# Patient Record
Sex: MALE | Race: WHITE | NOT HISPANIC OR LATINO | Employment: UNEMPLOYED | URBAN - METROPOLITAN AREA
[De-identification: names, ages, dates, MRNs, and addresses within clinical notes are randomized per-mention and may not be internally consistent; named-entity substitution may affect disease eponyms.]

---

## 2017-01-30 ENCOUNTER — TELEPHONE (OUTPATIENT)
Dept: CARDIOTHORACIC SURGERY | Facility: CLINIC | Age: 62
End: 2017-01-30

## 2017-01-30 NOTE — TELEPHONE ENCOUNTER
Returned call, will mail appointment slip closer to the scheduling period during April.  Verbalized understanding and is agreeable.

## 2017-01-30 NOTE — TELEPHONE ENCOUNTER
----- Message from Brian Leal sent at 1/30/2017  1:03 PM CST -----  Pt needs to schedule yearly f/u. Please call regarding this at [phone]

## 2017-05-17 ENCOUNTER — TELEPHONE (OUTPATIENT)
Dept: CARDIOTHORACIC SURGERY | Facility: CLINIC | Age: 62
End: 2017-05-17

## 2017-05-17 ENCOUNTER — HOSPITAL ENCOUNTER (OUTPATIENT)
Dept: RADIOLOGY | Facility: HOSPITAL | Age: 62
Discharge: HOME | End: 2017-05-17
Attending: THORACIC SURGERY (CARDIOTHORACIC VASCULAR SURGERY)
Payer: COMMERCIAL

## 2017-05-17 ENCOUNTER — OFFICE VISIT (OUTPATIENT)
Dept: CARDIOTHORACIC SURGERY | Facility: CLINIC | Age: 62
End: 2017-05-17
Payer: COMMERCIAL

## 2017-05-17 ENCOUNTER — DOCUMENTATION ONLY (OUTPATIENT)
Dept: CARDIOTHORACIC SURGERY | Facility: CLINIC | Age: 62
End: 2017-05-17

## 2017-05-17 VITALS
HEART RATE: 68 BPM | BODY MASS INDEX: 40.92 KG/M2 | WEIGHT: 292.31 LBS | HEIGHT: 71 IN | SYSTOLIC BLOOD PRESSURE: 127 MMHG | DIASTOLIC BLOOD PRESSURE: 83 MMHG | OXYGEN SATURATION: 94 %

## 2017-05-17 DIAGNOSIS — D3A.090 CARCINOID TUMOR OF LEFT LUNG: ICD-10-CM

## 2017-05-17 DIAGNOSIS — D3A.090 CARCINOID TUMOR OF LUNG: Primary | ICD-10-CM

## 2017-05-17 DIAGNOSIS — K76.9 LIVER LESION: ICD-10-CM

## 2017-05-17 DIAGNOSIS — D3A.00 CARCINOID TUMOR: Primary | ICD-10-CM

## 2017-05-17 PROCEDURE — 71250 CT THORAX DX C-: CPT | Mod: 26,,, | Performed by: RADIOLOGY

## 2017-05-17 PROCEDURE — 99999 PR PBB SHADOW E&M-EST. PATIENT-LVL III: CPT | Mod: PBBFAC,,, | Performed by: THORACIC SURGERY (CARDIOTHORACIC VASCULAR SURGERY)

## 2017-05-17 PROCEDURE — 99213 OFFICE O/P EST LOW 20 MIN: CPT | Mod: S$GLB,,, | Performed by: THORACIC SURGERY (CARDIOTHORACIC VASCULAR SURGERY)

## 2017-05-17 PROCEDURE — 99213 OFFICE O/P EST LOW 20 MIN: CPT | Mod: PBBFAC,25 | Performed by: THORACIC SURGERY (CARDIOTHORACIC VASCULAR SURGERY)

## 2017-05-17 NOTE — PATIENT CARE CONFERENCE
"[facility]      THORACIC MULTIDISCIPLINARY TUMOR BOARD  PATIENT REVIEW FORM  ________________________________________________________________________    CLINIC #: [id number]  DATE: 05/17/2017    TUMOR SITE:   Carcinoid    PRESENTER:   Dr. Bell    PATIENT SUMMARY:   62 y/o with typical carcinoid of the left lung. S/p thoracotomy with lung resection for ALIRIO carcinoid on 1/28/16. Pt presents today with repeat surveillance CT chest.   CT chest revealed "1.  Status post resection of left upper lobe and lingula for lingular carcinoid.  Stable postoperative findings.  2.  Several low attenuation lesions are apparent in the liver, new since the earlier studies.  One of the largest lesions is located in the anterior aspect of the RIGHT lobe of the liver, 4 x 2.8 cm (axial series 2 image 57).  Findings are concerning for hepatic metastases of unknown primary.  Sclerotic lesions in multiple vertebral bodies and in the manubrium are also concerning for hematogenous metastases.  3.  New subcentimeter soft tissue nodule in the extrapleural fat below the right middle lobe may represent an enlarge lymph node.  4.  Two subsolid opacities were identified in the left lower lobe on the most recent chest CT, 8/17/2016, the larger measuring 2.4 x 2.0 cm.  Those areas of subtle attenuation have resolved.  No new or persistent lung disease or pleural disease identified on today's study."    BOARD RECOMMENDATIONS:   Obtain triple phase CT abdomen and IR liver biopsy. Recommend Gallium 68 PET based on liver biopsy results.     CONSULT NEEDED:     [] Surgery    [] Hem/Onc    [] Rad/Onc    [] Dietary                 [] Social Service    [] Psychology       [] Pulmonology    Clinical Stage: Tumor  Node(s)  Metastasis   Pathologic Stage: Tumor  Node(s)  Metastasis   CD31: 40 vessels per 1 HPF  Factor VIII: 27 vessels per 1 HPF  Ki67: 1 % tumor cells staining    GROUP STAGE:     [] O    [] 1A    [] IB    [] IIA    [] IIB     [] IIIA  "    [] IIIB     [] IIIC    [] IV                               [] Local recurrence     [] Regional recurrence     [] Distant recurrence                   [] NSCLC     [] SCLC     Tumor type: Typical carcinoid     Unstageable:      [] Yes     [] No  Metastatic site(s):          [x] Pauly'l Treatment Guidelines reviewed and care planned is consistent with guidelines.         (i.e., NCCN, NCI, PD, ACO, AUA, etc.)    PRESENTATION AT CANCER CONFERENCE:         [] Prospective    [] Retrospective     [] Follow-Up          [] Eligible for clinical trial

## 2017-05-17 NOTE — TELEPHONE ENCOUNTER
Called to confirm scheduling of CT scan of abdomen.  Agreeable with scheduling for Friday 5/19 at [facility].  Instructions given NPO 4 hrs before procedure and to arrive  1 hr before scheduled testing.

## 2017-05-17 NOTE — MR AVS SNAPSHOT
"    [facility]  [address]  [facility] [address]  Phone: [phone]  Fax: [phone]                  Elroy Avilaups   2017 10:15 AM   Office Visit    Description:  Male : 1955   Provider:  Pan Bell MD   Department:  [facility]           Reason for Visit     Follow-up                To Do List           Goals (5 Years of Data)     None      [facility]     [facility] On Call Nurse Care Line -  Assistance  Unless otherwise directed by your provider, please contact [facility], our nurse care line that is available for  assistance.     Registered nurses in the [facility] provide: appointment scheduling, clinical advisement, health education, and other advisory services.  Call: [phone] (toll free)               Medications           STOP taking these medications     CARTIA  mg 24 hr capsule     flecainide (TAMBOCOR) 100 MG Tab            Verify that the below list of medications is an accurate representation of the medications you are currently taking.  If none reported, the list may be blank. If incorrect, please contact your healthcare provider. Carry this list with you in case of emergency.           Current Medications            Clinical Reference Information           Your Vitals Were     BP Pulse Height Weight SpO2 BMI    127/83 68 5' 11" (1.803 m) 132.6 kg (292 lb 5.3 oz) 94% 40.77 kg/m2      Blood Pressure          Most Recent Value    BP  127/83      Allergies as of 2017     No Known Allergies      Immunizations Administered on Date of Encounter - 2017     None      [facility] Sign-Up     Activating your [facility] account is as easy as 1-2-3!     1) Visit [email or web address], select Sign Up Now, enter this activation code and your date of birth, then select Next.  Activation code not generated  Current Patient Portal Status: Account disabled      2) Create a username and password to use when you " visit [email or web address] in the future and select a security question in case you lose your password and select Next.    3) Enter your e-mail address and click Sign Up!    Additional Information  If you have questions, please e-mail [email or web address] or call [phone] to talk to our [facility] staff. Remember, [facility] is NOT to be used for urgent needs. For medical emergencies, dial 911.         Language Assistance Services     ATTENTION: Language assistance services are available, free of charge. Please call [phone].      ATENCIÓN: Si habla español, tiene a grover disposición servicios gratuitos de asistencia lingüística. Llame al [phone].     CHÚ Ý: N?u b?n nói Ti?ng Vi?t, có các d?ch v? h? tr? ngôn ng? mi?n phí dành cho b?n. G?i s? [phone].         [facility] complies with applicable Federal civil rights laws and does not discriminate on the basis of race, color, national origin, age, disability, or sex.

## 2017-05-17 NOTE — PROGRESS NOTES
GENERAL SURGERY  Progress Note      SUBJECTIVE:     Elroy Rahman is a 61 y.o. male s/p thoracotomy with lung resection for ALIRIO carcinoid who presents for his 9 month follow up with repeat CT. His post-operative course was complicated by atrial fibrillation while inpatient which was successfully cardioverted and started on flecainide. However, he states he has been taken off of his medication since his last visit. He only takes a daily multivitamin. He denies SOB, chest pain, or chest palpitations.     Follow up CT shows no new concerning lesions or pulmonary pathology.     OBJECTIVE:     Most Recent Vitals:  Pulse: 68 (05/17/17 0954)  BP: 127/83 (05/17/17 0954)  SpO2: (!) 94 % (05/17/17 0954)    Physical Exam  Gen: NAD  Lungs: CTAB  Heart: RRR  Extremities: WWP, left leg prosthetic    Diagnostic Studes:  CT: Reviewed  No new concerning lesions or pulmonary pathology    ASSESSMENT:     Elroy Rahman is a 61 y.o. male 9 months s/p thoracotomy with lung resection for ALIRIO carcinoid who has no clinical or radiographic evidence of new disease.    Plan:    - RTC in 9 months with Chest CT w/o contrast    ATTENDING ATTESTATION:    I evaluated the patient and I agree with the assessment and plan.  The LLL ground glass density appears to have resolved.  The soft tissue density adjacent to the bronchial stump remains stable.  I see no new lung nodules or adenopathy. There are multiple subtle liver densities present.  I discussed the patient's case at our multidisciplinary lung conference.  A triple-phase abdominal CT scan followed by IR biopsy was recommended.  It was strongly felt that the liver lesions are likely from an intraabdominal source if they prove to be malignant. We will make the necessary referrals based upon the path findings from the percutaneous liver biopsy. I will see the patient to discuss the liver biopsy findings.  From a carcinoid tumor standpoint, I will see the patient again in 9 months.  If there  is no evidence of recurrent lung disease at that time, I will extend the follow up interval to yearly follow up.

## 2017-05-18 DIAGNOSIS — R16.0 LIVER MASS: Primary | ICD-10-CM

## 2017-05-19 ENCOUNTER — HOSPITAL ENCOUNTER (OUTPATIENT)
Dept: RADIOLOGY | Facility: HOSPITAL | Age: 62
Discharge: HOME | End: 2017-05-19
Attending: THORACIC SURGERY (CARDIOTHORACIC VASCULAR SURGERY)
Payer: COMMERCIAL

## 2017-05-19 DIAGNOSIS — K76.9 LIVER LESION: ICD-10-CM

## 2017-05-19 PROCEDURE — 74160 CT ABDOMEN W/CONTRAST: CPT | Mod: 26,,, | Performed by: RADIOLOGY

## 2017-05-19 PROCEDURE — 25500020 PHARM REV CODE 255: Performed by: THORACIC SURGERY (CARDIOTHORACIC VASCULAR SURGERY)

## 2017-05-19 PROCEDURE — 74160 CT ABDOMEN W/CONTRAST: CPT | Mod: TC

## 2017-05-19 RX ADMIN — IOHEXOL 100 ML: 350 INJECTION, SOLUTION INTRAVENOUS at 11:05

## 2017-05-31 ENCOUNTER — TELEPHONE (OUTPATIENT)
Dept: CARDIOTHORACIC SURGERY | Facility: CLINIC | Age: 62
End: 2017-05-31

## 2017-05-31 NOTE — TELEPHONE ENCOUNTER
Dr. Bell spoke with the pt's wife, discussed the need for IR liver biopsy after pt had the CT abdomen. Pt is agreeable to proceed. Notified Nena in IR the pt is ready to schedule. She will reach out to the pt to set up the bx.    ----- Message from Billy Beebe sent at 5/31/2017 12:41 PM CDT -----  [phone]//pt states that he needs to speak with nurse in ref to his ct results and what is being scheduled and why//please call/thank you

## 2017-06-02 DIAGNOSIS — K76.9 LIVER LESION: Primary | ICD-10-CM

## 2017-06-08 ENCOUNTER — SURGERY (OUTPATIENT)
Age: 62
End: 2017-06-08

## 2017-06-08 ENCOUNTER — HOSPITAL ENCOUNTER (OUTPATIENT)
Facility: HOSPITAL | Age: 62
Discharge: HOME | End: 2017-06-08
Attending: RADIOLOGY | Admitting: RADIOLOGY
Payer: COMMERCIAL

## 2017-06-08 VITALS
DIASTOLIC BLOOD PRESSURE: 70 MMHG | TEMPERATURE: 98 F | SYSTOLIC BLOOD PRESSURE: 119 MMHG | HEART RATE: 64 BPM | OXYGEN SATURATION: 98 % | RESPIRATION RATE: 18 BRPM | BODY MASS INDEX: 36.4 KG/M2 | WEIGHT: 260 LBS | HEIGHT: 71 IN

## 2017-06-08 DIAGNOSIS — K76.9 LIVER LESION: ICD-10-CM

## 2017-06-08 LAB
BASOPHILS # BLD AUTO: 0.02 K/UL
BASOPHILS NFR BLD: 0.4 %
DIFFERENTIAL METHOD: NORMAL
EOSINOPHIL # BLD AUTO: 0.1 K/UL
EOSINOPHIL NFR BLD: 1.8 %
ERYTHROCYTE [DISTWIDTH] IN BLOOD BY AUTOMATED COUNT: 13.5 %
HCT VFR BLD AUTO: 43.9 %
HGB BLD-MCNC: 15 G/DL
INR PPP: 1
LYMPHOCYTES # BLD AUTO: 1.5 K/UL
LYMPHOCYTES NFR BLD: 25.7 %
MCH RBC QN AUTO: 29.2 PG
MCHC RBC AUTO-ENTMCNC: 34.2 %
MCV RBC AUTO: 85 FL
MONOCYTES # BLD AUTO: 0.6 K/UL
MONOCYTES NFR BLD: 9.7 %
NEUTROPHILS # BLD AUTO: 3.5 K/UL
NEUTROPHILS NFR BLD: 62.2 %
PLATELET # BLD AUTO: 236 K/UL
PMV BLD AUTO: 9.9 FL
PROTHROMBIN TIME: 10.6 SEC
RBC # BLD AUTO: 5.14 M/UL
WBC # BLD AUTO: 5.67 K/UL

## 2017-06-08 PROCEDURE — 88307 TISSUE EXAM BY PATHOLOGIST: CPT | Performed by: PATHOLOGY

## 2017-06-08 PROCEDURE — 88342 IMHCHEM/IMCYTCHM 1ST ANTB: CPT | Mod: 26,59,, | Performed by: PATHOLOGY

## 2017-06-08 PROCEDURE — 88333 PATH CONSLTJ SURG CYTO XM 1: CPT | Mod: 26,,, | Performed by: PATHOLOGY

## 2017-06-08 PROCEDURE — 88360 TUMOR IMMUNOHISTOCHEM/MANUAL: CPT | Mod: 26,,, | Performed by: PATHOLOGY

## 2017-06-08 PROCEDURE — 85610 PROTHROMBIN TIME: CPT

## 2017-06-08 PROCEDURE — 63600175 PHARM REV CODE 636 W HCPCS: Performed by: RADIOLOGY

## 2017-06-08 PROCEDURE — 25000003 PHARM REV CODE 250: Performed by: NURSE PRACTITIONER

## 2017-06-08 PROCEDURE — 85025 COMPLETE CBC W/AUTO DIFF WBC: CPT

## 2017-06-08 PROCEDURE — 88341 IMHCHEM/IMCYTCHM EA ADD ANTB: CPT | Mod: 26,,, | Performed by: PATHOLOGY

## 2017-06-08 PROCEDURE — 25000003 PHARM REV CODE 250: Performed by: RADIOLOGY

## 2017-06-08 RX ORDER — SODIUM CHLORIDE 9 MG/ML
500 INJECTION, SOLUTION INTRAVENOUS CONTINUOUS
Status: DISCONTINUED | OUTPATIENT
Start: 2017-06-08 | End: 2017-06-08 | Stop reason: HOSPADM

## 2017-06-08 RX ORDER — MIDAZOLAM HYDROCHLORIDE 1 MG/ML
INJECTION, SOLUTION INTRAMUSCULAR; INTRAVENOUS CODE/TRAUMA/SEDATION MEDICATION
Status: COMPLETED | OUTPATIENT
Start: 2017-06-08 | End: 2017-06-08

## 2017-06-08 RX ORDER — LIDOCAINE HYDROCHLORIDE 10 MG/ML
1 INJECTION, SOLUTION EPIDURAL; INFILTRATION; INTRACAUDAL; PERINEURAL ONCE
Status: COMPLETED | OUTPATIENT
Start: 2017-06-08 | End: 2017-06-08

## 2017-06-08 RX ORDER — FENTANYL CITRATE 50 UG/ML
INJECTION, SOLUTION INTRAMUSCULAR; INTRAVENOUS CODE/TRAUMA/SEDATION MEDICATION
Status: COMPLETED | OUTPATIENT
Start: 2017-06-08 | End: 2017-06-08

## 2017-06-08 RX ORDER — HYDROCODONE BITARTRATE AND ACETAMINOPHEN 5; 325 MG/1; MG/1
1 TABLET ORAL EVERY 4 HOURS PRN
Status: DISCONTINUED | OUTPATIENT
Start: 2017-06-08 | End: 2017-06-08 | Stop reason: HOSPADM

## 2017-06-08 RX ADMIN — FENTANYL CITRATE 50 MCG: 50 INJECTION, SOLUTION INTRAMUSCULAR; INTRAVENOUS at 08:06

## 2017-06-08 RX ADMIN — SODIUM CHLORIDE 500 ML: 0.9 INJECTION, SOLUTION INTRAVENOUS at 07:06

## 2017-06-08 RX ADMIN — LIDOCAINE HYDROCHLORIDE 0.2 MG: 10 INJECTION, SOLUTION EPIDURAL; INFILTRATION; INTRACAUDAL; PERINEURAL at 07:06

## 2017-06-08 RX ADMIN — MIDAZOLAM HYDROCHLORIDE 1 MG: 1 INJECTION, SOLUTION INTRAMUSCULAR; INTRAVENOUS at 08:06

## 2017-06-08 NOTE — PLAN OF CARE
Pt arrived to ROCU bed 3 for 3 hour post CT guided liver biopsy recovery. Report received from SONIA Pemberton. Pt oriented to unit and staff.  Stretcher locked and placed in lowest position. Calming environment promoted. Comfort measures provided. Pt resting comfortably. Dressing CDI. VSS. No acute events. Wife to bedside. See flow sheets for post procedure monitoring. Pt denies pain/discomfort.  Will continue to monitor.

## 2017-06-08 NOTE — PROCEDURES
"Radiology Post-Procedure Note    Pre Op Diagnosis: right liver mass    Post Op Diagnosis: right liver mass    Procedure: right liver biopsy    Procedure performed by: Salvador Rudd MD and Soren Abarca MD    Written Informed Consent Obtained: Yes    Specimen Removed: YES  4 passes    Estimated Blood Loss: Minimal    Findings:   Using CT guidance a 19g sheath needle was placed into a right liver mass. 20g monopty biopsy gun used to take 4 core biopsy samples. Specimen sent to pathology.     Patient tolerated procedure well.    Soren Abarca MD (Buck)  Radiology PGY-3  [phone]      "

## 2017-06-08 NOTE — PROGRESS NOTES
Pt given discharge instructions/handouts. Pt and daughter both verbalize understanding. Questions answered. PIV dc'd. No acute events. Pt resting comfortably denies pain/discomfort. Pt to garage with all personal belongings. Pt refused transport, ambulating to garage. Daughter to provide transport home.

## 2017-06-08 NOTE — H&P
Radiology History & Physical      SUBJECTIVE:     Chief Complaint: multiple liver lesions    History of Present Illness:  Elroy Rahman is a 61 y.o. male who presents for Liver mass biopsy.    Past Medical History:   Diagnosis Date    Carcinoid bronchial adenoma of left lung     Cough with hemoptysis     mild/intermittent    Mild heartburn     Sleep apnea     Snores      Past Surgical History:   Procedure Laterality Date    Bka Left        Home Meds:   Prior to Admission medications    Not on File     Anticoagulants/Antiplatelets: no anticoagulation    Allergies: Review of patient's allergies indicates:  No Known Allergies  Sedation History:  no adverse reactions    Review of Systems:   Hematological: no known coagulopathies  Respiratory: no shortness of breath  Cardiovascular: no chest pain  Gastrointestinal: no abdominal pain  Genito-Urinary: no dysuria  Musculoskeletal: negative  Neurological: no TIA or stroke symptoms         OBJECTIVE:     Vital Signs (Most Recent)  Temp: 97.8 °F (36.6 °C) (06/08/17 0732)  Pulse: 62 (06/08/17 0732)  Resp: 20 (06/08/17 0732)  BP: 132/77 (06/08/17 0732)  SpO2: 96 % (06/08/17 0732)    Physical Exam:  ASA: 2  Mallampati: 2    General: no acute distress  Mental Status: alert and oriented to person, place and time  HEENT: normocephalic, atraumatic  Chest: unlabored breathing  Heart: regular heart rate  Abdomen: nondistended  Extremity: moves all extremities    Laboratory  Lab Results   Component Value Date    INR 1.0 06/08/2017       Lab Results   Component Value Date    WBC 5.67 06/08/2017    HGB 15.0 06/08/2017    HCT 43.9 06/08/2017    MCV 85 06/08/2017     06/08/2017      Lab Results   Component Value Date     05/19/2017     05/19/2017    K 4.1 05/19/2017     05/19/2017    CO2 28 05/19/2017    BUN 13 05/19/2017    CREATININE 1.0 05/19/2017    CALCIUM 9.6 05/19/2017    MG 2.0 02/04/2016       ASSESSMENT/PLAN:     Sedation Plan: moderate  Patient  "will undergo Liver mass biopsy.    Soren Abarca MD ([facility])  Radiology PGY-3  [phone]      "

## 2017-06-08 NOTE — PROGRESS NOTES
Liver biopsy complete. Pt tolerated well. VSS. No signs or symptoms of distress noted.  Pressure held per MD.  Bandaid to ruq CDI.  Pt to remain right side down for one hour, up at 1015.  Pt will be transferred to ROCU bed and report to be given at bedside.

## 2017-06-08 NOTE — DISCHARGE SUMMARY
"Radiology Discharge Summary      Hospital Course: No complications    Admit Date: 6/8/2017  Discharge Date: 06/08/2017     Instructions Given to Patient: Yes  Diet: Resume prior diet  Activity: activity as tolerated    Description of Condition on Discharge: Stable  Vital Signs (Most Recent): Temp: 98.4 °F (36.9 °C) (06/08/17 0915)  Pulse: 64 (06/08/17 1215)  Resp: 18 (06/08/17 1215)  BP: 119/70 (06/08/17 1215)  SpO2: 98 % (06/08/17 1215)    Discharge Disposition: Home    Discharge Diagnosis: liver mass    Follow-up: with Dr. Bell's clinic    Soren Abarca MD ([facility])  Radiology PGY-3  [phone]      "

## 2017-06-08 NOTE — PROGRESS NOTES
Pt arrived to  for Liver mass biopsy.  Name verified using two identifiers.  Allergies verified.  Will continue to monitor.

## 2017-06-14 DIAGNOSIS — C7B.00 METASTATIC CARCINOID TUMOR: Primary | ICD-10-CM

## 2017-06-15 DIAGNOSIS — C7B.8 SECONDARY NEUROENDOCRINE TUMOR OF LIVER: ICD-10-CM

## 2017-06-15 DIAGNOSIS — C7B.8 SECONDARY NEUROENDOCRINE TUMOR OF BONE(209.73): ICD-10-CM

## 2017-06-15 DIAGNOSIS — D3A.090 BRONCHIAL CARCINOID TUMORS: ICD-10-CM

## 2017-06-16 ENCOUNTER — OFFICE VISIT (OUTPATIENT)
Dept: CARDIOTHORACIC SURGERY | Facility: CLINIC | Age: 62
End: 2017-06-16
Payer: COMMERCIAL

## 2017-06-16 VITALS
WEIGHT: 297.63 LBS | BODY MASS INDEX: 41.67 KG/M2 | SYSTOLIC BLOOD PRESSURE: 136 MMHG | HEIGHT: 71 IN | OXYGEN SATURATION: 95 % | DIASTOLIC BLOOD PRESSURE: 74 MMHG | HEART RATE: 65 BPM

## 2017-06-16 DIAGNOSIS — C7B.02 METASTATIC MALIGNANT CARCINOID TUMOR TO LIVER: ICD-10-CM

## 2017-06-16 DIAGNOSIS — C7A.090 CARCINOID TUMOR, BRONCHUS, LUNG, MALIGNANT: Primary | ICD-10-CM

## 2017-06-16 PROCEDURE — 99213 OFFICE O/P EST LOW 20 MIN: CPT | Mod: S$GLB,,, | Performed by: THORACIC SURGERY (CARDIOTHORACIC VASCULAR SURGERY)

## 2017-06-16 PROCEDURE — 99999 PR PBB SHADOW E&M-EST. PATIENT-LVL III: CPT | Mod: PBBFAC,,, | Performed by: THORACIC SURGERY (CARDIOTHORACIC VASCULAR SURGERY)

## 2017-06-16 PROCEDURE — 99213 OFFICE O/P EST LOW 20 MIN: CPT | Mod: PBBFAC | Performed by: THORACIC SURGERY (CARDIOTHORACIC VASCULAR SURGERY)

## 2017-06-16 NOTE — PROGRESS NOTES
Subjective:       Patient ID: Elroy Rahman is a 61 y.o. male.    Chief Complaint: Follow-up    HPI   60 y/o with typical carcinoid of the left lung. S/p thoracotomy with lung resection for ALIRIO carcinoid on 1/28/16. On repeat surveillance CT chest on 5/17/17 noted to have several low attenuation lesions in the liver with there largest measuring 4 x 2.8 cm. Discussed at [facility] and recommendations for CT abdomen and liver biopsy. Subsequent IR liver biopsy on 6/8/17 revealing metastatic neuroendocrine tumor. Here today to discuss biopsy results.     Review of Systems      Objective:      Physical Exam      Pathology:   CT guided liver biopsy:  Positive for metastatic neuroendocrine tumor, see comment    CT abdomen 5/19/17:  There at least 9 discrete hepatic lesions as above, the majority of which are hypoenhancing. Findings consistent with metastasis.  Multiple sclerotic bony lesions, consistent with metastatic disease.  Stable right adrenal nodule, in keeping with an adenoma.    Assessment:       60 y/o with typical carcinoid of the left lung. S/p thoracotomy with lung resection for ALIRIO carcinoid on 1/28/16 with new liver lesions s/p IR biopsy revealing metastatic neuroendocrine tumor. Pathology discussed with patient.     Plan:       Plan for NM PET Gallium. Awaiting insurance approval. If not approved will plan for NM tumor localization with multi day Octreotide CT  Keep appointment with Dr. Florian     ATTENDING ATTESTATION:    I evaluated the patient and I agree with the assessment and plan.  The purpose of today's visit was to discuss the path report from the CT guided liver biopsy.  The path findings are consistent with metastatic neuroendocrine tumor to the liver.  The ki67 is 25% and there is no nuclear atypia or mitosis.  In comparison, the ki67 from the resected ALIRIO carcinoid tumor is <1%.  The patient has no symptoms and the liver lesions were identified during routine follow up.  Dr. London will see the  patient to discuss medical management.

## 2017-06-20 ENCOUNTER — TELEPHONE (OUTPATIENT)
Dept: CARDIOTHORACIC SURGERY | Facility: CLINIC | Age: 62
End: 2017-06-20

## 2017-06-20 NOTE — TELEPHONE ENCOUNTER
Called to remind pt of scheduled PET CT tomorrow 6/21.  Verbalized understanding of instructions NPO after midnight.

## 2017-06-21 ENCOUNTER — DOCUMENTATION ONLY (OUTPATIENT)
Dept: CARDIOTHORACIC SURGERY | Facility: CLINIC | Age: 62
End: 2017-06-21

## 2017-06-21 ENCOUNTER — HOSPITAL ENCOUNTER (OUTPATIENT)
Dept: RADIOLOGY | Facility: HOSPITAL | Age: 62
Discharge: HOME | End: 2017-06-21
Attending: INTERNAL MEDICINE
Payer: COMMERCIAL

## 2017-06-21 VITALS — WEIGHT: 297.38 LBS | BODY MASS INDEX: 41.48 KG/M2

## 2017-06-21 DIAGNOSIS — D3A.090 BRONCHIAL CARCINOID TUMORS: ICD-10-CM

## 2017-06-21 DIAGNOSIS — C7B.8 SECONDARY NEUROENDOCRINE TUMOR OF BONE(209.73): ICD-10-CM

## 2017-06-21 DIAGNOSIS — C7B.8 SECONDARY NEUROENDOCRINE TUMOR OF LIVER: ICD-10-CM

## 2017-06-21 PROCEDURE — A9587 GALLIUM GA-68: HCPCS

## 2017-06-21 PROCEDURE — 78815 PET IMAGE W/CT SKULL-THIGH: CPT | Mod: 26,PI,, | Performed by: RADIOLOGY

## 2017-06-21 NOTE — PATIENT CARE CONFERENCE
"[facility]      THORACIC MULTIDISCIPLINARY TUMOR BOARD  PATIENT REVIEW FORM  ________________________________________________________________________    CLINIC #: [id number]  DATE: 06/21/2017    TUMOR SITE:   Metastatic Neuroendocrine    PRESENTER:   Dr. Bell    PATIENT SUMMARY:   60 y/o with typical carcinoid of the left lung. S/p thoracotomy with lung resection for ALIRIO carcinoid on 1/28/16.   Restaging CT chest performed on 5/17/17 revealed   "1.Status post resection of left upper lobe and lingula for lingular carcinoid.  Stable postoperative findings.  2. Several low attenuation lesions are apparent in the liver, new since the earlier studies.  One of the largest lesions is located in the anterior aspect of the RIGHT lobe of the liver, 4 x 2.8 cm.  Findings are concerning for hepatic metastases of unknown primary.  Sclerotic lesions in multiple vertebral bodies and in the manubrium are also concerning for hematogenous metastases.  3. New subcentimeter soft tissue nodule in the extrapleural fat below the right middle lobe may represent an enlarge lymph node.  4. Two subsolid opacities were identified in the left lower lobe on the most recent chest CT, 8/17/2016, the larger measuring 2.4 x 2.0 cm.  Those areas of subtle attenuation have resolved.  No new or persistent lung disease or pleural disease identified on today's study."  Patients case was presented on 5/17/17 at thoracic tumor board conference, recommendation to obtain triple phase CT abdomen and IR liver biopsy.  Triple phase liver CT notes "There at least 9 discrete hepatic lesions as above, the majority of which are hypoenhancing. Findings consistent with metastasis.  Multiple sclerotic bony lesions, consistent with metastatic disease.  Stable right adrenal nodule, in keeping with an adenoma."   Subsequent IR liver biopsy on 6/8/17 revealing metastatic neuroendocrine tumor.    BOARD RECOMMENDATIONS:   Obtain Gallium PET and refer to " heme/onc to discuss treatment options.     CONSULT NEEDED:     [] Surgery    [x] Hem/Onc    [] Rad/Onc    [] Dietary                 [] Social Service    [] Psychology       [] Pulmonology    Clinical Stage: Tumor  Node(s)  Metastasis   Pathologic Stage: Tumor  Node(s)  Metastasis   Synaptophysin: Negative Chromogranin: Negative  Thyroid transcription factor (TTF): Negative CK7 CDX2: Negative KI67: 25%    GROUP STAGE:     [] O    [] 1A    [] IB    [] IIA    [] IIB     [] IIIA     [] IIIB     [] IIIC    [] IV                               [] Local recurrence     [] Regional recurrence     [] Distant recurrence                   [] NSCLC     [] SCLC     Tumor type: Neuroendocrine    Unstageable:      [] Yes     [] No  Metastatic site(s): Liver          [x] Pauly'l Treatment Guidelines reviewed and care planned is consistent with guidelines.         (i.e., NCCN, NCI, PD, ACO, AUA, etc.)    PRESENTATION AT CANCER CONFERENCE:         [] Prospective    [] Retrospective     [] Follow-Up          [] Eligible for clinical trial

## 2017-06-26 ENCOUNTER — INITIAL CONSULT (OUTPATIENT)
Dept: HEMATOLOGY/ONCOLOGY | Facility: CLINIC | Age: 62
End: 2017-06-26
Payer: COMMERCIAL

## 2017-06-26 VITALS
RESPIRATION RATE: 16 BRPM | HEIGHT: 71 IN | OXYGEN SATURATION: 96 % | SYSTOLIC BLOOD PRESSURE: 132 MMHG | DIASTOLIC BLOOD PRESSURE: 75 MMHG | BODY MASS INDEX: 42.19 KG/M2 | HEART RATE: 58 BPM | TEMPERATURE: 98 F | WEIGHT: 301.38 LBS

## 2017-06-26 DIAGNOSIS — D3A.090 BRONCHIAL CARCINOID TUMORS: Primary | ICD-10-CM

## 2017-06-26 DIAGNOSIS — C7B.8 SECONDARY NEUROENDOCRINE TUMOR OF LIVER: ICD-10-CM

## 2017-06-26 DIAGNOSIS — C7B.8 SECONDARY NEUROENDOCRINE TUMOR OF BONE(209.73): ICD-10-CM

## 2017-06-26 PROCEDURE — 99205 OFFICE O/P NEW HI 60 MIN: CPT | Mod: S$GLB,,, | Performed by: INTERNAL MEDICINE

## 2017-06-26 PROCEDURE — 99999 PR PBB SHADOW E&M-EST. PATIENT-LVL III: CPT | Mod: PBBFAC,,, | Performed by: INTERNAL MEDICINE

## 2017-06-26 NOTE — LETTER
June 26, 2017      Pan Bell MD  [address]  [facility] [address]           [facility]  [address]  [facility] [address]  Phone: [phone]          Patient: Elroy Rahman   MR Number: [id number]   YOB: 1955   Date of Visit: 6/26/2017       Dear Dr. Pan Bell:    Thank you for referring Elroy Rahman to me for evaluation. Attached you will find relevant portions of my assessment and plan of care.    If you have questions, please do not hesitate to call me. I look forward to following Elroy Rahman along with you.    Sincerely,    Jesus London DO, FACP    Enclosure  CC:  No Recipients    If you would like to receive this communication electronically, please contact [email or web address] or [phone] to request more information on [employer] Link access.    For providers and/or their staff who would like to refer a patient to [facility], please contact us through our one-stop-shop provider referral line, [facility], at [phone].    If you feel you have received this communication in error or would no longer like to receive these types of communications, please e-mail [email or web address]

## 2017-06-26 NOTE — PROGRESS NOTES
PATIENT: Elroy Rahman  MRN: [id number]  DATE: 6/26/2017      Diagnosis:   1. Bronchial carcinoid tumors    2. Secondary neuroendocrine tumor of bone    3. Secondary neuroendocrine tumor of liver        Chief Complaint: Carcinoid Tumor      Oncologic History:      Oncologic History Bronchial carcinoid, left, diagnosed 1/2016     Oncologic Treatment  left upper lobectomy 1/2016    Pathology 1/2016: Typical carcinoid tumor, well-differentiated T2a, N0, M0 Ki-67 <1%  6/2017: Liver biopsy, well-differentiated, Ki-67 25%           Subjective:    Interval History: Mr. Rahman is a 61 y.o. male who is seen as an initial visit for a bronchial carcinoid tumor.  His history dates to 1/2016 when he underwent a left upper lobectomy with Dr. Bell for a typical carcinoid tumor.  His pathological staging was T2a, N0, M0 with Ki-67 less than 1% and mitotic count 0 per 10 high-powered fields.  He done well postoperatively, however, in 5/2017 he was undergoing routine surveillance and a chest CT in picked up evidence of progression of disease in the liver.  Dedicated abdominal CT was performed showing multiple liver lesions.  In 6/2017 he underwent a CT-guided liver biopsy which was positive for metastatic neuroendocrine tumor which was well-differentiated with no overt nuclear atypia, however, the Ki-67 was 25%.  He underwent gallium-68 PET/CT and was found to have widespread disease involving the bone, liver and also mesentery.    He states that he feels well.  He denies any shortness of breath.  Denies any diarrhea, flushing or wheezing.  He has not lost weight.  He is fully active.  He is without complaints.    Past Medical History:   Past Medical History:   Diagnosis Date    Carcinoid bronchial adenoma of left lung     Cough with hemoptysis     mild/intermittent    Mild heartburn     Secondary neuroendocrine tumor of bone 6/15/2017    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    Sleep apnea     Snores   "      Past Surgical HIstory:   Past Surgical History:   Procedure Laterality Date    Bka Left        Family History:   Family History   Problem Relation Age of Onset    Cancer Mother      lung    Cancer Sister     Anesthesia problems Neg Hx        Social History:  reports that he has never smoked. He has never used smokeless tobacco. He reports that he drinks about 0.6 oz of alcohol per week . He reports that he does not use drugs.    Allergies:  Review of patient's allergies indicates:  No Known Allergies    Medications:  No current outpatient prescriptions on file.     No current facility-administered medications for this visit.        Review of Systems   Constitutional: Negative for appetite change, chills, fatigue, fever and unexpected weight change.   HENT: Negative for dental problem, sinus pressure and sneezing.    Eyes: Negative for visual disturbance.   Respiratory: Negative for cough, choking and chest tightness.    Cardiovascular: Negative for chest pain and leg swelling.   Gastrointestinal: Negative for abdominal pain, blood in stool, constipation, diarrhea and nausea.   Genitourinary: Negative for difficulty urinating and dysuria.   Musculoskeletal: Negative for arthralgias and back pain.   Skin: Negative for rash and wound.   Neurological: Negative for dizziness, light-headedness and headaches.   Hematological: Negative for adenopathy. Does not bruise/bleed easily.   Psychiatric/Behavioral: Negative for sleep disturbance. The patient is not nervous/anxious.        ECOG Performance Status:   ECOG SCORE           Objective:      Vitals:   Vitals:    06/26/17 1307   BP: 132/75   BP Location: Left arm   Patient Position: Sitting   BP Method: Automatic   Pulse: (!) 58   Resp: 16   Temp: 98 °F (36.7 °C)   TempSrc: Oral   SpO2: 96%   Weight: (!) 136.7 kg (301 lb 5.9 oz)   Height: 5' 11" (1.803 m)     BMI: Body mass index is 42.03 kg/m².    Physical Exam   Constitutional: He is oriented to person, place, " and time. He appears well-developed and well-nourished.   HENT:   Head: Normocephalic and atraumatic.   Eyes: Pupils are equal, round, and reactive to light.   Neck: Normal range of motion. Neck supple.   Cardiovascular: Normal rate and regular rhythm.    Pulmonary/Chest: Effort normal and breath sounds normal. No respiratory distress.   Abdominal: Soft. He exhibits no distension. There is no tenderness.   Musculoskeletal: He exhibits no edema or tenderness.   Left lower extremity prosthesis   Lymphadenopathy:     He has no cervical adenopathy.   Neurological: He is alert and oriented to person, place, and time. No cranial nerve deficit.   Skin: Skin is warm and dry.   Psychiatric: He has a normal mood and affect. His behavior is normal.       Laboratory Data:  No visits with results within 1 Week(s) from this visit.   Latest known visit with results is:   Admission on 06/08/2017, Discharged on 06/08/2017   Component Date Value Ref Range Status    WBC 06/08/2017 5.67  3.90 - 12.70 K/uL Final    RBC 06/08/2017 5.14  4.60 - 6.20 M/uL Final    Hemoglobin 06/08/2017 15.0  14.0 - 18.0 g/dL Final    Hematocrit 06/08/2017 43.9  40.0 - 54.0 % Final    MCV 06/08/2017 85  82 - 98 fL Final    MCH 06/08/2017 29.2  27.0 - 31.0 pg Final    MCHC 06/08/2017 34.2  32.0 - 36.0 % Final    RDW 06/08/2017 13.5  11.5 - 14.5 % Final    Platelets 06/08/2017 236  150 - 350 K/uL Final    MPV 06/08/2017 9.9  9.2 - 12.9 fL Final    Gran # 06/08/2017 3.5  1.8 - 7.7 K/uL Final    Lymph # 06/08/2017 1.5  1.0 - 4.8 K/uL Final    Mono # 06/08/2017 0.6  0.3 - 1.0 K/uL Final    Eos # 06/08/2017 0.1  0.0 - 0.5 K/uL Final    Baso # 06/08/2017 0.02  0.00 - 0.20 K/uL Final    Gran% 06/08/2017 62.2  38.0 - 73.0 % Final    Lymph% 06/08/2017 25.7  18.0 - 48.0 % Final    Mono% 06/08/2017 9.7  4.0 - 15.0 % Final    Eosinophil% 06/08/2017 1.8  0.0 - 8.0 % Final    Basophil% 06/08/2017 0.4  0.0 - 1.9 % Final    Differential Method  06/08/2017 Automated   Final    Prothrombin Time 06/08/2017 10.6  9.0 - 12.5 sec Final    INR 06/08/2017 1.0  0.8 - 1.2 Final    Comment: Coumadin Therapy:  2.0 - 3.0 for INR for all indicators except mechanical heart valves  and antiphospholipid syndromes which should use 2.5 - 3.5.       Supplemental Diagnosis  Chromogranin Staining:  Intensity: Moderate to strong. Positive cells: 100 (%)  Synaptophysin Staining:  Intensity: Strong. Positive cells: 100 (%)  CD31: 40 vessels per 1 HPF  Factor VIII: 27 vessels per 1 HPF  Ki67: 1 % tumor cells staining  Immunostains performed with appropriate positive and negative controls.  FINAL PATHOLOGIC DIAGNOSIS  1. Lymph node, level XI, excisional biopsy:  Fragments of benign lymph node with anthracosis and sinus histiocytosis (0/1).  2. Lung, lingular staple line, biopsy:  Lung, negative for neoplasm.  3. Lymph node, level X, excisional biopsy:  1 benign lymph node with anthracotic pigment and sinus histiocytosis (0/1).  4. Lymph node, level XII, excisional biopsy:  2 benign lymph nodes with sinus histiocytosis and anthracotic pigment (0/2).  5. Lung, left upper lobe, lobectomy:  Typical carcinoid tumor (well differentiated neuroendocrine tumor), 4 cm in greatest dimension.  Bronchial and vascular margins are negative for neoplasm.  Uninvolved lung adjacent to the tumor shows emphysematous changes and fibrosis, however remaining lung  appears unremarkable.  Additional immunohistochemical stains for ancillary studies (including synaptophysin, chromogranin and Ki-67)  will be completed and results will be reported in a supplemental report.  See synoptic report in comment section for further details.  6. Lymph node, level VII, excisional biopsy:  2 benign lymph nodes with anthracotic pigment and sinus histiocytosis (0/2).  Comment: Synoptic Report  Specimen: Left upper lobe of lung  Procedure: Lobectomy  Specimen laterality: Left  Tumor size:  Greatest dimension: 4 cm  Tumor  focality: Single focus  Histologic type: Typical carcinoid tumor  Visceral pleural invasion: Not identified  Tumor extension: Not identified  Margins: Bronchial and vascular margins are uninvolved by tumor.  Distance of tumor from closest margin: 1 cm from the bronchial surgical margin.  Lymphovascular invasion: Not identified  Ancillary studies:  Immunohistochemical stains for neuroendocrine markers and Ki-67 will be completed and results will follow in a  supplemental report.  Note: The tumor consists of a proliferation of cells in nests with lewis formation. The tumor cells have salt and  pepper chromatin pattern with abundant cytoplasm. There is no significant nuclear atypia. No evidence of  necrosis. Mitotic count is 0 mitoses in 10 high power fields.    Imaging:   Gallium-68 PET/CT 6/15/17  Gallium 68 PET-CT IMAGING :     History: History of metastatic carcinoid    Comparison: CT chest and abdomen 5/17/2017.    Technique:   The fasting blood glucose level was 96 mg/dl. 4.66 mCi of gallium 68 was administered intravenously in the left arm.  PET/CT images were acquired from the top of the skull through the thighs.  Noncontrast CT was performed for attenuation correction purposes.    Findings:    Quality of the study: Adequate.     There is widespread multifocal mostly blastic osseous metastatic disease to the axial and proximal appendicular skeleton including the right petroclival ridge, right humerus, cervical, thoracic, lumbar spine, sacrum, and pelvis, bilateral femurs, bilateral scapula, and bilateral rib cage.  Additional sternal lesions are also noted.  There is also diffuse hepatic metastatic disease as well as soft tissue metastatic deposits involving the scalp, right orbit an area of soft tissue thickening abutting the globe which may be involving the insertional fibers of the medial rectus with intraconal invasion not excluded.  There is also metastatic disease within the left upper quadrant  mesentery.    Index lesions are given as follows:    -Left orbital lesion demonstrates a max SUV of 8.0  -The left coracoid process of the scapula demonstrates a max SUV of 36.33  -Left hepatic lobe segment II liver lesion demonstrates a max SUV of 50.73 and is difficult to measure on this noncontrast CT.  -Left sacral ala demonstrates a max SUV of 51.1.    There is a right adrenal adenoma measuring 3.9 cm.  There is nonobstructing left nephrolithiasis.  There are postoperative changes of left upper lobe pulmonary resection.  There is mild coronary and aortic calcific atherosclerosis.   Impression         Widespread osseous metastatic disease to the axial and appendicular skeleton as above.    Diffuse hepatic metastatic disease.    Metastatic disease to the left upper quadrant mesentery.    Left orbital lesion abutting the medial globe with invasion of the intraconal structures not excluded.    Multiple scalp lesions.    Index lesions are given above.  ______________________________________             Assessment:       1. Bronchial carcinoid tumors    2. Secondary neuroendocrine tumor of bone    3. Secondary neuroendocrine tumor of liver           Plan:     I have had a long discussion with Mr. Rahman today concerning his disease.  I reviewed his images with him and we have discussed his case in thoracic tumor Board.  He has a widespread recurrent neuroendocrine tumor with metastatic disease to the liver, bone and also mesentery.  While his Ki-67 initially was less than 1% his repeat biopsy shows a Ki-67 of 25% indicating a more aggressive tumor.  He understands that his disease is incurable and that any treatment would be directed towards palliation and potential prolongation of life.  Fortunately, he is asymptomatic from his disease.  I discussed options for treatment with him and I would recommend the following.  1, start on Lanreotide 120 mg every 28 days.  He does have uptake on his gallium-68 PET/CT in do  think the use of a somatostatin analogue would be indicated.  The next recommendation would be for zoledronic acid given his bone disease.  He is undergoing dental procedures currently and I would hold off on initiating treatment until these have been completed.  Thirdly, I would recommend initiation of systemic chemotherapy with capecitabine and temozolomide.  I discussed the rationale, alternatives and potential side effects of all of these medications and he wishes to think about this more before committing to these.  He was given written information on all of these.  I will also send him for an MRI of the liver as he may be a candidate for liver directed therapy.  I will discuss his case at our multidisciplinary neuroendocrine tumor board.  Ideally, he would be an excellent candidate for peptide receptor radionuclide therapy, however, this is not readily available in the [place] but is on clinical trials.  The clinical trial in [place] does have a substantial cost which is not financially feasible for him.  Plan to see him back in another month for further discussion on treatment and he is to let us know if he wants to get started on treatment sooner.  Multiple questions were answered and he is agreeable with this plan.      Jesus London DO, FACP  Hematology & Oncology  [facility] [place] [address]  ph. [phone]  Fax. [phone]    60 minutes were spent in coordination of patient's care, record review and counseling.  More than 50% of the time was face-to-face.

## 2017-06-26 NOTE — Clinical Note
Schedule MRI He wishes to think about therapy and will let us know Neuroendocrine tumor board for treatment recommendations question liver directed therapy

## 2017-06-28 ENCOUNTER — TELEPHONE (OUTPATIENT)
Dept: ADMINISTRATIVE | Facility: OTHER | Age: 62
End: 2017-06-28

## 2017-06-28 ENCOUNTER — TELEPHONE (OUTPATIENT)
Dept: HEMATOLOGY/ONCOLOGY | Facility: CLINIC | Age: 62
End: 2017-06-28

## 2017-06-28 NOTE — TELEPHONE ENCOUNTER
----- Message from Yusra Salas RN sent at 6/28/2017  9:07 AM CDT -----  Contact: Spouse      ----- Message -----  From: Dav Mosley  Sent: 6/28/2017   8:54 AM  To: Surya Lee [facility]    Schedule appt,       Call: [phone]

## 2017-06-28 NOTE — TELEPHONE ENCOUNTER
----- Message from Lou Christina sent at 6/28/2017  9:21 AM CDT -----  Contact: Self   Pt would like a call back in regards to his recent visit. Pt stated he has questions he would like to speak with the doctor about.       Pt can be contacted [phone]

## 2017-07-03 ENCOUNTER — OFFICE VISIT (OUTPATIENT)
Dept: HEMATOLOGY/ONCOLOGY | Facility: CLINIC | Age: 62
End: 2017-07-03
Payer: COMMERCIAL

## 2017-07-03 ENCOUNTER — LAB VISIT (OUTPATIENT)
Dept: LAB | Facility: HOSPITAL | Age: 62
End: 2017-07-03
Attending: INTERNAL MEDICINE
Payer: COMMERCIAL

## 2017-07-03 VITALS
BODY MASS INDEX: 42.19 KG/M2 | HEART RATE: 65 BPM | OXYGEN SATURATION: 96 % | RESPIRATION RATE: 19 BRPM | HEIGHT: 71 IN | SYSTOLIC BLOOD PRESSURE: 114 MMHG | DIASTOLIC BLOOD PRESSURE: 64 MMHG | WEIGHT: 301.38 LBS | TEMPERATURE: 98 F

## 2017-07-03 DIAGNOSIS — D3A.00 CARCINOID TUMOR: Primary | ICD-10-CM

## 2017-07-03 DIAGNOSIS — D3A.00 CARCINOID TUMOR: ICD-10-CM

## 2017-07-03 PROCEDURE — 86316 IMMUNOASSAY TUMOR OTHER: CPT

## 2017-07-03 PROCEDURE — 99999 PR PBB SHADOW E&M-EST. PATIENT-LVL III: CPT | Mod: PBBFAC,,, | Performed by: INTERNAL MEDICINE

## 2017-07-03 PROCEDURE — 99213 OFFICE O/P EST LOW 20 MIN: CPT | Mod: S$GLB,,, | Performed by: INTERNAL MEDICINE

## 2017-07-03 PROCEDURE — 36415 COLL VENOUS BLD VENIPUNCTURE: CPT

## 2017-07-03 RX ORDER — TEMOZOLOMIDE 250 MG/1
200 CAPSULE ORAL DAILY
Qty: 10 CAPSULE | Refills: 3 | Status: SHIPPED | OUTPATIENT
Start: 2017-07-03 | End: 2017-07-08

## 2017-07-03 RX ORDER — CAPECITABINE 500 MG/1
750 TABLET, FILM COATED ORAL 2 TIMES DAILY
Qty: 112 TABLET | Refills: 3 | Status: SHIPPED | OUTPATIENT
Start: 2017-07-03 | End: 2017-11-10 | Stop reason: SDUPTHER

## 2017-07-03 NOTE — PROGRESS NOTES
Chief Complaint: Carcinoid Tumor        Oncologic History:       Oncologic History Bronchial carcinoid, left, diagnosed 1/2016     Oncologic Treatment  left upper lobectomy 1/2016    Pathology 1/2016: Typical carcinoid tumor, well-differentiated T2a, N0, M0 Ki-67 <1%  6/2017: Liver biopsy, well-differentiated, Ki-67 25%       HPI: Mr. Rahman is a 61 y.o. male with bronchial carcinoid tumor.  His history dates to 1/2016 when he underwent a left upper lobectomy with Dr. Bell for a typical carcinoid tumor.  His pathological staging was T2a, N0, M0 with Ki-67 less than 1% and mitotic count 0 per 10 high-powered fields.  He done well postoperatively, however, in 5/2017 he was undergoing routine surveillance and a chest CT in picked up evidence of progression of disease in the liver.  Dedicated abdominal CT was performed showing multiple liver lesions.  In 6/2017 he underwent a CT-guided liver biopsy which was positive for metastatic neuroendocrine tumor which was well-differentiated with no overt nuclear atypia, however, the Ki-67 was 25%.  He underwent gallium-68 PET/CT and was found to have widespread disease involving the bone, liver and also mesentery.    Review of Systems   Constitutional: Positive for malaise/fatigue. Negative for chills, fever and weight loss.   HENT: Negative for congestion, hearing loss and tinnitus.    Eyes: Negative for blurred vision and double vision.   Respiratory: Negative for cough, hemoptysis and shortness of breath.    Cardiovascular: Negative for chest pain, palpitations, orthopnea and leg swelling.   Gastrointestinal: Negative for abdominal pain, heartburn, nausea and vomiting.   Genitourinary: Negative for dysuria, frequency and hematuria.   Musculoskeletal: Negative for myalgias.   Neurological: Negative for dizziness, tingling, tremors, sensory change and headaches.   Endo/Heme/Allergies: Does not bruise/bleed easily.   Psychiatric/Behavioral: Negative for depression.    All other systems reviewed and are negative.        No current outpatient prescriptions on file.     No current facility-administered medications for this visit.        Vitals:    07/03/17 1618   BP: 114/64   Pulse: 65   Resp: 19   Temp: 98 °F (36.7 °C)       Physical Exam   Constitutional: He is oriented to person, place, and time. He appears well-developed and well-nourished.   HENT:   Head: Normocephalic and atraumatic.   Eyes: Pupils are equal, round, and reactive to light.   Neck: Normal range of motion.   Cardiovascular: Normal rate, regular rhythm and normal heart sounds.    Pulmonary/Chest: Effort normal and breath sounds normal. No respiratory distress.   Abdominal: Soft. He exhibits no distension.   Musculoskeletal: Normal range of motion.   Neurological: He is alert and oriented to person, place, and time. No cranial nerve deficit. Coordination normal.   Skin: Skin is warm.   Psychiatric: He has a normal mood and affect.       Component      Latest Ref Rng & Units 6/8/2017   WBC      3.90 - 12.70 K/uL 5.67   RBC      4.60 - 6.20 M/uL 5.14   Hemoglobin      14.0 - 18.0 g/dL 15.0   Hematocrit      40.0 - 54.0 % 43.9   MCV      82 - 98 fL 85   MCH      27.0 - 31.0 pg 29.2   MCHC      32.0 - 36.0 % 34.2   RDW      11.5 - 14.5 % 13.5   Platelets      150 - 350 K/uL 236   MPV      9.2 - 12.9 fL 9.9   Gran #      1.8 - 7.7 K/uL 3.5   Lymph #      1.0 - 4.8 K/uL 1.5   Mono #      0.3 - 1.0 K/uL 0.6   Eos #      0.0 - 0.5 K/uL 0.1   Baso #      0.00 - 0.20 K/uL 0.02   Gran%      38.0 - 73.0 % 62.2   Lymph%      18.0 - 48.0 % 25.7   Mono%      4.0 - 15.0 % 9.7   Eosinophil%      0.0 - 8.0 % 1.8   Basophil%      0.0 - 1.9 % 0.4   Differential Method       Automated   Protime      9.0 - 12.5 sec 10.6   Coumadin Monitoring INR      0.8 - 1.2 1.0          Supplemental Diagnosis  Chromogranin Staining:  Intensity: Moderate to strong. Positive cells: 100 (%)  Synaptophysin Staining:  Intensity: Strong. Positive cells:  100 (%)  CD31: 40 vessels per 1 HPF  Factor VIII: 27 vessels per 1 HPF  Ki67: 1 % tumor cells staining  Immunostains performed with appropriate positive and negative controls.  FINAL PATHOLOGIC DIAGNOSIS  1. Lymph node, level XI, excisional biopsy:  Fragments of benign lymph node with anthracosis and sinus histiocytosis (0/1).  2. Lung, lingular staple line, biopsy:  Lung, negative for neoplasm.  3. Lymph node, level X, excisional biopsy:  1 benign lymph node with anthracotic pigment and sinus histiocytosis (0/1).  4. Lymph node, level XII, excisional biopsy:  2 benign lymph nodes with sinus histiocytosis and anthracotic pigment (0/2).  5. Lung, left upper lobe, lobectomy:  Typical carcinoid tumor (well differentiated neuroendocrine tumor), 4 cm in greatest dimension.  Bronchial and vascular margins are negative for neoplasm.  Uninvolved lung adjacent to the tumor shows emphysematous changes and fibrosis, however remaining lung  appears unremarkable.  Additional immunohistochemical stains for ancillary studies (including synaptophysin, chromogranin and Ki-67)  will be completed and results will be reported in a supplemental report.  See synoptic report in comment section for further details.  6. Lymph node, level VII, excisional biopsy:  2 benign lymph nodes with anthracotic pigment and sinus histiocytosis (0/2).  Comment: Synoptic Report  Specimen: Left upper lobe of lung  Procedure: Lobectomy  Specimen laterality: Left  Tumor size:  Greatest dimension: 4 cm  Tumor focality: Single focus  Histologic type: Typical carcinoid tumor  Visceral pleural invasion: Not identified  Tumor extension: Not identified  Margins: Bronchial and vascular margins are uninvolved by tumor.  Distance of tumor from closest margin: 1 cm from the bronchial surgical margin.  Lymphovascular invasion: Not identified  Ancillary studies:  Immunohistochemical stains for neuroendocrine markers and Ki-67 will be completed and results will follow in  a  supplemental report.  Note: The tumor consists of a proliferation of cells in nests with lewis formation. The tumor cells have salt and  pepper chromatin pattern with abundant cytoplasm. There is no significant nuclear atypia. No evidence of  necrosis. Mitotic count is 0 mitoses in 10 high power fields.     Imaging:   Gallium-68 PET/CT 6/15/17  Gallium 68 PET-CT IMAGING :     History: History of metastatic carcinoid    Comparison: CT chest and abdomen 5/17/2017.    Technique:   The fasting blood glucose level was 96 mg/dl. 4.66 mCi of gallium 68 was administered intravenously in the left arm.  PET/CT images were acquired from the top of the skull through the thighs.  Noncontrast CT was performed for attenuation correction purposes.    Findings:    Quality of the study: Adequate.     There is widespread multifocal mostly blastic osseous metastatic disease to the axial and proximal appendicular skeleton including the right petroclival ridge, right humerus, cervical, thoracic, lumbar spine, sacrum, and pelvis, bilateral femurs, bilateral scapula, and bilateral rib cage.  Additional sternal lesions are also noted.  There is also diffuse hepatic metastatic disease as well as soft tissue metastatic deposits involving the scalp, right orbit an area of soft tissue thickening abutting the globe which may be involving the insertional fibers of the medial rectus with intraconal invasion not excluded.  There is also metastatic disease within the left upper quadrant mesentery.    Index lesions are given as follows:    -Left orbital lesion demonstrates a max SUV of 8.0  -The left coracoid process of the scapula demonstrates a max SUV of 36.33  -Left hepatic lobe segment II liver lesion demonstrates a max SUV of 50.73 and is difficult to measure on this noncontrast CT.  -Left sacral ala demonstrates a max SUV of 51.1.    There is a right adrenal adenoma measuring 3.9 cm.  There is nonobstructing left nephrolithiasis.  There  are postoperative changes of left upper lobe pulmonary resection.  There is mild coronary and aortic calcific atherosclerosis.     Impression          Widespread osseous metastatic disease to the axial and appendicular skeleton as above.    Diffuse hepatic metastatic disease.    Metastatic disease to the left upper quadrant mesentery.    Left orbital lesion abutting the medial globe with invasion of the intraconal structures not excluded.    Multiple scalp lesions.    Index lesions are given above.           Assessment:      1. Bronchial carcinoid tumors    2. Secondary neuroendocrine tumor of bone    3. Secondary neuroendocrine tumor of liver      Plan:     has widespread, recurrent neuroendocrine tumor with metastatic disease to the liver, bone and also mesentery.  While his Ki-67 initially was less than 1% his repeat biopsy showed a Ki-67 of 25% indicating a more aggressive tumor.  He understands that his disease is incurable and that any treatment would be directed towards palliation and potential prolongation of life.  Fortunately, he is asymptomatic from his disease. It was recommended that he start on Lanreotide 120 mg every 28 days.  He does have uptake on his gallium-68 PET/CT and thus, the use of a somatostatin analogue would be indicated.    It was also recommended that he start zoledronic acid given his bone disease.  He is undergoing dental procedures currently and Zolendronic acid would not be started at least 6-8 weeks after completion of the dental procedures.   It was also recommended that he start systemic chemotherapy with capecitabine and temozolomide.   He is scheduled to have MRI of the liver as it was felt that he may be a candidate for liver directed therapy. This will be discussed at the multidisciplinary neuroendocrine tumor board.

## 2017-07-03 NOTE — PATIENT INSTRUCTIONS
--Lanreotide sub q (care plan entered)  --labs today  --capecitabine and temodar (orders sent to [facility])  --weekly CBC, CMP x 8; monthly Chromogranin A  --Monthly Zometa ( start date after 7/18/17, after he visits his dentist)   --Calcium and vitamin D daily  --Follow up in 2 weeks

## 2017-07-05 ENCOUNTER — HOSPITAL ENCOUNTER (OUTPATIENT)
Dept: RADIOLOGY | Facility: HOSPITAL | Age: 62
Discharge: HOME | End: 2017-07-05
Attending: INTERNAL MEDICINE
Payer: COMMERCIAL

## 2017-07-05 ENCOUNTER — TELEPHONE (OUTPATIENT)
Dept: PHARMACY | Facility: CLINIC | Age: 62
End: 2017-07-05

## 2017-07-05 DIAGNOSIS — C7B.8 SECONDARY NEUROENDOCRINE TUMOR OF BONE(209.73): ICD-10-CM

## 2017-07-05 DIAGNOSIS — D3A.090 BRONCHIAL CARCINOID TUMORS: ICD-10-CM

## 2017-07-05 DIAGNOSIS — C7B.8 SECONDARY NEUROENDOCRINE TUMOR OF LIVER: ICD-10-CM

## 2017-07-05 PROCEDURE — A9585 GADOBUTROL INJECTION: HCPCS | Performed by: INTERNAL MEDICINE

## 2017-07-05 PROCEDURE — 25500020 PHARM REV CODE 255: Performed by: INTERNAL MEDICINE

## 2017-07-05 PROCEDURE — 74183 MRI ABD W/O CNTR FLWD CNTR: CPT | Mod: 26,,, | Performed by: RADIOLOGY

## 2017-07-05 PROCEDURE — 74183 MRI ABD W/O CNTR FLWD CNTR: CPT | Mod: TC

## 2017-07-05 RX ORDER — GADOBUTROL 604.72 MG/ML
10 INJECTION INTRAVENOUS
Status: COMPLETED | OUTPATIENT
Start: 2017-07-05 | End: 2017-07-05

## 2017-07-05 RX ADMIN — GADOBUTROL 10 ML: 604.72 INJECTION INTRAVENOUS at 09:07

## 2017-07-05 NOTE — TELEPHONE ENCOUNTER
[facility] received prescriptions for CAPTEM therapy (capecitabine 2000mg twice daily on days 1 to 14 plus temozolomide 500mg daily on days 10 to 14 of 28 day cycle.    Patient's Patient's Estimated Creatinine Clearance: 150.381 mL/min (based on Cr of 1.0) from 5/19/2017    Capecitabine dosing appropriate based on therapy for neuroendocrine (pancreatic/islet cell) tumors, metastatic or unresectable (off label use): Oral: 750 mg/m2 twice daily (in combination with temozolomide) on days 1 to 14 of a 4-week cycle (Alexis 2011)    Temozolomide dosing appropriate based on therapy for neuroendocrine tumors, advanced (off-label use): Oral: 200 mg/m2 once daily (at bedtime) days 10 to 14 of a 28-day treatment cycle (in combination with capecitabine) (Alexis 2011)    No drug-drug interactions identified.

## 2017-07-08 LAB — CGA SERPL-MCNC: 159 NG/ML

## 2017-07-10 ENCOUNTER — TELEPHONE (OUTPATIENT)
Dept: HEMATOLOGY/ONCOLOGY | Facility: CLINIC | Age: 62
End: 2017-07-10

## 2017-07-10 ENCOUNTER — TELEPHONE (OUTPATIENT)
Dept: ADMINISTRATIVE | Facility: OTHER | Age: 62
End: 2017-07-10

## 2017-07-10 ENCOUNTER — TELEPHONE (OUTPATIENT)
Dept: PHARMACY | Facility: CLINIC | Age: 62
End: 2017-07-10

## 2017-07-10 NOTE — TELEPHONE ENCOUNTER
----- Message from Alyssia Canada sent at 7/10/2017  8:22 AM CDT -----  Contact: wife  Patient's wife needs to speak with the nurse to find out why her  has to get labs today.  Wife's # is [phone]

## 2017-07-10 NOTE — TELEPHONE ENCOUNTER
Spoke with patient's wife regarding appointment that were scheduled. Patient's wife stated that the appointments were suppose to wait until after dentist appointment and per Dr. Stauffer note the appointments were suppose to wait until after dentist appointment. I advised the patient's wife that I will forward the concerns to the  to call her to reschedule all the appointments. Patient's wife verbalized understanding. Forward message to .    ---Cindy Husain

## 2017-07-10 NOTE — TELEPHONE ENCOUNTER
----- Message from Cindy Husain MA sent at 7/10/2017  8:56 AM CDT -----  Contact: wife  All appointments should wait until after dentist appointments  ----- Message -----  From: Jas Nath  Sent: 7/10/2017   8:53 AM  To: Cindy Husain MA    Hello, are you talking about the weekly labs?   ----- Message -----  From: Cindy Husain MA  Sent: 7/10/2017   8:30 AM  To: Jas Nath    Spoke with patient's wife and stated that the appointments were suppose to wait until after dentist appointment. And per Dr. Stauffer note to start after dentist appointment. I advised the patient's wife that you were going to call to reschedule appointments    Thanks  Cindy  ----- Message -----  From: Alyssia Canada  Sent: 7/10/2017   8:22 AM  To: [facility] Staff    Patient's wife needs to speak with the nurse to find out why her  has to get labs today.  Wife's # is [phone]

## 2017-07-10 NOTE — TELEPHONE ENCOUNTER
Initial CAPTEM (capecitabine and temozolomide) consult completed on 7/10/17 on phone with patients wife Hope.  Medications to be shipped to patient's home 7/10 for arrival on . $0 copay. Patient will start capecitabine and temozolomide cycle based on baseline labs and ok from MD office. Patient is awaiting completion of Dental procedures- last dental appointment on 17. Confirmed 2 patient identifiers - name and . Therapy Appropriate.     The patient was counseled on the administration directions for each 28-day cycle:   Capecitabine 500mg  - Take 4 tablets (2000mg total) twice daily, within 30 minutes of a meal on days 1 to 14 of 28 day cycle.    Temozolomide 250mg - Take 2 capsules (500mg total) once daily on days 10 to 14 of 28 day cycle. - take on an empty stomach with a full glass of water at bedtime in order to prevent nausea.    Patient would like to have Zofran on hand for preventative N/V for Temodar- please send an RX to their preferred pharmacy if possible- [facility]: [id number] , [place] [id number]      Do not chew, crush, or break the tablets. If possible, patient was instructed tip the tablets from the RX bottle to the cap, and take directly from the cap to the mouth. Patient may handle the medication with their hands if they wear with a latex or nitrile glove and wash their hands before and after handling the tablets.      Patient was counseled on the following possible side effects of both Xeloda and Temodar, which include, but are not limited to: anorexia, fatigue, headache, hair loss, nausea, vomiting, diarrhea, constipation, skin irritation, and hand-foot syndrome. Patient was given Eucerin cream for Hand-Foot Syndrome, and hydrocortisone cream for dermatitis.       DDIs: medication list reviewed, none.  Patients wife states he also drinks Papaya tea and takes Ca+ vitamin D.      Patient was given 2 patient education handouts on how to handle oral chemotherapy and specific  recommendations- do's and don'ts. Instructed the patient that if they have any remaining oral chemotherapy, not to flush down the toilet or throw away in the trash; The patient or caregiver should return the unused oral chemotherapy to either the clinic or to myself in the Pharmacy where the oral chemotherapy can be disposed of properly.       Discussed the importance of staying well hydrated while on therapy. Compliance stressed - patient to take missed doses as soon as remembered, but NOT to take 2 doses in one day. Patient will report questions or concerns to [facility] or practitioner. Patient verbalizes understanding. Consultation included: indication; goals of treatment; administration; storage and handling; side effects; how to handle side effects; the importance of compliance; how to handle missed doses; the importance of laboratory monitoring; the importance of keeping all follow up appointments. Patient understands to report any medication changes to [facility] and provider. All questions answered and addressed to patients satisfaction.  I will f/u with patient in 1 weeks from start, and [facility] will contact patient in 3 weeks to coordinate next refill.

## 2017-07-24 ENCOUNTER — INFUSION (OUTPATIENT)
Dept: INFUSION THERAPY | Facility: HOSPITAL | Age: 62
End: 2017-07-24
Attending: INTERNAL MEDICINE
Payer: COMMERCIAL

## 2017-07-24 ENCOUNTER — OFFICE VISIT (OUTPATIENT)
Dept: HEMATOLOGY/ONCOLOGY | Facility: CLINIC | Age: 62
End: 2017-07-24
Payer: COMMERCIAL

## 2017-07-24 ENCOUNTER — TELEPHONE (OUTPATIENT)
Dept: HEMATOLOGY/ONCOLOGY | Facility: CLINIC | Age: 62
End: 2017-07-24

## 2017-07-24 ENCOUNTER — LAB VISIT (OUTPATIENT)
Dept: LAB | Facility: HOSPITAL | Age: 62
End: 2017-07-24
Attending: INTERNAL MEDICINE
Payer: COMMERCIAL

## 2017-07-24 VITALS
TEMPERATURE: 99 F | SYSTOLIC BLOOD PRESSURE: 123 MMHG | HEART RATE: 61 BPM | DIASTOLIC BLOOD PRESSURE: 77 MMHG | RESPIRATION RATE: 18 BRPM

## 2017-07-24 VITALS
HEART RATE: 71 BPM | WEIGHT: 301.56 LBS | SYSTOLIC BLOOD PRESSURE: 139 MMHG | HEIGHT: 71 IN | DIASTOLIC BLOOD PRESSURE: 74 MMHG | RESPIRATION RATE: 18 BRPM | OXYGEN SATURATION: 96 % | BODY MASS INDEX: 42.22 KG/M2

## 2017-07-24 DIAGNOSIS — D3A.090 BRONCHIAL CARCINOID TUMORS: Primary | ICD-10-CM

## 2017-07-24 DIAGNOSIS — C7B.8 SECONDARY NEUROENDOCRINE TUMOR OF LIVER: ICD-10-CM

## 2017-07-24 DIAGNOSIS — D3A.00 CARCINOID TUMOR: ICD-10-CM

## 2017-07-24 LAB
ALBUMIN SERPL BCP-MCNC: 3.6 G/DL
ALP SERPL-CCNC: 89 U/L
ALT SERPL W/O P-5'-P-CCNC: 24 U/L
ANION GAP SERPL CALC-SCNC: 12 MMOL/L
AST SERPL-CCNC: 21 U/L
BASOPHILS # BLD AUTO: 0.02 K/UL
BASOPHILS NFR BLD: 0.3 %
BILIRUB SERPL-MCNC: 0.8 MG/DL
BUN SERPL-MCNC: 11 MG/DL
CALCIUM SERPL-MCNC: 9.3 MG/DL
CHLORIDE SERPL-SCNC: 104 MMOL/L
CO2 SERPL-SCNC: 22 MMOL/L
CREAT SERPL-MCNC: 1 MG/DL
DIFFERENTIAL METHOD: NORMAL
EOSINOPHIL # BLD AUTO: 0.1 K/UL
EOSINOPHIL NFR BLD: 1 %
ERYTHROCYTE [DISTWIDTH] IN BLOOD BY AUTOMATED COUNT: 13.3 %
EST. GFR  (AFRICAN AMERICAN): >60 ML/MIN/1.73 M^2
EST. GFR  (NON AFRICAN AMERICAN): >60 ML/MIN/1.73 M^2
GLUCOSE SERPL-MCNC: 133 MG/DL
HCT VFR BLD AUTO: 42.2 %
HGB BLD-MCNC: 14.6 G/DL
LYMPHOCYTES # BLD AUTO: 1.2 K/UL
LYMPHOCYTES NFR BLD: 20.2 %
MCH RBC QN AUTO: 29.4 PG
MCHC RBC AUTO-ENTMCNC: 34.6 G/DL
MCV RBC AUTO: 85 FL
MONOCYTES # BLD AUTO: 0.4 K/UL
MONOCYTES NFR BLD: 6.4 %
NEUTROPHILS # BLD AUTO: 4.4 K/UL
NEUTROPHILS NFR BLD: 71.9 %
PLATELET # BLD AUTO: 236 K/UL
PMV BLD AUTO: 10.2 FL
POTASSIUM SERPL-SCNC: 4 MMOL/L
PROT SERPL-MCNC: 7.3 G/DL
RBC # BLD AUTO: 4.97 M/UL
SODIUM SERPL-SCNC: 138 MMOL/L
WBC # BLD AUTO: 6.14 K/UL

## 2017-07-24 PROCEDURE — 25000003 PHARM REV CODE 250: Performed by: INTERNAL MEDICINE

## 2017-07-24 PROCEDURE — 96365 THER/PROPH/DIAG IV INF INIT: CPT

## 2017-07-24 PROCEDURE — 36415 COLL VENOUS BLD VENIPUNCTURE: CPT

## 2017-07-24 PROCEDURE — 80053 COMPREHEN METABOLIC PANEL: CPT

## 2017-07-24 PROCEDURE — 99999 PR PBB SHADOW E&M-EST. PATIENT-LVL III: CPT | Mod: PBBFAC,,, | Performed by: INTERNAL MEDICINE

## 2017-07-24 PROCEDURE — 99214 OFFICE O/P EST MOD 30 MIN: CPT | Mod: S$GLB,,, | Performed by: INTERNAL MEDICINE

## 2017-07-24 PROCEDURE — A4216 STERILE WATER/SALINE, 10 ML: HCPCS | Performed by: INTERNAL MEDICINE

## 2017-07-24 PROCEDURE — 63600175 PHARM REV CODE 636 W HCPCS: Performed by: INTERNAL MEDICINE

## 2017-07-24 PROCEDURE — 96372 THER/PROPH/DIAG INJ SC/IM: CPT

## 2017-07-24 PROCEDURE — 85025 COMPLETE CBC W/AUTO DIFF WBC: CPT

## 2017-07-24 RX ORDER — LANREOTIDE ACETATE 120 MG/.5ML
120 INJECTION SUBCUTANEOUS
Status: COMPLETED | OUTPATIENT
Start: 2017-07-24 | End: 2017-07-24

## 2017-07-24 RX ORDER — SODIUM CHLORIDE 0.9 % (FLUSH) 0.9 %
10 SYRINGE (ML) INJECTION
Status: DISCONTINUED | OUTPATIENT
Start: 2017-07-24 | End: 2017-07-24 | Stop reason: HOSPADM

## 2017-07-24 RX ADMIN — SODIUM CHLORIDE, PRESERVATIVE FREE 10 ML: 5 INJECTION INTRAVENOUS at 12:07

## 2017-07-24 RX ADMIN — LANREOTIDE ACETATE 120 MG: 120 INJECTION SUBCUTANEOUS at 12:07

## 2017-07-24 RX ADMIN — SODIUM CHLORIDE 4 MG: 9 INJECTION, SOLUTION INTRAVENOUS at 12:07

## 2017-07-24 RX ADMIN — SODIUM CHLORIDE: 9 INJECTION, SOLUTION INTRAVENOUS at 12:07

## 2017-07-24 NOTE — PROGRESS NOTES
PATIENT: Elroy Rahman  MRN: [id number]  DATE: 7/24/2017      Diagnosis:   1. Bronchial carcinoid tumors    2. Secondary neuroendocrine tumor of liver        Chief Complaint: Bronchial carcinoid tumors and Carcinoid Tumor      Oncologic History:      Oncologic History Bronchial carcinoid, left, diagnosed 1/2016     Oncologic Treatment  left upper lobectomy 1/2016  CAPTEM 7/2017   Lanreotide 7/2017   Zoledronic acid 7/2017     Pathology 1/2016: Typical carcinoid tumor, well-differentiated T2a, N0, M0 Ki-67 <1%  6/2017: Liver biopsy, well-differentiated, Ki-67 25%           Subjective:    Interval History: Mr. Rahman is a 61 y.o. male who is seen in follow-up for a bronchial carcinoid tumor.  He states he feels well.  He did fall in the waiting room after slipping and complains of some mild back pain.  He is without other new complaints.    His history dates to 1/2016 when he underwent a left upper lobectomy with Dr. Bell for a typical carcinoid tumor.  His pathological staging was T2a, N0, M0 with Ki-67 less than 1% and mitotic count 0 per 10 high-powered fields.  He done well postoperatively, however, in 5/2017 he was undergoing routine surveillance and a chest CT in picked up evidence of progression of disease in the liver.  Dedicated abdominal CT was performed showing multiple liver lesions.  In 6/2017 he underwent a CT-guided liver biopsy which was positive for metastatic neuroendocrine tumor which was well-differentiated with no overt nuclear atypia, however, the Ki-67 was 25%.  He underwent gallium-68 PET/CT and was found to have widespread disease involving the bone, liver and also mesentery.    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 He has never used smokeless tobacco. He reports that he drinks about 0.6 oz of alcohol per week . He reports that he does not use drugs.    Allergies:  Review of patient's allergies indicates:  No Known Allergies    Medications:  Current Outpatient Prescriptions   Medication Sig Dispense Refill    capecitabine (XELODA) 500 MG Tab Take 4 tablets (2,000 mg total) by mouth 2 (two) times daily. For 14 days , every 28 days ( 2 weeks on , 2 weeks off, every 4 weeks) 112 tablet 3     No current facility-administered medications for this visit.        Review of Systems   Constitutional: Negative for appetite change, chills, fatigue, fever and unexpected weight change.   HENT: Negative for dental problem, sinus pressure and sneezing.    Eyes: Negative for visual disturbance.   Respiratory: Negative for cough, choking and chest tightness.    Cardiovascular: Negative for chest pain and leg swelling.   Gastrointestinal: Negative for abdominal pain, blood in stool, constipation, diarrhea and nausea.   Genitourinary: Negative for difficulty urinating and dysuria.   Musculoskeletal: Positive for back pain. Negative for arthralgias.   Skin: Negative for rash and wound.   Neurological: Negative for dizziness, light-headedness and headaches.   Hematological: Negative for adenopathy. Does not bruise/bleed easily.   Psychiatric/Behavioral: Negative for sleep disturbance. The patient is not nervous/anxious.        ECOG Performance Status:   ECOG SCORE           Objective:      Vitals:   Vitals:    07/24/17 1042   BP: 139/74   BP Location: Left arm   Patient Position: Sitting   BP Method: Automatic   Pulse: 71   Resp: 18   SpO2: 96%   Weight: (!)  "136.8 kg (301 lb 9.4 oz)   Height: 5' 11" (1.803 m)     BMI: Body mass index is 42.06 kg/m².    Physical Exam   Constitutional: He is oriented to person, place, and time. He appears well-developed and well-nourished.   HENT:   Head: Normocephalic and atraumatic.   Eyes: Pupils are equal, round, and reactive to light.   Neck: Normal range of motion. Neck supple.   Cardiovascular: Normal rate and regular rhythm.    Pulmonary/Chest: Effort normal and breath sounds normal. No respiratory distress.   Abdominal: Soft. He exhibits no distension. There is no tenderness.   Musculoskeletal: He exhibits no edema or tenderness.   Left lower extremity prosthesis   Lymphadenopathy:     He has no cervical adenopathy.   Neurological: He is alert and oriented to person, place, and time. No cranial nerve deficit.   Skin: Skin is warm and dry.   Psychiatric: He has a normal mood and affect. His behavior is normal.       Laboratory Data:  Lab Visit on 07/24/2017   Component Date Value Ref Range Status    WBC 07/24/2017 6.14  3.90 - 12.70 K/uL Final    RBC 07/24/2017 4.97  4.60 - 6.20 M/uL Final    Hemoglobin 07/24/2017 14.6  14.0 - 18.0 g/dL Final    Hematocrit 07/24/2017 42.2  40.0 - 54.0 % Final    MCV 07/24/2017 85  82 - 98 fL Final    MCH 07/24/2017 29.4  27.0 - 31.0 pg Final    MCHC 07/24/2017 34.6  32.0 - 36.0 g/dL Final    RDW 07/24/2017 13.3  11.5 - 14.5 % Final    Platelets 07/24/2017 236  150 - 350 K/uL Final    MPV 07/24/2017 10.2  9.2 - 12.9 fL Final    Gran # 07/24/2017 4.4  1.8 - 7.7 K/uL Final    Lymph # 07/24/2017 1.2  1.0 - 4.8 K/uL Final    Mono # 07/24/2017 0.4  0.3 - 1.0 K/uL Final    Eos # 07/24/2017 0.1  0.0 - 0.5 K/uL Final    Baso # 07/24/2017 0.02  0.00 - 0.20 K/uL Final    Gran% 07/24/2017 71.9  38.0 - 73.0 % Final    Lymph% 07/24/2017 20.2  18.0 - 48.0 % Final    Mono% 07/24/2017 6.4  4.0 - 15.0 % Final    Eosinophil% 07/24/2017 1.0  0.0 - 8.0 % Final    Basophil% 07/24/2017 0.3  0.0 - 1.9 " % Final    Differential Method 07/24/2017 Automated   Final     Supplemental Diagnosis  Chromogranin Staining:  Intensity: Moderate to strong. Positive cells: 100 (%)  Synaptophysin Staining:  Intensity: Strong. Positive cells: 100 (%)  CD31: 40 vessels per 1 HPF  Factor VIII: 27 vessels per 1 HPF  Ki67: 1 % tumor cells staining  Immunostains performed with appropriate positive and negative controls.  FINAL PATHOLOGIC DIAGNOSIS  1. Lymph node, level XI, excisional biopsy:  Fragments of benign lymph node with anthracosis and sinus histiocytosis (0/1).  2. Lung, lingular staple line, biopsy:  Lung, negative for neoplasm.  3. Lymph node, level X, excisional biopsy:  1 benign lymph node with anthracotic pigment and sinus histiocytosis (0/1).  4. Lymph node, level XII, excisional biopsy:  2 benign lymph nodes with sinus histiocytosis and anthracotic pigment (0/2).  5. Lung, left upper lobe, lobectomy:  Typical carcinoid tumor (well differentiated neuroendocrine tumor), 4 cm in greatest dimension.  Bronchial and vascular margins are negative for neoplasm.  Uninvolved lung adjacent to the tumor shows emphysematous changes and fibrosis, however remaining lung  appears unremarkable.  Additional immunohistochemical stains for ancillary studies (including synaptophysin, chromogranin and Ki-67)  will be completed and results will be reported in a supplemental report.  See synoptic report in comment section for further details.  6. Lymph node, level VII, excisional biopsy:  2 benign lymph nodes with anthracotic pigment and sinus histiocytosis (0/2).  Comment: Synoptic Report  Specimen: Left upper lobe of lung  Procedure: Lobectomy  Specimen laterality: Left  Tumor size:  Greatest dimension: 4 cm  Tumor focality: Single focus  Histologic type: Typical carcinoid tumor  Visceral pleural invasion: Not identified  Tumor extension: Not identified  Margins: Bronchial and vascular margins are uninvolved by tumor.  Distance of tumor  from closest margin: 1 cm from the bronchial surgical margin.  Lymphovascular invasion: Not identified  Ancillary studies:  Immunohistochemical stains for neuroendocrine markers and Ki-67 will be completed and results will follow in a  supplemental report.  Note: The tumor consists of a proliferation of cells in nests with lewis formation. The tumor cells have salt and  pepper chromatin pattern with abundant cytoplasm. There is no significant nuclear atypia. No evidence of  necrosis. Mitotic count is 0 mitoses in 10 high power fields.    Imaging:   Gallium-68 PET/CT 6/15/17  Gallium 68 PET-CT IMAGING :     History: History of metastatic carcinoid    Comparison: CT chest and abdomen 5/17/2017.    Technique:   The fasting blood glucose level was 96 mg/dl. 4.66 mCi of gallium 68 was administered intravenously in the left arm.  PET/CT images were acquired from the top of the skull through the thighs.  Noncontrast CT was performed for attenuation correction purposes.    Findings:    Quality of the study: Adequate.     There is widespread multifocal mostly blastic osseous metastatic disease to the axial and proximal appendicular skeleton including the right petroclival ridge, right humerus, cervical, thoracic, lumbar spine, sacrum, and pelvis, bilateral femurs, bilateral scapula, and bilateral rib cage.  Additional sternal lesions are also noted.  There is also diffuse hepatic metastatic disease as well as soft tissue metastatic deposits involving the scalp, right orbit an area of soft tissue thickening abutting the globe which may be involving the insertional fibers of the medial rectus with intraconal invasion not excluded.  There is also metastatic disease within the left upper quadrant mesentery.    Index lesions are given as follows:    -Left orbital lesion demonstrates a max SUV of 8.0  -The left coracoid process of the scapula demonstrates a max SUV of 36.33  -Left hepatic lobe segment II liver lesion  demonstrates a max SUV of 50.73 and is difficult to measure on this noncontrast CT.  -Left sacral ala demonstrates a max SUV of 51.1.    There is a right adrenal adenoma measuring 3.9 cm.  There is nonobstructing left nephrolithiasis.  There are postoperative changes of left upper lobe pulmonary resection.  There is mild coronary and aortic calcific atherosclerosis.   Impression         Widespread osseous metastatic disease to the axial and appendicular skeleton as above.    Diffuse hepatic metastatic disease.    Metastatic disease to the left upper quadrant mesentery.    Left orbital lesion abutting the medial globe with invasion of the intraconal structures not excluded.    Multiple scalp lesions.    Index lesions are given above.  ______________________________________             Assessment:       1. Bronchial carcinoid tumors    2. Secondary neuroendocrine tumor of liver           Plan:     Mr. Rahman will begin on CAPTEM tomorrow and we'll also be initiated on Lanreotide and zoledronic acid.  He understands that following 3 months of therapy he will repeat imaging and after that may be a candidate for liver directed therapy versus further chemotherapy.  All questions were answered and he will follow up in one month or sooner if needed.      Jesus London DO, FACP  Hematology & Oncology  [facility] [place] [address]  ph. [phone]  Fax. [phone]    25 minutes were spent in coordination of patient's care, record review and counseling.  More than 50% of the time was face-to-face.

## 2017-07-24 NOTE — PLAN OF CARE
Problem: Patient Care Overview (Adult)  Goal: Individualization & Mutuality  Outcome: Ongoing (interventions implemented as appropriate)  Labs , hx, and medications reviewed. Assessment completed. Discussed plan of care with patient. Patient in agreement. Chair reclined and warm blanket and snack offered.

## 2017-07-24 NOTE — TELEPHONE ENCOUNTER
Clarified with Luisa Osman,  and Dr. London that Mr. Rahman is to get Zometa , starting today, every 3 monhts.

## 2017-07-27 ENCOUNTER — TELEPHONE (OUTPATIENT)
Dept: PHARMACY | Facility: CLINIC | Age: 62
End: 2017-07-27

## 2017-07-27 DIAGNOSIS — R11.0 NAUSEA: Primary | ICD-10-CM

## 2017-07-27 RX ORDER — ONDANSETRON 8 MG/1
8 TABLET, ORALLY DISINTEGRATING ORAL EVERY 12 HOURS PRN
Qty: 30 TABLET | Refills: 1 | Status: SHIPPED | OUTPATIENT
Start: 2017-07-27 | End: 2018-07-27

## 2017-08-02 ENCOUNTER — TELEPHONE (OUTPATIENT)
Dept: PHARMACY | Facility: CLINIC | Age: 62
End: 2017-08-02

## 2017-08-11 NOTE — TELEPHONE ENCOUNTER
"Patient's wife reports that patient has been tolerating CAPTEM therapy well with not missed doses.  He is currently in his 2 weeks off and will start next cycle 8/21/2017.    Patient experienced some diarrhea and vomiting on day 2 of the first cycle, but this has been managed with OTC nausea medication and now ondansetron.  Patient reports a tingling sensation all over his body, "like his skin is crawling".  Denies concentration in hands and feet, redness, swelling, or sores.  Feeling has been manageable but can be disruptive to falling asleep in the evenings with up to an hour delay in falling asleep.  Advised wife to monitor patient and report and progression to pharmacy or provider.    CAPTEM refill not scheduled during call due to requirement of secondary insurance to call for a manual override each fill.  Override will be obtained and patient called back to schedule delivery for start of next cycle.  "

## 2017-08-15 ENCOUNTER — TELEPHONE (OUTPATIENT)
Dept: PHARMACY | Facility: CLINIC | Age: 62
End: 2017-08-15

## 2017-08-15 NOTE — TELEPHONE ENCOUNTER
Patients wife reported Mr Abbasi has a rash on his forearms-- started day after he finished last Xeloda dose (aug 7th). The rash started 5-7 days ago on his forearms after exposure to heat. (this has happened  before prior to taking xeloda). Currently wearing long sleeves and putting lotion/coconut oil    Advised to try putting hydrocortisone on the rash--- thin layer BID to see if that helps. No issues with hands/feet only forearms. Advised Mrs Hope to try to have him stay out of the sun/heat as much as possible and try the hydrocortisone. Advised to stay hydrated and to report if it gets any worse. She verbalized understanding.    Kelley Gill, Pharm.D  Specialty Pharmacy Clinical Pharmacist  [facility]  Phone: [phone]

## 2017-08-16 NOTE — TELEPHONE ENCOUNTER
Patient's wife reports Mr. Abbasi's rash on forearms resolved with hydrocortisone cream.  He now has rhinitis and is taking OTC antihistamine.  No DDI with CAPTEM regimen.  Will f/u tomorrow.

## 2017-08-22 ENCOUNTER — TELEPHONE (OUTPATIENT)
Dept: PHARMACY | Facility: CLINIC | Age: 62
End: 2017-08-22

## 2017-08-25 ENCOUNTER — OFFICE VISIT (OUTPATIENT)
Dept: HEMATOLOGY/ONCOLOGY | Facility: CLINIC | Age: 62
End: 2017-08-25
Payer: COMMERCIAL

## 2017-08-25 VITALS
HEART RATE: 54 BPM | WEIGHT: 297.63 LBS | DIASTOLIC BLOOD PRESSURE: 79 MMHG | SYSTOLIC BLOOD PRESSURE: 137 MMHG | OXYGEN SATURATION: 95 % | RESPIRATION RATE: 18 BRPM | TEMPERATURE: 98 F | BODY MASS INDEX: 41.51 KG/M2

## 2017-08-25 DIAGNOSIS — D3A.090 BRONCHIAL CARCINOID TUMORS: Primary | ICD-10-CM

## 2017-08-25 DIAGNOSIS — Z09 CHEMOTHERAPY FOLLOW-UP EXAMINATION: ICD-10-CM

## 2017-08-25 DIAGNOSIS — C7B.8 SECONDARY NEUROENDOCRINE TUMOR OF LIVER: ICD-10-CM

## 2017-08-25 DIAGNOSIS — C7B.8 SECONDARY NEUROENDOCRINE TUMOR OF BONE: ICD-10-CM

## 2017-08-25 PROCEDURE — 99999 PR PBB SHADOW E&M-EST. PATIENT-LVL III: CPT | Mod: PBBFAC,,, | Performed by: INTERNAL MEDICINE

## 2017-08-25 PROCEDURE — 3008F BODY MASS INDEX DOCD: CPT | Mod: S$GLB,,, | Performed by: INTERNAL MEDICINE

## 2017-08-25 PROCEDURE — 99214 OFFICE O/P EST MOD 30 MIN: CPT | Mod: S$GLB,,, | Performed by: INTERNAL MEDICINE

## 2017-08-25 NOTE — PROGRESS NOTES
PATIENT: Elroy Rahman  MRN: [id number]  DATE: 8/25/2017      Diagnosis:   1. Bronchial carcinoid tumors    2. Secondary neuroendocrine tumor of liver    3. Secondary neuroendocrine tumor of bone    4. Chemotherapy follow-up examination        Chief Complaint: Carcinoid Tumor      Oncologic History:      Oncologic History Bronchial carcinoid, left, diagnosed 1/2016     Oncologic Treatment  left upper lobectomy 1/2016  CAPTEM 7/2017   Lanreotide 7/2017   Zoledronic acid 7/2017     Pathology 1/2016: Typical carcinoid tumor, well-differentiated T2a, N0, M0 Ki-67 <1%  6/2017: Liver biopsy, well-differentiated, Ki-67 25%           Subjective:    Interval History: Mr. Rahman is a 61 y.o. male who is seen in follow-up for a bronchial carcinoid tumor.  He states he feels well.  He began on CAPTEM and following the first dose of temozolomide he did develop nausea, however, this resolved with Zofran and he now takes Zofran prior to taking temozolomide.  He is without other new complaints.    His history dates to 1/2016 when he underwent a left upper lobectomy with Dr. Bell for a typical carcinoid tumor.  His pathological staging was T2a, N0, M0 with Ki-67 less than 1% and mitotic count 0 per 10 high-powered fields.  He done well postoperatively, however, in 5/2017 he was undergoing routine surveillance and a chest CT in picked up evidence of progression of disease in the liver.  Dedicated abdominal CT was performed showing multiple liver lesions.  In 6/2017 he underwent a CT-guided liver biopsy which was positive for metastatic neuroendocrine tumor which was well-differentiated with no overt nuclear atypia, however, the Ki-67 was 25%.  He underwent gallium-68 PET/CT and was found to have widespread disease involving the bone, liver and also mesentery.  He was started on CAPTEM in 7/2017 and also Lanreotide and zoledronic acid.    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 He has never used smokeless tobacco. He reports that he drinks about 0.6 oz of alcohol per week . He reports that he does not use drugs.    Allergies:  Review of patient's allergies indicates:  No Known Allergies    Medications:  Current Outpatient Prescriptions   Medication Sig Dispense Refill    capecitabine (XELODA) 500 MG Tab Take 4 tablets (2,000 mg total) by mouth 2 (two) times daily. For 14 days , every 28 days ( 2 weeks on , 2 weeks off, every 4 weeks) 112 tablet 3    ondansetron (ZOFRAN-ODT) 8 MG TbDL Take 1 tablet (8 mg total) by mouth every 12 (twelve) hours as needed. 30 tablet 1     No current facility-administered medications for this visit.        Review of Systems   Constitutional: Negative for appetite change, chills, fatigue, fever and unexpected weight change.   HENT: Negative for dental problem, sinus pressure and sneezing.    Eyes: Negative for visual disturbance.   Respiratory: Negative for cough, choking and chest tightness.    Cardiovascular: Negative for chest pain and leg swelling.   Gastrointestinal: Negative for abdominal pain, blood in stool, constipation, diarrhea and nausea.   Genitourinary: Negative for difficulty urinating and dysuria.   Musculoskeletal: Positive for back pain. Negative for arthralgias.   Skin: Negative for rash and wound.   Neurological: Negative for dizziness, light-headedness and headaches.   Hematological:  Negative for adenopathy. Does not bruise/bleed easily.   Psychiatric/Behavioral: Negative for sleep disturbance. The patient is not nervous/anxious.        ECOG Performance Status:   ECOG SCORE           Objective:      Vitals:   Vitals:    08/25/17 1149   BP: 137/79   BP Location: Right arm   Patient Position: Sitting   BP Method: Medium (Automatic)   Pulse: (!) 54   Resp: 18   Temp: 97.6 °F (36.4 °C)   TempSrc: Oral   SpO2: 95%   Weight: 135 kg (297 lb 9.9 oz)     BMI: Body mass index is 41.51 kg/m².    Physical Exam   Constitutional: He is oriented to person, place, and time. He appears well-developed and well-nourished.   HENT:   Head: Normocephalic and atraumatic.   Eyes: Pupils are equal, round, and reactive to light.   Neck: Normal range of motion. Neck supple.   Cardiovascular: Normal rate and regular rhythm.    Pulmonary/Chest: Effort normal and breath sounds normal. No respiratory distress.   Abdominal: Soft. He exhibits no distension. There is no tenderness.   Musculoskeletal: He exhibits no edema or tenderness.   Left lower extremity prosthesis   Lymphadenopathy:     He has no cervical adenopathy.   Neurological: He is alert and oriented to person, place, and time. No cranial nerve deficit.   Skin: Skin is warm and dry.   Psychiatric: He has a normal mood and affect. His behavior is normal.       Laboratory Data:  Lab Visit on 08/21/2017   Component Date Value Ref Range Status    WBC 08/21/2017 4.34  3.90 - 12.70 K/uL Final    RBC 08/21/2017 4.96  4.60 - 6.20 M/uL Final    Hemoglobin 08/21/2017 14.6  14.0 - 18.0 g/dL Final    Hematocrit 08/21/2017 42.6  40.0 - 54.0 % Final    MCV 08/21/2017 86  82 - 98 fL Final    MCH 08/21/2017 29.4  27.0 - 31.0 pg Final    MCHC 08/21/2017 34.3  32.0 - 36.0 g/dL Final    RDW 08/21/2017 14.5  11.5 - 14.5 % Final    Platelets 08/21/2017 208  150 - 350 K/uL Final    MPV 08/21/2017 10.2  9.2 - 12.9 fL Final    Gran # 08/21/2017 2.3  1.8 - 7.7 K/uL Final    Lymph  # 08/21/2017 1.5  1.0 - 4.8 K/uL Final    Mono # 08/21/2017 0.5  0.3 - 1.0 K/uL Final    Eos # 08/21/2017 0.1  0.0 - 0.5 K/uL Final    Baso # 08/21/2017 0.02  0.00 - 0.20 K/uL Final    Gran% 08/21/2017 51.7  38.0 - 73.0 % Final    Lymph% 08/21/2017 35.3  18.0 - 48.0 % Final    Mono% 08/21/2017 10.4  4.0 - 15.0 % Final    Eosinophil% 08/21/2017 2.1  0.0 - 8.0 % Final    Basophil% 08/21/2017 0.5  0.0 - 1.9 % Final    Differential Method 08/21/2017 Automated   Final    Sodium 08/21/2017 138  136 - 145 mmol/L Final    Potassium 08/21/2017 4.2  3.5 - 5.1 mmol/L Final    Chloride 08/21/2017 105  95 - 110 mmol/L Final    CO2 08/21/2017 25  23 - 29 mmol/L Final    Glucose 08/21/2017 107  70 - 110 mg/dL Final    BUN, Bld 08/21/2017 14  6 - 20 mg/dL Final    Creatinine 08/21/2017 1.1  0.5 - 1.4 mg/dL Final    Calcium 08/21/2017 8.5* 8.7 - 10.5 mg/dL Final    Total Protein 08/21/2017 7.2  6.0 - 8.4 g/dL Final    Albumin 08/21/2017 3.5  3.5 - 5.2 g/dL Final    Total Bilirubin 08/21/2017 0.9  0.1 - 1.0 mg/dL Final    Comment: For infants and newborns, interpretation of results should be based  on gestational age, weight and in agreement with clinical  observations.  Premature Infant recommended reference ranges:  Up to 24 hours.............<8.0 mg/dL  Up to 48 hours............<12.0 mg/dL  3-5 days..................<15.0 mg/dL  6-29 days.................<15.0 mg/dL      Alkaline Phosphatase 08/21/2017 81  55 - 135 U/L Final    AST 08/21/2017 17  10 - 40 U/L Final    ALT 08/21/2017 29  10 - 44 U/L Final    Anion Gap 08/21/2017 8  8 - 16 mmol/L Final    eGFR if African American 08/21/2017 >60.0  >60 mL/min/1.73 m^2 Final    eGFR if non African American 08/21/2017 >60.0  >60 mL/min/1.73 m^2 Final    Comment: Calculation used to obtain the estimated glomerular filtration  rate (eGFR) is the CKD-EPI equation. Since race is unknown   in our information system, the eGFR values for   -American and  Non--American patients are given   for each creatinine result.       Supplemental Diagnosis  Chromogranin Staining:  Intensity: Moderate to strong. Positive cells: 100 (%)  Synaptophysin Staining:  Intensity: Strong. Positive cells: 100 (%)  CD31: 40 vessels per 1 HPF  Factor VIII: 27 vessels per 1 HPF  Ki67: 1 % tumor cells staining  Immunostains performed with appropriate positive and negative controls.  FINAL PATHOLOGIC DIAGNOSIS  1. Lymph node, level XI, excisional biopsy:  Fragments of benign lymph node with anthracosis and sinus histiocytosis (0/1).  2. Lung, lingular staple line, biopsy:  Lung, negative for neoplasm.  3. Lymph node, level X, excisional biopsy:  1 benign lymph node with anthracotic pigment and sinus histiocytosis (0/1).  4. Lymph node, level XII, excisional biopsy:  2 benign lymph nodes with sinus histiocytosis and anthracotic pigment (0/2).  5. Lung, left upper lobe, lobectomy:  Typical carcinoid tumor (well differentiated neuroendocrine tumor), 4 cm in greatest dimension.  Bronchial and vascular margins are negative for neoplasm.  Uninvolved lung adjacent to the tumor shows emphysematous changes and fibrosis, however remaining lung  appears unremarkable.  Additional immunohistochemical stains for ancillary studies (including synaptophysin, chromogranin and Ki-67)  will be completed and results will be reported in a supplemental report.  See synoptic report in comment section for further details.  6. Lymph node, level VII, excisional biopsy:  2 benign lymph nodes with anthracotic pigment and sinus histiocytosis (0/2).  Comment: Synoptic Report  Specimen: Left upper lobe of lung  Procedure: Lobectomy  Specimen laterality: Left  Tumor size:  Greatest dimension: 4 cm  Tumor focality: Single focus  Histologic type: Typical carcinoid tumor  Visceral pleural invasion: Not identified  Tumor extension: Not identified  Margins: Bronchial and vascular margins are uninvolved by tumor.  Distance  of tumor from closest margin: 1 cm from the bronchial surgical margin.  Lymphovascular invasion: Not identified  Ancillary studies:  Immunohistochemical stains for neuroendocrine markers and Ki-67 will be completed and results will follow in a  supplemental report.  Note: The tumor consists of a proliferation of cells in nests with lewis formation. The tumor cells have salt and  pepper chromatin pattern with abundant cytoplasm. There is no significant nuclear atypia. No evidence of  necrosis. Mitotic count is 0 mitoses in 10 high power fields.    Imaging:   Gallium-68 PET/CT 6/15/17  Gallium 68 PET-CT IMAGING :     History: History of metastatic carcinoid    Comparison: CT chest and abdomen 5/17/2017.    Technique:   The fasting blood glucose level was 96 mg/dl. 4.66 mCi of gallium 68 was administered intravenously in the left arm.  PET/CT images were acquired from the top of the skull through the thighs.  Noncontrast CT was performed for attenuation correction purposes.    Findings:    Quality of the study: Adequate.     There is widespread multifocal mostly blastic osseous metastatic disease to the axial and proximal appendicular skeleton including the right petroclival ridge, right humerus, cervical, thoracic, lumbar spine, sacrum, and pelvis, bilateral femurs, bilateral scapula, and bilateral rib cage.  Additional sternal lesions are also noted.  There is also diffuse hepatic metastatic disease as well as soft tissue metastatic deposits involving the scalp, right orbit an area of soft tissue thickening abutting the globe which may be involving the insertional fibers of the medial rectus with intraconal invasion not excluded.  There is also metastatic disease within the left upper quadrant mesentery.    Index lesions are given as follows:    -Left orbital lesion demonstrates a max SUV of 8.0  -The left coracoid process of the scapula demonstrates a max SUV of 36.33  -Left hepatic lobe segment II liver lesion  demonstrates a max SUV of 50.73 and is difficult to measure on this noncontrast CT.  -Left sacral ala demonstrates a max SUV of 51.1.    There is a right adrenal adenoma measuring 3.9 cm.  There is nonobstructing left nephrolithiasis.  There are postoperative changes of left upper lobe pulmonary resection.  There is mild coronary and aortic calcific atherosclerosis.   Impression         Widespread osseous metastatic disease to the axial and appendicular skeleton as above.    Diffuse hepatic metastatic disease.    Metastatic disease to the left upper quadrant mesentery.    Left orbital lesion abutting the medial globe with invasion of the intraconal structures not excluded.    Multiple scalp lesions.    Index lesions are given above.  ______________________________________             Assessment:       1. Bronchial carcinoid tumors    2. Secondary neuroendocrine tumor of liver    3. Secondary neuroendocrine tumor of bone    4. Chemotherapy follow-up examination           Plan:     Mr. Rahman is doing well and tolerating chemotherapy with CAPTEM.  Review of his lab work shows no detrimental changes.  He will continue on with Lanreotide monthly and also zoledronic acid every 3 months.  Follow back up with me in one month.  We'll plan to check scans following 3 months of therapy.    Jesus London DO, FACP  Hematology & Oncology  [facility] [place] [address]  ph. [phone]  Fax. [phone]    25 minutes were spent in coordination of patient's care, record review and counseling.  More than 50% of the time was face-to-face.

## 2017-08-25 NOTE — Clinical Note
Change zoledronic acid to every 3 month dosing He gets Lanreotide next week Follow-up with me in 4 weeks

## 2017-08-28 ENCOUNTER — INFUSION (OUTPATIENT)
Dept: INFUSION THERAPY | Facility: HOSPITAL | Age: 62
End: 2017-08-28
Attending: INTERNAL MEDICINE
Payer: COMMERCIAL

## 2017-08-28 DIAGNOSIS — C7B.8 SECONDARY NEUROENDOCRINE TUMOR OF LIVER: ICD-10-CM

## 2017-08-28 DIAGNOSIS — D3A.090 BRONCHIAL CARCINOID TUMORS: Primary | ICD-10-CM

## 2017-08-28 PROCEDURE — 96402 CHEMO HORMON ANTINEOPL SQ/IM: CPT

## 2017-08-28 PROCEDURE — 63600175 PHARM REV CODE 636 W HCPCS: Performed by: INTERNAL MEDICINE

## 2017-08-28 RX ORDER — LANREOTIDE ACETATE 120 MG/.5ML
120 INJECTION SUBCUTANEOUS
Status: COMPLETED | OUTPATIENT
Start: 2017-08-28 | End: 2017-08-28

## 2017-08-28 RX ADMIN — LANREOTIDE ACETATE 120 MG: 120 INJECTION SUBCUTANEOUS at 11:08

## 2017-08-28 NOTE — NURSING
1130:  Pt arrived for Lanreotide #2.  Labs reviewed.  Pt tolerated injection SQ to right buttocks.  Discharged to home with wife and appt calendar.

## 2017-09-08 ENCOUNTER — TELEPHONE (OUTPATIENT)
Dept: PHARMACY | Facility: CLINIC | Age: 62
End: 2017-09-08

## 2017-09-11 ENCOUNTER — LAB VISIT (OUTPATIENT)
Dept: LAB | Facility: HOSPITAL | Age: 62
End: 2017-09-11
Attending: INTERNAL MEDICINE
Payer: COMMERCIAL

## 2017-09-11 DIAGNOSIS — D3A.00 CARCINOID TUMOR: ICD-10-CM

## 2017-09-11 LAB
ALBUMIN SERPL BCP-MCNC: 3.5 G/DL
ALP SERPL-CCNC: 63 U/L
ALT SERPL W/O P-5'-P-CCNC: 47 U/L
ANION GAP SERPL CALC-SCNC: 7 MMOL/L
AST SERPL-CCNC: 23 U/L
BASOPHILS # BLD AUTO: 0.02 K/UL
BASOPHILS NFR BLD: 0.4 %
BILIRUB SERPL-MCNC: 1 MG/DL
BUN SERPL-MCNC: 15 MG/DL
CALCIUM SERPL-MCNC: 8.9 MG/DL
CHLORIDE SERPL-SCNC: 107 MMOL/L
CO2 SERPL-SCNC: 26 MMOL/L
CREAT SERPL-MCNC: 1 MG/DL
DIFFERENTIAL METHOD: ABNORMAL
EOSINOPHIL # BLD AUTO: 0.1 K/UL
EOSINOPHIL NFR BLD: 1.2 %
ERYTHROCYTE [DISTWIDTH] IN BLOOD BY AUTOMATED COUNT: 16.4 %
EST. GFR  (AFRICAN AMERICAN): >60 ML/MIN/1.73 M^2
EST. GFR  (NON AFRICAN AMERICAN): >60 ML/MIN/1.73 M^2
GLUCOSE SERPL-MCNC: 101 MG/DL
HCT VFR BLD AUTO: 40.5 %
HGB BLD-MCNC: 14.3 G/DL
LYMPHOCYTES # BLD AUTO: 1.6 K/UL
LYMPHOCYTES NFR BLD: 30.2 %
MCH RBC QN AUTO: 29.9 PG
MCHC RBC AUTO-ENTMCNC: 35.3 G/DL
MCV RBC AUTO: 85 FL
MONOCYTES # BLD AUTO: 0.5 K/UL
MONOCYTES NFR BLD: 9.4 %
NEUTROPHILS # BLD AUTO: 3.1 K/UL
NEUTROPHILS NFR BLD: 58.6 %
PLATELET # BLD AUTO: 206 K/UL
PMV BLD AUTO: 9.8 FL
POTASSIUM SERPL-SCNC: 4.1 MMOL/L
PROT SERPL-MCNC: 6.7 G/DL
RBC # BLD AUTO: 4.79 M/UL
SODIUM SERPL-SCNC: 140 MMOL/L
WBC # BLD AUTO: 5.2 K/UL

## 2017-09-11 PROCEDURE — 80053 COMPREHEN METABOLIC PANEL: CPT

## 2017-09-11 PROCEDURE — 85025 COMPLETE CBC W/AUTO DIFF WBC: CPT

## 2017-09-11 PROCEDURE — 36415 COLL VENOUS BLD VENIPUNCTURE: CPT

## 2017-09-13 ENCOUNTER — TELEPHONE (OUTPATIENT)
Dept: PHARMACY | Facility: CLINIC | Age: 62
End: 2017-09-13

## 2017-09-18 ENCOUNTER — OFFICE VISIT (OUTPATIENT)
Dept: HEMATOLOGY/ONCOLOGY | Facility: CLINIC | Age: 62
End: 2017-09-18
Payer: COMMERCIAL

## 2017-09-18 VITALS
TEMPERATURE: 98 F | WEIGHT: 297.63 LBS | HEART RATE: 51 BPM | RESPIRATION RATE: 18 BRPM | OXYGEN SATURATION: 95 % | DIASTOLIC BLOOD PRESSURE: 72 MMHG | BODY MASS INDEX: 41.51 KG/M2 | SYSTOLIC BLOOD PRESSURE: 130 MMHG

## 2017-09-18 DIAGNOSIS — Z09 CHEMOTHERAPY FOLLOW-UP EXAMINATION: ICD-10-CM

## 2017-09-18 DIAGNOSIS — C7B.8 SECONDARY NEUROENDOCRINE TUMOR OF BONE: ICD-10-CM

## 2017-09-18 DIAGNOSIS — C7B.8 SECONDARY NEUROENDOCRINE TUMOR OF LIVER: ICD-10-CM

## 2017-09-18 DIAGNOSIS — D3A.090 BRONCHIAL CARCINOID TUMORS: Primary | ICD-10-CM

## 2017-09-18 PROCEDURE — 3008F BODY MASS INDEX DOCD: CPT | Mod: S$GLB,,, | Performed by: INTERNAL MEDICINE

## 2017-09-18 PROCEDURE — 99999 PR PBB SHADOW E&M-EST. PATIENT-LVL III: CPT | Mod: PBBFAC,,, | Performed by: INTERNAL MEDICINE

## 2017-09-18 PROCEDURE — 99214 OFFICE O/P EST MOD 30 MIN: CPT | Mod: S$GLB,,, | Performed by: INTERNAL MEDICINE

## 2017-09-18 NOTE — PROGRESS NOTES
PATIENT: Elroy Rahman  MRN: [id number]  DATE: 9/18/2017      Diagnosis:   1. Bronchial carcinoid tumors    2. Secondary neuroendocrine tumor of bone    3. Secondary neuroendocrine tumor of liver    4. Chemotherapy follow-up examination        Chief Complaint: Carcinoid Tumor      Oncologic History:      Oncologic History Bronchial carcinoid, left, diagnosed 1/2016     Oncologic Treatment  left upper lobectomy 1/2016  CAPTEM 7/2017   Lanreotide 7/2017   Zoledronic acid 7/2017     Pathology 1/2016: Typical carcinoid tumor, well-differentiated T2a, N0, M0 Ki-67 <1%  6/2017: Liver biopsy, well-differentiated, Ki-67 25%           Subjective:    Interval History: Mr. Rahman is a 62 y.o. male who is seen in follow-up for a bronchial carcinoid tumor.  He states he feels well.  Denies any nausea or vomiting.  He is planning on starting cycle 3 of CAPTEM tomorrow.  He is otherwise fully active and without new complaints.    His history dates to 1/2016 when he underwent a left upper lobectomy with Dr. Bell for a typical carcinoid tumor.  His pathological staging was T2a, N0, M0 with Ki-67 less than 1% and mitotic count 0 per 10 high-powered fields.  He done well postoperatively, however, in 5/2017 he was undergoing routine surveillance and a chest CT in picked up evidence of progression of disease in the liver.  Dedicated abdominal CT was performed showing multiple liver lesions.  In 6/2017 he underwent a CT-guided liver biopsy which was positive for metastatic neuroendocrine tumor which was well-differentiated with no overt nuclear atypia, however, the Ki-67 was 25%.  He underwent gallium-68 PET/CT and was found to have widespread disease involving the bone, liver and also mesentery.  He was started on CAPTEM in 7/2017 and also Lanreotide and zoledronic acid.    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 He has never used smokeless tobacco. He reports that he drinks about 0.6 oz of alcohol per week . He reports that he does not use drugs.    Allergies:  Review of patient's allergies indicates:  No Known Allergies    Medications:  Current Outpatient Prescriptions   Medication Sig Dispense Refill    capecitabine (XELODA) 500 MG Tab Take 4 tablets (2,000 mg total) by mouth 2 (two) times daily. For 14 days , every 28 days ( 2 weeks on , 2 weeks off, every 4 weeks) 112 tablet 3    ondansetron (ZOFRAN-ODT) 8 MG TbDL Take 1 tablet (8 mg total) by mouth every 12 (twelve) hours as needed. 30 tablet 1     No current facility-administered medications for this visit.        Review of Systems   Constitutional: Negative for appetite change, chills, fatigue, fever and unexpected weight change.   HENT: Negative for dental problem, sinus pressure and sneezing.    Eyes: Negative for visual disturbance.   Respiratory: Negative for cough, choking and chest tightness.    Cardiovascular: Negative for chest pain and leg swelling.   Gastrointestinal: Negative for abdominal pain, blood in stool, constipation, diarrhea and nausea.   Genitourinary: Negative for difficulty urinating and dysuria.   Musculoskeletal: Positive for back pain. Negative for arthralgias.   Skin: Negative for rash and wound.   Neurological: Negative for dizziness, light-headedness and headaches.   Hematological: Negative for adenopathy. Does not bruise/bleed easily.    Psychiatric/Behavioral: Negative for sleep disturbance. The patient is not nervous/anxious.        ECOG Performance Status:   ECOG SCORE           Objective:      Vitals:   Vitals:    09/18/17 1038   BP: 130/72   BP Location: Right arm   Patient Position: Sitting   BP Method: Medium (Automatic)   Pulse: (!) 51   Resp: 18   Temp: 97.9 °F (36.6 °C)   TempSrc: Oral   SpO2: 95%   Weight: 135 kg (297 lb 9.9 oz)     BMI: Body mass index is 41.51 kg/m².    Physical Exam   Constitutional: He is oriented to person, place, and time. He appears well-developed and well-nourished.   HENT:   Head: Normocephalic and atraumatic.   Eyes: Pupils are equal, round, and reactive to light.   Neck: Normal range of motion. Neck supple.   Cardiovascular: Normal rate and regular rhythm.    Pulmonary/Chest: Effort normal and breath sounds normal. No respiratory distress.   Abdominal: Soft. He exhibits no distension. There is no tenderness.   Musculoskeletal: He exhibits no edema or tenderness.   Left lower extremity prosthesis   Lymphadenopathy:     He has no cervical adenopathy.   Neurological: He is alert and oriented to person, place, and time. No cranial nerve deficit.   Skin: Skin is warm and dry.   Psychiatric: He has a normal mood and affect. His behavior is normal.       Laboratory Data:  Lab Visit on 09/18/2017   Component Date Value Ref Range Status    WBC 09/18/2017 4.65  3.90 - 12.70 K/uL Final    RBC 09/18/2017 4.88  4.60 - 6.20 M/uL Final    Hemoglobin 09/18/2017 14.6  14.0 - 18.0 g/dL Final    Hematocrit 09/18/2017 41.5  40.0 - 54.0 % Final    MCV 09/18/2017 85  82 - 98 fL Final    MCH 09/18/2017 29.9  27.0 - 31.0 pg Final    MCHC 09/18/2017 35.2  32.0 - 36.0 g/dL Final    RDW 09/18/2017 16.4* 11.5 - 14.5 % Final    Platelets 09/18/2017 196  150 - 350 K/uL Final    MPV 09/18/2017 10.0  9.2 - 12.9 fL Final    Gran # 09/18/2017 2.6  1.8 - 7.7 K/uL Final    Lymph # 09/18/2017 1.5  1.0 - 4.8 K/uL Final    Mono #  09/18/2017 0.4  0.3 - 1.0 K/uL Final    Eos # 09/18/2017 0.1  0.0 - 0.5 K/uL Final    Baso # 09/18/2017 0.02  0.00 - 0.20 K/uL Final    Gran% 09/18/2017 55.9  38.0 - 73.0 % Final    Lymph% 09/18/2017 32.7  18.0 - 48.0 % Final    Mono% 09/18/2017 8.2  4.0 - 15.0 % Final    Eosinophil% 09/18/2017 2.6  0.0 - 8.0 % Final    Basophil% 09/18/2017 0.4  0.0 - 1.9 % Final    Differential Method 09/18/2017 Automated   Final    Sodium 09/18/2017 138  136 - 145 mmol/L Final    Potassium 09/18/2017 4.3  3.5 - 5.1 mmol/L Final    Chloride 09/18/2017 107  95 - 110 mmol/L Final    CO2 09/18/2017 24  23 - 29 mmol/L Final    Glucose 09/18/2017 124* 70 - 110 mg/dL Final    BUN, Bld 09/18/2017 14  8 - 23 mg/dL Final    Creatinine 09/18/2017 1.1  0.5 - 1.4 mg/dL Final    Calcium 09/18/2017 8.9  8.7 - 10.5 mg/dL Final    Total Protein 09/18/2017 7.0  6.0 - 8.4 g/dL Final    Albumin 09/18/2017 3.6  3.5 - 5.2 g/dL Final    Total Bilirubin 09/18/2017 1.0  0.1 - 1.0 mg/dL Final    Comment: For infants and newborns, interpretation of results should be based  on gestational age, weight and in agreement with clinical  observations.  Premature Infant recommended reference ranges:  Up to 24 hours.............<8.0 mg/dL  Up to 48 hours............<12.0 mg/dL  3-5 days..................<15.0 mg/dL  6-29 days.................<15.0 mg/dL      Alkaline Phosphatase 09/18/2017 66  55 - 135 U/L Final    AST 09/18/2017 19  10 - 40 U/L Final    ALT 09/18/2017 29  10 - 44 U/L Final    Anion Gap 09/18/2017 7* 8 - 16 mmol/L Final    eGFR if African American 09/18/2017 >60.0  >60 mL/min/1.73 m^2 Final    eGFR if non African American 09/18/2017 >60.0  >60 mL/min/1.73 m^2 Final    Comment: Calculation used to obtain the estimated glomerular filtration  rate (eGFR) is the CKD-EPI equation. Since race is unknown   in our information system, the eGFR values for   -American and Non--American patients are given   for each  creatinine result.       Supplemental Diagnosis  Chromogranin Staining:  Intensity: Moderate to strong. Positive cells: 100 (%)  Synaptophysin Staining:  Intensity: Strong. Positive cells: 100 (%)  CD31: 40 vessels per 1 HPF  Factor VIII: 27 vessels per 1 HPF  Ki67: 1 % tumor cells staining  Immunostains performed with appropriate positive and negative controls.  FINAL PATHOLOGIC DIAGNOSIS  1. Lymph node, level XI, excisional biopsy:  Fragments of benign lymph node with anthracosis and sinus histiocytosis (0/1).  2. Lung, lingular staple line, biopsy:  Lung, negative for neoplasm.  3. Lymph node, level X, excisional biopsy:  1 benign lymph node with anthracotic pigment and sinus histiocytosis (0/1).  4. Lymph node, level XII, excisional biopsy:  2 benign lymph nodes with sinus histiocytosis and anthracotic pigment (0/2).  5. Lung, left upper lobe, lobectomy:  Typical carcinoid tumor (well differentiated neuroendocrine tumor), 4 cm in greatest dimension.  Bronchial and vascular margins are negative for neoplasm.  Uninvolved lung adjacent to the tumor shows emphysematous changes and fibrosis, however remaining lung  appears unremarkable.  Additional immunohistochemical stains for ancillary studies (including synaptophysin, chromogranin and Ki-67)  will be completed and results will be reported in a supplemental report.  See synoptic report in comment section for further details.  6. Lymph node, level VII, excisional biopsy:  2 benign lymph nodes with anthracotic pigment and sinus histiocytosis (0/2).  Comment: Synoptic Report  Specimen: Left upper lobe of lung  Procedure: Lobectomy  Specimen laterality: Left  Tumor size:  Greatest dimension: 4 cm  Tumor focality: Single focus  Histologic type: Typical carcinoid tumor  Visceral pleural invasion: Not identified  Tumor extension: Not identified  Margins: Bronchial and vascular margins are uninvolved by tumor.  Distance of tumor from closest margin: 1 cm from the  bronchial surgical margin.  Lymphovascular invasion: Not identified  Ancillary studies:  Immunohistochemical stains for neuroendocrine markers and Ki-67 will be completed and results will follow in a  supplemental report.  Note: The tumor consists of a proliferation of cells in nests with lewis formation. The tumor cells have salt and  pepper chromatin pattern with abundant cytoplasm. There is no significant nuclear atypia. No evidence of  necrosis. Mitotic count is 0 mitoses in 10 high power fields.    Imaging:   Gallium-68 PET/CT 6/15/17  Gallium 68 PET-CT IMAGING :     History: History of metastatic carcinoid    Comparison: CT chest and abdomen 5/17/2017.    Technique:   The fasting blood glucose level was 96 mg/dl. 4.66 mCi of gallium 68 was administered intravenously in the left arm.  PET/CT images were acquired from the top of the skull through the thighs.  Noncontrast CT was performed for attenuation correction purposes.    Findings:    Quality of the study: Adequate.     There is widespread multifocal mostly blastic osseous metastatic disease to the axial and proximal appendicular skeleton including the right petroclival ridge, right humerus, cervical, thoracic, lumbar spine, sacrum, and pelvis, bilateral femurs, bilateral scapula, and bilateral rib cage.  Additional sternal lesions are also noted.  There is also diffuse hepatic metastatic disease as well as soft tissue metastatic deposits involving the scalp, right orbit an area of soft tissue thickening abutting the globe which may be involving the insertional fibers of the medial rectus with intraconal invasion not excluded.  There is also metastatic disease within the left upper quadrant mesentery.    Index lesions are given as follows:    -Left orbital lesion demonstrates a max SUV of 8.0  -The left coracoid process of the scapula demonstrates a max SUV of 36.33  -Left hepatic lobe segment II liver lesion demonstrates a max SUV of 50.73 and is  difficult to measure on this noncontrast CT.  -Left sacral ala demonstrates a max SUV of 51.1.    There is a right adrenal adenoma measuring 3.9 cm.  There is nonobstructing left nephrolithiasis.  There are postoperative changes of left upper lobe pulmonary resection.  There is mild coronary and aortic calcific atherosclerosis.   Impression         Widespread osseous metastatic disease to the axial and appendicular skeleton as above.    Diffuse hepatic metastatic disease.    Metastatic disease to the left upper quadrant mesentery.    Left orbital lesion abutting the medial globe with invasion of the intraconal structures not excluded.    Multiple scalp lesions.    Index lesions are given above.  ______________________________________             Assessment:       1. Bronchial carcinoid tumors    2. Secondary neuroendocrine tumor of bone    3. Secondary neuroendocrine tumor of liver    4. Chemotherapy follow-up examination           Plan:     Mr. Rahman continues to do well and tolerating treatment with CAPTEM.  Labs are appropriate to continue treatment.  He will start cycle 3 tomorrow.  Follow back up with me in 4 weeks with a CT of the chest, abdomen and pelvis.  Report any new symptoms in the interim.    Jesus London DO, FACP  Hematology & Oncology  [address] [place] [address]  ph. [phone]  Fax. [phone]    25 minutes were spent in coordination of patient's care, record review and counseling.  More than 50% of the time was face-to-face.

## 2017-10-02 ENCOUNTER — LAB VISIT (OUTPATIENT)
Dept: LAB | Facility: HOSPITAL | Age: 62
End: 2017-10-02
Attending: INTERNAL MEDICINE
Payer: COMMERCIAL

## 2017-10-02 ENCOUNTER — INFUSION (OUTPATIENT)
Dept: INFUSION THERAPY | Facility: HOSPITAL | Age: 62
End: 2017-10-02
Attending: INTERNAL MEDICINE
Payer: COMMERCIAL

## 2017-10-02 VITALS
TEMPERATURE: 98 F | HEART RATE: 50 BPM | RESPIRATION RATE: 18 BRPM | SYSTOLIC BLOOD PRESSURE: 121 MMHG | DIASTOLIC BLOOD PRESSURE: 75 MMHG

## 2017-10-02 DIAGNOSIS — D3A.090 BRONCHIAL CARCINOID TUMORS: Primary | ICD-10-CM

## 2017-10-02 DIAGNOSIS — C7B.8 SECONDARY NEUROENDOCRINE TUMOR OF LIVER: ICD-10-CM

## 2017-10-02 DIAGNOSIS — D3A.00 CARCINOID TUMOR: ICD-10-CM

## 2017-10-02 LAB
ALBUMIN SERPL BCP-MCNC: 3.6 G/DL
ALP SERPL-CCNC: 63 U/L
ALT SERPL W/O P-5'-P-CCNC: 29 U/L
ANION GAP SERPL CALC-SCNC: 7 MMOL/L
AST SERPL-CCNC: 24 U/L
BASOPHILS # BLD AUTO: 0.03 K/UL
BASOPHILS NFR BLD: 0.6 %
BILIRUB SERPL-MCNC: 1 MG/DL
BUN SERPL-MCNC: 15 MG/DL
CALCIUM SERPL-MCNC: 8.9 MG/DL
CHLORIDE SERPL-SCNC: 104 MMOL/L
CO2 SERPL-SCNC: 27 MMOL/L
CREAT SERPL-MCNC: 1.1 MG/DL
DIFFERENTIAL METHOD: ABNORMAL
EOSINOPHIL # BLD AUTO: 0.1 K/UL
EOSINOPHIL NFR BLD: 1.6 %
ERYTHROCYTE [DISTWIDTH] IN BLOOD BY AUTOMATED COUNT: 16 %
EST. GFR  (AFRICAN AMERICAN): >60 ML/MIN/1.73 M^2
EST. GFR  (NON AFRICAN AMERICAN): >60 ML/MIN/1.73 M^2
GLUCOSE SERPL-MCNC: 112 MG/DL
HCT VFR BLD AUTO: 42.5 %
HGB BLD-MCNC: 14.8 G/DL
LYMPHOCYTES # BLD AUTO: 1.6 K/UL
LYMPHOCYTES NFR BLD: 31.6 %
MCH RBC QN AUTO: 30.3 PG
MCHC RBC AUTO-ENTMCNC: 34.8 G/DL
MCV RBC AUTO: 87 FL
MONOCYTES # BLD AUTO: 0.5 K/UL
MONOCYTES NFR BLD: 9.2 %
NEUTROPHILS # BLD AUTO: 2.9 K/UL
NEUTROPHILS NFR BLD: 56.8 %
PLATELET # BLD AUTO: 177 K/UL
PMV BLD AUTO: 9.7 FL
POTASSIUM SERPL-SCNC: 4.5 MMOL/L
PROT SERPL-MCNC: 7.1 G/DL
RBC # BLD AUTO: 4.88 M/UL
SODIUM SERPL-SCNC: 138 MMOL/L
WBC # BLD AUTO: 5.12 K/UL

## 2017-10-02 PROCEDURE — 80053 COMPREHEN METABOLIC PANEL: CPT

## 2017-10-02 PROCEDURE — 96365 THER/PROPH/DIAG IV INF INIT: CPT

## 2017-10-02 PROCEDURE — 36415 COLL VENOUS BLD VENIPUNCTURE: CPT

## 2017-10-02 PROCEDURE — 85025 COMPLETE CBC W/AUTO DIFF WBC: CPT

## 2017-10-02 PROCEDURE — 63600175 PHARM REV CODE 636 W HCPCS: Performed by: INTERNAL MEDICINE

## 2017-10-02 PROCEDURE — 96402 CHEMO HORMON ANTINEOPL SQ/IM: CPT

## 2017-10-02 RX ORDER — TEMOZOLOMIDE 250 MG/1
CAPSULE ORAL DAILY
COMMUNITY
Start: 2017-09-14 | End: 2017-11-10 | Stop reason: SDUPTHER

## 2017-10-02 RX ORDER — LANREOTIDE ACETATE 120 MG/.5ML
120 INJECTION SUBCUTANEOUS
Status: COMPLETED | OUTPATIENT
Start: 2017-10-02 | End: 2017-10-02

## 2017-10-02 RX ADMIN — LANREOTIDE ACETATE 120 MG: 120 INJECTION SUBCUTANEOUS at 09:10

## 2017-10-02 NOTE — NURSING
Pt arrived for lanreotide #3.  Labs reviewed with pt.  Pt tolerated injection SQ to right buttock.  Discharged to home with appt calendar.

## 2017-10-05 ENCOUNTER — TELEPHONE (OUTPATIENT)
Dept: PHARMACY | Facility: CLINIC | Age: 62
End: 2017-10-05

## 2017-10-13 ENCOUNTER — HOSPITAL ENCOUNTER (OUTPATIENT)
Dept: RADIOLOGY | Facility: HOSPITAL | Age: 62
Discharge: HOME | End: 2017-10-13
Attending: INTERNAL MEDICINE
Payer: COMMERCIAL

## 2017-10-13 DIAGNOSIS — D3A.090 BRONCHIAL CARCINOID TUMORS: ICD-10-CM

## 2017-10-13 DIAGNOSIS — C7B.8 SECONDARY NEUROENDOCRINE TUMOR OF BONE: ICD-10-CM

## 2017-10-13 DIAGNOSIS — C7B.8 SECONDARY NEUROENDOCRINE TUMOR OF LIVER: ICD-10-CM

## 2017-10-13 DIAGNOSIS — Z09 CHEMOTHERAPY FOLLOW-UP EXAMINATION: ICD-10-CM

## 2017-10-13 PROCEDURE — 71260 CT THORAX DX C+: CPT | Mod: 26,,, | Performed by: RADIOLOGY

## 2017-10-13 PROCEDURE — 25500020 PHARM REV CODE 255: Performed by: INTERNAL MEDICINE

## 2017-10-13 PROCEDURE — 74177 CT ABD & PELVIS W/CONTRAST: CPT | Mod: TC

## 2017-10-13 PROCEDURE — 74177 CT ABD & PELVIS W/CONTRAST: CPT | Mod: 26,,, | Performed by: RADIOLOGY

## 2017-10-13 PROCEDURE — 71260 CT THORAX DX C+: CPT | Mod: TC

## 2017-10-13 RX ADMIN — IOHEXOL 100 ML: 350 INJECTION, SOLUTION INTRAVENOUS at 12:10

## 2017-10-16 ENCOUNTER — LAB VISIT (OUTPATIENT)
Dept: LAB | Facility: HOSPITAL | Age: 62
End: 2017-10-16
Attending: INTERNAL MEDICINE
Payer: COMMERCIAL

## 2017-10-16 ENCOUNTER — OFFICE VISIT (OUTPATIENT)
Dept: HEMATOLOGY/ONCOLOGY | Facility: CLINIC | Age: 62
End: 2017-10-16
Payer: COMMERCIAL

## 2017-10-16 VITALS
HEART RATE: 77 BPM | TEMPERATURE: 99 F | WEIGHT: 299.19 LBS | HEIGHT: 71 IN | BODY MASS INDEX: 41.89 KG/M2 | DIASTOLIC BLOOD PRESSURE: 68 MMHG | SYSTOLIC BLOOD PRESSURE: 127 MMHG

## 2017-10-16 DIAGNOSIS — C7B.8 SECONDARY NEUROENDOCRINE TUMOR OF LIVER: ICD-10-CM

## 2017-10-16 DIAGNOSIS — D3A.090 BRONCHIAL CARCINOID TUMORS: Primary | ICD-10-CM

## 2017-10-16 DIAGNOSIS — C7B.8 SECONDARY NEUROENDOCRINE TUMOR OF BONE: ICD-10-CM

## 2017-10-16 DIAGNOSIS — Z09 CHEMOTHERAPY FOLLOW-UP EXAMINATION: ICD-10-CM

## 2017-10-16 DIAGNOSIS — D3A.00 CARCINOID TUMOR: ICD-10-CM

## 2017-10-16 LAB
ALBUMIN SERPL BCP-MCNC: 3.6 G/DL
ALP SERPL-CCNC: 58 U/L
ALT SERPL W/O P-5'-P-CCNC: 20 U/L
ANION GAP SERPL CALC-SCNC: 8 MMOL/L
AST SERPL-CCNC: 20 U/L
BASOPHILS # BLD AUTO: 0.03 K/UL
BASOPHILS NFR BLD: 0.7 %
BILIRUB SERPL-MCNC: 1.1 MG/DL
BUN SERPL-MCNC: 14 MG/DL
CALCIUM SERPL-MCNC: 9 MG/DL
CHLORIDE SERPL-SCNC: 105 MMOL/L
CO2 SERPL-SCNC: 24 MMOL/L
CREAT SERPL-MCNC: 1.1 MG/DL
DIFFERENTIAL METHOD: ABNORMAL
EOSINOPHIL # BLD AUTO: 0.1 K/UL
EOSINOPHIL NFR BLD: 1.8 %
ERYTHROCYTE [DISTWIDTH] IN BLOOD BY AUTOMATED COUNT: 15.9 %
EST. GFR  (AFRICAN AMERICAN): >60 ML/MIN/1.73 M^2
EST. GFR  (NON AFRICAN AMERICAN): >60 ML/MIN/1.73 M^2
GLUCOSE SERPL-MCNC: 120 MG/DL
HCT VFR BLD AUTO: 39.5 %
HGB BLD-MCNC: 14 G/DL
IMM GRANULOCYTES # BLD AUTO: 0.01 K/UL
IMM GRANULOCYTES NFR BLD AUTO: 0.2 %
LYMPHOCYTES # BLD AUTO: 1.1 K/UL
LYMPHOCYTES NFR BLD: 25.9 %
MCH RBC QN AUTO: 31.2 PG
MCHC RBC AUTO-ENTMCNC: 35.4 G/DL
MCV RBC AUTO: 88 FL
MONOCYTES # BLD AUTO: 0.6 K/UL
MONOCYTES NFR BLD: 13.7 %
NEUTROPHILS # BLD AUTO: 2.5 K/UL
NEUTROPHILS NFR BLD: 57.7 %
NRBC BLD-RTO: 0 /100 WBC
PLATELET # BLD AUTO: 194 K/UL
PMV BLD AUTO: 10.2 FL
POTASSIUM SERPL-SCNC: 4 MMOL/L
PROT SERPL-MCNC: 6.9 G/DL
RBC # BLD AUTO: 4.49 M/UL
SODIUM SERPL-SCNC: 137 MMOL/L
WBC # BLD AUTO: 4.37 K/UL

## 2017-10-16 PROCEDURE — 99214 OFFICE O/P EST MOD 30 MIN: CPT | Mod: S$GLB,,, | Performed by: INTERNAL MEDICINE

## 2017-10-16 PROCEDURE — 36415 COLL VENOUS BLD VENIPUNCTURE: CPT

## 2017-10-16 PROCEDURE — 80053 COMPREHEN METABOLIC PANEL: CPT

## 2017-10-16 PROCEDURE — 85025 COMPLETE CBC W/AUTO DIFF WBC: CPT

## 2017-10-16 PROCEDURE — 99999 PR PBB SHADOW E&M-EST. PATIENT-LVL III: CPT | Mod: PBBFAC,,, | Performed by: INTERNAL MEDICINE

## 2017-10-16 NOTE — PROGRESS NOTES
PATIENT: Elroy Rahman  MRN: [id number]  DATE: 10/16/2017      Diagnosis:   1. Bronchial carcinoid tumors    2. Chemotherapy follow-up examination    3. Secondary neuroendocrine tumor of bone    4. Secondary neuroendocrine tumor of liver        Chief Complaint: Carcinoid Tumor      Oncologic History:      Oncologic History Bronchial carcinoid, left, diagnosed 1/2016   Metastatic disease to liver and bone 5/2017     Oncologic Treatment Left upper lobectomy 1/2016  CAPTEM 7/2017   Lanreotide 7/2017   Zoledronic acid 7/2017     Pathology 1/2016: Typical carcinoid tumor, well-differentiated T2a, N0, M0 Ki-67 <1%  6/2017: Liver biopsy, well-differentiated, Ki-67 25%           Subjective:    Interval History: Mr. Rahman is a 62 y.o. male who is seen in follow-up for a bronchial carcinoid tumor.  He states he feels well.  He has no new complaints.    His history dates to 1/2016 when he underwent a left upper lobectomy with Dr. Bell for a typical carcinoid tumor.  His pathological staging was T2a, N0, M0 with Ki-67 less than 1% and mitotic count 0 per 10 high-powered fields.  He done well postoperatively, however, in 5/2017 he was undergoing routine surveillance and a chest CT in picked up evidence of progression of disease in the liver.  Dedicated abdominal CT was performed showing multiple liver lesions.  In 6/2017 he underwent a CT-guided liver biopsy which was positive for metastatic neuroendocrine tumor which was well-differentiated with no overt nuclear atypia, however, the Ki-67 was 25%.  He underwent gallium-68 PET/CT and was found to have widespread disease involving the bone, liver and also mesentery.  He was started on CAPTEM in 7/2017 and also Lanreotide and zoledronic acid.    Past Medical History:   Past Medical History:   Diagnosis Date    Carcinoid bronchial adenoma of left lung     Chemotherapy follow-up examination 8/25/2017    Cough with hemoptysis     mild/intermittent    Mild heartburn      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    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    Sleep apnea     Snores        Past Surgical HIstory:   Past Surgical History:   Procedure Laterality Date    Bka Left        Family History:   Family History   Problem Relation Age of Onset    Cancer Mother      lung    Cancer Sister     Anesthesia problems Neg Hx        Social History:  reports that he has never smoked. He has never used smokeless tobacco. He reports that he drinks about 0.6 oz of alcohol per week . He reports that he does not use drugs.    Allergies:  Review of patient's allergies indicates:  No Known Allergies    Medications:  Current Outpatient Prescriptions   Medication Sig Dispense Refill    capecitabine (XELODA) 500 MG Tab Take 4 tablets (2,000 mg total) by mouth 2 (two) times daily. For 14 days , every 28 days ( 2 weeks on , 2 weeks off, every 4 weeks) 112 tablet 3    ondansetron (ZOFRAN-ODT) 8 MG TbDL Take 1 tablet (8 mg total) by mouth every 12 (twelve) hours as needed. 30 tablet 1    temozolomide (TEMODAR) 250 MG capsule Take by mouth once daily.        No current facility-administered medications for this visit.        Review of Systems   Constitutional: Negative for appetite change, chills, fatigue, fever and unexpected weight change.   HENT: Negative for dental problem, sinus pressure and sneezing.    Eyes: Negative for visual disturbance.   Respiratory: Negative for cough, choking and chest tightness.    Cardiovascular: Negative for chest pain and leg swelling.   Gastrointestinal: Negative for abdominal pain, blood in stool, constipation, diarrhea and nausea.   Genitourinary: Negative for difficulty urinating and dysuria.   Musculoskeletal: Positive for back pain. Negative for arthralgias.   Skin: Negative for rash and wound.   Neurological: Negative for dizziness, light-headedness and headaches.   Hematological: Negative for adenopathy. Does not bruise/bleed  "easily.   Psychiatric/Behavioral: Negative for sleep disturbance. The patient is not nervous/anxious.        ECOG Performance Status:   ECOG SCORE           Objective:      Vitals:   Vitals:    10/16/17 1441   BP: 127/68   Pulse: 77   Temp: 98.7 °F (37.1 °C)   Weight: 135.7 kg (299 lb 2.6 oz)   Height: 5' 11" (1.803 m)     BMI: Body mass index is 41.72 kg/m².    Physical Exam   Constitutional: He is oriented to person, place, and time. He appears well-developed and well-nourished.   HENT:   Head: Normocephalic and atraumatic.   Eyes: Pupils are equal, round, and reactive to light.   Neck: Normal range of motion. Neck supple.   Cardiovascular: Normal rate and regular rhythm.    Pulmonary/Chest: Effort normal and breath sounds normal. No respiratory distress.   Abdominal: Soft. He exhibits no distension. There is no tenderness.   Musculoskeletal: He exhibits no edema or tenderness.   Left lower extremity prosthesis   Lymphadenopathy:     He has no cervical adenopathy.   Neurological: He is alert and oriented to person, place, and time. No cranial nerve deficit.   Skin: Skin is warm and dry.   Psychiatric: He has a normal mood and affect. His behavior is normal.       Laboratory Data:  Lab Visit on 10/16/2017   Component Date Value Ref Range Status    WBC 10/16/2017 4.37  3.90 - 12.70 K/uL Final    RBC 10/16/2017 4.49* 4.60 - 6.20 M/uL Final    Hemoglobin 10/16/2017 14.0  14.0 - 18.0 g/dL Final    Hematocrit 10/16/2017 39.5* 40.0 - 54.0 % Final    MCV 10/16/2017 88  82 - 98 fL Final    MCH 10/16/2017 31.2* 27.0 - 31.0 pg Final    MCHC 10/16/2017 35.4  32.0 - 36.0 g/dL Final    RDW 10/16/2017 15.9* 11.5 - 14.5 % Final    Platelets 10/16/2017 194  150 - 350 K/uL Final    MPV 10/16/2017 10.2  9.2 - 12.9 fL Final    Immature Granulocytes 10/16/2017 0.2  0.0 - 0.5 % Final    Gran # 10/16/2017 2.5  1.8 - 7.7 K/uL Final    Immature Grans (Abs) 10/16/2017 0.01  0.00 - 0.04 K/uL Final    Lymph # 10/16/2017 1.1  " 1.0 - 4.8 K/uL Final    Mono # 10/16/2017 0.6  0.3 - 1.0 K/uL Final    Eos # 10/16/2017 0.1  0.0 - 0.5 K/uL Final    Baso # 10/16/2017 0.03  0.00 - 0.20 K/uL Final    nRBC 10/16/2017 0  0 /100 WBC Final    Gran% 10/16/2017 57.7  38.0 - 73.0 % Final    Lymph% 10/16/2017 25.9  18.0 - 48.0 % Final    Mono% 10/16/2017 13.7  4.0 - 15.0 % Final    Eosinophil% 10/16/2017 1.8  0.0 - 8.0 % Final    Basophil% 10/16/2017 0.7  0.0 - 1.9 % Final    Differential Method 10/16/2017 Automated   Final    Sodium 10/16/2017 137  136 - 145 mmol/L Final    Potassium 10/16/2017 4.0  3.5 - 5.1 mmol/L Final    Chloride 10/16/2017 105  95 - 110 mmol/L Final    CO2 10/16/2017 24  23 - 29 mmol/L Final    Glucose 10/16/2017 120* 70 - 110 mg/dL Final    BUN, Bld 10/16/2017 14  8 - 23 mg/dL Final    Creatinine 10/16/2017 1.1  0.5 - 1.4 mg/dL Final    Calcium 10/16/2017 9.0  8.7 - 10.5 mg/dL Final    Total Protein 10/16/2017 6.9  6.0 - 8.4 g/dL Final    Albumin 10/16/2017 3.6  3.5 - 5.2 g/dL Final    Total Bilirubin 10/16/2017 1.1* 0.1 - 1.0 mg/dL Final    Comment: For infants and newborns, interpretation of results should be based  on gestational age, weight and in agreement with clinical  observations.  Premature Infant recommended reference ranges:  Up to 24 hours.............<8.0 mg/dL  Up to 48 hours............<12.0 mg/dL  3-5 days..................<15.0 mg/dL  6-29 days.................<15.0 mg/dL      Alkaline Phosphatase 10/16/2017 58  55 - 135 U/L Final    AST 10/16/2017 20  10 - 40 U/L Final    ALT 10/16/2017 20  10 - 44 U/L Final    Anion Gap 10/16/2017 8  8 - 16 mmol/L Final    eGFR if African American 10/16/2017 >60.0  >60 mL/min/1.73 m^2 Final    eGFR if non African American 10/16/2017 >60.0  >60 mL/min/1.73 m^2 Final    Comment: Calculation used to obtain the estimated glomerular filtration  rate (eGFR) is the CKD-EPI equation. Since race is unknown   in our information system, the eGFR values for    -American and Non--American patients are given   for each creatinine result.       Supplemental Diagnosis  Chromogranin Staining:  Intensity: Moderate to strong. Positive cells: 100 (%)  Synaptophysin Staining:  Intensity: Strong. Positive cells: 100 (%)  CD31: 40 vessels per 1 HPF  Factor VIII: 27 vessels per 1 HPF  Ki67: 1 % tumor cells staining  Immunostains performed with appropriate positive and negative controls.  FINAL PATHOLOGIC DIAGNOSIS  1. Lymph node, level XI, excisional biopsy:  Fragments of benign lymph node with anthracosis and sinus histiocytosis (0/1).  2. Lung, lingular staple line, biopsy:  Lung, negative for neoplasm.  3. Lymph node, level X, excisional biopsy:  1 benign lymph node with anthracotic pigment and sinus histiocytosis (0/1).  4. Lymph node, level XII, excisional biopsy:  2 benign lymph nodes with sinus histiocytosis and anthracotic pigment (0/2).  5. Lung, left upper lobe, lobectomy:  Typical carcinoid tumor (well differentiated neuroendocrine tumor), 4 cm in greatest dimension.  Bronchial and vascular margins are negative for neoplasm.  Uninvolved lung adjacent to the tumor shows emphysematous changes and fibrosis, however remaining lung  appears unremarkable.  Additional immunohistochemical stains for ancillary studies (including synaptophysin, chromogranin and Ki-67)  will be completed and results will be reported in a supplemental report.  See synoptic report in comment section for further details.  6. Lymph node, level VII, excisional biopsy:  2 benign lymph nodes with anthracotic pigment and sinus histiocytosis (0/2).  Comment: Synoptic Report  Specimen: Left upper lobe of lung  Procedure: Lobectomy  Specimen laterality: Left  Tumor size:  Greatest dimension: 4 cm  Tumor focality: Single focus  Histologic type: Typical carcinoid tumor  Visceral pleural invasion: Not identified  Tumor extension: Not identified  Margins: Bronchial and vascular margins are  uninvolved by tumor.  Distance of tumor from closest margin: 1 cm from the bronchial surgical margin.  Lymphovascular invasion: Not identified  Ancillary studies:  Immunohistochemical stains for neuroendocrine markers and Ki-67 will be completed and results will follow in a  supplemental report.  Note: The tumor consists of a proliferation of cells in nests with lewis formation. The tumor cells have salt and  pepper chromatin pattern with abundant cytoplasm. There is no significant nuclear atypia. No evidence of  necrosis. Mitotic count is 0 mitoses in 10 high power fields.    Imaging:   CT 10/13/17  Indication: Carcinoid tumor of the lung.    Comparison: MRI 7/5/17, CT by/19/17.    Findings:    The structures at the base of the neck are unremarkable.  There is a left-sided aortic arch with 3 branch vessels.  The aorta maintains normal caliber and course.The heart is not enlarged.  There is mild calcific atherosclerosis of the coronary arteries.    The trachea and large proximal airways are patent without evidence of endobronchial lesion.  Postoperative changes of left upper lobe and lingula resection.  Stable postoperative changes without evidence of local recurrent disease within the left lung.  The right lung is well aerated and clear without significant pulmonary disease.    The esophagus maintains normal caliber and course, and there is a small volume of fluid within the esophageal lumen.    The liver demonstrates multiple enhancing lesions compatible with hepatic metastatic disease.  Comparison is made to MRI of 7/5/17.  Comparison is difficult due to differences in modality.  Index lesions within hepatic segment VIII measure 4.1 cm and lesion within hepatic segment IV measuring 3.5 cm (previously 3.9 cm and 3.4 cm respectively).  Additional nontarget lesions appear stable to slightly increased in size.  The gallbladder is unremarkable.  There is no intra-or extrahepatic biliary ductal dilatation.    The  stomach, spleen, pancreas, and left adrenal are unremarkable.  Stable probable right adrenal adenoma.    The kidneys are normal in size and location and concentrate and excrete contrast properly on delayed imaging.  7 mm calculus within the lower pole the left kidney and tiny hypodensities within both kidneys which are too small to definitively characterize and unchanged.  No hydronephrosis.  The ureters are normal in course and caliber.  The urinary bladder is unremarkable. The prostate is within normal limits.    The abdominal aorta is tortuous and demonstrates mild scattered calcific atherosclerosis.     The small bowel is normal in caliber without evidence of obstruction or inflammation.   The appendix appears normal. The colon and rectum are unremarkable.  There is no ascites, free fluid, or intraperitoneal free air. No lymph node enlargement within the abdomen, pelvis, or inguinal regions.    1.0 cm soft tissue nodule within the left upper quadrant mesentery, previously hypermetabolic on PET/CT    The osseous structures demonstrate innumerable sclerotic lesions throughout the spine, bilateral ribs, bilateral iliac bones, and bilateral proximal femurs compatible with osseous metastatic disease.  Lesions appear increased in size and number from prior CT on 5/17/17.  fat containing umbilical hernia.  The extrathoracic and extraperitoneal soft tissues are otherwise unremarkable.   Impression         1.  Diffuse hepatic metastatic disease, with lesions stable to slightly increased in size from prior MRI on 7/5/17.    2.  Diffuse osseous metastatic disease, with multiple new metastatic lesions from prior CTs from 5/2017.    3.  Postoperative changes of carcinoid resection within the left upper lobe and lingula.  No local recurrent disease.    4.  1.1 cm left upper quadrant mesenteric nodule, stable from prior PET/CT on 6/21/17.    5.  Additional findings including right adrenal adenoma, nonobstructing left renal  stone, and multiple subcentimeter hypodense foci within the kidneys, likely benign.    RECIST SUMMARY    Lesion 1: Hepatic segment VIII lesion 4.1 cm cm Series 3 Image 19 Prior measurement 3.9 cm  Lesion 2: Hepatic segment IV 3.5 cm cm Series 3 Image 19 Prior measurement 3.4 cm  Lesion 3: Left upper quadrant mesenteric nodule 1.0 cm Series 3 Image with 20 Prior measurement 1.1 cm    Note there are increase in the metastatic sclerotic bony foci as above.              Assessment:       1. Bronchial carcinoid tumors    2. Chemotherapy follow-up examination    3. Secondary neuroendocrine tumor of bone    4. Secondary neuroendocrine tumor of liver           Plan:     Mr. Rahman remains on CAPTEM and tolerating well.  Review of his CT shows no detrimental changes in the liver.  It does note some progressive disease in the bone, however, this was compared to a scan prior to initiation of chemotherapy.  We'll plan to continue him on with CAPTEM and repeat imaging in another 3 months.  He may be a candidate for PRRT down the road.  Follow back up in one month.  All questions were answered and he is agreeable with this plan.    Jesus London DO, FACP  Hematology & Oncology  [facility] [place] [address]  ph. [phone]  Fax. [phone]    25 minutes were spent in coordination of patient's care, record review and counseling.  More than 50% of the time was face-to-face.

## 2017-11-06 ENCOUNTER — LAB VISIT (OUTPATIENT)
Dept: LAB | Facility: HOSPITAL | Age: 62
End: 2017-11-06
Attending: INTERNAL MEDICINE
Payer: COMMERCIAL

## 2017-11-06 DIAGNOSIS — D3A.00 CARCINOID TUMOR: ICD-10-CM

## 2017-11-06 LAB
ALBUMIN SERPL BCP-MCNC: 3.9 G/DL
ALP SERPL-CCNC: 57 U/L
ALT SERPL W/O P-5'-P-CCNC: 27 U/L
ANION GAP SERPL CALC-SCNC: 5 MMOL/L
AST SERPL-CCNC: 20 U/L
BASOPHILS # BLD AUTO: 0.05 K/UL
BASOPHILS NFR BLD: 0.8 %
BILIRUB SERPL-MCNC: 1.1 MG/DL
BUN SERPL-MCNC: 15 MG/DL
CALCIUM SERPL-MCNC: 9.3 MG/DL
CHLORIDE SERPL-SCNC: 103 MMOL/L
CO2 SERPL-SCNC: 29 MMOL/L
CREAT SERPL-MCNC: 1.1 MG/DL
DIFFERENTIAL METHOD: ABNORMAL
EOSINOPHIL # BLD AUTO: 0.1 K/UL
EOSINOPHIL NFR BLD: 0.8 %
ERYTHROCYTE [DISTWIDTH] IN BLOOD BY AUTOMATED COUNT: 15.3 %
EST. GFR  (AFRICAN AMERICAN): >60 ML/MIN/1.73 M^2
EST. GFR  (NON AFRICAN AMERICAN): >60 ML/MIN/1.73 M^2
GLUCOSE SERPL-MCNC: 127 MG/DL
HCT VFR BLD AUTO: 41.6 %
HGB BLD-MCNC: 14.6 G/DL
IMM GRANULOCYTES # BLD AUTO: 0.02 K/UL
IMM GRANULOCYTES NFR BLD AUTO: 0.3 %
LYMPHOCYTES # BLD AUTO: 1.4 K/UL
LYMPHOCYTES NFR BLD: 22.5 %
MCH RBC QN AUTO: 31.1 PG
MCHC RBC AUTO-ENTMCNC: 35.1 G/DL
MCV RBC AUTO: 89 FL
MONOCYTES # BLD AUTO: 0.7 K/UL
MONOCYTES NFR BLD: 11.6 %
NEUTROPHILS # BLD AUTO: 3.9 K/UL
NEUTROPHILS NFR BLD: 64 %
NRBC BLD-RTO: 0 /100 WBC
PLATELET # BLD AUTO: 223 K/UL
PMV BLD AUTO: 9.7 FL
POTASSIUM SERPL-SCNC: 4.5 MMOL/L
PROT SERPL-MCNC: 7.5 G/DL
RBC # BLD AUTO: 4.69 M/UL
SODIUM SERPL-SCNC: 137 MMOL/L
WBC # BLD AUTO: 6.13 K/UL

## 2017-11-06 PROCEDURE — 36415 COLL VENOUS BLD VENIPUNCTURE: CPT

## 2017-11-06 PROCEDURE — 85025 COMPLETE CBC W/AUTO DIFF WBC: CPT

## 2017-11-06 PROCEDURE — 80053 COMPREHEN METABOLIC PANEL: CPT

## 2017-11-08 DIAGNOSIS — D3A.00 CARCINOID TUMOR: ICD-10-CM

## 2017-11-10 NOTE — TELEPHONE ENCOUNTER
MELLISA....Patient is due for refill soon.  [facility] sent request for Capecitabine and Temozolomide refills.  Thanks!

## 2017-11-13 ENCOUNTER — INFUSION (OUTPATIENT)
Dept: INFUSION THERAPY | Facility: HOSPITAL | Age: 62
End: 2017-11-13
Attending: INTERNAL MEDICINE
Payer: COMMERCIAL

## 2017-11-13 ENCOUNTER — TELEPHONE (OUTPATIENT)
Dept: HEMATOLOGY/ONCOLOGY | Facility: CLINIC | Age: 62
End: 2017-11-13

## 2017-11-13 ENCOUNTER — TELEPHONE (OUTPATIENT)
Dept: PHARMACY | Facility: CLINIC | Age: 62
End: 2017-11-13

## 2017-11-13 ENCOUNTER — OFFICE VISIT (OUTPATIENT)
Dept: HEMATOLOGY/ONCOLOGY | Facility: CLINIC | Age: 62
End: 2017-11-13
Payer: COMMERCIAL

## 2017-11-13 VITALS
DIASTOLIC BLOOD PRESSURE: 78 MMHG | RESPIRATION RATE: 20 BRPM | BODY MASS INDEX: 42.12 KG/M2 | WEIGHT: 302 LBS | SYSTOLIC BLOOD PRESSURE: 129 MMHG | OXYGEN SATURATION: 97 % | TEMPERATURE: 99 F | HEART RATE: 68 BPM

## 2017-11-13 VITALS
SYSTOLIC BLOOD PRESSURE: 123 MMHG | RESPIRATION RATE: 16 BRPM | TEMPERATURE: 98 F | WEIGHT: 301 LBS | BODY MASS INDEX: 42.14 KG/M2 | HEIGHT: 71 IN | HEART RATE: 56 BPM | DIASTOLIC BLOOD PRESSURE: 74 MMHG

## 2017-11-13 DIAGNOSIS — C7B.8 SECONDARY NEUROENDOCRINE TUMOR OF BONE: ICD-10-CM

## 2017-11-13 DIAGNOSIS — C7B.8 SECONDARY NEUROENDOCRINE TUMOR OF LIVER: ICD-10-CM

## 2017-11-13 DIAGNOSIS — D3A.090 BRONCHIAL CARCINOID TUMORS: Primary | ICD-10-CM

## 2017-11-13 DIAGNOSIS — Z09 CHEMOTHERAPY FOLLOW-UP EXAMINATION: ICD-10-CM

## 2017-11-13 PROCEDURE — 25000003 PHARM REV CODE 250: Performed by: INTERNAL MEDICINE

## 2017-11-13 PROCEDURE — 63600175 PHARM REV CODE 636 W HCPCS: Performed by: INTERNAL MEDICINE

## 2017-11-13 PROCEDURE — 99999 PR PBB SHADOW E&M-EST. PATIENT-LVL III: CPT | Mod: PBBFAC,,, | Performed by: INTERNAL MEDICINE

## 2017-11-13 PROCEDURE — 99214 OFFICE O/P EST MOD 30 MIN: CPT | Mod: S$GLB,,, | Performed by: INTERNAL MEDICINE

## 2017-11-13 PROCEDURE — 96365 THER/PROPH/DIAG IV INF INIT: CPT

## 2017-11-13 RX ORDER — TEMOZOLOMIDE 250 MG/1
500 CAPSULE ORAL DAILY
Qty: 10 CAPSULE | Refills: 6 | Status: SHIPPED | OUTPATIENT
Start: 2017-11-13 | End: 2018-05-24

## 2017-11-13 RX ORDER — CAPECITABINE 500 MG/1
750 TABLET, FILM COATED ORAL 2 TIMES DAILY
Qty: 112 TABLET | Refills: 3 | Status: SHIPPED | OUTPATIENT
Start: 2017-11-13 | End: 2018-03-02 | Stop reason: SDUPTHER

## 2017-11-13 RX ORDER — LANREOTIDE ACETATE 120 MG/.5ML
120 INJECTION SUBCUTANEOUS
Status: DISCONTINUED | OUTPATIENT
Start: 2017-11-13 | End: 2017-11-13 | Stop reason: HOSPADM

## 2017-11-13 RX ADMIN — SODIUM CHLORIDE 4 MG: 9 INJECTION, SOLUTION INTRAVENOUS at 12:11

## 2017-11-13 NOTE — TELEPHONE ENCOUNTER
----- Message from Jesus London DO, FACP sent at 11/13/2017  1:09 PM CST -----  Lanreotide and zoledronic acid today  See me in 4 weeks for Lanreotide alone

## 2017-11-13 NOTE — PLAN OF CARE
Problem: Patient Care Overview (Adult)  Goal: Plan of Care Review  Outcome: Ongoing (interventions implemented as appropriate)  Patient tolerated Zometa well.  Lanreotide not given due to pharmacy not having medication in stock.  Notified Dr. London's office who will reschedule patient for Lanreotide.  AVS given to patient.  Patient ambulated off unit accompanied by his wife.

## 2017-11-13 NOTE — TELEPHONE ENCOUNTER
Spoke to patient, [facility] was out of Lanreotide and [facility] only has enough doses for their already scheduled patients. Patient rescheduled for 11/20

## 2017-11-13 NOTE — PROGRESS NOTES
PATIENT: Elroy Rahman  MRN: [id number]  DATE: 11/13/2017      Diagnosis:   1. Bronchial carcinoid tumors    2. Chemotherapy follow-up examination    3. Secondary neuroendocrine tumor of bone    4. Secondary neuroendocrine tumor of liver        Chief Complaint: Follow-up      Oncologic History:      Oncologic History Bronchial carcinoid, left, diagnosed 1/2016   Metastatic disease to liver and bone 5/2017     Oncologic Treatment Left upper lobectomy 1/2016  CAPTEM 7/2017   Lanreotide 7/2017   Zoledronic acid 7/2017     Pathology 1/2016: Typical carcinoid tumor, well-differentiated T2a, N0, M0 Ki-67 <1%  6/2017: Liver biopsy, well-differentiated, Ki-67 25%           Subjective:    Interval History: Mr. Rahman is a 62 y.o. male who is seen in follow-up for a bronchial carcinoid tumor.  He states he feels well.  He has no new complaints.    His history dates to 1/2016 when he underwent a left upper lobectomy with Dr. Bell for a typical carcinoid tumor.  His pathological staging was T2a, N0, M0 with Ki-67 less than 1% and mitotic count 0 per 10 high-powered fields.  He done well postoperatively, however, in 5/2017 he was undergoing routine surveillance and a chest CT in picked up evidence of progression of disease in the liver.  Dedicated abdominal CT was performed showing multiple liver lesions.  In 6/2017 he underwent a CT-guided liver biopsy which was positive for metastatic neuroendocrine tumor which was well-differentiated with no overt nuclear atypia, however, the Ki-67 was 25%.  He underwent gallium-68 PET/CT and was found to have widespread disease involving the bone, liver and also mesentery.  He was started on CAPTEM in 7/2017 and also Lanreotide and zoledronic acid.  Scans in 10/2017 had shown stability of disease.    Past Medical History:   Past Medical History:   Diagnosis Date    Carcinoid bronchial adenoma of left lung     Chemotherapy follow-up examination 8/25/2017    Cough with hemoptysis      mild/intermittent    Mild heartburn     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    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    Sleep apnea     Snores        Past Surgical HIstory:   Past Surgical History:   Procedure Laterality Date    Bka Left        Family History:   Family History   Problem Relation Age of Onset    Cancer Mother      lung    Cancer Sister     Anesthesia problems Neg Hx        Social History:  reports that he has never smoked. He has never used smokeless tobacco. He reports that he drinks about 0.6 oz of alcohol per week . He reports that he does not use drugs.    Allergies:  Review of patient's allergies indicates:  No Known Allergies    Medications:  Current Outpatient Prescriptions   Medication Sig Dispense Refill    capecitabine (XELODA) 500 MG Tab Take 4 tablets (2,000 mg total) by mouth 2 (two) times daily. For 14 days , every 28 days ( 2 weeks on , 2 weeks off, every 4 weeks) 112 tablet 3    ondansetron (ZOFRAN-ODT) 8 MG TbDL Take 1 tablet (8 mg total) by mouth every 12 (twelve) hours as needed. 30 tablet 1    temozolomide (TEMODAR) 250 MG capsule Take 2 capsules (500 mg total) by mouth once daily. Take 2 capsules on days 10-14 of 28 day cycle 10 capsule 6     No current facility-administered medications for this visit.      Facility-Administered Medications Ordered in Other Visits   Medication Dose Route Frequency Provider Last Rate Last Dose    lanreotide injection 120 mg  120 mg Subcutaneous 1 time in Clinic/HOD Jesus London DO, FACP        zoledronic acid (ZOMETA) 4 mg in sodium chloride 0.9% 100 mL IVPB  4 mg Intravenous 1 time in Clinic/HOD Jesus London DO, FACP           Review of Systems   Constitutional: Negative for appetite change, chills, fatigue, fever and unexpected weight change.   HENT: Negative for dental problem, sinus pressure and sneezing.    Eyes: Negative for visual disturbance.   Respiratory: Negative for cough,  choking and chest tightness.    Cardiovascular: Negative for chest pain and leg swelling.   Gastrointestinal: Negative for abdominal pain, blood in stool, constipation, diarrhea and nausea.   Genitourinary: Negative for difficulty urinating and dysuria.   Musculoskeletal: Positive for back pain. Negative for arthralgias.   Skin: Negative for rash and wound.   Neurological: Negative for dizziness, light-headedness and headaches.   Hematological: Negative for adenopathy. Does not bruise/bleed easily.   Psychiatric/Behavioral: Negative for sleep disturbance. The patient is not nervous/anxious.        ECOG Performance Status:   ECOG SCORE    0 - Fully active-able to carry on all pre-disease performance without restriction         Objective:      Vitals:   Vitals:    11/13/17 1143   BP: 129/78   BP Location: Left arm   Patient Position: Sitting   Pulse: 68   Resp: 20   Temp: 98.7 °F (37.1 °C)   TempSrc: Oral   SpO2: 97%   Weight: (!) 137 kg (302 lb 0.5 oz)     BMI: Body mass index is 42.12 kg/m².    Physical Exam   Constitutional: He is oriented to person, place, and time. He appears well-developed and well-nourished.   HENT:   Head: Normocephalic and atraumatic.   Eyes: Pupils are equal, round, and reactive to light.   Neck: Normal range of motion. Neck supple.   Cardiovascular: Normal rate and regular rhythm.    Pulmonary/Chest: Effort normal and breath sounds normal. No respiratory distress.   Abdominal: Soft. He exhibits no distension. There is no tenderness.   Musculoskeletal: He exhibits no edema or tenderness.   Left lower extremity prosthesis   Lymphadenopathy:     He has no cervical adenopathy.   Neurological: He is alert and oriented to person, place, and time. No cranial nerve deficit.   Skin: Skin is warm and dry.   Psychiatric: He has a normal mood and affect. His behavior is normal.       Laboratory Data:  Lab Visit on 11/13/2017   Component Date Value Ref Range Status    WBC 11/13/2017 4.30  3.90 - 12.70  K/uL Final    RBC 11/13/2017 4.64  4.60 - 6.20 M/uL Final    Hemoglobin 11/13/2017 14.4  14.0 - 18.0 g/dL Final    Hematocrit 11/13/2017 41.6  40.0 - 54.0 % Final    MCV 11/13/2017 90  82 - 98 fL Final    MCH 11/13/2017 31.0  27.0 - 31.0 pg Final    MCHC 11/13/2017 34.6  32.0 - 36.0 g/dL Final    RDW 11/13/2017 15.5* 11.5 - 14.5 % Final    Platelets 11/13/2017 212  150 - 350 K/uL Final    MPV 11/13/2017 10.0  9.2 - 12.9 fL Final    Immature Granulocytes 11/13/2017 0.5  0.0 - 0.5 % Final    Gran # 11/13/2017 2.7  1.8 - 7.7 K/uL Final    Immature Grans (Abs) 11/13/2017 0.02  0.00 - 0.04 K/uL Final    Lymph # 11/13/2017 1.1  1.0 - 4.8 K/uL Final    Mono # 11/13/2017 0.4  0.3 - 1.0 K/uL Final    Eos # 11/13/2017 0.0  0.0 - 0.5 K/uL Final    Baso # 11/13/2017 0.04  0.00 - 0.20 K/uL Final    nRBC 11/13/2017 0  0 /100 WBC Final    Gran% 11/13/2017 63.7  38.0 - 73.0 % Final    Lymph% 11/13/2017 26.3  18.0 - 48.0 % Final    Mono% 11/13/2017 8.1  4.0 - 15.0 % Final    Eosinophil% 11/13/2017 0.5  0.0 - 8.0 % Final    Basophil% 11/13/2017 0.9  0.0 - 1.9 % Final    Differential Method 11/13/2017 Automated   Final    Sodium 11/13/2017 138  136 - 145 mmol/L Final    Potassium 11/13/2017 4.4  3.5 - 5.1 mmol/L Final    Chloride 11/13/2017 105  95 - 110 mmol/L Final    CO2 11/13/2017 24  23 - 29 mmol/L Final    Glucose 11/13/2017 138* 70 - 110 mg/dL Final    BUN, Bld 11/13/2017 13  8 - 23 mg/dL Final    Creatinine 11/13/2017 1.0  0.5 - 1.4 mg/dL Final    Calcium 11/13/2017 8.9  8.7 - 10.5 mg/dL Final    Total Protein 11/13/2017 6.9  6.0 - 8.4 g/dL Final    Albumin 11/13/2017 3.6  3.5 - 5.2 g/dL Final    Total Bilirubin 11/13/2017 0.8  0.1 - 1.0 mg/dL Final    Comment: For infants and newborns, interpretation of results should be based  on gestational age, weight and in agreement with clinical  observations.  Premature Infant recommended reference ranges:  Up to 24 hours.............<8.0 mg/dL  Up to  48 hours............<12.0 mg/dL  3-5 days..................<15.0 mg/dL  6-29 days.................<15.0 mg/dL      Alkaline Phosphatase 11/13/2017 59  55 - 135 U/L Final    AST 11/13/2017 14  10 - 40 U/L Final    ALT 11/13/2017 23  10 - 44 U/L Final    Anion Gap 11/13/2017 9  8 - 16 mmol/L Final    eGFR if African American 11/13/2017 >60.0  >60 mL/min/1.73 m^2 Final    eGFR if non African American 11/13/2017 >60.0  >60 mL/min/1.73 m^2 Final    Comment: Calculation used to obtain the estimated glomerular filtration  rate (eGFR) is the CKD-EPI equation.       Magnesium 11/13/2017 1.9  1.6 - 2.6 mg/dL Final    Phosphorus 11/13/2017 2.5* 2.7 - 4.5 mg/dL Final     Supplemental Diagnosis  Chromogranin Staining:  Intensity: Moderate to strong. Positive cells: 100 (%)  Synaptophysin Staining:  Intensity: Strong. Positive cells: 100 (%)  CD31: 40 vessels per 1 HPF  Factor VIII: 27 vessels per 1 HPF  Ki67: 1 % tumor cells staining  Immunostains performed with appropriate positive and negative controls.  FINAL PATHOLOGIC DIAGNOSIS  1. Lymph node, level XI, excisional biopsy:  Fragments of benign lymph node with anthracosis and sinus histiocytosis (0/1).  2. Lung, lingular staple line, biopsy:  Lung, negative for neoplasm.  3. Lymph node, level X, excisional biopsy:  1 benign lymph node with anthracotic pigment and sinus histiocytosis (0/1).  4. Lymph node, level XII, excisional biopsy:  2 benign lymph nodes with sinus histiocytosis and anthracotic pigment (0/2).  5. Lung, left upper lobe, lobectomy:  Typical carcinoid tumor (well differentiated neuroendocrine tumor), 4 cm in greatest dimension.  Bronchial and vascular margins are negative for neoplasm.  Uninvolved lung adjacent to the tumor shows emphysematous changes and fibrosis, however remaining lung  appears unremarkable.  Additional immunohistochemical stains for ancillary studies (including synaptophysin, chromogranin and Ki-67)  will be completed and results  will be reported in a supplemental report.  See synoptic report in comment section for further details.  6. Lymph node, level VII, excisional biopsy:  2 benign lymph nodes with anthracotic pigment and sinus histiocytosis (0/2).  Comment: Synoptic Report  Specimen: Left upper lobe of lung  Procedure: Lobectomy  Specimen laterality: Left  Tumor size:  Greatest dimension: 4 cm  Tumor focality: Single focus  Histologic type: Typical carcinoid tumor  Visceral pleural invasion: Not identified  Tumor extension: Not identified  Margins: Bronchial and vascular margins are uninvolved by tumor.  Distance of tumor from closest margin: 1 cm from the bronchial surgical margin.  Lymphovascular invasion: Not identified  Ancillary studies:  Immunohistochemical stains for neuroendocrine markers and Ki-67 will be completed and results will follow in a  supplemental report.  Note: The tumor consists of a proliferation of cells in nests with lewis formation. The tumor cells have salt and  pepper chromatin pattern with abundant cytoplasm. There is no significant nuclear atypia. No evidence of  necrosis. Mitotic count is 0 mitoses in 10 high power fields.    Imaging:   CT 10/13/17  Indication: Carcinoid tumor of the lung.    Comparison: MRI 7/5/17, CT by/19/17.    Findings:    The structures at the base of the neck are unremarkable.  There is a left-sided aortic arch with 3 branch vessels.  The aorta maintains normal caliber and course.The heart is not enlarged.  There is mild calcific atherosclerosis of the coronary arteries.    The trachea and large proximal airways are patent without evidence of endobronchial lesion.  Postoperative changes of left upper lobe and lingula resection.  Stable postoperative changes without evidence of local recurrent disease within the left lung.  The right lung is well aerated and clear without significant pulmonary disease.    The esophagus maintains normal caliber and course, and there is a small  volume of fluid within the esophageal lumen.    The liver demonstrates multiple enhancing lesions compatible with hepatic metastatic disease.  Comparison is made to MRI of 7/5/17.  Comparison is difficult due to differences in modality.  Index lesions within hepatic segment VIII measure 4.1 cm and lesion within hepatic segment IV measuring 3.5 cm (previously 3.9 cm and 3.4 cm respectively).  Additional nontarget lesions appear stable to slightly increased in size.  The gallbladder is unremarkable.  There is no intra-or extrahepatic biliary ductal dilatation.    The stomach, spleen, pancreas, and left adrenal are unremarkable.  Stable probable right adrenal adenoma.    The kidneys are normal in size and location and concentrate and excrete contrast properly on delayed imaging.  7 mm calculus within the lower pole the left kidney and tiny hypodensities within both kidneys which are too small to definitively characterize and unchanged.  No hydronephrosis.  The ureters are normal in course and caliber.  The urinary bladder is unremarkable. The prostate is within normal limits.    The abdominal aorta is tortuous and demonstrates mild scattered calcific atherosclerosis.     The small bowel is normal in caliber without evidence of obstruction or inflammation.   The appendix appears normal. The colon and rectum are unremarkable.  There is no ascites, free fluid, or intraperitoneal free air. No lymph node enlargement within the abdomen, pelvis, or inguinal regions.    1.0 cm soft tissue nodule within the left upper quadrant mesentery, previously hypermetabolic on PET/CT    The osseous structures demonstrate innumerable sclerotic lesions throughout the spine, bilateral ribs, bilateral iliac bones, and bilateral proximal femurs compatible with osseous metastatic disease.  Lesions appear increased in size and number from prior CT on 5/17/17.  fat containing umbilical hernia.  The extrathoracic and extraperitoneal soft tissues  are otherwise unremarkable.   Impression         1.  Diffuse hepatic metastatic disease, with lesions stable to slightly increased in size from prior MRI on 7/5/17.    2.  Diffuse osseous metastatic disease, with multiple new metastatic lesions from prior CTs from 5/2017.    3.  Postoperative changes of carcinoid resection within the left upper lobe and lingula.  No local recurrent disease.    4.  1.1 cm left upper quadrant mesenteric nodule, stable from prior PET/CT on 6/21/17.    5.  Additional findings including right adrenal adenoma, nonobstructing left renal stone, and multiple subcentimeter hypodense foci within the kidneys, likely benign.    RECIST SUMMARY    Lesion 1: Hepatic segment VIII lesion 4.1 cm cm Series 3 Image 19 Prior measurement 3.9 cm  Lesion 2: Hepatic segment IV 3.5 cm cm Series 3 Image 19 Prior measurement 3.4 cm  Lesion 3: Left upper quadrant mesenteric nodule 1.0 cm Series 3 Image with 20 Prior measurement 1.1 cm    Note there are increase in the metastatic sclerotic bony foci as above.              Assessment:       1. Bronchial carcinoid tumors    2. Chemotherapy follow-up examination    3. Secondary neuroendocrine tumor of bone    4. Secondary neuroendocrine tumor of liver           Plan:     Mr. Rahman  is doing well from an oncologic perspective and tolerating treatment with CAPTEM.  His labs are appropriate to proceed.  He will receive Lanreotide and zoledronic acid today.  Follow back up with me in another 4 weeks or sooner if needed.    Jesus London DO, FACP  Hematology & Oncology  [facility] [place] [address]  ph. [phone]  Fax. [phone]    25 minutes were spent in coordination of patient's care, record review and counseling.  More than 50% of the time was face-to-face.

## 2017-11-13 NOTE — PLAN OF CARE
Problem: Patient Care Overview (Adult)  Goal: Plan of Care Review  Outcome: Ongoing (interventions implemented as appropriate)  Patient here for Zometa and Lanreotide.  Assessment complete and labs reviewed.  VSS.  Patient states he has not been taking calcium and vitamin D daily.  Educated patient on mechanism of action of Zometa and importance of daily calcium and vitamin D.  Patient denies jaw pain and any recent dental surgery.  No needs expressed at this time.  Will continue to monitor.

## 2017-11-15 ENCOUNTER — TELEPHONE (OUTPATIENT)
Dept: PHARMACY | Facility: CLINIC | Age: 62
End: 2017-11-15

## 2017-11-20 ENCOUNTER — INFUSION (OUTPATIENT)
Dept: INFUSION THERAPY | Facility: HOSPITAL | Age: 62
End: 2017-11-20
Attending: INTERNAL MEDICINE
Payer: COMMERCIAL

## 2017-11-20 ENCOUNTER — TELEPHONE (OUTPATIENT)
Dept: HEMATOLOGY/ONCOLOGY | Facility: CLINIC | Age: 62
End: 2017-11-20

## 2017-11-20 VITALS
SYSTOLIC BLOOD PRESSURE: 125 MMHG | RESPIRATION RATE: 18 BRPM | DIASTOLIC BLOOD PRESSURE: 74 MMHG | TEMPERATURE: 98 F | HEART RATE: 52 BPM

## 2017-11-20 DIAGNOSIS — D3A.090 BRONCHIAL CARCINOID TUMORS: Primary | ICD-10-CM

## 2017-11-20 DIAGNOSIS — C7B.8 SECONDARY NEUROENDOCRINE TUMOR OF LIVER: Primary | ICD-10-CM

## 2017-11-20 DIAGNOSIS — C7B.8 SECONDARY NEUROENDOCRINE TUMOR OF LIVER: ICD-10-CM

## 2017-11-20 PROCEDURE — 96372 THER/PROPH/DIAG INJ SC/IM: CPT

## 2017-11-20 PROCEDURE — 63600175 PHARM REV CODE 636 W HCPCS: Performed by: INTERNAL MEDICINE

## 2017-11-20 RX ORDER — LANREOTIDE ACETATE 120 MG/.5ML
120 INJECTION SUBCUTANEOUS
Status: COMPLETED | OUTPATIENT
Start: 2017-11-20 | End: 2017-11-20

## 2017-11-20 RX ADMIN — LANREOTIDE ACETATE 120 MG: 120 INJECTION SUBCUTANEOUS at 12:11

## 2017-11-27 PROBLEM — Z09 CHEMOTHERAPY FOLLOW-UP EXAMINATION: Status: RESOLVED | Noted: 2017-08-25 | Resolved: 2017-11-27

## 2017-12-04 ENCOUNTER — LAB VISIT (OUTPATIENT)
Dept: LAB | Facility: HOSPITAL | Age: 62
End: 2017-12-04
Attending: INTERNAL MEDICINE
Payer: COMMERCIAL

## 2017-12-04 DIAGNOSIS — C7B.8 SECONDARY NEUROENDOCRINE TUMOR OF LIVER: ICD-10-CM

## 2017-12-04 LAB
ALBUMIN SERPL BCP-MCNC: 3.8 G/DL
ALP SERPL-CCNC: 56 U/L
ALT SERPL W/O P-5'-P-CCNC: 27 U/L
ANION GAP SERPL CALC-SCNC: 8 MMOL/L
AST SERPL-CCNC: 21 U/L
BILIRUB SERPL-MCNC: 0.9 MG/DL
BUN SERPL-MCNC: 14 MG/DL
CALCIUM SERPL-MCNC: 9.4 MG/DL
CHLORIDE SERPL-SCNC: 105 MMOL/L
CO2 SERPL-SCNC: 26 MMOL/L
CREAT SERPL-MCNC: 1.2 MG/DL
ERYTHROCYTE [DISTWIDTH] IN BLOOD BY AUTOMATED COUNT: 14.7 %
EST. GFR  (AFRICAN AMERICAN): >60 ML/MIN/1.73 M^2
EST. GFR  (NON AFRICAN AMERICAN): >60 ML/MIN/1.73 M^2
GLUCOSE SERPL-MCNC: 126 MG/DL
HCT VFR BLD AUTO: 42 %
HGB BLD-MCNC: 14.6 G/DL
IMM GRANULOCYTES # BLD AUTO: 0.02 K/UL
MCH RBC QN AUTO: 31.4 PG
MCHC RBC AUTO-ENTMCNC: 34.8 G/DL
MCV RBC AUTO: 90 FL
NEUTROPHILS # BLD AUTO: 3 K/UL
PLATELET # BLD AUTO: 182 K/UL
PMV BLD AUTO: 9.7 FL
POTASSIUM SERPL-SCNC: 4.5 MMOL/L
PROT SERPL-MCNC: 7.3 G/DL
RBC # BLD AUTO: 4.65 M/UL
SODIUM SERPL-SCNC: 139 MMOL/L
WBC # BLD AUTO: 4.73 K/UL

## 2017-12-04 PROCEDURE — 85027 COMPLETE CBC AUTOMATED: CPT

## 2017-12-04 PROCEDURE — 36415 COLL VENOUS BLD VENIPUNCTURE: CPT

## 2017-12-04 PROCEDURE — 80053 COMPREHEN METABOLIC PANEL: CPT

## 2017-12-05 ENCOUNTER — TELEPHONE (OUTPATIENT)
Dept: NEUROLOGY | Facility: HOSPITAL | Age: 62
End: 2017-12-05

## 2017-12-05 NOTE — TELEPHONE ENCOUNTER
----- Message from Irish Sharpe sent at 12/5/2017 10:59 AM CST -----  Contact: Pt Wife   Pt Wife would like the nurse to give her a call back in regards to rescheduling Pt appointments .. Contact number [phone]..

## 2017-12-06 ENCOUNTER — TELEPHONE (OUTPATIENT)
Dept: PHARMACY | Facility: CLINIC | Age: 62
End: 2017-12-06

## 2017-12-08 NOTE — TELEPHONE ENCOUNTER
Refill call for CAP and TEM. Patient confirmed that they are in need of the refill. Will prepare to ship out 17 to arrive 12/15/17 with patient consent to charge [facility] Copay $0 at 004. Address &  confirmed.Patient has no doses remaining and starts next cycle on 17. Patient has not started any new medications, no missed doses and no side effects. Patient taking the medication as directed by doctor. Patient does not have any questions or concerns at this time.

## 2017-12-18 ENCOUNTER — INFUSION (OUTPATIENT)
Dept: INFUSION THERAPY | Facility: HOSPITAL | Age: 62
End: 2017-12-18
Attending: INTERNAL MEDICINE
Payer: COMMERCIAL

## 2017-12-18 ENCOUNTER — OFFICE VISIT (OUTPATIENT)
Dept: HEMATOLOGY/ONCOLOGY | Facility: CLINIC | Age: 62
End: 2017-12-18
Payer: COMMERCIAL

## 2017-12-18 VITALS
HEART RATE: 59 BPM | WEIGHT: 299.63 LBS | DIASTOLIC BLOOD PRESSURE: 89 MMHG | BODY MASS INDEX: 41.79 KG/M2 | TEMPERATURE: 98 F | RESPIRATION RATE: 15 BRPM | SYSTOLIC BLOOD PRESSURE: 142 MMHG

## 2017-12-18 DIAGNOSIS — C7B.8 SECONDARY NEUROENDOCRINE TUMOR OF LIVER: ICD-10-CM

## 2017-12-18 DIAGNOSIS — D3A.090 BRONCHIAL CARCINOID TUMORS: Primary | ICD-10-CM

## 2017-12-18 DIAGNOSIS — C7B.8 SECONDARY NEUROENDOCRINE TUMOR OF BONE: ICD-10-CM

## 2017-12-18 PROCEDURE — 99214 OFFICE O/P EST MOD 30 MIN: CPT | Mod: S$GLB,,, | Performed by: INTERNAL MEDICINE

## 2017-12-18 PROCEDURE — 63600175 PHARM REV CODE 636 W HCPCS: Performed by: INTERNAL MEDICINE

## 2017-12-18 PROCEDURE — 99999 PR PBB SHADOW E&M-EST. PATIENT-LVL III: CPT | Mod: PBBFAC,,, | Performed by: INTERNAL MEDICINE

## 2017-12-18 PROCEDURE — 96372 THER/PROPH/DIAG INJ SC/IM: CPT

## 2017-12-18 RX ORDER — LANREOTIDE ACETATE 120 MG/.5ML
120 INJECTION SUBCUTANEOUS
Status: COMPLETED | OUTPATIENT
Start: 2017-12-18 | End: 2017-12-18

## 2017-12-18 RX ADMIN — LANREOTIDE ACETATE 120 MG: 120 INJECTION SUBCUTANEOUS at 03:12

## 2017-12-18 NOTE — NURSING
Pt arrived for lanreotide following MD appt.  Pt tolerated injection SQ to right buttock.  Pt discharged to home.

## 2017-12-18 NOTE — PROGRESS NOTES
PATIENT: Elroy Rahman  MRN: [id number]  DATE: 12/18/2017      Diagnosis:   1. Bronchial carcinoid tumors    2. Secondary neuroendocrine tumor of liver    3. Secondary neuroendocrine tumor of bone        Chief Complaint: Bronchial carcinoid tumors      Oncologic History:      Oncologic History Bronchial carcinoid, left, diagnosed 1/2016   Metastatic disease to liver and bone 5/2017     Oncologic Treatment Left upper lobectomy 1/2016  CAPTEM 7/2017   Lanreotide 7/2017   Zoledronic acid 7/2017     Pathology 1/2016: Typical carcinoid tumor, well-differentiated T2a, N0, M0 Ki-67 <1%  6/2017: Liver biopsy, well-differentiated, Ki-67 25%           Subjective:    Interval History: Mr. Rahman is a 62 y.o. male who is seen in follow-up for a bronchial carcinoid tumor.  He states he feels well.  He has noted a change in taste following chemotherapy.  He has no other new complaints.    His history dates to 1/2016 when he underwent a left upper lobectomy with Dr. Bell for a typical carcinoid tumor.  His pathological staging was T2a, N0, M0 with Ki-67 less than 1% and mitotic count 0 per 10 high-powered fields.  He done well postoperatively, however, in 5/2017 he was undergoing routine surveillance and a chest CT in picked up evidence of progression of disease in the liver.  Dedicated abdominal CT was performed showing multiple liver lesions.  In 6/2017 he underwent a CT-guided liver biopsy which was positive for metastatic neuroendocrine tumor which was well-differentiated with no overt nuclear atypia, however, the Ki-67 was 25%.  He underwent gallium-68 PET/CT and was found to have widespread disease involving the bone, liver and also mesentery.  He was started on CAPTEM in 7/2017 and also Lanreotide and zoledronic acid.  Scans in 10/2017 had shown stability of disease.    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 He has never used smokeless tobacco. He reports that he drinks about 0.6 oz of alcohol per week . He reports that he does not use drugs.    Allergies:  Review of patient's allergies indicates:  No Known Allergies    Medications:  Current Outpatient Prescriptions   Medication Sig Dispense Refill    capecitabine (XELODA) 500 MG Tab Take 4 tablets (2,000 mg total) by mouth 2 (two) times daily. For 14 days , every 28 days ( 2 weeks on , 2 weeks off, every 4 weeks) 112 tablet 3    ondansetron (ZOFRAN-ODT) 8 MG TbDL Take 1 tablet (8 mg total) by mouth every 12 (twelve) hours as needed. 30 tablet 1    temozolomide (TEMODAR) 250 MG capsule Take 2 capsules (500 mg total) by mouth once daily. Take 2 capsules on days 10-14 of 28 day cycle 10 capsule 6     No current facility-administered medications for this visit.        Review of Systems   Constitutional: Negative for appetite change, chills, fatigue, fever and unexpected weight change.   HENT: Negative for dental problem, sinus pressure and sneezing.    Eyes: Negative for visual disturbance.   Respiratory: Negative for cough, choking and chest tightness.    Cardiovascular: Negative for chest pain and leg swelling.   Gastrointestinal: Negative for abdominal pain, blood in stool, constipation, diarrhea and nausea.   Genitourinary: Negative for difficulty urinating and dysuria.   Musculoskeletal: Positive for back pain. Negative for arthralgias.   Skin:  Negative for rash and wound.   Neurological: Negative for dizziness, light-headedness and headaches.   Hematological: Negative for adenopathy. Does not bruise/bleed easily.   Psychiatric/Behavioral: Negative for sleep disturbance. The patient is not nervous/anxious.        ECOG Performance Status:   ECOG SCORE    0 - Fully active-able to carry on all pre-disease performance without restriction         Objective:      Vitals:   Vitals:    12/18/17 1501   BP: (!) 142/89   Pulse: (!) 59   Resp: 15   Temp: 98.4 °F (36.9 °C)   Weight: 135.9 kg (299 lb 9.7 oz)     BMI: Body mass index is 41.79 kg/m².    Physical Exam   Constitutional: He is oriented to person, place, and time. He appears well-developed and well-nourished.   HENT:   Head: Normocephalic and atraumatic.   Eyes: Pupils are equal, round, and reactive to light.   Neck: Normal range of motion. Neck supple.   Cardiovascular: Normal rate and regular rhythm.    Pulmonary/Chest: Effort normal and breath sounds normal. No respiratory distress.   Abdominal: Soft. He exhibits no distension. There is no tenderness.   Musculoskeletal: He exhibits no edema or tenderness.   Left lower extremity prosthesis   Lymphadenopathy:     He has no cervical adenopathy.   Neurological: He is alert and oriented to person, place, and time. No cranial nerve deficit.   Skin: Skin is warm and dry.   Psychiatric: He has a normal mood and affect. His behavior is normal.       Laboratory Data:  Lab Visit on 12/18/2017   Component Date Value Ref Range Status    WBC 12/18/2017 5.37  3.90 - 12.70 K/uL Final    RBC 12/18/2017 4.61  4.60 - 6.20 M/uL Final    Hemoglobin 12/18/2017 14.8  14.0 - 18.0 g/dL Final    Hematocrit 12/18/2017 42.8  40.0 - 54.0 % Final    MCV 12/18/2017 93  82 - 98 fL Final    MCH 12/18/2017 32.1* 27.0 - 31.0 pg Final    MCHC 12/18/2017 34.6  32.0 - 36.0 g/dL Final    RDW 12/18/2017 14.3  11.5 - 14.5 % Final    Platelets 12/18/2017 232  150 - 350 K/uL Final    MPV  12/18/2017 10.0  9.2 - 12.9 fL Final    Gran # 12/18/2017 3.1  1.8 - 7.7 K/uL Final    Comment: The ANC is based on a white cell differential from an   automated cell counter. It has not been microscopically   reviewed for the presence of abnormal cells. Clinical   correlation is required.      Immature Grans (Abs) 12/18/2017 0.01  0.00 - 0.04 K/uL Final     Supplemental Diagnosis  Chromogranin Staining:  Intensity: Moderate to strong. Positive cells: 100 (%)  Synaptophysin Staining:  Intensity: Strong. Positive cells: 100 (%)  CD31: 40 vessels per 1 HPF  Factor VIII: 27 vessels per 1 HPF  Ki67: 1 % tumor cells staining  Immunostains performed with appropriate positive and negative controls.  FINAL PATHOLOGIC DIAGNOSIS  1. Lymph node, level XI, excisional biopsy:  Fragments of benign lymph node with anthracosis and sinus histiocytosis (0/1).  2. Lung, lingular staple line, biopsy:  Lung, negative for neoplasm.  3. Lymph node, level X, excisional biopsy:  1 benign lymph node with anthracotic pigment and sinus histiocytosis (0/1).  4. Lymph node, level XII, excisional biopsy:  2 benign lymph nodes with sinus histiocytosis and anthracotic pigment (0/2).  5. Lung, left upper lobe, lobectomy:  Typical carcinoid tumor (well differentiated neuroendocrine tumor), 4 cm in greatest dimension.  Bronchial and vascular margins are negative for neoplasm.  Uninvolved lung adjacent to the tumor shows emphysematous changes and fibrosis, however remaining lung  appears unremarkable.  Additional immunohistochemical stains for ancillary studies (including synaptophysin, chromogranin and Ki-67)  will be completed and results will be reported in a supplemental report.  See synoptic report in comment section for further details.  6. Lymph node, level VII, excisional biopsy:  2 benign lymph nodes with anthracotic pigment and sinus histiocytosis (0/2).  Comment: Synoptic Report  Specimen: Left upper lobe of lung  Procedure:  Lobectomy  Specimen laterality: Left  Tumor size:  Greatest dimension: 4 cm  Tumor focality: Single focus  Histologic type: Typical carcinoid tumor  Visceral pleural invasion: Not identified  Tumor extension: Not identified  Margins: Bronchial and vascular margins are uninvolved by tumor.  Distance of tumor from closest margin: 1 cm from the bronchial surgical margin.  Lymphovascular invasion: Not identified  Ancillary studies:  Immunohistochemical stains for neuroendocrine markers and Ki-67 will be completed and results will follow in a  supplemental report.  Note: The tumor consists of a proliferation of cells in nests with lewis formation. The tumor cells have salt and  pepper chromatin pattern with abundant cytoplasm. There is no significant nuclear atypia. No evidence of  necrosis. Mitotic count is 0 mitoses in 10 high power fields.    Imaging:   CT 10/13/17  Indication: Carcinoid tumor of the lung.    Comparison: MRI 7/5/17, CT by/19/17.    Findings:    The structures at the base of the neck are unremarkable.  There is a left-sided aortic arch with 3 branch vessels.  The aorta maintains normal caliber and course.The heart is not enlarged.  There is mild calcific atherosclerosis of the coronary arteries.    The trachea and large proximal airways are patent without evidence of endobronchial lesion.  Postoperative changes of left upper lobe and lingula resection.  Stable postoperative changes without evidence of local recurrent disease within the left lung.  The right lung is well aerated and clear without significant pulmonary disease.    The esophagus maintains normal caliber and course, and there is a small volume of fluid within the esophageal lumen.    The liver demonstrates multiple enhancing lesions compatible with hepatic metastatic disease.  Comparison is made to MRI of 7/5/17.  Comparison is difficult due to differences in modality.  Index lesions within hepatic segment VIII measure 4.1 cm and lesion  within hepatic segment IV measuring 3.5 cm (previously 3.9 cm and 3.4 cm respectively).  Additional nontarget lesions appear stable to slightly increased in size.  The gallbladder is unremarkable.  There is no intra-or extrahepatic biliary ductal dilatation.    The stomach, spleen, pancreas, and left adrenal are unremarkable.  Stable probable right adrenal adenoma.    The kidneys are normal in size and location and concentrate and excrete contrast properly on delayed imaging.  7 mm calculus within the lower pole the left kidney and tiny hypodensities within both kidneys which are too small to definitively characterize and unchanged.  No hydronephrosis.  The ureters are normal in course and caliber.  The urinary bladder is unremarkable. The prostate is within normal limits.    The abdominal aorta is tortuous and demonstrates mild scattered calcific atherosclerosis.     The small bowel is normal in caliber without evidence of obstruction or inflammation.   The appendix appears normal. The colon and rectum are unremarkable.  There is no ascites, free fluid, or intraperitoneal free air. No lymph node enlargement within the abdomen, pelvis, or inguinal regions.    1.0 cm soft tissue nodule within the left upper quadrant mesentery, previously hypermetabolic on PET/CT    The osseous structures demonstrate innumerable sclerotic lesions throughout the spine, bilateral ribs, bilateral iliac bones, and bilateral proximal femurs compatible with osseous metastatic disease.  Lesions appear increased in size and number from prior CT on 5/17/17.  fat containing umbilical hernia.  The extrathoracic and extraperitoneal soft tissues are otherwise unremarkable.   Impression         1.  Diffuse hepatic metastatic disease, with lesions stable to slightly increased in size from prior MRI on 7/5/17.    2.  Diffuse osseous metastatic disease, with multiple new metastatic lesions from prior CTs from 5/2017.    3.  Postoperative changes of  carcinoid resection within the left upper lobe and lingula.  No local recurrent disease.    4.  1.1 cm left upper quadrant mesenteric nodule, stable from prior PET/CT on 6/21/17.    5.  Additional findings including right adrenal adenoma, nonobstructing left renal stone, and multiple subcentimeter hypodense foci within the kidneys, likely benign.    RECIST SUMMARY    Lesion 1: Hepatic segment VIII lesion 4.1 cm cm Series 3 Image 19 Prior measurement 3.9 cm  Lesion 2: Hepatic segment IV 3.5 cm cm Series 3 Image 19 Prior measurement 3.4 cm  Lesion 3: Left upper quadrant mesenteric nodule 1.0 cm Series 3 Image with 20 Prior measurement 1.1 cm    Note there are increase in the metastatic sclerotic bony foci as above.              Assessment:       1. Bronchial carcinoid tumors    2. Secondary neuroendocrine tumor of liver    3. Secondary neuroendocrine tumor of bone           Plan:     Mr. Rahman continues to do well from an oncologic perspective.  Review of his lab work is appropriate to continue on with chemotherapy.  He will proceed onto cycle #6 of CAPTEM and we'll plan to repeat imaging prior to next visit.  He will get Lanreotide today.  Follow-up with me in 4 weeks.    Jesus London DO, FACP  Hematology & Oncology  [facility] [place] [address]  ph. [phone]  Fax. [phone]    25 minutes were spent in coordination of patient's care, record review and counseling.  More than 50% of the time was face-to-face.

## 2018-01-04 ENCOUNTER — TELEPHONE (OUTPATIENT)
Dept: PHARMACY | Facility: CLINIC | Age: 63
End: 2018-01-04

## 2018-01-08 ENCOUNTER — LAB VISIT (OUTPATIENT)
Dept: LAB | Facility: HOSPITAL | Age: 63
End: 2018-01-08
Attending: INTERNAL MEDICINE
Payer: COMMERCIAL

## 2018-01-08 DIAGNOSIS — C7B.8 SECONDARY NEUROENDOCRINE TUMOR OF LIVER: ICD-10-CM

## 2018-01-08 LAB
ALBUMIN SERPL BCP-MCNC: 3.8 G/DL
ALP SERPL-CCNC: 55 U/L
ALT SERPL W/O P-5'-P-CCNC: 34 U/L
ANION GAP SERPL CALC-SCNC: 8 MMOL/L
AST SERPL-CCNC: 23 U/L
BILIRUB SERPL-MCNC: 0.9 MG/DL
BUN SERPL-MCNC: 12 MG/DL
CALCIUM SERPL-MCNC: 9.4 MG/DL
CHLORIDE SERPL-SCNC: 104 MMOL/L
CO2 SERPL-SCNC: 26 MMOL/L
CREAT SERPL-MCNC: 1.1 MG/DL
ERYTHROCYTE [DISTWIDTH] IN BLOOD BY AUTOMATED COUNT: 14.7 %
EST. GFR  (AFRICAN AMERICAN): >60 ML/MIN/1.73 M^2
EST. GFR  (NON AFRICAN AMERICAN): >60 ML/MIN/1.73 M^2
GLUCOSE SERPL-MCNC: 111 MG/DL
HCT VFR BLD AUTO: 41 %
HGB BLD-MCNC: 14.3 G/DL
IMM GRANULOCYTES # BLD AUTO: 0.03 K/UL
MCH RBC QN AUTO: 31.5 PG
MCHC RBC AUTO-ENTMCNC: 34.9 G/DL
MCV RBC AUTO: 90 FL
NEUTROPHILS # BLD AUTO: 2.9 K/UL
PLATELET # BLD AUTO: 231 K/UL
PMV BLD AUTO: 9.4 FL
POTASSIUM SERPL-SCNC: 4.6 MMOL/L
PROT SERPL-MCNC: 7.2 G/DL
RBC # BLD AUTO: 4.54 M/UL
SODIUM SERPL-SCNC: 138 MMOL/L
WBC # BLD AUTO: 4.55 K/UL

## 2018-01-08 PROCEDURE — 85027 COMPLETE CBC AUTOMATED: CPT

## 2018-01-08 PROCEDURE — 36415 COLL VENOUS BLD VENIPUNCTURE: CPT

## 2018-01-08 PROCEDURE — 80053 COMPREHEN METABOLIC PANEL: CPT

## 2018-01-17 NOTE — TELEPHONE ENCOUNTER
FOR DOCUMENTATION ONLY:  Financial Assistance for Temozolomide & Capecitabine are approved from 1-16-18 to 6-30-18  Source [employer]

## 2018-01-18 ENCOUNTER — TELEPHONE (OUTPATIENT)
Dept: HEMATOLOGY/ONCOLOGY | Facility: CLINIC | Age: 63
End: 2018-01-18

## 2018-01-18 NOTE — TELEPHONE ENCOUNTER
----- Message from Demetrius Banerjee sent at 1/18/2018 11:26 AM CST -----  Contact: Spouse   Will like a call from office regarding the MRI and CT chest appt on tomorrow     Spouse states that both test are expensive and will like to know can pt do one test or the other     Contact::[phone]

## 2018-01-18 NOTE — TELEPHONE ENCOUNTER
Patient states for the two tests he has a co-pay of $1,100 and he cannot afford that. Let him know I would send his concerns to the  and to Dr. London.

## 2018-01-19 ENCOUNTER — HOSPITAL ENCOUNTER (OUTPATIENT)
Dept: RADIOLOGY | Facility: HOSPITAL | Age: 63
Discharge: HOME | End: 2018-01-19
Attending: INTERNAL MEDICINE
Payer: COMMERCIAL

## 2018-01-19 DIAGNOSIS — C7B.8 SECONDARY NEUROENDOCRINE TUMOR OF BONE: ICD-10-CM

## 2018-01-19 DIAGNOSIS — D3A.090 BRONCHIAL CARCINOID TUMORS: ICD-10-CM

## 2018-01-19 DIAGNOSIS — C7B.8 SECONDARY NEUROENDOCRINE TUMOR OF LIVER: ICD-10-CM

## 2018-01-19 PROCEDURE — 71250 CT THORAX DX C-: CPT | Mod: TC

## 2018-01-19 PROCEDURE — A9585 GADOBUTROL INJECTION: HCPCS | Performed by: INTERNAL MEDICINE

## 2018-01-19 PROCEDURE — 74183 MRI ABD W/O CNTR FLWD CNTR: CPT | Mod: TC

## 2018-01-19 PROCEDURE — 71250 CT THORAX DX C-: CPT | Mod: 26,,, | Performed by: RADIOLOGY

## 2018-01-19 PROCEDURE — 74183 MRI ABD W/O CNTR FLWD CNTR: CPT | Mod: 26,,, | Performed by: RADIOLOGY

## 2018-01-19 PROCEDURE — 25500020 PHARM REV CODE 255: Performed by: INTERNAL MEDICINE

## 2018-01-19 RX ORDER — GADOBUTROL 604.72 MG/ML
10 INJECTION INTRAVENOUS
Status: COMPLETED | OUTPATIENT
Start: 2018-01-19 | End: 2018-01-19

## 2018-01-19 RX ADMIN — GADOBUTROL 10 ML: 604.72 INJECTION INTRAVENOUS at 09:01

## 2018-01-22 ENCOUNTER — OFFICE VISIT (OUTPATIENT)
Dept: HEMATOLOGY/ONCOLOGY | Facility: CLINIC | Age: 63
End: 2018-01-22
Payer: COMMERCIAL

## 2018-01-22 ENCOUNTER — INFUSION (OUTPATIENT)
Dept: INFUSION THERAPY | Facility: HOSPITAL | Age: 63
End: 2018-01-22
Attending: THORACIC SURGERY (CARDIOTHORACIC VASCULAR SURGERY)
Payer: COMMERCIAL

## 2018-01-22 VITALS
HEART RATE: 52 BPM | OXYGEN SATURATION: 99 % | SYSTOLIC BLOOD PRESSURE: 136 MMHG | RESPIRATION RATE: 18 BRPM | HEIGHT: 71 IN | DIASTOLIC BLOOD PRESSURE: 90 MMHG | BODY MASS INDEX: 42.28 KG/M2 | WEIGHT: 302 LBS | TEMPERATURE: 98 F

## 2018-01-22 DIAGNOSIS — C7B.8 SECONDARY NEUROENDOCRINE TUMOR OF BONE: ICD-10-CM

## 2018-01-22 DIAGNOSIS — D3A.090 BRONCHIAL CARCINOID TUMORS: Primary | ICD-10-CM

## 2018-01-22 DIAGNOSIS — C7B.8 SECONDARY NEUROENDOCRINE TUMOR OF LIVER: ICD-10-CM

## 2018-01-22 DIAGNOSIS — Z09 CHEMOTHERAPY FOLLOW-UP EXAMINATION: ICD-10-CM

## 2018-01-22 PROCEDURE — 96372 THER/PROPH/DIAG INJ SC/IM: CPT

## 2018-01-22 PROCEDURE — 99999 PR PBB SHADOW E&M-EST. PATIENT-LVL III: CPT | Mod: PBBFAC,,, | Performed by: INTERNAL MEDICINE

## 2018-01-22 PROCEDURE — 63600175 PHARM REV CODE 636 W HCPCS: Mod: JG | Performed by: INTERNAL MEDICINE

## 2018-01-22 PROCEDURE — 99214 OFFICE O/P EST MOD 30 MIN: CPT | Mod: S$GLB,,, | Performed by: INTERNAL MEDICINE

## 2018-01-22 RX ORDER — LANREOTIDE ACETATE 120 MG/.5ML
120 INJECTION SUBCUTANEOUS
Status: COMPLETED | OUTPATIENT
Start: 2018-01-22 | End: 2018-01-22

## 2018-01-22 RX ADMIN — LANREOTIDE ACETATE 120 MG: 120 INJECTION SUBCUTANEOUS at 01:01

## 2018-01-22 NOTE — NURSING
Pt arrived for Lanreotide following MD appt.  Pt tolerated injection SQ to right buttocks.  Discharged to home.

## 2018-01-22 NOTE — PROGRESS NOTES
PATIENT: Elroy Rahman  MRN: [id number]  DATE: 1/22/2018      Diagnosis:   1. Bronchial carcinoid tumors    2. Secondary neuroendocrine tumor of liver    3. Chemotherapy follow-up examination    4. Secondary neuroendocrine tumor of bone        Chief Complaint: Carcinoid Tumor      Oncologic History:      Oncologic History Bronchial carcinoid, left, diagnosed 1/2016   Metastatic disease to liver and bone 5/2017     Oncologic Treatment Left upper lobectomy 1/2016  CAPTEM 7/2017   Lanreotide 7/2017   Zoledronic acid 7/2017     Pathology 1/2016: Typical carcinoid tumor, well-differentiated T2a, N0, M0 Ki-67 <1%  6/2017: Liver biopsy, well-differentiated, Ki-67 25%           Subjective:    Interval History: Mr. Rahman is a 62 y.o. male who is seen in follow-up for a bronchial carcinoid tumor.  He states he feels well.  He has no new complaints.    His history dates to 1/2016 when he underwent a left upper lobectomy with Dr. Bell for a typical carcinoid tumor.  His pathological staging was T2a, N0, M0 with Ki-67 less than 1% and mitotic count 0 per 10 high-powered fields.  He done well postoperatively, however, in 5/2017 he was undergoing routine surveillance and a chest CT in picked up evidence of progression of disease in the liver.  Dedicated abdominal CT was performed showing multiple liver lesions.  In 6/2017 he underwent a CT-guided liver biopsy which was positive for metastatic neuroendocrine tumor which was well-differentiated with no overt nuclear atypia, however, the Ki-67 was 25%.  He underwent gallium-68 PET/CT and was found to have widespread disease involving the bone, liver and also mesentery.  He was started on CAPTEM in 7/2017 and also Lanreotide and zoledronic acid.  Scans in 10/2017 had shown stability of disease.  Imaging in 1/2018 had shown stability of disease.      Past Medical History:   Past Medical History:   Diagnosis Date    Carcinoid bronchial adenoma of left lung     Chemotherapy  follow-up examination 8/25/2017    Cough with hemoptysis     mild/intermittent    Mild heartburn     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    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    Sleep apnea     Snores        Past Surgical HIstory:   Past Surgical History:   Procedure Laterality Date    Bka Left        Family History:   Family History   Problem Relation Age of Onset    Cancer Mother      lung    Cancer Sister     Anesthesia problems Neg Hx        Social History:  reports that he has never smoked. He has never used smokeless tobacco. He reports that he drinks about 0.6 oz of alcohol per week . He reports that he does not use drugs.    Allergies:  Review of patient's allergies indicates:  No Known Allergies    Medications:  Current Outpatient Prescriptions   Medication Sig Dispense Refill    capecitabine (XELODA) 500 MG Tab Take 4 tablets (2,000 mg total) by mouth 2 (two) times daily. For 14 days , every 28 days ( 2 weeks on , 2 weeks off, every 4 weeks) 112 tablet 3    ondansetron (ZOFRAN-ODT) 8 MG TbDL Take 1 tablet (8 mg total) by mouth every 12 (twelve) hours as needed. 30 tablet 1    temozolomide (TEMODAR) 250 MG capsule Take 2 capsules (500 mg total) by mouth once daily. Take 2 capsules on days 10-14 of 28 day cycle 10 capsule 6     No current facility-administered medications for this visit.      Facility-Administered Medications Ordered in Other Visits   Medication Dose Route Frequency Provider Last Rate Last Dose    lanreotide injection 120 mg  120 mg Subcutaneous 1 time in Clinic/HOD Jesus London DO, FACP           Review of Systems   Constitutional: Negative for appetite change, chills, fatigue, fever and unexpected weight change.   HENT: Negative for dental problem, sinus pressure and sneezing.    Eyes: Negative for visual disturbance.   Respiratory: Negative for cough, choking and chest tightness.    Cardiovascular: Negative for chest pain and leg  "swelling.   Gastrointestinal: Negative for abdominal pain, blood in stool, constipation, diarrhea and nausea.   Genitourinary: Negative for difficulty urinating and dysuria.   Musculoskeletal: Positive for back pain. Negative for arthralgias.   Skin: Negative for rash and wound.   Neurological: Negative for dizziness, light-headedness and headaches.   Hematological: Negative for adenopathy. Does not bruise/bleed easily.   Psychiatric/Behavioral: Negative for sleep disturbance. The patient is not nervous/anxious.        ECOG Performance Status:   ECOG SCORE    0 - Fully active-able to carry on all pre-disease performance without restriction         Objective:      Vitals:   Vitals:    01/22/18 1155   BP: (!) 136/90   Pulse: (!) 52   Resp: 18   Temp: 98 °F (36.7 °C)   TempSrc: Oral   SpO2: 99%   Weight: (!) 137 kg (302 lb)   Height: 5' 11" (1.803 m)     BMI: Body mass index is 42.12 kg/m².    Physical Exam   Constitutional: He is oriented to person, place, and time. He appears well-developed and well-nourished.   HENT:   Head: Normocephalic and atraumatic.   Eyes: Pupils are equal, round, and reactive to light.   Neck: Normal range of motion. Neck supple.   Cardiovascular: Normal rate and regular rhythm.    Pulmonary/Chest: Effort normal and breath sounds normal. No respiratory distress.   Abdominal: Soft. He exhibits no distension. There is no tenderness.   Musculoskeletal: He exhibits no edema or tenderness.   Left lower extremity prosthesis   Lymphadenopathy:     He has no cervical adenopathy.   Neurological: He is alert and oriented to person, place, and time. No cranial nerve deficit.   Skin: Skin is warm and dry.   Psychiatric: He has a normal mood and affect. His behavior is normal.       Laboratory Data:  Lab Visit on 01/22/2018   Component Date Value Ref Range Status    WBC 01/22/2018 4.46  3.90 - 12.70 K/uL Final    RBC 01/22/2018 4.55* 4.60 - 6.20 M/uL Final    Hemoglobin 01/22/2018 14.7  14.0 - 18.0 " g/dL Final    Hematocrit 01/22/2018 41.2  40.0 - 54.0 % Final    MCV 01/22/2018 91  82 - 98 fL Final    MCH 01/22/2018 32.3* 27.0 - 31.0 pg Final    MCHC 01/22/2018 35.7  32.0 - 36.0 g/dL Final    RDW 01/22/2018 13.8  11.5 - 14.5 % Final    Platelets 01/22/2018 166  150 - 350 K/uL Final    MPV 01/22/2018 9.6  9.2 - 12.9 fL Final    Gran # 01/22/2018 2.7  1.8 - 7.7 K/uL Final    Comment: The ANC is based on a white cell differential from an   automated cell counter. It has not been microscopically   reviewed for the presence of abnormal cells. Clinical   correlation is required.      Immature Grans (Abs) 01/22/2018 0.02  0.00 - 0.04 K/uL Final    Sodium 01/22/2018 140  136 - 145 mmol/L Final    Potassium 01/22/2018 4.2  3.5 - 5.1 mmol/L Final    Chloride 01/22/2018 106  95 - 110 mmol/L Final    CO2 01/22/2018 25  23 - 29 mmol/L Final    Glucose 01/22/2018 95  70 - 110 mg/dL Final    BUN, Bld 01/22/2018 15  8 - 23 mg/dL Final    Creatinine 01/22/2018 1.0  0.5 - 1.4 mg/dL Final    Calcium 01/22/2018 9.3  8.7 - 10.5 mg/dL Final    Total Protein 01/22/2018 7.4  6.0 - 8.4 g/dL Final    Albumin 01/22/2018 3.8  3.5 - 5.2 g/dL Final    Total Bilirubin 01/22/2018 0.9  0.1 - 1.0 mg/dL Final    Comment: For infants and newborns, interpretation of results should be based  on gestational age, weight and in agreement with clinical  observations.  Premature Infant recommended reference ranges:  Up to 24 hours.............<8.0 mg/dL  Up to 48 hours............<12.0 mg/dL  3-5 days..................<15.0 mg/dL  6-29 days.................<15.0 mg/dL      Alkaline Phosphatase 01/22/2018 58  55 - 135 U/L Final    AST 01/22/2018 16  10 - 40 U/L Final    ALT 01/22/2018 24  10 - 44 U/L Final    Anion Gap 01/22/2018 9  8 - 16 mmol/L Final    eGFR if African American 01/22/2018 >60.0  >60 mL/min/1.73 m^2 Final    eGFR if non African American 01/22/2018 >60.0  >60 mL/min/1.73 m^2 Final    Comment: Calculation used to  obtain the estimated glomerular filtration  rate (eGFR) is the CKD-EPI equation.        Supplemental Diagnosis  Chromogranin Staining:  Intensity: Moderate to strong. Positive cells: 100 (%)  Synaptophysin Staining:  Intensity: Strong. Positive cells: 100 (%)  CD31: 40 vessels per 1 HPF  Factor VIII: 27 vessels per 1 HPF  Ki67: 1 % tumor cells staining  Immunostains performed with appropriate positive and negative controls.  FINAL PATHOLOGIC DIAGNOSIS  1. Lymph node, level XI, excisional biopsy:  Fragments of benign lymph node with anthracosis and sinus histiocytosis (0/1).  2. Lung, lingular staple line, biopsy:  Lung, negative for neoplasm.  3. Lymph node, level X, excisional biopsy:  1 benign lymph node with anthracotic pigment and sinus histiocytosis (0/1).  4. Lymph node, level XII, excisional biopsy:  2 benign lymph nodes with sinus histiocytosis and anthracotic pigment (0/2).  5. Lung, left upper lobe, lobectomy:  Typical carcinoid tumor (well differentiated neuroendocrine tumor), 4 cm in greatest dimension.  Bronchial and vascular margins are negative for neoplasm.  Uninvolved lung adjacent to the tumor shows emphysematous changes and fibrosis, however remaining lung  appears unremarkable.  Additional immunohistochemical stains for ancillary studies (including synaptophysin, chromogranin and Ki-67)  will be completed and results will be reported in a supplemental report.  See synoptic report in comment section for further details.  6. Lymph node, level VII, excisional biopsy:  2 benign lymph nodes with anthracotic pigment and sinus histiocytosis (0/2).  Comment: Synoptic Report  Specimen: Left upper lobe of lung  Procedure: Lobectomy  Specimen laterality: Left  Tumor size:  Greatest dimension: 4 cm  Tumor focality: Single focus  Histologic type: Typical carcinoid tumor  Visceral pleural invasion: Not identified  Tumor extension: Not identified  Margins: Bronchial and vascular margins are uninvolved by  tumor.  Distance of tumor from closest margin: 1 cm from the bronchial surgical margin.  Lymphovascular invasion: Not identified  Ancillary studies:  Immunohistochemical stains for neuroendocrine markers and Ki-67 will be completed and results will follow in a  supplemental report.  Note: The tumor consists of a proliferation of cells in nests with lewis formation. The tumor cells have salt and  pepper chromatin pattern with abundant cytoplasm. There is no significant nuclear atypia. No evidence of  necrosis. Mitotic count is 0 mitoses in 10 high power fields.    Imaging:   CT 1/19/18    Comparison: CT chest 10/13/2017, 5/17/2017, 8/17/2016, 1/21/2016.    Indication: Carcinoid.    Findings:    The structures at the base of the neck reveal no convincing evidence of disease.    There is a left-sided aortic arch with three branch vessels.  The thoracic aorta maintains normal caliber, contour, and course without atherosclerotic calcification within its course.      The heart is not enlarged, and no pericardial fluid is identified.  There is atherosclerotic calcification in the distribution of coronary arteries.    There is no axillary or mediastinal lymph node enlargement.  The hilar contours are unremarkable on this non-contrast examination.    Limited views of the abdomen demonstrate multiple hepatic hypodense lesions, in this patient with known metastatic disease, noting the lesions are not fully evaluated on this noncontrasted exam.  There is a stable 1.1 cm soft tissue nodule in left upper quadrant, could be splenule.  There is a stable right adrenal nodule.    The osseous structures demonstrate stable post thoracotomy changes of the left ribs, as well as numerous sclerotic lesions throughout spine and ribs, grossly unchanged since prior exam.    Stable postoperative changes from prior left upper lobe and lingula resection are noted.  The trachea and proximal airways are patent.  There is no evidence for disease  recurrence in the lungs.  There is no pleural fluid or pneumothorax.   Impression         Stable postoperative changes from prior left upper and lingular section.  No evidence of disease recurrence in the lungs.    Grossly stable appearing osseous and hepatic metastatic disease as above.  ______________________________________      MRI 1/19/18  Comparison: CT abdomen and pelvis 10/13/2017, MRI abdomen 7/5/2017.    Technique:   Multiplanar, multi-sequence MR imaging of the abdomen was performed before and after administration of 10 cc of IV Gadavist contrast per the liver protocol.    Findings:    The visualized lungs, heart, and pericardium appear normal.      The liver demonstrates grossly stable size and number of multiple enhancing lesions scattered throughout the hepatic parenchyma, some of which appear more conspicuous on today's examination, noting lesions may be less well-defined due to respiratory motion on prior examination of 7/2017. Index lesion in hepatic segment VIII measures 3.8 cm and lesion within hepatic segment IV measures 3.6 cm (previously 3.9 cm and 3.4 cm, respectively).  The hepatic vein, portal vein, splenic vein, and hepatic artery are patent.  No evidence of intra-or extrahepatic biliary ductal dilatation.    The gallbladder demonstrates no evidence of gallstones or surrounding inflammatory change.  The spleen is enlarged with likely left upper quadrant accessory splenule. Stable appearance of the 3.6 cm right adrenal adenoma. The left adrenal glands, stomach, pancreas, and visualized portions of the bowel demonstrate no significant abnormalities.      Subcentimeter left renal cysts. No evidence of hydronephrosis.      The osseous structures demonstrate multiple enhancing lesions, similar in appearance to prior.    The visualized aorta is normal in caliber.    Soft tissues of the lower thoracic and abdominal wall are unremarkable.   Impression       1.  Grossly stable appearance of multiple  enhancing hepatic lesions consistent with metastatic disease.    2. Stable appearance of multiple enhancing osseous lesions compatible with metastases.    3. Right adrenal adenoma.      RECIST SUMMARY:  Lesion 1: Right hepatic lobe lesion measures 3.8 cm (series 12, image 42), previously 3.9 cm.  Lesion 2: Left hepatic lobe lesion measures 3.6 cm (series 12, image 42), previously 3.4 cm.    ______________________________________                 Assessment:       1. Bronchial carcinoid tumors    2. Secondary neuroendocrine tumor of liver    3. Chemotherapy follow-up examination    4. Secondary neuroendocrine tumor of bone           Plan:     Mr. Rahman is doing well from an oncologic perspective.  Review of his imaging shows stability of disease.  Labs are appropriate to continue treatment.  We will try to get him Lanreotide today to get him back on schedule.  Follow back up with me in 4 weeks.  All questions were answered and he is agreeable with this plan.    Jesus London DO, FACP  Hematology & Oncology  [facility] [place] [address]  ph. [phone]  Fax. [phone]    25 minutes were spent in coordination of patient's care, record review and counseling.  More than 50% of the time was face-to-face.

## 2018-02-01 ENCOUNTER — TELEPHONE (OUTPATIENT)
Dept: PHARMACY | Facility: CLINIC | Age: 63
End: 2018-02-01

## 2018-02-05 ENCOUNTER — LAB VISIT (OUTPATIENT)
Dept: LAB | Facility: HOSPITAL | Age: 63
End: 2018-02-05
Attending: INTERNAL MEDICINE
Payer: COMMERCIAL

## 2018-02-05 DIAGNOSIS — C7B.8 SECONDARY NEUROENDOCRINE TUMOR OF LIVER: ICD-10-CM

## 2018-02-05 LAB
ALBUMIN SERPL BCP-MCNC: 3.9 G/DL
ALP SERPL-CCNC: 58 U/L
ALT SERPL W/O P-5'-P-CCNC: 26 U/L
ANION GAP SERPL CALC-SCNC: 8 MMOL/L
AST SERPL-CCNC: 17 U/L
BILIRUB SERPL-MCNC: 1 MG/DL
BUN SERPL-MCNC: 16 MG/DL
CALCIUM SERPL-MCNC: 9.4 MG/DL
CHLORIDE SERPL-SCNC: 103 MMOL/L
CO2 SERPL-SCNC: 26 MMOL/L
CREAT SERPL-MCNC: 1.1 MG/DL
ERYTHROCYTE [DISTWIDTH] IN BLOOD BY AUTOMATED COUNT: 14.5 %
EST. GFR  (AFRICAN AMERICAN): >60 ML/MIN/1.73 M^2
EST. GFR  (NON AFRICAN AMERICAN): >60 ML/MIN/1.73 M^2
GLUCOSE SERPL-MCNC: 128 MG/DL
HCT VFR BLD AUTO: 41.8 %
HGB BLD-MCNC: 15 G/DL
IMM GRANULOCYTES # BLD AUTO: 0.01 K/UL
MCH RBC QN AUTO: 32.6 PG
MCHC RBC AUTO-ENTMCNC: 35.9 G/DL
MCV RBC AUTO: 91 FL
NEUTROPHILS # BLD AUTO: 3 K/UL
PLATELET # BLD AUTO: 202 K/UL
PMV BLD AUTO: 9.6 FL
POTASSIUM SERPL-SCNC: 4.2 MMOL/L
PROT SERPL-MCNC: 7.4 G/DL
RBC # BLD AUTO: 4.6 M/UL
SODIUM SERPL-SCNC: 137 MMOL/L
WBC # BLD AUTO: 4.72 K/UL

## 2018-02-05 PROCEDURE — 85027 COMPLETE CBC AUTOMATED: CPT

## 2018-02-05 PROCEDURE — 36415 COLL VENOUS BLD VENIPUNCTURE: CPT

## 2018-02-05 PROCEDURE — 80053 COMPREHEN METABOLIC PANEL: CPT

## 2018-02-05 NOTE — TELEPHONE ENCOUNTER
Patient called to refill Capecitabine and Temozolomide.  Patient confirmed he has no doses left.  Ship 2/7/18 for 2/8/18 delivery.  Copay $100 each in 004.  Patient confirmed no new meds, allergies, or health conditions.  Verified address, informed of copay.  Patient has assistance through [employer] for $150.00.  Declined [facility] .

## 2018-02-15 ENCOUNTER — TELEPHONE (OUTPATIENT)
Dept: HEMATOLOGY/ONCOLOGY | Facility: CLINIC | Age: 63
End: 2018-02-15

## 2018-02-15 NOTE — TELEPHONE ENCOUNTER
----- Message from Lou Cooper RN sent at 2/15/2018 12:40 PM CST -----  Contact: Pt Wife   This is Dr London patient.  ----- Message -----  From: Irish Sharpe  Sent: 2/15/2018  12:36 PM  To: [facility] Peraza Staff    Pt Wife would like the nurse to give her a call back in regards to rescheduling Pt appointment .. Pt wife stated she has already left a message and needs a call back .. contact number [phone] or [phone]..

## 2018-02-15 NOTE — TELEPHONE ENCOUNTER
----- Message from Yusra Salas RN sent at 2/15/2018 11:42 AM CST -----  Contact: Pt's wife  As long as they want to reschedule his injection and it isn't before the 28 days, that is fine.     ----- Message -----  From: Karmen Goyal  Sent: 2/14/2018   3:53 PM  To: Yusra Salas RN        ----- Message -----  From: Rosalina Baker  Sent: 2/14/2018   3:30 PM  To: Surya Lee Staff-[facility]    Is it okay to change the patients injection?  ----- Message -----  From: Lou Cooper RN  Sent: 2/14/2018  10:22 AM  To: Jas Nath    Please call to reschedule. Dr London patient.  ----- Message -----  From: Leticia Calero  Sent: 2/14/2018   8:58 AM  To: Henri Peraza Staff    Pt's wife is calling to r/s appt on 2/19/18 and can be reached at [phone].    Thank you

## 2018-02-21 ENCOUNTER — OFFICE VISIT (OUTPATIENT)
Dept: HEMATOLOGY/ONCOLOGY | Facility: CLINIC | Age: 63
End: 2018-02-21
Payer: COMMERCIAL

## 2018-02-21 ENCOUNTER — INFUSION (OUTPATIENT)
Dept: INFUSION THERAPY | Facility: HOSPITAL | Age: 63
End: 2018-02-21
Attending: THORACIC SURGERY (CARDIOTHORACIC VASCULAR SURGERY)
Payer: COMMERCIAL

## 2018-02-21 VITALS
BODY MASS INDEX: 41.74 KG/M2 | WEIGHT: 308.19 LBS | HEIGHT: 72 IN | SYSTOLIC BLOOD PRESSURE: 119 MMHG | DIASTOLIC BLOOD PRESSURE: 74 MMHG | HEART RATE: 62 BPM | TEMPERATURE: 98 F

## 2018-02-21 DIAGNOSIS — C7B.8 SECONDARY NEUROENDOCRINE TUMOR OF LIVER: ICD-10-CM

## 2018-02-21 DIAGNOSIS — D3A.090 BRONCHIAL CARCINOID TUMORS: Primary | ICD-10-CM

## 2018-02-21 PROCEDURE — 99215 OFFICE O/P EST HI 40 MIN: CPT | Mod: S$GLB,,, | Performed by: INTERNAL MEDICINE

## 2018-02-21 PROCEDURE — 3008F BODY MASS INDEX DOCD: CPT | Mod: S$GLB,,, | Performed by: INTERNAL MEDICINE

## 2018-02-21 PROCEDURE — 99999 PR PBB SHADOW E&M-EST. PATIENT-LVL III: CPT | Mod: PBBFAC,,, | Performed by: INTERNAL MEDICINE

## 2018-02-21 PROCEDURE — 96372 THER/PROPH/DIAG INJ SC/IM: CPT

## 2018-02-21 PROCEDURE — 63600175 PHARM REV CODE 636 W HCPCS: Mod: JG | Performed by: INTERNAL MEDICINE

## 2018-02-21 RX ORDER — LANREOTIDE ACETATE 120 MG/.5ML
120 INJECTION SUBCUTANEOUS
Status: COMPLETED | OUTPATIENT
Start: 2018-02-21 | End: 2018-02-21

## 2018-02-21 RX ORDER — LANREOTIDE ACETATE 120 MG/.5ML
120 INJECTION SUBCUTANEOUS
Status: ON HOLD | COMMUNITY
End: 2019-09-18

## 2018-02-21 RX ADMIN — LANREOTIDE ACETATE 120 MG: 120 INJECTION SUBCUTANEOUS at 01:02

## 2018-02-21 NOTE — PROGRESS NOTES
PATIENT: Elroy Rahman  MRN: [id number]  DATE: 2/21/2018      Diagnosis: Bronchial Carcinoid     Oncologic History:      Oncologic History Bronchial carcinoid, left, diagnosed 1/2016   Metastatic disease to liver and bone 5/2017     Oncologic Treatment Left upper lobectomy 1/2016  CAPTEM 7/2017   Lanreotide 7/2017   Zoledronic acid 7/2017     Pathology 1/2016: Typical carcinoid tumor, well-differentiated T2a, N0, M0 Ki-67 <1%  6/2017: Liver biopsy, well-differentiated, Ki-67 25%           Subjective:    Interval History: Mr. Rahman is a 62 y.o. male who is seen in follow-up for a bronchial carcinoid tumor.  He states he feels well.  His only complaint is worsening of his umbilical hernia. He denies all carcinoid symptoms. Of note he has gained 6 pounds in the last 3 weeks due to excessive oreo and ice cream consumption.     His history dates to 1/2016 when he underwent a left upper lobectomy with Dr. Bell for a typical carcinoid tumor.  His pathological staging was T2a, N0, M0 with Ki-67 less than 1% and mitotic count 0 per 10 high-powered fields.  He done well postoperatively, however, in 5/2017 he was undergoing routine surveillance and a chest CT in picked up evidence of progression of disease in the liver.  Dedicated abdominal CT was performed showing multiple liver lesions.  In 6/2017 he underwent a CT-guided liver biopsy which was positive for metastatic neuroendocrine tumor which was well-differentiated with no overt nuclear atypia, however, the Ki-67 was 25%.  He underwent gallium-68 PET/CT and was found to have widespread disease involving the bone, liver and also mesentery.  He was started on CAPTEM in 7/2017 and also Lanreotide and zoledronic acid.  Scans in 10/2017 had shown stability of disease.  Imaging in 1/2018 had shown stability of disease.      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 He has never used smokeless tobacco. He reports that he drinks about 0.6 oz of alcohol per week . He reports that he does not use drugs.    Allergies:  Review of patient's allergies indicates:  No Known Allergies    Medications:  Current Outpatient Prescriptions   Medication Sig Dispense Refill    capecitabine (XELODA) 500 MG Tab Take 4 tablets (2,000 mg total) by mouth 2 (two) times daily. For 14 days , every 28 days ( 2 weeks on , 2 weeks off, every 4 weeks) 112 tablet 3    lanreotide (SOMATULINE DEPOT) 120 mg/0.5 mL Syrg Inject 120 mg into the skin every 28 days.      ondansetron (ZOFRAN-ODT) 8 MG TbDL Take 1 tablet (8 mg total) by mouth every 12 (twelve) hours as needed. 30 tablet 1    temozolomide (TEMODAR) 250 MG capsule Take 2 capsules (500 mg total) by mouth once daily. Take 2 capsules on days 10-14 of 28 day cycle 10 capsule 6     No current facility-administered medications for this visit.        Review of Systems   Constitutional: Negative for appetite change, chills, fatigue, fever and unexpected weight change.   HENT: Negative for dental problem, sinus pressure and sneezing.    Eyes: Negative for visual disturbance.   Respiratory: Negative for cough, choking and chest tightness.    Cardiovascular: Negative for chest pain and leg swelling.   Gastrointestinal: Negative for abdominal pain, blood in stool, constipation, diarrhea and nausea.   Genitourinary: Negative for difficulty  urinating and dysuria.   Musculoskeletal: Positive for back pain. Negative for arthralgias.   Skin: Negative for rash and wound.   Neurological: Negative for dizziness, light-headedness and headaches.   Hematological: Negative for adenopathy. Does not bruise/bleed easily.   Psychiatric/Behavioral: Negative for sleep disturbance. The patient is not nervous/anxious.        ECOG Performance Status:   ECOG SCORE           Objective:      Vitals:   Vitals:    02/21/18 1512   BP: 119/74   Pulse: 62   Temp: 98.2 °F (36.8 °C)     Physical Exam   Constitutional: He is oriented to person, place, and time. He appears well-developed and well-nourished.   HENT:   Head: Normocephalic and atraumatic.   Eyes: Pupils are equal, round, and reactive to light.   Neck: Normal range of motion. Neck supple.   Cardiovascular: Normal rate and regular rhythm.    Pulmonary/Chest: Effort normal and breath sounds normal. No respiratory distress.   Abdominal: Soft. He exhibits no distension. There is no tenderness.   Musculoskeletal: He exhibits no edema or tenderness.   Left lower extremity prosthesis   Lymphadenopathy:     He has no cervical adenopathy.   Neurological: He is alert and oriented to person, place, and time. No cranial nerve deficit.   Skin: Skin is warm and dry.   Psychiatric: He has a normal mood and affect. His behavior is normal.       Laboratory Data:  No visits with results within 1 Week(s) from this visit.   Latest known visit with results is:   Lab Visit on 02/05/2018   Component Date Value Ref Range Status    WBC 02/05/2018 4.72  3.90 - 12.70 K/uL Final    RBC 02/05/2018 4.60  4.60 - 6.20 M/uL Final    Hemoglobin 02/05/2018 15.0  14.0 - 18.0 g/dL Final    Hematocrit 02/05/2018 41.8  40.0 - 54.0 % Final    MCV 02/05/2018 91  82 - 98 fL Final    MCH 02/05/2018 32.6* 27.0 - 31.0 pg Final    MCHC 02/05/2018 35.9  32.0 - 36.0 g/dL Final    RDW 02/05/2018 14.5  11.5 - 14.5 % Final    Platelets 02/05/2018 202  150 - 350  K/uL Final    MPV 02/05/2018 9.6  9.2 - 12.9 fL Final    Gran # (ANC) 02/05/2018 3.0  1.8 - 7.7 K/uL Final    Comment: The ANC is based on a white cell differential from an   automated cell counter. It has not been microscopically   reviewed for the presence of abnormal cells. Clinical   correlation is required.      Immature Grans (Abs) 02/05/2018 0.01  0.00 - 0.04 K/uL Final    Comment: Mild elevation in immature granulocytes is non specific and   can be seen in a variety of conditions including stress response,   acute inflammation, trauma and pregnancy. Correlation with other   laboratory and clinical findings is essential.      Sodium 02/05/2018 137  136 - 145 mmol/L Final    Potassium 02/05/2018 4.2  3.5 - 5.1 mmol/L Final    Chloride 02/05/2018 103  95 - 110 mmol/L Final    CO2 02/05/2018 26  23 - 29 mmol/L Final    Glucose 02/05/2018 128* 70 - 110 mg/dL Final    BUN, Bld 02/05/2018 16  8 - 23 mg/dL Final    Creatinine 02/05/2018 1.1  0.5 - 1.4 mg/dL Final    Calcium 02/05/2018 9.4  8.7 - 10.5 mg/dL Final    Total Protein 02/05/2018 7.4  6.0 - 8.4 g/dL Final    Albumin 02/05/2018 3.9  3.5 - 5.2 g/dL Final    Total Bilirubin 02/05/2018 1.0  0.1 - 1.0 mg/dL Final    Comment: For infants and newborns, interpretation of results should be based  on gestational age, weight and in agreement with clinical  observations.  Premature Infant recommended reference ranges:  Up to 24 hours.............<8.0 mg/dL  Up to 48 hours............<12.0 mg/dL  3-5 days..................<15.0 mg/dL  6-29 days.................<15.0 mg/dL      Alkaline Phosphatase 02/05/2018 58  55 - 135 U/L Final    AST 02/05/2018 17  10 - 40 U/L Final    ALT 02/05/2018 26  10 - 44 U/L Final    Anion Gap 02/05/2018 8  8 - 16 mmol/L Final    eGFR if African American 02/05/2018 >60.0  >60 mL/min/1.73 m^2 Final    eGFR if non African American 02/05/2018 >60.0  >60 mL/min/1.73 m^2 Final    Comment: Calculation used to obtain the  estimated glomerular filtration  rate (eGFR) is the CKD-EPI equation.        Supplemental Diagnosis  Chromogranin Staining:  Intensity: Moderate to strong. Positive cells: 100 (%)  Synaptophysin Staining:  Intensity: Strong. Positive cells: 100 (%)  CD31: 40 vessels per 1 HPF  Factor VIII: 27 vessels per 1 HPF  Ki67: 1 % tumor cells staining  Immunostains performed with appropriate positive and negative controls.  FINAL PATHOLOGIC DIAGNOSIS  1. Lymph node, level XI, excisional biopsy:  Fragments of benign lymph node with anthracosis and sinus histiocytosis (0/1).  2. Lung, lingular staple line, biopsy:  Lung, negative for neoplasm.  3. Lymph node, level X, excisional biopsy:  1 benign lymph node with anthracotic pigment and sinus histiocytosis (0/1).  4. Lymph node, level XII, excisional biopsy:  2 benign lymph nodes with sinus histiocytosis and anthracotic pigment (0/2).  5. Lung, left upper lobe, lobectomy:  Typical carcinoid tumor (well differentiated neuroendocrine tumor), 4 cm in greatest dimension.  Bronchial and vascular margins are negative for neoplasm.  Uninvolved lung adjacent to the tumor shows emphysematous changes and fibrosis, however remaining lung  appears unremarkable.  Additional immunohistochemical stains for ancillary studies (including synaptophysin, chromogranin and Ki-67)  will be completed and results will be reported in a supplemental report.  See synoptic report in comment section for further details.  6. Lymph node, level VII, excisional biopsy:  2 benign lymph nodes with anthracotic pigment and sinus histiocytosis (0/2).  Comment: Synoptic Report  Specimen: Left upper lobe of lung  Procedure: Lobectomy  Specimen laterality: Left  Tumor size:  Greatest dimension: 4 cm  Tumor focality: Single focus  Histologic type: Typical carcinoid tumor  Visceral pleural invasion: Not identified  Tumor extension: Not identified  Margins: Bronchial and vascular margins are uninvolved by tumor.  Distance  of tumor from closest margin: 1 cm from the bronchial surgical margin.  Lymphovascular invasion: Not identified  Ancillary studies:  Immunohistochemical stains for neuroendocrine markers and Ki-67 will be completed and results will follow in a  supplemental report.  Note: The tumor consists of a proliferation of cells in nests with lewis formation. The tumor cells have salt and  pepper chromatin pattern with abundant cytoplasm. There is no significant nuclear atypia. No evidence of  necrosis. Mitotic count is 0 mitoses in 10 high power fields.    Imaging:   CT 1/19/18    Comparison: CT chest 10/13/2017, 5/17/2017, 8/17/2016, 1/21/2016.  Indication: Carcinoid.  Findings:  The structures at the base of the neck reveal no convincing evidence of disease.  There is a left-sided aortic arch with three branch vessels.  The thoracic aorta maintains normal caliber, contour, and course without atherosclerotic calcification within its course.      The heart is not enlarged, and no pericardial fluid is identified.  There is atherosclerotic calcification in the distribution of coronary arteries.    There is no axillary or mediastinal lymph node enlargement.  The hilar contours are unremarkable on this non-contrast examination.    Limited views of the abdomen demonstrate multiple hepatic hypodense lesions, in this patient with known metastatic disease, noting the lesions are not fully evaluated on this noncontrasted exam.  There is a stable 1.1 cm soft tissue nodule in left upper quadrant, could be splenule.  There is a stable right adrenal nodule.    The osseous structures demonstrate stable post thoracotomy changes of the left ribs, as well as numerous sclerotic lesions throughout spine and ribs, grossly unchanged since prior exam.    Stable postoperative changes from prior left upper lobe and lingula resection are noted.  The trachea and proximal airways are patent.  There is no evidence for disease recurrence in the lungs.   There is no pleural fluid or pneumothorax.   Impression         Stable postoperative changes from prior left upper and lingular section.  No evidence of disease recurrence in the lungs.    Grossly stable appearing osseous and hepatic metastatic disease as above.  ______________________________________      MRI 1/19/18  Comparison: CT abdomen and pelvis 10/13/2017, MRI abdomen 7/5/2017.    Technique:   Multiplanar, multi-sequence MR imaging of the abdomen was performed before and after administration of 10 cc of IV Gadavist contrast per the liver protocol.    Findings:    The visualized lungs, heart, and pericardium appear normal.      The liver demonstrates grossly stable size and number of multiple enhancing lesions scattered throughout the hepatic parenchyma, some of which appear more conspicuous on today's examination, noting lesions may be less well-defined due to respiratory motion on prior examination of 7/2017. Index lesion in hepatic segment VIII measures 3.8 cm and lesion within hepatic segment IV measures 3.6 cm (previously 3.9 cm and 3.4 cm, respectively).  The hepatic vein, portal vein, splenic vein, and hepatic artery are patent.  No evidence of intra-or extrahepatic biliary ductal dilatation.    The gallbladder demonstrates no evidence of gallstones or surrounding inflammatory change.  The spleen is enlarged with likely left upper quadrant accessory splenule. Stable appearance of the 3.6 cm right adrenal adenoma. The left adrenal glands, stomach, pancreas, and visualized portions of the bowel demonstrate no significant abnormalities.      Subcentimeter left renal cysts. No evidence of hydronephrosis.      The osseous structures demonstrate multiple enhancing lesions, similar in appearance to prior.    The visualized aorta is normal in caliber.    Soft tissues of the lower thoracic and abdominal wall are unremarkable.   Impression       1.  Grossly stable appearance of multiple enhancing hepatic  lesions consistent with metastatic disease.    2. Stable appearance of multiple enhancing osseous lesions compatible with metastases.    3. Right adrenal adenoma.    RECIST SUMMARY:  Lesion 1: Right hepatic lobe lesion measures 3.8 cm (series 12, image 42), previously 3.9 cm.  Lesion 2: Left hepatic lobe lesion measures 3.6 cm (series 12, image 42), previously 3.4 cm.______________________________        Assessment:       Mr. Rahman is a pleasant 62 year old male with a bronchial carcinoid on lanreotide therapy.     Plan:     Mr. Rahman is doing well from an oncologic perspective.  Review of his imaging shows stability of disease.  Labs are appropriate to continue treatment.  He received Lanreotide today.      30 minutes were spent face to face with the patient and his  family to discuss the disease, natural history, treatment options and survival statistics. I have provided the patient with an opportunity to ask questions and have all questions answered to his satisfaction.         he will return to clinic in 1 month with Dr. London, but knows to call in the interim if symptoms change or should a problem arise.         Karmen Álvarez MD  Hematology and Medical Oncology  Bone Marrow Transplant  [facility]

## 2018-03-02 DIAGNOSIS — D3A.00 CARCINOID TUMOR: ICD-10-CM

## 2018-03-02 RX ORDER — CAPECITABINE 500 MG/1
2000 TABLET, FILM COATED ORAL 2 TIMES DAILY
Qty: 112 TABLET | Refills: 3 | Status: SHIPPED | OUTPATIENT
Start: 2018-05-24 | End: 2018-06-21

## 2018-03-06 ENCOUNTER — TELEPHONE (OUTPATIENT)
Dept: PHARMACY | Facility: CLINIC | Age: 63
End: 2018-03-06

## 2018-03-21 ENCOUNTER — OFFICE VISIT (OUTPATIENT)
Dept: HEMATOLOGY/ONCOLOGY | Facility: CLINIC | Age: 63
End: 2018-03-21
Payer: COMMERCIAL

## 2018-03-21 ENCOUNTER — INFUSION (OUTPATIENT)
Dept: INFUSION THERAPY | Facility: HOSPITAL | Age: 63
End: 2018-03-21
Attending: INTERNAL MEDICINE
Payer: COMMERCIAL

## 2018-03-21 ENCOUNTER — LAB VISIT (OUTPATIENT)
Dept: LAB | Facility: HOSPITAL | Age: 63
End: 2018-03-21
Attending: INTERNAL MEDICINE
Payer: COMMERCIAL

## 2018-03-21 VITALS
RESPIRATION RATE: 16 BRPM | TEMPERATURE: 98 F | HEIGHT: 72 IN | WEIGHT: 305.75 LBS | DIASTOLIC BLOOD PRESSURE: 81 MMHG | OXYGEN SATURATION: 97 % | SYSTOLIC BLOOD PRESSURE: 147 MMHG | HEART RATE: 57 BPM | BODY MASS INDEX: 41.41 KG/M2

## 2018-03-21 DIAGNOSIS — D49.1 NEOPLASM OF LUNG: ICD-10-CM

## 2018-03-21 DIAGNOSIS — D49.0 LIVER NEOPLASM: ICD-10-CM

## 2018-03-21 DIAGNOSIS — D3A.090 BRONCHIAL CARCINOID TUMORS: Primary | ICD-10-CM

## 2018-03-21 DIAGNOSIS — C7B.8 SECONDARY NEUROENDOCRINE TUMOR OF LIVER: ICD-10-CM

## 2018-03-21 DIAGNOSIS — D3A.090 BRONCHIAL CARCINOID TUMORS: ICD-10-CM

## 2018-03-21 DIAGNOSIS — C7B.8 SECONDARY NEUROENDOCRINE TUMOR OF BONE: ICD-10-CM

## 2018-03-21 DIAGNOSIS — Z09 CHEMOTHERAPY FOLLOW-UP EXAMINATION: ICD-10-CM

## 2018-03-21 LAB
ALBUMIN SERPL BCP-MCNC: 3.9 G/DL
ALP SERPL-CCNC: 56 U/L
ALT SERPL W/O P-5'-P-CCNC: 27 U/L
ANION GAP SERPL CALC-SCNC: 7 MMOL/L
AST SERPL-CCNC: 18 U/L
BASOPHILS # BLD AUTO: 0.05 K/UL
BASOPHILS NFR BLD: 1.2 %
BILIRUB SERPL-MCNC: 1.2 MG/DL
BUN SERPL-MCNC: 14 MG/DL
CALCIUM SERPL-MCNC: 9 MG/DL
CHLORIDE SERPL-SCNC: 105 MMOL/L
CO2 SERPL-SCNC: 26 MMOL/L
CREAT SERPL-MCNC: 1 MG/DL
DIFFERENTIAL METHOD: ABNORMAL
EOSINOPHIL # BLD AUTO: 0 K/UL
EOSINOPHIL NFR BLD: 0.7 %
ERYTHROCYTE [DISTWIDTH] IN BLOOD BY AUTOMATED COUNT: 13.5 %
EST. GFR  (AFRICAN AMERICAN): >60 ML/MIN/1.73 M^2
EST. GFR  (NON AFRICAN AMERICAN): >60 ML/MIN/1.73 M^2
GLUCOSE SERPL-MCNC: 132 MG/DL
HCT VFR BLD AUTO: 42.6 %
HGB BLD-MCNC: 14.9 G/DL
IMM GRANULOCYTES # BLD AUTO: 0.02 K/UL
IMM GRANULOCYTES NFR BLD AUTO: 0.5 %
LYMPHOCYTES # BLD AUTO: 1.1 K/UL
LYMPHOCYTES NFR BLD: 24.4 %
MCH RBC QN AUTO: 32.8 PG
MCHC RBC AUTO-ENTMCNC: 35 G/DL
MCV RBC AUTO: 94 FL
MONOCYTES # BLD AUTO: 0.5 K/UL
MONOCYTES NFR BLD: 10.7 %
NEUTROPHILS # BLD AUTO: 2.7 K/UL
NEUTROPHILS NFR BLD: 62.5 %
NRBC BLD-RTO: 0 /100 WBC
PLATELET # BLD AUTO: 164 K/UL
PMV BLD AUTO: 9.5 FL
POTASSIUM SERPL-SCNC: 4 MMOL/L
PROT SERPL-MCNC: 7.1 G/DL
RBC # BLD AUTO: 4.54 M/UL
SODIUM SERPL-SCNC: 138 MMOL/L
WBC # BLD AUTO: 4.3 K/UL

## 2018-03-21 PROCEDURE — 85025 COMPLETE CBC W/AUTO DIFF WBC: CPT

## 2018-03-21 PROCEDURE — 99999 PR PBB SHADOW E&M-EST. PATIENT-LVL IV: CPT | Mod: PBBFAC,,, | Performed by: INTERNAL MEDICINE

## 2018-03-21 PROCEDURE — 36415 COLL VENOUS BLD VENIPUNCTURE: CPT

## 2018-03-21 PROCEDURE — 80053 COMPREHEN METABOLIC PANEL: CPT

## 2018-03-21 PROCEDURE — 99214 OFFICE O/P EST MOD 30 MIN: CPT | Mod: S$GLB,,, | Performed by: INTERNAL MEDICINE

## 2018-03-21 PROCEDURE — 63600175 PHARM REV CODE 636 W HCPCS: Mod: JG | Performed by: INTERNAL MEDICINE

## 2018-03-21 PROCEDURE — 96372 THER/PROPH/DIAG INJ SC/IM: CPT

## 2018-03-21 RX ORDER — LANREOTIDE ACETATE 120 MG/.5ML
120 INJECTION SUBCUTANEOUS
Status: COMPLETED | OUTPATIENT
Start: 2018-03-21 | End: 2018-03-21

## 2018-03-21 RX ADMIN — LANREOTIDE ACETATE 120 MG: 120 INJECTION SUBCUTANEOUS at 11:03

## 2018-03-21 NOTE — PROGRESS NOTES
PATIENT: Elroy Rahman  MRN: [id number]  DATE: 3/21/2018      Diagnosis:   1. Bronchial carcinoid tumors    2. Secondary neuroendocrine tumor of liver    3. Secondary neuroendocrine tumor of bone    4. Chemotherapy follow-up examination        Chief Complaint: Bronchial carcinoid tumors      Oncologic History:      Oncologic History Bronchial carcinoid, left, diagnosed 1/2016   Metastatic disease to liver and bone 5/2017     Oncologic Treatment Left upper lobectomy 1/2016  CAPTEM 7/2017   Lanreotide 7/2017   Zoledronic acid 7/2017     Pathology 1/2016: Typical carcinoid tumor, well-differentiated T2a, N0, M0 Ki-67 <1%  6/2017: Liver biopsy, well-differentiated, Ki-67 25%           Subjective:    Interval History: Mr. Rahman is a 62 y.o. male who is seen in follow-up for a bronchial carcinoid tumor.  He states he feels well.  He has no new complaints.    His history dates to 1/2016 when he underwent a left upper lobectomy with Dr. Bell for a typical carcinoid tumor.  His pathological staging was T2a, N0, M0 with Ki-67 less than 1% and mitotic count 0 per 10 high-powered fields.  He done well postoperatively, however, in 5/2017 he was undergoing routine surveillance and a chest CT in picked up evidence of progression of disease in the liver.  Dedicated abdominal CT was performed showing multiple liver lesions.  In 6/2017 he underwent a CT-guided liver biopsy which was positive for metastatic neuroendocrine tumor which was well-differentiated with no overt nuclear atypia, however, the Ki-67 was 25%.  He underwent gallium-68 PET/CT and was found to have widespread disease involving the bone, liver and also mesentery.  He was started on CAPTEM in 7/2017 and also Lanreotide and zoledronic acid.  Scans in 10/2017 had shown stability of disease.  Imaging in 1/2018 had shown stability of disease.      Past Medical History:   Past Medical History:   Diagnosis Date    Carcinoid bronchial adenoma of left lung      Chemotherapy follow-up examination 8/25/2017    Cough with hemoptysis     mild/intermittent    Mild heartburn     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    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    Sleep apnea     Snores        Past Surgical HIstory:   Past Surgical History:   Procedure Laterality Date    Bka Left        Family History:   Family History   Problem Relation Age of Onset    Cancer Mother      lung    Cancer Sister     Anesthesia problems Neg Hx        Social History:  reports that he has never smoked. He has never used smokeless tobacco. He reports that he drinks about 0.6 oz of alcohol per week . He reports that he does not use drugs.    Allergies:  Review of patient's allergies indicates:  No Known Allergies    Medications:  Current Outpatient Prescriptions   Medication Sig Dispense Refill    capecitabine (XELODA) 500 MG Tab TAKE 4 TABLETS (2,000MG TOTAL) BY MOUTH 2 TIMES DAILY FOR 14 DAYS EVERY 28 DAYS ( 2 WEEKS ON, 2 WEEKS OFF, EVERY 4 WEEKS) 112 tablet 3    lanreotide (SOMATULINE DEPOT) 120 mg/0.5 mL Syrg Inject 120 mg into the skin every 28 days.      ondansetron (ZOFRAN-ODT) 8 MG TbDL Take 1 tablet (8 mg total) by mouth every 12 (twelve) hours as needed. 30 tablet 1    temozolomide (TEMODAR) 250 MG capsule Take 2 capsules (500 mg total) by mouth once daily. Take 2 capsules on days 10-14 of 28 day cycle 10 capsule 6     No current facility-administered medications for this visit.        Review of Systems   Constitutional: Negative for appetite change, chills, fatigue, fever and unexpected weight change.   HENT: Negative for dental problem, sinus pressure and sneezing.    Eyes: Negative for visual disturbance.   Respiratory: Negative for cough, choking and chest tightness.    Cardiovascular: Negative for chest pain and leg swelling.   Gastrointestinal: Negative for abdominal pain, blood in stool, constipation, diarrhea and nausea.   Genitourinary:  Negative for difficulty urinating and dysuria.   Musculoskeletal: Positive for back pain. Negative for arthralgias.   Skin: Negative for rash and wound.   Neurological: Negative for dizziness, light-headedness and headaches.   Hematological: Negative for adenopathy. Does not bruise/bleed easily.   Psychiatric/Behavioral: Negative for sleep disturbance. The patient is not nervous/anxious.        ECOG Performance Status:   ECOG SCORE    0 - Fully active-able to carry on all pre-disease performance without restriction         Objective:      Vitals:   Vitals:    03/21/18 1045   BP: (!) 147/81   BP Location: Right arm   Patient Position: Sitting   BP Method: Large (Automatic)   Pulse: (!) 57   Resp: 16   Temp: 98.1 °F (36.7 °C)   TempSrc: Oral   SpO2: 97%   Weight: (!) 138.7 kg (305 lb 12.5 oz)   Height: 6' (1.829 m)     BMI: Body mass index is 41.47 kg/m².    Physical Exam   Constitutional: He is oriented to person, place, and time. He appears well-developed and well-nourished.   HENT:   Head: Normocephalic and atraumatic.   Eyes: Pupils are equal, round, and reactive to light.   Neck: Normal range of motion. Neck supple.   Cardiovascular: Normal rate and regular rhythm.    Pulmonary/Chest: Effort normal and breath sounds normal. No respiratory distress.   Abdominal: Soft. He exhibits no distension. There is no tenderness.   Musculoskeletal: He exhibits no edema or tenderness.   Left lower extremity prosthesis   Lymphadenopathy:     He has no cervical adenopathy.   Neurological: He is alert and oriented to person, place, and time. No cranial nerve deficit.   Skin: Skin is warm and dry.   Psychiatric: He has a normal mood and affect. His behavior is normal.       Laboratory Data:  Lab Visit on 03/21/2018   Component Date Value Ref Range Status    WBC 03/21/2018 4.30  3.90 - 12.70 K/uL Final    RBC 03/21/2018 4.54* 4.60 - 6.20 M/uL Final    Hemoglobin 03/21/2018 14.9  14.0 - 18.0 g/dL Final    Hematocrit 03/21/2018  42.6  40.0 - 54.0 % Final    MCV 03/21/2018 94  82 - 98 fL Final    MCH 03/21/2018 32.8* 27.0 - 31.0 pg Final    MCHC 03/21/2018 35.0  32.0 - 36.0 g/dL Final    RDW 03/21/2018 13.5  11.5 - 14.5 % Final    Platelets 03/21/2018 164  150 - 350 K/uL Final    MPV 03/21/2018 9.5  9.2 - 12.9 fL Final    Immature Granulocytes 03/21/2018 0.5  0.0 - 0.5 % Final    Gran # (ANC) 03/21/2018 2.7  1.8 - 7.7 K/uL Final    Immature Grans (Abs) 03/21/2018 0.02  0.00 - 0.04 K/uL Final    Comment: Mild elevation in immature granulocytes is non specific and   can be seen in a variety of conditions including stress response,   acute inflammation, trauma and pregnancy. Correlation with other   laboratory and clinical findings is essential.      Lymph # 03/21/2018 1.1  1.0 - 4.8 K/uL Final    Mono # 03/21/2018 0.5  0.3 - 1.0 K/uL Final    Eos # 03/21/2018 0.0  0.0 - 0.5 K/uL Final    Baso # 03/21/2018 0.05  0.00 - 0.20 K/uL Final    nRBC 03/21/2018 0  0 /100 WBC Final    Gran% 03/21/2018 62.5  38.0 - 73.0 % Final    Lymph% 03/21/2018 24.4  18.0 - 48.0 % Final    Mono% 03/21/2018 10.7  4.0 - 15.0 % Final    Eosinophil% 03/21/2018 0.7  0.0 - 8.0 % Final    Basophil% 03/21/2018 1.2  0.0 - 1.9 % Final    Differential Method 03/21/2018 Automated   Final    Sodium 03/21/2018 138  136 - 145 mmol/L Final    Potassium 03/21/2018 4.0  3.5 - 5.1 mmol/L Final    Chloride 03/21/2018 105  95 - 110 mmol/L Final    CO2 03/21/2018 26  23 - 29 mmol/L Final    Glucose 03/21/2018 132* 70 - 110 mg/dL Final    BUN, Bld 03/21/2018 14  8 - 23 mg/dL Final    Creatinine 03/21/2018 1.0  0.5 - 1.4 mg/dL Final    Calcium 03/21/2018 9.0  8.7 - 10.5 mg/dL Final    Total Protein 03/21/2018 7.1  6.0 - 8.4 g/dL Final    Albumin 03/21/2018 3.9  3.5 - 5.2 g/dL Final    Total Bilirubin 03/21/2018 1.2* 0.1 - 1.0 mg/dL Final    Comment: For infants and newborns, interpretation of results should be based  on gestational age, weight and in agreement  with clinical  observations.  Premature Infant recommended reference ranges:  Up to 24 hours.............<8.0 mg/dL  Up to 48 hours............<12.0 mg/dL  3-5 days..................<15.0 mg/dL  6-29 days.................<15.0 mg/dL      Alkaline Phosphatase 03/21/2018 56  55 - 135 U/L Final    AST 03/21/2018 18  10 - 40 U/L Final    ALT 03/21/2018 27  10 - 44 U/L Final    Anion Gap 03/21/2018 7* 8 - 16 mmol/L Final    eGFR if African American 03/21/2018 >60.0  >60 mL/min/1.73 m^2 Final    eGFR if non African American 03/21/2018 >60.0  >60 mL/min/1.73 m^2 Final    Comment: Calculation used to obtain the estimated glomerular filtration  rate (eGFR) is the CKD-EPI equation.        Supplemental Diagnosis  Chromogranin Staining:  Intensity: Moderate to strong. Positive cells: 100 (%)  Synaptophysin Staining:  Intensity: Strong. Positive cells: 100 (%)  CD31: 40 vessels per 1 HPF  Factor VIII: 27 vessels per 1 HPF  Ki67: 1 % tumor cells staining  Immunostains performed with appropriate positive and negative controls.  FINAL PATHOLOGIC DIAGNOSIS  1. Lymph node, level XI, excisional biopsy:  Fragments of benign lymph node with anthracosis and sinus histiocytosis (0/1).  2. Lung, lingular staple line, biopsy:  Lung, negative for neoplasm.  3. Lymph node, level X, excisional biopsy:  1 benign lymph node with anthracotic pigment and sinus histiocytosis (0/1).  4. Lymph node, level XII, excisional biopsy:  2 benign lymph nodes with sinus histiocytosis and anthracotic pigment (0/2).  5. Lung, left upper lobe, lobectomy:  Typical carcinoid tumor (well differentiated neuroendocrine tumor), 4 cm in greatest dimension.  Bronchial and vascular margins are negative for neoplasm.  Uninvolved lung adjacent to the tumor shows emphysematous changes and fibrosis, however remaining lung  appears unremarkable.  Additional immunohistochemical stains for ancillary studies (including synaptophysin, chromogranin and Ki-67)  will be  completed and results will be reported in a supplemental report.  See synoptic report in comment section for further details.  6. Lymph node, level VII, excisional biopsy:  2 benign lymph nodes with anthracotic pigment and sinus histiocytosis (0/2).  Comment: Synoptic Report  Specimen: Left upper lobe of lung  Procedure: Lobectomy  Specimen laterality: Left  Tumor size:  Greatest dimension: 4 cm  Tumor focality: Single focus  Histologic type: Typical carcinoid tumor  Visceral pleural invasion: Not identified  Tumor extension: Not identified  Margins: Bronchial and vascular margins are uninvolved by tumor.  Distance of tumor from closest margin: 1 cm from the bronchial surgical margin.  Lymphovascular invasion: Not identified  Ancillary studies:  Immunohistochemical stains for neuroendocrine markers and Ki-67 will be completed and results will follow in a  supplemental report.  Note: The tumor consists of a proliferation of cells in nests with lewis formation. The tumor cells have salt and  pepper chromatin pattern with abundant cytoplasm. There is no significant nuclear atypia. No evidence of  necrosis. Mitotic count is 0 mitoses in 10 high power fields.    Imaging:   CT 1/19/18    Comparison: CT chest 10/13/2017, 5/17/2017, 8/17/2016, 1/21/2016.    Indication: Carcinoid.    Findings:    The structures at the base of the neck reveal no convincing evidence of disease.    There is a left-sided aortic arch with three branch vessels.  The thoracic aorta maintains normal caliber, contour, and course without atherosclerotic calcification within its course.      The heart is not enlarged, and no pericardial fluid is identified.  There is atherosclerotic calcification in the distribution of coronary arteries.    There is no axillary or mediastinal lymph node enlargement.  The hilar contours are unremarkable on this non-contrast examination.    Limited views of the abdomen demonstrate multiple hepatic hypodense lesions, in  this patient with known metastatic disease, noting the lesions are not fully evaluated on this noncontrasted exam.  There is a stable 1.1 cm soft tissue nodule in left upper quadrant, could be splenule.  There is a stable right adrenal nodule.    The osseous structures demonstrate stable post thoracotomy changes of the left ribs, as well as numerous sclerotic lesions throughout spine and ribs, grossly unchanged since prior exam.    Stable postoperative changes from prior left upper lobe and lingula resection are noted.  The trachea and proximal airways are patent.  There is no evidence for disease recurrence in the lungs.  There is no pleural fluid or pneumothorax.   Impression         Stable postoperative changes from prior left upper and lingular section.  No evidence of disease recurrence in the lungs.    Grossly stable appearing osseous and hepatic metastatic disease as above.  ______________________________________      MRI 1/19/18  Comparison: CT abdomen and pelvis 10/13/2017, MRI abdomen 7/5/2017.    Technique:   Multiplanar, multi-sequence MR imaging of the abdomen was performed before and after administration of 10 cc of IV Gadavist contrast per the liver protocol.    Findings:    The visualized lungs, heart, and pericardium appear normal.      The liver demonstrates grossly stable size and number of multiple enhancing lesions scattered throughout the hepatic parenchyma, some of which appear more conspicuous on today's examination, noting lesions may be less well-defined due to respiratory motion on prior examination of 7/2017. Index lesion in hepatic segment VIII measures 3.8 cm and lesion within hepatic segment IV measures 3.6 cm (previously 3.9 cm and 3.4 cm, respectively).  The hepatic vein, portal vein, splenic vein, and hepatic artery are patent.  No evidence of intra-or extrahepatic biliary ductal dilatation.    The gallbladder demonstrates no evidence of gallstones or surrounding inflammatory  change.  The spleen is enlarged with likely left upper quadrant accessory splenule. Stable appearance of the 3.6 cm right adrenal adenoma. The left adrenal glands, stomach, pancreas, and visualized portions of the bowel demonstrate no significant abnormalities.      Subcentimeter left renal cysts. No evidence of hydronephrosis.      The osseous structures demonstrate multiple enhancing lesions, similar in appearance to prior.    The visualized aorta is normal in caliber.    Soft tissues of the lower thoracic and abdominal wall are unremarkable.   Impression       1.  Grossly stable appearance of multiple enhancing hepatic lesions consistent with metastatic disease.    2. Stable appearance of multiple enhancing osseous lesions compatible with metastases.    3. Right adrenal adenoma.      RECIST SUMMARY:  Lesion 1: Right hepatic lobe lesion measures 3.8 cm (series 12, image 42), previously 3.9 cm.  Lesion 2: Left hepatic lobe lesion measures 3.6 cm (series 12, image 42), previously 3.4 cm.    ______________________________________                 Assessment:       1. Bronchial carcinoid tumors    2. Secondary neuroendocrine tumor of liver    3. Secondary neuroendocrine tumor of bone    4. Chemotherapy follow-up examination           Plan:     Mr. Rahman continues to do well from an oncologic standpoint.  His last scans were in 1/2018 and will plan to repeat scans in another month.  His labs are appropriate to continue treatment.  He will continue on with CAPTEM and also Lanreotide.  Report any new symptoms immediately.    Jesus London DO, FACP  Hematology & Oncology  [facility] [place] [address]  ph. [phone]  Fax. [phone]    25 minutes were spent in coordination of patient's care, record review and counseling.  More than 50% of the time was face-to-face.

## 2018-03-29 ENCOUNTER — TELEPHONE (OUTPATIENT)
Dept: PHARMACY | Facility: CLINIC | Age: 63
End: 2018-03-29

## 2018-04-16 ENCOUNTER — HOSPITAL ENCOUNTER (OUTPATIENT)
Dept: RADIOLOGY | Facility: HOSPITAL | Age: 63
Discharge: HOME | End: 2018-04-16
Attending: INTERNAL MEDICINE
Payer: COMMERCIAL

## 2018-04-16 DIAGNOSIS — D49.1 NEOPLASM OF LUNG: ICD-10-CM

## 2018-04-16 PROCEDURE — 71250 CT THORAX DX C-: CPT | Mod: 26,,, | Performed by: RADIOLOGY

## 2018-04-16 PROCEDURE — 71250 CT THORAX DX C-: CPT | Mod: TC

## 2018-04-18 ENCOUNTER — HOSPITAL ENCOUNTER (OUTPATIENT)
Dept: RADIOLOGY | Facility: HOSPITAL | Age: 63
Discharge: HOME | End: 2018-04-18
Attending: INTERNAL MEDICINE
Payer: COMMERCIAL

## 2018-04-18 ENCOUNTER — OFFICE VISIT (OUTPATIENT)
Dept: HEMATOLOGY/ONCOLOGY | Facility: CLINIC | Age: 63
End: 2018-04-18
Payer: COMMERCIAL

## 2018-04-18 ENCOUNTER — INFUSION (OUTPATIENT)
Dept: INFUSION THERAPY | Facility: HOSPITAL | Age: 63
End: 2018-04-18
Attending: INTERNAL MEDICINE
Payer: COMMERCIAL

## 2018-04-18 VITALS
HEIGHT: 72 IN | OXYGEN SATURATION: 97 % | HEART RATE: 58 BPM | RESPIRATION RATE: 18 BRPM | DIASTOLIC BLOOD PRESSURE: 72 MMHG | TEMPERATURE: 98 F | WEIGHT: 309.5 LBS | SYSTOLIC BLOOD PRESSURE: 128 MMHG | BODY MASS INDEX: 41.92 KG/M2

## 2018-04-18 DIAGNOSIS — D3A.090 BRONCHIAL CARCINOID TUMORS: Primary | ICD-10-CM

## 2018-04-18 DIAGNOSIS — C7B.8 SECONDARY NEUROENDOCRINE TUMOR OF BONE: ICD-10-CM

## 2018-04-18 DIAGNOSIS — C7B.8 SECONDARY NEUROENDOCRINE TUMOR OF LIVER: ICD-10-CM

## 2018-04-18 DIAGNOSIS — D49.0 LIVER NEOPLASM: ICD-10-CM

## 2018-04-18 DIAGNOSIS — Z09 CHEMOTHERAPY FOLLOW-UP EXAMINATION: ICD-10-CM

## 2018-04-18 PROCEDURE — 74183 MRI ABD W/O CNTR FLWD CNTR: CPT | Mod: 26,,, | Performed by: RADIOLOGY

## 2018-04-18 PROCEDURE — 99999 PR PBB SHADOW E&M-EST. PATIENT-LVL III: CPT | Mod: PBBFAC,,, | Performed by: INTERNAL MEDICINE

## 2018-04-18 PROCEDURE — 96372 THER/PROPH/DIAG INJ SC/IM: CPT

## 2018-04-18 PROCEDURE — 25500020 PHARM REV CODE 255: Performed by: INTERNAL MEDICINE

## 2018-04-18 PROCEDURE — A9585 GADOBUTROL INJECTION: HCPCS | Performed by: INTERNAL MEDICINE

## 2018-04-18 PROCEDURE — 63600175 PHARM REV CODE 636 W HCPCS: Mod: JG | Performed by: INTERNAL MEDICINE

## 2018-04-18 PROCEDURE — 74183 MRI ABD W/O CNTR FLWD CNTR: CPT | Mod: TC

## 2018-04-18 PROCEDURE — 99214 OFFICE O/P EST MOD 30 MIN: CPT | Mod: S$GLB,,, | Performed by: INTERNAL MEDICINE

## 2018-04-18 RX ORDER — GADOBUTROL 604.72 MG/ML
10 INJECTION INTRAVENOUS
Status: COMPLETED | OUTPATIENT
Start: 2018-04-18 | End: 2018-04-18

## 2018-04-18 RX ORDER — LANREOTIDE ACETATE 120 MG/.5ML
120 INJECTION SUBCUTANEOUS
Status: COMPLETED | OUTPATIENT
Start: 2018-04-18 | End: 2018-04-18

## 2018-04-18 RX ADMIN — GADOBUTROL 10 ML: 604.72 INJECTION INTRAVENOUS at 09:04

## 2018-04-18 RX ADMIN — LANREOTIDE ACETATE 120 MG: 120 INJECTION SUBCUTANEOUS at 12:04

## 2018-04-18 NOTE — PROGRESS NOTES
PATIENT: Elroy Rahman  MRN: [id number]  DATE: 4/18/2018      Diagnosis:   1. Bronchial carcinoid tumors    2. Secondary neuroendocrine tumor of liver    3. Secondary neuroendocrine tumor of bone    4. Chemotherapy follow-up examination        Chief Complaint: No chief complaint on file.      Oncologic History:      Oncologic History Bronchial carcinoid, left, diagnosed 1/2016   Metastatic disease to liver and bone 5/2017     Oncologic Treatment Left upper lobectomy 1/2016  CAPTEM 7/2017   Lanreotide 7/2017   Zoledronic acid 7/2017     Pathology 1/2016: Typical carcinoid tumor, well-differentiated T2a, N0, M0 Ki-67 <1%  6/2017: Liver biopsy, well-differentiated, Ki-67 25%           Subjective:    Interval History: Mr. Rahman is a 62 y.o. male who is seen in follow-up for a bronchial carcinoid tumor.  He states he feels well.  He has no new complaints.    His history dates to 1/2016 when he underwent a left upper lobectomy with Dr. Bell for a typical carcinoid tumor.  His pathological staging was T2a, N0, M0 with Ki-67 less than 1% and mitotic count 0 per 10 high-powered fields.  He done well postoperatively, however, in 5/2017 he was undergoing routine surveillance and a chest CT in picked up evidence of progression of disease in the liver.  Dedicated abdominal CT was performed showing multiple liver lesions.  In 6/2017 he underwent a CT-guided liver biopsy which was positive for metastatic neuroendocrine tumor which was well-differentiated with no overt nuclear atypia, however, the Ki-67 was 25%.  He underwent gallium-68 PET/CT and was found to have widespread disease involving the bone, liver and also mesentery.  He was started on CAPTEM in 7/2017 and also Lanreotide and zoledronic acid.  Scans in 10/2017 had shown stability of disease.  Imaging in 1/2018 had shown stability of disease.  Scans in 4/2018 had shown stability of disease.      Past Medical History:   Past Medical History:   Diagnosis Date     Carcinoid bronchial adenoma of left lung     Chemotherapy follow-up examination 8/25/2017    Cough with hemoptysis     mild/intermittent    Mild heartburn     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    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    Sleep apnea     Snores        Past Surgical HIstory:   Past Surgical History:   Procedure Laterality Date    Bka Left        Family History:   Family History   Problem Relation Age of Onset    Cancer Mother      lung    Cancer Sister     Anesthesia problems Neg Hx        Social History:  reports that he has never smoked. He has never used smokeless tobacco. He reports that he drinks about 0.6 oz of alcohol per week . He reports that he does not use drugs.    Allergies:  Review of patient's allergies indicates:  No Known Allergies    Medications:  Current Outpatient Prescriptions   Medication Sig Dispense Refill    capecitabine (XELODA) 500 MG Tab TAKE 4 TABLETS (2,000MG TOTAL) BY MOUTH 2 TIMES DAILY FOR 14 DAYS EVERY 28 DAYS ( 2 WEEKS ON, 2 WEEKS OFF, EVERY 4 WEEKS) 112 tablet 3    lanreotide (SOMATULINE DEPOT) 120 mg/0.5 mL Syrg Inject 120 mg into the skin every 28 days.      ondansetron (ZOFRAN-ODT) 8 MG TbDL Take 1 tablet (8 mg total) by mouth every 12 (twelve) hours as needed. 30 tablet 1    temozolomide (TEMODAR) 250 MG capsule Take 2 capsules (500 mg total) by mouth once daily. Take 2 capsules on days 10-14 of 28 day cycle 10 capsule 6     No current facility-administered medications for this visit.        Review of Systems   Constitutional: Negative for appetite change, chills, fatigue, fever and unexpected weight change.   HENT: Negative for dental problem, sinus pressure and sneezing.    Eyes: Negative for visual disturbance.   Respiratory: Negative for cough, choking and chest tightness.    Cardiovascular: Negative for chest pain and leg swelling.   Gastrointestinal: Negative for abdominal pain, blood in stool, constipation,  diarrhea and nausea.   Genitourinary: Negative for difficulty urinating and dysuria.   Musculoskeletal: Positive for back pain. Negative for arthralgias.   Skin: Negative for rash and wound.   Neurological: Negative for dizziness, light-headedness and headaches.   Hematological: Negative for adenopathy. Does not bruise/bleed easily.   Psychiatric/Behavioral: Negative for sleep disturbance. The patient is not nervous/anxious.        ECOG Performance Status:   ECOG SCORE    1 - Restricted in strenuous activity-ambulatory and able to carry out work of a light nature         Objective:      Vitals:   Vitals:    04/18/18 1145   BP: 128/72   BP Location: Right arm   Patient Position: Sitting   BP Method: Large (Automatic)   Pulse: (!) 58   Resp: 18   Temp: 97.8 °F (36.6 °C)   TempSrc: Oral   SpO2: 97%   Weight: (!) 140.4 kg (309 lb 8.4 oz)   Height: 6' (1.829 m)     BMI: Body mass index is 41.98 kg/m².    Physical Exam   Constitutional: He is oriented to person, place, and time. He appears well-developed and well-nourished.   HENT:   Head: Normocephalic and atraumatic.   Eyes: Pupils are equal, round, and reactive to light.   Neck: Normal range of motion. Neck supple.   Cardiovascular: Normal rate and regular rhythm.    Pulmonary/Chest: Effort normal and breath sounds normal. No respiratory distress.   Abdominal: Soft. He exhibits no distension. There is no tenderness.   Musculoskeletal: He exhibits no edema or tenderness.   Left lower extremity prosthesis   Lymphadenopathy:     He has no cervical adenopathy.   Neurological: He is alert and oriented to person, place, and time. No cranial nerve deficit.   Skin: Skin is warm and dry.   Psychiatric: He has a normal mood and affect. His behavior is normal.       Laboratory Data:  No visits with results within 1 Week(s) from this visit.   Latest known visit with results is:   Lab Visit on 03/21/2018   Component Date Value Ref Range Status    WBC 03/21/2018 4.30  3.90 - 12.70  K/uL Final    RBC 03/21/2018 4.54* 4.60 - 6.20 M/uL Final    Hemoglobin 03/21/2018 14.9  14.0 - 18.0 g/dL Final    Hematocrit 03/21/2018 42.6  40.0 - 54.0 % Final    MCV 03/21/2018 94  82 - 98 fL Final    MCH 03/21/2018 32.8* 27.0 - 31.0 pg Final    MCHC 03/21/2018 35.0  32.0 - 36.0 g/dL Final    RDW 03/21/2018 13.5  11.5 - 14.5 % Final    Platelets 03/21/2018 164  150 - 350 K/uL Final    MPV 03/21/2018 9.5  9.2 - 12.9 fL Final    Immature Granulocytes 03/21/2018 0.5  0.0 - 0.5 % Final    Gran # (ANC) 03/21/2018 2.7  1.8 - 7.7 K/uL Final    Immature Grans (Abs) 03/21/2018 0.02  0.00 - 0.04 K/uL Final    Comment: Mild elevation in immature granulocytes is non specific and   can be seen in a variety of conditions including stress response,   acute inflammation, trauma and pregnancy. Correlation with other   laboratory and clinical findings is essential.      Lymph # 03/21/2018 1.1  1.0 - 4.8 K/uL Final    Mono # 03/21/2018 0.5  0.3 - 1.0 K/uL Final    Eos # 03/21/2018 0.0  0.0 - 0.5 K/uL Final    Baso # 03/21/2018 0.05  0.00 - 0.20 K/uL Final    nRBC 03/21/2018 0  0 /100 WBC Final    Gran% 03/21/2018 62.5  38.0 - 73.0 % Final    Lymph% 03/21/2018 24.4  18.0 - 48.0 % Final    Mono% 03/21/2018 10.7  4.0 - 15.0 % Final    Eosinophil% 03/21/2018 0.7  0.0 - 8.0 % Final    Basophil% 03/21/2018 1.2  0.0 - 1.9 % Final    Differential Method 03/21/2018 Automated   Final    Sodium 03/21/2018 138  136 - 145 mmol/L Final    Potassium 03/21/2018 4.0  3.5 - 5.1 mmol/L Final    Chloride 03/21/2018 105  95 - 110 mmol/L Final    CO2 03/21/2018 26  23 - 29 mmol/L Final    Glucose 03/21/2018 132* 70 - 110 mg/dL Final    BUN, Bld 03/21/2018 14  8 - 23 mg/dL Final    Creatinine 03/21/2018 1.0  0.5 - 1.4 mg/dL Final    Calcium 03/21/2018 9.0  8.7 - 10.5 mg/dL Final    Total Protein 03/21/2018 7.1  6.0 - 8.4 g/dL Final    Albumin 03/21/2018 3.9  3.5 - 5.2 g/dL Final    Total Bilirubin 03/21/2018 1.2* 0.1 -  1.0 mg/dL Final    Comment: For infants and newborns, interpretation of results should be based  on gestational age, weight and in agreement with clinical  observations.  Premature Infant recommended reference ranges:  Up to 24 hours.............<8.0 mg/dL  Up to 48 hours............<12.0 mg/dL  3-5 days..................<15.0 mg/dL  6-29 days.................<15.0 mg/dL      Alkaline Phosphatase 03/21/2018 56  55 - 135 U/L Final    AST 03/21/2018 18  10 - 40 U/L Final    ALT 03/21/2018 27  10 - 44 U/L Final    Anion Gap 03/21/2018 7* 8 - 16 mmol/L Final    eGFR if African American 03/21/2018 >60.0  >60 mL/min/1.73 m^2 Final    eGFR if non African American 03/21/2018 >60.0  >60 mL/min/1.73 m^2 Final    Comment: Calculation used to obtain the estimated glomerular filtration  rate (eGFR) is the CKD-EPI equation.        Supplemental Diagnosis  Chromogranin Staining:  Intensity: Moderate to strong. Positive cells: 100 (%)  Synaptophysin Staining:  Intensity: Strong. Positive cells: 100 (%)  CD31: 40 vessels per 1 HPF  Factor VIII: 27 vessels per 1 HPF  Ki67: 1 % tumor cells staining  Immunostains performed with appropriate positive and negative controls.  FINAL PATHOLOGIC DIAGNOSIS  1. Lymph node, level XI, excisional biopsy:  Fragments of benign lymph node with anthracosis and sinus histiocytosis (0/1).  2. Lung, lingular staple line, biopsy:  Lung, negative for neoplasm.  3. Lymph node, level X, excisional biopsy:  1 benign lymph node with anthracotic pigment and sinus histiocytosis (0/1).  4. Lymph node, level XII, excisional biopsy:  2 benign lymph nodes with sinus histiocytosis and anthracotic pigment (0/2).  5. Lung, left upper lobe, lobectomy:  Typical carcinoid tumor (well differentiated neuroendocrine tumor), 4 cm in greatest dimension.  Bronchial and vascular margins are negative for neoplasm.  Uninvolved lung adjacent to the tumor shows emphysematous changes and fibrosis, however remaining lung  appears  unremarkable.  Additional immunohistochemical stains for ancillary studies (including synaptophysin, chromogranin and Ki-67)  will be completed and results will be reported in a supplemental report.  See synoptic report in comment section for further details.  6. Lymph node, level VII, excisional biopsy:  2 benign lymph nodes with anthracotic pigment and sinus histiocytosis (0/2).  Comment: Synoptic Report  Specimen: Left upper lobe of lung  Procedure: Lobectomy  Specimen laterality: Left  Tumor size:  Greatest dimension: 4 cm  Tumor focality: Single focus  Histologic type: Typical carcinoid tumor  Visceral pleural invasion: Not identified  Tumor extension: Not identified  Margins: Bronchial and vascular margins are uninvolved by tumor.  Distance of tumor from closest margin: 1 cm from the bronchial surgical margin.  Lymphovascular invasion: Not identified  Ancillary studies:  Immunohistochemical stains for neuroendocrine markers and Ki-67 will be completed and results will follow in a  supplemental report.  Note: The tumor consists of a proliferation of cells in nests with lewis formation. The tumor cells have salt and  pepper chromatin pattern with abundant cytoplasm. There is no significant nuclear atypia. No evidence of  necrosis. Mitotic count is 0 mitoses in 10 high power fields.    Imaging:   MRI 4/18/18  FINDINGS:  The visualized lungs, heart, and pericardium appear normal.    The liver is normal in size, contour, and parenchymal signal.  No intra or extrahepatic biliary ductal dilatation.  There are multiple enhancing lesions seen scattered throughout the hepatic parenchyma that are grossly stable in size and number compared to prior examination on 01/19/2018.  Index lesions measure as follows:    -lesion in hepatic segment V/VIII measures 3.8 cm (series 12, image 57) and previously measured at 3.8 cm.    -lesion in hepatic segment III measures 3.8 cm (series 12, image 57) and previously measured at 3.6  cm.  Differences in measurement may be secondary to interobserver variability.    The hepatic vein, portal vein, splenic vein, and hepatic artery are patent.  No evidence of intra-or extrahepatic biliary ductal dilatation.    The gallbladder demonstrates no evidence of gallstones or surrounding inflammatory change.  The spleen is not enlarged and has no collateral vessel formation.  There are 2 nonspecific lesions within the spleen that demonstrate restricted diffusion, with no associated abnormal enhancement on postcontrast images.  This appears unchanged compared to prior study on 01/19/2018 and may represent splenic lesions.  Incidental note is made of a splenule.  Stable appearing right adrenal adenoma.  The left adrenal glands, stomach, pancreas, and visualized portions of the bowel demonstrate no significant abnormalities.  Subcentimeter left renal cysts.  No evidence of hydronephrosis.    The osseous structures demonstrate multiple enhancing lesions throughout the thoracolumbar spine, similar to prior examination.    The visualized aorta is normal in caliber.  Soft tissues of the lower thoracic and abdominal wall are unremarkable.   Impression       Multiple enhancing hepatic lesions that appear stable in size and number compared to prior exam on 01/19/2018 and suggestive of metastatic disease.    Stable osseous metastatic disease.    Right adrenal adenoma.    2 nonspecific lesions within the spleen that demonstrate restricted diffusion, with no corresponding enhancement on postcontrast images that may represent splenic lesions.    Left renal cysts.    RECIST SUMMARY:    Date of prior examination for comparison: 01/19/2018    Lesion 1: Right hepatic lobe (segment V/VIII), measures 3.8 cm (series 12, image 57) and previously measured at 3.8 cm.    Lesion 2: Left hepatic lobe (segment III),  measures 3.8 cm (series 12, image 57) and previously measured at 3.8 cm.         CT 4/16/18  FINDINGS:  Base of Neck:  There is a 1.3 cm hypodense right inferior lobe thyroid lobe nodule, which appears stable since 2012 and is likely benign.    Thoracic soft tissues: Normal.    Aorta: There is a left-sided aortic arch with 3 branch vessels.  Thoracic aorta maintains a normal caliber, contour, and course.  There is mild calcification of the coronary arteries and thoracic aorta.    Heart: Normal size. No effusion.    Pulmonary vasculature: Pulmonary arteries distribute normally.  There are four pulmonary veins.    Darya/Mediastinum: No pathologic trang enlargement.    Lungs/Pleura: Postoperative changes of left upper lobe resection and lingulectomy with volume loss and leftward mediastinal shift again identified.  No definite new pulmonary disease identified.  No evidence of focal consolidation, pulmonary mass, or pneumothorax.  No pleural effusions.    Airways: The left upper lobe and lingular bronchi have been ligated.  The remaining proximal airways and trachea are patent.    Esophagus: Normal.    Upper Abdomen: There are multiple hypoattenuating masses identified in the hepatic parenchyma compatible with the patient's known metastatic disease, which are not fully characterized on this noncontrast examination.  The largest lesion in the right hepatic lobe measures approximately 4.5 cm.  There is a stable 3.5 cm hypoattenuating right adrenal nodule, likely an adenoma.  There is a stable left upper quadrant soft tissue nodule, presumably a splenule.    Bones: Postoperative changes of left thoracotomy again noted.  Numerous sclerotic foci are again identified throughout the visualized spine and ribs, relatively similar in comparison to the prior study.  No definite new osseous lesions identified.   Impression       Postoperative changes of left upper lobectomy and lingulectomy in this patient the history of metastatic carcinoid tumor.  No definite new pulmonary disease identified.    Osseous and hepatic metastases, essentially  unchanged in comparison to the previous study dated 01/19/2018.              Assessment:       1. Bronchial carcinoid tumors    2. Secondary neuroendocrine tumor of liver    3. Secondary neuroendocrine tumor of bone    4. Chemotherapy follow-up examination           Plan:     Mr. Rahman is doing well from an oncologic standpoint and review of his scans from yesterday show overall stability of his disease with no new growth.  Plan to continue on with treatment with CAPTEM.  Check lab work today.  Follow back up in another 4 weeks or sooner if needed.  All questions were answered and he is agreeable with this plan.    Jesus London DO, FACP  Hematology & Oncology  [address] [place] [address]  ph. [phone]  Fax. [phone]    25 minutes were spent in coordination of patient's care, record review and counseling.  More than 50% of the time was face-to-face.

## 2018-04-18 NOTE — NURSING
Pt arrived for Lanreotide following MD appt.  Pt tolerated injection SQ to R buttocks.  Discharged to home with wife.

## 2018-04-23 PROBLEM — Z09 CHEMOTHERAPY FOLLOW-UP EXAMINATION: Status: RESOLVED | Noted: 2018-01-22 | Resolved: 2018-04-23

## 2018-04-27 ENCOUNTER — TELEPHONE (OUTPATIENT)
Dept: PHARMACY | Facility: CLINIC | Age: 63
End: 2018-04-27

## 2018-05-01 ENCOUNTER — TELEPHONE (OUTPATIENT)
Dept: PHARMACY | Facility: CLINIC | Age: 63
End: 2018-05-01

## 2018-05-16 ENCOUNTER — TELEPHONE (OUTPATIENT)
Dept: HEMATOLOGY/ONCOLOGY | Facility: CLINIC | Age: 63
End: 2018-05-16

## 2018-05-16 NOTE — TELEPHONE ENCOUNTER
----- Message from Demterius Banerjee sent at 5/15/2018  9:48 AM CDT -----  Contact: Pt   Will like to reschedule 5.16.18 lab, appt with carmen martínez, and infusion to next avail     Contact::[phone]

## 2018-05-16 NOTE — TELEPHONE ENCOUNTER
----- Message from Jas Nath sent at 5/16/2018 10:45 AM CDT -----  Contact: Dc fatima   ----- Message -----  From: Demetrius Banerjee  Sent: 5/15/2018   9:48 AM  To: Jas Nath    Will like to reschedule 5.16.18 lab, appt with carmen martínez, and infusion to next avail     Contact::[phone]

## 2018-05-21 ENCOUNTER — LAB VISIT (OUTPATIENT)
Dept: LAB | Facility: HOSPITAL | Age: 63
End: 2018-05-21
Attending: INTERNAL MEDICINE
Payer: COMMERCIAL

## 2018-05-21 ENCOUNTER — OFFICE VISIT (OUTPATIENT)
Dept: HEMATOLOGY/ONCOLOGY | Facility: CLINIC | Age: 63
End: 2018-05-21
Payer: COMMERCIAL

## 2018-05-21 ENCOUNTER — INFUSION (OUTPATIENT)
Dept: INFUSION THERAPY | Facility: HOSPITAL | Age: 63
End: 2018-05-21
Attending: INTERNAL MEDICINE
Payer: COMMERCIAL

## 2018-05-21 VITALS
SYSTOLIC BLOOD PRESSURE: 121 MMHG | HEART RATE: 50 BPM | HEIGHT: 72 IN | TEMPERATURE: 98 F | RESPIRATION RATE: 18 BRPM | WEIGHT: 296.94 LBS | BODY MASS INDEX: 40.22 KG/M2 | DIASTOLIC BLOOD PRESSURE: 79 MMHG

## 2018-05-21 VITALS
OXYGEN SATURATION: 94 % | SYSTOLIC BLOOD PRESSURE: 128 MMHG | RESPIRATION RATE: 16 BRPM | HEIGHT: 72 IN | BODY MASS INDEX: 40.22 KG/M2 | TEMPERATURE: 98 F | WEIGHT: 296.94 LBS | DIASTOLIC BLOOD PRESSURE: 79 MMHG | HEART RATE: 66 BPM

## 2018-05-21 DIAGNOSIS — C7B.8 SECONDARY NEUROENDOCRINE TUMOR OF LIVER: ICD-10-CM

## 2018-05-21 DIAGNOSIS — D3A.090 BRONCHIAL CARCINOID TUMORS: Primary | ICD-10-CM

## 2018-05-21 DIAGNOSIS — E87.5 HYPERKALEMIA: ICD-10-CM

## 2018-05-21 DIAGNOSIS — C7B.8 SECONDARY NEUROENDOCRINE TUMOR OF BONE: ICD-10-CM

## 2018-05-21 LAB
ALBUMIN SERPL BCP-MCNC: 4.4 G/DL
ALP SERPL-CCNC: 58 U/L
ALT SERPL W/O P-5'-P-CCNC: 31 U/L
ANION GAP SERPL CALC-SCNC: 7 MMOL/L
AST SERPL-CCNC: 21 U/L
BILIRUB SERPL-MCNC: 1 MG/DL
BUN SERPL-MCNC: 16 MG/DL
CALCIUM SERPL-MCNC: 10.5 MG/DL
CHLORIDE SERPL-SCNC: 102 MMOL/L
CO2 SERPL-SCNC: 29 MMOL/L
CREAT SERPL-MCNC: 1.3 MG/DL
ERYTHROCYTE [DISTWIDTH] IN BLOOD BY AUTOMATED COUNT: 13.8 %
EST. GFR  (AFRICAN AMERICAN): >60 ML/MIN/1.73 M^2
EST. GFR  (NON AFRICAN AMERICAN): 58.9 ML/MIN/1.73 M^2
GLUCOSE SERPL-MCNC: 96 MG/DL
HCT VFR BLD AUTO: 43.6 %
HGB BLD-MCNC: 15.4 G/DL
IMM GRANULOCYTES # BLD AUTO: 0.01 K/UL
MCH RBC QN AUTO: 33.6 PG
MCHC RBC AUTO-ENTMCNC: 35.3 G/DL
MCV RBC AUTO: 95 FL
NEUTROPHILS # BLD AUTO: 3.1 K/UL
PLATELET # BLD AUTO: 190 K/UL
PMV BLD AUTO: 10 FL
POTASSIUM SERPL-SCNC: 5.4 MMOL/L
PROT SERPL-MCNC: 7.7 G/DL
RBC # BLD AUTO: 4.58 M/UL
SODIUM SERPL-SCNC: 138 MMOL/L
WBC # BLD AUTO: 4.83 K/UL

## 2018-05-21 PROCEDURE — 96372 THER/PROPH/DIAG INJ SC/IM: CPT

## 2018-05-21 PROCEDURE — 96365 THER/PROPH/DIAG IV INF INIT: CPT

## 2018-05-21 PROCEDURE — 36415 COLL VENOUS BLD VENIPUNCTURE: CPT

## 2018-05-21 PROCEDURE — 85027 COMPLETE CBC AUTOMATED: CPT

## 2018-05-21 PROCEDURE — 99214 OFFICE O/P EST MOD 30 MIN: CPT | Mod: S$GLB,,, | Performed by: NURSE PRACTITIONER

## 2018-05-21 PROCEDURE — 25000003 PHARM REV CODE 250: Performed by: INTERNAL MEDICINE

## 2018-05-21 PROCEDURE — 3008F BODY MASS INDEX DOCD: CPT | Mod: CPTII,S$GLB,, | Performed by: NURSE PRACTITIONER

## 2018-05-21 PROCEDURE — 80053 COMPREHEN METABOLIC PANEL: CPT

## 2018-05-21 PROCEDURE — 63600175 PHARM REV CODE 636 W HCPCS: Mod: JG | Performed by: INTERNAL MEDICINE

## 2018-05-21 PROCEDURE — 99999 PR PBB SHADOW E&M-EST. PATIENT-LVL III: CPT | Mod: PBBFAC,,, | Performed by: NURSE PRACTITIONER

## 2018-05-21 RX ORDER — LANREOTIDE ACETATE 120 MG/.5ML
120 INJECTION SUBCUTANEOUS
Status: COMPLETED | OUTPATIENT
Start: 2018-05-21 | End: 2018-05-21

## 2018-05-21 RX ADMIN — SODIUM CHLORIDE 4 MG: 9 INJECTION, SOLUTION INTRAVENOUS at 03:05

## 2018-05-21 RX ADMIN — SODIUM CHLORIDE: 9 INJECTION, SOLUTION INTRAVENOUS at 03:05

## 2018-05-21 RX ADMIN — LANREOTIDE ACETATE 120 MG: 120 INJECTION SUBCUTANEOUS at 04:05

## 2018-05-21 NOTE — PROGRESS NOTES
PATIENT: Elroy Rahman  MRN: [id number]  DATE: 5/21/2018      Diagnosis:   1. Bronchial carcinoid tumors    2. Secondary neuroendocrine tumor of bone    3. Secondary neuroendocrine tumor of liver    4. Hyperkalemia        Chief Complaint: Bronchial carcinoid tumors      Oncologic History:      Oncologic History Bronchial carcinoid, left, diagnosed 1/2016   Metastatic disease to liver and bone 5/2017     Oncologic Treatment Left upper lobectomy 1/2016  CAPTEM 7/2017   Lanreotide 7/2017   Zoledronic acid 7/2017     Pathology 1/2016: Typical carcinoid tumor, well-differentiated T2a, N0, M0 Ki-67 <1%  6/2017: Liver biopsy, well-differentiated, Ki-67 25%           Subjective:    Interval History: Mr. Rahman is a 62 y.o. male who is seen in follow-up for a bronchial carcinoid tumor. He states he feels well.  He has no new complaints.     His history dates to 1/2016 when he underwent a left upper lobectomy with Dr. Bell for a typical carcinoid tumor.  His pathological staging was T2a, N0, M0 with Ki-67 less than 1% and mitotic count 0 per 10 high-powered fields.  He done well postoperatively, however, in 5/2017 he was undergoing routine surveillance and a chest CT in picked up evidence of progression of disease in the liver.  Dedicated abdominal CT was performed showing multiple liver lesions.  In 6/2017 he underwent a CT-guided liver biopsy which was positive for metastatic neuroendocrine tumor which was well-differentiated with no overt nuclear atypia, however, the Ki-67 was 25%.  He underwent gallium-68 PET/CT and was found to have widespread disease involving the bone, liver and also mesentery.  He was started on CAPTEM in 7/2017 and also Lanreotide and zoledronic acid.  Scans in 10/2017 had shown stability of disease.  Imaging in 1/2018 had shown stability of disease.  Scans in 4/2018 had shown stability of disease.       Past Medical History:   Past Medical History:   Diagnosis Date    Carcinoid bronchial  adenoma of left lung     Chemotherapy follow-up examination 8/25/2017    Cough with hemoptysis     mild/intermittent    Mild heartburn     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    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    Sleep apnea     Snores        Past Surgical HIstory:   Past Surgical History:   Procedure Laterality Date    Bka Left        Family History:   Family History   Problem Relation Age of Onset    Cancer Mother         lung    Cancer Sister     Anesthesia problems Neg Hx        Social History:  reports that he has never smoked. He has never used smokeless tobacco. He reports that he drinks about 0.6 oz of alcohol per week . He reports that he does not use drugs.    Allergies:  Review of patient's allergies indicates:  No Known Allergies    Medications:  Current Outpatient Prescriptions   Medication Sig Dispense Refill    capecitabine (XELODA) 500 MG Tab TAKE 4 TABLETS (2,000MG TOTAL) BY MOUTH 2 TIMES DAILY FOR 14 DAYS EVERY 28 DAYS ( 2 WEEKS ON, 2 WEEKS OFF, EVERY 4 WEEKS) 112 tablet 3    lanreotide (SOMATULINE DEPOT) 120 mg/0.5 mL Syrg Inject 120 mg into the skin every 28 days.      ondansetron (ZOFRAN-ODT) 8 MG TbDL Take 1 tablet (8 mg total) by mouth every 12 (twelve) hours as needed. 30 tablet 1    temozolomide (TEMODAR) 250 MG capsule Take 2 capsules (500 mg total) by mouth once daily. Take 2 capsules on days 10-14 of 28 day cycle 10 capsule 6     No current facility-administered medications for this visit.        Review of Systems   Constitutional: Negative for appetite change, chills, fatigue, fever and unexpected weight change.   HENT: Negative for dental problem, sinus pressure and sneezing.    Eyes: Negative for visual disturbance.   Respiratory: Negative for cough, choking and chest tightness.    Cardiovascular: Negative for chest pain and leg swelling.   Gastrointestinal: Negative for abdominal pain, blood in stool, constipation, diarrhea and  nausea.   Genitourinary: Negative for difficulty urinating and dysuria.   Musculoskeletal: Negative for arthralgias.   Skin: Negative for rash and wound.   Neurological: Negative for dizziness, light-headedness and headaches.   Hematological: Negative for adenopathy. Does not bruise/bleed easily.   Psychiatric/Behavioral: Negative for sleep disturbance. The patient is not nervous/anxious.        ECOG Performance Status:   ECOG SCORE    1 - Restricted in strenuous activity-ambulatory and able to carry out work of a light nature         Objective:      Vitals:   Vitals:    05/21/18 1352   BP: 128/79   BP Location: Right arm   Patient Position: Sitting   BP Method: Large (Automatic)   Pulse: 66   Resp: 16   Temp: 97.9 °F (36.6 °C)   TempSrc: Oral   SpO2: (!) 94%   Weight: 134.7 kg (296 lb 15.4 oz)   Height: 6' (1.829 m)     BMI: Body mass index is 40.27 kg/m².    Physical Exam   Constitutional: He is oriented to person, place, and time. He appears well-developed and well-nourished.   HENT:   Head: Normocephalic and atraumatic.   Eyes: Pupils are equal, round, and reactive to light.   Neck: Normal range of motion. Neck supple.   Cardiovascular: Normal rate and regular rhythm.    Pulmonary/Chest: Effort normal and breath sounds normal. No respiratory distress.   Abdominal: Soft. He exhibits no distension. There is no tenderness.   Musculoskeletal: He exhibits no edema or tenderness.   Left lower extremity prosthesis   Lymphadenopathy:     He has no cervical adenopathy.   Neurological: He is alert and oriented to person, place, and time. No cranial nerve deficit.   Skin: Skin is warm and dry.   Psychiatric: He has a normal mood and affect. His behavior is normal.       Laboratory Data:  Lab Visit on 05/21/2018   Component Date Value Ref Range Status    WBC 05/21/2018 4.83  3.90 - 12.70 K/uL Final    RBC 05/21/2018 4.58* 4.60 - 6.20 M/uL Final    Hemoglobin 05/21/2018 15.4  14.0 - 18.0 g/dL Final    Hematocrit  05/21/2018 43.6  40.0 - 54.0 % Final    MCV 05/21/2018 95  82 - 98 fL Final    MCH 05/21/2018 33.6* 27.0 - 31.0 pg Final    MCHC 05/21/2018 35.3  32.0 - 36.0 g/dL Final    RDW 05/21/2018 13.8  11.5 - 14.5 % Final    Platelets 05/21/2018 190  150 - 350 K/uL Final    MPV 05/21/2018 10.0  9.2 - 12.9 fL Final    Gran # (ANC) 05/21/2018 3.1  1.8 - 7.7 K/uL Final    Comment: The ANC is based on a white cell differential from an   automated cell counter. It has not been microscopically   reviewed for the presence of abnormal cells. Clinical   correlation is required.      Immature Grans (Abs) 05/21/2018 0.01  0.00 - 0.04 K/uL Final    Comment: Mild elevation in immature granulocytes is non specific and   can be seen in a variety of conditions including stress response,   acute inflammation, trauma and pregnancy. Correlation with other   laboratory and clinical findings is essential.      Sodium 05/21/2018 138  136 - 145 mmol/L Final    Potassium 05/21/2018 5.4* 3.5 - 5.1 mmol/L Final    *No Visible Hemolysis    Chloride 05/21/2018 102  95 - 110 mmol/L Final    CO2 05/21/2018 29  23 - 29 mmol/L Final    Glucose 05/21/2018 96  70 - 110 mg/dL Final    BUN, Bld 05/21/2018 16  8 - 23 mg/dL Final    Creatinine 05/21/2018 1.3  0.5 - 1.4 mg/dL Final    Calcium 05/21/2018 10.5  8.7 - 10.5 mg/dL Final    Total Protein 05/21/2018 7.7  6.0 - 8.4 g/dL Final    Albumin 05/21/2018 4.4  3.5 - 5.2 g/dL Final    Total Bilirubin 05/21/2018 1.0  0.1 - 1.0 mg/dL Final    Comment: For infants and newborns, interpretation of results should be based  on gestational age, weight and in agreement with clinical  observations.  Premature Infant recommended reference ranges:  Up to 24 hours.............<8.0 mg/dL  Up to 48 hours............<12.0 mg/dL  3-5 days..................<15.0 mg/dL  6-29 days.................<15.0 mg/dL      Alkaline Phosphatase 05/21/2018 58  55 - 135 U/L Final    AST 05/21/2018 21  10 - 40 U/L Final     ALT 05/21/2018 31  10 - 44 U/L Final    Anion Gap 05/21/2018 7* 8 - 16 mmol/L Final    eGFR if African American 05/21/2018 >60.0  >60 mL/min/1.73 m^2 Final    eGFR if non African American 05/21/2018 58.9* >60 mL/min/1.73 m^2 Final    Comment: Calculation used to obtain the estimated glomerular filtration  rate (eGFR) is the CKD-EPI equation.        Supplemental Diagnosis  Chromogranin Staining:  Intensity: Moderate to strong. Positive cells: 100 (%)  Synaptophysin Staining:  Intensity: Strong. Positive cells: 100 (%)  CD31: 40 vessels per 1 HPF  Factor VIII: 27 vessels per 1 HPF  Ki67: 1 % tumor cells staining  Immunostains performed with appropriate positive and negative controls.  FINAL PATHOLOGIC DIAGNOSIS  1. Lymph node, level XI, excisional biopsy:  Fragments of benign lymph node with anthracosis and sinus histiocytosis (0/1).  2. Lung, lingular staple line, biopsy:  Lung, negative for neoplasm.  3. Lymph node, level X, excisional biopsy:  1 benign lymph node with anthracotic pigment and sinus histiocytosis (0/1).  4. Lymph node, level XII, excisional biopsy:  2 benign lymph nodes with sinus histiocytosis and anthracotic pigment (0/2).  5. Lung, left upper lobe, lobectomy:  Typical carcinoid tumor (well differentiated neuroendocrine tumor), 4 cm in greatest dimension.  Bronchial and vascular margins are negative for neoplasm.  Uninvolved lung adjacent to the tumor shows emphysematous changes and fibrosis, however remaining lung  appears unremarkable.  Additional immunohistochemical stains for ancillary studies (including synaptophysin, chromogranin and Ki-67)  will be completed and results will be reported in a supplemental report.  See synoptic report in comment section for further details.  6. Lymph node, level VII, excisional biopsy:  2 benign lymph nodes with anthracotic pigment and sinus histiocytosis (0/2).  Comment: Synoptic Report  Specimen: Left upper lobe of lung  Procedure: Lobectomy  Specimen  laterality: Left  Tumor size:  Greatest dimension: 4 cm  Tumor focality: Single focus  Histologic type: Typical carcinoid tumor  Visceral pleural invasion: Not identified  Tumor extension: Not identified  Margins: Bronchial and vascular margins are uninvolved by tumor.  Distance of tumor from closest margin: 1 cm from the bronchial surgical margin.  Lymphovascular invasion: Not identified  Ancillary studies:  Immunohistochemical stains for neuroendocrine markers and Ki-67 will be completed and results will follow in a  supplemental report.  Note: The tumor consists of a proliferation of cells in nests with lewis formation. The tumor cells have salt and  pepper chromatin pattern with abundant cytoplasm. There is no significant nuclear atypia. No evidence of  necrosis. Mitotic count is 0 mitoses in 10 high power fields.    Imaging:   MRI 4/18/18  FINDINGS:  The visualized lungs, heart, and pericardium appear normal.    The liver is normal in size, contour, and parenchymal signal.  No intra or extrahepatic biliary ductal dilatation.  There are multiple enhancing lesions seen scattered throughout the hepatic parenchyma that are grossly stable in size and number compared to prior examination on 01/19/2018.  Index lesions measure as follows:    -lesion in hepatic segment V/VIII measures 3.8 cm (series 12, image 57) and previously measured at 3.8 cm.    -lesion in hepatic segment III measures 3.8 cm (series 12, image 57) and previously measured at 3.6 cm.  Differences in measurement may be secondary to interobserver variability.    The hepatic vein, portal vein, splenic vein, and hepatic artery are patent.  No evidence of intra-or extrahepatic biliary ductal dilatation.    The gallbladder demonstrates no evidence of gallstones or surrounding inflammatory change.  The spleen is not enlarged and has no collateral vessel formation.  There are 2 nonspecific lesions within the spleen that demonstrate restricted diffusion,  with no associated abnormal enhancement on postcontrast images.  This appears unchanged compared to prior study on 01/19/2018 and may represent splenic lesions.  Incidental note is made of a splenule.  Stable appearing right adrenal adenoma.  The left adrenal glands, stomach, pancreas, and visualized portions of the bowel demonstrate no significant abnormalities.  Subcentimeter left renal cysts.  No evidence of hydronephrosis.    The osseous structures demonstrate multiple enhancing lesions throughout the thoracolumbar spine, similar to prior examination.    The visualized aorta is normal in caliber.  Soft tissues of the lower thoracic and abdominal wall are unremarkable.   Impression       Multiple enhancing hepatic lesions that appear stable in size and number compared to prior exam on 01/19/2018 and suggestive of metastatic disease.    Stable osseous metastatic disease.    Right adrenal adenoma.    2 nonspecific lesions within the spleen that demonstrate restricted diffusion, with no corresponding enhancement on postcontrast images that may represent splenic lesions.    Left renal cysts.    RECIST SUMMARY:    Date of prior examination for comparison: 01/19/2018    Lesion 1: Right hepatic lobe (segment V/VIII), measures 3.8 cm (series 12, image 57) and previously measured at 3.8 cm.    Lesion 2: Left hepatic lobe (segment III),  measures 3.8 cm (series 12, image 57) and previously measured at 3.8 cm.         CT 4/16/18  FINDINGS:  Base of Neck: There is a 1.3 cm hypodense right inferior lobe thyroid lobe nodule, which appears stable since 2012 and is likely benign.    Thoracic soft tissues: Normal.    Aorta: There is a left-sided aortic arch with 3 branch vessels.  Thoracic aorta maintains a normal caliber, contour, and course.  There is mild calcification of the coronary arteries and thoracic aorta.    Heart: Normal size. No effusion.    Pulmonary vasculature: Pulmonary arteries distribute normally.  There are  four pulmonary veins.    Darya/Mediastinum: No pathologic trang enlargement.    Lungs/Pleura: Postoperative changes of left upper lobe resection and lingulectomy with volume loss and leftward mediastinal shift again identified.  No definite new pulmonary disease identified.  No evidence of focal consolidation, pulmonary mass, or pneumothorax.  No pleural effusions.    Airways: The left upper lobe and lingular bronchi have been ligated.  The remaining proximal airways and trachea are patent.    Esophagus: Normal.    Upper Abdomen: There are multiple hypoattenuating masses identified in the hepatic parenchyma compatible with the patient's known metastatic disease, which are not fully characterized on this noncontrast examination.  The largest lesion in the right hepatic lobe measures approximately 4.5 cm.  There is a stable 3.5 cm hypoattenuating right adrenal nodule, likely an adenoma.  There is a stable left upper quadrant soft tissue nodule, presumably a splenule.    Bones: Postoperative changes of left thoracotomy again noted.  Numerous sclerotic foci are again identified throughout the visualized spine and ribs, relatively similar in comparison to the prior study.  No definite new osseous lesions identified.   Impression       Postoperative changes of left upper lobectomy and lingulectomy in this patient the history of metastatic carcinoid tumor.  No definite new pulmonary disease identified.    Osseous and hepatic metastases, essentially unchanged in comparison to the previous study dated 01/19/2018.              Assessment:       1. Bronchial carcinoid tumors    2. Secondary neuroendocrine tumor of bone    3. Secondary neuroendocrine tumor of liver    4. Hyperkalemia           Plan:     Mr. Rahman is doing well from an oncologic standpoint.  Labs reviewed. Most recent scans reviewed. Plan to continue on with treatment with CAPTEM.  Lanreotide and Zoledronic acid today as scheduled. RTC in 4 weeks to see   Surya with a CBC,CMP and for Lanreotide. Next Zoledronic acid today and 3 months.   He eats a banana and drinks freshly squeezed OJ daily- Encouraged to decrease the amount of potassium rich foods in diet. Recheck potassium next week.     Patient is in agreement with the proposed treatment plan. All questions were answered to the patient's satisfaction. Pt knows to call clinic if anything is needed before the next clinic visit.      TAMELA Burnette-RICA  Hematology & Oncology  [facility] [place] [address]  ph. [phone]  Fax. [phone]     30 minutes were spent in coordination of patient's care, record review and counseling. More than 50% of the time was face-to-face.      Distress Screening Results: Psychosocial Distress screening score of Distress Score: 0 noted and reviewed. No intervention indicated.

## 2018-05-21 NOTE — PLAN OF CARE
Problem: Oncology Care (Adult)  Goal: Signs and Symptoms of Listed Potential Problems Will be Absent, Minimized or Managed (Oncology Care)  Signs and symptoms of listed potential problems will be absent, minimized or managed by discharge/transition of care (reference Oncology Care (Adult) CPG).   Outcome: Ongoing (interventions implemented as appropriate)  Pt here for Zometa infusion, Lareotide injection, accompanied by spouse, no complaints or concerns at present; discussed treatment plan for today, all questions answered and pt agrees to proceed

## 2018-05-21 NOTE — PLAN OF CARE
Problem: Patient Care Overview  Goal: Plan of Care Review  Outcome: Ongoing (interventions implemented as appropriate)  Infusion completed, pt tolerated well; deep SQ injection to right hip/buttock, pt tolerated well, no site complications; pt instructed to remain well hydrated, to increase hydrated over next 3 days r/t Zometa infusion; instructed to contact MD for any needs or concerns; spouse states next appt should be on Mon 6/11/18 (not Wed 6/13/18) as pt is off work on Mondays, spouse will call  to request change; printed AVS given to and reviewed with pt and spouse, both verbalized understanding of all discussed

## 2018-05-21 NOTE — PLAN OF CARE
Problem: Oncology Care (Adult)  Goal: Signs and Symptoms of Listed Potential Problems Will be Absent, Minimized or Managed (Oncology Care)  Signs and symptoms of listed potential problems will be absent, minimized or managed by discharge/transition of care (reference Oncology Care (Adult) CPG).  Outcome: Ongoing (interventions implemented as appropriate)  Pt here for Cyramza, Taxotere infusion, Neulasta OBI, pt has no new complaints or concerns at present, tolerating treatment well; discussed treatment plan for today, all questions answered and pt agrees to proceed

## 2018-05-24 ENCOUNTER — TELEPHONE (OUTPATIENT)
Dept: HEMATOLOGY/ONCOLOGY | Facility: CLINIC | Age: 63
End: 2018-05-24

## 2018-05-24 ENCOUNTER — TELEPHONE (OUTPATIENT)
Dept: PHARMACY | Facility: CLINIC | Age: 63
End: 2018-05-24

## 2018-05-24 DIAGNOSIS — D3A.00 CARCINOID TUMOR: ICD-10-CM

## 2018-05-24 RX ORDER — TEMOZOLOMIDE 250 MG/1
500 CAPSULE ORAL DAILY
Qty: 10 CAPSULE | Refills: 6 | Status: CANCELLED | OUTPATIENT
Start: 2018-05-24

## 2018-05-24 NOTE — TELEPHONE ENCOUNTER
----- Message from Marilee Nance NP sent at 5/21/2018  2:31 PM CDT -----  CMP next week.   RTC in 4 weeks to see Dr. London with a CBC,CMP and for Lanreotide.

## 2018-05-28 DIAGNOSIS — D3A.00 CARCINOID TUMOR: ICD-10-CM

## 2018-05-28 RX ORDER — TEMOZOLOMIDE 250 MG/1
500 CAPSULE ORAL DAILY
Qty: 10 CAPSULE | Refills: 6 | Status: SHIPPED | OUTPATIENT
Start: 2018-05-28 | End: 2019-02-19

## 2018-05-28 NOTE — TELEPHONE ENCOUNTER
----- Message from Marie Kuhn sent at 5/28/2018 11:10 AM CDT -----  Contact: [facility] drea Chong calling to check status of refill request that was faxed over for Tamika Chong call back number [phone] opt 1

## 2018-06-13 ENCOUNTER — TELEPHONE (OUTPATIENT)
Dept: HEMATOLOGY/ONCOLOGY | Facility: CLINIC | Age: 63
End: 2018-06-13

## 2018-06-13 DIAGNOSIS — C7B.8 SECONDARY NEUROENDOCRINE TUMOR OF LIVER: ICD-10-CM

## 2018-06-13 DIAGNOSIS — D3A.090 BRONCHIAL CARCINOID TUMORS: Primary | ICD-10-CM

## 2018-06-13 NOTE — TELEPHONE ENCOUNTER
----- Message from Demetrius Banerjee sent at 6/13/2018  1:46 PM CDT -----  Contact: Hope- Spouse   Will like to reschedule today's lab, infusion and appt with  to next Monday     States pt is only available on Monday's     [place]::[phone]

## 2018-06-13 NOTE — TELEPHONE ENCOUNTER
Spoke to patient's wife.  She states that the patient is only available on Mondays and he wasn't aware he had an appointment today as it hasn't been 4 week since his last appt and injection/infusion.  She also said that his appts are in the middle of a cycle (when he is taking chemo) and the infusion of zometa this last time made him feel so sick he doesn't want to continue.  I offered to have him come at the end of this cycle, which he will finish his pills on Tuesday, 6/19, on July 2 so we can have labs, injection and future zometa while is not on his chemo to see if the side effects are easier.     I will follow up with Dr. London on when he needs to have scans.

## 2018-06-21 ENCOUNTER — TELEPHONE (OUTPATIENT)
Dept: PHARMACY | Facility: CLINIC | Age: 63
End: 2018-06-21

## 2018-06-21 DIAGNOSIS — D3A.00 CARCINOID TUMOR: ICD-10-CM

## 2018-06-21 RX ORDER — CAPECITABINE 500 MG/1
2000 TABLET, FILM COATED ORAL 2 TIMES DAILY
Qty: 112 TABLET | Refills: 3 | Status: SHIPPED | OUTPATIENT
Start: 2018-06-21 | End: 2018-11-09

## 2018-07-02 ENCOUNTER — OFFICE VISIT (OUTPATIENT)
Dept: HEMATOLOGY/ONCOLOGY | Facility: CLINIC | Age: 63
End: 2018-07-02
Payer: COMMERCIAL

## 2018-07-02 ENCOUNTER — INFUSION (OUTPATIENT)
Dept: INFUSION THERAPY | Facility: HOSPITAL | Age: 63
End: 2018-07-02
Attending: INTERNAL MEDICINE
Payer: COMMERCIAL

## 2018-07-02 ENCOUNTER — LAB VISIT (OUTPATIENT)
Dept: LAB | Facility: HOSPITAL | Age: 63
End: 2018-07-02
Attending: INTERNAL MEDICINE
Payer: COMMERCIAL

## 2018-07-02 VITALS
BODY MASS INDEX: 39.96 KG/M2 | SYSTOLIC BLOOD PRESSURE: 126 MMHG | OXYGEN SATURATION: 95 % | WEIGHT: 295 LBS | HEIGHT: 72 IN | RESPIRATION RATE: 57 BRPM | TEMPERATURE: 97 F | DIASTOLIC BLOOD PRESSURE: 71 MMHG

## 2018-07-02 DIAGNOSIS — C7B.8 SECONDARY NEUROENDOCRINE TUMOR OF LIVER: ICD-10-CM

## 2018-07-02 DIAGNOSIS — R17 ELEVATED BILIRUBIN: ICD-10-CM

## 2018-07-02 DIAGNOSIS — D3A.090 BRONCHIAL CARCINOID TUMORS: Primary | ICD-10-CM

## 2018-07-02 LAB
ALBUMIN SERPL BCP-MCNC: 4 G/DL
ALP SERPL-CCNC: 59 U/L
ALT SERPL W/O P-5'-P-CCNC: 22 U/L
ANION GAP SERPL CALC-SCNC: 7 MMOL/L
AST SERPL-CCNC: 15 U/L
BILIRUB SERPL-MCNC: 1.4 MG/DL
BUN SERPL-MCNC: 16 MG/DL
CALCIUM SERPL-MCNC: 9.4 MG/DL
CHLORIDE SERPL-SCNC: 105 MMOL/L
CO2 SERPL-SCNC: 24 MMOL/L
CREAT SERPL-MCNC: 1.1 MG/DL
ERYTHROCYTE [DISTWIDTH] IN BLOOD BY AUTOMATED COUNT: 13.7 %
EST. GFR  (AFRICAN AMERICAN): >60 ML/MIN/1.73 M^2
EST. GFR  (NON AFRICAN AMERICAN): >60 ML/MIN/1.73 M^2
GLUCOSE SERPL-MCNC: 118 MG/DL
HCT VFR BLD AUTO: 41.1 %
HGB BLD-MCNC: 14.1 G/DL
IMM GRANULOCYTES # BLD AUTO: 0.01 K/UL
MCH RBC QN AUTO: 32.3 PG
MCHC RBC AUTO-ENTMCNC: 34.3 G/DL
MCV RBC AUTO: 94 FL
NEUTROPHILS # BLD AUTO: 2.3 K/UL
PLATELET # BLD AUTO: 170 K/UL
PMV BLD AUTO: 9.9 FL
POTASSIUM SERPL-SCNC: 4.4 MMOL/L
PROT SERPL-MCNC: 7.1 G/DL
RBC # BLD AUTO: 4.37 M/UL
SODIUM SERPL-SCNC: 136 MMOL/L
WBC # BLD AUTO: 4.04 K/UL

## 2018-07-02 PROCEDURE — 3008F BODY MASS INDEX DOCD: CPT | Mod: CPTII,S$GLB,, | Performed by: INTERNAL MEDICINE

## 2018-07-02 PROCEDURE — 63600175 PHARM REV CODE 636 W HCPCS: Mod: JG | Performed by: INTERNAL MEDICINE

## 2018-07-02 PROCEDURE — 96372 THER/PROPH/DIAG INJ SC/IM: CPT

## 2018-07-02 PROCEDURE — 36415 COLL VENOUS BLD VENIPUNCTURE: CPT

## 2018-07-02 PROCEDURE — 99214 OFFICE O/P EST MOD 30 MIN: CPT | Mod: S$GLB,,, | Performed by: INTERNAL MEDICINE

## 2018-07-02 PROCEDURE — 85027 COMPLETE CBC AUTOMATED: CPT

## 2018-07-02 PROCEDURE — 99999 PR PBB SHADOW E&M-EST. PATIENT-LVL III: CPT | Mod: PBBFAC,,, | Performed by: INTERNAL MEDICINE

## 2018-07-02 PROCEDURE — 80053 COMPREHEN METABOLIC PANEL: CPT

## 2018-07-02 RX ORDER — LANREOTIDE ACETATE 120 MG/.5ML
120 INJECTION SUBCUTANEOUS
Status: COMPLETED | OUTPATIENT
Start: 2018-07-02 | End: 2018-07-02

## 2018-07-02 RX ADMIN — LANREOTIDE ACETATE 120 MG: 120 INJECTION SUBCUTANEOUS at 11:07

## 2018-07-02 NOTE — PROGRESS NOTES
PATIENT: Elroy Rahman  MRN: [id number]  DATE: 7/2/2018      Diagnosis:   1. Bronchial carcinoid tumors    2. Secondary neuroendocrine tumor of liver    3. Elevated bilirubin        Chief Complaint: No chief complaint on file.      Oncologic History:      Oncologic History Bronchial carcinoid, left, diagnosed 1/2016   Metastatic disease to liver and bone 5/2017     Oncologic Treatment Left upper lobectomy 1/2016  CAPTEM 7/2017   Lanreotide 7/2017   Zoledronic acid 7/2017     Pathology 1/2016: Typical carcinoid tumor, well-differentiated T2a, N0, M0 Ki-67 <1%  6/2017: Liver biopsy, well-differentiated, Ki-67 25%           Subjective:    Interval History: Mr. Rahman is a 62 y.o. male who is seen in follow-up for a bronchial carcinoid tumor.  He states he feels well.  He was bitten by an alligator yesterday on his right hand.  He clean the wound following this.  States his finger appears improved.  He has no new complaints.    His history dates to 1/2016 when he underwent a left upper lobectomy with Dr. Bell for a typical carcinoid tumor.  His pathological staging was T2a, N0, M0 with Ki-67 less than 1% and mitotic count 0 per 10 high-powered fields.  He done well postoperatively, however, in 5/2017 he was undergoing routine surveillance and a chest CT in picked up evidence of progression of disease in the liver.  Dedicated abdominal CT was performed showing multiple liver lesions.  In 6/2017 he underwent a CT-guided liver biopsy which was positive for metastatic neuroendocrine tumor which was well-differentiated with no overt nuclear atypia, however, the Ki-67 was 25%.  He underwent gallium-68 PET/CT and was found to have widespread disease involving the bone, liver and also mesentery.  He was started on CAPTEM in 7/2017 and also Lanreotide and zoledronic acid.  Scans in 10/2017 had shown stability of disease.  Imaging in 1/2018 had shown stability of disease.  Scans in 4/2018 had shown stability of  disease.      Past Medical History:   Past Medical History:   Diagnosis Date    Carcinoid bronchial adenoma of left lung     Chemotherapy follow-up examination 8/25/2017    Cough with hemoptysis     mild/intermittent    Elevated bilirubin 7/2/2018    Mild heartburn     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    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    Sleep apnea     Snores        Past Surgical HIstory:   Past Surgical History:   Procedure Laterality Date    Bka Left        Family History:   Family History   Problem Relation Age of Onset    Cancer Mother         lung    Cancer Sister     Anesthesia problems Neg Hx        Social History:  reports that he has never smoked. He has never used smokeless tobacco. He reports that he drinks about 0.6 oz of alcohol per week . He reports that he does not use drugs.    Allergies:  Review of patient's allergies indicates:  No Known Allergies    Medications:  Current Outpatient Prescriptions   Medication Sig Dispense Refill    capecitabine (XELODA) 500 MG Tab TAKE 4 TABLETS (2,000MG TOTAL) BY MOUTH 2 TIMES DAILY FOR 14 DAYS EVERY 28 DAYS ( 2 WEEKS ON, 2 WEEKS OFF, EVERY 4 WEEKS). 112 tablet 3    lanreotide (SOMATULINE DEPOT) 120 mg/0.5 mL Syrg Inject 120 mg into the skin every 28 days.      ondansetron (ZOFRAN-ODT) 8 MG TbDL Take 1 tablet (8 mg total) by mouth every 12 (twelve) hours as needed. 30 tablet 1    temozolomide (TEMODAR) 250 MG capsule TAKE 2 CAPSULES (500MG TOTAL) BY MOUTH ONCE DAILY ON DAYS 10-14 OF 28 DAY CYCLE 10 capsule 6     No current facility-administered medications for this visit.        Review of Systems   Constitutional: Negative for appetite change, chills, fatigue, fever and unexpected weight change.   HENT: Negative for dental problem, sinus pressure and sneezing.    Eyes: Negative for visual disturbance.   Respiratory: Negative for cough, choking and chest tightness.    Cardiovascular: Negative for chest  pain and leg swelling.   Gastrointestinal: Negative for abdominal pain, blood in stool, constipation, diarrhea and nausea.   Genitourinary: Negative for difficulty urinating and dysuria.   Musculoskeletal: Negative for arthralgias and back pain.   Skin: Positive for wound. Negative for rash.   Neurological: Negative for dizziness, light-headedness and headaches.   Hematological: Negative for adenopathy. Does not bruise/bleed easily.   Psychiatric/Behavioral: Negative for sleep disturbance. The patient is not nervous/anxious.        ECOG Performance Status:   ECOG SCORE    0 - Fully active-able to carry on all pre-disease performance without restriction         Objective:      Vitals:   Vitals:    07/02/18 1025   BP: 126/71   Resp: (!) 57   Temp: 97.3 °F (36.3 °C)   SpO2: 95%   Weight: 133.8 kg (294 lb 15.6 oz)   Height: 6' (1.829 m)     BMI: Body mass index is 40.01 kg/m².    Physical Exam   Constitutional: He is oriented to person, place, and time. He appears well-developed and well-nourished.   HENT:   Head: Normocephalic and atraumatic.   Eyes: Pupils are equal, round, and reactive to light.   Neck: Normal range of motion. Neck supple.   Cardiovascular: Normal rate and regular rhythm.    Pulmonary/Chest: Effort normal and breath sounds normal. No respiratory distress.   Abdominal: Soft. He exhibits no distension. There is no tenderness.   Musculoskeletal: He exhibits no edema or tenderness.   Left lower extremity prosthesis   Lymphadenopathy:     He has no cervical adenopathy.   Neurological: He is alert and oriented to person, place, and time. No cranial nerve deficit.   Skin: Skin is warm and dry.   Right middle finger with 2 cm abrasion, dorsal skin of 3rd metacarpal.  No surrounding erythema, edema.   Psychiatric: He has a normal mood and affect. His behavior is normal.       Laboratory Data:  Lab Visit on 07/02/2018   Component Date Value Ref Range Status    WBC 07/02/2018 4.04  3.90 - 12.70 K/uL Final     RBC 07/02/2018 4.37* 4.60 - 6.20 M/uL Final    Hemoglobin 07/02/2018 14.1  14.0 - 18.0 g/dL Final    Hematocrit 07/02/2018 41.1  40.0 - 54.0 % Final    MCV 07/02/2018 94  82 - 98 fL Final    MCH 07/02/2018 32.3* 27.0 - 31.0 pg Final    MCHC 07/02/2018 34.3  32.0 - 36.0 g/dL Final    RDW 07/02/2018 13.7  11.5 - 14.5 % Final    Platelets 07/02/2018 170  150 - 350 K/uL Final    MPV 07/02/2018 9.9  9.2 - 12.9 fL Final    Gran # (ANC) 07/02/2018 2.3  1.8 - 7.7 K/uL Final    Comment: The ANC is based on a white cell differential from an   automated cell counter. It has not been microscopically   reviewed for the presence of abnormal cells. Clinical   correlation is required.      Immature Grans (Abs) 07/02/2018 0.01  0.00 - 0.04 K/uL Final    Comment: Mild elevation in immature granulocytes is non specific and   can be seen in a variety of conditions including stress response,   acute inflammation, trauma and pregnancy. Correlation with other   laboratory and clinical findings is essential.      Sodium 07/02/2018 136  136 - 145 mmol/L Final    Potassium 07/02/2018 4.4  3.5 - 5.1 mmol/L Final    Chloride 07/02/2018 105  95 - 110 mmol/L Final    CO2 07/02/2018 24  23 - 29 mmol/L Final    Glucose 07/02/2018 118* 70 - 110 mg/dL Final    BUN, Bld 07/02/2018 16  8 - 23 mg/dL Final    Creatinine 07/02/2018 1.1  0.5 - 1.4 mg/dL Final    Calcium 07/02/2018 9.4  8.7 - 10.5 mg/dL Final    Total Protein 07/02/2018 7.1  6.0 - 8.4 g/dL Final    Albumin 07/02/2018 4.0  3.5 - 5.2 g/dL Final    Total Bilirubin 07/02/2018 1.4* 0.1 - 1.0 mg/dL Final    Comment: For infants and newborns, interpretation of results should be based  on gestational age, weight and in agreement with clinical  observations.  Premature Infant recommended reference ranges:  Up to 24 hours.............<8.0 mg/dL  Up to 48 hours............<12.0 mg/dL  3-5 days..................<15.0 mg/dL  6-29 days.................<15.0 mg/dL      Alkaline  Phosphatase 07/02/2018 59  55 - 135 U/L Final    AST 07/02/2018 15  10 - 40 U/L Final    ALT 07/02/2018 22  10 - 44 U/L Final    Anion Gap 07/02/2018 7* 8 - 16 mmol/L Final    eGFR if African American 07/02/2018 >60.0  >60 mL/min/1.73 m^2 Final    eGFR if non African American 07/02/2018 >60.0  >60 mL/min/1.73 m^2 Final    Comment: Calculation used to obtain the estimated glomerular filtration  rate (eGFR) is the CKD-EPI equation.        Supplemental Diagnosis  Chromogranin Staining:  Intensity: Moderate to strong. Positive cells: 100 (%)  Synaptophysin Staining:  Intensity: Strong. Positive cells: 100 (%)  CD31: 40 vessels per 1 HPF  Factor VIII: 27 vessels per 1 HPF  Ki67: 1 % tumor cells staining  Immunostains performed with appropriate positive and negative controls.  FINAL PATHOLOGIC DIAGNOSIS  1. Lymph node, level XI, excisional biopsy:  Fragments of benign lymph node with anthracosis and sinus histiocytosis (0/1).  2. Lung, lingular staple line, biopsy:  Lung, negative for neoplasm.  3. Lymph node, level X, excisional biopsy:  1 benign lymph node with anthracotic pigment and sinus histiocytosis (0/1).  4. Lymph node, level XII, excisional biopsy:  2 benign lymph nodes with sinus histiocytosis and anthracotic pigment (0/2).  5. Lung, left upper lobe, lobectomy:  Typical carcinoid tumor (well differentiated neuroendocrine tumor), 4 cm in greatest dimension.  Bronchial and vascular margins are negative for neoplasm.  Uninvolved lung adjacent to the tumor shows emphysematous changes and fibrosis, however remaining lung  appears unremarkable.  Additional immunohistochemical stains for ancillary studies (including synaptophysin, chromogranin and Ki-67)  will be completed and results will be reported in a supplemental report.  See synoptic report in comment section for further details.  6. Lymph node, level VII, excisional biopsy:  2 benign lymph nodes with anthracotic pigment and sinus histiocytosis  (0/2).  Comment: Synoptic Report  Specimen: Left upper lobe of lung  Procedure: Lobectomy  Specimen laterality: Left  Tumor size:  Greatest dimension: 4 cm  Tumor focality: Single focus  Histologic type: Typical carcinoid tumor  Visceral pleural invasion: Not identified  Tumor extension: Not identified  Margins: Bronchial and vascular margins are uninvolved by tumor.  Distance of tumor from closest margin: 1 cm from the bronchial surgical margin.  Lymphovascular invasion: Not identified  Ancillary studies:  Immunohistochemical stains for neuroendocrine markers and Ki-67 will be completed and results will follow in a  supplemental report.  Note: The tumor consists of a proliferation of cells in nests with lewis formation. The tumor cells have salt and  pepper chromatin pattern with abundant cytoplasm. There is no significant nuclear atypia. No evidence of  necrosis. Mitotic count is 0 mitoses in 10 high power fields.    Imaging:   MRI 4/18/18  FINDINGS:  The visualized lungs, heart, and pericardium appear normal.    The liver is normal in size, contour, and parenchymal signal.  No intra or extrahepatic biliary ductal dilatation.  There are multiple enhancing lesions seen scattered throughout the hepatic parenchyma that are grossly stable in size and number compared to prior examination on 01/19/2018.  Index lesions measure as follows:    -lesion in hepatic segment V/VIII measures 3.8 cm (series 12, image 57) and previously measured at 3.8 cm.    -lesion in hepatic segment III measures 3.8 cm (series 12, image 57) and previously measured at 3.6 cm.  Differences in measurement may be secondary to interobserver variability.    The hepatic vein, portal vein, splenic vein, and hepatic artery are patent.  No evidence of intra-or extrahepatic biliary ductal dilatation.    The gallbladder demonstrates no evidence of gallstones or surrounding inflammatory change.  The spleen is not enlarged and has no collateral vessel  formation.  There are 2 nonspecific lesions within the spleen that demonstrate restricted diffusion, with no associated abnormal enhancement on postcontrast images.  This appears unchanged compared to prior study on 01/19/2018 and may represent splenic lesions.  Incidental note is made of a splenule.  Stable appearing right adrenal adenoma.  The left adrenal glands, stomach, pancreas, and visualized portions of the bowel demonstrate no significant abnormalities.  Subcentimeter left renal cysts.  No evidence of hydronephrosis.    The osseous structures demonstrate multiple enhancing lesions throughout the thoracolumbar spine, similar to prior examination.    The visualized aorta is normal in caliber.  Soft tissues of the lower thoracic and abdominal wall are unremarkable.   Impression       Multiple enhancing hepatic lesions that appear stable in size and number compared to prior exam on 01/19/2018 and suggestive of metastatic disease.    Stable osseous metastatic disease.    Right adrenal adenoma.    2 nonspecific lesions within the spleen that demonstrate restricted diffusion, with no corresponding enhancement on postcontrast images that may represent splenic lesions.    Left renal cysts.    RECIST SUMMARY:    Date of prior examination for comparison: 01/19/2018    Lesion 1: Right hepatic lobe (segment V/VIII), measures 3.8 cm (series 12, image 57) and previously measured at 3.8 cm.    Lesion 2: Left hepatic lobe (segment III),  measures 3.8 cm (series 12, image 57) and previously measured at 3.8 cm.         CT 4/16/18  FINDINGS:  Base of Neck: There is a 1.3 cm hypodense right inferior lobe thyroid lobe nodule, which appears stable since 2012 and is likely benign.    Thoracic soft tissues: Normal.    Aorta: There is a left-sided aortic arch with 3 branch vessels.  Thoracic aorta maintains a normal caliber, contour, and course.  There is mild calcification of the coronary arteries and thoracic aorta.    Heart:  Normal size. No effusion.    Pulmonary vasculature: Pulmonary arteries distribute normally.  There are four pulmonary veins.    Darya/Mediastinum: No pathologic trang enlargement.    Lungs/Pleura: Postoperative changes of left upper lobe resection and lingulectomy with volume loss and leftward mediastinal shift again identified.  No definite new pulmonary disease identified.  No evidence of focal consolidation, pulmonary mass, or pneumothorax.  No pleural effusions.    Airways: The left upper lobe and lingular bronchi have been ligated.  The remaining proximal airways and trachea are patent.    Esophagus: Normal.    Upper Abdomen: There are multiple hypoattenuating masses identified in the hepatic parenchyma compatible with the patient's known metastatic disease, which are not fully characterized on this noncontrast examination.  The largest lesion in the right hepatic lobe measures approximately 4.5 cm.  There is a stable 3.5 cm hypoattenuating right adrenal nodule, likely an adenoma.  There is a stable left upper quadrant soft tissue nodule, presumably a splenule.    Bones: Postoperative changes of left thoracotomy again noted.  Numerous sclerotic foci are again identified throughout the visualized spine and ribs, relatively similar in comparison to the prior study.  No definite new osseous lesions identified.   Impression       Postoperative changes of left upper lobectomy and lingulectomy in this patient the history of metastatic carcinoid tumor.  No definite new pulmonary disease identified.    Osseous and hepatic metastases, essentially unchanged in comparison to the previous study dated 01/19/2018.              Assessment:       1. Bronchial carcinoid tumors    2. Secondary neuroendocrine tumor of liver    3. Elevated bilirubin           Plan:     Mr. Rahman continues on with CAPTEM tolerating well.  It is noted that his bilirubin has slightly increased in will plan to repeat labs on him next week.  If this  continues to trend up we will check scans early including an MRI with MRCP, otherwise plan to repeat all imaging in 1 month and see back at that time.  He will continue to monitor his finger wound and if worsened will seek medical attention immediately.  All questions were answered and he is agreeable with this plan.    Jesus London DO, [facility]ology [facility]  [address] [place] [address]  ph. [phone]  Fax. [phone]    25 minutes were spent in coordination of patient's care, record review and counseling.  More than 50% of the time was face-to-face.

## 2018-07-09 ENCOUNTER — LAB VISIT (OUTPATIENT)
Dept: LAB | Facility: HOSPITAL | Age: 63
End: 2018-07-09
Attending: INTERNAL MEDICINE
Payer: COMMERCIAL

## 2018-07-09 DIAGNOSIS — C7B.8 SECONDARY NEUROENDOCRINE TUMOR OF LIVER: ICD-10-CM

## 2018-07-09 LAB
ALBUMIN SERPL BCP-MCNC: 3.7 G/DL
ALP SERPL-CCNC: 58 U/L
ALT SERPL W/O P-5'-P-CCNC: 20 U/L
ANION GAP SERPL CALC-SCNC: 7 MMOL/L
AST SERPL-CCNC: 14 U/L
BILIRUB SERPL-MCNC: 0.9 MG/DL
BUN SERPL-MCNC: 16 MG/DL
CALCIUM SERPL-MCNC: 9.1 MG/DL
CHLORIDE SERPL-SCNC: 105 MMOL/L
CO2 SERPL-SCNC: 25 MMOL/L
CREAT SERPL-MCNC: 1 MG/DL
EST. GFR  (AFRICAN AMERICAN): >60 ML/MIN/1.73 M^2
EST. GFR  (NON AFRICAN AMERICAN): >60 ML/MIN/1.73 M^2
GLUCOSE SERPL-MCNC: 127 MG/DL
POTASSIUM SERPL-SCNC: 4 MMOL/L
PROT SERPL-MCNC: 6.8 G/DL
SODIUM SERPL-SCNC: 137 MMOL/L

## 2018-07-09 PROCEDURE — 80053 COMPREHEN METABOLIC PANEL: CPT

## 2018-07-09 PROCEDURE — 36415 COLL VENOUS BLD VENIPUNCTURE: CPT

## 2018-07-18 ENCOUNTER — TELEPHONE (OUTPATIENT)
Dept: PHARMACY | Facility: CLINIC | Age: 63
End: 2018-07-18

## 2018-07-23 ENCOUNTER — HOSPITAL ENCOUNTER (OUTPATIENT)
Dept: RADIOLOGY | Facility: HOSPITAL | Age: 63
Discharge: HOME | End: 2018-07-23
Attending: INTERNAL MEDICINE
Payer: COMMERCIAL

## 2018-07-23 DIAGNOSIS — D3A.090 BRONCHIAL CARCINOID TUMORS: ICD-10-CM

## 2018-07-23 DIAGNOSIS — C7B.8 SECONDARY NEUROENDOCRINE TUMOR OF LIVER: ICD-10-CM

## 2018-07-23 PROCEDURE — 71250 CT THORAX DX C-: CPT | Mod: TC

## 2018-07-23 PROCEDURE — 74183 MRI ABD W/O CNTR FLWD CNTR: CPT | Mod: 26,,, | Performed by: RADIOLOGY

## 2018-07-23 PROCEDURE — A9585 GADOBUTROL INJECTION: HCPCS | Performed by: INTERNAL MEDICINE

## 2018-07-23 PROCEDURE — 71250 CT THORAX DX C-: CPT | Mod: 26,,, | Performed by: RADIOLOGY

## 2018-07-23 PROCEDURE — 25500020 PHARM REV CODE 255: Performed by: INTERNAL MEDICINE

## 2018-07-23 PROCEDURE — 74183 MRI ABD W/O CNTR FLWD CNTR: CPT | Mod: TC

## 2018-07-23 RX ORDER — GADOBUTROL 604.72 MG/ML
10 INJECTION INTRAVENOUS
Status: COMPLETED | OUTPATIENT
Start: 2018-07-23 | End: 2018-07-23

## 2018-07-23 RX ADMIN — GADOBUTROL 10 ML: 604.72 INJECTION INTRAVENOUS at 10:07

## 2018-07-30 ENCOUNTER — OFFICE VISIT (OUTPATIENT)
Dept: HEMATOLOGY/ONCOLOGY | Facility: CLINIC | Age: 63
End: 2018-07-30
Payer: COMMERCIAL

## 2018-07-30 ENCOUNTER — LAB VISIT (OUTPATIENT)
Dept: LAB | Facility: HOSPITAL | Age: 63
End: 2018-07-30
Attending: INTERNAL MEDICINE
Payer: COMMERCIAL

## 2018-07-30 VITALS
DIASTOLIC BLOOD PRESSURE: 73 MMHG | SYSTOLIC BLOOD PRESSURE: 125 MMHG | BODY MASS INDEX: 40.25 KG/M2 | RESPIRATION RATE: 16 BRPM | HEART RATE: 50 BPM | TEMPERATURE: 98 F | HEIGHT: 72 IN | OXYGEN SATURATION: 96 % | WEIGHT: 297.19 LBS

## 2018-07-30 DIAGNOSIS — C7B.8 SECONDARY NEUROENDOCRINE TUMOR OF BONE: ICD-10-CM

## 2018-07-30 DIAGNOSIS — C7B.8 SECONDARY NEUROENDOCRINE TUMOR OF LIVER: ICD-10-CM

## 2018-07-30 DIAGNOSIS — D3A.090 BRONCHIAL CARCINOID TUMORS: Primary | ICD-10-CM

## 2018-07-30 LAB
ALBUMIN SERPL BCP-MCNC: 3.8 G/DL
ALP SERPL-CCNC: 53 U/L
ALT SERPL W/O P-5'-P-CCNC: 22 U/L
ANION GAP SERPL CALC-SCNC: 6 MMOL/L
AST SERPL-CCNC: 17 U/L
BILIRUB SERPL-MCNC: 1.2 MG/DL
BUN SERPL-MCNC: 16 MG/DL
CALCIUM SERPL-MCNC: 9 MG/DL
CHLORIDE SERPL-SCNC: 108 MMOL/L
CO2 SERPL-SCNC: 25 MMOL/L
CREAT SERPL-MCNC: 0.9 MG/DL
ERYTHROCYTE [DISTWIDTH] IN BLOOD BY AUTOMATED COUNT: 13.8 %
EST. GFR  (AFRICAN AMERICAN): >60 ML/MIN/1.73 M^2
EST. GFR  (NON AFRICAN AMERICAN): >60 ML/MIN/1.73 M^2
GLUCOSE SERPL-MCNC: 119 MG/DL
HCT VFR BLD AUTO: 40.6 %
HGB BLD-MCNC: 14.2 G/DL
IMM GRANULOCYTES # BLD AUTO: 0.02 K/UL
MCH RBC QN AUTO: 32.6 PG
MCHC RBC AUTO-ENTMCNC: 35 G/DL
MCV RBC AUTO: 93 FL
NEUTROPHILS # BLD AUTO: 2.4 K/UL
PLATELET # BLD AUTO: 168 K/UL
PMV BLD AUTO: 9.7 FL
POTASSIUM SERPL-SCNC: 4.2 MMOL/L
PROT SERPL-MCNC: 6.9 G/DL
RBC # BLD AUTO: 4.36 M/UL
SODIUM SERPL-SCNC: 139 MMOL/L
WBC # BLD AUTO: 4.12 K/UL

## 2018-07-30 PROCEDURE — 3008F BODY MASS INDEX DOCD: CPT | Mod: CPTII,S$GLB,, | Performed by: INTERNAL MEDICINE

## 2018-07-30 PROCEDURE — 99214 OFFICE O/P EST MOD 30 MIN: CPT | Mod: S$GLB,,, | Performed by: INTERNAL MEDICINE

## 2018-07-30 PROCEDURE — 80053 COMPREHEN METABOLIC PANEL: CPT

## 2018-07-30 PROCEDURE — 36415 COLL VENOUS BLD VENIPUNCTURE: CPT

## 2018-07-30 PROCEDURE — 85027 COMPLETE CBC AUTOMATED: CPT

## 2018-07-30 PROCEDURE — 99999 PR PBB SHADOW E&M-EST. PATIENT-LVL III: CPT | Mod: PBBFAC,,, | Performed by: INTERNAL MEDICINE

## 2018-07-30 RX ORDER — LANREOTIDE ACETATE 120 MG/.5ML
120 INJECTION SUBCUTANEOUS
Status: CANCELLED | OUTPATIENT
Start: 2018-08-28

## 2018-07-30 RX ORDER — LANREOTIDE ACETATE 120 MG/.5ML
120 INJECTION SUBCUTANEOUS
Status: CANCELLED | OUTPATIENT
Start: 2018-11-14

## 2018-07-30 RX ORDER — LANREOTIDE ACETATE 120 MG/.5ML
120 INJECTION SUBCUTANEOUS
Status: CANCELLED | OUTPATIENT
Start: 2019-01-07

## 2018-07-30 RX ORDER — LANREOTIDE ACETATE 120 MG/.5ML
120 INJECTION SUBCUTANEOUS
Status: CANCELLED | OUTPATIENT
Start: 2018-10-02

## 2018-07-30 RX ORDER — LANREOTIDE ACETATE 120 MG/.5ML
120 INJECTION SUBCUTANEOUS
Status: CANCELLED | OUTPATIENT
Start: 2018-07-30

## 2018-07-30 NOTE — Clinical Note
Lanreotide today Follow-up 4 weeks for visit and lanreotide, CBC, CMP Please add for tumor board:  Addition liver directed therapy? review scans

## 2018-07-30 NOTE — PROGRESS NOTES
PATIENT: Elroy Rahman  MRN: [id number]  DATE: 7/30/2018      Diagnosis:   1. Bronchial carcinoid tumors    2. Secondary neuroendocrine tumor of liver    3. Secondary neuroendocrine tumor of bone        Chief Complaint: Bronchial carcinoid tumors      Oncologic History:      Oncologic History Bronchial carcinoid, left, diagnosed 1/2016   Metastatic disease to liver and bone 5/2017     Oncologic Treatment Left upper lobectomy 1/2016  CAPTEM 7/2017   Lanreotide 7/2017   Zoledronic acid 7/2017     Pathology 1/2016: Typical carcinoid tumor, well-differentiated T2a, N0, M0 Ki-67 <1%  6/2017: Liver biopsy, well-differentiated, Ki-67 25%           Subjective:    Interval History: Mr. Rahman is a 62 y.o. male who is seen in follow-up for a bronchial carcinoid tumor.  He states he feels well.  He remains on treatment CAPTEM.  States he does have some fatigue while on this but relates some of this to being out in the sun throughout the day.  His wife has noted right eye irritation which she is concerned about.  He has not noticed this and has no visual changes.  He has no other new complaints.    His history dates to 1/2016 when he underwent a left upper lobectomy with Dr. Bell for a typical carcinoid tumor.  His pathological staging was T2a, N0, M0 with Ki-67 less than 1% and mitotic count 0 per 10 high-powered fields.  He done well postoperatively, however, in 5/2017 he was undergoing routine surveillance and a chest CT in picked up evidence of progression of disease in the liver.  Dedicated abdominal CT was performed showing multiple liver lesions.  In 6/2017 he underwent a CT-guided liver biopsy which was positive for metastatic neuroendocrine tumor which was well-differentiated with no overt nuclear atypia, however, the Ki-67 was 25%.  He underwent gallium-68 PET/CT and was found to have widespread disease involving the bone, liver and also mesentery.  He was started on CAPTEM in 7/2017 and also Lanreotide and  zoledronic acid.  Scans in 10/2017 had shown stability of disease.  Imaging in 1/2018 had shown stability of disease.  Scans in 4/2018 had shown stability of disease.  Scans in 07/2018 had continued to show stability of disease.      Past Medical History:   Past Medical History:   Diagnosis Date    Carcinoid bronchial adenoma of left lung     Chemotherapy follow-up examination 8/25/2017    Cough with hemoptysis     mild/intermittent    Elevated bilirubin 7/2/2018    Mild heartburn     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    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    Sleep apnea     Snores        Past Surgical HIstory:   Past Surgical History:   Procedure Laterality Date    Bka Left        Family History:   Family History   Problem Relation Age of Onset    Cancer Mother         lung    Cancer Sister     Anesthesia problems Neg Hx        Social History:  reports that he has never smoked. He has never used smokeless tobacco. He reports that he drinks about 0.6 oz of alcohol per week . He reports that he does not use drugs.    Allergies:  Review of patient's allergies indicates:  No Known Allergies    Medications:  Current Outpatient Prescriptions   Medication Sig Dispense Refill    capecitabine (XELODA) 500 MG Tab TAKE 4 TABLETS (2,000MG TOTAL) BY MOUTH 2 TIMES DAILY FOR 14 DAYS EVERY 28 DAYS ( 2 WEEKS ON, 2 WEEKS OFF, EVERY 4 WEEKS). 112 tablet 3    lanreotide (SOMATULINE DEPOT) 120 mg/0.5 mL Syrg Inject 120 mg into the skin every 28 days.      temozolomide (TEMODAR) 250 MG capsule TAKE 2 CAPSULES (500MG TOTAL) BY MOUTH ONCE DAILY ON DAYS 10-14 OF 28 DAY CYCLE 10 capsule 6     No current facility-administered medications for this visit.        Review of Systems   Constitutional: Positive for fatigue. Negative for appetite change, chills, fever and unexpected weight change.   HENT: Negative for dental problem, sinus pressure and sneezing.    Eyes: Negative for visual  disturbance.   Respiratory: Negative for cough, choking and chest tightness.    Cardiovascular: Negative for chest pain and leg swelling.   Gastrointestinal: Negative for abdominal pain, blood in stool, constipation, diarrhea and nausea.   Genitourinary: Negative for difficulty urinating and dysuria.   Musculoskeletal: Negative for arthralgias and back pain.   Skin: Positive for wound. Negative for rash.   Neurological: Negative for dizziness, light-headedness and headaches.   Hematological: Negative for adenopathy. Does not bruise/bleed easily.   Psychiatric/Behavioral: Negative for sleep disturbance. The patient is not nervous/anxious.        ECOG Performance Status:   ECOG SCORE    0 - Fully active-able to carry on all pre-disease performance without restriction         Objective:      Vitals:   Vitals:    07/30/18 0901   BP: 125/73   BP Location: Right arm   Patient Position: Sitting   BP Method: Large (Automatic)   Pulse: (!) 50   Resp: 16   Temp: 98.1 °F (36.7 °C)   TempSrc: Oral   SpO2: 96%   Weight: 134.8 kg (297 lb 2.9 oz)   Height: 6' (1.829 m)     BMI: Body mass index is 40.3 kg/m².    Physical Exam   Constitutional: He is oriented to person, place, and time. He appears well-developed and well-nourished.   HENT:   Head: Normocephalic and atraumatic.   Eyes: Pupils are equal, round, and reactive to light.   Neck: Normal range of motion. Neck supple.   Cardiovascular: Normal rate and regular rhythm.    Pulmonary/Chest: Effort normal and breath sounds normal. No respiratory distress.   Abdominal: Soft. He exhibits no distension. There is no tenderness.   Musculoskeletal: He exhibits no edema or tenderness.   Left lower extremity prosthesis   Lymphadenopathy:     He has no cervical adenopathy.   Neurological: He is alert and oriented to person, place, and time. No cranial nerve deficit.   Skin: Skin is warm and dry.   Right middle finger with 2 cm abrasion, dorsal skin of 3rd metacarpal.  No surrounding  erythema, edema.   Psychiatric: He has a normal mood and affect. His behavior is normal.       Laboratory Data:  Lab Visit on 07/30/2018   Component Date Value Ref Range Status    WBC 07/30/2018 4.12  3.90 - 12.70 K/uL Final    RBC 07/30/2018 4.36* 4.60 - 6.20 M/uL Final    Hemoglobin 07/30/2018 14.2  14.0 - 18.0 g/dL Final    Hematocrit 07/30/2018 40.6  40.0 - 54.0 % Final    MCV 07/30/2018 93  82 - 98 fL Final    MCH 07/30/2018 32.6* 27.0 - 31.0 pg Final    MCHC 07/30/2018 35.0  32.0 - 36.0 g/dL Final    RDW 07/30/2018 13.8  11.5 - 14.5 % Final    Platelets 07/30/2018 168  150 - 350 K/uL Final    MPV 07/30/2018 9.7  9.2 - 12.9 fL Final    Gran # (ANC) 07/30/2018 2.4  1.8 - 7.7 K/uL Final    Comment: The ANC is based on a white cell differential from an   automated cell counter. It has not been microscopically   reviewed for the presence of abnormal cells. Clinical   correlation is required.      Immature Grans (Abs) 07/30/2018 0.02  0.00 - 0.04 K/uL Final    Comment: Mild elevation in immature granulocytes is non specific and   can be seen in a variety of conditions including stress response,   acute inflammation, trauma and pregnancy. Correlation with other   laboratory and clinical findings is essential.      Sodium 07/30/2018 139  136 - 145 mmol/L Final    Potassium 07/30/2018 4.2  3.5 - 5.1 mmol/L Final    Chloride 07/30/2018 108  95 - 110 mmol/L Final    CO2 07/30/2018 25  23 - 29 mmol/L Final    Glucose 07/30/2018 119* 70 - 110 mg/dL Final    BUN, Bld 07/30/2018 16  8 - 23 mg/dL Final    Creatinine 07/30/2018 0.9  0.5 - 1.4 mg/dL Final    Calcium 07/30/2018 9.0  8.7 - 10.5 mg/dL Final    Total Protein 07/30/2018 6.9  6.0 - 8.4 g/dL Final    Albumin 07/30/2018 3.8  3.5 - 5.2 g/dL Final    Total Bilirubin 07/30/2018 1.2* 0.1 - 1.0 mg/dL Final    Comment: For infants and newborns, interpretation of results should be based  on gestational age, weight and in agreement with  clinical  observations.  Premature Infant recommended reference ranges:  Up to 24 hours.............<8.0 mg/dL  Up to 48 hours............<12.0 mg/dL  3-5 days..................<15.0 mg/dL  6-29 days.................<15.0 mg/dL      Alkaline Phosphatase 07/30/2018 53* 55 - 135 U/L Final    AST 07/30/2018 17  10 - 40 U/L Final    ALT 07/30/2018 22  10 - 44 U/L Final    Anion Gap 07/30/2018 6* 8 - 16 mmol/L Final    eGFR if African American 07/30/2018 >60.0  >60 mL/min/1.73 m^2 Final    eGFR if non African American 07/30/2018 >60.0  >60 mL/min/1.73 m^2 Final    Comment: Calculation used to obtain the estimated glomerular filtration  rate (eGFR) is the CKD-EPI equation.        Supplemental Diagnosis  Chromogranin Staining:  Intensity: Moderate to strong. Positive cells: 100 (%)  Synaptophysin Staining:  Intensity: Strong. Positive cells: 100 (%)  CD31: 40 vessels per 1 HPF  Factor VIII: 27 vessels per 1 HPF  Ki67: 1 % tumor cells staining  Immunostains performed with appropriate positive and negative controls.  FINAL PATHOLOGIC DIAGNOSIS  1. Lymph node, level XI, excisional biopsy:  Fragments of benign lymph node with anthracosis and sinus histiocytosis (0/1).  2. Lung, lingular staple line, biopsy:  Lung, negative for neoplasm.  3. Lymph node, level X, excisional biopsy:  1 benign lymph node with anthracotic pigment and sinus histiocytosis (0/1).  4. Lymph node, level XII, excisional biopsy:  2 benign lymph nodes with sinus histiocytosis and anthracotic pigment (0/2).  5. Lung, left upper lobe, lobectomy:  Typical carcinoid tumor (well differentiated neuroendocrine tumor), 4 cm in greatest dimension.  Bronchial and vascular margins are negative for neoplasm.  Uninvolved lung adjacent to the tumor shows emphysematous changes and fibrosis, however remaining lung  appears unremarkable.  Additional immunohistochemical stains for ancillary studies (including synaptophysin, chromogranin and Ki-67)  will be completed  and results will be reported in a supplemental report.  See synoptic report in comment section for further details.  6. Lymph node, level VII, excisional biopsy:  2 benign lymph nodes with anthracotic pigment and sinus histiocytosis (0/2).  Comment: Synoptic Report  Specimen: Left upper lobe of lung  Procedure: Lobectomy  Specimen laterality: Left  Tumor size:  Greatest dimension: 4 cm  Tumor focality: Single focus  Histologic type: Typical carcinoid tumor  Visceral pleural invasion: Not identified  Tumor extension: Not identified  Margins: Bronchial and vascular margins are uninvolved by tumor.  Distance of tumor from closest margin: 1 cm from the bronchial surgical margin.  Lymphovascular invasion: Not identified  Ancillary studies:  Immunohistochemical stains for neuroendocrine markers and Ki-67 will be completed and results will follow in a  supplemental report.  Note: The tumor consists of a proliferation of cells in nests with lewis formation. The tumor cells have salt and  pepper chromatin pattern with abundant cytoplasm. There is no significant nuclear atypia. No evidence of  necrosis. Mitotic count is 0 mitoses in 10 high power fields.    Imaging:     MRI 07/23/2018  EXAMINATION:  MRI ABDOMEN W WO CONTRAST    CLINICAL HISTORY:  Bronchial carcinoid tumors;  Other secondary neuroendocrine tumors    TECHNIQUE:  Multiplanar, multi-sequence MR imaging of the abdomen was performed before and after administration of 10 cc of Gadavist gadolinium-based intravenous contrast per the liver protocol.    COMPARISON:  MRI abdomen 01/19/2018, 04/18/2018    FINDINGS:  The visualized lungs, heart, and pericardium demonstrate nothing unusual within the confines of this exam.    The liver is normal in size and contour.  There is diffuse signal dropout of the liver on out of phase imaging compatible with hepatic steatosis.  Innumerable T2 hyperintense, enhancing lesions are seen throughout the liver compatible with known  metastatic disease.  Index lesion in hepatic segment III measures 4.1 cm (axial series 14, image 38), previously 3.8 cm.  Additional index lesion in hepatic segment V/VIII measures 4.0 cm (axial series 14, image 40), previously 3.8 cm.  While not an index lesion, there is a lesion in hepatic segment V which measures 2.3 cm (axial series 14, image 43), previously 2.2 cm when directly remeasured.  Overall lesions appear to have increased in size over multiple prior exams with no definite new lesions identified.  The portal vein is patent.    No evidence of intra-or extrahepatic biliary ductal dilatation. The gallbladder, stomach, pancreas, and visualized portions of the bowel demonstrate no significant abnormalities.  Two areas of restricted diffusion within the spleen appear unchanged.  Stable right adrenal adenoma.  The left adrenal gland is unremarkable.    The kidneys are normal in size and location. No evidence of hydronephrosis or solid renal masses.  Stable subcentimeter left renal cysts.    The visualized aorta is normal in caliber.    The osseous structures demonstrate stable enhancing lesions compatible with known metastatic disease.    Soft tissues of the lower thoracic and abdominal wall are unremarkable.      Impression       Innumerable enhancing hepatic lesions which appear stable in number and majority the stable in size, but at least 2 have slightly increased in size suggesting mild progression of metastatic disease.    Stable osseous metastatic disease.    Additional stable findings as above.    RECIST SUMMARY:    Date of prior examination for comparison: 04/18/2018    Lesion 1: Hepatic segment V/VIII.  4.0 cm. Series 14 image 40.  Prior measurement 3.8 cm.    Lesion 2: Hepatic segment III.  4.1 cm. Series 14 image 38.  Prior measurement 3.8 cm.       CT 07/23/2018  EXAMINATION:  CT CHEST WITHOUT CONTRAST    CLINICAL HISTORY:  Bronchial carcinoid tumors; Benign carcinoid tumor of the bronchus and  lung    TECHNIQUE:  Low dose axial images, sagittal and coronal reformations were obtained from the thoracic inlet to the lung bases. Contrast was not administered.    COMPARISON:  CT chest without contrast: 04/16/2018, 01/19/2018, 11/02/2017, 05/17/2017, 08/17/2016, 02/01/2016, 12/31/2015.    FINDINGS:  The structures at the base of the neck reveal no convincing evidence of disease.    There is a left-sided aortic arch with three branch vessels.  The thoracic aorta maintains normal caliber, contour, and course with atherosclerotic calcification within its course.    The heart is not enlarged and there is no evidence of pericardial effusion.  There is mild calcification of the coronary arteries.    The pulmonary arteries distribute normally.  There are four pulmonary veins.  Systemic and pulmonary venoatrial connections are concordant.    There is no axillary or mediastinal lymph node enlargement.    The esophagus maintains a normal course and caliber.    Postoperative changes of left thoracotomy are again noted.  There are numerous sclerotic foci again identified throughout the visualized spine and ribs, similar to prior examination dated 04/16/2018.    Limited views of the abdomen demonstrate multiple hypoattenuating masses in the liver consistent with patient's known metastatic disease, which are not fully characterized on this noncontrast examination.  There is again identified a right adrenal adenoma.  Please refer to MRI abdomen dated 07/23/2018 for further characterization..    Lungs: Postoperative changes of left upper lobe resection and lingulectomy with volume loss and leftward mediastinal shift again identified.  No convincing evidence of new pulmonary disease.  There is a nodule measuring 0.4 cm located in the posterior basal segment of the left lower lobe (axial series 4, image 324).  This has been stable since CT chest dated 05/17/2017.  Such stability over 12 months is likely of benign etiology.  No  further surveillance is warranted per the Fleischner Society Pulmonary nodule recommendations.  There is an incomplete right major fissure.  The lungs are symmetrically expanded and without evidence of consolidation, pneumothorax, mass, or significant pleural thickening.  There is no evidence of pleural fluid.      Impression       Postoperative changes of left upper lobe resection and lingulectomy with volume loss and leftward mediastinal shift again identified. No convincing evidence of new pulmonary disease.  There is a nodule measuring 0.4 cm located in the posterior basal segment of the left lower lobe (axial series 4, image 324).  This has been stable since CT chest dated 05/17/2017. Such stability over 12 months is likely of benign etiology. No further surveillance is warranted per the Fleischner Society Pulmonary nodule recommendations.    Hypoattenuating masses in the liver consistent with patient's known metastatic disease, which are not fully characterized on this noncontrast examination.  Right adrenal adenoma is again identified.  Please refer to MRI abdomen dated 07/23/2018 for further characterization.    Numerous sclerotic foci consistent with metastases again identified throughout the visualized spine and ribs, similar to prior examination dated 04/16/2018.    Additional findings as above                  Assessment:       1. Bronchial carcinoid tumors    2. Secondary neuroendocrine tumor of liver    3. Secondary neuroendocrine tumor of bone           Plan:     Mr. Rahman is doing well from an oncologic standpoint and review of his scans shows overall stability of his disease.  It does look like to lesions may be increasing in size by a few mm.  I will discuss his case in tumor Board and review his scans.  Options may be consideration for the addition of liver directed therapy.  All questions were answered and he is agreeable with this plan.    Jesus London, DO, FACP  Hematology & Oncology  [id number]  [place] [address]  ph. [phone]  Fax. [phone]    25 minutes were spent in coordination of patient's care, record review and counseling.  More than 50% of the time was face-to-face.

## 2018-08-06 ENCOUNTER — CONFERENCE (OUTPATIENT)
Dept: NEUROLOGY | Facility: HOSPITAL | Age: 63
End: 2018-08-06

## 2018-08-06 NOTE — TELEPHONE ENCOUNTER
[facility]      NEUROENDOCRINE TUMOR MULTIDISCIPLINARY TUMOR BOARD  _____________________________________________________________________    PRESENTER:   Jesus London DO    REASON FOR PRESENTATION:  Treatment Plan, Scan Review and Liver Directed Therapy    ATTENDEES:   Surgery:              MD TOMÁS Corbin MD               Interventional Radiology - [place] Peralta MD  Pathology -   Oncology - Jesus London  Gastroenterology - Not present   Nursing  Research    PATIENT STATUS:  Established Patient    TUMOR SITE (Primary & Mets):  See below    PATIENT SUMMARY:  Past Medical History:   Diagnosis Date    Carcinoid bronchial adenoma of left lung     Chemotherapy follow-up examination 8/25/2017    Cough with hemoptysis     mild/intermittent    Elevated bilirubin 7/2/2018    Mild heartburn     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    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    Sleep apnea     Snores        Past Surgical History:   Procedure Laterality Date    Bka Left      ________________________________________________________________    DISCUSSION:  Diagnosis:   1. Bronchial carcinoid tumors    2. Secondary neuroendocrine tumor of liver    3. Secondary neuroendocrine tumor of bone          Chief Complaint: Bronchial carcinoid tumors        Oncologic History:       Oncologic History Bronchial carcinoid, left, diagnosed 1/2016   Metastatic disease to liver and bone 5/2017     Oncologic Treatment Left upper lobectomy 1/2016  CAPTEM 7/2017   Lanreotide 7/2017   Zoledronic acid 7/2017     Pathology 1/2016: Typical carcinoid tumor, well-differentiated T2a, N0, M0 Ki-67 <1%  6/2017: Liver biopsy, well-differentiated, Ki-67           Pt has widespread osseous metastasis and bulky liver disease with ~30% involvement. All lesions (target and non target) are enlarging. I dont see prior LDT on our system. Has he had any done at OSH. If no  prior LDT I think he would be a good RETNET candidate. I rec TACE given increase in size of lesions.   MRI scan reviewed     BOARD RECOMMENDATIONS:     Consider TACE for multiple liver lesions which are enlarging, no new lesions noted  Not a candidate for RetNet due to chemotherapy CapTem  Appointment with Dr Peralta to discuss TACE

## 2018-08-20 ENCOUNTER — TELEPHONE (OUTPATIENT)
Dept: PHARMACY | Facility: CLINIC | Age: 63
End: 2018-08-20

## 2018-08-27 ENCOUNTER — LAB VISIT (OUTPATIENT)
Dept: LAB | Facility: HOSPITAL | Age: 63
End: 2018-08-27
Attending: INTERNAL MEDICINE
Payer: COMMERCIAL

## 2018-08-27 ENCOUNTER — OFFICE VISIT (OUTPATIENT)
Dept: HEMATOLOGY/ONCOLOGY | Facility: CLINIC | Age: 63
End: 2018-08-27
Payer: COMMERCIAL

## 2018-08-27 ENCOUNTER — INFUSION (OUTPATIENT)
Dept: INFUSION THERAPY | Facility: HOSPITAL | Age: 63
End: 2018-08-27
Attending: INTERNAL MEDICINE
Payer: COMMERCIAL

## 2018-08-27 VITALS
HEART RATE: 63 BPM | RESPIRATION RATE: 16 BRPM | OXYGEN SATURATION: 97 % | SYSTOLIC BLOOD PRESSURE: 127 MMHG | DIASTOLIC BLOOD PRESSURE: 71 MMHG | BODY MASS INDEX: 40.13 KG/M2 | TEMPERATURE: 98 F | WEIGHT: 295.88 LBS

## 2018-08-27 VITALS
SYSTOLIC BLOOD PRESSURE: 125 MMHG | DIASTOLIC BLOOD PRESSURE: 77 MMHG | RESPIRATION RATE: 18 BRPM | TEMPERATURE: 98 F | HEART RATE: 52 BPM

## 2018-08-27 DIAGNOSIS — C7B.8 SECONDARY NEUROENDOCRINE TUMOR OF LIVER: ICD-10-CM

## 2018-08-27 DIAGNOSIS — D3A.090 BRONCHIAL CARCINOID TUMORS: ICD-10-CM

## 2018-08-27 DIAGNOSIS — E87.5 HYPERKALEMIA: ICD-10-CM

## 2018-08-27 DIAGNOSIS — C7B.8 SECONDARY NEUROENDOCRINE TUMOR OF BONE: ICD-10-CM

## 2018-08-27 DIAGNOSIS — D3A.090 BRONCHIAL CARCINOID TUMORS: Primary | ICD-10-CM

## 2018-08-27 LAB
ALBUMIN SERPL BCP-MCNC: 3.9 G/DL
ALP SERPL-CCNC: 60 U/L
ALT SERPL W/O P-5'-P-CCNC: 23 U/L
ANION GAP SERPL CALC-SCNC: 8 MMOL/L
AST SERPL-CCNC: 17 U/L
BILIRUB SERPL-MCNC: 1.2 MG/DL
BUN SERPL-MCNC: 16 MG/DL
CALCIUM SERPL-MCNC: 9.1 MG/DL
CHLORIDE SERPL-SCNC: 106 MMOL/L
CO2 SERPL-SCNC: 24 MMOL/L
CREAT SERPL-MCNC: 1 MG/DL
ERYTHROCYTE [DISTWIDTH] IN BLOOD BY AUTOMATED COUNT: 14 %
EST. GFR  (AFRICAN AMERICAN): >60 ML/MIN/1.73 M^2
EST. GFR  (NON AFRICAN AMERICAN): >60 ML/MIN/1.73 M^2
GLUCOSE SERPL-MCNC: 119 MG/DL
HCT VFR BLD AUTO: 41.6 %
HGB BLD-MCNC: 14.3 G/DL
IMM GRANULOCYTES # BLD AUTO: 0.01 K/UL
MCH RBC QN AUTO: 32.6 PG
MCHC RBC AUTO-ENTMCNC: 34.4 G/DL
MCV RBC AUTO: 95 FL
NEUTROPHILS # BLD AUTO: 2.6 K/UL
PLATELET # BLD AUTO: 193 K/UL
PMV BLD AUTO: 9.5 FL
POTASSIUM SERPL-SCNC: 4.1 MMOL/L
PROT SERPL-MCNC: 7.1 G/DL
RBC # BLD AUTO: 4.39 M/UL
SODIUM SERPL-SCNC: 138 MMOL/L
WBC # BLD AUTO: 4.18 K/UL

## 2018-08-27 PROCEDURE — 36415 COLL VENOUS BLD VENIPUNCTURE: CPT

## 2018-08-27 PROCEDURE — 85027 COMPLETE CBC AUTOMATED: CPT

## 2018-08-27 PROCEDURE — 99999 PR PBB SHADOW E&M-EST. PATIENT-LVL III: CPT | Mod: PBBFAC,,, | Performed by: INTERNAL MEDICINE

## 2018-08-27 PROCEDURE — 63600175 PHARM REV CODE 636 W HCPCS: Mod: JG | Performed by: INTERNAL MEDICINE

## 2018-08-27 PROCEDURE — 96372 THER/PROPH/DIAG INJ SC/IM: CPT

## 2018-08-27 PROCEDURE — 3008F BODY MASS INDEX DOCD: CPT | Mod: CPTII,S$GLB,, | Performed by: INTERNAL MEDICINE

## 2018-08-27 PROCEDURE — 99214 OFFICE O/P EST MOD 30 MIN: CPT | Mod: S$GLB,,, | Performed by: INTERNAL MEDICINE

## 2018-08-27 PROCEDURE — 80053 COMPREHEN METABOLIC PANEL: CPT

## 2018-08-27 PROCEDURE — 96401 CHEMO ANTI-NEOPL SQ/IM: CPT

## 2018-08-27 RX ORDER — LANREOTIDE ACETATE 120 MG/.5ML
120 INJECTION SUBCUTANEOUS
Status: COMPLETED | OUTPATIENT
Start: 2018-08-27 | End: 2018-08-27

## 2018-08-27 RX ADMIN — LANREOTIDE ACETATE 120 MG: 120 INJECTION SUBCUTANEOUS at 11:08

## 2018-08-27 NOTE — PROGRESS NOTES
PATIENT: Elroy Rahman  MRN: [id number]  DATE: 8/27/2018      Diagnosis:   1. Bronchial carcinoid tumors    2. Secondary neuroendocrine tumor of liver        Chief Complaint: Carcinoid Syndrome (bronchial)      Oncologic History:      Oncologic History Bronchial carcinoid, left, diagnosed 1/2016   Metastatic disease to liver and bone 5/2017     Oncologic Treatment Left upper lobectomy 1/2016  CAPTEM 7/2017   Lanreotide 7/2017   Zoledronic acid 7/2017     Pathology 1/2016: Typical carcinoid tumor, well-differentiated T2a, N0, M0 Ki-67 <1%  6/2017: Liver biopsy, well-differentiated, Ki-67 25%           Subjective:    Interval History: Mr. Rahman is a 62 y.o. male who is seen in follow-up for a bronchial carcinoid tumor.  He states he feels well.  He remains on treatment CAPTEM.  He is planning on going [place] [place] next week when the season opens.  He has no new complaints.    His history dates to 1/2016 when he underwent a left upper lobectomy with Dr. Bell for a typical carcinoid tumor.  His pathological staging was T2a, N0, M0 with Ki-67 less than 1% and mitotic count 0 per 10 high-powered fields.  He done well postoperatively, however, in 5/2017 he was undergoing routine surveillance and a chest CT in picked up evidence of progression of disease in the liver.  Dedicated abdominal CT was performed showing multiple liver lesions.  In 6/2017 he underwent a CT-guided liver biopsy which was positive for metastatic neuroendocrine tumor which was well-differentiated with no overt nuclear atypia, however, the Ki-67 was 25%.  He underwent gallium-68 PET/CT and was found to have widespread disease involving the bone, liver and also mesentery.  He was started on CAPTEM in 7/2017 and also Lanreotide and zoledronic acid.  Scans in 10/2017 had shown stability of disease.  Imaging in 1/2018 had shown stability of disease.  Scans in 4/2018 had shown stability of disease.  Scans in 07/2018 had continued to showed mild  growth.      Past Medical History:   Past Medical History:   Diagnosis Date    Carcinoid bronchial adenoma of left lung     Chemotherapy follow-up examination 8/25/2017    Cough with hemoptysis     mild/intermittent    Elevated bilirubin 7/2/2018    Mild heartburn     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    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    Sleep apnea     Snores        Past Surgical HIstory:   Past Surgical History:   Procedure Laterality Date    Bka Left        Family History:   Family History   Problem Relation Age of Onset    Cancer Mother         lung    Cancer Sister     Anesthesia problems Neg Hx        Social History:  reports that  has never smoked. he has never used smokeless tobacco. He reports that he drinks about 0.6 oz of alcohol per week. He reports that he does not use drugs.    Allergies:  Review of patient's allergies indicates:  No Known Allergies    Medications:  Current Outpatient Medications   Medication Sig Dispense Refill    capecitabine (XELODA) 500 MG Tab TAKE 4 TABLETS (2,000MG TOTAL) BY MOUTH 2 TIMES DAILY FOR 14 DAYS EVERY 28 DAYS ( 2 WEEKS ON, 2 WEEKS OFF, EVERY 4 WEEKS). 112 tablet 3    lanreotide (SOMATULINE DEPOT) 120 mg/0.5 mL Syrg Inject 120 mg into the skin every 28 days.      temozolomide (TEMODAR) 250 MG capsule TAKE 2 CAPSULES (500MG TOTAL) BY MOUTH ONCE DAILY ON DAYS 10-14 OF 28 DAY CYCLE 10 capsule 6     No current facility-administered medications for this visit.      Facility-Administered Medications Ordered in Other Visits   Medication Dose Route Frequency Provider Last Rate Last Dose    lanreotide injection 120 mg  120 mg Subcutaneous 1 time in Clinic/HOD Jesus London DO, FACP           Review of Systems   Constitutional: Positive for fatigue. Negative for appetite change, chills, fever and unexpected weight change.   HENT: Negative for dental problem, sinus pressure and sneezing.    Eyes: Negative for visual  disturbance.   Respiratory: Negative for cough, choking and chest tightness.    Cardiovascular: Negative for chest pain and leg swelling.   Gastrointestinal: Negative for abdominal pain, blood in stool, constipation, diarrhea and nausea.   Genitourinary: Negative for difficulty urinating and dysuria.   Musculoskeletal: Negative for arthralgias and back pain.   Skin: Positive for wound. Negative for rash.   Neurological: Negative for dizziness, light-headedness and headaches.   Hematological: Negative for adenopathy. Does not bruise/bleed easily.   Psychiatric/Behavioral: Negative for sleep disturbance. The patient is not nervous/anxious.        ECOG Performance Status:   ECOG SCORE    0 - Fully active-able to carry on all pre-disease performance without restriction         Objective:      Vitals:   Vitals:    08/27/18 1054   BP: 127/71   Pulse: 63   Resp: 16   Temp: 97.8 °F (36.6 °C)   SpO2: 97%   Weight: 134.2 kg (295 lb 13.7 oz)     BMI: Body mass index is 40.13 kg/m².    Physical Exam   Constitutional: He is oriented to person, place, and time. He appears well-developed and well-nourished.   HENT:   Head: Normocephalic and atraumatic.   Eyes: Pupils are equal, round, and reactive to light.   Neck: Normal range of motion. Neck supple.   Cardiovascular: Normal rate and regular rhythm.   Pulmonary/Chest: Effort normal and breath sounds normal. No respiratory distress.   Abdominal: Soft. He exhibits no distension. There is no tenderness.   Musculoskeletal: He exhibits no edema or tenderness.   Left lower extremity prosthesis   Lymphadenopathy:     He has no cervical adenopathy.   Neurological: He is alert and oriented to person, place, and time. No cranial nerve deficit.   Skin: Skin is warm and dry.   Right middle finger with 2 cm abrasion, dorsal skin of 3rd metacarpal.  No surrounding erythema, edema.   Psychiatric: He has a normal mood and affect. His behavior is normal.       Laboratory Data:  Lab Visit on  08/27/2018   Component Date Value Ref Range Status    WBC 08/27/2018 4.18  3.90 - 12.70 K/uL Final    RBC 08/27/2018 4.39* 4.60 - 6.20 M/uL Final    Hemoglobin 08/27/2018 14.3  14.0 - 18.0 g/dL Final    Hematocrit 08/27/2018 41.6  40.0 - 54.0 % Final    MCV 08/27/2018 95  82 - 98 fL Final    MCH 08/27/2018 32.6* 27.0 - 31.0 pg Final    MCHC 08/27/2018 34.4  32.0 - 36.0 g/dL Final    RDW 08/27/2018 14.0  11.5 - 14.5 % Final    Platelets 08/27/2018 193  150 - 350 K/uL Final    MPV 08/27/2018 9.5  9.2 - 12.9 fL Final    Gran # (ANC) 08/27/2018 2.6  1.8 - 7.7 K/uL Final    Comment: The ANC is based on a white cell differential from an   automated cell counter. It has not been microscopically   reviewed for the presence of abnormal cells. Clinical   correlation is required.      Immature Grans (Abs) 08/27/2018 0.01  0.00 - 0.04 K/uL Final    Comment: Mild elevation in immature granulocytes is non specific and   can be seen in a variety of conditions including stress response,   acute inflammation, trauma and pregnancy. Correlation with other   laboratory and clinical findings is essential.      Sodium 08/27/2018 138  136 - 145 mmol/L Final    Potassium 08/27/2018 4.1  3.5 - 5.1 mmol/L Final    Chloride 08/27/2018 106  95 - 110 mmol/L Final    CO2 08/27/2018 24  23 - 29 mmol/L Final    Glucose 08/27/2018 119* 70 - 110 mg/dL Final    BUN, Bld 08/27/2018 16  8 - 23 mg/dL Final    Creatinine 08/27/2018 1.0  0.5 - 1.4 mg/dL Final    Calcium 08/27/2018 9.1  8.7 - 10.5 mg/dL Final    Total Protein 08/27/2018 7.1  6.0 - 8.4 g/dL Final    Albumin 08/27/2018 3.9  3.5 - 5.2 g/dL Final    Total Bilirubin 08/27/2018 1.2* 0.1 - 1.0 mg/dL Final    Comment: For infants and newborns, interpretation of results should be based  on gestational age, weight and in agreement with clinical  observations.  Premature Infant recommended reference ranges:  Up to 24 hours.............<8.0 mg/dL  Up to 48 hours............<12.0  mg/dL  3-5 days..................<15.0 mg/dL  6-29 days.................<15.0 mg/dL      Alkaline Phosphatase 08/27/2018 60  55 - 135 U/L Final    AST 08/27/2018 17  10 - 40 U/L Final    ALT 08/27/2018 23  10 - 44 U/L Final    Anion Gap 08/27/2018 8  8 - 16 mmol/L Final    eGFR if African American 08/27/2018 >60.0  >60 mL/min/1.73 m^2 Final    eGFR if non African American 08/27/2018 >60.0  >60 mL/min/1.73 m^2 Final    Comment: Calculation used to obtain the estimated glomerular filtration  rate (eGFR) is the CKD-EPI equation.        Supplemental Diagnosis  Chromogranin Staining:  Intensity: Moderate to strong. Positive cells: 100 (%)  Synaptophysin Staining:  Intensity: Strong. Positive cells: 100 (%)  CD31: 40 vessels per 1 HPF  Factor VIII: 27 vessels per 1 HPF  Ki67: 1 % tumor cells staining  Immunostains performed with appropriate positive and negative controls.  FINAL PATHOLOGIC DIAGNOSIS  1. Lymph node, level XI, excisional biopsy:  Fragments of benign lymph node with anthracosis and sinus histiocytosis (0/1).  2. Lung, lingular staple line, biopsy:  Lung, negative for neoplasm.  3. Lymph node, level X, excisional biopsy:  1 benign lymph node with anthracotic pigment and sinus histiocytosis (0/1).  4. Lymph node, level XII, excisional biopsy:  2 benign lymph nodes with sinus histiocytosis and anthracotic pigment (0/2).  5. Lung, left upper lobe, lobectomy:  Typical carcinoid tumor (well differentiated neuroendocrine tumor), 4 cm in greatest dimension.  Bronchial and vascular margins are negative for neoplasm.  Uninvolved lung adjacent to the tumor shows emphysematous changes and fibrosis, however remaining lung  appears unremarkable.  Additional immunohistochemical stains for ancillary studies (including synaptophysin, chromogranin and Ki-67)  will be completed and results will be reported in a supplemental report.  See synoptic report in comment section for further details.  6. Lymph node, level VII,  excisional biopsy:  2 benign lymph nodes with anthracotic pigment and sinus histiocytosis (0/2).  Comment: Synoptic Report  Specimen: Left upper lobe of lung  Procedure: Lobectomy  Specimen laterality: Left  Tumor size:  Greatest dimension: 4 cm  Tumor focality: Single focus  Histologic type: Typical carcinoid tumor  Visceral pleural invasion: Not identified  Tumor extension: Not identified  Margins: Bronchial and vascular margins are uninvolved by tumor.  Distance of tumor from closest margin: 1 cm from the bronchial surgical margin.  Lymphovascular invasion: Not identified  Ancillary studies:  Immunohistochemical stains for neuroendocrine markers and Ki-67 will be completed and results will follow in a  supplemental report.  Note: The tumor consists of a proliferation of cells in nests with lewis formation. The tumor cells have salt and  pepper chromatin pattern with abundant cytoplasm. There is no significant nuclear atypia. No evidence of  necrosis. Mitotic count is 0 mitoses in 10 high power fields.    Imaging:     MRI 07/23/2018  EXAMINATION:  MRI ABDOMEN W WO CONTRAST    CLINICAL HISTORY:  Bronchial carcinoid tumors;  Other secondary neuroendocrine tumors    TECHNIQUE:  Multiplanar, multi-sequence MR imaging of the abdomen was performed before and after administration of 10 cc of Gadavist gadolinium-based intravenous contrast per the liver protocol.    COMPARISON:  MRI abdomen 01/19/2018, 04/18/2018    FINDINGS:  The visualized lungs, heart, and pericardium demonstrate nothing unusual within the confines of this exam.    The liver is normal in size and contour.  There is diffuse signal dropout of the liver on out of phase imaging compatible with hepatic steatosis.  Innumerable T2 hyperintense, enhancing lesions are seen throughout the liver compatible with known metastatic disease.  Index lesion in hepatic segment III measures 4.1 cm (axial series 14, image 38), previously 3.8 cm.  Additional index lesion  in hepatic segment V/VIII measures 4.0 cm (axial series 14, image 40), previously 3.8 cm.  While not an index lesion, there is a lesion in hepatic segment V which measures 2.3 cm (axial series 14, image 43), previously 2.2 cm when directly remeasured.  Overall lesions appear to have increased in size over multiple prior exams with no definite new lesions identified.  The portal vein is patent.    No evidence of intra-or extrahepatic biliary ductal dilatation. The gallbladder, stomach, pancreas, and visualized portions of the bowel demonstrate no significant abnormalities.  Two areas of restricted diffusion within the spleen appear unchanged.  Stable right adrenal adenoma.  The left adrenal gland is unremarkable.    The kidneys are normal in size and location. No evidence of hydronephrosis or solid renal masses.  Stable subcentimeter left renal cysts.    The visualized aorta is normal in caliber.    The osseous structures demonstrate stable enhancing lesions compatible with known metastatic disease.    Soft tissues of the lower thoracic and abdominal wall are unremarkable.      Impression       Innumerable enhancing hepatic lesions which appear stable in number and majority the stable in size, but at least 2 have slightly increased in size suggesting mild progression of metastatic disease.    Stable osseous metastatic disease.    Additional stable findings as above.    RECIST SUMMARY:    Date of prior examination for comparison: 04/18/2018    Lesion 1: Hepatic segment V/VIII.  4.0 cm. Series 14 image 40.  Prior measurement 3.8 cm.    Lesion 2: Hepatic segment III.  4.1 cm. Series 14 image 38.  Prior measurement 3.8 cm.       CT 07/23/2018  EXAMINATION:  CT CHEST WITHOUT CONTRAST    CLINICAL HISTORY:  Bronchial carcinoid tumors; Benign carcinoid tumor of the bronchus and lung    TECHNIQUE:  Low dose axial images, sagittal and coronal reformations were obtained from the thoracic inlet to the lung bases. Contrast was  not administered.    COMPARISON:  CT chest without contrast: 04/16/2018, 01/19/2018, 11/02/2017, 05/17/2017, 08/17/2016, 02/01/2016, 12/31/2015.    FINDINGS:  The structures at the base of the neck reveal no convincing evidence of disease.    There is a left-sided aortic arch with three branch vessels.  The thoracic aorta maintains normal caliber, contour, and course with atherosclerotic calcification within its course.    The heart is not enlarged and there is no evidence of pericardial effusion.  There is mild calcification of the coronary arteries.    The pulmonary arteries distribute normally.  There are four pulmonary veins.  Systemic and pulmonary venoatrial connections are concordant.    There is no axillary or mediastinal lymph node enlargement.    The esophagus maintains a normal course and caliber.    Postoperative changes of left thoracotomy are again noted.  There are numerous sclerotic foci again identified throughout the visualized spine and ribs, similar to prior examination dated 04/16/2018.    Limited views of the abdomen demonstrate multiple hypoattenuating masses in the liver consistent with patient's known metastatic disease, which are not fully characterized on this noncontrast examination.  There is again identified a right adrenal adenoma.  Please refer to MRI abdomen dated 07/23/2018 for further characterization..    Lungs: Postoperative changes of left upper lobe resection and lingulectomy with volume loss and leftward mediastinal shift again identified.  No convincing evidence of new pulmonary disease.  There is a nodule measuring 0.4 cm located in the posterior basal segment of the left lower lobe (axial series 4, image 324).  This has been stable since CT chest dated 05/17/2017.  Such stability over 12 months is likely of benign etiology.  No further surveillance is warranted per the Fleischner Society Pulmonary nodule recommendations.  There is an incomplete right major fissure.  The  lungs are symmetrically expanded and without evidence of consolidation, pneumothorax, mass, or significant pleural thickening.  There is no evidence of pleural fluid.      Impression       Postoperative changes of left upper lobe resection and lingulectomy with volume loss and leftward mediastinal shift again identified. No convincing evidence of new pulmonary disease.  There is a nodule measuring 0.4 cm located in the posterior basal segment of the left lower lobe (axial series 4, image 324).  This has been stable since CT chest dated 05/17/2017. Such stability over 12 months is likely of benign etiology. No further surveillance is warranted per the Fleischner Society Pulmonary nodule recommendations.    Hypoattenuating masses in the liver consistent with patient's known metastatic disease, which are not fully characterized on this noncontrast examination.  Right adrenal adenoma is again identified.  Please refer to MRI abdomen dated 07/23/2018 for further characterization.    Numerous sclerotic foci consistent with metastases again identified throughout the visualized spine and ribs, similar to prior examination dated 04/16/2018.    Additional findings as above                  Assessment:       1. Bronchial carcinoid tumors    2. Secondary neuroendocrine tumor of liver           Plan:     Mr. Rahman continues to do well on CAPTEM in tolerating this fine.  I did review his scans in our multidisciplinary neuroendocrine tumor board which indicate mild increase in the size several liver lesions and liver directed therapy was recommended.  I have discussed this in detail with him today and he is agreeable to proceed but wishes to wait until after hunting season which should and next week.  He will start his next cycle of CAPTEM tomorrow and we will plan on getting him set up for liver directed therapy in another 3-4 weeks.  All questions were answered and he is agreeable with this plan.    Jesus London DO,  FACP  Hematology & Oncology  [address] [place] [address]  ph. [phone]  Fax. [phone]    25 minutes were spent in coordination of patient's care, record review and counseling.  More than 50% of the time was face-to-face.

## 2018-08-29 ENCOUNTER — TELEPHONE (OUTPATIENT)
Dept: HEMATOLOGY/ONCOLOGY | Facility: CLINIC | Age: 63
End: 2018-08-29

## 2018-08-29 DIAGNOSIS — C7B.8 SECONDARY NEUROENDOCRINE TUMOR OF LIVER: Primary | ICD-10-CM

## 2018-08-29 NOTE — TELEPHONE ENCOUNTER
Spoke to patient to make appt for TACE, he said he would call back to find out what dates we have.

## 2018-09-04 ENCOUNTER — TELEPHONE (OUTPATIENT)
Dept: NEUROLOGY | Facility: HOSPITAL | Age: 63
End: 2018-09-04

## 2018-09-04 DIAGNOSIS — C7B.8 SECONDARY NEUROENDOCRINE TUMOR OF BONE: ICD-10-CM

## 2018-09-04 DIAGNOSIS — C7B.8 SECONDARY NEUROENDOCRINE TUMOR OF LIVER: Primary | ICD-10-CM

## 2018-09-04 NOTE — TELEPHONE ENCOUNTER
----- Message from Demetrius Banerjee sent at 9/4/2018 10:46 AM CDT -----  Contact: Pt   Will like a call from office in regards to a certain procedure     Contact::[phone]

## 2018-09-12 ENCOUNTER — TELEPHONE (OUTPATIENT)
Dept: PHARMACY | Facility: CLINIC | Age: 63
End: 2018-09-12

## 2018-09-12 NOTE — TELEPHONE ENCOUNTER
Patient states that he is not in need of CAPTEM therapy. Reached out to doctor's office to confirm.

## 2018-09-21 ENCOUNTER — TELEPHONE (OUTPATIENT)
Dept: HEMATOLOGY/ONCOLOGY | Facility: CLINIC | Age: 63
End: 2018-09-21

## 2018-09-21 NOTE — TELEPHONE ENCOUNTER
Returned call to pt.  Pt unavailable.   Left message for pt to return call.   Callback number provided.         ----- Message from Marie Kuhn sent at 9/21/2018  1:37 PM CDT -----  Contact: Monica pt wife   Pt wife calling regarding upcoming appts and upcoming procedure        Hope call back number [phone]

## 2018-09-27 ENCOUNTER — TELEPHONE (OUTPATIENT)
Dept: HEMATOLOGY/ONCOLOGY | Facility: CLINIC | Age: 63
End: 2018-09-27

## 2018-09-27 NOTE — TELEPHONE ENCOUNTER
Spoke to patient and let him know to get his injection, it is only before a gallium scan he should hold off.  He requests an appointment on Monday, will alert the .

## 2018-09-27 NOTE — TELEPHONE ENCOUNTER
----- Message from Jillian De Jesus RN sent at 9/24/2018  3:53 PM CDT -----  Contact: patient  Mr Rahman missed his lanreotide injection.  Spoke with patient on phone and he is wondering if he should take his lanreotide injection before upcoming procedure.  Please call patient back to discuss.

## 2018-10-01 ENCOUNTER — INFUSION (OUTPATIENT)
Dept: INFUSION THERAPY | Facility: HOSPITAL | Age: 63
End: 2018-10-01
Attending: INTERNAL MEDICINE
Payer: COMMERCIAL

## 2018-10-01 VITALS — HEART RATE: 67 BPM | RESPIRATION RATE: 18 BRPM | DIASTOLIC BLOOD PRESSURE: 80 MMHG | SYSTOLIC BLOOD PRESSURE: 132 MMHG

## 2018-10-01 DIAGNOSIS — C7B.8 SECONDARY NEUROENDOCRINE TUMOR OF LIVER: ICD-10-CM

## 2018-10-01 DIAGNOSIS — D3A.090 BRONCHIAL CARCINOID TUMORS: Primary | ICD-10-CM

## 2018-10-01 PROCEDURE — 63600175 PHARM REV CODE 636 W HCPCS: Mod: JG | Performed by: INTERNAL MEDICINE

## 2018-10-01 PROCEDURE — 96372 THER/PROPH/DIAG INJ SC/IM: CPT

## 2018-10-01 RX ORDER — LANREOTIDE ACETATE 120 MG/.5ML
120 INJECTION SUBCUTANEOUS
Status: COMPLETED | OUTPATIENT
Start: 2018-10-01 | End: 2018-10-01

## 2018-10-01 RX ADMIN — LANREOTIDE ACETATE 120 MG: 120 INJECTION SUBCUTANEOUS at 11:10

## 2018-10-04 ENCOUNTER — OFFICE VISIT (OUTPATIENT)
Dept: NEUROLOGY | Facility: HOSPITAL | Age: 63
End: 2018-10-04
Attending: SURGERY
Payer: COMMERCIAL

## 2018-10-04 ENCOUNTER — LAB VISIT (OUTPATIENT)
Dept: LAB | Facility: HOSPITAL | Age: 63
End: 2018-10-04
Attending: SURGERY
Payer: COMMERCIAL

## 2018-10-04 VITALS
HEIGHT: 71 IN | WEIGHT: 297.81 LBS | OXYGEN SATURATION: 98 % | BODY MASS INDEX: 41.69 KG/M2 | TEMPERATURE: 97 F | HEART RATE: 51 BPM | DIASTOLIC BLOOD PRESSURE: 72 MMHG | SYSTOLIC BLOOD PRESSURE: 132 MMHG

## 2018-10-04 DIAGNOSIS — C78.7 SECONDARY MALIGNANT NEOPLASM OF LIVER: ICD-10-CM

## 2018-10-04 DIAGNOSIS — C78.7 SECONDARY MALIGNANT NEOPLASM OF LIVER: Primary | ICD-10-CM

## 2018-10-04 LAB
ALBUMIN SERPL BCP-MCNC: 4 G/DL
ALP SERPL-CCNC: 70 U/L
ALT SERPL W/O P-5'-P-CCNC: 19 U/L
ANION GAP SERPL CALC-SCNC: 10 MMOL/L
APTT BLDCRRT: 28.5 SEC
AST SERPL-CCNC: 16 U/L
BASOPHILS # BLD AUTO: 0.03 K/UL
BASOPHILS NFR BLD: 0.6 %
BILIRUB SERPL-MCNC: 0.9 MG/DL
BUN SERPL-MCNC: 13 MG/DL
CALCIUM SERPL-MCNC: 9.4 MG/DL
CHLORIDE SERPL-SCNC: 102 MMOL/L
CO2 SERPL-SCNC: 26 MMOL/L
CREAT SERPL-MCNC: 1.1 MG/DL
DIFFERENTIAL METHOD: ABNORMAL
EOSINOPHIL # BLD AUTO: 0.1 K/UL
EOSINOPHIL NFR BLD: 1 %
ERYTHROCYTE [DISTWIDTH] IN BLOOD BY AUTOMATED COUNT: 13.5 %
EST. GFR  (AFRICAN AMERICAN): >60 ML/MIN/1.73 M^2
EST. GFR  (NON AFRICAN AMERICAN): >60 ML/MIN/1.73 M^2
GLUCOSE SERPL-MCNC: 107 MG/DL
HCT VFR BLD AUTO: 42.8 %
HGB BLD-MCNC: 14.5 G/DL
INR PPP: 0.9
LYMPHOCYTES # BLD AUTO: 1.2 K/UL
LYMPHOCYTES NFR BLD: 22.7 %
MCH RBC QN AUTO: 31.8 PG
MCHC RBC AUTO-ENTMCNC: 33.9 G/DL
MCV RBC AUTO: 94 FL
MONOCYTES # BLD AUTO: 0.6 K/UL
MONOCYTES NFR BLD: 11.6 %
NEUTROPHILS # BLD AUTO: 3.2 K/UL
NEUTROPHILS NFR BLD: 63.7 %
PLATELET # BLD AUTO: 226 K/UL
PMV BLD AUTO: 9.8 FL
POTASSIUM SERPL-SCNC: 4.4 MMOL/L
PROT SERPL-MCNC: 7.7 G/DL
PROTHROMBIN TIME: 9.9 SEC
RBC # BLD AUTO: 4.56 M/UL
SODIUM SERPL-SCNC: 138 MMOL/L
WBC # BLD AUTO: 5.07 K/UL

## 2018-10-04 PROCEDURE — 85730 THROMBOPLASTIN TIME PARTIAL: CPT

## 2018-10-04 PROCEDURE — 36415 COLL VENOUS BLD VENIPUNCTURE: CPT

## 2018-10-04 PROCEDURE — 80053 COMPREHEN METABOLIC PANEL: CPT

## 2018-10-04 PROCEDURE — 99214 OFFICE O/P EST MOD 30 MIN: CPT | Performed by: SURGERY

## 2018-10-04 PROCEDURE — 85025 COMPLETE CBC W/AUTO DIFF WBC: CPT

## 2018-10-04 PROCEDURE — 85610 PROTHROMBIN TIME: CPT

## 2018-10-04 RX ORDER — DEXTROSE MONOHYDRATE, SODIUM CHLORIDE, AND POTASSIUM CHLORIDE 50; 1.49; 4.5 G/1000ML; G/1000ML; G/1000ML
INJECTION, SOLUTION INTRAVENOUS CONTINUOUS
Status: CANCELLED | OUTPATIENT
Start: 2018-10-04

## 2018-10-04 RX ORDER — CIPROFLOXACIN 500 MG/1
500 TABLET ORAL EVERY 12 HOURS
Qty: 14 TABLET | Refills: 0 | Status: SHIPPED | OUTPATIENT
Start: 2018-10-04 | End: 2018-11-12

## 2018-10-04 RX ORDER — ONDANSETRON 2 MG/ML
4 INJECTION INTRAMUSCULAR; INTRAVENOUS EVERY 8 HOURS PRN
Status: CANCELLED | OUTPATIENT
Start: 2018-10-04

## 2018-10-04 RX ORDER — MAGNESIUM SULFATE HEPTAHYDRATE 40 MG/ML
2 INJECTION, SOLUTION INTRAVENOUS ONCE
Status: CANCELLED | OUTPATIENT
Start: 2018-10-04 | End: 2018-10-04

## 2018-10-04 RX ORDER — OXYCODONE AND ACETAMINOPHEN 5; 325 MG/1; MG/1
1 TABLET ORAL EVERY 4 HOURS PRN
Status: CANCELLED | OUTPATIENT
Start: 2018-10-04

## 2018-10-04 RX ORDER — ONDANSETRON 4 MG/1
4 TABLET, FILM COATED ORAL 2 TIMES DAILY
Qty: 15 TABLET | Refills: 0 | Status: ON HOLD | OUTPATIENT
Start: 2018-10-04 | End: 2019-09-18

## 2018-10-04 RX ORDER — FUROSEMIDE 10 MG/ML
20 INJECTION INTRAMUSCULAR; INTRAVENOUS ONCE
Status: CANCELLED | OUTPATIENT
Start: 2018-10-04 | End: 2018-10-04

## 2018-10-04 RX ORDER — LIDOCAINE HYDROCHLORIDE 10 MG/ML
1 INJECTION, SOLUTION EPIDURAL; INFILTRATION; INTRACAUDAL; PERINEURAL ONCE
Status: CANCELLED | OUTPATIENT
Start: 2018-10-04 | End: 2018-10-04

## 2018-10-04 RX ORDER — DEXAMETHASONE SODIUM PHOSPHATE 4 MG/ML
8 INJECTION, SOLUTION INTRA-ARTICULAR; INTRALESIONAL; INTRAMUSCULAR; INTRAVENOUS; SOFT TISSUE ONCE
Status: CANCELLED | OUTPATIENT
Start: 2018-10-04 | End: 2018-10-04

## 2018-10-04 RX ORDER — MIDAZOLAM HYDROCHLORIDE 1 MG/ML
0.5 INJECTION INTRAMUSCULAR; INTRAVENOUS ONCE
Status: CANCELLED | OUTPATIENT
Start: 2018-10-04 | End: 2018-10-04

## 2018-10-04 RX ORDER — TRAMADOL HYDROCHLORIDE 50 MG/1
50 TABLET ORAL EVERY 6 HOURS PRN
Qty: 15 TABLET | Refills: 0 | Status: SHIPPED | OUTPATIENT
Start: 2018-10-04 | End: 2018-10-14

## 2018-10-04 RX ORDER — SODIUM CHLORIDE 9 MG/ML
500 INJECTION, SOLUTION INTRAVENOUS ONCE
Status: CANCELLED | OUTPATIENT
Start: 2018-10-04 | End: 2018-10-04

## 2018-10-04 RX ORDER — DIPHENHYDRAMINE HYDROCHLORIDE 50 MG/ML
50 INJECTION INTRAMUSCULAR; INTRAVENOUS ONCE
Status: CANCELLED | OUTPATIENT
Start: 2018-10-04 | End: 2018-10-04

## 2018-10-04 RX ORDER — FAMOTIDINE 20 MG/1
20 TABLET, FILM COATED ORAL EVERY 12 HOURS
Status: CANCELLED | OUTPATIENT
Start: 2018-10-04

## 2018-10-04 RX ORDER — FENTANYL CITRATE 50 UG/ML
50 INJECTION, SOLUTION INTRAMUSCULAR; INTRAVENOUS ONCE
Status: CANCELLED | OUTPATIENT
Start: 2018-10-04 | End: 2018-10-04

## 2018-10-04 RX ORDER — IBUPROFEN 400 MG/1
400 TABLET ORAL EVERY 4 HOURS PRN
Status: CANCELLED | OUTPATIENT
Start: 2018-10-04

## 2018-10-04 RX ORDER — ALLOPURINOL 300 MG/1
300 TABLET ORAL DAILY
Status: CANCELLED | OUTPATIENT
Start: 2018-10-04 | End: 2018-10-06

## 2018-10-04 RX ORDER — MANNITOL 250 MG/ML
12.5 INJECTION, SOLUTION INTRAVENOUS ONCE
Status: CANCELLED | OUTPATIENT
Start: 2018-10-04 | End: 2018-10-04

## 2018-10-04 NOTE — PATIENT INSTRUCTIONS
Pre TACE instructions:    Labs today, [facility]       Nothing to eat or drink after midnight tonight.    Please report to [address] [place] [address] hospital admissions desk at the time given to you by Interventional Radiology. Someone will contact you by the end of the day to give you an arrival time.     *Remember to have labs (CBC & CMP) 10 days after TACE procedure- orders placed in [facility]    A hospital follow up appointment with surgeon is typically not needed, unless complications arise.     You will need repeat MRI in 1-3 months after TACE, depending on Radiologist recommendation.   This timeframe will be indicated at the time of your discharge.   Orders for MRI will be placed electronically, and you can call [phone] to schedule your MRI appointment.     Please call our office after your MRI is completed.   We will review this MRI in our Multi Disciplinary Tumor Board to determine if next TACE is needed.   If it is decided that future TACE treatments are needed, our office will contact you to schedule additional TACE.  If additional TACE treatments are not needed, you will continue regular follow up with your managing Oncologist (Dr. Mejia or Dr. London).    Please contact our office with any questions

## 2018-10-04 NOTE — PROGRESS NOTES
"NOLANETS:  [facility]  A collaboration between [facility] and [facility]      PATIENT: Elroy Rahman  MRN: [id number]  DATE: 10/4/2018    Subjective:      Chief Complaint: Consult (Pre TACE #1)  has had surgery for lung carcinoid 2 years back    Doing well  Eating well   Having normal Gut functions    Vitals:   Vitals:    10/04/18 1205   BP: 132/72   Pulse: (!) 51   Temp: 97 °F (36.1 °C)   TempSrc: Oral   SpO2: 98%   Weight: 135.1 kg (297 lb 13.5 oz)   Height: 5' 11" (1.803 m)        Karnofsky Score:     Diagnosis:   1. 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    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    Sleep apnea     Snores        Past Surgical History:  Past Surgical History:   Procedure Laterality Date    [id number] N/A 6/8/2017    Performed by [facility] Provider at [facility] OR 2ND FLR    Bka Left     LEFT UPPER LOBECTOMY-LUNG Left 1/28/2016    Performed by Pan Bell MD at [facility] OR 2ND FLR    THORACOTOMY W/LUNG RESECTION Left 1/28/2016    Performed by Pan Bell MD at [facility] OR 2ND FLR       Family History:  Family History   Problem Relation Age of Onset    Cancer Mother         lung    Cancer Sister     Anesthesia problems Neg Hx        Allergies:  Review of patient's allergies indicates:  No Known Allergies    Medications:  Current Outpatient Medications   Medication Sig Dispense Refill    capecitabine (XELODA) 500 MG Tab TAKE 4 TABLETS (2,000MG TOTAL) BY MOUTH 2 TIMES DAILY FOR 14 DAYS EVERY 28 DAYS ( 2 WEEKS " ON, 2 WEEKS OFF, EVERY 4 WEEKS). 112 tablet 3    lanreotide (SOMATULINE DEPOT) 120 mg/0.5 mL Syrg Inject 120 mg into the skin every 28 days.      temozolomide (TEMODAR) 250 MG capsule TAKE 2 CAPSULES (500MG TOTAL) BY MOUTH ONCE DAILY ON DAYS 10-14 OF 28 DAY CYCLE 10 capsule 6     No current facility-administered medications for this visit.        Review of Systems   Constitutional: Positive for fatigue. Negative for activity change, appetite change, chills, diaphoresis, fever and unexpected weight change.   HENT: Negative for dental problem, drooling, ear discharge, ear pain, facial swelling, mouth sores, postnasal drip, rhinorrhea, sinus pressure, sinus pain, sneezing and sore throat.    Eyes: Negative for photophobia, pain, discharge, redness, itching and visual disturbance.   Respiratory: Negative for choking, wheezing and stridor.    Cardiovascular: Negative for chest pain, palpitations and leg swelling.   Gastrointestinal: Negative for abdominal pain, anal bleeding, constipation, diarrhea, nausea, rectal pain and vomiting.   Endocrine: Negative.    Genitourinary: Negative for decreased urine volume, difficulty urinating, enuresis, flank pain, frequency, penile pain, scrotal swelling, testicular pain and urgency.   Musculoskeletal: Negative for arthralgias, back pain, gait problem and joint swelling.   Skin: Negative for color change, pallor and rash.   Allergic/Immunologic: Negative.    Neurological: Positive for numbness. Negative for dizziness, tremors, syncope, facial asymmetry, light-headedness and headaches.   Psychiatric/Behavioral: Negative for agitation, behavioral problems, decreased concentration and hallucinations.      Objective:      Physical Exam   Constitutional: He is oriented to person, place, and time. He appears well-developed and well-nourished.   HENT:   Head: Normocephalic and atraumatic.   Right Ear: External ear normal.   Left Ear: External ear normal.   Eyes: Conjunctivae and EOM are  normal. Pupils are equal, round, and reactive to light.   Neck: Normal range of motion. Neck supple.   Cardiovascular: Normal rate and regular rhythm.   Pulmonary/Chest: Effort normal and breath sounds normal.   Abdominal: Soft. Bowel sounds are normal. A hernia is present.   Incarcerated umbilical hernia has some symptoms but not severe   Musculoskeletal:   Status post amputation one side   Neurological: He is alert and oriented to person, place, and time.   Skin: Skin is warm. He is not diaphoretic.   Psychiatric: He has a normal mood and affect. His behavior is normal.      Assessment:       1. Secondary malignant neoplasm of liver        Laboratory Data:   Results for TRISTEN GARCIA (MRN [id number]) as of 10/4/2018 12:48   Ref. Range 7/23/2018 10:38 7/30/2018 08:30 8/27/2018 10:17   WBC Latest Ref Range: 3.90 - 12.70 K/uL  4.12 4.18   RBC Latest Ref Range: 4.60 - 6.20 M/uL  4.36 (L) 4.39 (L)   Hemoglobin Latest Ref Range: 14.0 - 18.0 g/dL  14.2 14.3   Hematocrit Latest Ref Range: 40.0 - 54.0 %  40.6 41.6   MCV Latest Ref Range: 82 - 98 fL  93 95   MCH Latest Ref Range: 27.0 - 31.0 pg  32.6 (H) 32.6 (H)   MCHC Latest Ref Range: 32.0 - 36.0 g/dL  35.0 34.4   RDW Latest Ref Range: 11.5 - 14.5 %  13.8 14.0   Platelets Latest Ref Range: 150 - 350 K/uL  168 193   MPV Latest Ref Range: 9.2 - 12.9 fL  9.7 9.5   Gran # (ANC) Latest Ref Range: 1.8 - 7.7 K/uL  2.4 2.6   Immature Grans (Abs) Latest Ref Range: 0.00 - 0.04 K/uL  0.02 0.01   Sodium Latest Ref Range: 136 - 145 mmol/L  139 138   Potassium Latest Ref Range: 3.5 - 5.1 mmol/L  4.2 4.1   Chloride Latest Ref Range: 95 - 110 mmol/L  108 106   CO2 Latest Ref Range: 23 - 29 mmol/L  25 24   Anion Gap Latest Ref Range: 8 - 16 mmol/L  6 (L) 8   BUN, Bld Latest Ref Range: 8 - 23 mg/dL  16 16   Creatinine Latest Ref Range: 0.5 - 1.4 mg/dL  0.9 1.0   eGFR if non African American Latest Ref Range: >60 mL/min/1.73 m^2  >60.0 >60.0   eGFR if African American Latest Ref Range:  >60 mL/min/1.73 m^2  >60.0 >60.0   Glucose Latest Ref Range: 70 - 110 mg/dL  119 (H) 119 (H)   Calcium Latest Ref Range: 8.7 - 10.5 mg/dL  9.0 9.1   Alkaline Phosphatase Latest Ref Range: 55 - 135 U/L  53 (L) 60   Total Protein Latest Ref Range: 6.0 - 8.4 g/dL  6.9 7.1   Albumin Latest Ref Range: 3.5 - 5.2 g/dL  3.8 3.9   Total Bilirubin Latest Ref Range: 0.1 - 1.0 mg/dL  1.2 (H) 1.2 (H)   AST Latest Ref Range: 10 - 40 U/L  17 17   ALT Latest Ref Range: 10 - 44 U/L  22 23   MRI ABDOMEN W WO CONTRAST Unknown Rpt       Progress Notes     Expand All Collapse All    PATIENT: Elroy Rahman  MRN: [id number]  DATE: 8/27/2018        Diagnosis:   1. Bronchial carcinoid tumors    2. Secondary neuroendocrine tumor of liver          Chief Complaint: Carcinoid Syndrome (bronchial)        Oncologic History:       Oncologic History Bronchial carcinoid, left, diagnosed 1/2016   Metastatic disease to liver and bone 5/2017     Oncologic Treatment Left upper lobectomy 1/2016  CAPTEM 7/2017   Lanreotide 7/2017   Zoledronic acid 7/2017     Pathology 1/2016: Typical carcinoid tumor, well-differentiated T2a, N0, M0 Ki-67 <1%  6/2017: Liver biopsy, well-differentiated, Ki-67 25%             Subjective:    Interval History: Mr. Rahman is a 62 y.o. male who is seen in follow-up for a bronchial carcinoid tumor.  He states he feels well.  He remains on treatment CAPTEM.  He is planning on going [employer] hunting next week when the season opens.  He has no new complaints.     His history dates to 1/2016 when he underwent a left upper lobectomy with Dr. Bell for a typical carcinoid tumor.  His pathological staging was T2a, N0, M0 with Ki-67 less than 1% and mitotic count 0 per 10 high-powered fields.  He done well postoperatively, however, in 5/2017 he was undergoing routine surveillance and a chest CT in picked up evidence of progression of disease in the liver.  Dedicated abdominal CT was performed showing multiple liver lesions.  In  6/2017 he underwent a CT-guided liver biopsy which was positive for metastatic neuroendocrine tumor which was well-differentiated with no overt nuclear atypia, however, the Ki-67 was 25%.  He underwent gallium-68 PET/CT and was found to have widespread disease involving the bone, liver and also mesentery.  He was started on CAPTEM in 7/2017 and also Lanreotide and zoledronic acid.  Scans in 10/2017 had shown stability of disease.  Imaging in 1/2018 had shown stability of disease.  Scans in 4/2018 had shown stability of disease.  Scans in 07/2018 had continued to showed mild growth.        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 he has never used smokeless tobacco. He reports that he drinks about 0.6 oz of alcohol per week. He reports that he does not use drugs.     Allergies:  Review of patient's allergies indicates:  No Known Allergies     Medications:  Current Medications          Current Outpatient Medications   Medication Sig Dispense Refill    capecitabine (XELODA) 500 MG Tab TAKE 4 TABLETS (2,000MG TOTAL) BY MOUTH 2 TIMES DAILY FOR 14 DAYS EVERY 28 DAYS ( 2 WEEKS ON, 2 WEEKS OFF, EVERY 4 WEEKS). 112 tablet 3    lanreotide (SOMATULINE DEPOT) 120 mg/0.5 mL Syrg Inject 120 mg into the skin every 28 days.        temozolomide  (TEMODAR) 250 MG capsule TAKE 2 CAPSULES (500MG TOTAL) BY MOUTH ONCE DAILY ON DAYS 10-14 OF 28 DAY CYCLE 10 capsule 6      No current facility-administered medications for this visit.                 Facility-Administered Medications Ordered in Other Visits   Medication Dose Route Frequency Provider Last Rate Last Dose    lanreotide injection 120 mg  120 mg Subcutaneous 1 time in Clinic/HOD Jesus London, DO, FACP                Review of Systems   Constitutional: Positive for fatigue. Negative for appetite change, chills, fever and unexpected weight change.   HENT: Negative for dental problem, sinus pressure and sneezing.    Eyes: Negative for visual disturbance.   Respiratory: Negative for cough, choking and chest tightness.    Cardiovascular: Negative for chest pain and leg swelling.   Gastrointestinal: Negative for abdominal pain, blood in stool, constipation, diarrhea and nausea.   Genitourinary: Negative for difficulty urinating and dysuria.   Musculoskeletal: Negative for arthralgias and back pain.   Skin: Positive for wound. Negative for rash.   Neurological: Negative for dizziness, light-headedness and headaches.   Hematological: Negative for adenopathy. Does not bruise/bleed easily.   Psychiatric/Behavioral: Negative for sleep disturbance. The patient is not nervous/anxious.          ECOG Performance Status:   ECOG SCORE    0 - Fully active-able to carry on all pre-disease performance without restriction            Objective:   Vitals:   Vitals       Vitals:     08/27/18 1054   BP: 127/71   Pulse: 63   Resp: 16   Temp: 97.8 °F (36.6 °C)   SpO2: 97%   Weight: 134.2 kg (295 lb 13.7 oz)         BMI: Body mass index is 40.13 kg/m².     Physical Exam   Constitutional: He is oriented to person, place, and time. He appears well-developed and well-nourished.   HENT:   Head: Normocephalic and atraumatic.   Eyes: Pupils are equal, round, and reactive to light.   Neck: Normal range of motion. Neck supple.    Cardiovascular: Normal rate and regular rhythm.   Pulmonary/Chest: Effort normal and breath sounds normal. No respiratory distress.   Abdominal: Soft. He exhibits no distension. There is no tenderness.   Musculoskeletal: He exhibits no edema or tenderness.   Left lower extremity prosthesis   Lymphadenopathy:     He has no cervical adenopathy.   Neurological: He is alert and oriented to person, place, and time. No cranial nerve deficit.   Skin: Skin is warm and dry.   Right middle finger with 2 cm abrasion, dorsal skin of 3rd metacarpal.  No surrounding erythema, edema.   Psychiatric: He has a normal mood and affect. His behavior is normal.         Laboratory Data:          Lab Visit on 08/27/2018   Component Date Value Ref Range Status    WBC 08/27/2018 4.18  3.90 - 12.70 K/uL Final    RBC 08/27/2018 4.39* 4.60 - 6.20 M/uL Final    Hemoglobin 08/27/2018 14.3  14.0 - 18.0 g/dL Final    Hematocrit 08/27/2018 41.6  40.0 - 54.0 % Final    MCV 08/27/2018 95  82 - 98 fL Final    MCH 08/27/2018 32.6* 27.0 - 31.0 pg Final    MCHC 08/27/2018 34.4  32.0 - 36.0 g/dL Final    RDW 08/27/2018 14.0  11.5 - 14.5 % Final    Platelets 08/27/2018 193  150 - 350 K/uL Final    MPV 08/27/2018 9.5  9.2 - 12.9 fL Final    Gran # (ANC) 08/27/2018 2.6  1.8 - 7.7 K/uL Final     Comment: The ANC is based on a white cell differential from an   automated cell counter. It has not been microscopically   reviewed for the presence of abnormal cells. Clinical   correlation is required.       Immature Grans (Abs) 08/27/2018 0.01  0.00 - 0.04 K/uL Final     Comment: Mild elevation in immature granulocytes is non specific and   can be seen in a variety of conditions including stress response,   acute inflammation, trauma and pregnancy. Correlation with other   laboratory and clinical findings is essential.       Sodium 08/27/2018 138  136 - 145 mmol/L Final    Potassium 08/27/2018 4.1  3.5 - 5.1 mmol/L Final    Chloride 08/27/2018 106   95 - 110 mmol/L Final    CO2 08/27/2018 24  23 - 29 mmol/L Final    Glucose 08/27/2018 119* 70 - 110 mg/dL Final    BUN, Bld 08/27/2018 16  8 - 23 mg/dL Final    Creatinine 08/27/2018 1.0  0.5 - 1.4 mg/dL Final    Calcium 08/27/2018 9.1  8.7 - 10.5 mg/dL Final    Total Protein 08/27/2018 7.1  6.0 - 8.4 g/dL Final    Albumin 08/27/2018 3.9  3.5 - 5.2 g/dL Final    Total Bilirubin 08/27/2018 1.2* 0.1 - 1.0 mg/dL Final     Comment: For infants and newborns, interpretation of results should be based  on gestational age, weight and in agreement with clinical  observations.  Premature Infant recommended reference ranges:  Up to 24 hours.............<8.0 mg/dL  Up to 48 hours............<12.0 mg/dL  3-5 days..................<15.0 mg/dL  6-29 days.................<15.0 mg/dL       Alkaline Phosphatase 08/27/2018 60  55 - 135 U/L Final    AST 08/27/2018 17  10 - 40 U/L Final    ALT 08/27/2018 23  10 - 44 U/L Final    Anion Gap 08/27/2018 8  8 - 16 mmol/L Final    eGFR if African American 08/27/2018 >60.0  >60 mL/min/1.73 m^2 Final    eGFR if non African American 08/27/2018 >60.0  >60 mL/min/1.73 m^2 Final     Comment: Calculation used to obtain the estimated glomerular filtration  rate (eGFR) is the CKD-EPI equation.          Supplemental Diagnosis  Chromogranin Staining:  Intensity: Moderate to strong. Positive cells: 100 (%)  Synaptophysin Staining:  Intensity: Strong. Positive cells: 100 (%)  CD31: 40 vessels per 1 HPF  Factor VIII: 27 vessels per 1 HPF  Ki67: 1 % tumor cells staining  Immunostains performed with appropriate positive and negative controls.  FINAL PATHOLOGIC DIAGNOSIS  1. Lymph node, level XI, excisional biopsy:  Fragments of benign lymph node with anthracosis and sinus histiocytosis (0/1).  2. Lung, lingular staple line, biopsy:  Lung, negative for neoplasm.  3. Lymph node, level X, excisional biopsy:  1 benign lymph node with anthracotic pigment and sinus histiocytosis (0/1).  4. Lymph  node, level XII, excisional biopsy:  2 benign lymph nodes with sinus histiocytosis and anthracotic pigment (0/2).  5. Lung, left upper lobe, lobectomy:  Typical carcinoid tumor (well differentiated neuroendocrine tumor), 4 cm in greatest dimension.  Bronchial and vascular margins are negative for neoplasm.  Uninvolved lung adjacent to the tumor shows emphysematous changes and fibrosis, however remaining lung  appears unremarkable.  Additional immunohistochemical stains for ancillary studies (including synaptophysin, chromogranin and Ki-67)  will be completed and results will be reported in a supplemental report.  See synoptic report in comment section for further details.  6. Lymph node, level VII, excisional biopsy:  2 benign lymph nodes with anthracotic pigment and sinus histiocytosis (0/2).  Comment: Synoptic Report  Specimen: Left upper lobe of lung  Procedure: Lobectomy  Specimen laterality: Left  Tumor size:  Greatest dimension: 4 cm  Tumor focality: Single focus  Histologic type: Typical carcinoid tumor  Visceral pleural invasion: Not identified  Tumor extension: Not identified  Margins: Bronchial and vascular margins are uninvolved by tumor.  Distance of tumor from closest margin: 1 cm from the bronchial surgical margin.  Lymphovascular invasion: Not identified  Ancillary studies:  Immunohistochemical stains for neuroendocrine markers and Ki-67 will be completed and results will follow in a  supplemental report.  Note: The tumor consists of a proliferation of cells in nests with lewis formation. The tumor cells have salt and  pepper chromatin pattern with abundant cytoplasm. There is no significant nuclear atypia. No evidence of  necrosis. Mitotic count is 0 mitoses in 10 high power fields.     Imaging:      MRI 07/23/2018       EXAMINATION:  MRI ABDOMEN W WO CONTRAST    CLINICAL HISTORY:  Bronchial carcinoid tumors;  Other secondary neuroendocrine tumors    TECHNIQUE:  Multiplanar, multi-sequence MR  imaging of the abdomen was performed before and after administration of 10 cc of Gadavist gadolinium-based intravenous contrast per the liver protocol.    COMPARISON:  MRI abdomen 01/19/2018, 04/18/2018    FINDINGS:  The visualized lungs, heart, and pericardium demonstrate nothing unusual within the confines of this exam.    The liver is normal in size and contour.  There is diffuse signal dropout of the liver on out of phase imaging compatible with hepatic steatosis.  Innumerable T2 hyperintense, enhancing lesions are seen throughout the liver compatible with known metastatic disease.  Index lesion in hepatic segment III measures 4.1 cm (axial series 14, image 38), previously 3.8 cm.  Additional index lesion in hepatic segment V/VIII measures 4.0 cm (axial series 14, image 40), previously 3.8 cm.  While not an index lesion, there is a lesion in hepatic segment V which measures 2.3 cm (axial series 14, image 43), previously 2.2 cm when directly remeasured.  Overall lesions appear to have increased in size over multiple prior exams with no definite new lesions identified.  The portal vein is patent.    No evidence of intra-or extrahepatic biliary ductal dilatation. The gallbladder, stomach, pancreas, and visualized portions of the bowel demonstrate no significant abnormalities.  Two areas of restricted diffusion within the spleen appear unchanged.  Stable right adrenal adenoma.  The left adrenal gland is unremarkable.    The kidneys are normal in size and location. No evidence of hydronephrosis or solid renal masses.  Stable subcentimeter left renal cysts.    The visualized aorta is normal in caliber.    The osseous structures demonstrate stable enhancing lesions compatible with known metastatic disease.    Soft tissues of the lower thoracic and abdominal wall are unremarkable.       Impression         Innumerable enhancing hepatic lesions which appear stable in number and majority the stable in size, but at least 2  have slightly increased in size suggesting mild progression of metastatic disease.    Stable osseous metastatic disease.    Additional stable findings as above.    RECIST SUMMARY:    Date of prior examination for comparison: 04/18/2018    Lesion 1: Hepatic segment V/VIII.  4.0 cm. Series 14 image 40.  Prior measurement 3.8 cm.    Lesion 2: Hepatic segment III.  4.1 cm. Series 14 image 38.  Prior measurement 3.8 cm.         CT 07/23/2018       EXAMINATION:  CT CHEST WITHOUT CONTRAST    CLINICAL HISTORY:  Bronchial carcinoid tumors; Benign carcinoid tumor of the bronchus and lung    TECHNIQUE:  Low dose axial images, sagittal and coronal reformations were obtained from the thoracic inlet to the lung bases. Contrast was not administered.    COMPARISON:  CT chest without contrast: 04/16/2018, 01/19/2018, 11/02/2017, 05/17/2017, 08/17/2016, 02/01/2016, 12/31/2015.    FINDINGS:  The structures at the base of the neck reveal no convincing evidence of disease.    There is a left-sided aortic arch with three branch vessels.  The thoracic aorta maintains normal caliber, contour, and course with atherosclerotic calcification within its course.    The heart is not enlarged and there is no evidence of pericardial effusion.  There is mild calcification of the coronary arteries.    The pulmonary arteries distribute normally.  There are four pulmonary veins.  Systemic and pulmonary venoatrial connections are concordant.    There is no axillary or mediastinal lymph node enlargement.    The esophagus maintains a normal course and caliber.    Postoperative changes of left thoracotomy are again noted.  There are numerous sclerotic foci again identified throughout the visualized spine and ribs, similar to prior examination dated 04/16/2018.    Limited views of the abdomen demonstrate multiple hypoattenuating masses in the liver consistent with patient's known metastatic disease, which are not fully characterized on this noncontrast  examination.  There is again identified a right adrenal adenoma.  Please refer to MRI abdomen dated 07/23/2018 for further characterization..    Lungs: Postoperative changes of left upper lobe resection and lingulectomy with volume loss and leftward mediastinal shift again identified.  No convincing evidence of new pulmonary disease.  There is a nodule measuring 0.4 cm located in the posterior basal segment of the left lower lobe (axial series 4, image 324).  This has been stable since CT chest dated 05/17/2017.  Such stability over 12 months is likely of benign etiology.  No further surveillance is warranted per the Fleischner Society Pulmonary nodule recommendations.  There is an incomplete right major fissure.  The lungs are symmetrically expanded and without evidence of consolidation, pneumothorax, mass, or significant pleural thickening.  There is no evidence of pleural fluid.       Impression         Postoperative changes of left upper lobe resection and lingulectomy with volume loss and leftward mediastinal shift again identified. No convincing evidence of new pulmonary disease.  There is a nodule measuring 0.4 cm located in the posterior basal segment of the left lower lobe (axial series 4, image 324).  This has been stable since CT chest dated 05/17/2017. Such stability over 12 months is likely of benign etiology. No further surveillance is warranted per the Fleischner Society Pulmonary nodule recommendations.    Hypoattenuating masses in the liver consistent with patient's known metastatic disease, which are not fully characterized on this noncontrast examination.  Right adrenal adenoma is again identified.  Please refer to MRI abdomen dated 07/23/2018 for further characterization.    Numerous sclerotic foci consistent with metastases again identified throughout the visualized spine and ribs, similar to prior examination dated 04/16/2018.    Additional findings as above                     Assessment:        1. Bronchial carcinoid tumors    2. Secondary neuroendocrine tumor of liver             Plan:      Mr. Rahman continues to do well on CAPTEM in tolerating this fine.  I did review his scans in our multidisciplinary neuroendocrine tumor board which indicate mild increase in the size several liver lesions and liver directed therapy was recommended.  I have discussed this in detail with him today and he is agreeable to proceed but wishes to wait until after hunting season which should and next week.  He will start his next cycle of CAPTEM tomorrow and we will plan on getting him set up for liver directed therapy in another 3-4 weeks.  All questions were answered and he is agreeable with this plan.     Jesus London DO, FACP  Hematology & Oncology  [facility] [place] [address]  ph. [phone]  Fax. [phone]           Encounter        [facility]                                                NEUROENDOCRINE TUMOR MULTIDISCIPLINARY TUMOR BOARD  _____________________________________________________________________     PRESENTER:   Jesus London DO     REASON FOR PRESENTATION:  Treatment Plan, Scan Review and Liver Directed Therapy     ATTENDEES:   Surgery:              MD TOMÁS Corbin MD     Interventional Radiology - Richmond Peralta MD  Pathology -   Oncology - Jesus London  Gastroenterology - Not present   Nursing  Research     PATIENT STATUS:  Established Patient     TUMOR SITE (Primary & Mets):  See below     PATIENT SUMMARY:       Past Medical History:   Diagnosis Date    Carcinoid bronchial adenoma of left lung      Chemotherapy follow-up examination 8/25/2017    Cough with hemoptysis       mild/intermittent    Elevated bilirubin 7/2/2018    Mild heartburn      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 Bronchial carcinoid tumors    2. Secondary neuroendocrine tumor of liver    3. Secondary neuroendocrine tumor of bone          Chief Complaint: Bronchial carcinoid tumors        Oncologic History:       Oncologic History Bronchial carcinoid, left, diagnosed 1/2016   Metastatic disease to liver and bone 5/2017     Oncologic Treatment Left upper lobectomy 1/2016  CAPTEM 7/2017   Lanreotide 7/2017   Zoledronic acid 7/2017     Pathology 1/2016: Typical carcinoid tumor, well-differentiated T2a, N0, M0 Ki-67 <1%  6/2017: Liver biopsy, well-differentiated, Ki-67               Pt has widespread osseous metastasis and bulky liver disease with ~30% involvement. All lesions (target and non target) are enlarging. I dont see prior LDT on our system. Has he had any done at OSH. If no prior LDT I think he would be a good RETNET candidate. I rec TACE given increase in size of lesions.   MRI scan reviewed      BOARD RECOMMENDATIONS:      Consider TACE for multiple liver lesions which are enlarging, no new lesions noted  Not a candidate for RetNet due to chemotherapy CapTem  Appointment with Dr Peralta to discuss TACE           MRI ABDOMEN W WO CONTRAST    CLINICAL HISTORY:  Bronchial carcinoid tumors;  Other secondary neuroendocrine tumors    TECHNIQUE:  Multiplanar, multi-sequence MR imaging of the abdomen was performed before and after administration of 10 cc of Gadavist gadolinium-based intravenous contrast per the liver protocol.    COMPARISON:  MRI abdomen 01/19/2018, 04/18/2018    FINDINGS:  The visualized lungs, heart, and pericardium demonstrate nothing unusual within the confines of this exam.    The liver is normal in size and contour.  There is diffuse signal dropout of the liver on out of phase imaging compatible with hepatic steatosis.  Innumerable T2  hyperintense, enhancing lesions are seen throughout the liver compatible with known metastatic disease.  Index lesion in hepatic segment III measures 4.1 cm (axial series 14, image 38), previously 3.8 cm.  Additional index lesion in hepatic segment V/VIII measures 4.0 cm (axial series 14, image 40), previously 3.8 cm.  While not an index lesion, there is a lesion in hepatic segment V which measures 2.3 cm (axial series 14, image 43), previously 2.2 cm when directly remeasured.  Overall lesions appear to have increased in size over multiple prior exams with no definite new lesions identified.  The portal vein is patent.    No evidence of intra-or extrahepatic biliary ductal dilatation. The gallbladder, stomach, pancreas, and visualized portions of the bowel demonstrate no significant abnormalities.  Two areas of restricted diffusion within the spleen appear unchanged.  Stable right adrenal adenoma.  The left adrenal gland is unremarkable.    The kidneys are normal in size and location. No evidence of hydronephrosis or solid renal masses.  Stable subcentimeter left renal cysts.    The visualized aorta is normal in caliber.    The osseous structures demonstrate stable enhancing lesions compatible with known metastatic disease.    Soft tissues of the lower thoracic and abdominal wall are unremarkable                         Impression:  Lung carcinoid with metastatic disease to the liver with an incarcerated umbilical hernia  .  He is here for the chemo embolization for the 1st time I doctor him extensively about the risks and benefits of the chemo embolization I went over with him the procedure however it is done the hospitalization and the postop recovery time.  I doctor more the complications suggests nausea vomiting fever of fatigue for to 2-3 weeks time.    Laboratory explained about the embolization the need for repetition of the procedure in 3 months time.  I also Dr. IRAHETA postop care since his labs in 2 weeks time  and MRI in  3 months time    Since he has incarcerated umbilical hernia which is causing him some trouble I would pursue repair of umbilical hernia after he recovers from the chemo embolization.  He is not obstructed and not significantly symptomatic however side has some discomfort on palpation of the hernia. Will fix the hernia between the chemo embolization sections.    I answered all his questions extensively.  High a doctor over all the possible options.  Spent more than 30 min in conversation explanation and discussion of long-term plans  Plan:     NPO after midnight  Pre up orders and post embolization are displaced  For chemo embolization tomorrow  Umbilical hernia repair in about 4 weeks time                    GRACY Sepulveda MD, FACS   Associate Professor of Surgery, [facility]   Neuroendocrine Surgery, Hepatic/Pancreatic & General Surgery   [address], [address]   [place] [address]   ph. [phone]; [phone]   fax. [phone]

## 2018-10-05 ENCOUNTER — HOSPITAL ENCOUNTER (OUTPATIENT)
Facility: HOSPITAL | Age: 63
Discharge: HOME | End: 2018-10-06
Attending: INTERNAL MEDICINE | Admitting: SURGERY
Payer: COMMERCIAL

## 2018-10-05 DIAGNOSIS — C78.7 SECONDARY MALIGNANT NEOPLASM OF LIVER: ICD-10-CM

## 2018-10-05 DIAGNOSIS — C7B.8 SECONDARY NEUROENDOCRINE TUMOR OF LIVER: ICD-10-CM

## 2018-10-05 PROCEDURE — 25000003 PHARM REV CODE 250: Performed by: INTERNAL MEDICINE

## 2018-10-05 PROCEDURE — 63600175 PHARM REV CODE 636 W HCPCS: Performed by: RADIOLOGY

## 2018-10-05 PROCEDURE — 25000003 PHARM REV CODE 250: Performed by: RADIOLOGY

## 2018-10-05 PROCEDURE — 63600175 PHARM REV CODE 636 W HCPCS: Performed by: INTERNAL MEDICINE

## 2018-10-05 PROCEDURE — 25500020 PHARM REV CODE 255

## 2018-10-05 PROCEDURE — 25000003 PHARM REV CODE 250: Performed by: SURGERY

## 2018-10-05 PROCEDURE — 63600175 PHARM REV CODE 636 W HCPCS: Performed by: SURGERY

## 2018-10-05 PROCEDURE — 63600175 PHARM REV CODE 636 W HCPCS

## 2018-10-05 PROCEDURE — 25500020 PHARM REV CODE 255: Performed by: RADIOLOGY

## 2018-10-05 RX ORDER — FENTANYL CITRATE 50 UG/ML
INJECTION, SOLUTION INTRAMUSCULAR; INTRAVENOUS CODE/TRAUMA/SEDATION MEDICATION
Status: DISCONTINUED | OUTPATIENT
Start: 2018-10-05 | End: 2018-10-06 | Stop reason: HOSPADM

## 2018-10-05 RX ORDER — PROMETHAZINE HYDROCHLORIDE 25 MG/ML
INJECTION, SOLUTION INTRAMUSCULAR; INTRAVENOUS CODE/TRAUMA/SEDATION MEDICATION
Status: DISCONTINUED | OUTPATIENT
Start: 2018-10-05 | End: 2018-10-06 | Stop reason: HOSPADM

## 2018-10-05 RX ORDER — MIDAZOLAM HYDROCHLORIDE 1 MG/ML
INJECTION INTRAMUSCULAR; INTRAVENOUS CODE/TRAUMA/SEDATION MEDICATION
Status: DISCONTINUED | OUTPATIENT
Start: 2018-10-05 | End: 2018-10-06 | Stop reason: HOSPADM

## 2018-10-05 RX ORDER — IODIXANOL 320 MG/ML
105 INJECTION, SOLUTION INTRAVASCULAR
Status: COMPLETED | OUTPATIENT
Start: 2018-10-05 | End: 2018-10-05

## 2018-10-05 RX ORDER — MAGNESIUM SULFATE HEPTAHYDRATE 40 MG/ML
2 INJECTION, SOLUTION INTRAVENOUS ONCE
Status: COMPLETED | OUTPATIENT
Start: 2018-10-05 | End: 2018-10-05

## 2018-10-05 RX ORDER — MANNITOL 250 MG/ML
12.5 INJECTION, SOLUTION INTRAVENOUS ONCE
Status: DISCONTINUED | OUTPATIENT
Start: 2018-10-05 | End: 2018-10-05 | Stop reason: HOSPADM

## 2018-10-05 RX ORDER — LIDOCAINE HYDROCHLORIDE 10 MG/ML
INJECTION INFILTRATION; PERINEURAL CODE/TRAUMA/SEDATION MEDICATION
Status: DISCONTINUED | OUTPATIENT
Start: 2018-10-05 | End: 2018-10-06 | Stop reason: HOSPADM

## 2018-10-05 RX ORDER — DEXTROSE MONOHYDRATE, SODIUM CHLORIDE, AND POTASSIUM CHLORIDE 50; 1.49; 4.5 G/1000ML; G/1000ML; G/1000ML
INJECTION, SOLUTION INTRAVENOUS CONTINUOUS
Status: DISCONTINUED | OUTPATIENT
Start: 2018-10-05 | End: 2018-10-06 | Stop reason: HOSPADM

## 2018-10-05 RX ORDER — FUROSEMIDE 10 MG/ML
20 INJECTION INTRAMUSCULAR; INTRAVENOUS ONCE
Status: COMPLETED | OUTPATIENT
Start: 2018-10-05 | End: 2018-10-05

## 2018-10-05 RX ORDER — DEXAMETHASONE SODIUM PHOSPHATE 4 MG/ML
8 INJECTION, SOLUTION INTRA-ARTICULAR; INTRALESIONAL; INTRAMUSCULAR; INTRAVENOUS; SOFT TISSUE ONCE
Status: COMPLETED | OUTPATIENT
Start: 2018-10-05 | End: 2018-10-05

## 2018-10-05 RX ORDER — ONDANSETRON 2 MG/ML
4 INJECTION INTRAMUSCULAR; INTRAVENOUS EVERY 8 HOURS PRN
Status: DISCONTINUED | OUTPATIENT
Start: 2018-10-05 | End: 2018-10-06 | Stop reason: HOSPADM

## 2018-10-05 RX ORDER — DIPHENHYDRAMINE HYDROCHLORIDE 50 MG/ML
50 INJECTION INTRAMUSCULAR; INTRAVENOUS ONCE
Status: COMPLETED | OUTPATIENT
Start: 2018-10-05 | End: 2018-10-05

## 2018-10-05 RX ORDER — IBUPROFEN 400 MG/1
400 TABLET ORAL EVERY 4 HOURS PRN
Status: DISCONTINUED | OUTPATIENT
Start: 2018-10-05 | End: 2018-10-06 | Stop reason: HOSPADM

## 2018-10-05 RX ORDER — SODIUM CHLORIDE 9 MG/ML
500 INJECTION, SOLUTION INTRAVENOUS ONCE
Status: COMPLETED | OUTPATIENT
Start: 2018-10-05 | End: 2018-10-05

## 2018-10-05 RX ORDER — OXYCODONE AND ACETAMINOPHEN 5; 325 MG/1; MG/1
1 TABLET ORAL EVERY 4 HOURS PRN
Status: DISCONTINUED | OUTPATIENT
Start: 2018-10-05 | End: 2018-10-06 | Stop reason: HOSPADM

## 2018-10-05 RX ORDER — ALLOPURINOL 300 MG/1
300 TABLET ORAL DAILY
Status: COMPLETED | OUTPATIENT
Start: 2018-10-05 | End: 2018-10-06

## 2018-10-05 RX ORDER — FAMOTIDINE 20 MG/1
20 TABLET, FILM COATED ORAL EVERY 12 HOURS
Status: DISCONTINUED | OUTPATIENT
Start: 2018-10-05 | End: 2018-10-06 | Stop reason: HOSPADM

## 2018-10-05 RX ADMIN — LIDOCAINE HYDROCHLORIDE 10 ML: 10 INJECTION, SOLUTION INFILTRATION; PERINEURAL at 10:10

## 2018-10-05 RX ADMIN — FUROSEMIDE 20 MG: 10 INJECTION, SOLUTION INTRAMUSCULAR; INTRAVENOUS at 01:10

## 2018-10-05 RX ADMIN — AMPICILLIN AND SULBACTAM 3 G: 2; 1 INJECTION, POWDER, FOR SOLUTION INTRAVENOUS at 09:10

## 2018-10-05 RX ADMIN — DEXTROSE, SODIUM CHLORIDE, AND POTASSIUM CHLORIDE: 5; .45; .15 INJECTION INTRAVENOUS at 12:10

## 2018-10-05 RX ADMIN — FAMOTIDINE 20 MG: 20 TABLET, FILM COATED ORAL at 08:10

## 2018-10-05 RX ADMIN — DOXORUBICIN HYDROCHLORIDE 50 MG: 2 INJECTION, POWDER, LYOPHILIZED, FOR SOLUTION INTRAVENOUS at 10:10

## 2018-10-05 RX ADMIN — SODIUM CHLORIDE 500 ML: 0.9 INJECTION, SOLUTION INTRAVENOUS at 09:10

## 2018-10-05 RX ADMIN — DIPHENHYDRAMINE HYDROCHLORIDE 50 MG: 50 INJECTION, SOLUTION INTRAMUSCULAR; INTRAVENOUS at 09:10

## 2018-10-05 RX ADMIN — OCTREOTIDE ACETATE 500 MCG: 500 INJECTION, SOLUTION INTRAVENOUS; SUBCUTANEOUS at 09:10

## 2018-10-05 RX ADMIN — AMPICILLIN AND SULBACTAM 3 G: 2; 1 INJECTION, POWDER, FOR SOLUTION INTRAVENOUS at 08:10

## 2018-10-05 RX ADMIN — PROMETHAZINE HYDROCHLORIDE 12.5 MG: 25 INJECTION INTRAMUSCULAR; INTRAVENOUS at 10:10

## 2018-10-05 RX ADMIN — MIDAZOLAM HYDROCHLORIDE 0.5 MG: 1 INJECTION, SOLUTION INTRAMUSCULAR; INTRAVENOUS at 10:10

## 2018-10-05 RX ADMIN — FENTANYL CITRATE 25 MCG: 50 INJECTION, SOLUTION INTRAMUSCULAR; INTRAVENOUS at 10:10

## 2018-10-05 RX ADMIN — OCTREOTIDE ACETATE 500 MCG/HR: 1000 INJECTION, SOLUTION INTRAVENOUS; SUBCUTANEOUS at 10:10

## 2018-10-05 RX ADMIN — ONDANSETRON HYDROCHLORIDE 16 MG: 2 SOLUTION INTRAMUSCULAR; INTRAVENOUS at 09:10

## 2018-10-05 RX ADMIN — DEXTROSE, SODIUM CHLORIDE, AND POTASSIUM CHLORIDE: 5; .45; .15 INJECTION INTRAVENOUS at 09:10

## 2018-10-05 RX ADMIN — IODIXANOL 105 ML: 320 INJECTION, SOLUTION INTRAVASCULAR at 11:10

## 2018-10-05 RX ADMIN — MAGNESIUM SULFATE IN WATER 2 G: 40 INJECTION, SOLUTION INTRAVENOUS at 09:10

## 2018-10-05 RX ADMIN — ALLOPURINOL 300 MG: 300 TABLET ORAL at 01:10

## 2018-10-05 RX ADMIN — DEXAMETHASONE SODIUM PHOSPHATE 8 MG: 4 INJECTION, SOLUTION INTRAMUSCULAR; INTRAVENOUS at 01:10

## 2018-10-05 RX ADMIN — DEXTROSE, SODIUM CHLORIDE, AND POTASSIUM CHLORIDE: 5; .45; .15 INJECTION INTRAVENOUS at 07:10

## 2018-10-05 RX ADMIN — Medication 10 ML: at 10:10

## 2018-10-05 RX ADMIN — FAMOTIDINE 20 MG: 20 TABLET, FILM COATED ORAL at 01:10

## 2018-10-05 RX ADMIN — AMPICILLIN AND SULBACTAM 3 G: 2; 1 INJECTION, POWDER, FOR SOLUTION INTRAVENOUS at 03:10

## 2018-10-05 NOTE — H&P
Radiology History & Physical      SUBJECTIVE:     Chief Complaint: Neuroendocrine    History of Present Illness:  Elroy Rahman is a 63 y.o. 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    Sleep apnea     Snores      Past Surgical History:   Procedure Laterality Date    [id number] N/A 6/8/2017    Performed by [facility] Diagnostic Provider at [facility] OR 2ND FLR    Bka Left     LEFT UPPER LOBECTOMY-LUNG Left 1/28/2016    Performed by Pan Bell MD at [facility] [place]    THORACOTOMY W/LUNG RESECTION Left 1/28/2016    Performed by Pan Bell MD at [facility] OR [address] [place] [address]       Home Meds:   Prior to Admission medications    Medication Sig Start Date End Date Taking? Authorizing Provider   capecitabine (XELODA) 500 MG Tab TAKE 4 TABLETS (2,000MG TOTAL) BY MOUTH 2 TIMES DAILY FOR 14 DAYS EVERY 28 DAYS ( 2 WEEKS ON, 2 WEEKS OFF, EVERY 4 WEEKS). 6/21/18   Jesus London DO, FACJOSE   ciprofloxacin HCl (CIPRO) 500 MG tablet Take 1 tablet (500 mg total) by mouth every 12 (twelve) hours. 10/4/18   Coco Guidry MD   lanreotide (SOMATULINE DEPOT) 120 mg/0.5 mL Syrg Inject 120 mg into the skin every 28 days.    Historical Provider, MD   ondansetron (ZOFRAN) 4 MG tablet Take 1 tablet (4 mg total) by mouth 2 (two) times daily. 10/4/18   Coco Guidry MD   temozolomide (TEMODAR) 250 MG capsule TAKE 2 CAPSULES (500MG TOTAL) BY MOUTH ONCE DAILY ON DAYS 10-14 OF 28 DAY CYCLE 5/28/18   Kade Medina MD   traMADol (ULTRAM) 50 mg tablet Take 1 tablet (50 mg total) by mouth every 6 (six) hours as needed. 10/4/18 10/14/18  Coco Guidry MD      Anticoagulants/Antiplatelets: no anticoagulation    Allergies: Review of patient's allergies indicates:  No Known Allergies  Sedation History:  no adverse reactions           OBJECTIVE:     Vital Signs (Most Recent)       Physical Exam:  ASA: 2  Mallampati: 2    General: no acute distress  Mental Status: alert and oriented to person, place and time  HEENT: normocephalic, atraumatic  Chest: unlabored breathing  Heart: regular heart rate  Abdomen: nondistended  Extremity: moves all extremities    Laboratory  Lab Results   Component Value Date    INR 0.9 10/04/2018       Lab Results   Component Value Date    WBC 5.07 10/04/2018    HGB 14.5 10/04/2018    HCT 42.8 10/04/2018    MCV 94 10/04/2018     10/04/2018      Lab Results   Component Value Date     10/04/2018     10/04/2018    K 4.4 10/04/2018     10/04/2018    CO2 26 10/04/2018    BUN 13 10/04/2018    CREATININE 1.1 10/04/2018    CALCIUM 9.4 10/04/2018    MG 2.0 11/20/2017    ALT 19 10/04/2018    AST 16 10/04/2018    ALBUMIN 4.0 10/04/2018    BILITOT 0.9 10/04/2018       ASSESSMENT/PLAN:     Sedation Plan: Moderate  Patient will undergo TACE.    .INestor M.D. personally reviewed and agree with the above dictated note.

## 2018-10-05 NOTE — PLAN OF CARE
Problem: Patient Care Overview  Goal: Plan of Care Review  Outcome: Ongoing (interventions implemented as appropriate)  Pt AAOx4. Right groin dressing CDI. Pt advanced to a regular diet, tolerating well. No complaints of pain or discomfort. Wife at the bedside. Will continue to monitor.

## 2018-10-05 NOTE — DISCHARGE SUMMARY
Radiology Discharge Summary      Hospital Course: No complications    Admit Date: 10/5/2018  Discharge Date: 10/05/2018     Instructions Given to Patient: Yes  Diet: Resume prior diet  Activity: activity as tolerated    Description of Condition on Discharge: Stable  Vital Signs (Most Recent): Temp: 98.1 °F (36.7 °C) (10/05/18 0837)  Pulse: (!) 52 (10/05/18 1130)  Resp: 15 (10/05/18 1130)  BP: (!) 161/90 (10/05/18 1130)  SpO2: 97 % (10/05/18 1130)    Discharge Disposition: Floor    Discharge Diagnosis: Neuroendocrine     Follow-up: with surgical oncology    .INestor M.D. personally reviewed and agree with the above dictated note.

## 2018-10-05 NOTE — PROCEDURES
Radiology Post-Procedure Note    Pre Op Diagnosis: Metastatatic Neuroendocrine    Post Op Diagnosis: Same    Procedure: Chemoembolization    Procedure Performed by: Kelvin    Written Informed Consent Obtained: Yes    Specimen Removed: None    Estimated Blood Loss: Minimal    Findings:     After placement of a right femoral artery sheath, a 5-[place]ian catheter was inserted and angiography of the celiac artery and superior mesenteric artery for anatomic evaluation and localization of hepatic tumor.  A microcatheter was introduced into feeding arteries of the right hepatic lobe and lidiodol coated with doxorubicin emulsion was injected until slow flow was achieved. Bakced up with 100-300 microspheres.  Post procedural angiography revealed no complications.    Right femoral artery angiogram was performed and the sheath was removed.  Hemostasis was achieved using AngioSeal technique.  There was no hematoma at the time of hemostasis.    The patient tolerated procedure well.  Please see Imaging report for further details.    .Nestor LEONE M.D. personally reviewed and agree with the above dictated note.

## 2018-10-05 NOTE — NURSING TRANSFER
Nursing Transfer Note      10/5/2018     Transfer From: IR    Transfer via stretcher    Transfer with Personal belongings     Transported by [facility]    Medicines sent: Yes    Chart send with patient: Yes    Notified: spouse    Patient reassessed at: 1345  10/5/2018    Upon arrival to floor: patient oriented to room, call bell in reach and bed in lowest position

## 2018-10-06 VITALS
RESPIRATION RATE: 18 BRPM | DIASTOLIC BLOOD PRESSURE: 83 MMHG | HEIGHT: 71 IN | WEIGHT: 282.19 LBS | TEMPERATURE: 97 F | OXYGEN SATURATION: 98 % | SYSTOLIC BLOOD PRESSURE: 150 MMHG | BODY MASS INDEX: 39.51 KG/M2 | HEART RATE: 60 BPM

## 2018-10-06 PROCEDURE — 63600175 PHARM REV CODE 636 W HCPCS: Performed by: SURGERY

## 2018-10-06 PROCEDURE — 25000003 PHARM REV CODE 250: Performed by: SURGERY

## 2018-10-06 RX ADMIN — FAMOTIDINE 20 MG: 20 TABLET, FILM COATED ORAL at 10:10

## 2018-10-06 RX ADMIN — DEXTROSE, SODIUM CHLORIDE, AND POTASSIUM CHLORIDE: 5; .45; .15 INJECTION INTRAVENOUS at 02:10

## 2018-10-06 RX ADMIN — ALLOPURINOL 300 MG: 300 TABLET ORAL at 10:10

## 2018-10-06 RX ADMIN — AMPICILLIN AND SULBACTAM 3 G: 2; 1 INJECTION, POWDER, FOR SOLUTION INTRAVENOUS at 10:10

## 2018-10-06 RX ADMIN — AMPICILLIN AND SULBACTAM 3 G: 2; 1 INJECTION, POWDER, FOR SOLUTION INTRAVENOUS at 02:10

## 2018-10-06 NOTE — DISCHARGE SUMMARY
[facility] Surgery  Discharge Summary      Patient Name: Elroy Rahman  MRN: [id number]  Admission Date: 10/5/2018  Hospital Length of Stay: 0 days  Discharge Date and Time:  10/06/2018 11:21 AM  Attending Physician: Coco Guidry MD   Discharging Provider: Marcel Guardado MD  Primary Care Provider: Wayne Catalan PA-C     HPI: See H&P.  Planned observation following chemo-embolization    Procedure(s) (LRB):  EMBOLIZATION, BLOOD VESSEL (N/A)     Hospital Course: Admitted for obs following TACE.  Discharged to home in stable condition without any complaints.  Has medications and follow up plan.    Consults: none    Significant Diagnostic Studies: none    Pending Diagnostic Studies:     None        Final Active Diagnoses:    Diagnosis Date Noted POA    PRINCIPAL PROBLEM:  Secondary malignant neoplasm of liver [C78.7] 10/05/2018 Yes      Problems Resolved During this Admission:      Discharged Condition: stable    Disposition: Home or Self Care    Follow Up:  Follow-up Information     Jesus CHERELLE London DO, FACP In 3 months.    Specialty:  Hematology and Oncology  Contact information:  [address]  [address] [id number] [phone]                 Patient Instructions:      Diet Adult Regular     Activity as tolerated     Medications:  Reconciled Home Medications:      Medication List      CONTINUE taking these medications    capecitabine 500 MG Tab  Commonly known as:  XELODA  TAKE 4 TABLETS (2,000MG TOTAL) BY MOUTH 2 TIMES DAILY FOR 14 DAYS EVERY 28 DAYS ( 2 WEEKS ON, 2 WEEKS OFF, EVERY 4 WEEKS).     ciprofloxacin HCl 500 MG tablet  Commonly known as:  CIPRO  Take 1 tablet (500 mg total) by mouth every 12 (twelve) hours.     lanreotide 120 mg/0.5 mL Syrg  Commonly known as:  SOMATULINE DEPOT  Inject 120 mg into the skin every 28 days.     ondansetron 4 MG tablet  Commonly known as:  ZOFRAN  Take 1 tablet (4 mg total) by mouth 2 (two) times daily.     temozolomide 250  MG capsule  Commonly known as:  TEMODAR  TAKE 2 CAPSULES (500MG TOTAL) BY MOUTH ONCE DAILY ON DAYS 10-14 OF 28 DAY CYCLE     traMADol 50 mg tablet  Commonly known as:  ULTRAM  Take 1 tablet (50 mg total) by mouth every 6 (six) hours as needed.            Marcel Guardado MD  General Surgery  [facility]

## 2018-10-06 NOTE — PROGRESS NOTES
S/p first chemoembolization.    Doing well, no N/V or pain.  Would like to go home today    Has scripts    See final d/c summary.

## 2018-10-06 NOTE — PLAN OF CARE
Discharge orders noted, no HH or HME ordered.    Future Appointments   Date Time Provider Department Center   10/15/2018  9:00 AM LAB, HEMONC CANCER BLDG [facility] LAB HO [facility]   10/15/2018 10:00 AM Jesus London DO, FACP [facility] HEM ONC [facility]   11/5/2018  8:30 AM Coco Guidry MD [facility] TUMOR [facility]       Pt's nurse will go over medications/signs and symptoms prior to discharge       10/06/18 1127   Final Note   Assessment Type Final Discharge Note   Discharge Disposition Home   What phone number can be called within the next 1-3 days to see how you are doing after discharge? [phone]   Hospital Follow Up  Appt(s) scheduled? No  (Offices closed for weekend. Patient to schedule own follow up appointment.)   Right Care Referral Info   Post Acute Recommendation No Care     Anne Gutierres, RN Transitional Navigator  [phone]

## 2018-10-06 NOTE — NURSING
Patient discharged per MD orders. Patient verbalized understanding of discharge instructions. PIV dis continued per MD orders. Catheter tip intact and pressure dressing applied. Awaiting transport to [facility] patient to main lobby.

## 2018-10-15 ENCOUNTER — LAB VISIT (OUTPATIENT)
Dept: LAB | Facility: HOSPITAL | Age: 63
End: 2018-10-15
Attending: INTERNAL MEDICINE
Payer: COMMERCIAL

## 2018-10-15 ENCOUNTER — OFFICE VISIT (OUTPATIENT)
Dept: HEMATOLOGY/ONCOLOGY | Facility: CLINIC | Age: 63
End: 2018-10-15
Payer: COMMERCIAL

## 2018-10-15 ENCOUNTER — TELEPHONE (OUTPATIENT)
Dept: PHARMACY | Facility: CLINIC | Age: 63
End: 2018-10-15

## 2018-10-15 VITALS
SYSTOLIC BLOOD PRESSURE: 118 MMHG | RESPIRATION RATE: 16 BRPM | DIASTOLIC BLOOD PRESSURE: 74 MMHG | WEIGHT: 289.25 LBS | HEART RATE: 60 BPM | HEIGHT: 72 IN | OXYGEN SATURATION: 96 % | TEMPERATURE: 98 F | BODY MASS INDEX: 39.18 KG/M2

## 2018-10-15 DIAGNOSIS — D3A.090 BRONCHIAL CARCINOID TUMORS: Primary | ICD-10-CM

## 2018-10-15 DIAGNOSIS — C7B.8 SECONDARY NEUROENDOCRINE TUMOR OF LIVER: ICD-10-CM

## 2018-10-15 DIAGNOSIS — C7B.8 SECONDARY NEUROENDOCRINE TUMOR OF BONE: ICD-10-CM

## 2018-10-15 DIAGNOSIS — D49.1 NEOPLASM OF LUNG: ICD-10-CM

## 2018-10-15 DIAGNOSIS — D49.89 NEOPLASM OF ABDOMEN: ICD-10-CM

## 2018-10-15 LAB
ALBUMIN SERPL BCP-MCNC: 3.5 G/DL
ALP SERPL-CCNC: 106 U/L
ALT SERPL W/O P-5'-P-CCNC: 23 U/L
ANION GAP SERPL CALC-SCNC: 6 MMOL/L
AST SERPL-CCNC: 13 U/L
BILIRUB SERPL-MCNC: 0.6 MG/DL
BUN SERPL-MCNC: 10 MG/DL
CALCIUM SERPL-MCNC: 9.6 MG/DL
CHLORIDE SERPL-SCNC: 105 MMOL/L
CO2 SERPL-SCNC: 28 MMOL/L
CREAT SERPL-MCNC: 1.1 MG/DL
ERYTHROCYTE [DISTWIDTH] IN BLOOD BY AUTOMATED COUNT: 12.6 %
EST. GFR  (AFRICAN AMERICAN): >60 ML/MIN/1.73 M^2
EST. GFR  (NON AFRICAN AMERICAN): >60 ML/MIN/1.73 M^2
GLUCOSE SERPL-MCNC: 132 MG/DL
HCT VFR BLD AUTO: 41.1 %
HGB BLD-MCNC: 14 G/DL
IMM GRANULOCYTES # BLD AUTO: 0.03 K/UL
MCH RBC QN AUTO: 31.7 PG
MCHC RBC AUTO-ENTMCNC: 34.1 G/DL
MCV RBC AUTO: 93 FL
NEUTROPHILS # BLD AUTO: 4.6 K/UL
PLATELET # BLD AUTO: 303 K/UL
PMV BLD AUTO: 9.3 FL
POTASSIUM SERPL-SCNC: 4.7 MMOL/L
PROT SERPL-MCNC: 7.7 G/DL
RBC # BLD AUTO: 4.42 M/UL
SODIUM SERPL-SCNC: 139 MMOL/L
WBC # BLD AUTO: 6.2 K/UL

## 2018-10-15 PROCEDURE — 99999 PR PBB SHADOW E&M-EST. PATIENT-LVL IV: CPT | Mod: PBBFAC,,, | Performed by: INTERNAL MEDICINE

## 2018-10-15 PROCEDURE — 80053 COMPREHEN METABOLIC PANEL: CPT

## 2018-10-15 PROCEDURE — 3008F BODY MASS INDEX DOCD: CPT | Mod: CPTII,S$GLB,, | Performed by: INTERNAL MEDICINE

## 2018-10-15 PROCEDURE — 99214 OFFICE O/P EST MOD 30 MIN: CPT | Mod: S$GLB,,, | Performed by: INTERNAL MEDICINE

## 2018-10-15 PROCEDURE — 36415 COLL VENOUS BLD VENIPUNCTURE: CPT

## 2018-10-15 PROCEDURE — 85027 COMPLETE CBC AUTOMATED: CPT

## 2018-10-15 NOTE — TELEPHONE ENCOUNTER
Called patient in regards to CAPTEM refill and clinical follow up. Per Dr. London's visit today, patient should restart medications. Patient confirmed he will restart CAPTEM on 10/22. Patient stated he struggles with persistent fatigue but has not worsened much after his chemoembolization.      Will ship CAPTEM 10/18 for patient to receive 10/19. Verified address, $0.00 copay. Patient has 0 doses on hand. No new allergies or health conditions. Will follow up with patient one week after restarting medication.

## 2018-10-15 NOTE — PROGRESS NOTES
PATIENT: Elroy Rahman  MRN: [id number]  DATE: 10/15/2018      Diagnosis:   1. Bronchial carcinoid tumors    2. Secondary neuroendocrine tumor of liver    3. Secondary neuroendocrine tumor of bone        Chief Complaint: Bronchial carcinoid tumors      Oncologic History:      Oncologic History Bronchial carcinoid, left, diagnosed 1/2016   Metastatic disease to liver and bone 5/2017     Oncologic Treatment Left upper lobectomy 1/2016  CAPTEM 7/2017   Lanreotide 7/2017   Zoledronic acid 7/2017   TACE 10/2018    Pathology 1/2016: Typical carcinoid tumor, well-differentiated T2a, N0, M0 Ki-67 <1%  6/2017: Liver biopsy, well-differentiated, Ki-67 25%           Subjective:    Interval History: Mr. Rahman is a 63 y.o. male who is seen in follow-up for a bronchial carcinoid tumor.  He states he generally feels well. In 10/2018 he underwent chemo embolization.  He tolerated this procedure well but did have some fatigue following this.  He is without new complaints.    His history dates to 1/2016 when he underwent a left upper lobectomy with Dr. Bell for a typical carcinoid tumor.  His pathological staging was T2a, N0, M0 with Ki-67 less than 1% and mitotic count 0 per 10 high-powered fields.  He done well postoperatively, however, in 5/2017 he was undergoing routine surveillance and a chest CT in picked up evidence of progression of disease in the liver.  Dedicated abdominal CT was performed showing multiple liver lesions.  In 6/2017 he underwent a CT-guided liver biopsy which was positive for metastatic neuroendocrine tumor which was well-differentiated with no overt nuclear atypia, however, the Ki-67 was 25%.  He underwent gallium-68 PET/CT and was found to have widespread disease involving the bone, liver and also mesentery.  He was started on CAPTEM in 7/2017 and also Lanreotide and zoledronic acid.  Scans in 10/2017 had shown stability of disease.  Imaging in 1/2018 had shown stability of disease.  Scans in  4/2018 had shown stability of disease.  Scans in 07/2018 had continued to showed mild growth.  In 10/2018 he underwent TACE.      Past Medical History:   Past Medical History:   Diagnosis Date    Carcinoid bronchial adenoma of left lung     Chemotherapy follow-up examination 8/25/2017    Cough with hemoptysis     mild/intermittent    Elevated bilirubin 7/2/2018    Hx of BKA, left     Mild heartburn     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    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    Sleep apnea     Snores        Past Surgical HIstory:   Past Surgical History:   Procedure Laterality Date    [id number] N/A 6/8/2017    Performed by [facility] OR 2ND FLR    Bka Left     LEFT UPPER LOBECTOMY-LUNG Left 1/28/2016    Performed by Pan Bell MD at [facility] OR 2ND FLR    THORACOTOMY W/LUNG RESECTION Left 1/28/2016    Performed by Pan Bell MD at [facility] OR 2ND FLR       Family History:   Family History   Problem Relation Age of Onset    Cancer Mother         lung    Cancer Sister     Anesthesia problems Neg Hx        Social History:  reports that  has never smoked. he has never used smokeless tobacco. He reports that he drinks about 0.6 oz of alcohol per week. He reports that he does not use drugs.    Allergies:  Review of patient's allergies indicates:  No Known Allergies    Medications:  Current Outpatient Medications   Medication Sig Dispense Refill    lanreotide (SOMATULINE DEPOT) 120 mg/0.5 mL Syrg Inject 120 mg into the skin every 28 days.      capecitabine (XELODA) 500 MG Tab TAKE 4 TABLETS (2,000MG TOTAL) BY MOUTH 2 TIMES DAILY FOR 14 DAYS EVERY 28 DAYS ( 2 WEEKS ON, 2 WEEKS OFF, EVERY 4 WEEKS). 112 tablet 3    ciprofloxacin HCl (CIPRO) 500 MG tablet Take 1 tablet (500 mg total) by mouth every 12 (twelve) hours. 14 tablet 0    ondansetron (ZOFRAN) 4 MG tablet Take 1 tablet (4 mg total) by mouth 2 (two) times daily. 15 tablet 0     temozolomide (TEMODAR) 250 MG capsule TAKE 2 CAPSULES (500MG TOTAL) BY MOUTH ONCE DAILY ON DAYS 10-14 OF 28 DAY CYCLE 10 capsule 6     No current facility-administered medications for this visit.        Review of Systems   Constitutional: Positive for fatigue. Negative for appetite change, chills, fever and unexpected weight change.   HENT: Negative for dental problem, sinus pressure and sneezing.    Eyes: Negative for visual disturbance.   Respiratory: Negative for cough, choking and chest tightness.    Cardiovascular: Negative for chest pain and leg swelling.   Gastrointestinal: Negative for abdominal pain, blood in stool, constipation, diarrhea and nausea.   Genitourinary: Negative for difficulty urinating and dysuria.   Musculoskeletal: Negative for arthralgias and back pain.   Skin: Positive for wound. Negative for rash.   Neurological: Negative for dizziness, light-headedness and headaches.   Hematological: Negative for adenopathy. Does not bruise/bleed easily.   Psychiatric/Behavioral: Negative for sleep disturbance. The patient is not nervous/anxious.        ECOG Performance Status:   ECOG SCORE    0 - Fully active-able to carry on all pre-disease performance without restriction         Objective:      Vitals:   Vitals:    10/15/18 1014   BP: 118/74   BP Location: Right arm   Patient Position: Sitting   BP Method: Large (Automatic)   Pulse: 60   Resp: 16   Temp: 98.1 °F (36.7 °C)   TempSrc: Oral   SpO2: 96%   Weight: 131.2 kg (289 lb 3.9 oz)   Height: 6' (1.829 m)     BMI: Body mass index is 39.23 kg/m².    Physical Exam   Constitutional: He is oriented to person, place, and time. He appears well-developed and well-nourished.   HENT:   Head: Normocephalic and atraumatic.   Eyes: Pupils are equal, round, and reactive to light.   Neck: Normal range of motion. Neck supple.   Cardiovascular: Normal rate and regular rhythm.   Pulmonary/Chest: Effort normal and breath sounds normal. No respiratory distress.    Abdominal: Soft. He exhibits no distension. There is no tenderness.   Musculoskeletal: He exhibits no edema or tenderness.   Left lower extremity prosthesis   Lymphadenopathy:     He has no cervical adenopathy.   Neurological: He is alert and oriented to person, place, and time. No cranial nerve deficit.   Skin: Skin is warm and dry.   Right middle finger with 2 cm abrasion, dorsal skin of 3rd metacarpal.  No surrounding erythema, edema.   Psychiatric: He has a normal mood and affect. His behavior is normal.       Laboratory Data:  Lab Visit on 10/15/2018   Component Date Value Ref Range Status    WBC 10/15/2018 6.20  3.90 - 12.70 K/uL Final    RBC 10/15/2018 4.42* 4.60 - 6.20 M/uL Final    Hemoglobin 10/15/2018 14.0  14.0 - 18.0 g/dL Final    Hematocrit 10/15/2018 41.1  40.0 - 54.0 % Final    MCV 10/15/2018 93  82 - 98 fL Final    MCH 10/15/2018 31.7* 27.0 - 31.0 pg Final    MCHC 10/15/2018 34.1  32.0 - 36.0 g/dL Final    RDW 10/15/2018 12.6  11.5 - 14.5 % Final    Platelets 10/15/2018 303  150 - 350 K/uL Final    MPV 10/15/2018 9.3  9.2 - 12.9 fL Final    Gran # (ANC) 10/15/2018 4.6  1.8 - 7.7 K/uL Final    Comment: The ANC is based on a white cell differential from an   automated cell counter. It has not been microscopically   reviewed for the presence of abnormal cells. Clinical   correlation is required.      Immature Grans (Abs) 10/15/2018 0.03  0.00 - 0.04 K/uL Final    Comment: Mild elevation in immature granulocytes is non specific and   can be seen in a variety of conditions including stress response,   acute inflammation, trauma and pregnancy. Correlation with other   laboratory and clinical findings is essential.      Sodium 10/15/2018 139  136 - 145 mmol/L Final    Potassium 10/15/2018 4.7  3.5 - 5.1 mmol/L Final    Chloride 10/15/2018 105  95 - 110 mmol/L Final    CO2 10/15/2018 28  23 - 29 mmol/L Final    Glucose 10/15/2018 132* 70 - 110 mg/dL Final    BUN, Bld 10/15/2018 10  8 -  23 mg/dL Final    Creatinine 10/15/2018 1.1  0.5 - 1.4 mg/dL Final    Calcium 10/15/2018 9.6  8.7 - 10.5 mg/dL Final    Total Protein 10/15/2018 7.7  6.0 - 8.4 g/dL Final    Albumin 10/15/2018 3.5  3.5 - 5.2 g/dL Final    Total Bilirubin 10/15/2018 0.6  0.1 - 1.0 mg/dL Final    Comment: For infants and newborns, interpretation of results should be based  on gestational age, weight and in agreement with clinical  observations.  Premature Infant recommended reference ranges:  Up to 24 hours.............<8.0 mg/dL  Up to 48 hours............<12.0 mg/dL  3-5 days..................<15.0 mg/dL  6-29 days.................<15.0 mg/dL      Alkaline Phosphatase 10/15/2018 106  55 - 135 U/L Final    AST 10/15/2018 13  10 - 40 U/L Final    ALT 10/15/2018 23  10 - 44 U/L Final    Anion Gap 10/15/2018 6* 8 - 16 mmol/L Final    eGFR if African American 10/15/2018 >60.0  >60 mL/min/1.73 m^2 Final    eGFR if non African American 10/15/2018 >60.0  >60 mL/min/1.73 m^2 Final    Comment: Calculation used to obtain the estimated glomerular filtration  rate (eGFR) is the CKD-EPI equation.        Supplemental Diagnosis  Chromogranin Staining:  Intensity: Moderate to strong. Positive cells: 100 (%)  Synaptophysin Staining:  Intensity: Strong. Positive cells: 100 (%)  CD31: 40 vessels per 1 HPF  Factor VIII: 27 vessels per 1 HPF  Ki67: 1 % tumor cells staining  Immunostains performed with appropriate positive and negative controls.  FINAL PATHOLOGIC DIAGNOSIS  1. Lymph node, level XI, excisional biopsy:  Fragments of benign lymph node with anthracosis and sinus histiocytosis (0/1).  2. Lung, lingular staple line, biopsy:  Lung, negative for neoplasm.  3. Lymph node, level X, excisional biopsy:  1 benign lymph node with anthracotic pigment and sinus histiocytosis (0/1).  4. Lymph node, level XII, excisional biopsy:  2 benign lymph nodes with sinus histiocytosis and anthracotic pigment (0/2).  5. Lung, left upper lobe,  lobectomy:  Typical carcinoid tumor (well differentiated neuroendocrine tumor), 4 cm in greatest dimension.  Bronchial and vascular margins are negative for neoplasm.  Uninvolved lung adjacent to the tumor shows emphysematous changes and fibrosis, however remaining lung  appears unremarkable.  Additional immunohistochemical stains for ancillary studies (including synaptophysin, chromogranin and Ki-67)  will be completed and results will be reported in a supplemental report.  See synoptic report in comment section for further details.  6. Lymph node, level VII, excisional biopsy:  2 benign lymph nodes with anthracotic pigment and sinus histiocytosis (0/2).  Comment: Synoptic Report  Specimen: Left upper lobe of lung  Procedure: Lobectomy  Specimen laterality: Left  Tumor size:  Greatest dimension: 4 cm  Tumor focality: Single focus  Histologic type: Typical carcinoid tumor  Visceral pleural invasion: Not identified  Tumor extension: Not identified  Margins: Bronchial and vascular margins are uninvolved by tumor.  Distance of tumor from closest margin: 1 cm from the bronchial surgical margin.  Lymphovascular invasion: Not identified  Ancillary studies:  Immunohistochemical stains for neuroendocrine markers and Ki-67 will be completed and results will follow in a  supplemental report.  Note: The tumor consists of a proliferation of cells in nests with lewis formation. The tumor cells have salt and  pepper chromatin pattern with abundant cytoplasm. There is no significant nuclear atypia. No evidence of  necrosis. Mitotic count is 0 mitoses in 10 high power fields.    Imaging:     MRI 07/23/2018  EXAMINATION:  MRI ABDOMEN W WO CONTRAST    CLINICAL HISTORY:  Bronchial carcinoid tumors;  Other secondary neuroendocrine tumors    TECHNIQUE:  Multiplanar, multi-sequence MR imaging of the abdomen was performed before and after administration of 10 cc of Gadavist gadolinium-based intravenous contrast per the liver  protocol.    COMPARISON:  MRI abdomen 01/19/2018, 04/18/2018    FINDINGS:  The visualized lungs, heart, and pericardium demonstrate nothing unusual within the confines of this exam.    The liver is normal in size and contour.  There is diffuse signal dropout of the liver on out of phase imaging compatible with hepatic steatosis.  Innumerable T2 hyperintense, enhancing lesions are seen throughout the liver compatible with known metastatic disease.  Index lesion in hepatic segment III measures 4.1 cm (axial series 14, image 38), previously 3.8 cm.  Additional index lesion in hepatic segment V/VIII measures 4.0 cm (axial series 14, image 40), previously 3.8 cm.  While not an index lesion, there is a lesion in hepatic segment V which measures 2.3 cm (axial series 14, image 43), previously 2.2 cm when directly remeasured.  Overall lesions appear to have increased in size over multiple prior exams with no definite new lesions identified.  The portal vein is patent.    No evidence of intra-or extrahepatic biliary ductal dilatation. The gallbladder, stomach, pancreas, and visualized portions of the bowel demonstrate no significant abnormalities.  Two areas of restricted diffusion within the spleen appear unchanged.  Stable right adrenal adenoma.  The left adrenal gland is unremarkable.    The kidneys are normal in size and location. No evidence of hydronephrosis or solid renal masses.  Stable subcentimeter left renal cysts.    The visualized aorta is normal in caliber.    The osseous structures demonstrate stable enhancing lesions compatible with known metastatic disease.    Soft tissues of the lower thoracic and abdominal wall are unremarkable.      Impression       Innumerable enhancing hepatic lesions which appear stable in number and majority the stable in size, but at least 2 have slightly increased in size suggesting mild progression of metastatic disease.    Stable osseous metastatic disease.    Additional stable  findings as above.    RECIST SUMMARY:    Date of prior examination for comparison: 04/18/2018    Lesion 1: Hepatic segment V/VIII.  4.0 cm. Series 14 image 40.  Prior measurement 3.8 cm.    Lesion 2: Hepatic segment III.  4.1 cm. Series 14 image 38.  Prior measurement 3.8 cm.       CT 07/23/2018  EXAMINATION:  CT CHEST WITHOUT CONTRAST    CLINICAL HISTORY:  Bronchial carcinoid tumors; Benign carcinoid tumor of the bronchus and lung    TECHNIQUE:  Low dose axial images, sagittal and coronal reformations were obtained from the thoracic inlet to the lung bases. Contrast was not administered.    COMPARISON:  CT chest without contrast: 04/16/2018, 01/19/2018, 11/02/2017, 05/17/2017, 08/17/2016, 02/01/2016, 12/31/2015.    FINDINGS:  The structures at the base of the neck reveal no convincing evidence of disease.    There is a left-sided aortic arch with three branch vessels.  The thoracic aorta maintains normal caliber, contour, and course with atherosclerotic calcification within its course.    The heart is not enlarged and there is no evidence of pericardial effusion.  There is mild calcification of the coronary arteries.    The pulmonary arteries distribute normally.  There are four pulmonary veins.  Systemic and pulmonary venoatrial connections are concordant.    There is no axillary or mediastinal lymph node enlargement.    The esophagus maintains a normal course and caliber.    Postoperative changes of left thoracotomy are again noted.  There are numerous sclerotic foci again identified throughout the visualized spine and ribs, similar to prior examination dated 04/16/2018.    Limited views of the abdomen demonstrate multiple hypoattenuating masses in the liver consistent with patient's known metastatic disease, which are not fully characterized on this noncontrast examination.  There is again identified a right adrenal adenoma.  Please refer to MRI abdomen dated 07/23/2018 for further characterization..    Lungs:  Postoperative changes of left upper lobe resection and lingulectomy with volume loss and leftward mediastinal shift again identified.  No convincing evidence of new pulmonary disease.  There is a nodule measuring 0.4 cm located in the posterior basal segment of the left lower lobe (axial series 4, image 324).  This has been stable since CT chest dated 05/17/2017.  Such stability over 12 months is likely of benign etiology.  No further surveillance is warranted per the Fleischner Society Pulmonary nodule recommendations.  There is an incomplete right major fissure.  The lungs are symmetrically expanded and without evidence of consolidation, pneumothorax, mass, or significant pleural thickening.  There is no evidence of pleural fluid.      Impression       Postoperative changes of left upper lobe resection and lingulectomy with volume loss and leftward mediastinal shift again identified. No convincing evidence of new pulmonary disease.  There is a nodule measuring 0.4 cm located in the posterior basal segment of the left lower lobe (axial series 4, image 324).  This has been stable since CT chest dated 05/17/2017. Such stability over 12 months is likely of benign etiology. No further surveillance is warranted per the Fleischner Society Pulmonary nodule recommendations.    Hypoattenuating masses in the liver consistent with patient's known metastatic disease, which are not fully characterized on this noncontrast examination.  Right adrenal adenoma is again identified.  Please refer to MRI abdomen dated 07/23/2018 for further characterization.    Numerous sclerotic foci consistent with metastases again identified throughout the visualized spine and ribs, similar to prior examination dated 04/16/2018.    Additional findings as above                  Assessment:       1. Bronchial carcinoid tumors    2. Secondary neuroendocrine tumor of liver    3. Secondary neuroendocrine tumor of bone           Plan:     Mr. Rahman is  doing well and tolerated his embolization.  Plan to restart CAPTEM now.  Follow back up me in another month with repeat CT of the chest and MRI of the abdomen.  All questions were answered he is agreeable with this plan.    Jesus London DO, [facility]  Hematology & Oncology  [facility] [place] [address]  ph. [phone]  Fax. [phone]    25 minutes were spent in coordination of patient's care, record review and counseling.  More than 50% of the time was face-to-face.

## 2018-10-29 NOTE — TELEPHONE ENCOUNTER
Called patient to follow up with him one week after restarting CAPTEM. Patient declined f/u at this time since he was busy. Patient confirmed restarting medication last week. He stated he is overall doing fine and has not had any notable side effects. He stated energy levels continue to be low but are unchanged from baseline. He is aware [facility] will reach out in a few weeks for a refill and to contact us with any questions/concerns.

## 2018-11-05 ENCOUNTER — HOSPITAL ENCOUNTER (OUTPATIENT)
Dept: RADIOLOGY | Facility: HOSPITAL | Age: 63
Discharge: HOME | End: 2018-11-05
Attending: INTERNAL MEDICINE
Payer: COMMERCIAL

## 2018-11-05 ENCOUNTER — OFFICE VISIT (OUTPATIENT)
Dept: NEUROLOGY | Facility: HOSPITAL | Age: 63
End: 2018-11-05
Attending: SURGERY
Payer: COMMERCIAL

## 2018-11-05 VITALS
HEART RATE: 51 BPM | DIASTOLIC BLOOD PRESSURE: 81 MMHG | HEIGHT: 72 IN | SYSTOLIC BLOOD PRESSURE: 133 MMHG | TEMPERATURE: 98 F | WEIGHT: 299.19 LBS | BODY MASS INDEX: 40.52 KG/M2

## 2018-11-05 DIAGNOSIS — K42.0 INCARCERATED UMBILICAL HERNIA: Primary | ICD-10-CM

## 2018-11-05 DIAGNOSIS — D49.1 NEOPLASM OF LUNG: ICD-10-CM

## 2018-11-05 PROCEDURE — 99215 OFFICE O/P EST HI 40 MIN: CPT | Mod: 25 | Performed by: SURGERY

## 2018-11-05 PROCEDURE — 71250 CT THORAX DX C-: CPT | Mod: TC

## 2018-11-05 PROCEDURE — 71250 CT THORAX DX C-: CPT | Mod: 26,,, | Performed by: RADIOLOGY

## 2018-11-05 RX ORDER — KETOROLAC TROMETHAMINE 15 MG/ML
15 INJECTION, SOLUTION INTRAMUSCULAR; INTRAVENOUS
Status: CANCELLED | OUTPATIENT
Start: 2018-11-05 | End: 2018-11-08

## 2018-11-05 RX ORDER — ACETAMINOPHEN 10 MG/ML
1000 INJECTION, SOLUTION INTRAVENOUS ONCE
Status: CANCELLED | OUTPATIENT
Start: 2018-11-05 | End: 2018-11-06

## 2018-11-05 RX ORDER — PREGABALIN 75 MG/1
75 CAPSULE ORAL
Status: CANCELLED | OUTPATIENT
Start: 2018-11-05

## 2018-11-05 NOTE — PATIENT INSTRUCTIONS
Patient Instructions     Take a Hibiclens shower twice a day for 3 days prior to surgery, including the morning of surgery.   Gargle with Listerine twice a day for 2 days prior to surgery, including the morning of surgery.      To be determined, surgery department will call you    Appointment with   Pre [facility] for Hospital Admission - Go to 1st floor of the hospital-admitting desk. You will do paper work, get blood drawn,  get x-rays and see Anesthesia at this time.     11/26/2018   Do not eat or drink anything after midnight.                 11/27/2018   Hospital Admission for surgery.  Report to 2nd floor, Same Day Surgery desk at 5:30am   Surgery is scheduled for 7:00am        [facility] [address]  [place]  [address]  [phone]      INFORMATION REGARDING YOUR PROCEDURE      We look forward to serving you and your family and appreciate that you have chosen [facility] for your healthcare needs. Before, during, and after your surgery, you will be cared for by skilled medical professionals. Our surgeons, anesthesiologists, nurses,  and other healthcare professionals will work with you and your family to ensure a safe, smooth and comfortable surgery and recovery.    In order to best meet your pre-admission needs, your surgeon or ordering physicians office will contact our Scheduling Office at [phone] and schedule a Pre-Procedure Appointment.  This should preferably be done 72 hours or greater before your scheduled procedure date.     During your pre-procedure visit your insurance will be verified for your procedure. You will meet with a Registered Nurse and an Anesthesiologist or Nurse Anesthetist. If tests are required, they will be performed during your visit. Please allow about one and a half hours for this visit.      You will need to bring the following information or items with you to your Pre-Procedure Appointment:    1.  Picture Identification  2.   Insurance Card  3.  Current list of medications to include name and dosage  4.  List of allergies  5.  Orders and any other forms your doctor has given to you  6.  Copies of lab results performed at other facilities. This may include blood work, EKGs or chest xrays.   These results can be faxed to [phone].    The Pre-Op Center Registration Desk is located on the first floor of the hospital ([address] [place] [address]) in the [facility].  The Pre-Operative Center is located on the second floor of the hospital. Someone will walk you from the registration area to the Pre-Operative Center.    Free parking is located in the front of the hospital and medical office building and is easily accessed from [address] and [address]. When you arrive, please check in at the main information desk of the hospital.    Please call Outpatient Surgery at [phone] after 12:00pm on the day before your procedure for your arrival time and updated procedure time.      Pre-Op Bathing Instructions    Before surgery, you can play an important role in your own health.    Because skin is not sterile, we need to be sure that your skin is as free of germs as possible. By following the instructions below, you can reduce the number of germs on your skin before surgery.    IMPORTANT: You will need to shower with a special soap called Hibiclens*, available at any pharmacy.  If you are allergic to Chlorhexidine (the antiseptic in Hibiclens), use an antibacterial soap such as Dial Soap for your preoperative shower.  You will shower with Hibiclens both the night before your surgery and the morning of your surgery.  Do not use Hibiclens on the head, face or genitals to avoid injury to those areas.    STEP #1: THE NIGHT BEFORE YOUR SURGERY     1. Do not shave the area of your body where your surgery will be performed.  2. Shower and wash your hair and body as usual with your normal soap and shampoo.  3. Rinse your hair and body  thoroughly after you shower to remove all soap residue.  4. With your hand, apply one packet of Hibiclens soap to the surgical site.   5. Wash the site gently for five (5) minutes. Do not scrub your skin too hard.   6. Do not wash with your regular soap after Hibiclens is used.  7. Rinse your body thoroughly.  8. Pat yourself dry with a clean, soft towel.  9. Do not use lotion, cream, or powder.  10. Wear clean clothes.    STEP #2: THE MORNING OF YOUR SURGERY     1. Repeat Step #1.    * Not to be used by people allergic to Chlorhexidine.

## 2018-11-05 NOTE — H&P (VIEW-ONLY)
NOLANETS:  [facility]  A collaboration between [facility] and [facility]      PATIENT: Elroy Rahman  MRN: [id number]  DATE: 11/5/2018    Subjective:      Chief Complaint: Follow-up (consult for hernia)  continues to have pain at umbilicus on and off  Had chemoembolization on 10/5/2018  He finished CAP/TEM yesterday and has 14 used to start an extra round    When I  saw him just prior to chemo embolization, he was complaining of pain around the umbilicus.  And I told him that time that he can have the umbilical hernia repair a month after the chemo embolization    Vitals:   Vitals:    11/05/18 0854   BP: 133/81   Pulse: (!) 51   Temp: 98.3 °F (36.8 °C)   TempSrc: Oral   Weight: 135.7 kg (299 lb 2.6 oz)   Height: 6' (1.829 m)        Karnofsky Score:     Diagnosis: No diagnosis found.     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 OR 2ND FLR    Bka Left     LEFT UPPER LOBECTOMY-LUNG Left 1/28/2016    Performed by [facility] at [facility] OR 2ND FLR    THORACOTOMY W/LUNG RESECTION Left 1/28/2016    Performed by [facility] at [facility] OR 2ND FLR       Family History:  Family History   Problem Relation Age of Onset    Cancer Mother         lung    Cancer Sister     Anesthesia problems Neg Hx        Allergies:  Review of patient's  allergies indicates:   Allergen Reactions    Epinephrine Other (See Comments)     Carcinoid crisis       Medications:  Current Outpatient Medications   Medication Sig Dispense Refill    capecitabine (XELODA) 500 MG Tab TAKE 4 TABLETS (2,000MG TOTAL) BY MOUTH 2 TIMES DAILY FOR 14 DAYS EVERY 28 DAYS ( 2 WEEKS ON, 2 WEEKS OFF, EVERY 4 WEEKS). 112 tablet 3    ciprofloxacin HCl (CIPRO) 500 MG tablet Take 1 tablet (500 mg total) by mouth every 12 (twelve) hours. 14 tablet 0    lanreotide (SOMATULINE DEPOT) 120 mg/0.5 mL Syrg Inject 120 mg into the skin every 28 days.      ondansetron (ZOFRAN) 4 MG tablet Take 1 tablet (4 mg total) by mouth 2 (two) times daily. 15 tablet 0    temozolomide (TEMODAR) 250 MG capsule TAKE 2 CAPSULES (500MG TOTAL) BY MOUTH ONCE DAILY ON DAYS 10-14 OF 28 DAY CYCLE 10 capsule 6     No current facility-administered medications for this visit.        Review of Systems   Constitutional: Positive for fatigue. Negative for activity change, appetite change, chills, diaphoresis and unexpected weight change.   HENT: Negative for congestion, dental problem, drooling, ear discharge, facial swelling, hearing loss, nosebleeds, postnasal drip, rhinorrhea, sinus pressure, sinus pain, sneezing and sore throat.    Eyes: Negative for photophobia, pain, discharge, redness, itching and visual disturbance.   Respiratory: Negative for cough, chest tightness, shortness of breath, wheezing and stridor.    Cardiovascular: Negative for chest pain, palpitations and leg swelling.   Gastrointestinal: Positive for nausea. Negative for abdominal distention, abdominal pain, constipation, rectal pain and vomiting.   Endocrine: Negative.    Genitourinary: Negative.    Skin: Negative.    Allergic/Immunologic: Negative.    Neurological: Negative.    Hematological: Negative.    Psychiatric/Behavioral: Negative.       Objective:      Physical Exam   Constitutional: He is oriented to person, place, and time. He appears  well-developed and well-nourished. No distress.   HENT:   Head: Normocephalic and atraumatic.   Right Ear: External ear normal.   Left Ear: External ear normal.   Eyes: Conjunctivae and EOM are normal. Pupils are equal, round, and reactive to light.   Neck: Normal range of motion. Neck supple.   Cardiovascular: Normal rate and regular rhythm.   Pulmonary/Chest: Effort normal and breath sounds normal.   Abdominal: Soft. Bowel sounds are normal. He exhibits no distension and no mass. There is no rebound and no guarding. A hernia is present.   Non reducible umbilical hernia  Mild tenderness   Musculoskeletal: Normal range of motion.   Neurological: He is alert and oriented to person, place, and time.   Skin: Skin is warm. He is not diaphoretic.   Psychiatric: He has a normal mood and affect. His behavior is normal.      Assessment:       No diagnosis found.    Laboratory Data:  Results for TRISTEN GARCIA (MRN [id number]) as of 11/5/2018 09:18   Ref. Range 10/5/2018 11:05 10/15/2018 09:42   WBC Latest Ref Range: 3.90 - 12.70 K/uL  6.20   RBC Latest Ref Range: 4.60 - 6.20 M/uL  4.42 (L)   Hemoglobin Latest Ref Range: 14.0 - 18.0 g/dL  14.0   Hematocrit Latest Ref Range: 40.0 - 54.0 %  41.1   MCV Latest Ref Range: 82 - 98 fL  93   MCH Latest Ref Range: 27.0 - 31.0 pg  31.7 (H)   MCHC Latest Ref Range: 32.0 - 36.0 g/dL  34.1   RDW Latest Ref Range: 11.5 - 14.5 %  12.6   Platelets Latest Ref Range: 150 - 350 K/uL  303   MPV Latest Ref Range: 9.2 - 12.9 fL  9.3   Gran # (ANC) Latest Ref Range: 1.8 - 7.7 K/uL  4.6   Immature Grans (Abs) Latest Ref Range: 0.00 - 0.04 K/uL  0.03   Sodium Latest Ref Range: 136 - 145 mmol/L  139   Potassium Latest Ref Range: 3.5 - 5.1 mmol/L  4.7   Chloride Latest Ref Range: 95 - 110 mmol/L  105   CO2 Latest Ref Range: 23 - 29 mmol/L  28   Anion Gap Latest Ref Range: 8 - 16 mmol/L  6 (L)   BUN, Bld Latest Ref Range: 8 - 23 mg/dL  10   Creatinine Latest Ref Range: 0.5 - 1.4 mg/dL  1.1   eGFR if  non  Latest Ref Range: >60 mL/min/1.73 m^2  >60.0   eGFR if African American Latest Ref Range: >60 mL/min/1.73 m^2  >60.0   Glucose Latest Ref Range: 70 - 110 mg/dL  132 (H)   Calcium Latest Ref Range: 8.7 - 10.5 mg/dL  9.6   Alkaline Phosphatase Latest Ref Range: 55 - 135 U/L  106   Total Protein Latest Ref Range: 6.0 - 8.4 g/dL  7.7   Albumin Latest Ref Range: 3.5 - 5.2 g/dL  3.5   Total Bilirubin Latest Ref Range: 0.1 - 1.0 mg/dL  0.6   AST Latest Ref Range: 10 - 40 U/L  13   ALT Latest Ref Range: 10 - 44 U/L  23   IR ANGIOGRAM EACH ADDITIONAL VESSEL Unknown Rpt    IR ANGIOGRAM VISCERAL Unknown Rpt    IR CHEMOTHERAPY AGENT ADMINISTRATION Unknown Rpt    IR EMBOLIZATION COMP FOR TUMOR_ORGAN ISCHEMIA_INFARC Unknown Rpt         Reviewed By Jesus London DO, FACP on 7/29/2018 22:48   MRI Abdomen W WO Contrast   Order: [id number]   Status:  Final result   Visible to patient:  No (Not Released) Next appt:  Today at 12:00 PM in Radiology ([facility]MRI[facility]) Dx:  Secondary neuroendocrine tumor of liver   Details     Reading Physician Reading Date Result Priority   Soren Phillips MD 7/23/2018    Gin Benoit MD 7/23/2018       Narrative     EXAMINATION:  MRI ABDOMEN W WO CONTRAST    CLINICAL HISTORY:  Bronchial carcinoid tumors;  Other secondary neuroendocrine tumors    TECHNIQUE:  Multiplanar, multi-sequence MR imaging of the abdomen was performed before and after administration of 10 cc of Gadavist gadolinium-based intravenous contrast per the liver protocol.    COMPARISON:  MRI abdomen 01/19/2018, 04/18/2018    FINDINGS:  The visualized lungs, heart, and pericardium demonstrate nothing unusual within the confines of this exam.    The liver is normal in size and contour.  There is diffuse signal dropout of the liver on out of phase imaging compatible with hepatic steatosis.  Innumerable T2 hyperintense, enhancing lesions are seen throughout the liver compatible with known metastatic disease.   Index lesion in hepatic segment III measures 4.1 cm (axial series 14, image 38), previously 3.8 cm.  Additional index lesion in hepatic segment V/VIII measures 4.0 cm (axial series 14, image 40), previously 3.8 cm.  While not an index lesion, there is a lesion in hepatic segment V which measures 2.3 cm (axial series 14, image 43), previously 2.2 cm when directly remeasured.  Overall lesions appear to have increased in size over multiple prior exams with no definite new lesions identified.  The portal vein is patent.    No evidence of intra-or extrahepatic biliary ductal dilatation. The gallbladder, stomach, pancreas, and visualized portions of the bowel demonstrate no significant abnormalities.  Two areas of restricted diffusion within the spleen appear unchanged.  Stable right adrenal adenoma.  The left adrenal gland is unremarkable.    The kidneys are normal in size and location. No evidence of hydronephrosis or solid renal masses.  Stable subcentimeter left renal cysts.    The visualized aorta is normal in caliber.    The osseous structures demonstrate stable enhancing lesions compatible with known metastatic disease.    Soft tissues of the lower thoracic and abdominal wall are unremarkable.      Impression       Innumerable enhancing hepatic lesions which appear stable in number and majority the stable in size, but at least 2 have slightly increased in size suggesting mild progression of metastatic disease.    Stable osseous metastatic disease.    Additional stable findings as above.    RECIST SUMMARY:             Impression: incarcerated umbilical hernia with pain with a history of neuroendocrine tumor from the lung metastatic to the liver.  He has had a traumatic amputation of his lower extremity.  He is doing well overall with no symptoms.    He takes CAP/TEM father neuroendocrine tumor, completed his last course yesterday and he restarts in 2 weeks time.  He is tender on palpation at the umbilicus. He has a  defect of about 2.5 cm at the umbilicus, has incarcerated.  He needs to have this fixed before  he comes to the ER with strangulated hernia.    I talked about the surgery.  The best option would be to laparoscopic repair as it has minimal problems with wound healing.  Open repair with mesh for his umbilical hernia can have problem with wound healing as he already takes chemo and is scheduled for chemo embolization in about 6 weeks time.    I talked about the surgery and the risk involved such as bleeding infection DVT PE MI stroke recurrence chronic pain requiring removal of the mesh.  This procedure would be outpatient.  Following the procedure he has to wait for at least 2 weeks before he starts the chemo.  He would prefer to have the next chemo embolization in February which is acceptable    He is scheduled for CT scan of the chest and to see Dr. London on Monday  Plan:     1. plan for laparoscopic umbilical hernia repair on 27th of this month.  2.  Not to start chemo until surgery is done.   3.  Chemo embolization in February of 2019  4.  Will schedule MRI after the postop visit    Spent more than 40 min in explaining the surgery risk in wall and with relation to the chemo medications.    For umbilical hernia repair                GRACY Sepulveda MD, FACS   Associate Professor of Surgery, [facility]   Neuroendocrine Surgery, Hepatic/Pancreatic & General Surgery   [address]   [place] [address]   ph. [phone]; [phone]   fax. [phone]

## 2018-11-05 NOTE — PROGRESS NOTES
NOLANETS:  [facility]  A collaboration between [facility] and [facility]      PATIENT: Elroy Rahman  MRN: [id number]  DATE: 11/5/2018    Subjective:      Chief Complaint: Follow-up (consult for hernia)  continues to have pain at umbilicus on and off  Had chemoembolization on 10/5/2018  He finished CAP/TEM yesterday and has 14 used to start an extra round    When I  saw him just prior to chemo embolization, he was complaining of pain around the umbilicus.  And I told him that time that he can have the umbilical hernia repair a month after the chemo embolization    Vitals:   Vitals:    11/05/18 0854   BP: 133/81   Pulse: (!) 51   Temp: 98.3 °F (36.8 °C)   TempSrc: Oral   Weight: 135.7 kg (299 lb 2.6 oz)   Height: 6' (1.829 m)        Karnofsky Score:     Diagnosis: No diagnosis found.     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 at [facility] OR 2ND FLR    Bka Left     LEFT UPPER LOBECTOMY-LUNG Left 1/28/2016    Performed by [facility] at [facility] OR 2ND FLR    THORACOTOMY W/LUNG RESECTION Left 1/28/2016    Performed by [facility] at [facility] OR 2ND FLR       Family History:  Family History   Problem Relation Age of Onset    Cancer Mother         lung    Cancer Sister     Anesthesia problems Neg Hx        Allergies:  Review of patient's  allergies indicates:   Allergen Reactions    Epinephrine Other (See Comments)     Carcinoid crisis       Medications:  Current Outpatient Medications   Medication Sig Dispense Refill    capecitabine (XELODA) 500 MG Tab TAKE 4 TABLETS (2,000MG TOTAL) BY MOUTH 2 TIMES DAILY FOR 14 DAYS EVERY 28 DAYS ( 2 WEEKS ON, 2 WEEKS OFF, EVERY 4 WEEKS). 112 tablet 3    ciprofloxacin HCl (CIPRO) 500 MG tablet Take 1 tablet (500 mg total) by mouth every 12 (twelve) hours. 14 tablet 0    lanreotide (SOMATULINE DEPOT) 120 mg/0.5 mL Syrg Inject 120 mg into the skin every 28 days.      ondansetron (ZOFRAN) 4 MG tablet Take 1 tablet (4 mg total) by mouth 2 (two) times daily. 15 tablet 0    temozolomide (TEMODAR) 250 MG capsule TAKE 2 CAPSULES (500MG TOTAL) BY MOUTH ONCE DAILY ON DAYS 10-14 OF 28 DAY CYCLE 10 capsule 6     No current facility-administered medications for this visit.        Review of Systems   Constitutional: Positive for fatigue. Negative for activity change, appetite change, chills, diaphoresis and unexpected weight change.   HENT: Negative for congestion, dental problem, drooling, ear discharge, facial swelling, hearing loss, nosebleeds, postnasal drip, rhinorrhea, sinus pressure, sinus pain, sneezing and sore throat.    Eyes: Negative for photophobia, pain, discharge, redness, itching and visual disturbance.   Respiratory: Negative for cough, chest tightness, shortness of breath, wheezing and stridor.    Cardiovascular: Negative for chest pain, palpitations and leg swelling.   Gastrointestinal: Positive for nausea. Negative for abdominal distention, abdominal pain, constipation, rectal pain and vomiting.   Endocrine: Negative.    Genitourinary: Negative.    Skin: Negative.    Allergic/Immunologic: Negative.    Neurological: Negative.    Hematological: Negative.    Psychiatric/Behavioral: Negative.       Objective:      Physical Exam   Constitutional: He is oriented to person, place, and time. He appears  well-developed and well-nourished. No distress.   HENT:   Head: Normocephalic and atraumatic.   Right Ear: External ear normal.   Left Ear: External ear normal.   Eyes: Conjunctivae and EOM are normal. Pupils are equal, round, and reactive to light.   Neck: Normal range of motion. Neck supple.   Cardiovascular: Normal rate and regular rhythm.   Pulmonary/Chest: Effort normal and breath sounds normal.   Abdominal: Soft. Bowel sounds are normal. He exhibits no distension and no mass. There is no rebound and no guarding. A hernia is present.   Non reducible umbilical hernia  Mild tenderness   Musculoskeletal: Normal range of motion.   Neurological: He is alert and oriented to person, place, and time.   Skin: Skin is warm. He is not diaphoretic.   Psychiatric: He has a normal mood and affect. His behavior is normal.      Assessment:       No diagnosis found.    Laboratory Data:  Results for TRISTEN GARCIA (MRN [id number]) as of 11/5/2018 09:18   Ref. Range 10/5/2018 11:05 10/15/2018 09:42   WBC Latest Ref Range: 3.90 - 12.70 K/uL  6.20   RBC Latest Ref Range: 4.60 - 6.20 M/uL  4.42 (L)   Hemoglobin Latest Ref Range: 14.0 - 18.0 g/dL  14.0   Hematocrit Latest Ref Range: 40.0 - 54.0 %  41.1   MCV Latest Ref Range: 82 - 98 fL  93   MCH Latest Ref Range: 27.0 - 31.0 pg  31.7 (H)   MCHC Latest Ref Range: 32.0 - 36.0 g/dL  34.1   RDW Latest Ref Range: 11.5 - 14.5 %  12.6   Platelets Latest Ref Range: 150 - 350 K/uL  303   MPV Latest Ref Range: 9.2 - 12.9 fL  9.3   Gran # (ANC) Latest Ref Range: 1.8 - 7.7 K/uL  4.6   Immature Grans (Abs) Latest Ref Range: 0.00 - 0.04 K/uL  0.03   Sodium Latest Ref Range: 136 - 145 mmol/L  139   Potassium Latest Ref Range: 3.5 - 5.1 mmol/L  4.7   Chloride Latest Ref Range: 95 - 110 mmol/L  105   CO2 Latest Ref Range: 23 - 29 mmol/L  28   Anion Gap Latest Ref Range: 8 - 16 mmol/L  6 (L)   BUN, Bld Latest Ref Range: 8 - 23 mg/dL  10   Creatinine Latest Ref Range: 0.5 - 1.4 mg/dL  1.1   eGFR if  non  Latest Ref Range: >60 mL/min/1.73 m^2  >60.0   eGFR if African American Latest Ref Range: >60 mL/min/1.73 m^2  >60.0   Glucose Latest Ref Range: 70 - 110 mg/dL  132 (H)   Calcium Latest Ref Range: 8.7 - 10.5 mg/dL  9.6   Alkaline Phosphatase Latest Ref Range: 55 - 135 U/L  106   Total Protein Latest Ref Range: 6.0 - 8.4 g/dL  7.7   Albumin Latest Ref Range: 3.5 - 5.2 g/dL  3.5   Total Bilirubin Latest Ref Range: 0.1 - 1.0 mg/dL  0.6   AST Latest Ref Range: 10 - 40 U/L  13   ALT Latest Ref Range: 10 - 44 U/L  23   IR ANGIOGRAM EACH ADDITIONAL VESSEL Unknown Rpt    IR ANGIOGRAM VISCERAL Unknown Rpt    IR CHEMOTHERAPY AGENT ADMINISTRATION Unknown Rpt    IR EMBOLIZATION COMP FOR TUMOR_ORGAN ISCHEMIA_INFARC Unknown Rpt         Reviewed By Jesus London DO, FACP on 7/29/2018 22:48   MRI Abdomen W WO Contrast   Order: [id number]   Status:  Final result   Visible to patient:  No (Not Released) Next appt:  Today at 12:00 PM in Radiology ([facility]MRI[facility]) Dx:  Secondary neuroendocrine tumor of liver   Details     Reading Physician Reading Date Result Priority   Soren Phillips MD 7/23/2018    Gin Benoit MD 7/23/2018       Narrative     EXAMINATION:  MRI ABDOMEN W WO CONTRAST    CLINICAL HISTORY:  Bronchial carcinoid tumors;  Other secondary neuroendocrine tumors    TECHNIQUE:  Multiplanar, multi-sequence MR imaging of the abdomen was performed before and after administration of 10 cc of Gadavist gadolinium-based intravenous contrast per the liver protocol.    COMPARISON:  MRI abdomen 01/19/2018, 04/18/2018    FINDINGS:  The visualized lungs, heart, and pericardium demonstrate nothing unusual within the confines of this exam.    The liver is normal in size and contour.  There is diffuse signal dropout of the liver on out of phase imaging compatible with hepatic steatosis.  Innumerable T2 hyperintense, enhancing lesions are seen throughout the liver compatible with known metastatic disease.   Index lesion in hepatic segment III measures 4.1 cm (axial series 14, image 38), previously 3.8 cm.  Additional index lesion in hepatic segment V/VIII measures 4.0 cm (axial series 14, image 40), previously 3.8 cm.  While not an index lesion, there is a lesion in hepatic segment V which measures 2.3 cm (axial series 14, image 43), previously 2.2 cm when directly remeasured.  Overall lesions appear to have increased in size over multiple prior exams with no definite new lesions identified.  The portal vein is patent.    No evidence of intra-or extrahepatic biliary ductal dilatation. The gallbladder, stomach, pancreas, and visualized portions of the bowel demonstrate no significant abnormalities.  Two areas of restricted diffusion within the spleen appear unchanged.  Stable right adrenal adenoma.  The left adrenal gland is unremarkable.    The kidneys are normal in size and location. No evidence of hydronephrosis or solid renal masses.  Stable subcentimeter left renal cysts.    The visualized aorta is normal in caliber.    The osseous structures demonstrate stable enhancing lesions compatible with known metastatic disease.    Soft tissues of the lower thoracic and abdominal wall are unremarkable.      Impression       Innumerable enhancing hepatic lesions which appear stable in number and majority the stable in size, but at least 2 have slightly increased in size suggesting mild progression of metastatic disease.    Stable osseous metastatic disease.    Additional stable findings as above.    RECIST SUMMARY:             Impression: incarcerated umbilical hernia with pain with a history of neuroendocrine tumor from the lung metastatic to the liver.  He has had a traumatic amputation of his lower extremity.  He is doing well overall with no symptoms.    He takes CAP/TEM father neuroendocrine tumor, completed his last course yesterday and he restarts in 2 weeks time.  He is tender on palpation at the umbilicus. He has a  defect of about 2.5 cm at the umbilicus, has incarcerated.  He needs to have this fixed before  he comes to the ER with strangulated hernia.    I talked about the surgery.  The best option would be to laparoscopic repair as it has minimal problems with wound healing.  Open repair with mesh for his umbilical hernia can have problem with wound healing as he already takes chemo and is scheduled for chemo embolization in about 6 weeks time.    I talked about the surgery and the risk involved such as bleeding infection DVT PE MI stroke recurrence chronic pain requiring removal of the mesh.  This procedure would be outpatient.  Following the procedure he has to wait for at least 2 weeks before he starts the chemo.  He would prefer to have the next chemo embolization in February which is acceptable    He is scheduled for CT scan of the chest and to see Dr. London on Monday  Plan:     1. plan for laparoscopic umbilical hernia repair on 27th of this month.  2.  Not to start chemo until surgery is done.   3.  Chemo embolization in February of 2019  4.  Will schedule MRI after the postop visit    Spent more than 40 min in explaining the surgery risk in wall and with relation to the chemo medications.    For umbilical hernia repair                GRACY Sepulveda MD, FACS   Associate Professor of Surgery, [facility]   Neuroendocrine Surgery, Hepatic/Pancreatic & General Surgery   [address]   [place] [address]   ph. [phone]; [phone]   fax. [phone]

## 2018-11-09 DIAGNOSIS — D3A.00 CARCINOID TUMOR: ICD-10-CM

## 2018-11-12 ENCOUNTER — OFFICE VISIT (OUTPATIENT)
Dept: HEMATOLOGY/ONCOLOGY | Facility: CLINIC | Age: 63
End: 2018-11-12
Payer: COMMERCIAL

## 2018-11-12 ENCOUNTER — TELEPHONE (OUTPATIENT)
Dept: HEMATOLOGY/ONCOLOGY | Facility: CLINIC | Age: 63
End: 2018-11-12

## 2018-11-12 ENCOUNTER — LAB VISIT (OUTPATIENT)
Dept: LAB | Facility: HOSPITAL | Age: 63
End: 2018-11-12
Attending: INTERNAL MEDICINE
Payer: COMMERCIAL

## 2018-11-12 ENCOUNTER — TELEPHONE (OUTPATIENT)
Dept: PHARMACY | Facility: CLINIC | Age: 63
End: 2018-11-12

## 2018-11-12 VITALS
WEIGHT: 298.5 LBS | OXYGEN SATURATION: 96 % | BODY MASS INDEX: 40.43 KG/M2 | TEMPERATURE: 98 F | RESPIRATION RATE: 20 BRPM | DIASTOLIC BLOOD PRESSURE: 55 MMHG | HEIGHT: 72 IN | SYSTOLIC BLOOD PRESSURE: 114 MMHG | HEART RATE: 57 BPM

## 2018-11-12 DIAGNOSIS — C7B.8 SECONDARY NEUROENDOCRINE TUMOR OF BONE: ICD-10-CM

## 2018-11-12 DIAGNOSIS — D3A.090 BRONCHIAL CARCINOID TUMORS: Primary | ICD-10-CM

## 2018-11-12 DIAGNOSIS — C7B.8 SECONDARY NEUROENDOCRINE TUMOR OF LIVER: ICD-10-CM

## 2018-11-12 LAB
ALBUMIN SERPL BCP-MCNC: 3.8 G/DL
ALP SERPL-CCNC: 70 U/L
ALT SERPL W/O P-5'-P-CCNC: 26 U/L
ANION GAP SERPL CALC-SCNC: 6 MMOL/L
AST SERPL-CCNC: 16 U/L
BILIRUB SERPL-MCNC: 0.9 MG/DL
BUN SERPL-MCNC: 9 MG/DL
CALCIUM SERPL-MCNC: 9.6 MG/DL
CHLORIDE SERPL-SCNC: 104 MMOL/L
CO2 SERPL-SCNC: 27 MMOL/L
CREAT SERPL-MCNC: 1 MG/DL
ERYTHROCYTE [DISTWIDTH] IN BLOOD BY AUTOMATED COUNT: 14.6 %
EST. GFR  (AFRICAN AMERICAN): >60 ML/MIN/1.73 M^2
EST. GFR  (NON AFRICAN AMERICAN): >60 ML/MIN/1.73 M^2
GLUCOSE SERPL-MCNC: 132 MG/DL
HCT VFR BLD AUTO: 39.9 %
HGB BLD-MCNC: 13.9 G/DL
IMM GRANULOCYTES # BLD AUTO: 0.02 K/UL
MCH RBC QN AUTO: 31.2 PG
MCHC RBC AUTO-ENTMCNC: 34.8 G/DL
MCV RBC AUTO: 90 FL
NEUTROPHILS # BLD AUTO: 3.2 K/UL
PLATELET # BLD AUTO: 182 K/UL
PMV BLD AUTO: 9.4 FL
POTASSIUM SERPL-SCNC: 3.9 MMOL/L
PROT SERPL-MCNC: 7.2 G/DL
RBC # BLD AUTO: 4.46 M/UL
SODIUM SERPL-SCNC: 137 MMOL/L
WBC # BLD AUTO: 4.7 K/UL

## 2018-11-12 PROCEDURE — 80053 COMPREHEN METABOLIC PANEL: CPT

## 2018-11-12 PROCEDURE — 99214 OFFICE O/P EST MOD 30 MIN: CPT | Mod: S$GLB,,, | Performed by: INTERNAL MEDICINE

## 2018-11-12 PROCEDURE — 36415 COLL VENOUS BLD VENIPUNCTURE: CPT

## 2018-11-12 PROCEDURE — 3008F BODY MASS INDEX DOCD: CPT | Mod: CPTII,S$GLB,, | Performed by: INTERNAL MEDICINE

## 2018-11-12 PROCEDURE — 99999 PR PBB SHADOW E&M-EST. PATIENT-LVL III: CPT | Mod: PBBFAC,,, | Performed by: INTERNAL MEDICINE

## 2018-11-12 PROCEDURE — 85027 COMPLETE CBC AUTOMATED: CPT

## 2018-11-12 RX ORDER — CAPECITABINE 500 MG/1
2000 TABLET, FILM COATED ORAL 2 TIMES DAILY
Qty: 112 TABLET | Refills: 3 | Status: SHIPPED | OUTPATIENT
Start: 2018-11-12 | End: 2019-07-29

## 2018-11-12 NOTE — TELEPHONE ENCOUNTER
Called patient to schedule injection. He scheduled for tomorrow at 9.      ----- Message from Yusra Salas RN sent at 11/12/2018  4:09 PM CST -----  He didn't show up upstairs for his Lanreotide, can you call him to reschedule? He is supposed to hold his chemo not his shot.  He still needs that monthly.       ----- Message -----  From: Jesus London DO FACP  Sent: 11/12/2018  12:57 PM  To: Yusra Salas RN    See back in another 4-5 weeks

## 2018-11-12 NOTE — PROGRESS NOTES
PATIENT: Elroy Rahman  MRN: [id number]  DATE: 11/12/2018      Diagnosis:   1. Bronchial carcinoid tumors    2. Secondary neuroendocrine tumor of liver    3. Secondary neuroendocrine tumor of bone        Chief Complaint: Carcinoid Tumor (bronchial)      Oncologic History:      Oncologic History Bronchial carcinoid, left, diagnosed 1/2016   Metastatic disease to liver and bone 5/2017     Oncologic Treatment Left upper lobectomy 1/2016  CAPTEM 7/2017   Lanreotide 7/2017   Zoledronic acid 7/2017   TACE 10/2018    Pathology 1/2016: Typical carcinoid tumor, well-differentiated T2a, N0, M0 Ki-67 <1%  6/2017: Liver biopsy, well-differentiated, Ki-67 25%           Subjective:    Interval History: Mr. Rahman is a 63 y.o. male who is seen in follow-up for a bronchial carcinoid tumor.  He states he has been feeling well.  He is planning on having hernia repair in the next 2 weeks.  He is currently off chemotherapy.  He is also having a dental extraction done in the coming weeks as well.  He has no other new complaints.    His history dates to 1/2016 when he underwent a left upper lobectomy with Dr. Bell for a typical carcinoid tumor.  His pathological staging was T2a, N0, M0 with Ki-67 less than 1% and mitotic count 0 per 10 high-powered fields.  He done well postoperatively, however, in 5/2017 he was undergoing routine surveillance and a chest CT in picked up evidence of progression of disease in the liver.  Dedicated abdominal CT was performed showing multiple liver lesions.  In 6/2017 he underwent a CT-guided liver biopsy which was positive for metastatic neuroendocrine tumor which was well-differentiated with no overt nuclear atypia, however, the Ki-67 was 25%.  He underwent gallium-68 PET/CT and was found to have widespread disease involving the bone, liver and also mesentery.  He was started on CAPTEM in 7/2017 and also Lanreotide and zoledronic acid.  Scans in 10/2017 had shown stability of disease.  Imaging in  1/2018 had shown stability of disease.  Scans in 4/2018 had shown stability of disease.  Scans in 07/2018 had continued to showed mild growth.  In 10/2018 he underwent TACE.      Past Medical History:   Past Medical History:   Diagnosis Date    Carcinoid bronchial adenoma of left lung     Chemotherapy follow-up examination 8/25/2017    Cough with hemoptysis     mild/intermittent    Elevated bilirubin 7/2/2018    Hx of BKA, left     Mild heartburn     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    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    Sleep apnea     Snores        Past Surgical HIstory:   Past Surgical History:   Procedure Laterality Date    [id number]-[id number]-[id number] N/A 6/8/2017    Performed by [facility] at [facility] OR 2ND FLR    Bka Left     LEFT UPPER LOBECTOMY-LUNG Left 1/28/2016    Performed by Pan Bell MD at [facility] OR 2ND FLR    THORACOTOMY W/LUNG RESECTION Left 1/28/2016    Performed by Pan Bell MD at [facility] OR 2ND FLR       Family History:   Family History   Problem Relation Age of Onset    Cancer Mother         lung    Cancer Sister     Anesthesia problems Neg Hx        Social History:  reports that  has never smoked. he has never used smokeless tobacco. He reports that he drinks about 0.6 oz of alcohol per week. He reports that he does not use drugs.    Allergies:  Review of patient's allergies indicates:  No Known Allergies    Medications:  Current Outpatient Medications   Medication Sig Dispense Refill    capecitabine (XELODA) 500 MG Tab TAKE 4 TABLETS (2,000MG TOTAL) BY MOUTH 2 TIMES DAILY FOR 14 DAYS EVERY 28 DAYS ( 2 WEEKS ON, 2 WEEKS OFF, EVERY 4 WEEKS). 112 tablet 3    lanreotide (SOMATULINE DEPOT) 120 mg/0.5 mL Syrg Inject 120 mg into the skin every 28 days.      ondansetron (ZOFRAN) 4 MG tablet Take 1 tablet (4 mg total) by mouth 2 (two) times daily. 15 tablet 0    temozolomide (TEMODAR) 250 MG capsule TAKE 2 CAPSULES (500MG  TOTAL) BY MOUTH ONCE DAILY ON DAYS 10-14 OF 28 DAY CYCLE 10 capsule 6     No current facility-administered medications for this visit.        Review of Systems   Constitutional: Negative for appetite change, chills, fatigue, fever and unexpected weight change.   HENT: Negative for dental problem, sinus pressure and sneezing.    Eyes: Negative for visual disturbance.   Respiratory: Negative for cough, choking and chest tightness.    Cardiovascular: Negative for chest pain and leg swelling.   Gastrointestinal: Negative for abdominal pain, blood in stool, constipation, diarrhea and nausea.   Genitourinary: Negative for difficulty urinating and dysuria.   Musculoskeletal: Negative for arthralgias and back pain.   Skin: Positive for wound. Negative for rash.   Neurological: Negative for dizziness, light-headedness and headaches.   Hematological: Negative for adenopathy. Does not bruise/bleed easily.   Psychiatric/Behavioral: Negative for sleep disturbance. The patient is not nervous/anxious.        ECOG Performance Status:   ECOG SCORE    0 - Fully active-able to carry on all pre-disease performance without restriction         Objective:      Vitals:   Vitals:    11/12/18 1126   BP: (!) 114/55   Pulse: (!) 57   Resp: 20   Temp: 98.4 °F (36.9 °C)   SpO2: 96%   Weight: 135.4 kg (298 lb 8.1 oz)   Height: 6' (1.829 m)     BMI: Body mass index is 40.48 kg/m².    Physical Exam   Constitutional: He is oriented to person, place, and time. He appears well-developed and well-nourished.   HENT:   Head: Normocephalic and atraumatic.   Eyes: Pupils are equal, round, and reactive to light.   Neck: Normal range of motion. Neck supple.   Cardiovascular: Normal rate and regular rhythm.   Pulmonary/Chest: Effort normal and breath sounds normal. No respiratory distress.   Abdominal: Soft. He exhibits no distension. There is no tenderness.   Musculoskeletal: He exhibits no edema or tenderness.   Left lower extremity prosthesis    Lymphadenopathy:     He has no cervical adenopathy.   Neurological: He is alert and oriented to person, place, and time. No cranial nerve deficit.   Skin: Skin is warm and dry.   Right middle finger with 2 cm abrasion, dorsal skin of 3rd metacarpal.  No surrounding erythema, edema.   Psychiatric: He has a normal mood and affect. His behavior is normal.       Laboratory Data:  Lab Visit on 11/12/2018   Component Date Value Ref Range Status    WBC 11/12/2018 4.70  3.90 - 12.70 K/uL Final    RBC 11/12/2018 4.46* 4.60 - 6.20 M/uL Final    Hemoglobin 11/12/2018 13.9* 14.0 - 18.0 g/dL Final    Hematocrit 11/12/2018 39.9* 40.0 - 54.0 % Final    MCV 11/12/2018 90  82 - 98 fL Final    MCH 11/12/2018 31.2* 27.0 - 31.0 pg Final    MCHC 11/12/2018 34.8  32.0 - 36.0 g/dL Final    RDW 11/12/2018 14.6* 11.5 - 14.5 % Final    Platelets 11/12/2018 182  150 - 350 K/uL Final    MPV 11/12/2018 9.4  9.2 - 12.9 fL Final    Gran # (ANC) 11/12/2018 3.2  1.8 - 7.7 K/uL Final    Comment: The ANC is based on a white cell differential from an   automated cell counter. It has not been microscopically   reviewed for the presence of abnormal cells. Clinical   correlation is required.      Immature Grans (Abs) 11/12/2018 0.02  0.00 - 0.04 K/uL Final    Comment: Mild elevation in immature granulocytes is non specific and   can be seen in a variety of conditions including stress response,   acute inflammation, trauma and pregnancy. Correlation with other   laboratory and clinical findings is essential.      Sodium 11/12/2018 137  136 - 145 mmol/L Final    Potassium 11/12/2018 3.9  3.5 - 5.1 mmol/L Final    Chloride 11/12/2018 104  95 - 110 mmol/L Final    CO2 11/12/2018 27  23 - 29 mmol/L Final    Glucose 11/12/2018 132* 70 - 110 mg/dL Final    BUN, Bld 11/12/2018 9  8 - 23 mg/dL Final    Creatinine 11/12/2018 1.0  0.5 - 1.4 mg/dL Final    Calcium 11/12/2018 9.6  8.7 - 10.5 mg/dL Final    Total Protein 11/12/2018 7.2  6.0 -  8.4 g/dL Final    Albumin 11/12/2018 3.8  3.5 - 5.2 g/dL Final    Total Bilirubin 11/12/2018 0.9  0.1 - 1.0 mg/dL Final    Comment: For infants and newborns, interpretation of results should be based  on gestational age, weight and in agreement with clinical  observations.  Premature Infant recommended reference ranges:  Up to 24 hours.............<8.0 mg/dL  Up to 48 hours............<12.0 mg/dL  3-5 days..................<15.0 mg/dL  6-29 days.................<15.0 mg/dL      Alkaline Phosphatase 11/12/2018 70  55 - 135 U/L Final    AST 11/12/2018 16  10 - 40 U/L Final    ALT 11/12/2018 26  10 - 44 U/L Final    Anion Gap 11/12/2018 6* 8 - 16 mmol/L Final    eGFR if African American 11/12/2018 >60.0  >60 mL/min/1.73 m^2 Final    eGFR if non African American 11/12/2018 >60.0  >60 mL/min/1.73 m^2 Final    Comment: Calculation used to obtain the estimated glomerular filtration  rate (eGFR) is the CKD-EPI equation.        Supplemental Diagnosis  Chromogranin Staining:  Intensity: Moderate to strong. Positive cells: 100 (%)  Synaptophysin Staining:  Intensity: Strong. Positive cells: 100 (%)  CD31: 40 vessels per 1 HPF  Factor VIII: 27 vessels per 1 HPF  Ki67: 1 % tumor cells staining  Immunostains performed with appropriate positive and negative controls.  FINAL PATHOLOGIC DIAGNOSIS  1. Lymph node, level XI, excisional biopsy:  Fragments of benign lymph node with anthracosis and sinus histiocytosis (0/1).  2. Lung, lingular staple line, biopsy:  Lung, negative for neoplasm.  3. Lymph node, level X, excisional biopsy:  1 benign lymph node with anthracotic pigment and sinus histiocytosis (0/1).  4. Lymph node, level XII, excisional biopsy:  2 benign lymph nodes with sinus histiocytosis and anthracotic pigment (0/2).  5. Lung, left upper lobe, lobectomy:  Typical carcinoid tumor (well differentiated neuroendocrine tumor), 4 cm in greatest dimension.  Bronchial and vascular margins are negative for  neoplasm.  Uninvolved lung adjacent to the tumor shows emphysematous changes and fibrosis, however remaining lung  appears unremarkable.  Additional immunohistochemical stains for ancillary studies (including synaptophysin, chromogranin and Ki-67)  will be completed and results will be reported in a supplemental report.  See synoptic report in comment section for further details.  6. Lymph node, level VII, excisional biopsy:  2 benign lymph nodes with anthracotic pigment and sinus histiocytosis (0/2).  Comment: Synoptic Report  Specimen: Left upper lobe of lung  Procedure: Lobectomy  Specimen laterality: Left  Tumor size:  Greatest dimension: 4 cm  Tumor focality: Single focus  Histologic type: Typical carcinoid tumor  Visceral pleural invasion: Not identified  Tumor extension: Not identified  Margins: Bronchial and vascular margins are uninvolved by tumor.  Distance of tumor from closest margin: 1 cm from the bronchial surgical margin.  Lymphovascular invasion: Not identified  Ancillary studies:  Immunohistochemical stains for neuroendocrine markers and Ki-67 will be completed and results will follow in a  supplemental report.  Note: The tumor consists of a proliferation of cells in nests with lewis formation. The tumor cells have salt and  pepper chromatin pattern with abundant cytoplasm. There is no significant nuclear atypia. No evidence of  necrosis. Mitotic count is 0 mitoses in 10 high power fields.    Imaging:     MRI 07/23/2018  EXAMINATION:  MRI ABDOMEN W WO CONTRAST    CLINICAL HISTORY:  Bronchial carcinoid tumors;  Other secondary neuroendocrine tumors    TECHNIQUE:  Multiplanar, multi-sequence MR imaging of the abdomen was performed before and after administration of 10 cc of Gadavist gadolinium-based intravenous contrast per the liver protocol.    COMPARISON:  MRI abdomen 01/19/2018, 04/18/2018    FINDINGS:  The visualized lungs, heart, and pericardium demonstrate nothing unusual within the confines  of this exam.    The liver is normal in size and contour.  There is diffuse signal dropout of the liver on out of phase imaging compatible with hepatic steatosis.  Innumerable T2 hyperintense, enhancing lesions are seen throughout the liver compatible with known metastatic disease.  Index lesion in hepatic segment III measures 4.1 cm (axial series 14, image 38), previously 3.8 cm.  Additional index lesion in hepatic segment V/VIII measures 4.0 cm (axial series 14, image 40), previously 3.8 cm.  While not an index lesion, there is a lesion in hepatic segment V which measures 2.3 cm (axial series 14, image 43), previously 2.2 cm when directly remeasured.  Overall lesions appear to have increased in size over multiple prior exams with no definite new lesions identified.  The portal vein is patent.    No evidence of intra-or extrahepatic biliary ductal dilatation. The gallbladder, stomach, pancreas, and visualized portions of the bowel demonstrate no significant abnormalities.  Two areas of restricted diffusion within the spleen appear unchanged.  Stable right adrenal adenoma.  The left adrenal gland is unremarkable.    The kidneys are normal in size and location. No evidence of hydronephrosis or solid renal masses.  Stable subcentimeter left renal cysts.    The visualized aorta is normal in caliber.    The osseous structures demonstrate stable enhancing lesions compatible with known metastatic disease.    Soft tissues of the lower thoracic and abdominal wall are unremarkable.      Impression       Innumerable enhancing hepatic lesions which appear stable in number and majority the stable in size, but at least 2 have slightly increased in size suggesting mild progression of metastatic disease.    Stable osseous metastatic disease.    Additional stable findings as above.    RECIST SUMMARY:    Date of prior examination for comparison: 04/18/2018    Lesion 1: Hepatic segment V/VIII.  4.0 cm. Series 14 image 40.  Prior  measurement 3.8 cm.    Lesion 2: Hepatic segment III.  4.1 cm. Series 14 image 38.  Prior measurement 3.8 cm.       CT 07/23/2018  EXAMINATION:  CT CHEST WITHOUT CONTRAST    CLINICAL HISTORY:  Bronchial carcinoid tumors; Benign carcinoid tumor of the bronchus and lung    TECHNIQUE:  Low dose axial images, sagittal and coronal reformations were obtained from the thoracic inlet to the lung bases. Contrast was not administered.    COMPARISON:  CT chest without contrast: 04/16/2018, 01/19/2018, 11/02/2017, 05/17/2017, 08/17/2016, 02/01/2016, 12/31/2015.    FINDINGS:  The structures at the base of the neck reveal no convincing evidence of disease.    There is a left-sided aortic arch with three branch vessels.  The thoracic aorta maintains normal caliber, contour, and course with atherosclerotic calcification within its course.    The heart is not enlarged and there is no evidence of pericardial effusion.  There is mild calcification of the coronary arteries.    The pulmonary arteries distribute normally.  There are four pulmonary veins.  Systemic and pulmonary venoatrial connections are concordant.    There is no axillary or mediastinal lymph node enlargement.    The esophagus maintains a normal course and caliber.    Postoperative changes of left thoracotomy are again noted.  There are numerous sclerotic foci again identified throughout the visualized spine and ribs, similar to prior examination dated 04/16/2018.    Limited views of the abdomen demonstrate multiple hypoattenuating masses in the liver consistent with patient's known metastatic disease, which are not fully characterized on this noncontrast examination.  There is again identified a right adrenal adenoma.  Please refer to MRI abdomen dated 07/23/2018 for further characterization..    Lungs: Postoperative changes of left upper lobe resection and lingulectomy with volume loss and leftward mediastinal shift again identified.  No convincing evidence of new  pulmonary disease.  There is a nodule measuring 0.4 cm located in the posterior basal segment of the left lower lobe (axial series 4, image 324).  This has been stable since CT chest dated 05/17/2017.  Such stability over 12 months is likely of benign etiology.  No further surveillance is warranted per the Fleischner Society Pulmonary nodule recommendations.  There is an incomplete right major fissure.  The lungs are symmetrically expanded and without evidence of consolidation, pneumothorax, mass, or significant pleural thickening.  There is no evidence of pleural fluid.      Impression       Postoperative changes of left upper lobe resection and lingulectomy with volume loss and leftward mediastinal shift again identified. No convincing evidence of new pulmonary disease.  There is a nodule measuring 0.4 cm located in the posterior basal segment of the left lower lobe (axial series 4, image 324).  This has been stable since CT chest dated 05/17/2017. Such stability over 12 months is likely of benign etiology. No further surveillance is warranted per the Fleischner Society Pulmonary nodule recommendations.    Hypoattenuating masses in the liver consistent with patient's known metastatic disease, which are not fully characterized on this noncontrast examination.  Right adrenal adenoma is again identified.  Please refer to MRI abdomen dated 07/23/2018 for further characterization.    Numerous sclerotic foci consistent with metastases again identified throughout the visualized spine and ribs, similar to prior examination dated 04/16/2018.    Additional findings as above       CT 11/05/2018  COMPARISON:  CT chest 07/23/2018, 05/17/2017, MRI abdomen 07/23/2018    FINDINGS:  Base of Neck: No significant abnormality.    Thoracic soft tissues: Normal.    Aorta: Left-sided aortic arch.  Mild atherosclerosis.    Heart: Normal size. No effusion.  Coronary artery atherosclerosis.  Calcification of the aortic  annulus.    Pulmonary vasculature: Pulmonary arteries distribute normally.  There are four pulmonary veins.    Darya/Mediastinum: No pathologic trang enlargement.  Stable leftward shift of the mediastinum status post left upper lobectomy.    Airways: Patent.    Lungs/Pleura: Postoperative changes from left upper lobectomy.  Surgical clips are stable in location configuration.  No new pulmonary mass identified.  Stable 0.4 cm solid pulmonary micronodule within posterior basal segment left lower lobe (series 4, image 333.  Right lung is clear.    Esophagus: Normal.    Upper Abdomen: Partially visualized liver demonstrates numerous hypodense lesions with regions of increased density compatible with prior chemo embolizations of hepatic metastasis.  Stable right adrenal adenoma.    Bones: Numerous sclerotic osseous lesions similar to prior exam.  No acute fracture.      Impression       1. Stable postoperative changes from left upper lobectomy.  No new pulmonary disease identified.  2. Pulmonary micronodule within the left lower lobe is stable in comparison to 05/17/2017.  Such stability suggest a benign etiology.  3. Numerous hepatic hypodensities compatible patient's history of liver metastasis and chemoembolization.  4. Numerous sclerotic osseous lesions in this patient with history of bony metastasis.  No acute fracture.  5. Stable right adrenal adenoma.                Assessment:       1. Bronchial carcinoid tumors    2. Secondary neuroendocrine tumor of liver    3. Secondary neuroendocrine tumor of bone           Plan:     Mr. Rahman is doing well clinically.  He is planning on having surgery for hernia repair in 2 weeks.  He will remain off chemotherapy for at least 2 weeks after this procedure and if fully recovered from his surgery he can resume.  He wishes to hold off on further chemo embolization until February given the fact that he is going on a cruise.  He also is having a dental extraction done in the coming  weeks.  I have told him to notify his dentist that I would prefer he not receive epinephrine given the fact that this can sometimes produce a carcinoid crisis.  He is going to let his dentist know and I had told him I would be happy to speak with him if he had further questions.  Will plan to see back in another month.  All questions were answered and he is agreeable with this plan.    Jesus London DO, FACP  Hematology & Oncology  [facility] [place] [address]  ph. [phone]  Fax. [phone]    25 minutes were spent in coordination of patient's care, record review and counseling.  More than 50% of the time was face-to-face.

## 2018-11-13 ENCOUNTER — INFUSION (OUTPATIENT)
Dept: INFUSION THERAPY | Facility: HOSPITAL | Age: 63
End: 2018-11-13
Attending: INTERNAL MEDICINE
Payer: COMMERCIAL

## 2018-11-13 DIAGNOSIS — C7B.8 SECONDARY NEUROENDOCRINE TUMOR OF LIVER: ICD-10-CM

## 2018-11-13 DIAGNOSIS — D3A.090 BRONCHIAL CARCINOID TUMORS: Primary | ICD-10-CM

## 2018-11-13 PROCEDURE — 96372 THER/PROPH/DIAG INJ SC/IM: CPT

## 2018-11-13 PROCEDURE — 63600175 PHARM REV CODE 636 W HCPCS: Mod: JG | Performed by: INTERNAL MEDICINE

## 2018-11-13 RX ORDER — LANREOTIDE ACETATE 120 MG/.5ML
120 INJECTION SUBCUTANEOUS
Status: COMPLETED | OUTPATIENT
Start: 2018-11-13 | End: 2018-11-13

## 2018-11-13 RX ADMIN — LANREOTIDE ACETATE 120 MG: 120 INJECTION SUBCUTANEOUS at 10:11

## 2018-11-20 ENCOUNTER — CLINICAL SUPPORT (OUTPATIENT)
Dept: LAB | Facility: HOSPITAL | Age: 63
End: 2018-11-20
Attending: SURGERY
Payer: COMMERCIAL

## 2018-11-20 ENCOUNTER — HOSPITAL ENCOUNTER (OUTPATIENT)
Dept: PREADMISSION TESTING | Facility: HOSPITAL | Age: 63
Discharge: HOME | End: 2018-11-20
Attending: SURGERY
Payer: COMMERCIAL

## 2018-11-20 ENCOUNTER — ANESTHESIA EVENT (OUTPATIENT)
Dept: SURGERY | Facility: HOSPITAL | Age: 63
End: 2018-11-20
Payer: COMMERCIAL

## 2018-11-20 VITALS
WEIGHT: 295.31 LBS | RESPIRATION RATE: 20 BRPM | OXYGEN SATURATION: 97 % | HEIGHT: 72 IN | TEMPERATURE: 98 F | SYSTOLIC BLOOD PRESSURE: 130 MMHG | HEART RATE: 55 BPM | BODY MASS INDEX: 40 KG/M2 | DIASTOLIC BLOOD PRESSURE: 80 MMHG

## 2018-11-20 DIAGNOSIS — C78.7 SECONDARY MALIGNANT NEOPLASM OF LIVER: ICD-10-CM

## 2018-11-20 DIAGNOSIS — C78.7 SECONDARY MALIGNANT NEOPLASM OF LIVER: Primary | ICD-10-CM

## 2018-11-20 PROCEDURE — 93010 EKG 12-LEAD: ICD-10-PCS | Mod: ,,, | Performed by: INTERNAL MEDICINE

## 2018-11-20 PROCEDURE — 93010 ELECTROCARDIOGRAM REPORT: CPT | Mod: ,,, | Performed by: INTERNAL MEDICINE

## 2018-11-20 PROCEDURE — 93005 ELECTROCARDIOGRAM TRACING: CPT

## 2018-11-20 RX ORDER — LIDOCAINE HYDROCHLORIDE 10 MG/ML
1 INJECTION, SOLUTION EPIDURAL; INFILTRATION; INTRACAUDAL; PERINEURAL ONCE
Status: CANCELLED | OUTPATIENT
Start: 2018-11-20 | End: 2018-11-20

## 2018-11-20 RX ORDER — SODIUM CHLORIDE, SODIUM LACTATE, POTASSIUM CHLORIDE, CALCIUM CHLORIDE 600; 310; 30; 20 MG/100ML; MG/100ML; MG/100ML; MG/100ML
INJECTION, SOLUTION INTRAVENOUS CONTINUOUS
Status: CANCELLED | OUTPATIENT
Start: 2018-11-20

## 2018-11-20 NOTE — PRE-PROCEDURE INSTRUCTIONS
[place] , [phone]    Allergies, medical, surgical, family and psychosocial histories reviewed with patient. Periop plan of care reviewed. Preop instructions given, including medications to take and to hold. Hibiclens soap and instructions on use given. Time allotted for questions to be addressed.  Patient verbalized understanding.

## 2018-11-20 NOTE — DISCHARGE INSTRUCTIONS
Your surgery is scheduled for 11/27.    Please report to Outpatient Surgery Intake Office on the 2nd FLOOR at 1pm   a.m.     We will call you 11/26, after 2pm, to verify your arrival time.     INSTRUCTIONS IMPORTANT!!!  ¨ Do not eat or drink after 12 midnight-including water. OK to brush teeth, no   gum, candy or mints!          ____  Proceed to [facility] on *** for additional blood test.        ____  Do not wear makeup, including mascara.  ____  No powder, lotions or creams to surgical area.  ____  Please remove all jewelry, including piercings and leave at home.  ____  No money or valuables needed. Please leave at home.  ____  Please bring any documents given by your doctor.  ____  If going home the same day, arrange for a ride home. You will not be able to             drive if Anesthesia was used.  ____  Children under 18 years require a parent / guardian present the entire time             they are in surgery / recovery.  ____  Wear loose fitting clothing. Allow for dressings, bandages.  ____  Stop Aspirin, Ibuprofen, Motrin and Aleve at least 3-5 days before surgery, unless otherwise instructed by your doctor, or the nurse.   You MAY use Tylenol/acetaminophen until day of surgery.  ____  Wash the surgical area with Hibiclens the night before surgery, and again the             morning of surgery.  Be sure to rinse hibiclens off completely (if instructed by   nurse).  ____  If you take diabetic medication, do not take am of surgery unless instructed by Doctor.  ____  Call MD for temperature above 101 degrees or any other signs of infection such as Urinary (bladder) infection, Upper respiratory infection, skin boils, etc.  ____ Stop taking any Fish Oil supplement or any Vitamins that contain Vitamin E at least 5 days prior to surgery.  ____ Do Not wear your contact lenses the day of your procedure.  You may wear your glasses.      ____Do not shave for 3 days prior to surgery.  ____ Practice Good  hand washing before, during, and after procedure.      I have read or had read and explained to me, and understand the above information.  Additional comments or instructions:  For additional questions call [phone]      Pre-Op Bathing Instructions    Before surgery, you can play an important role in your own health.    Because skin is not sterile, we need to be sure that your skin is as free of germs as possible. By following the instructions below, you can reduce the number of germs on your skin before surgery.    IMPORTANT: You will need to shower with a special soap called Hibiclens*, available at any pharmacy.  If you are allergic to Chlorhexidine (the antiseptic in Hibiclens), use an antibacterial soap such as Dial Soap for your preoperative shower.  You will shower with Hibiclens both the night before your surgery and the morning of your surgery.  Do not use Hibiclens on the head, face or genitals to avoid injury to those areas.    STEP #1: THE NIGHT BEFORE YOUR SURGERY     1. Do not shave the area of your body where your surgery will be performed.  2. Shower and wash your hair and body as usual with your normal soap and shampoo.  3. Rinse your hair and body thoroughly after you shower to remove all soap residue.  4. With your hand, apply one packet of Hibiclens soap to the surgical site.   5. Wash the site gently for five (5) minutes. Do not scrub your skin too hard.   6. Do not wash with your regular soap after Hibiclens is used.  7. Rinse your body thoroughly.  8. Pat yourself dry with a clean, soft towel.  9. Do not use lotion, cream, or powder.  10. Wear clean clothes.    STEP #2: THE MORNING OF YOUR SURGERY     1. Repeat Step #1.    * Not to be used by people allergic to Chlorhexidine.      Hernia Repair Surgery  A hernia (or rupture) is a weakness or defect in the wall of the abdomen. A hernia will not heal on its own. Surgery is needed to repair the defect in the abdominal wall. If not treated, a  hernia can get larger. It can also lead to serious health complications. Fortunately hernia surgery can be done quickly and safely. Below is an overview of hernia repair surgery.  Preparing for surgery  Your healthcare provider will talk with you about getting ready for surgery. Follow all the instructions youre given and be sure to:  · Tell your healthcare provider about any medicines, supplements, or herbs you take. This includes both prescription and over-the-counter items. Ask if you should stop taking any of them.  · Stop taking aspirin, ibuprofen, naproxen, and other NSAIDs 7 to 14 days before surgery.  · Arrange for an adult family member or friend to give you a ride home after surgery.  · Stop smoking. Smoking affects blood flow and can slow healing.  · Gently wash the surgical area the night before surgery.  · Follow any directions you are given for not eating or drinking before surgery.  The day of surgery  Arrive at the hospital or surgical center at your scheduled time. Youll be asked to change into a patient gown. Youll then be given an IV to provide fluids and medicine. Shortly before surgery, an anesthesiologist or nurse anesthetist will talk with you. He or she will explain the types of anesthesia used to prevent pain during surgery. You will have one or more of the following:  · Monitored sedation to make you relaxed and sleepy.  · Local anesthesia to numb the surgical site.  · Regional anesthesia to numb specific areas of your body.  · General anesthesia to let you sleep during surgery.  Fixing the weakness  Surgery treats a hernia by repairing the weakness in the abdominal wall. Most hernias are treated using tension-free repairs. This is surgery that uses special mesh materials to repair the weak area. The mesh covers the weak area like a patch. The mesh is made of strong, flexible plastic that stays in the body. Over time, nearby tissues grow into the mesh to strengthen the repair.  After  surgery  When the procedure is over, youll be taken to the recovery area to rest. Your blood pressure and heart rate will be monitored. Youll also have a bandage over the surgical site. To help reduce discomfort, youll be given pain medicines. You may also be given breathing exercises to keep your lungs clear. Later youll be asked to get up and walk. This helps prevent blood clots in the legs. You can go home when your healthcare provider says youre ready.     Risks and possible complications of hernia surgery  · Bleeding  · Infection  · Numbness or pain in the groin or leg  · Risk the hernia will recur  · Damage to the testicles or testicular function · Anesthesia risks  · Mesh complications  · Inability to urinate  · Bowel or bladder injury       Date Last Reviewed: 10/1/2016  © 7346-0209 [employer]. [address] [place] [address], [place] [address]. All rights reserved. This information is not intended as a substitute for professional medical care. Always follow your healthcare professional's instructions.        Anesthesia: General Anesthesia     You are watched continuously during your procedure by your anesthesia provider.     Youre due to have surgery. During surgery, youll be given medicine called anesthesia or anesthetic. This will keep you comfortable and pain-free. Your anesthesia provider will use general anesthesia.  What is general anesthesia?  General anesthesia puts you into a state like deep sleep. It goes into the bloodstream (IV anesthetics), into the lungs (gas anesthetics), or both. You feel nothing during the procedure. You will not remember it. During the procedure, the anesthesia provider monitors you continuously. He or she checks your heart rate and rhythm, blood pressure, breathing, and blood oxygen.  · IV anesthetics. IV anesthetics are given through an IV line in your arm. Theyre often given first. This is so you are asleep before a gas anesthetic is started. Some kinds of  IV anesthetics relieve pain. Others relax you. Your doctor will decide which kind is best in your case.  · Gas anesthetics. Gas anesthetics are breathed into the lungs. They are often used to keep you asleep. They can be given through a facemask or a tube placed in your larynx or trachea (breathing tube).  ¨ If you have a facemask, your anesthesia provider will most likely place it over your nose and mouth while youre still awake. Youll breathe oxygen through the mask as your IV anesthetic is started. Gas anesthetic may be added through the mask.  ¨ If you have a tube in the larynx or trachea, it will be inserted into your throat after youre asleep.  Anesthesia tools and medicines  You will likely have:  · IV anesthetics. These are put into an IV line into your bloodstream.  · Gas anesthetics. You breathe these anesthetics into your lungs, where they pass into your bloodstream.  · Pulse oximeter. This is a small clip that is attached to the end of your finger. This measures your blood oxygen level.  · Electrocardiography leads (electrodes). These are small sticky pads that are placed on your chest. They record your heart rate and rhythm.  · Blood pressure cuff. This reads your blood pressure.  Risks and possible complications  General anesthesia has some risks. These include:  · Breathing problems  · Nausea and vomiting  · Sore throat or hoarseness (usually temporary)  · Allergic reaction to the anesthetic  · Irregular heartbeat (rare)  · Cardiac arrest (rare)   Anesthesia safety  · Follow all instructions you are given for how long not to eat or drink before your procedure.  · Be sure your doctor knows what medicines and drugs you take. This includes over-the-counter medicines, herbs, supplements, alcohol or other drugs. You will be asked when those were last taken.  · Have an adult family member or friend drive you home after the procedure.  · For the first 24 hours after your surgery:  ¨ Do not drive or use  heavy equipment.  ¨ Do not make important decisions or sign legal documents. If important decisions or signing legal documents is necessary during the first 24 hours after surgery, have a trusted family member or spouse act on your behalf.  ¨ Avoid alcohol.  ¨ Have a responsible adult stay with you. He or she can watch for problems and help keep you safe.  Date Last Reviewed: 12/1/2016  © 0672-1603 [employer]. [address] [place] [address], [place] [address]. All rights reserved. This information is not intended as a substitute for professional medical care. Always follow your healthcare professional's instructions.

## 2018-11-20 NOTE — ANESTHESIA PREPROCEDURE EVALUATION
11/20/2018  Elroy Rahman is a 63 y.o., male scheduled for umbilical hernia repair 11/27/18.    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    Sleep apnea     Snores      Past Surgical History:   Procedure Laterality Date    WSLULD-TCACOC-JH N/A 6/8/2017    [facility]    Bka Left     LEFT UPPER LOBECTOMY-LUNG Left 1/28/2016    Performed by Pan Bell MD at [facility] OR [address] [place] [address]    THORACOTOMY W/LUNG RESECTION Left 1/28/2016    Performed by Pan Bell MD at [facility] OR [address] [place] [address]     Anesthesia Evaluation    I have reviewed the Patient Summary Reports.     I have reviewed the Medications.     Review of Systems  Anesthesia Hx:  No problems with previous Anesthesia  History of prior surgery of interest to airway management or planning: lung surgery. Previous anesthesia: General   Social:  Non-Smoker, No Alcohol Use    Hematology/Oncology:  Hematology Normal      Current/Recent Cancer. Other (see Oncology comments)   Cardiovascular:   Exercise tolerance: good Denies Dysrhythmias.   Denies Angina.        Pulmonary:   Denies COPD.  Denies Asthma.  Denies Shortness of breath. Sleep Apnea  Hx of Lung/Chest Surgery or Procedure: left, Lobectomy    Renal/:   Denies Chronic Renal Disease.     Hepatic/GI:   Denies GERD.  Liver Disease Hepatic Neoplasm, Metastic Cancer To Liver (metastatic neuroendocrine tumor) Not currently receiving chemotherapy   Neurological:   Denies TIA. Denies CVA. Denies Seizures.     Endocrine:  Endocrine Normal        Physical Exam  General:  Morbid Obesity    Airway/Jaw/Neck:  Airway Findings: Mouth Opening: Normal Tongue: Large  General Airway Assessment: Adult  Mallampati: IV  Improves to III with phonation.  Jaw/Neck Findings:  Neck ROM: Normal ROM      Dental:  Dental Findings: Periodontal disease, Severe   Chest/Lungs:  Chest/Lungs Findings: Normal Respiratory Rate, Decreased Breathe Sounds Left     Heart/Vascular:  Heart Findings: Rate: Normal  Rhythm: Regular Rhythm  Sounds: Normal  Heart murmur: negative       Mental Status:  Mental Status Findings:  Cooperative, Alert and Oriented         Anesthesia Plan  Type of Anesthesia, risks & benefits discussed:  Anesthesia Type:  general  Patient's Preference:   Intra-op Monitoring Plan: standard ASA monitors  Intra-op Monitoring Plan Comments:   Post Op Pain Control Plan: per primary service following discharge from PACU  Post Op Pain Control Plan Comments:   Induction:   IV  Beta Blocker:         Informed Consent: Patient understands risks and agrees with Anesthesia plan.  Questions answered. Anesthesia consent signed with patient.  ASA Score: 3     Day of Surgery Review of History & Physical:  There are no significant changes.      Anesthesia Plan Notes: Anesthesia consent to be signed prior to procedure 11/27/18.          Ready For Surgery From Anesthesia Perspective.

## 2018-11-27 ENCOUNTER — ANESTHESIA (OUTPATIENT)
Dept: SURGERY | Facility: HOSPITAL | Age: 63
End: 2018-11-27
Payer: COMMERCIAL

## 2018-11-27 ENCOUNTER — HOSPITAL ENCOUNTER (OUTPATIENT)
Facility: HOSPITAL | Age: 63
Discharge: HOME | End: 2018-11-27
Attending: SURGERY | Admitting: SURGERY
Payer: COMMERCIAL

## 2018-11-27 DIAGNOSIS — C78.7 SECONDARY MALIGNANT NEOPLASM OF LIVER: ICD-10-CM

## 2018-11-27 DIAGNOSIS — C7A.090 CARCINOID BRONCHIAL ADENOMA OF LEFT LUNG: ICD-10-CM

## 2018-11-27 DIAGNOSIS — K42.0 INCARCERATED UMBILICAL HERNIA: ICD-10-CM

## 2018-11-27 PROCEDURE — 25000003 PHARM REV CODE 250: Performed by: SURGERY

## 2018-11-27 PROCEDURE — 63600175 PHARM REV CODE 636 W HCPCS: Performed by: SURGERY

## 2018-11-27 PROCEDURE — 88302 TISSUE EXAM BY PATHOLOGIST: CPT | Performed by: PATHOLOGY

## 2018-11-27 PROCEDURE — 71000015 HC POSTOP RECOV 1ST HR: Performed by: SURGERY

## 2018-11-27 PROCEDURE — 36000708 HC OR TIME LEV III 1ST 15 MIN: Performed by: SURGERY

## 2018-11-27 PROCEDURE — C1781 MESH (IMPLANTABLE): HCPCS | Performed by: SURGERY

## 2018-11-27 PROCEDURE — 37000008 HC ANESTHESIA 1ST 15 MINUTES: Performed by: SURGERY

## 2018-11-27 PROCEDURE — 36000709 HC OR TIME LEV III EA ADD 15 MIN: Performed by: SURGERY

## 2018-11-27 PROCEDURE — 88302 TISSUE EXAM BY PATHOLOGIST: CPT | Mod: 26,,, | Performed by: PATHOLOGY

## 2018-11-27 PROCEDURE — 71000016 HC POSTOP RECOV ADDL HR: Performed by: SURGERY

## 2018-11-27 PROCEDURE — 71000039 HC RECOVERY, EACH ADD'L HOUR: Performed by: SURGERY

## 2018-11-27 PROCEDURE — 25000003 PHARM REV CODE 250: Performed by: NURSE PRACTITIONER

## 2018-11-27 PROCEDURE — 63600175 PHARM REV CODE 636 W HCPCS: Performed by: NURSE ANESTHETIST, CERTIFIED REGISTERED

## 2018-11-27 PROCEDURE — 27201423 OPTIME MED/SURG SUP & DEVICES STERILE SUPPLY: Performed by: SURGERY

## 2018-11-27 PROCEDURE — 25000003 PHARM REV CODE 250: Performed by: NURSE ANESTHETIST, CERTIFIED REGISTERED

## 2018-11-27 PROCEDURE — 37000009 HC ANESTHESIA EA ADD 15 MINS: Performed by: SURGERY

## 2018-11-27 PROCEDURE — C9290 INJ, BUPIVACAINE LIPOSOME: HCPCS | Performed by: SURGERY

## 2018-11-27 PROCEDURE — 71000033 HC RECOVERY, INTIAL HOUR: Performed by: SURGERY

## 2018-11-27 DEVICE — MESH VENTRALIGHT ST 4.5IN CIR: Type: IMPLANTABLE DEVICE | Site: UMBILICAL | Status: FUNCTIONAL

## 2018-11-27 RX ORDER — BACITRACIN 50000 [IU]/1
INJECTION, POWDER, FOR SOLUTION INTRAMUSCULAR
Status: DISCONTINUED | OUTPATIENT
Start: 2018-11-27 | End: 2018-11-27 | Stop reason: HOSPADM

## 2018-11-27 RX ORDER — ACETAMINOPHEN 10 MG/ML
INJECTION, SOLUTION INTRAVENOUS
Status: DISCONTINUED | OUTPATIENT
Start: 2018-11-27 | End: 2018-11-27

## 2018-11-27 RX ORDER — MIDAZOLAM HYDROCHLORIDE 1 MG/ML
INJECTION INTRAMUSCULAR; INTRAVENOUS
Status: DISCONTINUED | OUTPATIENT
Start: 2018-11-27 | End: 2018-11-27

## 2018-11-27 RX ORDER — HYDROCODONE BITARTRATE AND ACETAMINOPHEN 5; 325 MG/1; MG/1
1 TABLET ORAL EVERY 4 HOURS PRN
Status: DISCONTINUED | OUTPATIENT
Start: 2018-11-27 | End: 2018-11-27 | Stop reason: HOSPADM

## 2018-11-27 RX ORDER — SODIUM CHLORIDE 0.9 % (FLUSH) 0.9 %
3 SYRINGE (ML) INJECTION
Status: DISCONTINUED | OUTPATIENT
Start: 2018-11-27 | End: 2018-11-27 | Stop reason: HOSPADM

## 2018-11-27 RX ORDER — GLYCOPYRROLATE 0.2 MG/ML
INJECTION INTRAMUSCULAR; INTRAVENOUS
Status: DISCONTINUED | OUTPATIENT
Start: 2018-11-27 | End: 2018-11-27

## 2018-11-27 RX ORDER — PROPOFOL 10 MG/ML
VIAL (ML) INTRAVENOUS
Status: DISCONTINUED | OUTPATIENT
Start: 2018-11-27 | End: 2018-11-27

## 2018-11-27 RX ORDER — KETOROLAC TROMETHAMINE 30 MG/ML
INJECTION, SOLUTION INTRAMUSCULAR; INTRAVENOUS
Status: DISCONTINUED | OUTPATIENT
Start: 2018-11-27 | End: 2018-11-27

## 2018-11-27 RX ORDER — SUCCINYLCHOLINE CHLORIDE 20 MG/ML
INJECTION INTRAMUSCULAR; INTRAVENOUS
Status: DISCONTINUED | OUTPATIENT
Start: 2018-11-27 | End: 2018-11-27

## 2018-11-27 RX ORDER — HYDROMORPHONE HYDROCHLORIDE 2 MG/ML
0.5 INJECTION, SOLUTION INTRAMUSCULAR; INTRAVENOUS; SUBCUTANEOUS EVERY 5 MIN PRN
Status: DISCONTINUED | OUTPATIENT
Start: 2018-11-27 | End: 2018-11-27 | Stop reason: HOSPADM

## 2018-11-27 RX ORDER — LIDOCAINE HYDROCHLORIDE 10 MG/ML
1 INJECTION, SOLUTION EPIDURAL; INFILTRATION; INTRACAUDAL; PERINEURAL ONCE
Status: DISCONTINUED | OUTPATIENT
Start: 2018-11-27 | End: 2018-11-27

## 2018-11-27 RX ORDER — HYDROMORPHONE HYDROCHLORIDE 2 MG/ML
1 INJECTION, SOLUTION INTRAMUSCULAR; INTRAVENOUS; SUBCUTANEOUS EVERY 4 HOURS PRN
Status: DISCONTINUED | OUTPATIENT
Start: 2018-11-27 | End: 2018-11-27 | Stop reason: HOSPADM

## 2018-11-27 RX ORDER — ACETAMINOPHEN 10 MG/ML
1000 INJECTION, SOLUTION INTRAVENOUS ONCE
Status: COMPLETED | OUTPATIENT
Start: 2018-11-27 | End: 2018-11-27

## 2018-11-27 RX ORDER — DIPHENHYDRAMINE HYDROCHLORIDE 50 MG/ML
12.5 INJECTION INTRAMUSCULAR; INTRAVENOUS EVERY 6 HOURS PRN
Status: DISCONTINUED | OUTPATIENT
Start: 2018-11-27 | End: 2018-11-27 | Stop reason: RX

## 2018-11-27 RX ORDER — ONDANSETRON HYDROCHLORIDE 2 MG/ML
INJECTION, SOLUTION INTRAMUSCULAR; INTRAVENOUS
Status: DISCONTINUED | OUTPATIENT
Start: 2018-11-27 | End: 2018-11-27

## 2018-11-27 RX ORDER — ROCURONIUM BROMIDE 10 MG/ML
INJECTION, SOLUTION INTRAVENOUS
Status: DISCONTINUED | OUTPATIENT
Start: 2018-11-27 | End: 2018-11-27

## 2018-11-27 RX ORDER — EPHEDRINE SULFATE 50 MG/ML
INJECTION, SOLUTION INTRAVENOUS
Status: DISCONTINUED | OUTPATIENT
Start: 2018-11-27 | End: 2018-11-27

## 2018-11-27 RX ORDER — NEOSTIGMINE METHYLSULFATE 1 MG/ML
INJECTION, SOLUTION INTRAVENOUS
Status: DISCONTINUED | OUTPATIENT
Start: 2018-11-27 | End: 2018-11-27

## 2018-11-27 RX ORDER — PREGABALIN 75 MG/1
75 CAPSULE ORAL
Status: DISCONTINUED | OUTPATIENT
Start: 2018-11-27 | End: 2018-11-27

## 2018-11-27 RX ORDER — SODIUM CHLORIDE, SODIUM LACTATE, POTASSIUM CHLORIDE, CALCIUM CHLORIDE 600; 310; 30; 20 MG/100ML; MG/100ML; MG/100ML; MG/100ML
INJECTION, SOLUTION INTRAVENOUS CONTINUOUS
Status: DISCONTINUED | OUTPATIENT
Start: 2018-11-27 | End: 2018-11-27

## 2018-11-27 RX ORDER — OXYCODONE AND ACETAMINOPHEN 5; 325 MG/1; MG/1
1 TABLET ORAL EVERY 4 HOURS PRN
Qty: 25 TABLET | Refills: 0 | Status: SHIPPED | OUTPATIENT
Start: 2018-11-27 | End: 2019-07-29

## 2018-11-27 RX ORDER — ONDANSETRON 2 MG/ML
8 INJECTION INTRAMUSCULAR; INTRAVENOUS
Status: COMPLETED | OUTPATIENT
Start: 2018-11-27 | End: 2018-11-27

## 2018-11-27 RX ORDER — BUPIVACAINE HYDROCHLORIDE 2.5 MG/ML
INJECTION, SOLUTION EPIDURAL; INFILTRATION; INTRACAUDAL
Status: DISCONTINUED | OUTPATIENT
Start: 2018-11-27 | End: 2018-11-27 | Stop reason: HOSPADM

## 2018-11-27 RX ORDER — FENTANYL CITRATE 50 UG/ML
INJECTION, SOLUTION INTRAMUSCULAR; INTRAVENOUS
Status: DISCONTINUED | OUTPATIENT
Start: 2018-11-27 | End: 2018-11-27

## 2018-11-27 RX ORDER — KETOROLAC TROMETHAMINE 30 MG/ML
15 INJECTION, SOLUTION INTRAMUSCULAR; INTRAVENOUS
Status: DISCONTINUED | OUTPATIENT
Start: 2018-11-27 | End: 2018-11-27

## 2018-11-27 RX ORDER — LIDOCAINE HCL/PF 100 MG/5ML
SYRINGE (ML) INTRAVENOUS
Status: DISCONTINUED | OUTPATIENT
Start: 2018-11-27 | End: 2018-11-27

## 2018-11-27 RX ORDER — KETOROLAC TROMETHAMINE 30 MG/ML
30 INJECTION, SOLUTION INTRAMUSCULAR; INTRAVENOUS
Status: DISCONTINUED | OUTPATIENT
Start: 2018-11-27 | End: 2018-11-27 | Stop reason: HOSPADM

## 2018-11-27 RX ORDER — ONDANSETRON 2 MG/ML
4 INJECTION INTRAMUSCULAR; INTRAVENOUS DAILY PRN
Status: DISCONTINUED | OUTPATIENT
Start: 2018-11-27 | End: 2018-11-27 | Stop reason: HOSPADM

## 2018-11-27 RX ORDER — DEXAMETHASONE SODIUM PHOSPHATE 4 MG/ML
INJECTION, SOLUTION INTRA-ARTICULAR; INTRALESIONAL; INTRAMUSCULAR; INTRAVENOUS; SOFT TISSUE
Status: DISCONTINUED | OUTPATIENT
Start: 2018-11-27 | End: 2018-11-27

## 2018-11-27 RX ADMIN — KETOROLAC TROMETHAMINE 30 MG: 30 INJECTION, SOLUTION INTRAMUSCULAR at 09:11

## 2018-11-27 RX ADMIN — ROCURONIUM BROMIDE 40 MG: 10 INJECTION, SOLUTION INTRAVENOUS at 08:11

## 2018-11-27 RX ADMIN — ONDANSETRON 8 MG: 2 INJECTION INTRAMUSCULAR; INTRAVENOUS at 07:11

## 2018-11-27 RX ADMIN — ACETAMINOPHEN 1000 MG: 10 INJECTION, SOLUTION INTRAVENOUS at 09:11

## 2018-11-27 RX ADMIN — KETOROLAC TROMETHAMINE 15 MG: 30 INJECTION, SOLUTION INTRAMUSCULAR at 06:11

## 2018-11-27 RX ADMIN — FENTANYL CITRATE 50 MCG: 50 INJECTION, SOLUTION INTRAMUSCULAR; INTRAVENOUS at 08:11

## 2018-11-27 RX ADMIN — SODIUM CHLORIDE, SODIUM LACTATE, POTASSIUM CHLORIDE, AND CALCIUM CHLORIDE: .6; .31; .03; .02 INJECTION, SOLUTION INTRAVENOUS at 07:11

## 2018-11-27 RX ADMIN — MIDAZOLAM HYDROCHLORIDE 2 MG: 1 INJECTION, SOLUTION INTRAMUSCULAR; INTRAVENOUS at 08:11

## 2018-11-27 RX ADMIN — SUCCINYLCHOLINE CHLORIDE 120 MG: 20 INJECTION, SOLUTION INTRAMUSCULAR; INTRAVENOUS at 08:11

## 2018-11-27 RX ADMIN — KETOROLAC TROMETHAMINE 30 MG: 30 INJECTION, SOLUTION INTRAMUSCULAR; INTRAVENOUS at 09:11

## 2018-11-27 RX ADMIN — AMPICILLIN SODIUM AND SULBACTAM SODIUM 3 G: 2; 1 INJECTION, POWDER, FOR SOLUTION INTRAMUSCULAR; INTRAVENOUS at 08:11

## 2018-11-27 RX ADMIN — ACETAMINOPHEN 1000 MG: 10 INJECTION, SOLUTION INTRAVENOUS at 06:11

## 2018-11-27 RX ADMIN — ONDANSETRON 8 MG: 2 INJECTION, SOLUTION INTRAMUSCULAR; INTRAVENOUS at 09:11

## 2018-11-27 RX ADMIN — FENTANYL CITRATE 100 MCG: 50 INJECTION, SOLUTION INTRAMUSCULAR; INTRAVENOUS at 08:11

## 2018-11-27 RX ADMIN — ROCURONIUM BROMIDE 10 MG: 10 INJECTION, SOLUTION INTRAVENOUS at 08:11

## 2018-11-27 RX ADMIN — OCTREOTIDE ACETATE: 500 INJECTION, SOLUTION INTRAVENOUS; SUBCUTANEOUS at 06:11

## 2018-11-27 RX ADMIN — DEXAMETHASONE SODIUM PHOSPHATE 8 MG: 4 INJECTION, SOLUTION INTRAMUSCULAR; INTRAVENOUS at 08:11

## 2018-11-27 RX ADMIN — GLYCOPYRROLATE 0.8 MG: 0.2 INJECTION, SOLUTION INTRAMUSCULAR; INTRAVENOUS at 09:11

## 2018-11-27 RX ADMIN — PREGABALIN 75 MG: 75 CAPSULE ORAL at 06:11

## 2018-11-27 RX ADMIN — PROPOFOL 200 MG: 10 INJECTION, EMULSION INTRAVENOUS at 08:11

## 2018-11-27 RX ADMIN — HYDROCODONE BITARTRATE AND ACETAMINOPHEN 1 TABLET: 5; 325 TABLET ORAL at 02:11

## 2018-11-27 RX ADMIN — ROCURONIUM BROMIDE 30 MG: 10 INJECTION, SOLUTION INTRAVENOUS at 08:11

## 2018-11-27 RX ADMIN — LIDOCAINE HYDROCHLORIDE 100 MG: 20 INJECTION, SOLUTION INTRAVENOUS at 08:11

## 2018-11-27 RX ADMIN — PROPOFOL 80 MG: 10 INJECTION, EMULSION INTRAVENOUS at 10:11

## 2018-11-27 RX ADMIN — EPHEDRINE SULFATE 15 MG: 50 INJECTION, SOLUTION INTRAMUSCULAR; INTRAVENOUS; SUBCUTANEOUS at 08:11

## 2018-11-27 RX ADMIN — OCTREOTIDE ACETATE 250 MCG/HR: 1000 INJECTION, SOLUTION INTRAVENOUS; SUBCUTANEOUS at 06:11

## 2018-11-27 RX ADMIN — NEOSTIGMINE METHYLSULFATE 5 MG: 1 INJECTION INTRAVENOUS at 10:11

## 2018-11-27 NOTE — TRANSFER OF CARE
Anesthesia Transfer of Care Note    Patient: Elroy Rahman    Procedure(s) Performed: Procedure(s) (LRB):  REPAIR, HERNIA, UMBILICAL, INCARCERATED, LAPAROSCOPIC (N/A)    Patient location: PACU    Anesthesia Type: general    Transport from OR: Transported from OR on 6-10 L/min O2 by face mask with adequate spontaneous ventilation    Post pain: adequate analgesia    Post assessment: no apparent anesthetic complications    Post vital signs: stable    Level of consciousness: awake and alert    Nausea/Vomiting: no nausea/vomiting    Complications: none    Transfer of care protocol was followed      Last vitals:   Visit Vitals  /71   Pulse 66   Temp 36.7 °C (98.1 °F) (Skin)   Resp 13   Ht 6' (1.829 m)   Wt 133.8 kg (295 lb)   SpO2 95%   BMI 40.01 kg/m²

## 2018-11-27 NOTE — OPERATIVE NOTE ADDENDUM
[facility]  Surgery Department  Operative Note    SUMMARY     Date of Procedure: 11/27/2018     Procedure: Procedure(s) (LRB):  REPAIR, HERNIA, UMBILICAL, INCARCERATED, LAPAROSCOPIC (N/A)   2. Placement of ventralite St echo mesh 11.4X 11.4 cms with rtackers    Surgeon(s) and Role:     * Coco Guidry MD - Primary    Assisting Surgeon: Dr. Riley Parish    Pre-Operative Diagnosis: Incarcerated umbilical hernia [K42.0]    Post-Operative Diagnosis: Post-Op Diagnosis Codes:     * Incarcerated umbilical hernia [K42.0]    Anesthesia: General    Technical Procedures Used: lap hernia with mesh and tackers, primary closure done    Description of the Findings of the Procedure: incarcertaed umbilical hernia with preperitoneal fat      Complications:no    Estimated Blood Loss (EBL): 5 mL           Implants:   Implant Name Type Inv. Item Serial No.  Lot No. LRB No. Used   MESH VENTRALIGHT ST 4.5IN [facility] [id number]  MESH VENTRALIGHT ST 4.5IN [facility]  [employer] [id number] N/A 1       Specimens:   Specimen (12h ago, onward)    Start     Ordered    11/27/18 0911  Specimen to Pathology - Surgery  Once     Comments:  Pre-op Diagnosis: Incarcerated umbilical hernia [K42.0]Post-op Diagnosis: sameProcedure(s):REPAIR, HERNIA, UMBILICAL, INCARCERATED, LAPAROSCOPIC Number of specimens: 1Name of specimens: 1. Umbilical Hernia Contents     Start Status   11/27/18 0911 Collected (11/27/18 0942)       11/27/18 0941                [id number]  Condition: Stable    Disposition: PACU - hemodynamically stable.    Attestation: I was present and scrubbed for the entire procedure.

## 2018-11-27 NOTE — OP NOTE
DATE OF PROCEDURE:  11/27/2018.    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    POSTOPERATIVE DIAGNOSES:  1.  Lung carcinoid.  2.  Metastatic disease to the liver.  3.  Incarcerated umbilical hernia with preperitoneal fat.    PROCEDURES PERFORMED:  1.  Laparoscopic reduction of the preperitoneal incarcerated hernia.  2.  Primary closure of the hernia defect.  3.  Reincorporation with mesh Ventralight ST Echo 11.4 x 11.4 cm mesh.    ANESTHESIA:  General endotracheal anesthesia.    SURGEON:  Jonatan Sepulveda M.D.    SURGERY RESIDENT:  Dr. Ruslan Parish and Ruslan Ortiz.    BLOOD LOSS:  Minimal.    The patient is stable, transferred to Recovery Room in stable condition.    HISTORY AND INDICATION:  This is a 63-year-old gentleman who is known to have a   history of carcinoid disease of the lung, also has a metastatic carcinoid in the   liver.  He was evaluated for chemoembolization.  After the first   chemoembolization, he started to have pain around the umbilicus.  On   examination, he was found to have incarcerated umbilical hernia.  He was on   chemotherapy with a CAPTEM.  It was finished about two weeks back.  After the   first chemoembolization, I talked to him about the surgery for the incarcerated   umbilical hernia.  I emphasized the need for surgery as he has significant pain   around the umbilicus.  He did not have any other issues like nausea, vomiting or   any change in bowel habitus.  Because of the pain, he agreed for the surgery   and then I talked to him about the surgery, the risk involved such as bleeding,   infection, DVT, PE, MI, the recurrence of the hernia, the neurologia requiring   mesh removal, etc.  After going over all the risk and benefits, consent was   obtained.    FINDINGS:  The patient had incarcerated preperitoneal fat.  There was a small   umbilical fascial defect about by 5 mm x 5  donnell.    PROCEDURE IN DETAIL:  The patient was brought to the Operating Room with   Sandostatin IV on board.  He was placed in supine position.  He was then sedated   and intubated.  The abdomen was prepped and draped in the usual sterile manner.    A timeout was done to verify the ID of the patient, procedure and everyone   concurred.  A small incision was made in the right upper quadrant.  Veress   needle was inserted and abdomen was insufflated with CO2.  After adequate   insufflation, an Optiview trocar was inserted into the abdominal cavity under   vision.  Under direct vision, a 5-mm port in the left upper quadrant and another   5-mm port in the left middle quadrant followed by another 5-mm port in the left   lower quadrant was placed.  The 5-mm port in the right upper quadrant was   changed to a 10 mm port.  All the ports were introduced under direct vision.    On examination, the preperitoneal fat was herniated through the umbilical   fascial defect.  This preperitoneal fat was carefully dissected off from the   hernia sac.  Dissection was done to remove the preperitoneal fat, which was   attached to the anterior abdominal wall.  This was all taken out as a specimen   out carefully.  Following this, I created an area for mesh fixation.  I   dissected the falciform ligament off the supraumbilical area.  Since the defect   was less than about a centimeter, I decided to take the mesh about 11.4 x 11.4   cm.    Under direct vision using a V-Loc stitch 2-0 PDS, the umbilical fascial defect   was approximated primarily under direct vision.  Also, the hernia sac, which was   close to the skin was incorporated at the umbilical fascia so that the   umbilicus would not be outpouching.  Following the closure of the fascia, I   rolled the mesh and introduced to the 11 mm port.  The blue cord of the mesh was   brought out through the umbilicus by making a separate stab incision.    Following this, the tip of the blue cord  was transected and the whole mesh was   inflated with air so that it was laid against abdominal wall.  The mesh was   carefully held in position and using tackers, the rim of the mesh was tacked to   the abdominal wall.  Following this, the green framework supporting the mesh was   released and was brought out through the 11 mm port.  Another layer of tacking   was done on the inside, so that the mesh was secured safely into the anterior   abdominal wall.  Tackers were done about less than 2 cm intervals.  At the   completion of the procedure, the mesh was safely tacked to the anterior   abdominal wall without any problem.  Following this, the 11 mm port was taken   out and the fascia was approximated using Shay-Tracey needle using 3-0   Vicryl stitch.  All the incision was approximated using 4-0 Monocryl.  Marcaine   and Exparel was injected at all the incision.  Blood loss was minimal.  The   patient was stable, was extubated and taken to the Recovery Room in stable   condition.      TR/IN  dd: 11/27/2018 10:12:59 (CST)  td: 11/27/2018 11:51:23 (CST)  Doc ID   #[id number]  Job ID #[id number]    CC:

## 2018-11-27 NOTE — ANESTHESIA POSTPROCEDURE EVALUATION
Anesthesia Post Evaluation    Patient: Elroy Rahman    Procedure(s) Performed: Procedure(s) (LRB):  REPAIR, HERNIA, UMBILICAL, INCARCERATED, LAPAROSCOPIC (N/A)    Final Anesthesia Type: general  Patient location during evaluation: PACU  Patient participation: Yes- Able to Participate  Level of consciousness: awake and alert, oriented and awake  Post-procedure vital signs: reviewed and stable  Pain management: adequate  Airway patency: patent  PONV status at discharge: No PONV  Anesthetic complications: no      Cardiovascular status: blood pressure returned to baseline  Respiratory status: unassisted and room air  Hydration status: euvolemic  Follow-up not needed.        Visit Vitals  /76   Pulse 67   Temp 36.7 °C (98 °F) (Skin)   Resp 19   Ht 6' (1.829 m)   Wt 133.8 kg (295 lb)   SpO2 96%   BMI 40.01 kg/m²       Pain/Masood Score: Pain Assessment Performed: Yes (11/27/2018  3:05 PM)  Presence of Pain: complains of pain/discomfort (11/27/2018  3:05 PM)  Pain Rating Prior to Med Admin: 4 (11/27/2018  2:42 PM)  Masood Score: 10 (11/27/2018  3:05 PM)

## 2018-11-27 NOTE — INTERVAL H&P NOTE
The patient has been examined and the H&P has been reviewed:    I concur with the findings and no changes have occurred since H&P was written.    Anesthesia/Surgery risks, benefits and alternative options discussed and understood by patient/family.          Active Hospital Problems    Diagnosis  POA    Incarcerated umbilical hernia [K42.0]  Yes    Secondary malignant neoplasm of liver [C78.7]  Yes      Resolved Hospital Problems   No resolved problems to display.

## 2018-11-27 NOTE — DISCHARGE INSTRUCTIONS
Discharge Instructions for Hernia Repair  You had a procedure called open hernia repair. Also called a rupture, a hernia is a tear or weakness in the wall of the belly. This weakness may be present at birth. Or it can be caused by the wear and tear of daily living. Hernias may get worse with time or with physical stress. But surgery can help repair the weakness and eliminate symptoms.  Activity after surgery  Recommendations include the following:  · After surgery, take it easy for the rest of the day. If you had general anesthesia, dont use machinery or power tools, drink alcohol, or make any major decisions for at least the first 24 hours.  · Dont drive while you are still taking opioid pain medicine, and dont drive until you are able to step firmly on the brake pedal without hesitation.  · Ask others to help with chores and errands while you recover.  · Dont lift anything heavier than 10 pounds until your healthcare provider says its OK.  · Dont mow the lawn, use a vacuum , or do other strenuous activities until your healthcare provider says its OK.  · Walk as often as you feel able.  · Continue the coughing and deep breathing exercises that you learned in the hospital.  · Ask your healthcare provider when you can expect to return to work.  · Avoid constipation:  ¨ Eat fruits, vegetables, and whole grains.  ¨ Drink 6 to 8 glasses of water a day, unless otherwise instructed.  ¨ Use a laxative or a mild stool softener as instructed by your healthcare provider.  Bandage and incision care  Tips include the following:  · Do not get the bandage or wound wet for 48 hours.  · If strips of tape were used to close your incision, dont pull them off. Let them fall off on their own.  · Remove any gauze bandage in 48 hours.  · Wash your incision with mild soap and water. Pat it dry. Dont use oils, powders, or lotions on your incision. Do not soak your incision or take tub baths until cleared by your  healthcare provider.  Follow-up care  Keep follow-up appointments during your recovery. These allow your healthcare provider to check your progress and make sure youre healing well. You may also need to have your stitches, staples, or bandage removed. During office visits, tell your healthcare provider if you have any new symptoms. And be sure to ask any questions you have.     When to call your healthcare provider  Call your healthcare provider immediately if you have any of the following:  · A large amount of swelling or bruising (some testicular swelling and bruising is common)  · Bleeding  · Increasing pain  · Increased redness or drainage of the incision  · Fever of 100.4°F (38.0°C) or higher, or as directed by your healthcare provider  · Trouble urinating  · Nausea or vomiting   Date Last Reviewed: 7/1/2016  © 2203-6973 [employer]. [address] [place] [id number]. All rights reserved. This information is not intended as a substitute for professional medical care. Always follow your healthcare professional's instructions.  ANESTHESIA  -For the first 24 hours after surgery:  Do not drive, use heavy equipment, make important decisions, or drink alcohol  -It is normal to feel sleepy for several hours.  Rest until you are more awake.  -Have someone stay with you, if needed.  They can watch for problems and help keep you safe.  -Some possible post anesthesia side effects include: nausea and vomiting, sore throat and hoarseness, sleepiness, and dizziness.    PAIN  -If you have pain after surgery, pain medicine will help you feel better.  Take it as directed, before pain becomes severe.  Most pain relievers taken by mouth need at least 20-30 minutes to start working.  -Do not drive or drink alcohol while taking pain medicine.  -Pain medication can upset your stomach.  Taking them with a little food may help.  -Other ways to help control pain: elevation, ice, and relaxation  -Call your surgeon  if still having unmanageable pain an hour after taking pain medicine.  -Pain medicine can cause constipation.  Taking an over-the counter stool softener while on prescription pain medicine and drinking plenty of fluids can prevent this side effect.  -Call your surgeon if you have severe side effects like: breathing problems, trouble waking up, dizziness, confusion, or severe constipation.    NAUSEA  -Some people have nausea after surgery.  This is often because of anesthesia, pain, pain medicine, or the stress of surgery.  -Do not push yourself to eat.  Start off with clear liquids and soup.  Slowly move to solid foods.  Don't eat fatty, rich, spicy foods at first.  Eat smaller amounts.  -If you develop persistent nausea and vomiting please notify your surgeon immediately.    BLEEDING  -Different types of surgery require different types of care and dressing changes.  It is important to follow all instructions and advice from your surgeon.  Change dressing as directed.  Call your surgeon for any concerns regarding postop bleeding.    SIGNS OF INFECTION  -Signs of infection include: fever, swelling, drainage, and redness  -Notify your surgeon if you have a fever of 100.4 F (38.0 C) or higher.  -Notify your surgeon if you notice redness, swelling, increased pain, pus, or a foul smell at the incision site.    Acetaminophen; Oxycodone capsules  What is this medicine?  ACETAMINOPHEN; OXYCODONE (a set a MIN onel fen; ox i KOE done) is a pain reliever. It is used to treat moderate to severe pain.  How should I use this medicine?  Take this medicine by mouth with a full glass of water. Follow the directions on the prescription label. You can take it with or without food. If it upsets your stomach, take it with food. Take your medicine at regular intervals. Do not take it more often than directed.  A special MedGuide will be given to you by the pharmacist with each prescription and refill. Be sure to read this information  carefully each time.  Talk to your pediatrician regarding the use of this medicine in children. Special care may be needed.  What side effects may I notice from receiving this medicine?  Side effects that you should report to your doctor or health care professional as soon as possible:  · allergic reactions like skin rash, itching or hives, swelling of the face, lips, or tongue  · breathing problems  · confusion  · redness, blistering, peeling or loosening of the skin, including inside the mouth  · signs and symptoms of liver injury like dark yellow or brown urine; general ill feeling or flu-like symptoms; light-colored stools; loss of appetite; nausea; right upper belly pain; unusually weak or tired; yellowing of the eyes or skin  · signs and symptoms of low blood pressure like dizziness; feeling faint or lightheaded, falls; unusually weak or tired  · trouble passing urine or change in the amount of urine  Side effects that usually do not require medical attention (report to your doctor or health care professional if they continue or are bothersome):  · constipation  · dry mouth  · nausea, vomiting  · tiredness  What may interact with this medicine?  This medicine may interact with the following medications:  · alcohol  · antihistamines for allergy, cough and cold  · antiviral medicines for HIV or AIDS  · atropine  · certain antibiotics like clarithromycin, erythromycin, linezolid, rifampin  · certain medicines for anxiety or sleep  · certain medicines for bladder problems like oxybutynin, tolterodine  · certain medicines for depression like amitriptyline, fluoxetine, sertraline  · certain medicines for fungal infections like ketoconazole, itraconazole, voriconazole  · certain medicines for migraine headache like almotriptan, eletriptan, frovatriptan, naratriptan, rizatriptan, sumatriptan, zolmitriptan  · certain medicines for nausea or vomiting like dolasetron, ondansetron, palonosetron  · certain medicines for  Parkinson's disease like benztropine, trihexyphenidyl  · certain medicines for seizures like phenobarbital, phenytoin, primidone  · certain medicines for stomach problems like dicyclomine, hyoscyamine  · certain medicines for travel sickness like scopolamine  · diuretics  · general anesthetics like halothane, isoflurane, methoxyflurane, propofol  · ipratropium  · local anesthetics like lidocaine, pramoxine, tetracaine  · MAOIs like Carbex, Eldepryl, Marplan, Nardil, and Parnate  · medicines that relax muscles for surgery  · methylene blue  · nilotinib  · other medicines with acetaminophen  · other narcotic medicines for pain or cough  · phenothiazines like chlorpromazine, mesoridazine, prochlorperazine, thioridazine  What if I miss a dose?  If you miss a dose, take it as soon as you can. If it is almost time for your next dose, take only that dose. Do not take double or extra doses.  Where should I keep my medicine?  Keep out of the reach of children. This medicine can be abused. Keep your medicine in a safe place to protect it from theft. Do not share this medicine with anyone. Selling or giving away this medicine is dangerous and against the law.  This medicine may cause accidental overdose and death if it taken by other adults, children, or pets. Mix any unused medicine with a substance like cat litter or coffee grounds. Then throw the medicine away in a sealed container like a sealed bag or a coffee can with a lid. Do not use the medicine after the expiration date.  Store at room temperature between 15 and 30 degrees C (59 and 86 degrees F). Keep container tightly closed. Protect from light.  What should I tell my health care provider before I take this medicine?  They need to know if you have any of these conditions:  · brain tumor  · Crohn's disease, inflammatory bowel disease, or ulcerative colitis  · drug abuse or addiction  · head injury  · heart or circulation problems  · if you often drink  alcohol  · kidney disease or problems going to the bathroom  · liver disease  · lung disease, asthma, or breathing problems  · an unusual or allergic reaction to salicylates, acetaminophen, oxycodone, other opioid analgesics, other medicines, foods, dyes, or preservatives  · pregnant or trying to get pregnant  · breast-feeding  What should I watch for while using this medicine?  Tell your doctor or health care professional if your pain does not go away, if it gets worse, or if you have new or a different type of pain. You may develop tolerance to the medicine. Tolerance means that you will need a higher dose of the medication for pain relief. Tolerance is normal and is expected if you take this medicine for a long time.  Do not suddenly stop taking your medicine because you may develop a severe reaction. Your body becomes used to the medicine. This does NOT mean you are addicted. Addiction is a behavior related to getting and using a drug for a nonmedical reason. If you have pain, you have a medical reason to take pain medicine. Your doctor will tell you how much medicine to take. If your doctor wants you to stop the medicine, the dose will be slowly lowered over time to avoid any side effects.  There are different types of narcotic medicines (opiates). If you take more than one type at the same time or if you are taking another medicine that also causes drowsiness, you may have more side effects. Give your health care provider a list of all medicines you use. Your doctor will tell you how much medicine to take. Do not take more medicine than directed. Call emergency for help if you have problems breathing or unusual sleepiness.  Do not take other medicines that contain acetaminophen with this medicine. Always read labels carefully. If you have questions, ask your doctor or pharmacist.  If you take too much acetaminophen get medical help right away. Too much acetaminophen can be very dangerous and cause liver  damage. Even if you do not have symptoms, it is important to get help right away.  You may get drowsy or dizzy. Do not drive, use machinery, or do anything that needs mental alertness until you know how this medicine affects you. Do not stand or sit up quickly, especially if you are an older patient. This reduces the risk of dizzy or fainting spells. Alcohol may interfere with the effect of this medicine. Avoid alcoholic drinks.  The medicine will cause constipation. Try to have a bowel movement at least every 2 to 3 days. If you do not have a bowel movement for 3 days, call your doctor or health care professional.  Your mouth may get dry. Chewing sugarless gum or sucking hard candy, and drinking plenty of water may help. Contact your doctor if the problem does not go away or is severe.  NOTE:This sheet is a summary. It may not cover all possible information. If you have questions about this medicine, talk to your doctor, pharmacist, or health care provider. Copyright© 2017 [employer]

## 2018-11-28 VITALS
DIASTOLIC BLOOD PRESSURE: 76 MMHG | OXYGEN SATURATION: 96 % | WEIGHT: 295 LBS | HEART RATE: 67 BPM | SYSTOLIC BLOOD PRESSURE: 124 MMHG | BODY MASS INDEX: 39.96 KG/M2 | RESPIRATION RATE: 19 BRPM | HEIGHT: 72 IN | TEMPERATURE: 98 F

## 2018-12-10 ENCOUNTER — TELEPHONE (OUTPATIENT)
Dept: HEMATOLOGY/ONCOLOGY | Facility: CLINIC | Age: 63
End: 2018-12-10

## 2018-12-10 ENCOUNTER — OFFICE VISIT (OUTPATIENT)
Dept: HEMATOLOGY/ONCOLOGY | Facility: CLINIC | Age: 63
End: 2018-12-10
Payer: COMMERCIAL

## 2018-12-10 ENCOUNTER — LAB VISIT (OUTPATIENT)
Dept: LAB | Facility: HOSPITAL | Age: 63
End: 2018-12-10
Attending: INTERNAL MEDICINE
Payer: COMMERCIAL

## 2018-12-10 ENCOUNTER — OFFICE VISIT (OUTPATIENT)
Dept: NEUROLOGY | Facility: HOSPITAL | Age: 63
End: 2018-12-10
Attending: SURGERY
Payer: COMMERCIAL

## 2018-12-10 ENCOUNTER — INFUSION (OUTPATIENT)
Dept: INFUSION THERAPY | Facility: HOSPITAL | Age: 63
End: 2018-12-10
Attending: INTERNAL MEDICINE
Payer: COMMERCIAL

## 2018-12-10 VITALS
TEMPERATURE: 98 F | DIASTOLIC BLOOD PRESSURE: 86 MMHG | BODY MASS INDEX: 39.84 KG/M2 | OXYGEN SATURATION: 97 % | WEIGHT: 294.13 LBS | RESPIRATION RATE: 16 BRPM | HEART RATE: 67 BPM | HEIGHT: 72 IN | SYSTOLIC BLOOD PRESSURE: 131 MMHG

## 2018-12-10 VITALS
WEIGHT: 294.13 LBS | TEMPERATURE: 98 F | HEIGHT: 72 IN | BODY MASS INDEX: 39.84 KG/M2 | HEART RATE: 58 BPM | DIASTOLIC BLOOD PRESSURE: 78 MMHG | SYSTOLIC BLOOD PRESSURE: 127 MMHG

## 2018-12-10 DIAGNOSIS — C7B.8 SECONDARY NEUROENDOCRINE TUMOR OF LIVER: ICD-10-CM

## 2018-12-10 DIAGNOSIS — Z09 POSTOP CHECK: Primary | ICD-10-CM

## 2018-12-10 DIAGNOSIS — D3A.090 BRONCHIAL CARCINOID TUMORS: Primary | ICD-10-CM

## 2018-12-10 LAB
ALBUMIN SERPL BCP-MCNC: 3.9 G/DL
ALP SERPL-CCNC: 81 U/L
ALT SERPL W/O P-5'-P-CCNC: 23 U/L
ANION GAP SERPL CALC-SCNC: 8 MMOL/L
AST SERPL-CCNC: 15 U/L
BILIRUB SERPL-MCNC: 0.6 MG/DL
BUN SERPL-MCNC: 13 MG/DL
CALCIUM SERPL-MCNC: 10.1 MG/DL
CHLORIDE SERPL-SCNC: 104 MMOL/L
CO2 SERPL-SCNC: 26 MMOL/L
CREAT SERPL-MCNC: 1 MG/DL
ERYTHROCYTE [DISTWIDTH] IN BLOOD BY AUTOMATED COUNT: 13.6 %
EST. GFR  (AFRICAN AMERICAN): >60 ML/MIN/1.73 M^2
EST. GFR  (NON AFRICAN AMERICAN): >60 ML/MIN/1.73 M^2
GLUCOSE SERPL-MCNC: 122 MG/DL
HCT VFR BLD AUTO: 42.4 %
HGB BLD-MCNC: 14.6 G/DL
IMM GRANULOCYTES # BLD AUTO: 0.01 K/UL
MCH RBC QN AUTO: 31.1 PG
MCHC RBC AUTO-ENTMCNC: 34.4 G/DL
MCV RBC AUTO: 90 FL
NEUTROPHILS # BLD AUTO: 3.8 K/UL
PLATELET # BLD AUTO: 227 K/UL
PMV BLD AUTO: 9.9 FL
POTASSIUM SERPL-SCNC: 4.5 MMOL/L
PROT SERPL-MCNC: 7.6 G/DL
RBC # BLD AUTO: 4.69 M/UL
SODIUM SERPL-SCNC: 138 MMOL/L
WBC # BLD AUTO: 5.42 K/UL

## 2018-12-10 PROCEDURE — 96372 THER/PROPH/DIAG INJ SC/IM: CPT

## 2018-12-10 PROCEDURE — 99999 PR PBB SHADOW E&M-EST. PATIENT-LVL III: CPT | Mod: PBBFAC,,, | Performed by: INTERNAL MEDICINE

## 2018-12-10 PROCEDURE — 63600175 PHARM REV CODE 636 W HCPCS: Mod: JG | Performed by: INTERNAL MEDICINE

## 2018-12-10 PROCEDURE — 36415 COLL VENOUS BLD VENIPUNCTURE: CPT

## 2018-12-10 PROCEDURE — 99213 OFFICE O/P EST LOW 20 MIN: CPT | Mod: 25 | Performed by: SURGERY

## 2018-12-10 PROCEDURE — 3008F BODY MASS INDEX DOCD: CPT | Mod: CPTII,S$GLB,, | Performed by: INTERNAL MEDICINE

## 2018-12-10 PROCEDURE — 80053 COMPREHEN METABOLIC PANEL: CPT

## 2018-12-10 PROCEDURE — 85027 COMPLETE CBC AUTOMATED: CPT

## 2018-12-10 PROCEDURE — 99214 OFFICE O/P EST MOD 30 MIN: CPT | Mod: S$GLB,,, | Performed by: INTERNAL MEDICINE

## 2018-12-10 RX ORDER — LANREOTIDE ACETATE 120 MG/.5ML
120 INJECTION SUBCUTANEOUS
Status: COMPLETED | OUTPATIENT
Start: 2018-12-10 | End: 2018-12-10

## 2018-12-10 RX ADMIN — LANREOTIDE ACETATE 120 MG: 120 INJECTION SUBCUTANEOUS at 12:12

## 2018-12-10 NOTE — TELEPHONE ENCOUNTER
----- Message from Jesus London DO, FACP sent at 12/10/2018 12:03 PM CST -----  Lanreotide today  F/u with Dr. Cruz  See me in 4 weeks

## 2018-12-10 NOTE — PROGRESS NOTES
PATIENT: Elroy Rahman  MRN: [id number]  DATE: 12/10/2018      Diagnosis:   1. Bronchial carcinoid tumors    2. Secondary neuroendocrine tumor of liver        Chief Complaint: Bronchial carcinoid tumors      Oncologic History:      Oncologic History Bronchial carcinoid, left, diagnosed 1/2016   Metastatic disease to liver and bone 5/2017     Oncologic Treatment Left upper lobectomy 1/2016  CAPTEM 7/2017   Lanreotide 7/2017   Zoledronic acid 7/2017   TACE 10/2018    Pathology 1/2016: Typical carcinoid tumor, well-differentiated T2a, N0, M0 Ki-67 <1%  6/2017: Liver biopsy, well-differentiated, Ki-67 25%           Subjective:    Interval History: Mr. Rahman is a 63 y.o. male who is seen in follow-up for a bronchial carcinoid tumor.  He states he has been feeling well. He had surgery for an incarcerated hernia about 2 weeks ago.  His pain has been well. No other new.    His history dates to 1/2016 when he underwent a left upper lobectomy with Dr. Bell for a typical carcinoid tumor.  His pathological staging was T2a, N0, M0 with Ki-67 less than 1% and mitotic count 0 per 10 high-powered fields.  He done well postoperatively, however, in 5/2017 he was undergoing routine surveillance and a chest CT in picked up evidence of progression of disease in the liver.  Dedicated abdominal CT was performed showing multiple liver lesions.  In 6/2017 he underwent a CT-guided liver biopsy which was positive for metastatic neuroendocrine tumor which was well-differentiated with no overt nuclear atypia, however, the Ki-67 was 25%.  He underwent gallium-68 PET/CT and was found to have widespread disease involving the bone, liver and also mesentery.  He was started on CAPTEM in 7/2017 and also Lanreotide and zoledronic acid.  Scans in 10/2017 had shown stability of disease.  Imaging in 1/2018 had shown stability of disease.  Scans in 4/2018 had shown stability of disease.  Scans in 07/2018 had continued to showed mild growth.  In  10/2018 he underwent TACE.      Past Medical History:   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    Sleep apnea     Snores        Past Surgical HIstory:   Past Surgical History:   Procedure Laterality Date    SCHKWK-MHBHUC-MR N/A 6/8/2017    Performed by [facility] at [facility] OR 2ND FLR    Bka Left     LAPAROSCOPIC REPAIR OF INCARCERATED UMBILICAL HERNIA N/A 11/27/2018    Procedure: REPAIR, HERNIA, UMBILICAL, INCARCERATED, LAPAROSCOPIC;  Surgeon: Coco Guidry MD;  Location: [facility] OR;  Service: General;  Laterality: N/A;  video    LEFT UPPER LOBECTOMY-LUNG Left 1/28/2016    Performed by Pan Bell MD at [facility] OR 2ND FLR    REPAIR, HERNIA, UMBILICAL, INCARCERATED, LAPAROSCOPIC N/A 11/27/2018    Performed by Coco Guidry MD at [facility] OR    THORACOTOMY W/LUNG RESECTION Left 1/28/2016    Performed by Pan Bell MD at [facility] OR 2ND FLR       Family History:   Family History   Problem Relation Age of Onset    Cancer Mother         lung    Cancer Sister     Anesthesia problems Neg Hx        Social History:  reports that  has never smoked. he has never used smokeless tobacco. He reports that he drinks about 0.6 oz of alcohol per week. He reports that he does not use drugs.    Allergies:  Review of patient's allergies indicates:  No Known Allergies    Medications:  Current Outpatient Medications   Medication Sig Dispense Refill    capecitabine (XELODA) 500 MG Tab TAKE 4 TABLETS (2,000MG TOTAL) BY MOUTH 2 TIMES DAILY FOR 14 DAYS EVERY 28 DAYS ( 2 WEEKS ON, 2 WEEKS OFF, EVERY 4 WEEKS). 112 tablet 3    lanreotide (SOMATULINE DEPOT) 120  mg/0.5 mL Syrg Inject 120 mg into the skin every 28 days.      ondansetron (ZOFRAN) 4 MG tablet Take 1 tablet (4 mg total) by mouth 2 (two) times daily. 15 tablet 0    oxyCODONE-acetaminophen (PERCOCET) 5-325 mg per tablet Take 1 tablet by mouth every 4 (four) hours as needed for Pain. 25 tablet 0    temozolomide (TEMODAR) 250 MG capsule TAKE 2 CAPSULES (500MG TOTAL) BY MOUTH ONCE DAILY ON DAYS 10-14 OF 28 DAY CYCLE 10 capsule 6     No current facility-administered medications for this visit.        Review of Systems   Constitutional: Negative for appetite change, chills, fatigue, fever and unexpected weight change.   HENT: Negative for dental problem, sinus pressure and sneezing.    Eyes: Negative for visual disturbance.   Respiratory: Negative for cough, choking and chest tightness.    Cardiovascular: Negative for chest pain and leg swelling.   Gastrointestinal: Negative for abdominal pain, blood in stool, constipation, diarrhea and nausea.   Genitourinary: Negative for difficulty urinating and dysuria.   Musculoskeletal: Negative for arthralgias and back pain.   Skin: Positive for wound. Negative for rash.   Neurological: Negative for dizziness, light-headedness and headaches.   Hematological: Negative for adenopathy. Does not bruise/bleed easily.   Psychiatric/Behavioral: Negative for sleep disturbance. The patient is not nervous/anxious.        ECOG Performance Status:   ECOG SCORE           Objective:      Vitals:   Vitals:    12/10/18 1143   BP: 131/86   BP Location: Left arm   Patient Position: Sitting   BP Method: Large (Automatic)   Pulse: 67   Resp: 16   Temp: 98.2 °F (36.8 °C)   TempSrc: Oral   SpO2: 97%   Weight: 133.4 kg (294 lb 1.5 oz)   Height: 6' (1.829 m)     BMI: Body mass index is 39.89 kg/m².    Physical Exam   Constitutional: He is oriented to person, place, and time. He appears well-developed and well-nourished.   HENT:   Head: Normocephalic and atraumatic.   Eyes: Pupils are equal, round,  and reactive to light.   Neck: Normal range of motion. Neck supple.   Cardiovascular: Normal rate and regular rhythm.   Pulmonary/Chest: Effort normal and breath sounds normal. No respiratory distress.   Abdominal: Soft. He exhibits no distension. There is no tenderness.   Musculoskeletal: He exhibits no edema or tenderness.   Left lower extremity prosthesis   Lymphadenopathy:     He has no cervical adenopathy.   Neurological: He is alert and oriented to person, place, and time. No cranial nerve deficit.   Skin: Skin is warm and dry.   Right middle finger with 2 cm abrasion, dorsal skin of 3rd metacarpal.  No surrounding erythema, edema.   Psychiatric: He has a normal mood and affect. His behavior is normal.       Laboratory Data:  Lab Visit on 12/10/2018   Component Date Value Ref Range Status    WBC 12/10/2018 5.42  3.90 - 12.70 K/uL Final    RBC 12/10/2018 4.69  4.60 - 6.20 M/uL Final    Hemoglobin 12/10/2018 14.6  14.0 - 18.0 g/dL Final    Hematocrit 12/10/2018 42.4  40.0 - 54.0 % Final    MCV 12/10/2018 90  82 - 98 fL Final    MCH 12/10/2018 31.1* 27.0 - 31.0 pg Final    MCHC 12/10/2018 34.4  32.0 - 36.0 g/dL Final    RDW 12/10/2018 13.6  11.5 - 14.5 % Final    Platelets 12/10/2018 227  150 - 350 K/uL Final    MPV 12/10/2018 9.9  9.2 - 12.9 fL Final    Gran # (ANC) 12/10/2018 3.8  1.8 - 7.7 K/uL Final    Comment: The ANC is based on a white cell differential from an   automated cell counter. It has not been microscopically   reviewed for the presence of abnormal cells. Clinical   correlation is required.      Immature Grans (Abs) 12/10/2018 0.01  0.00 - 0.04 K/uL Final    Comment: Mild elevation in immature granulocytes is non specific and   can be seen in a variety of conditions including stress response,   acute inflammation, trauma and pregnancy. Correlation with other   laboratory and clinical findings is essential.       Supplemental Diagnosis  Chromogranin Staining:  Intensity: Moderate to  strong. Positive cells: 100 (%)  Synaptophysin Staining:  Intensity: Strong. Positive cells: 100 (%)  CD31: 40 vessels per 1 HPF  Factor VIII: 27 vessels per 1 HPF  Ki67: 1 % tumor cells staining  Immunostains performed with appropriate positive and negative controls.  FINAL PATHOLOGIC DIAGNOSIS  1. Lymph node, level XI, excisional biopsy:  Fragments of benign lymph node with anthracosis and sinus histiocytosis (0/1).  2. Lung, lingular staple line, biopsy:  Lung, negative for neoplasm.  3. Lymph node, level X, excisional biopsy:  1 benign lymph node with anthracotic pigment and sinus histiocytosis (0/1).  4. Lymph node, level XII, excisional biopsy:  2 benign lymph nodes with sinus histiocytosis and anthracotic pigment (0/2).  5. Lung, left upper lobe, lobectomy:  Typical carcinoid tumor (well differentiated neuroendocrine tumor), 4 cm in greatest dimension.  Bronchial and vascular margins are negative for neoplasm.  Uninvolved lung adjacent to the tumor shows emphysematous changes and fibrosis, however remaining lung  appears unremarkable.  Additional immunohistochemical stains for ancillary studies (including synaptophysin, chromogranin and Ki-67)  will be completed and results will be reported in a supplemental report.  See synoptic report in comment section for further details.  6. Lymph node, level VII, excisional biopsy:  2 benign lymph nodes with anthracotic pigment and sinus histiocytosis (0/2).  Comment: Synoptic Report  Specimen: Left upper lobe of lung  Procedure: Lobectomy  Specimen laterality: Left  Tumor size:  Greatest dimension: 4 cm  Tumor focality: Single focus  Histologic type: Typical carcinoid tumor  Visceral pleural invasion: Not identified  Tumor extension: Not identified  Margins: Bronchial and vascular margins are uninvolved by tumor.  Distance of tumor from closest margin: 1 cm from the bronchial surgical margin.  Lymphovascular invasion: Not identified  Ancillary  studies:  Immunohistochemical stains for neuroendocrine markers and Ki-67 will be completed and results will follow in a  supplemental report.  Note: The tumor consists of a proliferation of cells in nests with lewis formation. The tumor cells have salt and  pepper chromatin pattern with abundant cytoplasm. There is no significant nuclear atypia. No evidence of  necrosis. Mitotic count is 0 mitoses in 10 high power fields.    Imaging:     MRI 07/23/2018  EXAMINATION:  MRI ABDOMEN W WO CONTRAST    CLINICAL HISTORY:  Bronchial carcinoid tumors;  Other secondary neuroendocrine tumors    TECHNIQUE:  Multiplanar, multi-sequence MR imaging of the abdomen was performed before and after administration of 10 cc of Gadavist gadolinium-based intravenous contrast per the liver protocol.    COMPARISON:  MRI abdomen 01/19/2018, 04/18/2018    FINDINGS:  The visualized lungs, heart, and pericardium demonstrate nothing unusual within the confines of this exam.    The liver is normal in size and contour.  There is diffuse signal dropout of the liver on out of phase imaging compatible with hepatic steatosis.  Innumerable T2 hyperintense, enhancing lesions are seen throughout the liver compatible with known metastatic disease.  Index lesion in hepatic segment III measures 4.1 cm (axial series 14, image 38), previously 3.8 cm.  Additional index lesion in hepatic segment V/VIII measures 4.0 cm (axial series 14, image 40), previously 3.8 cm.  While not an index lesion, there is a lesion in hepatic segment V which measures 2.3 cm (axial series 14, image 43), previously 2.2 cm when directly remeasured.  Overall lesions appear to have increased in size over multiple prior exams with no definite new lesions identified.  The portal vein is patent.    No evidence of intra-or extrahepatic biliary ductal dilatation. The gallbladder, stomach, pancreas, and visualized portions of the bowel demonstrate no significant abnormalities.  Two areas of  restricted diffusion within the spleen appear unchanged.  Stable right adrenal adenoma.  The left adrenal gland is unremarkable.    The kidneys are normal in size and location. No evidence of hydronephrosis or solid renal masses.  Stable subcentimeter left renal cysts.    The visualized aorta is normal in caliber.    The osseous structures demonstrate stable enhancing lesions compatible with known metastatic disease.    Soft tissues of the lower thoracic and abdominal wall are unremarkable.      Impression       Innumerable enhancing hepatic lesions which appear stable in number and majority the stable in size, but at least 2 have slightly increased in size suggesting mild progression of metastatic disease.    Stable osseous metastatic disease.    Additional stable findings as above.    RECIST SUMMARY:    Date of prior examination for comparison: 04/18/2018    Lesion 1: Hepatic segment V/VIII.  4.0 cm. Series 14 image 40.  Prior measurement 3.8 cm.    Lesion 2: Hepatic segment III.  4.1 cm. Series 14 image 38.  Prior measurement 3.8 cm.       CT 07/23/2018  EXAMINATION:  CT CHEST WITHOUT CONTRAST    CLINICAL HISTORY:  Bronchial carcinoid tumors; Benign carcinoid tumor of the bronchus and lung    TECHNIQUE:  Low dose axial images, sagittal and coronal reformations were obtained from the thoracic inlet to the lung bases. Contrast was not administered.    COMPARISON:  CT chest without contrast: 04/16/2018, 01/19/2018, 11/02/2017, 05/17/2017, 08/17/2016, 02/01/2016, 12/31/2015.    FINDINGS:  The structures at the base of the neck reveal no convincing evidence of disease.    There is a left-sided aortic arch with three branch vessels.  The thoracic aorta maintains normal caliber, contour, and course with atherosclerotic calcification within its course.    The heart is not enlarged and there is no evidence of pericardial effusion.  There is mild calcification of the coronary arteries.    The pulmonary arteries distribute  normally.  There are four pulmonary veins.  Systemic and pulmonary venoatrial connections are concordant.    There is no axillary or mediastinal lymph node enlargement.    The esophagus maintains a normal course and caliber.    Postoperative changes of left thoracotomy are again noted.  There are numerous sclerotic foci again identified throughout the visualized spine and ribs, similar to prior examination dated 04/16/2018.    Limited views of the abdomen demonstrate multiple hypoattenuating masses in the liver consistent with patient's known metastatic disease, which are not fully characterized on this noncontrast examination.  There is again identified a right adrenal adenoma.  Please refer to MRI abdomen dated 07/23/2018 for further characterization..    Lungs: Postoperative changes of left upper lobe resection and lingulectomy with volume loss and leftward mediastinal shift again identified.  No convincing evidence of new pulmonary disease.  There is a nodule measuring 0.4 cm located in the posterior basal segment of the left lower lobe (axial series 4, image 324).  This has been stable since CT chest dated 05/17/2017.  Such stability over 12 months is likely of benign etiology.  No further surveillance is warranted per the Fleischner Society Pulmonary nodule recommendations.  There is an incomplete right major fissure.  The lungs are symmetrically expanded and without evidence of consolidation, pneumothorax, mass, or significant pleural thickening.  There is no evidence of pleural fluid.      Impression       Postoperative changes of left upper lobe resection and lingulectomy with volume loss and leftward mediastinal shift again identified. No convincing evidence of new pulmonary disease.  There is a nodule measuring 0.4 cm located in the posterior basal segment of the left lower lobe (axial series 4, image 324).  This has been stable since CT chest dated 05/17/2017. Such stability over 12 months is likely of  benign etiology. No further surveillance is warranted per the Fleischner Society Pulmonary nodule recommendations.    Hypoattenuating masses in the liver consistent with patient's known metastatic disease, which are not fully characterized on this noncontrast examination.  Right adrenal adenoma is again identified.  Please refer to MRI abdomen dated 07/23/2018 for further characterization.    Numerous sclerotic foci consistent with metastases again identified throughout the visualized spine and ribs, similar to prior examination dated 04/16/2018.    Additional findings as above       CT 11/05/2018  COMPARISON:  CT chest 07/23/2018, 05/17/2017, MRI abdomen 07/23/2018    FINDINGS:  Base of Neck: No significant abnormality.    Thoracic soft tissues: Normal.    Aorta: Left-sided aortic arch.  Mild atherosclerosis.    Heart: Normal size. No effusion.  Coronary artery atherosclerosis.  Calcification of the aortic annulus.    Pulmonary vasculature: Pulmonary arteries distribute normally.  There are four pulmonary veins.    Darya/Mediastinum: No pathologic trang enlargement.  Stable leftward shift of the mediastinum status post left upper lobectomy.    Airways: Patent.    Lungs/Pleura: Postoperative changes from left upper lobectomy.  Surgical clips are stable in location configuration.  No new pulmonary mass identified.  Stable 0.4 cm solid pulmonary micronodule within posterior basal segment left lower lobe (series 4, image 333.  Right lung is clear.    Esophagus: Normal.    Upper Abdomen: Partially visualized liver demonstrates numerous hypodense lesions with regions of increased density compatible with prior chemo embolizations of hepatic metastasis.  Stable right adrenal adenoma.    Bones: Numerous sclerotic osseous lesions similar to prior exam.  No acute fracture.      Impression       1. Stable postoperative changes from left upper lobectomy.  No new pulmonary disease identified.  2. Pulmonary micronodule within the  left lower lobe is stable in comparison to 05/17/2017.  Such stability suggest a benign etiology.  3. Numerous hepatic hypodensities compatible patient's history of liver metastasis and chemoembolization.  4. Numerous sclerotic osseous lesions in this patient with history of bony metastasis.  No acute fracture.  5. Stable right adrenal adenoma.                Assessment:       1. Bronchial carcinoid tumors    2. Secondary neuroendocrine tumor of liver           Plan:     Mr. Rahman continues to recover from his hernia surgery.  He is awaiting an appointment with Dr. Sepulveda.  I would prefer he hold off on further CAPTEM until cleared by surgery.  He is okay to resume Lanreotide.  Follow up with me in another.  Report any new symptoms in the interim.  All questions were answered and he is agreeable with this.    Jesus London DO, FACP  Hematology & Oncology  [facility] [place] [address]  ph. [phone]  Fax. [phone]    25 minutes were spent in coordination of patient's care, record review and counseling.  More than 50% of the time was face-to-face.

## 2018-12-10 NOTE — TELEPHONE ENCOUNTER
----- Message from Yusra Salas RN sent at 12/10/2018  3:14 PM CST -----  Can you please schedule his Lanreotide every 4 weeks to coincide with Dr. London's appts.     ----- Message -----  From: Jesus London DO, FACP  Sent: 12/10/2018  12:03 PM  To: Yusra Salas RN    Lanreotide today  F/u with Dr. Anthony Hargrove me in 4 weeks

## 2018-12-10 NOTE — PROGRESS NOTES
Has seroma of umbilucus fluid aspirated    Will resolve spontaneously  Common after ventral hernia repair    F/u 2 weeks    Can apply mag sulph cream prn    No intervention needed

## 2018-12-20 ENCOUNTER — OFFICE VISIT (OUTPATIENT)
Dept: NEUROLOGY | Facility: HOSPITAL | Age: 63
End: 2018-12-20
Attending: SURGERY
Payer: COMMERCIAL

## 2018-12-20 ENCOUNTER — TELEPHONE (OUTPATIENT)
Dept: PHARMACY | Facility: CLINIC | Age: 63
End: 2018-12-20

## 2018-12-20 VITALS
SYSTOLIC BLOOD PRESSURE: 130 MMHG | BODY MASS INDEX: 38.18 KG/M2 | WEIGHT: 297.5 LBS | DIASTOLIC BLOOD PRESSURE: 89 MMHG | TEMPERATURE: 99 F | HEART RATE: 59 BPM | HEIGHT: 74 IN

## 2018-12-20 DIAGNOSIS — Z09 POSTOP CHECK: Primary | ICD-10-CM

## 2018-12-20 PROCEDURE — 99213 OFFICE O/P EST LOW 20 MIN: CPT | Performed by: SURGERY

## 2018-12-20 NOTE — TELEPHONE ENCOUNTER
Patient confirmed he will restart CAPTEM.  He plans to resume therapy on 2018.  He has follow up with SAYRA Nance with labs on 2019    Patient confirms the need of a refill. Will ship on 2018 with patient consent. Copay $0.00 at 004. Patient has 0 days supply remaining. Patient has not started any new medications, 0 missed doses and denies new side effects. Patient taking medication as prescribed, no changes in direction. Patient did not have any concerns or questions at this time.  & address verified.

## 2019-01-07 ENCOUNTER — INFUSION (OUTPATIENT)
Dept: INFUSION THERAPY | Facility: HOSPITAL | Age: 64
End: 2019-01-07
Attending: INTERNAL MEDICINE
Payer: COMMERCIAL

## 2019-01-07 ENCOUNTER — OFFICE VISIT (OUTPATIENT)
Dept: HEMATOLOGY/ONCOLOGY | Facility: CLINIC | Age: 64
End: 2019-01-07
Payer: COMMERCIAL

## 2019-01-07 ENCOUNTER — LAB VISIT (OUTPATIENT)
Dept: LAB | Facility: HOSPITAL | Age: 64
End: 2019-01-07
Attending: INTERNAL MEDICINE
Payer: COMMERCIAL

## 2019-01-07 VITALS
BODY MASS INDEX: 40.34 KG/M2 | OXYGEN SATURATION: 97 % | TEMPERATURE: 98 F | HEART RATE: 71 BPM | HEIGHT: 72 IN | DIASTOLIC BLOOD PRESSURE: 74 MMHG | SYSTOLIC BLOOD PRESSURE: 128 MMHG | RESPIRATION RATE: 16 BRPM | WEIGHT: 297.81 LBS

## 2019-01-07 DIAGNOSIS — C7B.8 SECONDARY NEUROENDOCRINE TUMOR OF LIVER: ICD-10-CM

## 2019-01-07 DIAGNOSIS — C7A.090 CARCINOID BRONCHIAL ADENOMA OF LEFT LUNG: Primary | ICD-10-CM

## 2019-01-07 DIAGNOSIS — R73.9 HYPERGLYCEMIA: ICD-10-CM

## 2019-01-07 DIAGNOSIS — D3A.090 BRONCHIAL CARCINOID TUMORS: Primary | ICD-10-CM

## 2019-01-07 LAB
ALBUMIN SERPL BCP-MCNC: 4.1 G/DL
ALP SERPL-CCNC: 79 U/L
ALT SERPL W/O P-5'-P-CCNC: 23 U/L
ANION GAP SERPL CALC-SCNC: 7 MMOL/L
AST SERPL-CCNC: 16 U/L
BILIRUB SERPL-MCNC: 0.8 MG/DL
BUN SERPL-MCNC: 12 MG/DL
CALCIUM SERPL-MCNC: 9.7 MG/DL
CHLORIDE SERPL-SCNC: 102 MMOL/L
CO2 SERPL-SCNC: 29 MMOL/L
CREAT SERPL-MCNC: 1.1 MG/DL
ERYTHROCYTE [DISTWIDTH] IN BLOOD BY AUTOMATED COUNT: 13.9 %
EST. GFR  (AFRICAN AMERICAN): >60 ML/MIN/1.73 M^2
EST. GFR  (NON AFRICAN AMERICAN): >60 ML/MIN/1.73 M^2
GLUCOSE SERPL-MCNC: 119 MG/DL
HCT VFR BLD AUTO: 43.8 %
HGB BLD-MCNC: 14.9 G/DL
IMM GRANULOCYTES # BLD AUTO: 0.02 K/UL
MCH RBC QN AUTO: 30.5 PG
MCHC RBC AUTO-ENTMCNC: 34 G/DL
MCV RBC AUTO: 90 FL
NEUTROPHILS # BLD AUTO: 2.8 K/UL
PLATELET # BLD AUTO: 198 K/UL
PMV BLD AUTO: 9.6 FL
POTASSIUM SERPL-SCNC: 4.6 MMOL/L
PROT SERPL-MCNC: 7.7 G/DL
RBC # BLD AUTO: 4.88 M/UL
SODIUM SERPL-SCNC: 138 MMOL/L
WBC # BLD AUTO: 5.03 K/UL

## 2019-01-07 PROCEDURE — 96372 THER/PROPH/DIAG INJ SC/IM: CPT

## 2019-01-07 PROCEDURE — 99999 PR PBB SHADOW E&M-EST. PATIENT-LVL III: CPT | Mod: PBBFAC,,, | Performed by: NURSE PRACTITIONER

## 2019-01-07 PROCEDURE — 99214 OFFICE O/P EST MOD 30 MIN: CPT | Mod: S$GLB,,, | Performed by: NURSE PRACTITIONER

## 2019-01-07 PROCEDURE — 63600175 PHARM REV CODE 636 W HCPCS: Mod: JG | Performed by: INTERNAL MEDICINE

## 2019-01-07 PROCEDURE — 3008F PR BODY MASS INDEX (BMI) DOCUMENTED: ICD-10-PCS | Mod: CPTII,S$GLB,, | Performed by: NURSE PRACTITIONER

## 2019-01-07 PROCEDURE — 3008F BODY MASS INDEX DOCD: CPT | Mod: CPTII,S$GLB,, | Performed by: NURSE PRACTITIONER

## 2019-01-07 PROCEDURE — 99999 PR PBB SHADOW E&M-EST. PATIENT-LVL III: ICD-10-PCS | Mod: PBBFAC,,, | Performed by: NURSE PRACTITIONER

## 2019-01-07 PROCEDURE — 99214 PR OFFICE/OUTPT VISIT, EST, LEVL IV, 30-39 MIN: ICD-10-PCS | Mod: S$GLB,,, | Performed by: NURSE PRACTITIONER

## 2019-01-07 PROCEDURE — 80053 COMPREHEN METABOLIC PANEL: CPT

## 2019-01-07 PROCEDURE — 36415 COLL VENOUS BLD VENIPUNCTURE: CPT

## 2019-01-07 PROCEDURE — 85027 COMPLETE CBC AUTOMATED: CPT

## 2019-01-07 RX ORDER — LANREOTIDE ACETATE 120 MG/.5ML
120 INJECTION SUBCUTANEOUS
Status: COMPLETED | OUTPATIENT
Start: 2019-01-07 | End: 2019-01-07

## 2019-01-07 RX ADMIN — LANREOTIDE ACETATE 120 MG: 120 INJECTION SUBCUTANEOUS at 01:01

## 2019-01-07 NOTE — PROGRESS NOTES
PATIENT: Elroy Rahman  MRN: [id number]  DATE: 1/7/2019      Diagnosis:   1. Carcinoid bronchial adenoma of left lung    2. Secondary neuroendocrine tumor of liver    3. Hyperglycemia        Chief Complaint: Bronchial carcinoid tumors      Oncologic History:      Oncologic History Bronchial carcinoid, left, diagnosed 1/2016   Metastatic disease to liver and bone 5/2017     Oncologic Treatment Left upper lobectomy 1/2016  CAPTEM 7/2017   Lanreotide 7/2017   Zoledronic acid 7/2017   TACE 10/2018    Pathology 1/2016: Typical carcinoid tumor, well-differentiated T2a, N0, M0 Ki-67 <1%  6/2017: Liver biopsy, well-differentiated, Ki-67 25%           Subjective:    Interval History: Mr. Rahman is a 63 y.o. male who is seen in follow-up for a bronchial carcinoid tumor.  He states he has been feeling well. His CAPTEM treatment was resumed and he is on day 9 today. His pain has been well. Mild abdominal soreness from hernia repair. No other new complaints. Bowels normal.  Going on cruise at the end of January.       His history dates to 1/2016 when he underwent a left upper lobectomy with Dr. Bell for a typical carcinoid tumor.  His pathological staging was T2a, N0, M0 with Ki-67 less than 1% and mitotic count 0 per 10 high-powered fields.  He done well postoperatively, however, in 5/2017 he was undergoing routine surveillance and a chest CT in picked up evidence of progression of disease in the liver.  Dedicated abdominal CT was performed showing multiple liver lesions.  In 6/2017 he underwent a CT-guided liver biopsy which was positive for metastatic neuroendocrine tumor which was well-differentiated with no overt nuclear atypia, however, the Ki-67 was 25%.  He underwent gallium-68 PET/CT and was found to have widespread disease involving the bone, liver and also mesentery.  He was started on CAPTEM in 7/2017 and also Lanreotide and zoledronic acid.  Scans in 10/2017 had shown stability of disease.  Imaging in 1/2018  had shown stability of disease.  Scans in 4/2018 had shown stability of disease.  Scans in 07/2018 had continued to showed mild growth.  In 10/2018 he underwent TACE.      Past Medical History:   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    Sleep apnea     Snores        Past Surgical HIstory:   Past Surgical History:   Procedure Laterality Date    [id number]-[id number]-DN N/A 6/8/2017    Performed by [facility] at [facility] OR 2ND FLR    Bka Left     LEFT UPPER LOBECTOMY-LUNG Left 1/28/2016    Performed by Pan Bell MD at [facility] OR 2ND FLR    REPAIR, HERNIA, UMBILICAL, INCARCERATED, LAPAROSCOPIC N/A 11/27/2018    Performed by Coco Guidry [facility] at [facility] OR    THORACOTOMY W/LUNG RESECTION Left 1/28/2016    Performed by Pan Bell MD at [facility] OR 2ND FLR       Family History:   Family History   Problem Relation Age of Onset    Cancer Mother         lung    Cancer Sister     Anesthesia problems Neg Hx        Social History:  reports that  has never smoked. he has never used smokeless tobacco. He reports that he drinks about 0.6 oz of alcohol per week. He reports that he does not use drugs.    Allergies:  Review of patient's allergies indicates:  No Known Allergies    Medications:  Current Outpatient Medications   Medication Sig Dispense Refill    capecitabine (XELODA) 500 MG Tab TAKE 4 TABLETS (2,000MG TOTAL) BY MOUTH 2 TIMES DAILY FOR 14 DAYS EVERY 28 DAYS ( 2 WEEKS ON, 2 WEEKS OFF, EVERY 4 WEEKS). 112 tablet 3    lanreotide (SOMATULINE DEPOT) 120 mg/0.5 mL Syrg Inject 120 mg into the skin every 28 days.      ondansetron (ZOFRAN) 4 MG tablet Take 1  tablet (4 mg total) by mouth 2 (two) times daily. 15 tablet 0    oxyCODONE-acetaminophen (PERCOCET) 5-325 mg per tablet Take 1 tablet by mouth every 4 (four) hours as needed for Pain. 25 tablet 0    temozolomide (TEMODAR) 250 MG capsule TAKE 2 CAPSULES (500MG TOTAL) BY MOUTH ONCE DAILY ON DAYS 10-14 OF 28 DAY CYCLE 10 capsule 6     No current facility-administered medications for this visit.      Facility-Administered Medications Ordered in Other Visits   Medication Dose Route Frequency Provider Last Rate Last Dose    lanreotide injection 120 mg  120 mg Subcutaneous 1 time in Clinic/HOD Jesus London DO, FACP           Review of Systems   Constitutional: Negative for appetite change, chills, fatigue, fever and unexpected weight change.   HENT: Negative for dental problem, sinus pressure and sneezing.    Eyes: Negative for visual disturbance.   Respiratory: Negative for cough, choking and chest tightness.    Cardiovascular: Negative for chest pain and leg swelling.   Gastrointestinal: Negative for abdominal pain, blood in stool, constipation, diarrhea and nausea.   Genitourinary: Negative for difficulty urinating and dysuria.   Musculoskeletal: Negative for arthralgias and back pain.   Skin: wounds healed. Negative for rash.   Neurological: Negative for dizziness, light-headedness and headaches.   Hematological: Negative for adenopathy. Does not bruise/bleed easily.   Psychiatric/Behavioral: Negative for sleep disturbance. The patient is not nervous/anxious.        ECOG Performance Status:   ECOG SCORE    0 - Fully active-able to carry on all pre-disease performance without restriction         Objective:      Vitals:   Vitals:    01/07/19 1255   BP: 128/74   BP Location: Right arm   Patient Position: Sitting   BP Method: Large (Automatic)   Pulse: 71   Resp: 16   Temp: 97.8 °F (36.6 °C)   TempSrc: Oral   SpO2: 97%   Weight: 135.1 kg (297 lb 13.5 oz)   Height: 6' (1.829 m)     BMI: Body mass index is 40.39  kg/m².    Physical Exam   Constitutional: He is oriented to person, place, and time. He appears well-developed and well-nourished.   HENT:   Head: Normocephalic and atraumatic.   Eyes: Pupils are equal, round, and reactive to light.   Neck: Normal range of motion. Neck supple.   Cardiovascular: Normal rate and regular rhythm.   Pulmonary/Chest: Effort normal and breath sounds normal. No respiratory distress.   Abdominal: Soft. He exhibits no distension. There is no tenderness.   Musculoskeletal: He exhibits no edema or tenderness.   Left lower extremity prosthesis   Lymphadenopathy:     He has no cervical adenopathy.   Neurological: He is alert and oriented to person, place, and time. No cranial nerve deficit.   Skin: Skin is warm and dry.   Psychiatric: He has a normal mood and affect. His behavior is normal.       Laboratory Data:  Lab Visit on 01/07/2019   Component Date Value Ref Range Status    WBC 01/07/2019 5.03  3.90 - 12.70 K/uL Final    RBC 01/07/2019 4.88  4.60 - 6.20 M/uL Final    Hemoglobin 01/07/2019 14.9  14.0 - 18.0 g/dL Final    Hematocrit 01/07/2019 43.8  40.0 - 54.0 % Final    MCV 01/07/2019 90  82 - 98 fL Final    MCH 01/07/2019 30.5  27.0 - 31.0 pg Final    MCHC 01/07/2019 34.0  32.0 - 36.0 g/dL Final    RDW 01/07/2019 13.9  11.5 - 14.5 % Final    Platelets 01/07/2019 198  150 - 350 K/uL Final    MPV 01/07/2019 9.6  9.2 - 12.9 fL Final    Gran # (ANC) 01/07/2019 2.8  1.8 - 7.7 K/uL Final    Comment: The ANC is based on a white cell differential from an   automated cell counter. It has not been microscopically   reviewed for the presence of abnormal cells. Clinical   correlation is required.      Immature Grans (Abs) 01/07/2019 0.02  0.00 - 0.04 K/uL Final    Comment: Mild elevation in immature granulocytes is non specific and   can be seen in a variety of conditions including stress response,   acute inflammation, trauma and pregnancy. Correlation with other   laboratory and clinical  findings is essential.      Sodium 01/07/2019 138  136 - 145 mmol/L Final    Potassium 01/07/2019 4.6  3.5 - 5.1 mmol/L Final    Chloride 01/07/2019 102  95 - 110 mmol/L Final    CO2 01/07/2019 29  23 - 29 mmol/L Final    Glucose 01/07/2019 119* 70 - 110 mg/dL Final    BUN, Bld 01/07/2019 12  8 - 23 mg/dL Final    Creatinine 01/07/2019 1.1  0.5 - 1.4 mg/dL Final    Calcium 01/07/2019 9.7  8.7 - 10.5 mg/dL Final    Total Protein 01/07/2019 7.7  6.0 - 8.4 g/dL Final    Albumin 01/07/2019 4.1  3.5 - 5.2 g/dL Final    Total Bilirubin 01/07/2019 0.8  0.1 - 1.0 mg/dL Final    Comment: For infants and newborns, interpretation of results should be based  on gestational age, weight and in agreement with clinical  observations.  Premature Infant recommended reference ranges:  Up to 24 hours.............<8.0 mg/dL  Up to 48 hours............<12.0 mg/dL  3-5 days..................<15.0 mg/dL  6-29 days.................<15.0 mg/dL      Alkaline Phosphatase 01/07/2019 79  55 - 135 U/L Final    AST 01/07/2019 16  10 - 40 U/L Final    ALT 01/07/2019 23  10 - 44 U/L Final    Anion Gap 01/07/2019 7* 8 - 16 mmol/L Final    eGFR if African American 01/07/2019 >60.0  >60 mL/min/1.73 m^2 Final    eGFR if non African American 01/07/2019 >60.0  >60 mL/min/1.73 m^2 Final    Comment: Calculation used to obtain the estimated glomerular filtration  rate (eGFR) is the CKD-EPI equation.        Supplemental Diagnosis  Chromogranin Staining:  Intensity: Moderate to strong. Positive cells: 100 (%)  Synaptophysin Staining:  Intensity: Strong. Positive cells: 100 (%)  CD31: 40 vessels per 1 HPF  Factor VIII: 27 vessels per 1 HPF  Ki67: 1 % tumor cells staining  Immunostains performed with appropriate positive and negative controls.  FINAL PATHOLOGIC DIAGNOSIS  1. Lymph node, level XI, excisional biopsy:  Fragments of benign lymph node with anthracosis and sinus histiocytosis (0/1).  2. Lung, lingular staple line, biopsy:  Lung,  negative for neoplasm.  3. Lymph node, level X, excisional biopsy:  1 benign lymph node with anthracotic pigment and sinus histiocytosis (0/1).  4. Lymph node, level XII, excisional biopsy:  2 benign lymph nodes with sinus histiocytosis and anthracotic pigment (0/2).  5. Lung, left upper lobe, lobectomy:  Typical carcinoid tumor (well differentiated neuroendocrine tumor), 4 cm in greatest dimension.  Bronchial and vascular margins are negative for neoplasm.  Uninvolved lung adjacent to the tumor shows emphysematous changes and fibrosis, however remaining lung  appears unremarkable.  Additional immunohistochemical stains for ancillary studies (including synaptophysin, chromogranin and Ki-67)  will be completed and results will be reported in a supplemental report.  See synoptic report in comment section for further details.  6. Lymph node, level VII, excisional biopsy:  2 benign lymph nodes with anthracotic pigment and sinus histiocytosis (0/2).  Comment: Synoptic Report  Specimen: Left upper lobe of lung  Procedure: Lobectomy  Specimen laterality: Left  Tumor size:  Greatest dimension: 4 cm  Tumor focality: Single focus  Histologic type: Typical carcinoid tumor  Visceral pleural invasion: Not identified  Tumor extension: Not identified  Margins: Bronchial and vascular margins are uninvolved by tumor.  Distance of tumor from closest margin: 1 cm from the bronchial surgical margin.  Lymphovascular invasion: Not identified  Ancillary studies:  Immunohistochemical stains for neuroendocrine markers and Ki-67 will be completed and results will follow in a  supplemental report.  Note: The tumor consists of a proliferation of cells in nests with lewis formation. The tumor cells have salt and  pepper chromatin pattern with abundant cytoplasm. There is no significant nuclear atypia. No evidence of  necrosis. Mitotic count is 0 mitoses in 10 high power fields.    Imaging:     MRI 07/23/2018  EXAMINATION:  MRI ABDOMEN W WO  CONTRAST    CLINICAL HISTORY:  Bronchial carcinoid tumors;  Other secondary neuroendocrine tumors    TECHNIQUE:  Multiplanar, multi-sequence MR imaging of the abdomen was performed before and after administration of 10 cc of Gadavist gadolinium-based intravenous contrast per the liver protocol.    COMPARISON:  MRI abdomen 01/19/2018, 04/18/2018    FINDINGS:  The visualized lungs, heart, and pericardium demonstrate nothing unusual within the confines of this exam.    The liver is normal in size and contour.  There is diffuse signal dropout of the liver on out of phase imaging compatible with hepatic steatosis.  Innumerable T2 hyperintense, enhancing lesions are seen throughout the liver compatible with known metastatic disease.  Index lesion in hepatic segment III measures 4.1 cm (axial series 14, image 38), previously 3.8 cm.  Additional index lesion in hepatic segment V/VIII measures 4.0 cm (axial series 14, image 40), previously 3.8 cm.  While not an index lesion, there is a lesion in hepatic segment V which measures 2.3 cm (axial series 14, image 43), previously 2.2 cm when directly remeasured.  Overall lesions appear to have increased in size over multiple prior exams with no definite new lesions identified.  The portal vein is patent.    No evidence of intra-or extrahepatic biliary ductal dilatation. The gallbladder, stomach, pancreas, and visualized portions of the bowel demonstrate no significant abnormalities.  Two areas of restricted diffusion within the spleen appear unchanged.  Stable right adrenal adenoma.  The left adrenal gland is unremarkable.    The kidneys are normal in size and location. No evidence of hydronephrosis or solid renal masses.  Stable subcentimeter left renal cysts.    The visualized aorta is normal in caliber.    The osseous structures demonstrate stable enhancing lesions compatible with known metastatic disease.    Soft tissues of the lower thoracic and abdominal wall are  unremarkable.      Impression       Innumerable enhancing hepatic lesions which appear stable in number and majority the stable in size, but at least 2 have slightly increased in size suggesting mild progression of metastatic disease.    Stable osseous metastatic disease.    Additional stable findings as above.    RECIST SUMMARY:    Date of prior examination for comparison: 04/18/2018    Lesion 1: Hepatic segment V/VIII.  4.0 cm. Series 14 image 40.  Prior measurement 3.8 cm.    Lesion 2: Hepatic segment III.  4.1 cm. Series 14 image 38.  Prior measurement 3.8 cm.       CT 07/23/2018  EXAMINATION:  CT CHEST WITHOUT CONTRAST    CLINICAL HISTORY:  Bronchial carcinoid tumors; Benign carcinoid tumor of the bronchus and lung    TECHNIQUE:  Low dose axial images, sagittal and coronal reformations were obtained from the thoracic inlet to the lung bases. Contrast was not administered.    COMPARISON:  CT chest without contrast: 04/16/2018, 01/19/2018, 11/02/2017, 05/17/2017, 08/17/2016, 02/01/2016, 12/31/2015.    FINDINGS:  The structures at the base of the neck reveal no convincing evidence of disease.    There is a left-sided aortic arch with three branch vessels.  The thoracic aorta maintains normal caliber, contour, and course with atherosclerotic calcification within its course.    The heart is not enlarged and there is no evidence of pericardial effusion.  There is mild calcification of the coronary arteries.    The pulmonary arteries distribute normally.  There are four pulmonary veins.  Systemic and pulmonary venoatrial connections are concordant.    There is no axillary or mediastinal lymph node enlargement.    The esophagus maintains a normal course and caliber.    Postoperative changes of left thoracotomy are again noted.  There are numerous sclerotic foci again identified throughout the visualized spine and ribs, similar to prior examination dated 04/16/2018.    Limited views of the abdomen demonstrate multiple  hypoattenuating masses in the liver consistent with patient's known metastatic disease, which are not fully characterized on this noncontrast examination.  There is again identified a right adrenal adenoma.  Please refer to MRI abdomen dated 07/23/2018 for further characterization..    Lungs: Postoperative changes of left upper lobe resection and lingulectomy with volume loss and leftward mediastinal shift again identified.  No convincing evidence of new pulmonary disease.  There is a nodule measuring 0.4 cm located in the posterior basal segment of the left lower lobe (axial series 4, image 324).  This has been stable since CT chest dated 05/17/2017.  Such stability over 12 months is likely of benign etiology.  No further surveillance is warranted per the Fleischner Society Pulmonary nodule recommendations.  There is an incomplete right major fissure.  The lungs are symmetrically expanded and without evidence of consolidation, pneumothorax, mass, or significant pleural thickening.  There is no evidence of pleural fluid.      Impression       Postoperative changes of left upper lobe resection and lingulectomy with volume loss and leftward mediastinal shift again identified. No convincing evidence of new pulmonary disease.  There is a nodule measuring 0.4 cm located in the posterior basal segment of the left lower lobe (axial series 4, image 324).  This has been stable since CT chest dated 05/17/2017. Such stability over 12 months is likely of benign etiology. No further surveillance is warranted per the Fleischner Society Pulmonary nodule recommendations.    Hypoattenuating masses in the liver consistent with patient's known metastatic disease, which are not fully characterized on this noncontrast examination.  Right adrenal adenoma is again identified.  Please refer to MRI abdomen dated 07/23/2018 for further characterization.    Numerous sclerotic foci consistent with metastases again identified throughout the  visualized spine and ribs, similar to prior examination dated 04/16/2018.    Additional findings as above       CT 11/05/2018  COMPARISON:  CT chest 07/23/2018, 05/17/2017, MRI abdomen 07/23/2018    FINDINGS:  Base of Neck: No significant abnormality.    Thoracic soft tissues: Normal.    Aorta: Left-sided aortic arch.  Mild atherosclerosis.    Heart: Normal size. No effusion.  Coronary artery atherosclerosis.  Calcification of the aortic annulus.    Pulmonary vasculature: Pulmonary arteries distribute normally.  There are four pulmonary veins.    Darya/Mediastinum: No pathologic trang enlargement.  Stable leftward shift of the mediastinum status post left upper lobectomy.    Airways: Patent.    Lungs/Pleura: Postoperative changes from left upper lobectomy.  Surgical clips are stable in location configuration.  No new pulmonary mass identified.  Stable 0.4 cm solid pulmonary micronodule within posterior basal segment left lower lobe (series 4, image 333.  Right lung is clear.    Esophagus: Normal.    Upper Abdomen: Partially visualized liver demonstrates numerous hypodense lesions with regions of increased density compatible with prior chemo embolizations of hepatic metastasis.  Stable right adrenal adenoma.    Bones: Numerous sclerotic osseous lesions similar to prior exam.  No acute fracture.      Impression       1. Stable postoperative changes from left upper lobectomy.  No new pulmonary disease identified.  2. Pulmonary micronodule within the left lower lobe is stable in comparison to 05/17/2017.  Such stability suggest a benign etiology.  3. Numerous hepatic hypodensities compatible patient's history of liver metastasis and chemoembolization.  4. Numerous sclerotic osseous lesions in this patient with history of bony metastasis.  No acute fracture.  5. Stable right adrenal adenoma.                Assessment:       1. Carcinoid bronchial adenoma of left lung    2. Secondary neuroendocrine tumor of liver    3.  Hyperglycemia           Plan:     Mr. Rahman is doing well from an oncologic standpoint.  Labs reviewed. Plan to continue on with treatment with CAPTEM.  Lanreotide today as scheduled. RTC in 4 weeks to see Dr. London with a CBC,CMP and for Lanreotide. Further chemo embolization held until February due to cruise.   Monitor glucose.     Patient is in agreement with the proposed treatment plan. All questions were answered to the patient's satisfaction. Pt knows to call clinic for any new or worsening symptoms and if anything is needed before the next clinic visit.      Marilee Nance, DAVP-C  Hematology & Oncology  [facility] [place] [address]  ph. [phone]  Fax. [phone]     I spent 30 minutes (face to face) with the patient, more than 50% was in counseling and coordination of care as detailed above.     Distress Screening Results: Psychosocial Distress screening score of Distress Score: 0 noted and reviewed. No intervention indicated.

## 2019-01-10 ENCOUNTER — TELEPHONE (OUTPATIENT)
Dept: NEUROLOGY | Facility: HOSPITAL | Age: 64
End: 2019-01-10

## 2019-01-11 ENCOUNTER — TELEPHONE (OUTPATIENT)
Dept: HEMATOLOGY/ONCOLOGY | Facility: CLINIC | Age: 64
End: 2019-01-11

## 2019-01-11 NOTE — TELEPHONE ENCOUNTER
Called patient left message to call the office to schedule a MRI. Number was provided.          ----- Message from Yusra Salas RN sent at 1/10/2019  5:22 PM CST -----  Never mind, he just needs the MRI and I need to call him to schedule his next liver procedure.  Can you see if he wants the MRI when he comes for his shot?    ----- Message -----  From: Jesus London DO, FACP  Sent: 1/8/2019   1:01 PM  To: Yusra Galeano RN    IR wanted another MRI before his next TACE?  I won't need to see him before that.    ----- Message -----  From: Yusra Salas RN  Sent: 1/8/2019  11:00 AM  To: Jesus Galeano DO, FACP    Sooo, is that yes to all of the above???    ----- Message -----  From: Jesus London DO, FACP  Sent: 1/8/2019   8:50 AM  To: Yusra Galeano RN    Yes    ----- Message -----  From: Yusra Salas RN  Sent: 1/7/2019   2:56 PM  To: Jesus Galeano DO, FACP    He is to follow up 4 weeks from now, but you are not in that day.  I see he should be due for another TACE in February.  Does he need another MRI and schedule for a TACE or do you want to see him first?

## 2019-01-11 NOTE — TELEPHONE ENCOUNTER
Spoke to patient the MRI is scheduled for 2/4 same day as injection. Mailed appt slip        ----- Message from Yusra Salas RN sent at 1/10/2019  5:22 PM CST -----  Never mind, he just needs the MRI and I need to call him to schedule his next liver procedure.  Can you see if he wants the MRI when he comes for his shot?    ----- Message -----  From: Jesus London DO, FACP  Sent: 1/8/2019   1:01 PM  To: Yusra Galeano RN    IR wanted another MRI before his next TACE?  I won't need to see him before that.    ----- Message -----  From: Yusra Salas RN  Sent: 1/8/2019  11:00 AM  To: Jesus Galeano DO, FACP    Sooo, is that yes to all of the above???    ----- Message -----  From: Jesus London DO, FACP  Sent: 1/8/2019   8:50 AM  To: Yusra Galeano RN    Yes    ----- Message -----  From: Yusra Salas RN  Sent: 1/7/2019   2:56 PM  To: Jesus Galeano DO, FACP    He is to follow up 4 weeks from now, but you are not in that day.  I see he should be due for another TACE in February.  Does he need another MRI and schedule for a TACE or do you want to see him first?

## 2019-01-22 NOTE — TELEPHONE ENCOUNTER
Contacted patient in regards to CAPTEM refill and clinical follow up. Patient will need refill by 1/29 but will not be home at all this week. Will ship capecitabine and temozolomide 1/28 for patient to receive 1/29. $200 copay should be covered by [employer]. Address confirmed. No new medications, allergies, health conditions, or missed doses. Patient declined follow up at this time.

## 2019-02-04 ENCOUNTER — INFUSION (OUTPATIENT)
Dept: INFUSION THERAPY | Facility: HOSPITAL | Age: 64
End: 2019-02-04
Attending: INTERNAL MEDICINE
Payer: COMMERCIAL

## 2019-02-04 ENCOUNTER — HOSPITAL ENCOUNTER (OUTPATIENT)
Dept: RADIOLOGY | Facility: HOSPITAL | Age: 64
Discharge: HOME | End: 2019-02-04
Attending: INTERNAL MEDICINE
Payer: COMMERCIAL

## 2019-02-04 DIAGNOSIS — C7B.8 SECONDARY NEUROENDOCRINE TUMOR OF LIVER: ICD-10-CM

## 2019-02-04 DIAGNOSIS — D49.89 NEOPLASM OF ABDOMEN: ICD-10-CM

## 2019-02-04 DIAGNOSIS — D3A.090 BRONCHIAL CARCINOID TUMORS: Primary | ICD-10-CM

## 2019-02-04 PROCEDURE — 25500020 PHARM REV CODE 255: Performed by: INTERNAL MEDICINE

## 2019-02-04 PROCEDURE — 74183 MRI ABD W/O CNTR FLWD CNTR: CPT | Mod: 26,,, | Performed by: RADIOLOGY

## 2019-02-04 PROCEDURE — 74183 MRI ABD W/O CNTR FLWD CNTR: CPT | Mod: TC

## 2019-02-04 PROCEDURE — 63600175 PHARM REV CODE 636 W HCPCS: Mod: JG | Performed by: INTERNAL MEDICINE

## 2019-02-04 PROCEDURE — 96372 THER/PROPH/DIAG INJ SC/IM: CPT | Mod: 59

## 2019-02-04 PROCEDURE — A9585 GADOBUTROL INJECTION: HCPCS | Performed by: INTERNAL MEDICINE

## 2019-02-04 PROCEDURE — 74183 MRI ABDOMEN W WO CONTRAST: ICD-10-PCS | Mod: 26,,, | Performed by: RADIOLOGY

## 2019-02-04 RX ORDER — GADOBUTROL 604.72 MG/ML
10 INJECTION INTRAVENOUS
Status: COMPLETED | OUTPATIENT
Start: 2019-02-04 | End: 2019-02-04

## 2019-02-04 RX ORDER — LANREOTIDE ACETATE 120 MG/.5ML
120 INJECTION SUBCUTANEOUS
Status: CANCELLED | OUTPATIENT
Start: 2019-03-02

## 2019-02-04 RX ORDER — LANREOTIDE ACETATE 120 MG/.5ML
120 INJECTION SUBCUTANEOUS
Status: COMPLETED | OUTPATIENT
Start: 2019-02-04 | End: 2019-02-04

## 2019-02-04 RX ADMIN — LANREOTIDE ACETATE 120 MG: 120 INJECTION SUBCUTANEOUS at 10:02

## 2019-02-04 RX ADMIN — GADOBUTROL 10 ML: 604.72 INJECTION INTRAVENOUS at 09:02

## 2019-02-05 ENCOUNTER — TELEPHONE (OUTPATIENT)
Dept: HEMATOLOGY/ONCOLOGY | Facility: CLINIC | Age: 64
End: 2019-02-05

## 2019-02-05 NOTE — TELEPHONE ENCOUNTER
----- Message from Jesus London DO, FACP sent at 2/5/2019  1:43 PM CST -----  TB: Review scans, ? Progression, further LDT? PRRT?

## 2019-02-11 ENCOUNTER — OFFICE VISIT (OUTPATIENT)
Dept: HEMATOLOGY/ONCOLOGY | Facility: CLINIC | Age: 64
End: 2019-02-11
Payer: COMMERCIAL

## 2019-02-11 ENCOUNTER — TELEPHONE (OUTPATIENT)
Dept: HEMATOLOGY/ONCOLOGY | Facility: CLINIC | Age: 64
End: 2019-02-11

## 2019-02-11 VITALS
HEIGHT: 72 IN | DIASTOLIC BLOOD PRESSURE: 74 MMHG | HEART RATE: 57 BPM | RESPIRATION RATE: 16 BRPM | SYSTOLIC BLOOD PRESSURE: 124 MMHG | TEMPERATURE: 97 F | OXYGEN SATURATION: 95 % | BODY MASS INDEX: 40.58 KG/M2 | WEIGHT: 299.63 LBS

## 2019-02-11 DIAGNOSIS — C7B.8 SECONDARY NEUROENDOCRINE TUMOR OF LIVER: ICD-10-CM

## 2019-02-11 DIAGNOSIS — D3A.090 BRONCHIAL CARCINOID TUMORS: Primary | ICD-10-CM

## 2019-02-11 DIAGNOSIS — C7B.8 SECONDARY NEUROENDOCRINE TUMOR OF LIVER: Primary | ICD-10-CM

## 2019-02-11 DIAGNOSIS — C7B.8 SECONDARY NEUROENDOCRINE TUMOR OF BONE: ICD-10-CM

## 2019-02-11 PROCEDURE — 3008F BODY MASS INDEX DOCD: CPT | Mod: CPTII,S$GLB,, | Performed by: INTERNAL MEDICINE

## 2019-02-11 PROCEDURE — 99999 PR PBB SHADOW E&M-EST. PATIENT-LVL III: ICD-10-PCS | Mod: PBBFAC,,, | Performed by: INTERNAL MEDICINE

## 2019-02-11 PROCEDURE — 99999 PR PBB SHADOW E&M-EST. PATIENT-LVL III: CPT | Mod: PBBFAC,,, | Performed by: INTERNAL MEDICINE

## 2019-02-11 PROCEDURE — 99214 PR OFFICE/OUTPT VISIT, EST, LEVL IV, 30-39 MIN: ICD-10-PCS | Mod: S$GLB,,, | Performed by: INTERNAL MEDICINE

## 2019-02-11 PROCEDURE — 99214 OFFICE O/P EST MOD 30 MIN: CPT | Mod: S$GLB,,, | Performed by: INTERNAL MEDICINE

## 2019-02-11 PROCEDURE — 3008F PR BODY MASS INDEX (BMI) DOCUMENTED: ICD-10-PCS | Mod: CPTII,S$GLB,, | Performed by: INTERNAL MEDICINE

## 2019-02-11 NOTE — PROGRESS NOTES
PATIENT: Elroy Rahman  MRN: [id number]  DATE: 2/11/2019      Diagnosis:   1. Bronchial carcinoid tumors    2. Secondary neuroendocrine tumor of liver    3. Secondary neuroendocrine tumor of bone        Chief Complaint: Carcinoid Tumor (bronchial)      Oncologic History:      Oncologic History Bronchial carcinoid, left, diagnosed 1/2016   Metastatic disease to liver and bone 5/2017     Oncologic Treatment Left upper lobectomy 1/2016  CAPTEM 7/2017   Lanreotide 7/2017   Zoledronic acid 7/2017   TACE 10/2018    Pathology 1/2016: Typical carcinoid tumor, well-differentiated T2a, N0, M0 Ki-67 <1%  6/2017: Liver biopsy, well-differentiated, Ki-67 25%           Subjective:    Interval History: Mr. Rahman is a 63 y.o. male who is seen in follow-up for a bronchial carcinoid tumor.  He states he has been feeling well. He remains on treatment with CAPTEM.  He has no new complaints.    His history dates to 1/2016 when he underwent a left upper lobectomy with Dr. Bell for a typical carcinoid tumor.  His pathological staging was T2a, N0, M0 with Ki-67 less than 1% and mitotic count 0 per 10 high-powered fields.  He done well postoperatively, however, in 5/2017 he was undergoing routine surveillance and a chest CT in picked up evidence of progression of disease in the liver.  Dedicated abdominal CT was performed showing multiple liver lesions.  In 6/2017 he underwent a CT-guided liver biopsy which was positive for metastatic neuroendocrine tumor which was well-differentiated with no overt nuclear atypia, however, the Ki-67 was 25%.  He underwent gallium-68 PET/CT and was found to have widespread disease involving the bone, liver and also mesentery.  He was started on CAPTEM in 7/2017 and also Lanreotide and zoledronic acid.  Scans in 10/2017 had shown stability of disease.  Imaging in 1/2018 had shown stability of disease.  Scans in 4/2018 had shown stability of disease.  Scans in 07/2018 had continued to showed mild  growth.  In 10/2018 he underwent TACE.      Past Medical History:   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    Sleep apnea     Snores        Past Surgical HIstory:   Past Surgical History:   Procedure Laterality Date    SKLPER-WOCUKL-WR N/A 6/8/2017    Performed by [facility] at [facility] OR 2ND FLR    Bka Left     LEFT UPPER LOBECTOMY-LUNG Left 1/28/2016    Performed by Pan Bell MD at [facility] OR 2ND FLR    REPAIR, HERNIA, UMBILICAL, INCARCERATED, LAPAROSCOPIC N/A 11/27/2018    Performed by Coco Guidry MD at [facility] OR    THORACOTOMY W/LUNG RESECTION Left 1/28/2016    Performed by Pan Bell MD at [facility] OR 2ND FLR       Family History:   Family History   Problem Relation Age of Onset    Cancer Mother         lung    Cancer Sister     Anesthesia problems Neg Hx        Social History:  reports that  has never smoked. he has never used smokeless tobacco. He reports that he drinks about 0.6 oz of alcohol per week. He reports that he does not use drugs.    Allergies:  Review of patient's allergies indicates:  No Known Allergies    Medications:  Current Outpatient Medications   Medication Sig Dispense Refill    capecitabine (XELODA) 500 MG Tab TAKE 4 TABLETS (2,000MG TOTAL) BY MOUTH 2 TIMES DAILY FOR 14 DAYS EVERY 28 DAYS ( 2 WEEKS ON, 2 WEEKS OFF, EVERY 4 WEEKS). 112 tablet 3    lanreotide (SOMATULINE DEPOT) 120 mg/0.5 mL Syrg Inject 120 mg into the skin every 28 days.      ondansetron (ZOFRAN) 4 MG tablet Take 1 tablet (4 mg total) by mouth 2 (two) times daily. 15 tablet 0    temozolomide (TEMODAR) 250 MG capsule TAKE 2 CAPSULES (500MG  TOTAL) BY MOUTH ONCE DAILY ON DAYS 10-14 OF 28 DAY CYCLE 10 capsule 6    oxyCODONE-acetaminophen (PERCOCET) 5-325 mg per tablet Take 1 tablet by mouth every 4 (four) hours as needed for Pain. 25 tablet 0     No current facility-administered medications for this visit.        Review of Systems   Constitutional: Negative for appetite change, chills, fatigue, fever and unexpected weight change.   HENT: Negative for dental problem, sinus pressure and sneezing.    Eyes: Negative for visual disturbance.   Respiratory: Negative for cough, choking and chest tightness.    Cardiovascular: Negative for chest pain and leg swelling.   Gastrointestinal: Negative for abdominal pain, blood in stool, constipation, diarrhea and nausea.   Genitourinary: Negative for difficulty urinating and dysuria.   Musculoskeletal: Negative for arthralgias and back pain.   Skin: Negative for rash and wound.   Neurological: Negative for dizziness, light-headedness and headaches.   Hematological: Negative for adenopathy. Does not bruise/bleed easily.   Psychiatric/Behavioral: Negative for sleep disturbance. The patient is not nervous/anxious.        ECOG Performance Status:   ECOG SCORE    0 - Fully active-able to carry on all pre-disease performance without restriction         Objective:      Vitals:   Vitals:    02/11/19 1117   BP: 124/74   Pulse: (!) 57   Resp: 16   Temp: 97 °F (36.1 °C)   SpO2: 95%   Weight: 135.9 kg (299 lb 9.7 oz)   Height: 6' (1.829 m)     BMI: Body mass index is 40.63 kg/m².    Physical Exam   Constitutional: He is oriented to person, place, and time. He appears well-developed and well-nourished.   HENT:   Head: Normocephalic and atraumatic.   Eyes: Pupils are equal, round, and reactive to light.   Neck: Normal range of motion. Neck supple.   Cardiovascular: Normal rate and regular rhythm.   Pulmonary/Chest: Effort normal and breath sounds normal. No respiratory distress.   Abdominal: Soft. He exhibits no distension. There  is no tenderness.   Musculoskeletal: He exhibits no edema or tenderness.   Left lower extremity prosthesis   Lymphadenopathy:     He has no cervical adenopathy.   Neurological: He is alert and oriented to person, place, and time. No cranial nerve deficit.   Skin: Skin is warm and dry.   Psychiatric: He has a normal mood and affect. His behavior is normal.       Laboratory Data:  No visits with results within 1 Week(s) from this visit.   Latest known visit with results is:   Lab Visit on 01/07/2019   Component Date Value Ref Range Status    WBC 01/07/2019 5.03  3.90 - 12.70 K/uL Final    RBC 01/07/2019 4.88  4.60 - 6.20 M/uL Final    Hemoglobin 01/07/2019 14.9  14.0 - 18.0 g/dL Final    Hematocrit 01/07/2019 43.8  40.0 - 54.0 % Final    MCV 01/07/2019 90  82 - 98 fL Final    MCH 01/07/2019 30.5  27.0 - 31.0 pg Final    MCHC 01/07/2019 34.0  32.0 - 36.0 g/dL Final    RDW 01/07/2019 13.9  11.5 - 14.5 % Final    Platelets 01/07/2019 198  150 - 350 K/uL Final    MPV 01/07/2019 9.6  9.2 - 12.9 fL Final    Gran # (ANC) 01/07/2019 2.8  1.8 - 7.7 K/uL Final    Comment: The ANC is based on a white cell differential from an   automated cell counter. It has not been microscopically   reviewed for the presence of abnormal cells. Clinical   correlation is required.      Immature Grans (Abs) 01/07/2019 0.02  0.00 - 0.04 K/uL Final    Comment: Mild elevation in immature granulocytes is non specific and   can be seen in a variety of conditions including stress response,   acute inflammation, trauma and pregnancy. Correlation with other   laboratory and clinical findings is essential.      Sodium 01/07/2019 138  136 - 145 mmol/L Final    Potassium 01/07/2019 4.6  3.5 - 5.1 mmol/L Final    Chloride 01/07/2019 102  95 - 110 mmol/L Final    CO2 01/07/2019 29  23 - 29 mmol/L Final    Glucose 01/07/2019 119* 70 - 110 mg/dL Final    BUN, Bld 01/07/2019 12  8 - 23 mg/dL Final    Creatinine 01/07/2019 1.1  0.5 - 1.4 mg/dL  Final    Calcium 01/07/2019 9.7  8.7 - 10.5 mg/dL Final    Total Protein 01/07/2019 7.7  6.0 - 8.4 g/dL Final    Albumin 01/07/2019 4.1  3.5 - 5.2 g/dL Final    Total Bilirubin 01/07/2019 0.8  0.1 - 1.0 mg/dL Final    Comment: For infants and newborns, interpretation of results should be based  on gestational age, weight and in agreement with clinical  observations.  Premature Infant recommended reference ranges:  Up to 24 hours.............<8.0 mg/dL  Up to 48 hours............<12.0 mg/dL  3-5 days..................<15.0 mg/dL  6-29 days.................<15.0 mg/dL      Alkaline Phosphatase 01/07/2019 79  55 - 135 U/L Final    AST 01/07/2019 16  10 - 40 U/L Final    ALT 01/07/2019 23  10 - 44 U/L Final    Anion Gap 01/07/2019 7* 8 - 16 mmol/L Final    eGFR if African American 01/07/2019 >60.0  >60 mL/min/1.73 m^2 Final    eGFR if non African American 01/07/2019 >60.0  >60 mL/min/1.73 m^2 Final    Comment: Calculation used to obtain the estimated glomerular filtration  rate (eGFR) is the CKD-EPI equation.        Supplemental Diagnosis  Chromogranin Staining:  Intensity: Moderate to strong. Positive cells: 100 (%)  Synaptophysin Staining:  Intensity: Strong. Positive cells: 100 (%)  CD31: 40 vessels per 1 HPF  Factor VIII: 27 vessels per 1 HPF  Ki67: 1 % tumor cells staining  Immunostains performed with appropriate positive and negative controls.  FINAL PATHOLOGIC DIAGNOSIS  1. Lymph node, level XI, excisional biopsy:  Fragments of benign lymph node with anthracosis and sinus histiocytosis (0/1).  2. Lung, lingular staple line, biopsy:  Lung, negative for neoplasm.  3. Lymph node, level X, excisional biopsy:  1 benign lymph node with anthracotic pigment and sinus histiocytosis (0/1).  4. Lymph node, level XII, excisional biopsy:  2 benign lymph nodes with sinus histiocytosis and anthracotic pigment (0/2).  5. Lung, left upper lobe, lobectomy:  Typical carcinoid tumor (well differentiated neuroendocrine  tumor), 4 cm in greatest dimension.  Bronchial and vascular margins are negative for neoplasm.  Uninvolved lung adjacent to the tumor shows emphysematous changes and fibrosis, however remaining lung  appears unremarkable.  Additional immunohistochemical stains for ancillary studies (including synaptophysin, chromogranin and Ki-67)  will be completed and results will be reported in a supplemental report.  See synoptic report in comment section for further details.  6. Lymph node, level VII, excisional biopsy:  2 benign lymph nodes with anthracotic pigment and sinus histiocytosis (0/2).  Comment: Synoptic Report  Specimen: Left upper lobe of lung  Procedure: Lobectomy  Specimen laterality: Left  Tumor size:  Greatest dimension: 4 cm  Tumor focality: Single focus  Histologic type: Typical carcinoid tumor  Visceral pleural invasion: Not identified  Tumor extension: Not identified  Margins: Bronchial and vascular margins are uninvolved by tumor.  Distance of tumor from closest margin: 1 cm from the bronchial surgical margin.  Lymphovascular invasion: Not identified  Ancillary studies:  Immunohistochemical stains for neuroendocrine markers and Ki-67 will be completed and results will follow in a  supplemental report.  Note: The tumor consists of a proliferation of cells in nests with lewis formation. The tumor cells have salt and  pepper chromatin pattern with abundant cytoplasm. There is no significant nuclear atypia. No evidence of  necrosis. Mitotic count is 0 mitoses in 10 high power fields.    Imaging:     MRI 02/04/2019  COMPARISON:  MRI abdomen 07/23/2018.    FINDINGS:  Liver: Hepatic parenchyma shows diffuse signal dropout compatible with hepatic steatosis.  Interval increase in size of multiple enhancing T2 hyperintense lesions compatible with known metastatic disease.    Index lesions as follows:    Hepatic segment III lesion measures 5.5 cm (series 13, image 41), previously 4.1 cm.    Hepatic segment VIII  lesion measures 4.8 cm (series 13, image 44), previously 4.0 cm.    Hepatic and portal veins are patent.    Biliary: Gallbladder is unremarkable.  No intrahepatic or extrahepatic biliary dilatation.    Pancreas: Unremarkable.  No pancreatic ductal dilatation.    Spleen: Normal size.  No focal lesions.    Adrenal glands: Right adrenal nodule demonstrates signal dropout compatible with adenoma.  Left adrenal gland is unremarkable.    Kidneys: No renal masses.  No hydronephrosis.    Miscellaneous: Visualized bowel loops are unremarkable.  No evidence for lymphadenopathy.  Multiple osseous lesions again noted, stable to mildly more pronounced when compared to prior study.      Impression       Findings concerning for progression of disease with interval increase in size of multiple enhancing hepatic lesions.  Additionally, the osseous metastases appear stable to more pronounced when compared to prior study.    RECIST SUMMARY:    Date of prior examination for comparison: MRI abdomen 07/23/2018.    Lesion 1: Hepatic segment III lesion measures 5.5 cm (series 13, image 41), previously 4.1 cm.    Lesion 2: Hepatic segment VIII lesion measures 4.8 cm (series 13, image 44), previously 4.0 cm.                  Assessment:       1. Bronchial carcinoid tumors    2. Secondary neuroendocrine tumor of liver    3. Secondary neuroendocrine tumor of bone           Plan:     Mr. Rahman is doing well from a clinical standpoint.  Review of his MRI shows growth of to lesions. I am unsure if these lesions were previously treated and will discuss his case in tumor Board tomorrow and notify him of further recommendations.  All questions were answered and he is agreeable this plan.    Jesus London DO, FACP  Hematology & Oncology  [facility] [place] [address]  ph. [phone]  Fax. [phone]    25 minutes were spent in coordination of patient's care, record review and counseling.  More than 50% of the time was  face-to-face.

## 2019-02-19 ENCOUNTER — TELEPHONE (OUTPATIENT)
Dept: PHARMACY | Facility: CLINIC | Age: 64
End: 2019-02-19

## 2019-02-19 DIAGNOSIS — D3A.00 CARCINOID TUMOR: ICD-10-CM

## 2019-02-19 RX ORDER — TEMOZOLOMIDE 250 MG/1
500 CAPSULE ORAL DAILY
Qty: 10 CAPSULE | Refills: 6 | Status: SHIPPED | OUTPATIENT
Start: 2019-02-19 | End: 2019-07-29

## 2019-02-19 NOTE — TELEPHONE ENCOUNTER
Spoke with Mr. Rahman - his last cycle ended around 2/13 and he is currently in his off week. He is scheduled for TACE on 3/1 and has been instructed to hold CAPTEM therapy. Likely will hold for a few weeks after procedure as well. Will look in chart week of 3/4 to confirm restart date and message MDO if necessary

## 2019-02-26 ENCOUNTER — TELEPHONE (OUTPATIENT)
Dept: NEUROLOGY | Facility: HOSPITAL | Age: 64
End: 2019-02-26

## 2019-02-26 NOTE — TELEPHONE ENCOUNTER
Will schedule labs at [facility] earlier in the day on Thursday to be done prior to seeing Dr. Cruz.

## 2019-02-26 NOTE — TELEPHONE ENCOUNTER
----- Message from Mily Perry sent at 2/26/2019  9:37 AM CST -----  Contact: Patient's wife  RR:  Patient's wife called and would like to reschedule TACE procedure due to not wanting to come in for H&P on Thursday.  She can be reached at [phone].  Thank you  abc

## 2019-02-28 ENCOUNTER — TELEPHONE (OUTPATIENT)
Dept: INTERVENTIONAL RADIOLOGY/VASCULAR | Facility: HOSPITAL | Age: 64
End: 2019-02-28

## 2019-02-28 ENCOUNTER — LAB VISIT (OUTPATIENT)
Dept: LAB | Facility: HOSPITAL | Age: 64
End: 2019-02-28
Attending: INTERNAL MEDICINE
Payer: COMMERCIAL

## 2019-02-28 ENCOUNTER — OFFICE VISIT (OUTPATIENT)
Dept: NEUROLOGY | Facility: HOSPITAL | Age: 64
End: 2019-02-28
Attending: SURGERY
Payer: COMMERCIAL

## 2019-02-28 VITALS
BODY MASS INDEX: 41.87 KG/M2 | WEIGHT: 299.06 LBS | TEMPERATURE: 97 F | HEIGHT: 71 IN | DIASTOLIC BLOOD PRESSURE: 78 MMHG | SYSTOLIC BLOOD PRESSURE: 126 MMHG | HEART RATE: 53 BPM

## 2019-02-28 DIAGNOSIS — C7B.8 SECONDARY NEUROENDOCRINE TUMOR OF LIVER: ICD-10-CM

## 2019-02-28 DIAGNOSIS — C7B.8 SECONDARY MALIGNANT NEUROENDOCRINE TUMOR OF LIVER: Primary | ICD-10-CM

## 2019-02-28 LAB
ALBUMIN SERPL BCP-MCNC: 4.2 G/DL
ALP SERPL-CCNC: 70 U/L
ALT SERPL W/O P-5'-P-CCNC: 22 U/L
ANION GAP SERPL CALC-SCNC: 9 MMOL/L
AST SERPL-CCNC: 16 U/L
BILIRUB SERPL-MCNC: 1.1 MG/DL
BUN SERPL-MCNC: 13 MG/DL
CALCIUM SERPL-MCNC: 9.4 MG/DL
CHLORIDE SERPL-SCNC: 103 MMOL/L
CO2 SERPL-SCNC: 25 MMOL/L
CREAT SERPL-MCNC: 1 MG/DL
ERYTHROCYTE [DISTWIDTH] IN BLOOD BY AUTOMATED COUNT: 14.6 %
EST. GFR  (AFRICAN AMERICAN): >60 ML/MIN/1.73 M^2
EST. GFR  (NON AFRICAN AMERICAN): >60 ML/MIN/1.73 M^2
GLUCOSE SERPL-MCNC: 98 MG/DL
HCT VFR BLD AUTO: 41.8 %
HGB BLD-MCNC: 14.2 G/DL
INR PPP: 1
MCH RBC QN AUTO: 30.1 PG
MCHC RBC AUTO-ENTMCNC: 34 G/DL
MCV RBC AUTO: 89 FL
NEUTROPHILS # BLD AUTO: 2.6 K/UL
PLATELET # BLD AUTO: 205 K/UL
PMV BLD AUTO: 9.8 FL
POTASSIUM SERPL-SCNC: 4.1 MMOL/L
PROT SERPL-MCNC: 7.3 G/DL
PROTHROMBIN TIME: 10.5 SEC
RBC # BLD AUTO: 4.72 M/UL
SODIUM SERPL-SCNC: 137 MMOL/L
WBC # BLD AUTO: 4.5 K/UL

## 2019-02-28 PROCEDURE — 85027 COMPLETE CBC AUTOMATED: CPT

## 2019-02-28 PROCEDURE — 80053 COMPREHEN METABOLIC PANEL: CPT

## 2019-02-28 PROCEDURE — 85610 PROTHROMBIN TIME: CPT

## 2019-02-28 PROCEDURE — 36415 COLL VENOUS BLD VENIPUNCTURE: CPT

## 2019-02-28 PROCEDURE — 99214 OFFICE O/P EST MOD 30 MIN: CPT | Performed by: SURGERY

## 2019-02-28 RX ORDER — FUROSEMIDE 10 MG/ML
20 INJECTION INTRAMUSCULAR; INTRAVENOUS ONCE
Status: CANCELLED | OUTPATIENT
Start: 2019-02-28 | End: 2019-02-28

## 2019-02-28 RX ORDER — MIDAZOLAM HYDROCHLORIDE 1 MG/ML
0.5 INJECTION INTRAMUSCULAR; INTRAVENOUS ONCE
Status: CANCELLED | OUTPATIENT
Start: 2019-02-28 | End: 2019-02-28

## 2019-02-28 RX ORDER — ONDANSETRON 4 MG/1
4 TABLET, FILM COATED ORAL 2 TIMES DAILY
Qty: 15 TABLET | Refills: 0 | Status: ON HOLD | OUTPATIENT
Start: 2019-02-28 | End: 2019-09-20 | Stop reason: SDUPTHER

## 2019-02-28 RX ORDER — ONDANSETRON 2 MG/ML
4 INJECTION INTRAMUSCULAR; INTRAVENOUS EVERY 8 HOURS PRN
Status: CANCELLED | OUTPATIENT
Start: 2019-02-28

## 2019-02-28 RX ORDER — TRAMADOL HYDROCHLORIDE 50 MG/1
50 TABLET ORAL EVERY 6 HOURS PRN
Qty: 15 TABLET | Refills: 0 | Status: SHIPPED | OUTPATIENT
Start: 2019-02-28 | End: 2019-03-10

## 2019-02-28 RX ORDER — FAMOTIDINE 20 MG/1
20 TABLET, FILM COATED ORAL EVERY 12 HOURS
Status: CANCELLED | OUTPATIENT
Start: 2019-02-28

## 2019-02-28 RX ORDER — TRAMADOL HYDROCHLORIDE 50 MG/1
50 TABLET ORAL EVERY 6 HOURS PRN
Qty: 15 TABLET | Refills: 0 | Status: CANCELLED | OUTPATIENT
Start: 2019-02-28 | End: 2019-03-10

## 2019-02-28 RX ORDER — DIPHENHYDRAMINE HYDROCHLORIDE 50 MG/ML
50 INJECTION INTRAMUSCULAR; INTRAVENOUS ONCE
Status: CANCELLED | OUTPATIENT
Start: 2019-02-28 | End: 2019-02-28

## 2019-02-28 RX ORDER — DEXTROSE MONOHYDRATE, SODIUM CHLORIDE, AND POTASSIUM CHLORIDE 50; 1.49; 4.5 G/1000ML; G/1000ML; G/1000ML
INJECTION, SOLUTION INTRAVENOUS CONTINUOUS
Status: CANCELLED | OUTPATIENT
Start: 2019-02-28

## 2019-02-28 RX ORDER — ALLOPURINOL 300 MG/1
300 TABLET ORAL DAILY
Status: CANCELLED | OUTPATIENT
Start: 2019-02-28 | End: 2019-03-02

## 2019-02-28 RX ORDER — CIPROFLOXACIN 500 MG/1
500 TABLET ORAL EVERY 12 HOURS
Qty: 14 TABLET | Refills: 0 | Status: SHIPPED | OUTPATIENT
Start: 2019-02-28 | End: 2019-07-29

## 2019-02-28 RX ORDER — DEXAMETHASONE SODIUM PHOSPHATE 4 MG/ML
8 INJECTION, SOLUTION INTRA-ARTICULAR; INTRALESIONAL; INTRAMUSCULAR; INTRAVENOUS; SOFT TISSUE ONCE
Status: CANCELLED | OUTPATIENT
Start: 2019-02-28 | End: 2019-02-28

## 2019-02-28 RX ORDER — MANNITOL 250 MG/ML
12.5 INJECTION, SOLUTION INTRAVENOUS ONCE
Status: CANCELLED | OUTPATIENT
Start: 2019-02-28 | End: 2019-02-28

## 2019-02-28 RX ORDER — FENTANYL CITRATE 50 UG/ML
50 INJECTION, SOLUTION INTRAMUSCULAR; INTRAVENOUS ONCE
Status: CANCELLED | OUTPATIENT
Start: 2019-02-28 | End: 2019-02-28

## 2019-02-28 RX ORDER — LIDOCAINE HYDROCHLORIDE 10 MG/ML
1 INJECTION, SOLUTION EPIDURAL; INFILTRATION; INTRACAUDAL; PERINEURAL ONCE
Status: CANCELLED | OUTPATIENT
Start: 2019-02-28 | End: 2019-02-28

## 2019-02-28 RX ORDER — MAGNESIUM SULFATE HEPTAHYDRATE 40 MG/ML
2 INJECTION, SOLUTION INTRAVENOUS ONCE
Status: CANCELLED | OUTPATIENT
Start: 2019-02-28 | End: 2019-02-28

## 2019-02-28 RX ORDER — SODIUM CHLORIDE 9 MG/ML
500 INJECTION, SOLUTION INTRAVENOUS ONCE
Status: CANCELLED | OUTPATIENT
Start: 2019-02-28 | End: 2019-02-28

## 2019-02-28 RX ORDER — IBUPROFEN 400 MG/1
400 TABLET ORAL EVERY 4 HOURS PRN
Status: CANCELLED | OUTPATIENT
Start: 2019-02-28

## 2019-02-28 NOTE — H&P (VIEW-ONLY)
"NOLANETS:  [facility]  A collaboration between [facility] and [facility]      PATIENT: Elroy Rahman  MRN: [id number]  DATE: 2/28/2019    Subjective:      Chief Complaint: Follow-up (TACE #2)  overall doing ok   Continue to work ok    He had incarcerated umbilical hernia repair done recently he has recovered well after that.  He stopped his chemo to undergo chemo embolization  For 2nd round chemo embo  Vitals:   Vitals:    02/28/19 1413   BP: 126/78   Pulse: (!) 53   Temp: 97.4 °F (36.3 °C)   Weight: 135.6 kg (299 lb 0.9 oz)   Height: 5' 11" (1.803 m)        Karnofsky Score:     Diagnosis: No diagnosis found.     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    Sleep apnea     Snores        Past Surgical History:  Past Surgical History:   Procedure Laterality Date    [id number]-IP N/A 6/8/2017    Performed by [facility] Provider at [facility] OR 2ND FLR    Bka Left     LEFT UPPER LOBECTOMY-LUNG Left 1/28/2016    Performed by Pan Bell MD at [facility] OR 2ND FLR    REPAIR, HERNIA, UMBILICAL, INCARCERATED, LAPAROSCOPIC N/A 11/27/2018    Performed by [facility] at [facility] OR    THORACOTOMY W/LUNG RESECTION Left 1/28/2016    Performed by Pan Bell MD at [facility] OR 2ND FLR       Family History:  Family History   Problem Relation Age of Onset    Cancer Mother         lung    Cancer Sister     Anesthesia problems Neg Hx        Allergies:  Review of patient's allergies indicates:   Allergen " Reactions    Epinephrine Other (See Comments)     Carcinoid crisis       Medications:  Current Outpatient Medications   Medication Sig Dispense Refill    capecitabine (XELODA) 500 MG Tab TAKE 4 TABLETS (2,000MG TOTAL) BY MOUTH 2 TIMES DAILY FOR 14 DAYS EVERY 28 DAYS ( 2 WEEKS ON, 2 WEEKS OFF, EVERY 4 WEEKS). 112 tablet 3    lanreotide (SOMATULINE DEPOT) 120 mg/0.5 mL Syrg Inject 120 mg into the skin every 28 days.      ondansetron (ZOFRAN) 4 MG tablet Take 1 tablet (4 mg total) by mouth 2 (two) times daily. 15 tablet 0    oxyCODONE-acetaminophen (PERCOCET) 5-325 mg per tablet Take 1 tablet by mouth every 4 (four) hours as needed for Pain. 25 tablet 0    temozolomide (TEMODAR) 250 MG capsule TAKE 2 CAPSULES (500MG TOTAL) BY MOUTH ONCE DAILY ON DAYS 10-14 OF 28 DAY CYCLE 10 capsule 6     No current facility-administered medications for this visit.        Review of Systems   Constitutional: Negative for appetite change, chills, fatigue, fever and unexpected weight change.   HENT: Negative for dental problem, drooling, ear discharge, ear pain, facial swelling, hearing loss, mouth sores, nosebleeds, postnasal drip, rhinorrhea, sinus pressure, sinus pain, sneezing and sore throat.    Eyes: Negative for photophobia, pain and redness.   Respiratory: Negative for cough, choking, chest tightness and shortness of breath.    Cardiovascular: Negative for chest pain, palpitations and leg swelling.   Gastrointestinal: Positive for vomiting. Negative for abdominal pain, anal bleeding, blood in stool, constipation and nausea.   Endocrine: Negative.    Genitourinary: Negative.    Musculoskeletal: Positive for arthralgias, back pain, gait problem and joint swelling.   Skin: Negative.    Allergic/Immunologic: Negative.    Neurological: Negative for tremors, seizures, syncope, facial asymmetry, speech difficulty, weakness, light-headedness, numbness and headaches.   Hematological: Negative.    Psychiatric/Behavioral: Negative.        Objective:      Physical Exam   Constitutional: He appears well-developed and well-nourished. No distress.   HENT:   Head: Normocephalic and atraumatic.   Right Ear: External ear normal.   Left Ear: External ear normal.   Mouth/Throat: Oropharynx is clear and moist.   Eyes: Conjunctivae and EOM are normal. Pupils are equal, round, and reactive to light.   Neck: Normal range of motion.   Cardiovascular: Normal rate and regular rhythm.   Pulmonary/Chest: Effort normal and breath sounds normal.   Abdominal: Soft. Bowel sounds are normal. There is no guarding. No hernia.   Umbilical incision healed   Neurological: He is alert.   Skin: Skin is warm. He is not diaphoretic.   Psychiatric: He has a normal mood and affect. His behavior is normal.      Assessment:       No diagnosis found.    Laboratory Data:   Viewer      Show images for MRI Abdomen W WO Contrast   PACS Images for [facility] Viewer (Includes [employer] Images)      Show images for MRI Abdomen W WO Contrast   Reviewed By Jesus London DO, FACP on 2/5/2019 13:44   MRI Abdomen W WO Contrast   Order: [id number]   Status:  Final result   Visible to patient:  No (Not Released) Next appt:  03/01/2019 at 07:00 AM in Interventional Radiology ([facility] IR1) Dx:  Neoplasm of abdomen   Details     Reading Physician Reading Date Result Priority   Yong Bolaños MD 2/4/2019    Mike Eduardo MD 2/4/2019       Narrative     EXAMINATION:  MRI ABDOMEN W WO CONTRAST    CLINICAL HISTORY:  Neoplasm: abdomen, metastatic, recurrence, suspected/known;  Neoplasm of unspecified behavior of other specified sites    TECHNIQUE:  Multisequence, multiplanar MRI of the abdomen performed per liver protocol before and after administration of 10 mL Gadavist intravenous contrast.    COMPARISON:  MRI abdomen 07/23/2018.    FINDINGS:  Liver: Hepatic parenchyma shows diffuse signal dropout compatible with hepatic steatosis.  Interval increase in size of multiple enhancing T2  hyperintense lesions compatible with known metastatic disease.    Index lesions as follows:    Hepatic segment III lesion measures 5.5 cm (series 13, image 41), previously 4.1 cm.    Hepatic segment VIII lesion measures 4.8 cm (series 13, image 44), previously 4.0 cm.    Hepatic and portal veins are patent.    Biliary: Gallbladder is unremarkable.  No intrahepatic or extrahepatic biliary dilatation.    Pancreas: Unremarkable.  No pancreatic ductal dilatation.    Spleen: Normal size.  No focal lesions.    Adrenal glands: Right adrenal nodule demonstrates signal dropout compatible with adenoma.  Left adrenal gland is unremarkable.    Kidneys: No renal masses.  No hydronephrosis.    Miscellaneous: Visualized bowel loops are unremarkable.  No evidence for lymphadenopathy.  Multiple osseous lesions again noted, stable to mildly more pronounced when compared to prior study.      Impression       Findings concerning for progression of disease with interval increase in size of multiple enhancing hepatic lesions.  Additionally, the osseous metastases appear stable to more pronounced when compared to prior study.                                            NEUROENDOCRINE TUMOR MULTIDISCIPLINARY TUMOR BOARD  _____________________________________________________________________     PRESENTER:   Jesus London, DO     REASON FOR PRESENTATION:  Rev scans/?prgression/?LDT/PRRT?     ATTENDEES:      Surgery:              MD TOMÁS Corbin MD T. 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 OR 2ND FLR    Bka Left      LEFT UPPER LOBECTOMY-LUNG Left 1/28/2016     Performed by Pan Bell MD at [facility] OR 2ND FLR    REPAIR, HERNIA, UMBILICAL, INCARCERATED, LAPAROSCOPIC N/A 11/27/2018     [facility] by [facility] at [facility] OR    THORACOTOMY W/LUNG RESECTION Left 1/28/2016     Performed by Pan Bell MD at [facility] OR 2ND FLR      ________________________________________________________________     DISCUSSION:  Responded well to right lobe cTACE. Progressed on the left and in segment 4. Bone and splenic mets slightly enlarged. Recommend left lobe and segment 4 TACE, followed by PRRT for other mets. Probably needs new Ga-68.     BOARD RECOMMENDATIONS:      LDT- scheduled  Zometa      Results for TRISTEN GARCIA (MRN [id number]) as of 2/28/2019 14:20   Ref. Range 12/10/2018 11:17 12/10/2018 11:18 1/7/2019 11:44   WBC Latest Ref Range: 3.90 - 12.70 K/uL  5.42 5.03   RBC Latest Ref Range: 4.60 - 6.20 M/uL  4.69 4.88   Hemoglobin Latest Ref Range: 14.0 - 18.0 g/dL  14.6 14.9   Hematocrit Latest Ref Range: 40.0 - 54.0 %  42.4 43.8   MCV Latest Ref Range: 82 - 98 fL  90 90   MCH Latest Ref Range: 27.0 - 31.0 pg  31.1 (H) 30.5   MCHC Latest Ref Range: 32.0 - 36.0 g/dL  34.4 34.0   RDW Latest Ref Range: 11.5 - 14.5 %  13.6 13.9   Platelets Latest Ref Range: 150 - 350 K/uL  227 198   MPV Latest Ref Range: 9.2 - 12.9 fL  9.9 9.6   Gran # (ANC) Latest Ref Range: 1.8 - 7.7 K/uL  3.8 2.8   Immature Grans (Abs) Latest Ref Range: 0.00 - 0.04 K/uL  0.01 0.02   Sodium Latest Ref Range: 136 - 145 mmol/L 138  138   Potassium Latest Ref Range: 3.5 - 5.1 mmol/L 4.5   4.6   Chloride Latest Ref Range: 95 - 110 mmol/L 104  102   CO2 Latest Ref Range: 23 - 29 mmol/L 26  29   Anion Gap Latest Ref Range: 8 - 16 mmol/L 8  7 (L)   BUN, Bld Latest Ref Range: 8 - 23 mg/dL 13  12   Creatinine Latest Ref Range: 0.5 - 1.4 mg/dL 1.0  1.1   eGFR if non African American Latest Ref Range: >60 mL/min/1.73 m^2 >60.0  >60.0   eGFR if African American Latest Ref Range: >60 mL/min/1.73 m^2 >60.0  >60.0   Glucose Latest Ref Range: 70 - 110 mg/dL 122 (H)  119 (H)   Calcium Latest Ref Range: 8.7 - 10.5 mg/dL 10.1  9.7   Alkaline Phosphatase Latest Ref Range: 55 - 135 U/L 81  79   Total Protein Latest Ref Range: 6.0 - 8.4 g/dL 7.6  7.7   Albumin Latest Ref Range: 3.5 - 5.2 g/dL 3.9  4.1   Total Bilirubin Latest Ref Range: 0.1 - 1.0 mg/dL 0.6  0.8   AST Latest Ref Range: 10 - 40 U/L 15  16   ALT Latest Ref Range: 10 - 44 U/L 23  23         Impression:  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  This is his 2nd round of chemo embolization.  I talked to him about the chemo embolization the hospitalization time discharge instructions.  I also talked about the risk of procedure the complications with the post embolization syndrome with nausea low-grade fever pain and fatigue.  I also discussed with him about the tumor board recommendation. .  He needs to have follow-up gallium scan with a MRI of the liver.  Scheduled to undergo embolization on the left side with segment 4 lesion embolization  Discussed with him about the hospitalization time.  Discharge medications prescribed  Plan:       NPO after midnight  Preop orders and postprocedure orders placed  For chemo embolization in the morning  Discharge planning on Saturday    I healed the umbilical incision from incarcerated umbilical hernia repair                 GRACY Sepulveda MD, FACS   Associate Professor of Surgery, [facility]   Neuroendocrine Surgery, Hepatic/Pancreatic & General Surgery   [address], [address]   [place]  [place] [address]   ph. [phone]; [phone]   fax. [phone]

## 2019-02-28 NOTE — PATIENT INSTRUCTIONS
Pre TACE instructions:    Nothing to eat or drink after midnight tonight.    Please report to 1st floor hospital admissions desk at the time given to you by Interventional Radiology. Someone will contact you by the end of the day to give you an arrival time.     *Remember to have labs (CBC & CMP) 10 days after TACE procedure- orders given to patient/placed in EPIC    A hospital follow up appointment with surgeon is typically not needed, unless complications arise.     You will need repeat MRI in 6 weeks after TACE, depending on Radiologist recommendation.   This timeframe will be indicated at the time of your discharge.   Orders for MRI will be placed electronically, and you can call [phone] to schedule your MRI appointment.   Please call our office after your MRI is completed.   We will review this MRI in our Multi Disciplinary Tumor Board to determine if next TACE is needed.   If it is decided that future TACE treatments are needed, our office will contact you to schedule additional TACE.  If additional TACE treatments are not needed, you will continue regular follow up with your managing Oncologist (Dr. Mejia or Dr. London).    Please contact our office with any questions

## 2019-02-28 NOTE — PROGRESS NOTES
"NOLANETS:  [facility]  A collaboration between [facility] and [facility]      PATIENT: Elroy Rahman  MRN: [id number]  DATE: 2/28/2019    Subjective:      Chief Complaint: Follow-up (TACE #2)  overall doing ok   Continue to work ok    He had incarcerated umbilical hernia repair done recently he has recovered well after that.  He stopped his chemo to undergo chemo embolization  For 2nd round chemo embo  Vitals:   Vitals:    02/28/19 1413   BP: 126/78   Pulse: (!) 53   Temp: 97.4 °F (36.3 °C)   Weight: 135.6 kg (299 lb 0.9 oz)   Height: 5' 11" (1.803 m)        Karnofsky Score:     Diagnosis: No diagnosis found.     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 OR 2ND FLR    Bka Left     LEFT UPPER LOBECTOMY-LUNG Left 1/28/2016    Performed by Pan Bell MD at [facility] OR 2ND FLR    REPAIR, HERNIA, UMBILICAL, INCARCERATED, LAPAROSCOPIC N/A 11/27/2018    Performed by [facility] Guidry [facility] at [facility] OR    THORACOTOMY W/LUNG RESECTION Left 1/28/2016    Performed by Pan Bell MD at [facility] OR 2ND FLR       Family History:  Family History   Problem Relation Age of Onset    Cancer Mother         lung    Cancer Sister     Anesthesia problems Neg Hx        Allergies:  Review of patient's allergies indicates:   Allergen " Reactions    Epinephrine Other (See Comments)     Carcinoid crisis       Medications:  Current Outpatient Medications   Medication Sig Dispense Refill    capecitabine (XELODA) 500 MG Tab TAKE 4 TABLETS (2,000MG TOTAL) BY MOUTH 2 TIMES DAILY FOR 14 DAYS EVERY 28 DAYS ( 2 WEEKS ON, 2 WEEKS OFF, EVERY 4 WEEKS). 112 tablet 3    lanreotide (SOMATULINE DEPOT) 120 mg/0.5 mL Syrg Inject 120 mg into the skin every 28 days.      ondansetron (ZOFRAN) 4 MG tablet Take 1 tablet (4 mg total) by mouth 2 (two) times daily. 15 tablet 0    oxyCODONE-acetaminophen (PERCOCET) 5-325 mg per tablet Take 1 tablet by mouth every 4 (four) hours as needed for Pain. 25 tablet 0    temozolomide (TEMODAR) 250 MG capsule TAKE 2 CAPSULES (500MG TOTAL) BY MOUTH ONCE DAILY ON DAYS 10-14 OF 28 DAY CYCLE 10 capsule 6     No current facility-administered medications for this visit.        Review of Systems   Constitutional: Negative for appetite change, chills, fatigue, fever and unexpected weight change.   HENT: Negative for dental problem, drooling, ear discharge, ear pain, facial swelling, hearing loss, mouth sores, nosebleeds, postnasal drip, rhinorrhea, sinus pressure, sinus pain, sneezing and sore throat.    Eyes: Negative for photophobia, pain and redness.   Respiratory: Negative for cough, choking, chest tightness and shortness of breath.    Cardiovascular: Negative for chest pain, palpitations and leg swelling.   Gastrointestinal: Positive for vomiting. Negative for abdominal pain, anal bleeding, blood in stool, constipation and nausea.   Endocrine: Negative.    Genitourinary: Negative.    Musculoskeletal: Positive for arthralgias, back pain, gait problem and joint swelling.   Skin: Negative.    Allergic/Immunologic: Negative.    Neurological: Negative for tremors, seizures, syncope, facial asymmetry, speech difficulty, weakness, light-headedness, numbness and headaches.   Hematological: Negative.    Psychiatric/Behavioral: Negative.        Objective:      Physical Exam   Constitutional: He appears well-developed and well-nourished. No distress.   HENT:   Head: Normocephalic and atraumatic.   Right Ear: External ear normal.   Left Ear: External ear normal.   Mouth/Throat: Oropharynx is clear and moist.   Eyes: Conjunctivae and EOM are normal. Pupils are equal, round, and reactive to light.   Neck: Normal range of motion.   Cardiovascular: Normal rate and regular rhythm.   Pulmonary/Chest: Effort normal and breath sounds normal.   Abdominal: Soft. Bowel sounds are normal. There is no guarding. No hernia.   Umbilical incision healed   Neurological: He is alert.   Skin: Skin is warm. He is not diaphoretic.   Psychiatric: He has a normal mood and affect. His behavior is normal.      Assessment:       No diagnosis found.    Laboratory Data:   Viewer      Show images for MRI Abdomen W WO Contrast   PACS Images for [facility] [place] Viewer (Includes [facility] Images)      Show images for MRI Abdomen W WO Contrast   Reviewed By Jesus London DO, FACP on 2/5/2019 13:44   MRI Abdomen W WO Contrast   Order: [id number]   Status:  Final result   Visible to patient:  No (Not Released) Next appt:  03/01/2019 at 07:00 AM in Interventional Radiology ([facility] IR1) Dx:  Neoplasm of abdomen   Details     Reading Physician Reading Date Result Priority   Yong Bolaños MD 2/4/2019    Mike Eduardo MD 2/4/2019       Narrative     EXAMINATION:  MRI ABDOMEN W WO CONTRAST    CLINICAL HISTORY:  Neoplasm: abdomen, metastatic, recurrence, suspected/known;  Neoplasm of unspecified behavior of other specified sites    TECHNIQUE:  Multisequence, multiplanar MRI of the abdomen performed per liver protocol before and after administration of 10 mL Gadavist intravenous contrast.    COMPARISON:  MRI abdomen 07/23/2018.    FINDINGS:  Liver: Hepatic parenchyma shows diffuse signal dropout compatible with hepatic steatosis.  Interval increase in size of multiple enhancing T2  hyperintense lesions compatible with known metastatic disease.    Index lesions as follows:    Hepatic segment III lesion measures 5.5 cm (series 13, image 41), previously 4.1 cm.    Hepatic segment VIII lesion measures 4.8 cm (series 13, image 44), previously 4.0 cm.    Hepatic and portal veins are patent.    Biliary: Gallbladder is unremarkable.  No intrahepatic or extrahepatic biliary dilatation.    Pancreas: Unremarkable.  No pancreatic ductal dilatation.    Spleen: Normal size.  No focal lesions.    Adrenal glands: Right adrenal nodule demonstrates signal dropout compatible with adenoma.  Left adrenal gland is unremarkable.    Kidneys: No renal masses.  No hydronephrosis.    Miscellaneous: Visualized bowel loops are unremarkable.  No evidence for lymphadenopathy.  Multiple osseous lesions again noted, stable to mildly more pronounced when compared to prior study.      Impression       Findings concerning for progression of disease with interval increase in size of multiple enhancing hepatic lesions.  Additionally, the osseous metastases appear stable to more pronounced when compared to prior study.                                            NEUROENDOCRINE TUMOR MULTIDISCIPLINARY TUMOR BOARD  _____________________________________________________________________     PRESENTER:   Jesus London, DO     REASON FOR PRESENTATION:  Rev scans/?prgression/?LDT/PRRT?     ATTENDEES:      Surgery:              MD TOMÁS Corbin MD T. 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 2ND FLR    Bka Left      LEFT UPPER LOBECTOMY-LUNG Left 1/28/2016     Performed by Pan Bell [facility] at [facility] OR 2ND FLR    REPAIR, HERNIA, UMBILICAL, INCARCERATED, LAPAROSCOPIC N/A 11/27/2018     Per[facility] by [facility] OR    THORACOTOMY W/LUNG RESECTION Left 1/28/2016     Performed by Pan [facility] at [facility] OR 2ND FLR      ________________________________________________________________     DISCUSSION:  Responded well to right lobe cTACE. Progressed on the left and in segment 4. Bone and splenic mets slightly enlarged. Recommend left lobe and segment 4 TACE, followed by PRRT for other mets. Probably needs new Ga-68.     BOARD RECOMMENDATIONS:      LDT- scheduled  Zometa      Results for TRISTEN GARCIA (MRN [id number]) as of 2/28/2019 14:20   Ref. Range 12/10/2018 11:17 12/10/2018 11:18 1/7/2019 11:44   WBC Latest Ref Range: 3.90 - 12.70 K/uL  5.42 5.03   RBC Latest Ref Range: 4.60 - 6.20 M/uL  4.69 4.88   Hemoglobin Latest Ref Range: 14.0 - 18.0 g/dL  14.6 14.9   Hematocrit Latest Ref Range: 40.0 - 54.0 %  42.4 43.8   MCV Latest Ref Range: 82 - 98 fL  90 90   MCH Latest Ref Range: 27.0 - 31.0 pg  31.1 (H) 30.5   MCHC Latest Ref Range: 32.0 - 36.0 g/dL  34.4 34.0   RDW Latest Ref Range: 11.5 - 14.5 %  13.6 13.9   Platelets Latest Ref Range: 150 - 350 K/uL  227 198   MPV Latest Ref Range: 9.2 - 12.9 fL  9.9 9.6   Gran # (ANC) Latest Ref Range: 1.8 - 7.7 K/uL  3.8 2.8   Immature Grans (Abs) Latest Ref Range: 0.00 - 0.04 K/uL  0.01 0.02   Sodium Latest Ref Range: 136 - 145 mmol/L 138  138   Potassium Latest Ref Range: 3.5 - 5.1 mmol/L 4.5   4.6   Chloride Latest Ref Range: 95 - 110 mmol/L 104  102   CO2 Latest Ref Range: 23 - 29 mmol/L 26  29   Anion Gap Latest Ref Range: 8 - 16 mmol/L 8  7 (L)   BUN, Bld Latest Ref Range: 8 - 23 mg/dL 13  12   Creatinine Latest Ref Range: 0.5 - 1.4 mg/dL 1.0  1.1   eGFR if non African American Latest Ref Range: >60 mL/min/1.73 m^2 >60.0  >60.0   eGFR if African American Latest Ref Range: >60 mL/min/1.73 m^2 >60.0  >60.0   Glucose Latest Ref Range: 70 - 110 mg/dL 122 (H)  119 (H)   Calcium Latest Ref Range: 8.7 - 10.5 mg/dL 10.1  9.7   Alkaline Phosphatase Latest Ref Range: 55 - 135 U/L 81  79   Total Protein Latest Ref Range: 6.0 - 8.4 g/dL 7.6  7.7   Albumin Latest Ref Range: 3.5 - 5.2 g/dL 3.9  4.1   Total Bilirubin Latest Ref Range: 0.1 - 1.0 mg/dL 0.6  0.8   AST Latest Ref Range: 10 - 40 U/L 15  16   ALT Latest Ref Range: 10 - 44 U/L 23  23         Impression:  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  This is his 2nd round of chemo embolization.  I talked to him about the chemo embolization the hospitalization time discharge instructions.  I also talked about the risk of procedure the complications with the post embolization syndrome with nausea low-grade fever pain and fatigue.  I also discussed with him about the tumor board recommendation. .  He needs to have follow-up gallium scan with a MRI of the liver.  Scheduled to undergo embolization on the left side with segment 4 lesion embolization  Discussed with him about the hospitalization time.  Discharge medications prescribed  Plan:       NPO after midnight  Preop orders and postprocedure orders placed  For chemo embolization in the morning  Discharge planning on Saturday    I healed the umbilical incision from incarcerated umbilical hernia repair                 GRACY Sepulveda MD, FACS   Associate Professor of Surgery, [facility]   Neuroendocrine Surgery, Hepatic/Pancreatic & General Surgery   [address], [address]   [place]  [place] [address]   ph. [phone]; [phone]   fax. [phone]

## 2019-02-28 NOTE — LETTER
February 28, 2019      Jesus London DO, FACP  [address]  [facility] [address]           [facility] [place] [address]  Phone: [phone]  Fax: [phone]          Patient: Elroy Rahman   MR Number: [id number]   YOB: 1955   Date of Visit: 2/28/2019       Dear Dr. Jesus London:    Thank you for referring Elroy Rahman to me for evaluation. Attached you will find relevant portions of my assessment and plan of care.    If you have questions, please do not hesitate to call me. I look forward to following Elroy Rahman along with you.    Sincerely,    Coco Guidry MD    Enclosure  CC:  No Recipients    If you would like to receive this communication electronically, please contact [email or web address] or [phone] to request more information on behaview Link access.    For providers and/or their staff who would like to refer a patient to [facility], please contact us through our one-stop-shop provider referral line, [facility], at [phone].    If you feel you have received this communication in error or would no longer like to receive these types of communications, please e-mail [email or web address]

## 2019-03-01 ENCOUNTER — HOSPITAL ENCOUNTER (OUTPATIENT)
Facility: HOSPITAL | Age: 64
Discharge: HOME | End: 2019-03-02
Attending: SURGERY | Admitting: SURGERY
Payer: COMMERCIAL

## 2019-03-01 DIAGNOSIS — C7B.8 SECONDARY MALIGNANT NEUROENDOCRINE TUMOR OF LIVER: Primary | ICD-10-CM

## 2019-03-01 DIAGNOSIS — C7B.8 SECONDARY NEUROENDOCRINE TUMOR OF LIVER: ICD-10-CM

## 2019-03-01 PROCEDURE — 63600175 PHARM REV CODE 636 W HCPCS: Performed by: SURGERY

## 2019-03-01 PROCEDURE — 63600175 PHARM REV CODE 636 W HCPCS: Performed by: RADIOLOGY

## 2019-03-01 PROCEDURE — 25000003 PHARM REV CODE 250: Performed by: RADIOLOGY

## 2019-03-01 PROCEDURE — 25500020 PHARM REV CODE 255: Performed by: SURGERY

## 2019-03-01 PROCEDURE — 25000003 PHARM REV CODE 250: Performed by: SURGERY

## 2019-03-01 RX ORDER — FUROSEMIDE 10 MG/ML
20 INJECTION INTRAMUSCULAR; INTRAVENOUS ONCE
Status: COMPLETED | OUTPATIENT
Start: 2019-03-01 | End: 2019-03-01

## 2019-03-01 RX ORDER — FENTANYL CITRATE 50 UG/ML
INJECTION, SOLUTION INTRAMUSCULAR; INTRAVENOUS CODE/TRAUMA/SEDATION MEDICATION
Status: COMPLETED | OUTPATIENT
Start: 2019-03-01 | End: 2019-03-01

## 2019-03-01 RX ORDER — IBUPROFEN 400 MG/1
400 TABLET ORAL EVERY 4 HOURS PRN
Status: DISCONTINUED | OUTPATIENT
Start: 2019-03-01 | End: 2019-03-02 | Stop reason: HOSPADM

## 2019-03-01 RX ORDER — PROMETHAZINE HYDROCHLORIDE 25 MG/ML
INJECTION, SOLUTION INTRAMUSCULAR; INTRAVENOUS CODE/TRAUMA/SEDATION MEDICATION
Status: COMPLETED | OUTPATIENT
Start: 2019-03-01 | End: 2019-03-01

## 2019-03-01 RX ORDER — DIPHENHYDRAMINE HYDROCHLORIDE 50 MG/ML
50 INJECTION INTRAMUSCULAR; INTRAVENOUS ONCE
Status: COMPLETED | OUTPATIENT
Start: 2019-03-01 | End: 2019-03-01

## 2019-03-01 RX ORDER — FENTANYL CITRATE 50 UG/ML
50 INJECTION, SOLUTION INTRAMUSCULAR; INTRAVENOUS ONCE
Status: DISCONTINUED | OUTPATIENT
Start: 2019-03-01 | End: 2019-03-01 | Stop reason: HOSPADM

## 2019-03-01 RX ORDER — DEXTROSE MONOHYDRATE, SODIUM CHLORIDE, AND POTASSIUM CHLORIDE 50; 1.49; 4.5 G/1000ML; G/1000ML; G/1000ML
INJECTION, SOLUTION INTRAVENOUS CONTINUOUS
Status: DISCONTINUED | OUTPATIENT
Start: 2019-03-01 | End: 2019-03-02 | Stop reason: HOSPADM

## 2019-03-01 RX ORDER — SODIUM CHLORIDE 9 MG/ML
500 INJECTION, SOLUTION INTRAVENOUS ONCE
Status: COMPLETED | OUTPATIENT
Start: 2019-03-01 | End: 2019-03-01

## 2019-03-01 RX ORDER — DEXTROSE MONOHYDRATE, SODIUM CHLORIDE, AND POTASSIUM CHLORIDE 50; 1.49; 4.5 G/1000ML; G/1000ML; G/1000ML
INJECTION, SOLUTION INTRAVENOUS CONTINUOUS
Status: DISCONTINUED | OUTPATIENT
Start: 2019-03-01 | End: 2019-03-01

## 2019-03-01 RX ORDER — MANNITOL 250 MG/ML
12.5 INJECTION, SOLUTION INTRAVENOUS ONCE
Status: DISCONTINUED | OUTPATIENT
Start: 2019-03-01 | End: 2019-03-01 | Stop reason: HOSPADM

## 2019-03-01 RX ORDER — LIDOCAINE HYDROCHLORIDE 10 MG/ML
1 INJECTION, SOLUTION EPIDURAL; INFILTRATION; INTRACAUDAL; PERINEURAL ONCE
Status: DISCONTINUED | OUTPATIENT
Start: 2019-03-01 | End: 2019-03-01 | Stop reason: HOSPADM

## 2019-03-01 RX ORDER — MIDAZOLAM HYDROCHLORIDE 1 MG/ML
0.5 INJECTION INTRAMUSCULAR; INTRAVENOUS ONCE
Status: DISCONTINUED | OUTPATIENT
Start: 2019-03-01 | End: 2019-03-01 | Stop reason: HOSPADM

## 2019-03-01 RX ORDER — FAMOTIDINE 20 MG/1
20 TABLET, FILM COATED ORAL EVERY 12 HOURS
Status: DISCONTINUED | OUTPATIENT
Start: 2019-03-01 | End: 2019-03-02 | Stop reason: HOSPADM

## 2019-03-01 RX ORDER — ONDANSETRON 2 MG/ML
4 INJECTION INTRAMUSCULAR; INTRAVENOUS EVERY 8 HOURS PRN
Status: DISCONTINUED | OUTPATIENT
Start: 2019-03-01 | End: 2019-03-02 | Stop reason: HOSPADM

## 2019-03-01 RX ORDER — DEXAMETHASONE SODIUM PHOSPHATE 4 MG/ML
8 INJECTION, SOLUTION INTRA-ARTICULAR; INTRALESIONAL; INTRAMUSCULAR; INTRAVENOUS; SOFT TISSUE ONCE
Status: COMPLETED | OUTPATIENT
Start: 2019-03-01 | End: 2019-03-01

## 2019-03-01 RX ORDER — MAGNESIUM SULFATE HEPTAHYDRATE 40 MG/ML
2 INJECTION, SOLUTION INTRAVENOUS ONCE
Status: COMPLETED | OUTPATIENT
Start: 2019-03-01 | End: 2019-03-01

## 2019-03-01 RX ORDER — ALLOPURINOL 300 MG/1
300 TABLET ORAL DAILY
Status: COMPLETED | OUTPATIENT
Start: 2019-03-01 | End: 2019-03-02

## 2019-03-01 RX ORDER — MIDAZOLAM HYDROCHLORIDE 1 MG/ML
INJECTION INTRAMUSCULAR; INTRAVENOUS CODE/TRAUMA/SEDATION MEDICATION
Status: COMPLETED | OUTPATIENT
Start: 2019-03-01 | End: 2019-03-01

## 2019-03-01 RX ORDER — LIDOCAINE HYDROCHLORIDE 10 MG/ML
INJECTION INFILTRATION; PERINEURAL CODE/TRAUMA/SEDATION MEDICATION
Status: COMPLETED | OUTPATIENT
Start: 2019-03-01 | End: 2019-03-01

## 2019-03-01 RX ADMIN — AMPICILLIN SODIUM AND SULBACTAM SODIUM 3 G: 2; 1 INJECTION, POWDER, FOR SOLUTION INTRAMUSCULAR; INTRAVENOUS at 03:03

## 2019-03-01 RX ADMIN — AMPICILLIN SODIUM AND SULBACTAM SODIUM 3 G: 2; 1 INJECTION, POWDER, FOR SOLUTION INTRAMUSCULAR; INTRAVENOUS at 09:03

## 2019-03-01 RX ADMIN — SODIUM CHLORIDE 3 G: 9 INJECTION, SOLUTION INTRAVENOUS at 07:03

## 2019-03-01 RX ADMIN — IOHEXOL 55 ML: 350 INJECTION, SOLUTION INTRAVENOUS at 09:03

## 2019-03-01 RX ADMIN — OCTREOTIDE ACETATE 500 MCG/HR: 1000 INJECTION, SOLUTION INTRAVENOUS; SUBCUTANEOUS at 07:03

## 2019-03-01 RX ADMIN — FENTANYL CITRATE 50 MCG: 50 INJECTION, SOLUTION INTRAMUSCULAR; INTRAVENOUS at 09:03

## 2019-03-01 RX ADMIN — FAMOTIDINE 20 MG: 20 TABLET, FILM COATED ORAL at 09:03

## 2019-03-01 RX ADMIN — MIDAZOLAM HYDROCHLORIDE 1 MG: 1 INJECTION, SOLUTION INTRAMUSCULAR; INTRAVENOUS at 09:03

## 2019-03-01 RX ADMIN — DIPHENHYDRAMINE HYDROCHLORIDE 50 MG: 50 INJECTION, SOLUTION INTRAMUSCULAR; INTRAVENOUS at 08:03

## 2019-03-01 RX ADMIN — SODIUM CHLORIDE 50 MG: 9 INJECTION, SOLUTION INTRAVENOUS at 09:03

## 2019-03-01 RX ADMIN — ALLOPURINOL 300 MG: 300 TABLET ORAL at 03:03

## 2019-03-01 RX ADMIN — DEXAMETHASONE SODIUM PHOSPHATE 8 MG: 4 INJECTION, SOLUTION INTRA-ARTICULAR; INTRALESIONAL; INTRAMUSCULAR; INTRAVENOUS; SOFT TISSUE at 03:03

## 2019-03-01 RX ADMIN — LIDOCAINE HYDROCHLORIDE 10 ML: 10 INJECTION, SOLUTION INFILTRATION; PERINEURAL at 09:03

## 2019-03-01 RX ADMIN — Medication 10 ML: at 09:03

## 2019-03-01 RX ADMIN — SODIUM CHLORIDE 500 ML: 0.9 INJECTION, SOLUTION INTRAVENOUS at 07:03

## 2019-03-01 RX ADMIN — DEXTROSE, SODIUM CHLORIDE, AND POTASSIUM CHLORIDE: 5; .45; .15 INJECTION INTRAVENOUS at 07:03

## 2019-03-01 RX ADMIN — MAGNESIUM SULFATE IN WATER 2 G: 40 INJECTION, SOLUTION INTRAVENOUS at 07:03

## 2019-03-01 RX ADMIN — OCTREOTIDE ACETATE 500 MCG: 500 INJECTION, SOLUTION INTRAVENOUS; SUBCUTANEOUS at 08:03

## 2019-03-01 RX ADMIN — PROMETHAZINE HYDROCHLORIDE 12.5 MG: 25 INJECTION INTRAMUSCULAR; INTRAVENOUS at 09:03

## 2019-03-01 RX ADMIN — FUROSEMIDE 20 MG: 10 INJECTION, SOLUTION INTRAMUSCULAR; INTRAVENOUS at 03:03

## 2019-03-01 RX ADMIN — DEXTROSE, SODIUM CHLORIDE, AND POTASSIUM CHLORIDE 125 ML/HR: 5; .45; .15 INJECTION INTRAVENOUS at 03:03

## 2019-03-01 RX ADMIN — DEXTROSE, SODIUM CHLORIDE, AND POTASSIUM CHLORIDE: 5; .45; .15 INJECTION INTRAVENOUS at 09:03

## 2019-03-01 RX ADMIN — OCTREOTIDE ACETATE 125 MCG/HR: 1000 INJECTION, SOLUTION INTRAVENOUS; SUBCUTANEOUS at 03:03

## 2019-03-01 NOTE — H&P
Radiology History & Physical      SUBJECTIVE:     Chief Complaint: Metastatic Neuroendocrine    History of Present Illness:  Elroy Rahman is a 63 y.o. 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    Sleep apnea     Snores      Past Surgical History:   Procedure Laterality Date    [id number] N/A 6/8/2017    Performed by [facility] Diagnostic Provider at [facility] OR 2ND FLR    Bka Left     LEFT UPPER LOBECTOMY-LUNG Left 1/28/2016    Performed by Pan Bell MD at [facility] OR [facility] FLR    REPAIR, HERNIA, UMBILICAL, INCARCERATED, LAPAROSCOPIC N/A 11/27/2018    [facility]    THORACOTOMY W/LUNG RESECTION Left 1/28/2016    Performed by Pan Bell MD at [facility] OR [facility] FLR       Home Meds:   Prior to Admission medications    Medication Sig Start Date End Date Taking? Authorizing Provider   capecitabine (XELODA) 500 MG Tab TAKE 4 TABLETS (2,000MG TOTAL) BY MOUTH 2 TIMES DAILY FOR 14 DAYS EVERY 28 DAYS ( 2 WEEKS ON, 2 WEEKS OFF, EVERY 4 WEEKS). 11/12/18  Yes Jesus London DO, FACP   lanreotide (SOMATULINE DEPOT) 120 mg/0.5 mL Syrg Inject 120 mg into the skin every 28 days.   Yes Historical Provider, MD   temozolomide (TEMODAR) 250 MG capsule TAKE 2 CAPSULES (500MG TOTAL) BY MOUTH ONCE DAILY ON DAYS 10-14 OF 28 DAY CYCLE 2/19/19  Yes Kade Medina MD   ciprofloxacin HCl (CIPRO) 500 MG tablet Take 1 tablet (500 mg total) by mouth every 12 (twelve) hours. 2/28/19   Coco Guidry MD   ondansetron (ZOFRAN) 4 MG tablet Take 1 tablet (4 mg total) by mouth 2 (two) times daily. 10/4/18   Coco  MD Brea   ondansetron (ZOFRAN) 4 MG tablet Take 1 tablet (4 mg total) by mouth 2 (two) times daily. 2/28/19   Coco Guidry MD   oxyCODONE-acetaminophen (PERCOCET) 5-325 mg per tablet Take 1 tablet by mouth every 4 (four) hours as needed for Pain. 11/27/18   Coco Guidry MD   traMADol (ULTRAM) 50 mg tablet Take 1 tablet (50 mg total) by mouth every 6 (six) hours as needed. 2/28/19 3/10/19  Coco Guidry MD     Anticoagulants/Antiplatelets: no anticoagulation    Allergies:   Review of patient's allergies indicates:   Allergen Reactions    Epinephrine Other (See Comments)     Carcinoid crisis     Sedation History:  no adverse reactions             OBJECTIVE:     Vital Signs (Most Recent)  Temp: 97.9 °F (36.6 °C) (03/01/19 0753)  Pulse: (!) 53 (03/01/19 0753)  Resp: 16 (03/01/19 0753)  BP: 131/80 (03/01/19 0753)  SpO2: 97 % (03/01/19 0753)    Physical Exam:  ASA: 2  Mallampati: 2    General: no acute distress  Mental Status: alert and oriented to person, place and time  HEENT: normocephalic, atraumatic  Chest: unlabored breathing  Heart: regular heart rate  Abdomen: nondistended  Extremity: moves all extremities    Laboratory  Lab Results   Component Value Date    INR 1.0 02/28/2019       Lab Results   Component Value Date    WBC 4.50 02/28/2019    HGB 14.2 02/28/2019    HCT 41.8 02/28/2019    MCV 89 02/28/2019     02/28/2019      Lab Results   Component Value Date    GLU 98 02/28/2019     02/28/2019    K 4.1 02/28/2019     02/28/2019    CO2 25 02/28/2019    BUN 13 02/28/2019    CREATININE 1.0 02/28/2019    CALCIUM 9.4 02/28/2019    MG 2.0 11/20/2017    ALT 22 02/28/2019    AST 16 02/28/2019    ALBUMIN 4.2 02/28/2019    BILITOT 1.1 (H) 02/28/2019       ASSESSMENT/PLAN:     Sedation Plan: Moderate  Patient will undergo TACE.    .Nestor LEONE M.D. personally reviewed and agree with the above dictated note.

## 2019-03-01 NOTE — DISCHARGE SUMMARY
Radiology Discharge Summary      Hospital Course: No complications    Admit Date: 3/1/2019  Discharge Date: 03/01/2019     Instructions Given to Patient: Yes  Diet: Resume prior diet  Activity: activity as tolerated    Description of Condition on Discharge: Stable  Vital Signs (Most Recent): Temp: 97.9 °F (36.6 °C) (03/01/19 0753)  Pulse: (!) 56 (03/01/19 0955)  Resp: 18 (03/01/19 0955)  BP: (!) 144/85 (03/01/19 0955)  SpO2: 100 % (03/01/19 0955)    Discharge Disposition: floor    Discharge Diagnosis: neuroendocrine     Follow-up: with referring    .Nestor LEONE M.D. personally reviewed and agree with the above dictated note.

## 2019-03-01 NOTE — INTERVAL H&P NOTE
The patient has been examined and the H&P has been reviewed:    I concur with the findings and no changes have occurred since H&P was written.    Anesthesia/Surgery risks, benefits and alternative options discussed and understood by patient/family.          Active Hospital Problems    Diagnosis  POA    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]  Yes    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]  Yes      Resolved Hospital Problems   No resolved problems to display.

## 2019-03-02 VITALS
TEMPERATURE: 98 F | WEIGHT: 298.75 LBS | BODY MASS INDEX: 41.82 KG/M2 | SYSTOLIC BLOOD PRESSURE: 146 MMHG | OXYGEN SATURATION: 99 % | HEIGHT: 71 IN | HEART RATE: 59 BPM | DIASTOLIC BLOOD PRESSURE: 80 MMHG | RESPIRATION RATE: 20 BRPM

## 2019-03-02 PROCEDURE — 25000003 PHARM REV CODE 250: Performed by: SURGERY

## 2019-03-02 PROCEDURE — 63600175 PHARM REV CODE 636 W HCPCS: Performed by: SURGERY

## 2019-03-02 RX ADMIN — ALLOPURINOL 300 MG: 300 TABLET ORAL at 09:03

## 2019-03-02 RX ADMIN — AMPICILLIN SODIUM AND SULBACTAM SODIUM 3 G: 2; 1 INJECTION, POWDER, FOR SOLUTION INTRAMUSCULAR; INTRAVENOUS at 02:03

## 2019-03-02 RX ADMIN — DEXTROSE, SODIUM CHLORIDE, AND POTASSIUM CHLORIDE: 5; .45; .15 INJECTION INTRAVENOUS at 07:03

## 2019-03-02 RX ADMIN — FAMOTIDINE 20 MG: 20 TABLET, FILM COATED ORAL at 09:03

## 2019-03-02 RX ADMIN — AMPICILLIN SODIUM AND SULBACTAM SODIUM 3 G: 2; 1 INJECTION, POWDER, FOR SOLUTION INTRAMUSCULAR; INTRAVENOUS at 09:03

## 2019-03-02 NOTE — PLAN OF CARE
Discharge orders noted, no HH or HME ordered.    Future Appointments   Date Time Provider Department Center   3/4/2019 10:30 AM INJECTION, [facility] INFUSION [facility]   3/18/2019  8:20 AM LAB, HEMONC CANCER BLDG [facility]   3/18/2019  9:20 AM Jesus London DO, FACP [facility] HEM ONC [facility]   4/1/2019 10:30 AM INJECTION, [facility]   4/29/2019 10:30 AM INJECTION, [facility]       Pt's nurse will go over medications/signs and symptoms prior to discharge       03/02/19 1038   Final Note   Assessment Type Discharge Planning Assessment   Anticipated Discharge Disposition Home   What phone number can be called within the next 1-3 days to see how you are doing after discharge? [phone]   Hospital Follow Up  Appt(s) scheduled? No  (Offices closed for weekend. Patient to schedule own follow up appointment.)   Right Care Referral Info   Post Acute Recommendation No Care     Anne Gutierres, RN Transitional Navigator  [phone]

## 2019-03-02 NOTE — PLAN OF CARE
Problem: Adult Inpatient Plan of Care  Goal: Plan of Care Review  Outcome: Ongoing (interventions implemented as appropriate)  Patient denies pain. IV fluids/antibioitcs infusing. Medications given per MAR. Pt asleep most of shift. Safety maintained.

## 2019-03-02 NOTE — NURSING
Written and verbal discharge instructions given to patient and spouse. Prescriptions already given to patient by MD. All questions answered. Patient aware to call Dr. Sepulveda's office on Monday  to schedule follow up appointment. Iv catheter from LAC and LH discontinued with tip intact. All instructions verbalized back by Patient and wife. Patient discharged via wheelchair with staff to front of hospital and assisted to his car.

## 2019-03-02 NOTE — DISCHARGE SUMMARY
[facility] General Surgery  Discharge Summary      Patient Name: Elroy Rahman  MRN: [id number]  Admission Date: 3/1/2019  Hospital Length of Stay: 0 days  Discharge Date and Time:  03/02/2019 10:01 AM  Attending Physician: Coco Guidry MD   Discharging Provider: Edy Caldera MD  Primary Care Provider: Wayne Catalan PA-C     HPI:   Presenting for TACE    Procedure(s) (LRB):  EMBOLIZATION, BLOOD VESSEL (N/A)     Hospital Course: The patient was admitted date of procedure on 3/1/19 for TACE. Patient tolerated procedure well and recovered overnight. The next morning he was doing well and was felt to be stable for discharge home.    Consults: IR    Significant Diagnostic Studies: Radiology: TACE    Pending Diagnostic Studies:     Procedure Component Value Units Date/Time    IR Angiogram Each Additional Vessel [[id number]] Resulted:  03/01/19 0925    Order Status:  Sent Lab Status:  In process Updated:  03/01/19 1000    IR Angiogram Each Additional Vessel [[id number]] Resulted:  03/01/19 0925    Order Status:  Sent Lab Status:  In process Updated:  03/01/19 1000    IR Angiogram Visceral [[id number]] Resulted:  03/01/19 0925    Order Status:  Sent Lab Status:  In process Updated:  03/01/19 1000    IR Angiogram Visceral [[id number]] Resulted:  03/01/19 0925    Order Status:  Sent Lab Status:  In process Updated:  03/01/19 1000    IR Chemotherapy Agent Administration [[id number]] Resulted:  03/01/19 0925    Order Status:  Sent Lab Status:  In process Updated:  03/01/19 1000    IR Embolization for Tumor_Organ Ischemia [[id number]] Resulted:  03/01/19 0916    Order Status:  Sent Lab Status:  In process Updated:  03/01/19 1000    IR Ultrasound Guidance [[id number]] Resulted:  03/01/19 0925    Order Status:  Sent Lab Status:  In process Updated:  03/01/19 1000        Final Active Diagnoses:    Diagnosis Date Noted POA    PRINCIPAL PROBLEM:  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]  03/01/2019 Yes    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] 06/15/2017 Yes      Problems Resolved During this Admission:      Discharged Condition: stable    Disposition: Home or Self Care    Follow Up:    Patient Instructions:      Diet Adult Regular     Notify your health care provider if you experience any of the following:  temperature >100.4     Notify your health care provider if you experience any of the following:  persistent nausea and vomiting or diarrhea     Notify your health care provider if you experience any of the following:  severe uncontrolled pain     Notify your health care provider if you experience any of the following:  redness, tenderness, or signs of infection (pain, swelling, redness, odor or green/yellow discharge around incision site)     Notify your health care provider if you experience any of the following:  difficulty breathing or increased cough     Notify your health care provider if you experience any of the following:  severe persistent headache     Notify your health care provider if you experience any of the following:  worsening rash     Notify your health care provider if you experience any of the following:  persistent dizziness, light-headedness, or visual disturbances     Notify your health care provider if you experience any of the following:  increased confusion or weakness     No dressing needed     Activity as tolerated     Medications:  Reconciled Home Medications:      Medication List      CONTINUE taking these medications    capecitabine 500 MG Tab  Commonly known as:  XELODA  TAKE 4 TABLETS (2,000MG TOTAL) BY MOUTH 2 TIMES DAILY FOR 14 DAYS EVERY 28 DAYS ( 2 WEEKS ON, 2 WEEKS OFF, EVERY 4 WEEKS).     ciprofloxacin HCl 500 MG tablet  Commonly known as:  CIPRO  Take 1 tablet (500 mg total) by mouth every 12 (twelve) hours.     lanreotide 120 mg/0.5 mL Syrg  Commonly known as:  SOMATULINE DEPOT  Inject 120 mg into the skin every 28 days.     * ondansetron 4 MG  tablet  Commonly known as:  ZOFRAN  Take 1 tablet (4 mg total) by mouth 2 (two) times daily.     * ondansetron 4 MG tablet  Commonly known as:  ZOFRAN  Take 1 tablet (4 mg total) by mouth 2 (two) times daily.     oxyCODONE-acetaminophen 5-325 mg per tablet  Commonly known as:  PERCOCET  Take 1 tablet by mouth every 4 (four) hours as needed for Pain.     temozolomide 250 MG capsule  Commonly known as:  TEMODAR  TAKE 2 CAPSULES (500MG TOTAL) BY MOUTH ONCE DAILY ON DAYS 10-14 OF 28 DAY CYCLE     traMADol 50 mg tablet  Commonly known as:  ULTRAM  Take 1 tablet (50 mg total) by mouth every 6 (six) hours as needed.         * This list has 2 medication(s) that are the same as other medications prescribed for you. Read the directions carefully, and ask your doctor or other care provider to review them with you.                Edy Caldera MD  General Surgery  [facility]

## 2019-03-04 ENCOUNTER — TELEPHONE (OUTPATIENT)
Dept: HEMATOLOGY/ONCOLOGY | Facility: CLINIC | Age: 64
End: 2019-03-04

## 2019-03-04 NOTE — TELEPHONE ENCOUNTER
Patient requested to have injection moved to 3/18 with his follow up with Dr. London.  Will move that injection and all subsequent injections.

## 2019-03-04 NOTE — TELEPHONE ENCOUNTER
----- Message from Chely Palma sent at 3/4/2019 10:16 AM CST -----  Contact: pt wife   Pt wife called to speak with nurse Calle about a shot that pt is to get today 3/4 have some questions   Callback#[phone]

## 2019-03-05 ENCOUNTER — NURSE TRIAGE (OUTPATIENT)
Dept: ADMINISTRATIVE | Facility: CLINIC | Age: 64
End: 2019-03-05

## 2019-03-05 NOTE — TELEPHONE ENCOUNTER
Reason for Disposition   Blood in urine, but all triage questions negative  (Exception: could be normal menstrual bleeding)    Protocols used: ST URINE - BLOOD IN-A-  family called- patient had chemo-embolization done on 3/1 for liver cancer, and patient's urine is now a dark orange color. Pt was running a temp of 101 yesterday but has not had fever today. Also reports fatigue. Denies any abdomianl pain, dysuria, back pain. Read through discharge papers with family- in AVS had listed that abdominal pain, fatigue, fever are side effects from procedure and to take medications as prescribed. Asked about yellowing of eyes- family reported that it was present. Advised ER. Unsure what they are going to do at this time. Please contact patient to further advise

## 2019-03-06 NOTE — TELEPHONE ENCOUNTER
Spoke to patient, he denies any current fever, no discolored eyes or skin.  States he has not urinated yet.  Asked him to return call if his urine is still discolored, will get him in for labs and visit with SKYLER.

## 2019-03-18 ENCOUNTER — LAB VISIT (OUTPATIENT)
Dept: LAB | Facility: HOSPITAL | Age: 64
End: 2019-03-18
Payer: COMMERCIAL

## 2019-03-18 ENCOUNTER — OFFICE VISIT (OUTPATIENT)
Dept: HEMATOLOGY/ONCOLOGY | Facility: CLINIC | Age: 64
End: 2019-03-18
Payer: COMMERCIAL

## 2019-03-18 VITALS
SYSTOLIC BLOOD PRESSURE: 121 MMHG | WEIGHT: 294.13 LBS | DIASTOLIC BLOOD PRESSURE: 74 MMHG | BODY MASS INDEX: 39.84 KG/M2 | HEIGHT: 72 IN | RESPIRATION RATE: 16 BRPM | TEMPERATURE: 99 F | HEART RATE: 75 BPM | OXYGEN SATURATION: 98 %

## 2019-03-18 DIAGNOSIS — D3A.090 BRONCHIAL CARCINOID TUMORS: Primary | ICD-10-CM

## 2019-03-18 DIAGNOSIS — C7B.8 SECONDARY NEUROENDOCRINE TUMOR OF LIVER: ICD-10-CM

## 2019-03-18 DIAGNOSIS — C7B.8 SECONDARY NEUROENDOCRINE TUMOR OF BONE: ICD-10-CM

## 2019-03-18 LAB
ALBUMIN SERPL BCP-MCNC: 3.1 G/DL
ALP SERPL-CCNC: 168 U/L
ALT SERPL W/O P-5'-P-CCNC: 42 U/L
ANION GAP SERPL CALC-SCNC: 8 MMOL/L
AST SERPL-CCNC: 17 U/L
BILIRUB SERPL-MCNC: 0.4 MG/DL
BUN SERPL-MCNC: 10 MG/DL
CALCIUM SERPL-MCNC: 9.6 MG/DL
CHLORIDE SERPL-SCNC: 104 MMOL/L
CO2 SERPL-SCNC: 28 MMOL/L
CREAT SERPL-MCNC: 1 MG/DL
ERYTHROCYTE [DISTWIDTH] IN BLOOD BY AUTOMATED COUNT: 13.6 %
EST. GFR  (AFRICAN AMERICAN): >60 ML/MIN/1.73 M^2
EST. GFR  (NON AFRICAN AMERICAN): >60 ML/MIN/1.73 M^2
GLUCOSE SERPL-MCNC: 130 MG/DL
HCT VFR BLD AUTO: 40.8 %
HGB BLD-MCNC: 13.1 G/DL
IMM GRANULOCYTES # BLD AUTO: 0.02 K/UL
MCH RBC QN AUTO: 29.3 PG
MCHC RBC AUTO-ENTMCNC: 32.1 G/DL
MCV RBC AUTO: 91 FL
NEUTROPHILS # BLD AUTO: 3.8 K/UL
PLATELET # BLD AUTO: 506 K/UL
PMV BLD AUTO: 9.6 FL
POTASSIUM SERPL-SCNC: 4.6 MMOL/L
PROT SERPL-MCNC: 7.3 G/DL
RBC # BLD AUTO: 4.47 M/UL
SODIUM SERPL-SCNC: 140 MMOL/L
WBC # BLD AUTO: 5.39 K/UL

## 2019-03-18 PROCEDURE — 80053 COMPREHEN METABOLIC PANEL: CPT

## 2019-03-18 PROCEDURE — 3008F BODY MASS INDEX DOCD: CPT | Mod: CPTII,S$GLB,, | Performed by: INTERNAL MEDICINE

## 2019-03-18 PROCEDURE — 36415 COLL VENOUS BLD VENIPUNCTURE: CPT

## 2019-03-18 PROCEDURE — 99214 OFFICE O/P EST MOD 30 MIN: CPT | Mod: S$GLB,,, | Performed by: INTERNAL MEDICINE

## 2019-03-18 PROCEDURE — 99999 PR PBB SHADOW E&M-EST. PATIENT-LVL IV: ICD-10-PCS | Mod: PBBFAC,,, | Performed by: INTERNAL MEDICINE

## 2019-03-18 PROCEDURE — 3008F PR BODY MASS INDEX (BMI) DOCUMENTED: ICD-10-PCS | Mod: CPTII,S$GLB,, | Performed by: INTERNAL MEDICINE

## 2019-03-18 PROCEDURE — 99999 PR PBB SHADOW E&M-EST. PATIENT-LVL IV: CPT | Mod: PBBFAC,,, | Performed by: INTERNAL MEDICINE

## 2019-03-18 PROCEDURE — 99214 PR OFFICE/OUTPT VISIT, EST, LEVL IV, 30-39 MIN: ICD-10-PCS | Mod: S$GLB,,, | Performed by: INTERNAL MEDICINE

## 2019-03-18 PROCEDURE — 85027 COMPLETE CBC AUTOMATED: CPT

## 2019-03-18 NOTE — PROGRESS NOTES
PATIENT: Elroy Rahman  MRN: [id number]  DATE: 3/18/2019      Diagnosis:   1. Bronchial carcinoid tumors    2. Secondary neuroendocrine tumor of liver    3. Secondary neuroendocrine tumor of bone        Chief Complaint: Follow-up      Oncologic History:      Oncologic History Bronchial carcinoid, left, diagnosed 1/2016   Metastatic disease to liver and bone 5/2017     Oncologic Treatment Left upper lobectomy 1/2016  CAPTEM 7/2017 - 2/2019 (discontinued due to progression)  Lanreotide 7/2017   Zoledronic acid 7/2017   TACE 10/2018  TACE 3/2019    Pathology 1/2016: Typical carcinoid tumor, well-differentiated T2a, N0, M0 Ki-67 <1%  6/2017: Liver biopsy, well-differentiated, Ki-67 25%           Subjective:    Interval History: Mr. Rahman is a 63 y.o. male who is seen in follow-up for a bronchial carcinoid tumor.  He states he has been feeling well.  Since I had seen him last he underwent TACE.  He did have some trouble tolerating this procedure and had developed fatigue but has since recovered.  He is back to work.  He has no other new complaints.  His history dates to 1/2016 when he underwent a left upper lobectomy with Dr. Bell for a typical carcinoid tumor.  His pathological staging was T2a, N0, M0 with Ki-67 less than 1% and mitotic count 0 per 10 high-powered fields.  He done well postoperatively, however, in 5/2017 he was undergoing routine surveillance and a chest CT in picked up evidence of progression of disease in the liver.  Dedicated abdominal CT was performed showing multiple liver lesions.  In 6/2017 he underwent a CT-guided liver biopsy which was positive for metastatic neuroendocrine tumor which was well-differentiated with no overt nuclear atypia, however, the Ki-67 was 25%.  He underwent gallium-68 PET/CT and was found to have widespread disease involving the bone, liver and also mesentery.  He was started on CAPTEM in 7/2017 and also Lanreotide and zoledronic acid.  Scans in 10/2017 had shown  stability of disease.  Imaging in 1/2018 had shown stability of disease.  Scans in 4/2018 had shown stability of disease.  Scans in 07/2018 had continued to showed mild growth.  In 10/2018 he underwent TACE.  CAPTEM was discontinued in 02/2019 due to progression.  He underwent TACE in 03/2019.      Past Medical History:   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    Sleep apnea     Snores        Past Surgical HIstory:   Past Surgical History:   Procedure Laterality Date    [id number]-ZWHFNC-ML N/A 6/8/2017    Performed by [facility] OR 2ND FLR    Bka Left     LEFT UPPER LOBECTOMY-LUNG Left 1/28/2016    Performed by Pan Bell MD at [facility] OR 2ND FLR    REPAIR, HERNIA, UMBILICAL, INCARCERATED, LAPAROSCOPIC N/A 11/27/2018    Performed by [facility]oco Guidry [facility] at [facility] OR    THORACOTOMY W/LUNG RESECTION Left 1/28/2016    Performed by Pan Bell MD at [facility] OR 2ND FLR       Family History:   Family History   Problem Relation Age of Onset    Cancer Mother         lung    Cancer Sister     Anesthesia problems Neg Hx        Social History:  reports that  has never smoked. he has never used smokeless tobacco. He reports that he drinks about 0.6 oz of alcohol per week. He reports that he does not use drugs.    Allergies:  Review of patient's allergies indicates:  No Known Allergies    Medications:  Current Outpatient Medications   Medication Sig Dispense Refill    capecitabine (XELODA) 500 MG Tab TAKE 4 TABLETS (2,000MG TOTAL) BY MOUTH 2 TIMES DAILY FOR 14 DAYS EVERY 28 DAYS ( 2 WEEKS ON, 2 WEEKS OFF, EVERY 4 WEEKS). 112 tablet 3     ciprofloxacin HCl (CIPRO) 500 MG tablet Take 1 tablet (500 mg total) by mouth every 12 (twelve) hours. 14 tablet 0    lanreotide (SOMATULINE DEPOT) 120 mg/0.5 mL Syrg Inject 120 mg into the skin every 28 days.      ondansetron (ZOFRAN) 4 MG tablet Take 1 tablet (4 mg total) by mouth 2 (two) times daily. 15 tablet 0    ondansetron (ZOFRAN) 4 MG tablet Take 1 tablet (4 mg total) by mouth 2 (two) times daily. 15 tablet 0    oxyCODONE-acetaminophen (PERCOCET) 5-325 mg per tablet Take 1 tablet by mouth every 4 (four) hours as needed for Pain. 25 tablet 0    temozolomide (TEMODAR) 250 MG capsule TAKE 2 CAPSULES (500MG TOTAL) BY MOUTH ONCE DAILY ON DAYS 10-14 OF 28 DAY CYCLE 10 capsule 6     No current facility-administered medications for this visit.        Review of Systems   Constitutional: Negative for appetite change, chills, fatigue, fever and unexpected weight change.   HENT: Negative for dental problem, sinus pressure and sneezing.    Eyes: Negative for visual disturbance.   Respiratory: Negative for cough, choking and chest tightness.    Cardiovascular: Negative for chest pain and leg swelling.   Gastrointestinal: Negative for abdominal pain, blood in stool, constipation, diarrhea and nausea.   Genitourinary: Negative for difficulty urinating and dysuria.   Musculoskeletal: Negative for arthralgias and back pain.   Skin: Negative for rash and wound.   Neurological: Negative for dizziness, light-headedness and headaches.   Hematological: Negative for adenopathy. Does not bruise/bleed easily.   Psychiatric/Behavioral: Negative for sleep disturbance. The patient is not nervous/anxious.        ECOG Performance Status:   ECOG SCORE    0 - Fully active-able to carry on all pre-disease performance without restriction         Objective:      Vitals:   Vitals:    03/18/19 0917   BP: 121/74   BP Location: Right arm   Patient Position: Sitting   BP Method: Large (Automatic)   Pulse: 75   Resp: 16   Temp: 98.5 °F (36.9  °C)   TempSrc: Oral   SpO2: 98%   Weight: 133.4 kg (294 lb 1.5 oz)   Height: 6' (1.829 m)     BMI: Body mass index is 39.89 kg/m².    Physical Exam   Constitutional: He is oriented to person, place, and time. He appears well-developed and well-nourished.   HENT:   Head: Normocephalic and atraumatic.   Eyes: Pupils are equal, round, and reactive to light.   Neck: Normal range of motion. Neck supple.   Cardiovascular: Normal rate and regular rhythm.   Pulmonary/Chest: Effort normal and breath sounds normal. No respiratory distress.   Abdominal: Soft. He exhibits no distension. There is no tenderness.   Musculoskeletal: He exhibits no edema or tenderness.   Left lower extremity prosthesis   Lymphadenopathy:     He has no cervical adenopathy.   Neurological: He is alert and oriented to person, place, and time. No cranial nerve deficit.   Skin: Skin is warm and dry.   Psychiatric: He has a normal mood and affect. His behavior is normal.       Laboratory Data:  Lab Visit on 03/18/2019   Component Date Value Ref Range Status    WBC 03/18/2019 5.39  3.90 - 12.70 K/uL Final    RBC 03/18/2019 4.47* 4.60 - 6.20 M/uL Final    Hemoglobin 03/18/2019 13.1* 14.0 - 18.0 g/dL Final    Hematocrit 03/18/2019 40.8  40.0 - 54.0 % Final    MCV 03/18/2019 91  82 - 98 fL Final    MCH 03/18/2019 29.3  27.0 - 31.0 pg Final    MCHC 03/18/2019 32.1  32.0 - 36.0 g/dL Final    RDW 03/18/2019 13.6  11.5 - 14.5 % Final    Platelets 03/18/2019 506* 150 - 350 K/uL Final    MPV 03/18/2019 9.6  9.2 - 12.9 fL Final    Gran # (ANC) 03/18/2019 3.8  1.8 - 7.7 K/uL Final    Comment: The ANC is based on a white cell differential from an   automated cell counter. It has not been microscopically   reviewed for the presence of abnormal cells. Clinical   correlation is required.      Immature Grans (Abs) 03/18/2019 0.02  0.00 - 0.04 K/uL Final    Comment: Mild elevation in immature granulocytes is non specific and   can be seen in a variety of  conditions including stress response,   acute inflammation, trauma and pregnancy. Correlation with other   laboratory and clinical findings is essential.      Sodium 03/18/2019 140  136 - 145 mmol/L Final    Potassium 03/18/2019 4.6  3.5 - 5.1 mmol/L Final    Chloride 03/18/2019 104  95 - 110 mmol/L Final    CO2 03/18/2019 28  23 - 29 mmol/L Final    Glucose 03/18/2019 130* 70 - 110 mg/dL Final    BUN, Bld 03/18/2019 10  8 - 23 mg/dL Final    Creatinine 03/18/2019 1.0  0.5 - 1.4 mg/dL Final    Calcium 03/18/2019 9.6  8.7 - 10.5 mg/dL Final    Total Protein 03/18/2019 7.3  6.0 - 8.4 g/dL Final    Albumin 03/18/2019 3.1* 3.5 - 5.2 g/dL Final    Total Bilirubin 03/18/2019 0.4  0.1 - 1.0 mg/dL Final    Comment: For infants and newborns, interpretation of results should be based  on gestational age, weight and in agreement with clinical  observations.  Premature Infant recommended reference ranges:  Up to 24 hours.............<8.0 mg/dL  Up to 48 hours............<12.0 mg/dL  3-5 days..................<15.0 mg/dL  6-29 days.................<15.0 mg/dL      Alkaline Phosphatase 03/18/2019 168* 55 - 135 U/L Final    AST 03/18/2019 17  10 - 40 U/L Final    ALT 03/18/2019 42  10 - 44 U/L Final    Anion Gap 03/18/2019 8  8 - 16 mmol/L Final    eGFR if African American 03/18/2019 >60.0  >60 mL/min/1.73 m^2 Final    eGFR if non African American 03/18/2019 >60.0  >60 mL/min/1.73 m^2 Final    Comment: Calculation used to obtain the estimated glomerular filtration  rate (eGFR) is the CKD-EPI equation.        Supplemental Diagnosis  Chromogranin Staining:  Intensity: Moderate to strong. Positive cells: 100 (%)  Synaptophysin Staining:  Intensity: Strong. Positive cells: 100 (%)  CD31: 40 vessels per 1 HPF  Factor VIII: 27 vessels per 1 HPF  Ki67: 1 % tumor cells staining  Immunostains performed with appropriate positive and negative controls.  FINAL PATHOLOGIC DIAGNOSIS  1. Lymph node, level XI, excisional  biopsy:  Fragments of benign lymph node with anthracosis and sinus histiocytosis (0/1).  2. Lung, lingular staple line, biopsy:  Lung, negative for neoplasm.  3. Lymph node, level X, excisional biopsy:  1 benign lymph node with anthracotic pigment and sinus histiocytosis (0/1).  4. Lymph node, level XII, excisional biopsy:  2 benign lymph nodes with sinus histiocytosis and anthracotic pigment (0/2).  5. Lung, left upper lobe, lobectomy:  Typical carcinoid tumor (well differentiated neuroendocrine tumor), 4 cm in greatest dimension.  Bronchial and vascular margins are negative for neoplasm.  Uninvolved lung adjacent to the tumor shows emphysematous changes and fibrosis, however remaining lung  appears unremarkable.  Additional immunohistochemical stains for ancillary studies (including synaptophysin, chromogranin and Ki-67)  will be completed and results will be reported in a supplemental report.  See synoptic report in comment section for further details.  6. Lymph node, level VII, excisional biopsy:  2 benign lymph nodes with anthracotic pigment and sinus histiocytosis (0/2).  Comment: Synoptic Report  Specimen: Left upper lobe of lung  Procedure: Lobectomy  Specimen laterality: Left  Tumor size:  Greatest dimension: 4 cm  Tumor focality: Single focus  Histologic type: Typical carcinoid tumor  Visceral pleural invasion: Not identified  Tumor extension: Not identified  Margins: Bronchial and vascular margins are uninvolved by tumor.  Distance of tumor from closest margin: 1 cm from the bronchial surgical margin.  Lymphovascular invasion: Not identified  Ancillary studies:  Immunohistochemical stains for neuroendocrine markers and Ki-67 will be completed and results will follow in a  supplemental report.  Note: The tumor consists of a proliferation of cells in nests with lewis formation. The tumor cells have salt and  pepper chromatin pattern with abundant cytoplasm. There is no significant nuclear atypia. No  evidence of  necrosis. Mitotic count is 0 mitoses in 10 high power fields.    Imaging:     MRI 02/04/2019  COMPARISON:  MRI abdomen 07/23/2018.    FINDINGS:  Liver: Hepatic parenchyma shows diffuse signal dropout compatible with hepatic steatosis.  Interval increase in size of multiple enhancing T2 hyperintense lesions compatible with known metastatic disease.    Index lesions as follows:    Hepatic segment III lesion measures 5.5 cm (series 13, image 41), previously 4.1 cm.    Hepatic segment VIII lesion measures 4.8 cm (series 13, image 44), previously 4.0 cm.    Hepatic and portal veins are patent.    Biliary: Gallbladder is unremarkable.  No intrahepatic or extrahepatic biliary dilatation.    Pancreas: Unremarkable.  No pancreatic ductal dilatation.    Spleen: Normal size.  No focal lesions.    Adrenal glands: Right adrenal nodule demonstrates signal dropout compatible with adenoma.  Left adrenal gland is unremarkable.    Kidneys: No renal masses.  No hydronephrosis.    Miscellaneous: Visualized bowel loops are unremarkable.  No evidence for lymphadenopathy.  Multiple osseous lesions again noted, stable to mildly more pronounced when compared to prior study.      Impression       Findings concerning for progression of disease with interval increase in size of multiple enhancing hepatic lesions.  Additionally, the osseous metastases appear stable to more pronounced when compared to prior study.    RECIST SUMMARY:    Date of prior examination for comparison: MRI abdomen 07/23/2018.    Lesion 1: Hepatic segment III lesion measures 5.5 cm (series 13, image 41), previously 4.1 cm.    Lesion 2: Hepatic segment VIII lesion measures 4.8 cm (series 13, image 44), previously 4.0 cm.                  Assessment:       1. Bronchial carcinoid tumors    2. Secondary neuroendocrine tumor of liver    3. Secondary neuroendocrine tumor of bone           Plan:     Mr. Rahman was discussed in our multidisciplinary neuroendocrine  tumor board with recommendations for liver directed therapy followed by PRRT.  I have discussed the rationale, alternatives and potential side effects of PRRT with him.  I have given him written information on Lutathera.  He is agreeable to proceed.  We will hold his Lanreotide today and get him set.  All questions were answered and he is agreeable with this plan.    Jesus London DO, FACP  Hematology & Oncology  [facility] [place] [address]  ph. [phone]  Fax. [phone]    25 minutes were spent in coordination of patient's care, record review and counseling.  More than 50% of the time was face-to-face.

## 2019-03-19 ENCOUNTER — TELEPHONE (OUTPATIENT)
Dept: NEUROLOGY | Facility: HOSPITAL | Age: 64
End: 2019-03-19

## 2019-03-19 ENCOUNTER — TELEPHONE (OUTPATIENT)
Dept: PHARMACY | Facility: CLINIC | Age: 64
End: 2019-03-19

## 2019-03-19 ENCOUNTER — DOCUMENTATION ONLY (OUTPATIENT)
Dept: NEUROLOGY | Facility: HOSPITAL | Age: 64
End: 2019-03-19

## 2019-03-19 DIAGNOSIS — D3A.090 BRONCHIAL CARCINOID TUMORS: Primary | ICD-10-CM

## 2019-03-19 DIAGNOSIS — C7B.8 SECONDARY MALIGNANT NEUROENDOCRINE TUMOR OF LIVER: ICD-10-CM

## 2019-03-19 DIAGNOSIS — C7A.090 CARCINOID BRONCHIAL ADENOMA OF LEFT LUNG: ICD-10-CM

## 2019-03-19 DIAGNOSIS — C7B.8 SECONDARY NEUROENDOCRINE TUMOR OF BONE: ICD-10-CM

## 2019-03-19 RX ORDER — SODIUM CHLORIDE 9 MG/ML
INJECTION, SOLUTION INTRAVENOUS ONCE
Status: CANCELLED | OUTPATIENT
Start: 2019-04-10

## 2019-03-19 RX ORDER — SODIUM CHLORIDE 0.9 % (FLUSH) 0.9 %
10 SYRINGE (ML) INJECTION
Status: CANCELLED | OUTPATIENT
Start: 2019-04-10

## 2019-03-19 RX ORDER — ACETAMINOPHEN 325 MG/1
650 TABLET ORAL
Status: CANCELLED | OUTPATIENT
Start: 2019-04-10

## 2019-03-19 RX ORDER — ARGININE/LYSINE/0.9 % SOD CHL 25-25MG/ML
1000 PLASTIC BAG, INJECTION (ML) INTRAVENOUS
Status: CANCELLED | OUTPATIENT
Start: 2019-04-10 | End: 2019-04-10

## 2019-03-19 NOTE — TELEPHONE ENCOUNTER
Patient has been registered in the [facility] portal access code: [id number]. Patient has been notified and verbalized understanding.

## 2019-03-19 NOTE — TELEPHONE ENCOUNTER
----- Message from Yusra Salas RN sent at 3/18/2019  9:28 AM CDT -----  Regarding: PRRT  Dr. London would like him to get PRRT now.  That was recommended in tumor board and he is informing him now.

## 2019-03-19 NOTE — PROGRESS NOTES
Patient indicated he was in agreement to proceed with plan as per Dr. London for PRRT. Started instructions as listed below. Patient had to return to work.   Discussed scheduling PRRT at our facility.  - Need to return call.  Instructed patient on the followin.  You will see your physician and have lab work within 1-2 weeks of the procedure.   2.  Arrive at scheduled time on the 2nd floor - [address].  3.  This is an Outpatient procedure. Plan to be here for at least 6 hours.   4. Nuc Med MD will discuss procedure, answer questions and sign consents prior to procedure.  5.  Hydrate 3 days prior to and 3 days after procedure. This helps get rid of radiation.  6.  No visitors allowed in room during procedure.  7.  We will start 2 IV's for the treatment, one in each arm.  8.  You will receive pre medications, then an Amino Acid solution, then the Lily 177 infusion.  The Amino Acid solution is to protect your kidneys, it could cause nausea.  If you have nausea, we will slow down the Amino Acid solution and give you additional anti nausea medication.  9.  You will need to have lab work done at 4 weeks and 7 weeks post treatment.  10.  You will be instructed on Radiation Safety precautions upon discharge.    -Anum made for MD, labs and Lily 177 treatment.     -Email notification sent to [employer] call team.    Last SSA injection-19     PRRT scheduled -04/10/19, 19,19,19    All questions answered.

## 2019-03-19 NOTE — TELEPHONE ENCOUNTER
Contacted the patient to confirm CAPTEM discontinuation d/t PD. He has no tablets remaining therefore did not address disposal information. Informed him we will close him out of [facility].

## 2019-03-21 ENCOUNTER — TELEPHONE (OUTPATIENT)
Dept: HEMATOLOGY/ONCOLOGY | Facility: CLINIC | Age: 64
End: 2019-03-21

## 2019-03-21 RX ORDER — LANREOTIDE ACETATE 120 MG/.5ML
120 INJECTION SUBCUTANEOUS ONCE
Status: CANCELLED | OUTPATIENT
Start: 2019-04-11 | End: 2019-04-11

## 2019-03-21 RX ORDER — LANREOTIDE ACETATE 120 MG/.5ML
120 INJECTION SUBCUTANEOUS
Status: CANCELLED
Start: 2019-04-11

## 2019-03-21 NOTE — PROGRESS NOTES
Reached out to patient to update on schedule. Pt. Indicates dates OK., brief on phone. Aware Kev AYALA to schedule him for Gallium scan prior to start of PRRT.

## 2019-03-21 NOTE — TELEPHONE ENCOUNTER
----- Message from Maddie Hughes RN sent at 3/21/2019  1:49 PM CDT -----  Alva,   Can you please scheduled Mr. Flor appointment to see Dr. London at the [facility] location. Idea dates would be 06/03/19, 07/29/19, and 09/23/19. We have him scheduled for PRRT on 04/10/19, 06/05/19. 07/31/19, 09/25/19. If you have questions just let us know.    Thanks   Luz Maria

## 2019-03-22 ENCOUNTER — TELEPHONE (OUTPATIENT)
Dept: NEUROLOGY | Facility: HOSPITAL | Age: 64
End: 2019-03-22

## 2019-03-22 NOTE — TELEPHONE ENCOUNTER
----- Message from Yusra Salas, RN sent at 3/21/2019 11:47 AM CDT -----  Regarding: RE: Need Appt w/Dr. Lnodon pre PRRT  I made the April appt, Dr. London is out of town before the June appt so I made the appt with DR. Cruz (he did his surgery).  I can't make any other ones. The schedule at [facility] is only open a month or two at a time.  I see his Lanreotide is scheduled almost a week after his PRRT shouldn't that be changed?    ----- Message -----  From: Maddie Hughes RN  Sent: 3/21/2019  11:32 AM  To: Uma Calloway RN, Yusra Salas, RN  Subject: Need Appt w/Dr. London pre PRRT                 Alva,   Can you please scheduled Mr. Flor appointment to see Dr. London at the [facility] location. We have is scheduled for PRRT on 04/10/19, 06/05/19. 07/31/19, 09/25/19.    Thanks  Luz Maria

## 2019-03-27 NOTE — PROGRESS NOTES
Called patient to inform of Gallium scan scheduled on 4/1/19 @0930 at [facility] . Pt unable to take the message at this time, stated he would call back. Pt also due for cbc, cmp same day.

## 2019-03-28 ENCOUNTER — TELEPHONE (OUTPATIENT)
Dept: NEUROLOGY | Facility: HOSPITAL | Age: 64
End: 2019-03-28

## 2019-03-28 NOTE — TELEPHONE ENCOUNTER
Able to reach wife at [phone]. Reviewed schedule of lab, Ga 68 Pet scan, PRRT schedule. All questions addressed.    Discussed scheduling PRRT at our facility.  Reviewed PRRT process.    Instructed patient on the followin.  You will see your physician and have lab work within 1-2 weeks of the procedure.   2.  Arrive at scheduled time on the 2nd floor - [address].  3.  This is an Outpatient procedure. Plan to be here for at least 6 hours.   4. Nuc Med MD will discuss procedure, answer questions and sign consents prior to procedure.  5.  Hydrate 3 days prior to and 3 days after procedure. This helps get rid of radiation.  6.  No visitors allowed in room during procedure.  7.  We will start 2 IV's for the treatment, one in each arm.  8.  You will receive pre medications, then an Amino Acid solution, then the Lily 177 infusion.  The Amino Acid solution is to protect your kidneys, it could cause nausea.  If you have nausea, we will slow down the Amino Acid solution and give you additional anti nausea medication.  9.  You will need to have lab work done at 4 weeks and 7 weeks post treatment.  10.  You will be instructed on Radiation Safety precautions upon discharge.    -Anum made for MD, labs and Lily 177 treatment.     -Email notification sent to [employer] call team.    Last SSA injection- 19    PRRT scheduled -4/10/19    All questions answered.

## 2019-03-28 NOTE — TELEPHONE ENCOUNTER
Called patient to notify of schedule change of GALLIUM SCAN FROM 4/1 at [place] TO 4/3/19 AT 0730 JANNA AYALA AT 0730. Left message on voice mail for patient to return call.

## 2019-04-01 ENCOUNTER — LAB VISIT (OUTPATIENT)
Dept: LAB | Facility: HOSPITAL | Age: 64
End: 2019-04-01
Attending: INTERNAL MEDICINE
Payer: COMMERCIAL

## 2019-04-01 DIAGNOSIS — C7A.090 CARCINOID BRONCHIAL ADENOMA OF LEFT LUNG: ICD-10-CM

## 2019-04-01 DIAGNOSIS — D3A.090 BRONCHIAL CARCINOID TUMORS: ICD-10-CM

## 2019-04-01 DIAGNOSIS — C7B.8 SECONDARY MALIGNANT NEUROENDOCRINE TUMOR OF LIVER: ICD-10-CM

## 2019-04-01 DIAGNOSIS — C7B.8 SECONDARY NEUROENDOCRINE TUMOR OF BONE: ICD-10-CM

## 2019-04-01 LAB
ALBUMIN SERPL BCP-MCNC: 3.5 G/DL (ref 3.5–5.2)
ALP SERPL-CCNC: 106 U/L (ref 55–135)
ALT SERPL W/O P-5'-P-CCNC: 19 U/L (ref 10–44)
ANION GAP SERPL CALC-SCNC: 8 MMOL/L (ref 8–16)
AST SERPL-CCNC: 15 U/L (ref 10–40)
BASOPHILS # BLD AUTO: 0.03 K/UL (ref 0–0.2)
BASOPHILS NFR BLD: 0.6 % (ref 0–1.9)
BILIRUB SERPL-MCNC: 0.5 MG/DL (ref 0.1–1)
BUN SERPL-MCNC: 17 MG/DL (ref 8–23)
CALCIUM SERPL-MCNC: 9.2 MG/DL (ref 8.7–10.5)
CHLORIDE SERPL-SCNC: 105 MMOL/L (ref 95–110)
CO2 SERPL-SCNC: 25 MMOL/L (ref 23–29)
CREAT SERPL-MCNC: 0.9 MG/DL (ref 0.5–1.4)
DIFFERENTIAL METHOD: ABNORMAL
EOSINOPHIL # BLD AUTO: 0 K/UL (ref 0–0.5)
EOSINOPHIL NFR BLD: 0.8 % (ref 0–8)
ERYTHROCYTE [DISTWIDTH] IN BLOOD BY AUTOMATED COUNT: 13.5 % (ref 11.5–14.5)
EST. GFR  (AFRICAN AMERICAN): >60 ML/MIN/1.73 M^2
EST. GFR  (NON AFRICAN AMERICAN): >60 ML/MIN/1.73 M^2
GLUCOSE SERPL-MCNC: 110 MG/DL (ref 70–110)
HCT VFR BLD AUTO: 39.6 % (ref 40–54)
HGB BLD-MCNC: 13.1 G/DL (ref 14–18)
IMM GRANULOCYTES # BLD AUTO: 0.02 K/UL (ref 0–0.04)
IMM GRANULOCYTES NFR BLD AUTO: 0.4 % (ref 0–0.5)
LYMPHOCYTES # BLD AUTO: 1 K/UL (ref 1–4.8)
LYMPHOCYTES NFR BLD: 19 % (ref 18–48)
MCH RBC QN AUTO: 29 PG (ref 27–31)
MCHC RBC AUTO-ENTMCNC: 33.1 G/DL (ref 32–36)
MCV RBC AUTO: 88 FL (ref 82–98)
MONOCYTES # BLD AUTO: 0.4 K/UL (ref 0.3–1)
MONOCYTES NFR BLD: 8.4 % (ref 4–15)
NEUTROPHILS # BLD AUTO: 3.6 K/UL (ref 1.8–7.7)
NEUTROPHILS NFR BLD: 70.8 % (ref 38–73)
NRBC BLD-RTO: 0 /100 WBC
PLATELET # BLD AUTO: 219 K/UL (ref 150–350)
PMV BLD AUTO: 10 FL (ref 9.2–12.9)
POTASSIUM SERPL-SCNC: 4.3 MMOL/L (ref 3.5–5.1)
PROT SERPL-MCNC: 7.1 G/DL (ref 6–8.4)
RBC # BLD AUTO: 4.51 M/UL (ref 4.6–6.2)
SODIUM SERPL-SCNC: 138 MMOL/L (ref 136–145)
WBC # BLD AUTO: 5.11 K/UL (ref 3.9–12.7)

## 2019-04-01 PROCEDURE — 80053 COMPREHEN METABOLIC PANEL: CPT

## 2019-04-01 PROCEDURE — 85025 COMPLETE CBC W/AUTO DIFF WBC: CPT

## 2019-04-01 PROCEDURE — 36415 COLL VENOUS BLD VENIPUNCTURE: CPT

## 2019-04-03 ENCOUNTER — HOSPITAL ENCOUNTER (OUTPATIENT)
Dept: RADIOLOGY | Facility: HOSPITAL | Age: 64
Discharge: HOME | End: 2019-04-03
Attending: INTERNAL MEDICINE
Payer: COMMERCIAL

## 2019-04-03 DIAGNOSIS — C7B.8 SECONDARY NEUROENDOCRINE TUMOR OF LIVER: ICD-10-CM

## 2019-04-03 DIAGNOSIS — C7B.8 SECONDARY NEUROENDOCRINE TUMOR OF BONE: ICD-10-CM

## 2019-04-03 DIAGNOSIS — D3A.090 BRONCHIAL CARCINOID TUMORS: ICD-10-CM

## 2019-04-03 PROCEDURE — 78815 NM PET 68GA DOTATATE WHOLE BODY: ICD-10-PCS | Mod: 26,PS,, | Performed by: RADIOLOGY

## 2019-04-03 PROCEDURE — A9587 GALLIUM GA-68: HCPCS | Mod: TB

## 2019-04-03 PROCEDURE — 78815 PET IMAGE W/CT SKULL-THIGH: CPT | Mod: 26,PS,, | Performed by: RADIOLOGY

## 2019-04-03 PROCEDURE — 78815 PET IMAGE W/CT SKULL-THIGH: CPT | Mod: TC

## 2019-04-05 ENCOUNTER — TELEPHONE (OUTPATIENT)
Dept: HEMATOLOGY/ONCOLOGY | Facility: CLINIC | Age: 64
End: 2019-04-05

## 2019-04-10 ENCOUNTER — INFUSION (OUTPATIENT)
Dept: INFUSION THERAPY | Facility: HOSPITAL | Age: 64
End: 2019-04-10
Attending: INTERNAL MEDICINE
Payer: COMMERCIAL

## 2019-04-10 ENCOUNTER — HOSPITAL ENCOUNTER (OUTPATIENT)
Dept: RADIOLOGY | Facility: HOSPITAL | Age: 64
Discharge: HOME | End: 2019-04-10
Attending: INTERNAL MEDICINE
Payer: COMMERCIAL

## 2019-04-10 VITALS
TEMPERATURE: 98 F | OXYGEN SATURATION: 97 % | HEIGHT: 72 IN | HEART RATE: 56 BPM | BODY MASS INDEX: 39.84 KG/M2 | WEIGHT: 294.13 LBS | SYSTOLIC BLOOD PRESSURE: 120 MMHG | DIASTOLIC BLOOD PRESSURE: 75 MMHG | RESPIRATION RATE: 16 BRPM

## 2019-04-10 DIAGNOSIS — C7B.8 SECONDARY NEUROENDOCRINE TUMOR OF BONE: ICD-10-CM

## 2019-04-10 DIAGNOSIS — C7B.8 SECONDARY MALIGNANT NEUROENDOCRINE TUMOR OF LIVER: ICD-10-CM

## 2019-04-10 DIAGNOSIS — D3A.090 BRONCHIAL CARCINOID TUMORS: ICD-10-CM

## 2019-04-10 DIAGNOSIS — C7A.090 CARCINOID BRONCHIAL ADENOMA OF LEFT LUNG: ICD-10-CM

## 2019-04-10 DIAGNOSIS — C7B.8 SECONDARY NEUROENDOCRINE TUMOR OF LIVER: ICD-10-CM

## 2019-04-10 DIAGNOSIS — C78.7 SECONDARY MALIGNANT NEOPLASM OF LIVER: ICD-10-CM

## 2019-04-10 DIAGNOSIS — C7B.8 SECONDARY MALIGNANT NEUROENDOCRINE TUMOR OF LIVER: Primary | ICD-10-CM

## 2019-04-10 PROCEDURE — 96367 TX/PROPH/DG ADDL SEQ IV INF: CPT

## 2019-04-10 PROCEDURE — 25000003 PHARM REV CODE 250: Performed by: INTERNAL MEDICINE

## 2019-04-10 PROCEDURE — 96366 THER/PROPH/DIAG IV INF ADDON: CPT

## 2019-04-10 PROCEDURE — A9513 LUTETIUM LU 177 DOTATAT THER: HCPCS | Mod: TB

## 2019-04-10 PROCEDURE — 79101 NM THERAPY BY IV ADMINISTRATION LU177: ICD-10-PCS | Mod: 26,,, | Performed by: RADIOLOGY

## 2019-04-10 PROCEDURE — 96365 THER/PROPH/DIAG IV INF INIT: CPT

## 2019-04-10 PROCEDURE — 63600175 PHARM REV CODE 636 W HCPCS: Performed by: INTERNAL MEDICINE

## 2019-04-10 PROCEDURE — 79101 NUCLEAR RX IV ADMIN: CPT | Mod: 26,,, | Performed by: RADIOLOGY

## 2019-04-10 RX ORDER — SODIUM CHLORIDE 0.9 % (FLUSH) 0.9 %
10 SYRINGE (ML) INJECTION
Status: CANCELLED | OUTPATIENT
Start: 2019-04-11

## 2019-04-10 RX ORDER — LANREOTIDE ACETATE 120 MG/.5ML
120 INJECTION SUBCUTANEOUS
Status: CANCELLED
Start: 2019-04-11

## 2019-04-10 RX ORDER — SODIUM CHLORIDE 0.9 % (FLUSH) 0.9 %
10 SYRINGE (ML) INJECTION
Status: DISCONTINUED | OUTPATIENT
Start: 2019-04-10 | End: 2019-04-10 | Stop reason: HOSPADM

## 2019-04-10 RX ORDER — SODIUM CHLORIDE 9 MG/ML
INJECTION, SOLUTION INTRAVENOUS ONCE
Status: COMPLETED | OUTPATIENT
Start: 2019-04-10 | End: 2019-04-10

## 2019-04-10 RX ORDER — ARGININE/LYSINE/0.9 % SOD CHL 25-25MG/ML
1000 PLASTIC BAG, INJECTION (ML) INTRAVENOUS
Status: COMPLETED | OUTPATIENT
Start: 2019-04-10 | End: 2019-04-10

## 2019-04-10 RX ORDER — ACETAMINOPHEN 325 MG/1
650 TABLET ORAL
Status: DISCONTINUED | OUTPATIENT
Start: 2019-04-10 | End: 2019-04-10 | Stop reason: HOSPADM

## 2019-04-10 RX ORDER — ACETAMINOPHEN 325 MG/1
650 TABLET ORAL
Status: CANCELLED | OUTPATIENT
Start: 2019-04-11

## 2019-04-10 RX ORDER — SODIUM CHLORIDE 9 MG/ML
INJECTION, SOLUTION INTRAVENOUS ONCE
Status: CANCELLED | OUTPATIENT
Start: 2019-04-11

## 2019-04-10 RX ORDER — LANREOTIDE ACETATE 120 MG/.5ML
120 INJECTION SUBCUTANEOUS ONCE
Status: CANCELLED | OUTPATIENT
Start: 2019-04-11

## 2019-04-10 RX ORDER — ARGININE/LYSINE/0.9 % SOD CHL 25-25MG/ML
1000 PLASTIC BAG, INJECTION (ML) INTRAVENOUS
Status: CANCELLED | OUTPATIENT
Start: 2019-04-11

## 2019-04-10 RX ADMIN — SODIUM CHLORIDE: 0.9 INJECTION, SOLUTION INTRAVENOUS at 07:04

## 2019-04-10 RX ADMIN — Medication 1000 ML: at 08:04

## 2019-04-10 RX ADMIN — DEXAMETHASONE SODIUM PHOSPHATE: 4 INJECTION, SOLUTION INTRAMUSCULAR; INTRAVENOUS at 07:04

## 2019-04-10 NOTE — NURSING
0745- received in infusion for PRRT. IV started x2. Instructions reviewed. Pt verbalized understanding, time allotted for questions.

## 2019-04-10 NOTE — NURSING
1205- Discharge instructions reviewed, time allotted for questions. Pt has no further questions, discharged out of unit in good condition.Ambulated out of unit in good condition.

## 2019-04-15 ENCOUNTER — INFUSION (OUTPATIENT)
Dept: INFUSION THERAPY | Facility: HOSPITAL | Age: 64
End: 2019-04-15
Attending: INTERNAL MEDICINE
Payer: COMMERCIAL

## 2019-04-15 DIAGNOSIS — C7A.090 CARCINOID BRONCHIAL ADENOMA OF LEFT LUNG: ICD-10-CM

## 2019-04-15 DIAGNOSIS — C7B.8 SECONDARY NEUROENDOCRINE TUMOR OF LIVER: ICD-10-CM

## 2019-04-15 DIAGNOSIS — D3A.090 BRONCHIAL CARCINOID TUMORS: ICD-10-CM

## 2019-04-15 DIAGNOSIS — C7B.8 SECONDARY MALIGNANT NEUROENDOCRINE TUMOR OF LIVER: Primary | ICD-10-CM

## 2019-04-15 DIAGNOSIS — C78.7 SECONDARY MALIGNANT NEOPLASM OF LIVER: ICD-10-CM

## 2019-04-15 DIAGNOSIS — C7B.8 SECONDARY NEUROENDOCRINE TUMOR OF BONE: ICD-10-CM

## 2019-04-15 PROCEDURE — 96372 THER/PROPH/DIAG INJ SC/IM: CPT

## 2019-04-15 PROCEDURE — 63600175 PHARM REV CODE 636 W HCPCS: Mod: JG | Performed by: INTERNAL MEDICINE

## 2019-04-15 PROCEDURE — 96401 CHEMO ANTI-NEOPL SQ/IM: CPT

## 2019-04-15 RX ORDER — ARGININE 100 %
1000 CRYSTALS MISCELLANEOUS
Status: CANCELLED | OUTPATIENT
Start: 2019-05-13

## 2019-04-15 RX ORDER — LANREOTIDE ACETATE 120 MG/.5ML
120 INJECTION SUBCUTANEOUS ONCE
Status: CANCELLED | OUTPATIENT
Start: 2019-05-13

## 2019-04-15 RX ORDER — SODIUM CHLORIDE 0.9 % (FLUSH) 0.9 %
10 SYRINGE (ML) INJECTION
Status: CANCELLED | OUTPATIENT
Start: 2019-05-13

## 2019-04-15 RX ORDER — SODIUM CHLORIDE 9 MG/ML
INJECTION, SOLUTION INTRAVENOUS ONCE
Status: CANCELLED | OUTPATIENT
Start: 2019-05-13

## 2019-04-15 RX ORDER — ACETAMINOPHEN 325 MG/1
650 TABLET ORAL
Status: CANCELLED | OUTPATIENT
Start: 2019-05-13

## 2019-04-15 RX ORDER — LANREOTIDE ACETATE 120 MG/.5ML
120 INJECTION SUBCUTANEOUS ONCE
Status: COMPLETED | OUTPATIENT
Start: 2019-04-15 | End: 2019-04-15

## 2019-04-15 RX ORDER — LANREOTIDE ACETATE 120 MG/.5ML
120 INJECTION SUBCUTANEOUS
Status: CANCELLED
Start: 2019-05-13

## 2019-04-15 RX ADMIN — LANREOTIDE ACETATE 120 MG: 120 INJECTION SUBCUTANEOUS at 10:04

## 2019-05-08 ENCOUNTER — LAB VISIT (OUTPATIENT)
Dept: LAB | Facility: HOSPITAL | Age: 64
End: 2019-05-08
Attending: INTERNAL MEDICINE
Payer: COMMERCIAL

## 2019-05-08 DIAGNOSIS — C7B.8 SECONDARY MALIGNANT NEUROENDOCRINE TUMOR OF LIVER: ICD-10-CM

## 2019-05-08 DIAGNOSIS — C7A.090 CARCINOID BRONCHIAL ADENOMA OF LEFT LUNG: ICD-10-CM

## 2019-05-08 DIAGNOSIS — D3A.090 BRONCHIAL CARCINOID TUMORS: ICD-10-CM

## 2019-05-08 DIAGNOSIS — C7B.8 SECONDARY NEUROENDOCRINE TUMOR OF BONE: ICD-10-CM

## 2019-05-08 LAB
ALBUMIN SERPL BCP-MCNC: 3.8 G/DL (ref 3.5–5.2)
ALP SERPL-CCNC: 74 U/L (ref 55–135)
ALT SERPL W/O P-5'-P-CCNC: 14 U/L (ref 10–44)
ANION GAP SERPL CALC-SCNC: 9 MMOL/L (ref 8–16)
AST SERPL-CCNC: 13 U/L (ref 10–40)
BASOPHILS # BLD AUTO: 0.05 K/UL (ref 0–0.2)
BASOPHILS NFR BLD: 0.9 % (ref 0–1.9)
BILIRUB SERPL-MCNC: 0.8 MG/DL (ref 0.1–1)
BUN SERPL-MCNC: 10 MG/DL (ref 8–23)
CALCIUM SERPL-MCNC: 9.1 MG/DL (ref 8.7–10.5)
CHLORIDE SERPL-SCNC: 103 MMOL/L (ref 95–110)
CO2 SERPL-SCNC: 24 MMOL/L (ref 23–29)
CREAT SERPL-MCNC: 1 MG/DL (ref 0.5–1.4)
DIFFERENTIAL METHOD: ABNORMAL
EOSINOPHIL # BLD AUTO: 0.1 K/UL (ref 0–0.5)
EOSINOPHIL NFR BLD: 2.3 % (ref 0–8)
ERYTHROCYTE [DISTWIDTH] IN BLOOD BY AUTOMATED COUNT: 13.8 % (ref 11.5–14.5)
EST. GFR  (AFRICAN AMERICAN): >60 ML/MIN/1.73 M^2
EST. GFR  (NON AFRICAN AMERICAN): >60 ML/MIN/1.73 M^2
GLUCOSE SERPL-MCNC: 157 MG/DL (ref 70–110)
HCT VFR BLD AUTO: 40.9 % (ref 40–54)
HGB BLD-MCNC: 13.7 G/DL (ref 14–18)
IMM GRANULOCYTES # BLD AUTO: 0.02 K/UL (ref 0–0.04)
IMM GRANULOCYTES NFR BLD AUTO: 0.3 % (ref 0–0.5)
LYMPHOCYTES # BLD AUTO: 0.5 K/UL (ref 1–4.8)
LYMPHOCYTES NFR BLD: 9.2 % (ref 18–48)
MCH RBC QN AUTO: 29.1 PG (ref 27–31)
MCHC RBC AUTO-ENTMCNC: 33.5 G/DL (ref 32–36)
MCV RBC AUTO: 87 FL (ref 82–98)
MONOCYTES # BLD AUTO: 1.1 K/UL (ref 0.3–1)
MONOCYTES NFR BLD: 18.8 % (ref 4–15)
NEUTROPHILS # BLD AUTO: 3.9 K/UL (ref 1.8–7.7)
NEUTROPHILS NFR BLD: 68.5 % (ref 38–73)
NRBC BLD-RTO: 0 /100 WBC
PLATELET # BLD AUTO: 148 K/UL (ref 150–350)
PMV BLD AUTO: 9.9 FL (ref 9.2–12.9)
POTASSIUM SERPL-SCNC: 4.1 MMOL/L (ref 3.5–5.1)
PROT SERPL-MCNC: 7.3 G/DL (ref 6–8.4)
RBC # BLD AUTO: 4.7 M/UL (ref 4.6–6.2)
SODIUM SERPL-SCNC: 136 MMOL/L (ref 136–145)
WBC # BLD AUTO: 5.74 K/UL (ref 3.9–12.7)

## 2019-05-08 PROCEDURE — 36415 COLL VENOUS BLD VENIPUNCTURE: CPT

## 2019-05-08 PROCEDURE — 85025 COMPLETE CBC W/AUTO DIFF WBC: CPT

## 2019-05-08 PROCEDURE — 80053 COMPREHEN METABOLIC PANEL: CPT

## 2019-05-09 ENCOUNTER — INFUSION (OUTPATIENT)
Dept: INFUSION THERAPY | Facility: HOSPITAL | Age: 64
End: 2019-05-09
Attending: INTERNAL MEDICINE
Payer: COMMERCIAL

## 2019-05-09 DIAGNOSIS — C78.7 SECONDARY MALIGNANT NEOPLASM OF LIVER: ICD-10-CM

## 2019-05-09 DIAGNOSIS — C7B.8 SECONDARY NEUROENDOCRINE TUMOR OF LIVER: ICD-10-CM

## 2019-05-09 DIAGNOSIS — C7B.8 SECONDARY MALIGNANT NEUROENDOCRINE TUMOR OF LIVER: Primary | ICD-10-CM

## 2019-05-09 DIAGNOSIS — C7B.8 SECONDARY NEUROENDOCRINE TUMOR OF BONE: ICD-10-CM

## 2019-05-09 DIAGNOSIS — D3A.090 BRONCHIAL CARCINOID TUMORS: ICD-10-CM

## 2019-05-09 DIAGNOSIS — C7A.090 CARCINOID BRONCHIAL ADENOMA OF LEFT LUNG: ICD-10-CM

## 2019-05-09 PROCEDURE — 96372 THER/PROPH/DIAG INJ SC/IM: CPT

## 2019-05-09 PROCEDURE — 63600175 PHARM REV CODE 636 W HCPCS: Mod: JG | Performed by: INTERNAL MEDICINE

## 2019-05-09 RX ORDER — LANREOTIDE ACETATE 120 MG/.5ML
120 INJECTION SUBCUTANEOUS ONCE
Status: COMPLETED | OUTPATIENT
Start: 2019-05-09 | End: 2019-05-09

## 2019-05-09 RX ORDER — SODIUM CHLORIDE 9 MG/ML
INJECTION, SOLUTION INTRAVENOUS ONCE
Status: CANCELLED | OUTPATIENT
Start: 2019-06-06

## 2019-05-09 RX ORDER — ACETAMINOPHEN 325 MG/1
650 TABLET ORAL
Status: CANCELLED | OUTPATIENT
Start: 2019-06-06

## 2019-05-09 RX ORDER — LANREOTIDE ACETATE 120 MG/.5ML
120 INJECTION SUBCUTANEOUS ONCE
Status: CANCELLED | OUTPATIENT
Start: 2019-06-06

## 2019-05-09 RX ORDER — ARGININE 100 %
1000 CRYSTALS MISCELLANEOUS
Status: CANCELLED | OUTPATIENT
Start: 2019-06-06

## 2019-05-09 RX ORDER — LANREOTIDE ACETATE 120 MG/.5ML
120 INJECTION SUBCUTANEOUS
Status: CANCELLED
Start: 2019-06-06

## 2019-05-09 RX ORDER — SODIUM CHLORIDE 0.9 % (FLUSH) 0.9 %
10 SYRINGE (ML) INJECTION
Status: CANCELLED | OUTPATIENT
Start: 2019-06-06

## 2019-05-09 RX ADMIN — LANREOTIDE ACETATE 120 MG: 120 INJECTION SUBCUTANEOUS at 09:05

## 2019-05-29 ENCOUNTER — TELEPHONE (OUTPATIENT)
Dept: NEUROLOGY | Facility: HOSPITAL | Age: 64
End: 2019-05-29

## 2019-05-29 ENCOUNTER — LAB VISIT (OUTPATIENT)
Dept: LAB | Facility: HOSPITAL | Age: 64
End: 2019-05-29
Attending: INTERNAL MEDICINE
Payer: COMMERCIAL

## 2019-05-29 DIAGNOSIS — D3A.090 BRONCHIAL CARCINOID TUMORS: ICD-10-CM

## 2019-05-29 DIAGNOSIS — C7A.090 CARCINOID BRONCHIAL ADENOMA OF LEFT LUNG: ICD-10-CM

## 2019-05-29 DIAGNOSIS — C7B.8 SECONDARY MALIGNANT NEUROENDOCRINE TUMOR OF LIVER: ICD-10-CM

## 2019-05-29 DIAGNOSIS — C7B.8 SECONDARY NEUROENDOCRINE TUMOR OF BONE: ICD-10-CM

## 2019-05-29 LAB
ALBUMIN SERPL BCP-MCNC: 3.7 G/DL (ref 3.5–5.2)
ALP SERPL-CCNC: 71 U/L (ref 55–135)
ALT SERPL W/O P-5'-P-CCNC: 20 U/L (ref 10–44)
ANION GAP SERPL CALC-SCNC: 9 MMOL/L (ref 8–16)
AST SERPL-CCNC: 14 U/L (ref 10–40)
BASOPHILS # BLD AUTO: 0.04 K/UL (ref 0–0.2)
BASOPHILS NFR BLD: 1.1 % (ref 0–1.9)
BILIRUB SERPL-MCNC: 0.8 MG/DL (ref 0.1–1)
BUN SERPL-MCNC: 13 MG/DL (ref 8–23)
CALCIUM SERPL-MCNC: 9.1 MG/DL (ref 8.7–10.5)
CHLORIDE SERPL-SCNC: 107 MMOL/L (ref 95–110)
CO2 SERPL-SCNC: 22 MMOL/L (ref 23–29)
CREAT SERPL-MCNC: 1 MG/DL (ref 0.5–1.4)
DIFFERENTIAL METHOD: ABNORMAL
EOSINOPHIL # BLD AUTO: 0.1 K/UL (ref 0–0.5)
EOSINOPHIL NFR BLD: 1.4 % (ref 0–8)
ERYTHROCYTE [DISTWIDTH] IN BLOOD BY AUTOMATED COUNT: 14 % (ref 11.5–14.5)
EST. GFR  (AFRICAN AMERICAN): >60 ML/MIN/1.73 M^2
EST. GFR  (NON AFRICAN AMERICAN): >60 ML/MIN/1.73 M^2
GLUCOSE SERPL-MCNC: 106 MG/DL (ref 70–110)
HCT VFR BLD AUTO: 40 % (ref 40–54)
HGB BLD-MCNC: 13.6 G/DL (ref 14–18)
IMM GRANULOCYTES # BLD AUTO: 0.02 K/UL (ref 0–0.04)
IMM GRANULOCYTES NFR BLD AUTO: 0.6 % (ref 0–0.5)
LYMPHOCYTES # BLD AUTO: 0.7 K/UL (ref 1–4.8)
LYMPHOCYTES NFR BLD: 20.1 % (ref 18–48)
MCH RBC QN AUTO: 29.5 PG (ref 27–31)
MCHC RBC AUTO-ENTMCNC: 34 G/DL (ref 32–36)
MCV RBC AUTO: 87 FL (ref 82–98)
MONOCYTES # BLD AUTO: 0.4 K/UL (ref 0.3–1)
MONOCYTES NFR BLD: 12.6 % (ref 4–15)
NEUTROPHILS # BLD AUTO: 2.2 K/UL (ref 1.8–7.7)
NEUTROPHILS NFR BLD: 64.2 % (ref 38–73)
NRBC BLD-RTO: 0 /100 WBC
PLATELET # BLD AUTO: 189 K/UL (ref 150–350)
PMV BLD AUTO: 9.9 FL (ref 9.2–12.9)
POTASSIUM SERPL-SCNC: 4.3 MMOL/L (ref 3.5–5.1)
PROT SERPL-MCNC: 6.9 G/DL (ref 6–8.4)
RBC # BLD AUTO: 4.61 M/UL (ref 4.6–6.2)
SODIUM SERPL-SCNC: 138 MMOL/L (ref 136–145)
WBC # BLD AUTO: 3.48 K/UL (ref 3.9–12.7)

## 2019-05-29 PROCEDURE — 85025 COMPLETE CBC W/AUTO DIFF WBC: CPT

## 2019-05-29 PROCEDURE — 80053 COMPREHEN METABOLIC PANEL: CPT

## 2019-05-29 PROCEDURE — 36415 COLL VENOUS BLD VENIPUNCTURE: CPT

## 2019-05-29 NOTE — TELEPHONE ENCOUNTER
Patient/wife called inquired about moving next PRRT scheduled on 6/5 to 6/12/19. Pt.doing well with problems, lab results recovered and within parameters. Confirmed with Dr. London, would be OK, Per Rhiney/Radiology Lutathera dose has already be confirmed with provider. Patient notified and understands. Request that wife be contacted tomorrow with details of scheduled. 2 previous attempts made to contact wife- voice mail full.

## 2019-05-30 ENCOUNTER — TELEPHONE (OUTPATIENT)
Dept: NEUROLOGY | Facility: HOSPITAL | Age: 64
End: 2019-05-30

## 2019-05-30 NOTE — TELEPHONE ENCOUNTER
Contacted Hope/Wife to review patient PRRT schedule. Patient able to proceed with treatment on 6/5. Wife now up to date with patient portal. Patient reported that chief symptoms from last treatment was fatigue. All questions addressed.

## 2019-06-03 ENCOUNTER — TELEPHONE (OUTPATIENT)
Dept: NEUROLOGY | Facility: HOSPITAL | Age: 64
End: 2019-06-03

## 2019-06-03 ENCOUNTER — OFFICE VISIT (OUTPATIENT)
Dept: NEUROLOGY | Facility: HOSPITAL | Age: 64
End: 2019-06-03
Attending: SURGERY
Payer: COMMERCIAL

## 2019-06-03 VITALS
DIASTOLIC BLOOD PRESSURE: 71 MMHG | SYSTOLIC BLOOD PRESSURE: 135 MMHG | HEIGHT: 72 IN | HEART RATE: 50 BPM | OXYGEN SATURATION: 98 % | BODY MASS INDEX: 40.04 KG/M2 | WEIGHT: 295.63 LBS

## 2019-06-03 DIAGNOSIS — C7B.02 SECONDARY MALIGNANT CARCINOID TUMOR OF LIVER: ICD-10-CM

## 2019-06-03 DIAGNOSIS — D3A.090 BRONCHIAL CARCINOID TUMORS: Primary | ICD-10-CM

## 2019-06-03 DIAGNOSIS — C7B.00 METASTATIC CARCINOID TUMOR: ICD-10-CM

## 2019-06-03 PROCEDURE — 99213 OFFICE O/P EST LOW 20 MIN: CPT | Performed by: SURGERY

## 2019-06-03 NOTE — PATIENT INSTRUCTIONS
PRRT - Lily 177 Schedule    Wednesday, June 5, 2019        Lily 177 Therapy date:  Tx #____1__     Scheduled __________________    **Arrive at [facility], [address].      **Hydrate well 3 day prior to and after treatment.  You may eat a light breakfast.    _________________________________________________________________    Lab work  4 weeks after treatment:   Due __July 3, 2019__________     Lab work 7 weeks after treatment:   Due ___July 24, 2019_________      SSA injection: (daisy/Lanreotide), always do after PRRT treatment.            ___________________________________________________________________    Please notify us of any change in your insurance as soon as you are aware.

## 2019-06-03 NOTE — PROGRESS NOTES
[facility]:  [facility]  A collaboration between [facility] and [facility]      PATIENT: Elroy Rahman  MRN: [id number]  DATE: 6/3/2019    Subjective:      Chief Complaint: No chief complaint on file.  for 2nd round of PRRT  Overall doing ok    Eating well   No change in flushing,  Diarrhea has improved       Vitals:   Vitals:    06/03/19 1111   BP: 135/71   BP Location: Right arm   Patient Position: Sitting   BP Method: Large (Automatic)   Pulse: (!) 50   SpO2: 98%   Weight: 134.1 kg (295 lb 10.2 oz)   Height: 6' (1.829 m)        Karnofsky Score:     Diagnosis: No diagnosis found.     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    Sleep apnea     Snores        Past Surgical History:  Past Surgical History:   Procedure Laterality Date    IBHPIO-RYAHMV-KT N/A 6/8/2017    Performed by [facility] Provider at [facility] OR 2ND FLR    Bka Left     LEFT UPPER LOBECTOMY-LUNG Left 1/28/2016    Performed by Pan Bell MD at [facility] OR 2ND FLR    REPAIR, HERNIA, UMBILICAL, INCARCERATED, LAPAROSCOPIC N/A 11/27/2018    Performed by [facility] Guidry [facility] at [facility] OR    THORACOTOMY W/LUNG RESECTION Left 1/28/2016    Performed by Pan Bell MD at [facility] OR 2ND FLR       Family History:  Family History   Problem Relation Age of Onset    Cancer Mother         lung    Cancer Sister     Anesthesia problems Neg Hx        Allergies:  Review of patient's allergies indicates:   Allergen Reactions     Epinephrine Other (See Comments)     Carcinoid crisis       Medications:  Current Outpatient Medications   Medication Sig Dispense Refill    capecitabine (XELODA) 500 MG Tab TAKE 4 TABLETS (2,000MG TOTAL) BY MOUTH 2 TIMES DAILY FOR 14 DAYS EVERY 28 DAYS ( 2 WEEKS ON, 2 WEEKS OFF, EVERY 4 WEEKS). 112 tablet 3    ciprofloxacin HCl (CIPRO) 500 MG tablet Take 1 tablet (500 mg total) by mouth every 12 (twelve) hours. 14 tablet 0    lanreotide (SOMATULINE DEPOT) 120 mg/0.5 mL Syrg Inject 120 mg into the skin every 28 days.      ondansetron (ZOFRAN) 4 MG tablet Take 1 tablet (4 mg total) by mouth 2 (two) times daily. 15 tablet 0    ondansetron (ZOFRAN) 4 MG tablet Take 1 tablet (4 mg total) by mouth 2 (two) times daily. 15 tablet 0    oxyCODONE-acetaminophen (PERCOCET) 5-325 mg per tablet Take 1 tablet by mouth every 4 (four) hours as needed for Pain. 25 tablet 0    temozolomide (TEMODAR) 250 MG capsule TAKE 2 CAPSULES (500MG TOTAL) BY MOUTH ONCE DAILY ON DAYS 10-14 OF 28 DAY CYCLE 10 capsule 6     No current facility-administered medications for this visit.        Review of Systems   Constitutional: Negative for activity change, appetite change, chills, diaphoresis, fatigue, fever and unexpected weight change.   HENT: Negative for congestion, dental problem, drooling, ear pain, hearing loss, mouth sores, nosebleeds, sinus pain, sneezing, sore throat and tinnitus.    Eyes: Positive for visual disturbance. Negative for photophobia, pain, redness and itching.   Respiratory: Negative for cough, choking and chest tightness.    Cardiovascular: Negative for chest pain, palpitations and leg swelling.   Gastrointestinal: Negative for abdominal distention, abdominal pain and anal bleeding.   Endocrine: Negative.    Genitourinary: Negative.    Musculoskeletal: Negative.    Skin: Negative.    Allergic/Immunologic: Negative.    Neurological: Negative for tremors, seizures, syncope, speech difficulty, light-headedness,  numbness and headaches.   Hematological: Negative.    Psychiatric/Behavioral: Negative.  Negative for agitation, behavioral problems and confusion.      Objective:      Physical Exam   Constitutional: He is oriented to person, place, and time. He appears well-developed and well-nourished.   HENT:   Head: Normocephalic and atraumatic.   Right Ear: External ear normal.   Left Ear: External ear normal.   Eyes: Pupils are equal, round, and reactive to light. Conjunctivae and EOM are normal.   Cardiovascular: Normal rate and regular rhythm.   Pulmonary/Chest: Effort normal and breath sounds normal.   Abdominal: Soft. Bowel sounds are normal. He exhibits no mass. There is no tenderness. There is no rebound and no guarding. No hernia.   Umbilical scar healed well   Musculoskeletal: Normal range of motion.   Neurological: He is alert and oriented to person, place, and time.   Skin: Skin is warm.   Psychiatric: He has a normal mood and affect. His behavior is normal.      Assessment:       No diagnosis found.    Laboratory Data:   Results for TRISTEN GARCIA (MRN [id number]) as of 6/3/2019 17:10   Ref. Range 4/10/2019 13:59 5/8/2019 09:22 5/29/2019 07:46   WBC Latest Ref Range: 3.90 - 12.70 K/uL  5.74 3.48 (L)   RBC Latest Ref Range: 4.60 - 6.20 M/uL  4.70 4.61   Hemoglobin Latest Ref Range: 14.0 - 18.0 g/dL  13.7 (L) 13.6 (L)   Hematocrit Latest Ref Range: 40.0 - 54.0 %  40.9 40.0   MCV Latest Ref Range: 82 - 98 fL  87 87   MCH Latest Ref Range: 27.0 - 31.0 pg  29.1 29.5   MCHC Latest Ref Range: 32.0 - 36.0 g/dL  33.5 34.0   RDW Latest Ref Range: 11.5 - 14.5 %  13.8 14.0   Platelets Latest Ref Range: 150 - 350 K/uL  148 (L) 189   MPV Latest Ref Range: 9.2 - 12.9 fL  9.9 9.9   Gran% Latest Ref Range: 38.0 - 73.0 %  68.5 64.2   Gran # (ANC) Latest Ref Range: 1.8 - 7.7 K/uL  3.9 2.2   Lymph% Latest Ref Range: 18.0 - 48.0 %  9.2 (L) 20.1   Lymph # Latest Ref Range: 1.0 - 4.8 K/uL  0.5 (L) 0.7 (L)   Mono% Latest Ref Range: 4.0 -  15.0 %  18.8 (H) 12.6   Mono # Latest Ref Range: 0.3 - 1.0 K/uL  1.1 (H) 0.4   Eosinophil% Latest Ref Range: 0.0 - 8.0 %  2.3 1.4   Eos # Latest Ref Range: 0.0 - 0.5 K/uL  0.1 0.1   Basophil% Latest Ref Range: 0.0 - 1.9 %  0.9 1.1   Baso # Latest Ref Range: 0.00 - 0.20 K/uL  0.05 0.04   nRBC Latest Ref Range: 0 /100 WBC  0 0   Differential Method Unknown  Automated Automated   Immature Grans (Abs) Latest Ref Range: 0.00 - 0.04 K/uL  0.02 0.02   Immature Granulocytes Latest Ref Range: 0.0 - 0.5 %  0.3 0.6 (H)   Sodium Latest Ref Range: 136 - 145 mmol/L  136 138   Potassium Latest Ref Range: 3.5 - 5.1 mmol/L  4.1 4.3   Chloride Latest Ref Range: 95 - 110 mmol/L  103 107   CO2 Latest Ref Range: 23 - 29 mmol/L  24 22 (L)   Anion Gap Latest Ref Range: 8 - 16 mmol/L  9 9   BUN, Bld Latest Ref Range: 8 - 23 mg/dL  10 13   Creatinine Latest Ref Range: 0.5 - 1.4 mg/dL  1.0 1.0   eGFR if non African American Latest Ref Range: >60 mL/min/1.73 m^2  >60.0 >60.0   eGFR if African American Latest Ref Range: >60 mL/min/1.73 m^2  >60.0 >60.0   Glucose Latest Ref Range: 70 - 110 mg/dL  157 (H) 106   Calcium Latest Ref Range: 8.7 - 10.5 mg/dL  9.1 9.1   Alkaline Phosphatase Latest Ref Range: 55 - 135 U/L  74 71   PROTEIN TOTAL Latest Ref Range: 6.0 - 8.4 g/dL  7.3 6.9   Albumin Latest Ref Range: 3.5 - 5.2 g/dL  3.8 3.7   BILIRUBIN TOTAL Latest Ref Range: 0.1 - 1.0 mg/dL  0.8 0.8   AST Latest Ref Range: 10 - 40 U/L  13 14   ALT Latest Ref Range: 10 - 44 U/L  14 20   NM THERAPY BY IV ADMINISTRATION  Unknown Rpt         Impression:  7-year-old gentleman with 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  She he underwent chemoembolization of the liver. There is stable ready of the tumor in the liver.  He has undergone Peptide receptor therapy last time and now for 2nd intervention    He is status post umbilical hernia repair.  He has done well.  Incision looks good    He is labs were reviewed no evidence  of any bone marrow abnormalities. He can proceed with peptide receptor therapy tomorrow  Plan:       Overall stable    Labs stable    Can undergo 2nd round PRRT tomorrow                  GRACY Sepulveda MD, FACS   Associate Professor of Surgery, [facility]   Neuroendocrine Surgery, Hepatic/Pancreatic & General Surgery   [address], [address]   [place] [address]   ph. [phone]; [phone]   fax. [phone]

## 2019-06-03 NOTE — TELEPHONE ENCOUNTER
Email correspondence from Princess Barba.    Good Morning All,    Called [employer] spoke to Nikkie (ref# [id number]) and she states that the member has met all deductible and co-insurance for the 2019 calendar year.    Pt is clear to proceed with no OOP needed.    Thank you,      Princess Barba  Senior- Patient Financial Services

## 2019-06-05 ENCOUNTER — HOSPITAL ENCOUNTER (OUTPATIENT)
Dept: RADIOLOGY | Facility: HOSPITAL | Age: 64
Discharge: HOME | End: 2019-06-05
Attending: INTERNAL MEDICINE
Payer: COMMERCIAL

## 2019-06-05 ENCOUNTER — INFUSION (OUTPATIENT)
Dept: INFUSION THERAPY | Facility: HOSPITAL | Age: 64
End: 2019-06-05
Attending: INTERNAL MEDICINE
Payer: COMMERCIAL

## 2019-06-05 VITALS
WEIGHT: 295.63 LBS | HEIGHT: 72 IN | HEART RATE: 53 BPM | DIASTOLIC BLOOD PRESSURE: 77 MMHG | SYSTOLIC BLOOD PRESSURE: 134 MMHG | OXYGEN SATURATION: 97 % | TEMPERATURE: 98 F | RESPIRATION RATE: 18 BRPM | BODY MASS INDEX: 40.04 KG/M2

## 2019-06-05 DIAGNOSIS — C78.7 SECONDARY MALIGNANT NEOPLASM OF LIVER: ICD-10-CM

## 2019-06-05 DIAGNOSIS — D3A.090 BRONCHIAL CARCINOID TUMORS: ICD-10-CM

## 2019-06-05 DIAGNOSIS — C7B.8 SECONDARY NEUROENDOCRINE TUMOR OF BONE: ICD-10-CM

## 2019-06-05 DIAGNOSIS — C7B.8 SECONDARY MALIGNANT NEUROENDOCRINE TUMOR OF LIVER: Primary | ICD-10-CM

## 2019-06-05 DIAGNOSIS — C7A.090 CARCINOID BRONCHIAL ADENOMA OF LEFT LUNG: ICD-10-CM

## 2019-06-05 DIAGNOSIS — C7B.8 SECONDARY MALIGNANT NEUROENDOCRINE TUMOR OF LIVER: ICD-10-CM

## 2019-06-05 DIAGNOSIS — C7B.8 SECONDARY NEUROENDOCRINE TUMOR OF LIVER: ICD-10-CM

## 2019-06-05 PROCEDURE — 63600175 PHARM REV CODE 636 W HCPCS: Performed by: INTERNAL MEDICINE

## 2019-06-05 PROCEDURE — 79101 NUCLEAR RX IV ADMIN: CPT | Mod: 26,,, | Performed by: RADIOLOGY

## 2019-06-05 PROCEDURE — 79101 NM THERAPY BY IV ADMINISTRATION LU177: ICD-10-PCS | Mod: 26,,, | Performed by: RADIOLOGY

## 2019-06-05 PROCEDURE — 96366 THER/PROPH/DIAG IV INF ADDON: CPT

## 2019-06-05 PROCEDURE — 96367 TX/PROPH/DG ADDL SEQ IV INF: CPT

## 2019-06-05 PROCEDURE — 96365 THER/PROPH/DIAG IV INF INIT: CPT | Mod: 59

## 2019-06-05 PROCEDURE — A9513 LUTETIUM LU 177 DOTATAT THER: HCPCS | Mod: TB

## 2019-06-05 PROCEDURE — 25000003 PHARM REV CODE 250: Performed by: INTERNAL MEDICINE

## 2019-06-05 RX ORDER — ARGININE/LYSINE/0.9 % SOD CHL 25-25MG/ML
1000 PLASTIC BAG, INJECTION (ML) INTRAVENOUS
Status: CANCELLED | OUTPATIENT
Start: 2019-07-03

## 2019-06-05 RX ORDER — SODIUM CHLORIDE 9 MG/ML
INJECTION, SOLUTION INTRAVENOUS ONCE
Status: COMPLETED | OUTPATIENT
Start: 2019-06-05 | End: 2019-06-05

## 2019-06-05 RX ORDER — LANREOTIDE ACETATE 120 MG/.5ML
120 INJECTION SUBCUTANEOUS
Status: CANCELLED
Start: 2019-07-03

## 2019-06-05 RX ORDER — LANREOTIDE ACETATE 120 MG/.5ML
120 INJECTION SUBCUTANEOUS
Status: DISCONTINUED | OUTPATIENT
Start: 2019-06-05 | End: 2019-06-05 | Stop reason: HOSPADM

## 2019-06-05 RX ORDER — ARGININE/LYSINE/0.9 % SOD CHL 25-25MG/ML
1000 PLASTIC BAG, INJECTION (ML) INTRAVENOUS
Status: COMPLETED | OUTPATIENT
Start: 2019-06-05 | End: 2019-06-05

## 2019-06-05 RX ORDER — SODIUM CHLORIDE 0.9 % (FLUSH) 0.9 %
10 SYRINGE (ML) INJECTION
Status: CANCELLED | OUTPATIENT
Start: 2019-07-03

## 2019-06-05 RX ORDER — LANREOTIDE ACETATE 120 MG/.5ML
120 INJECTION SUBCUTANEOUS ONCE
Status: CANCELLED | OUTPATIENT
Start: 2019-07-03

## 2019-06-05 RX ORDER — ACETAMINOPHEN 325 MG/1
650 TABLET ORAL
Status: DISCONTINUED | OUTPATIENT
Start: 2019-06-05 | End: 2019-06-05 | Stop reason: HOSPADM

## 2019-06-05 RX ORDER — LANREOTIDE ACETATE 120 MG/.5ML
120 INJECTION SUBCUTANEOUS ONCE
Status: DISCONTINUED | OUTPATIENT
Start: 2019-06-05 | End: 2019-06-05 | Stop reason: HOSPADM

## 2019-06-05 RX ORDER — SODIUM CHLORIDE 9 MG/ML
INJECTION, SOLUTION INTRAVENOUS ONCE
Status: CANCELLED | OUTPATIENT
Start: 2019-07-03

## 2019-06-05 RX ORDER — SODIUM CHLORIDE 0.9 % (FLUSH) 0.9 %
10 SYRINGE (ML) INJECTION
Status: DISCONTINUED | OUTPATIENT
Start: 2019-06-05 | End: 2019-06-05 | Stop reason: HOSPADM

## 2019-06-05 RX ORDER — ACETAMINOPHEN 325 MG/1
650 TABLET ORAL
Status: CANCELLED | OUTPATIENT
Start: 2019-07-03

## 2019-06-05 RX ADMIN — DEXAMETHASONE SODIUM PHOSPHATE: 4 INJECTION, SOLUTION INTRAMUSCULAR; INTRAVENOUS at 08:06

## 2019-06-05 RX ADMIN — Medication 1000 ML: at 08:06

## 2019-06-05 RX ADMIN — SODIUM CHLORIDE: 0.9 INJECTION, SOLUTION INTRAVENOUS at 07:06

## 2019-06-05 NOTE — NURSING
1230- Discharge instructions explained,verbalizes understanding, has no further questions. Discharged off unit in good condition.

## 2019-06-05 NOTE — PATIENT INSTRUCTIONS
Following handouts given regarding manuel-177  :    Carcinoid syndrome    Recommendations for the patient to be treated with [id number]-octreotate  (LUTATHERA)Following handouts given regarding manuel-177  :    Carcinoid syndrome    Recommendations for the patient to be treated with 455Fl-[id number]-octreotate  (LUTATHERA)

## 2019-06-06 ENCOUNTER — INFUSION (OUTPATIENT)
Dept: INFUSION THERAPY | Facility: HOSPITAL | Age: 64
End: 2019-06-06
Attending: INTERNAL MEDICINE
Payer: COMMERCIAL

## 2019-06-06 DIAGNOSIS — C7A.090 CARCINOID BRONCHIAL ADENOMA OF LEFT LUNG: ICD-10-CM

## 2019-06-06 DIAGNOSIS — C78.7 SECONDARY MALIGNANT NEOPLASM OF LIVER: ICD-10-CM

## 2019-06-06 DIAGNOSIS — C7B.8 SECONDARY NEUROENDOCRINE TUMOR OF BONE: ICD-10-CM

## 2019-06-06 DIAGNOSIS — D3A.090 BRONCHIAL CARCINOID TUMORS: ICD-10-CM

## 2019-06-06 DIAGNOSIS — C7B.8 SECONDARY NEUROENDOCRINE TUMOR OF LIVER: ICD-10-CM

## 2019-06-06 DIAGNOSIS — C7B.8 SECONDARY MALIGNANT NEUROENDOCRINE TUMOR OF LIVER: Primary | ICD-10-CM

## 2019-06-06 PROCEDURE — 96372 THER/PROPH/DIAG INJ SC/IM: CPT

## 2019-06-06 PROCEDURE — 63600175 PHARM REV CODE 636 W HCPCS: Mod: JG | Performed by: INTERNAL MEDICINE

## 2019-06-06 RX ORDER — LANREOTIDE ACETATE 120 MG/.5ML
120 INJECTION SUBCUTANEOUS ONCE
Status: CANCELLED | OUTPATIENT
Start: 2019-07-03

## 2019-06-06 RX ORDER — ARGININE 100 %
1000 CRYSTALS MISCELLANEOUS
Status: CANCELLED | OUTPATIENT
Start: 2019-07-03

## 2019-06-06 RX ORDER — LANREOTIDE ACETATE 120 MG/.5ML
120 INJECTION SUBCUTANEOUS
Status: CANCELLED
Start: 2019-07-03

## 2019-06-06 RX ORDER — SODIUM CHLORIDE 0.9 % (FLUSH) 0.9 %
10 SYRINGE (ML) INJECTION
Status: CANCELLED | OUTPATIENT
Start: 2019-07-03

## 2019-06-06 RX ORDER — SODIUM CHLORIDE 9 MG/ML
INJECTION, SOLUTION INTRAVENOUS ONCE
Status: CANCELLED | OUTPATIENT
Start: 2019-07-03

## 2019-06-06 RX ORDER — LANREOTIDE ACETATE 120 MG/.5ML
120 INJECTION SUBCUTANEOUS ONCE
Status: COMPLETED | OUTPATIENT
Start: 2019-06-06 | End: 2019-06-06

## 2019-06-06 RX ORDER — ACETAMINOPHEN 325 MG/1
650 TABLET ORAL
Status: CANCELLED | OUTPATIENT
Start: 2019-07-03

## 2019-06-06 RX ADMIN — LANREOTIDE ACETATE 120 MG: 120 INJECTION SUBCUTANEOUS at 08:06

## 2019-07-03 ENCOUNTER — LAB VISIT (OUTPATIENT)
Dept: LAB | Facility: HOSPITAL | Age: 64
End: 2019-07-03
Attending: INTERNAL MEDICINE
Payer: COMMERCIAL

## 2019-07-03 ENCOUNTER — TELEPHONE (OUTPATIENT)
Dept: NEUROLOGY | Facility: HOSPITAL | Age: 64
End: 2019-07-03

## 2019-07-03 DIAGNOSIS — C7B.8 SECONDARY NEUROENDOCRINE TUMOR OF LIVER: ICD-10-CM

## 2019-07-03 LAB
ALBUMIN SERPL BCP-MCNC: 4 G/DL (ref 3.5–5.2)
ALP SERPL-CCNC: 76 U/L (ref 55–135)
ALT SERPL W/O P-5'-P-CCNC: 27 U/L (ref 10–44)
ANION GAP SERPL CALC-SCNC: 8 MMOL/L (ref 8–16)
AST SERPL-CCNC: 17 U/L (ref 10–40)
BILIRUB SERPL-MCNC: 0.4 MG/DL (ref 0.1–1)
BUN SERPL-MCNC: 16 MG/DL (ref 8–23)
CALCIUM SERPL-MCNC: 9.2 MG/DL (ref 8.7–10.5)
CHLORIDE SERPL-SCNC: 105 MMOL/L (ref 95–110)
CO2 SERPL-SCNC: 26 MMOL/L (ref 23–29)
CREAT SERPL-MCNC: 1 MG/DL (ref 0.5–1.4)
ERYTHROCYTE [DISTWIDTH] IN BLOOD BY AUTOMATED COUNT: 13.9 % (ref 11.5–14.5)
EST. GFR  (AFRICAN AMERICAN): >60 ML/MIN/1.73 M^2
EST. GFR  (NON AFRICAN AMERICAN): >60 ML/MIN/1.73 M^2
GLUCOSE SERPL-MCNC: 122 MG/DL (ref 70–110)
HCT VFR BLD AUTO: 42 % (ref 40–54)
HGB BLD-MCNC: 14 G/DL (ref 14–18)
IMM GRANULOCYTES # BLD AUTO: 0.01 K/UL (ref 0–0.04)
MCH RBC QN AUTO: 29.6 PG (ref 27–31)
MCHC RBC AUTO-ENTMCNC: 33.3 G/DL (ref 32–36)
MCV RBC AUTO: 89 FL (ref 82–98)
NEUTROPHILS # BLD AUTO: 2.5 K/UL (ref 1.8–7.7)
PLATELET # BLD AUTO: 155 K/UL (ref 150–350)
PMV BLD AUTO: 10 FL (ref 9.2–12.9)
POTASSIUM SERPL-SCNC: 4.3 MMOL/L (ref 3.5–5.1)
PROT SERPL-MCNC: 7.1 G/DL (ref 6–8.4)
RBC # BLD AUTO: 4.73 M/UL (ref 4.6–6.2)
SODIUM SERPL-SCNC: 139 MMOL/L (ref 136–145)
WBC # BLD AUTO: 3.51 K/UL (ref 3.9–12.7)

## 2019-07-03 PROCEDURE — 36415 COLL VENOUS BLD VENIPUNCTURE: CPT

## 2019-07-03 PROCEDURE — 85027 COMPLETE CBC AUTOMATED: CPT

## 2019-07-03 PROCEDURE — 80053 COMPREHEN METABOLIC PANEL: CPT

## 2019-07-03 NOTE — TELEPHONE ENCOUNTER
----- Message from Ismael Boucher sent at 7/3/2019 10:14 AM CDT -----  Contact: Nolvia ([facility] )  The doctor wants to know what type of chemo the pt is receiving. Please contact to assist.        Contact:: [phone]

## 2019-07-08 ENCOUNTER — INFUSION (OUTPATIENT)
Dept: INFUSION THERAPY | Facility: HOSPITAL | Age: 64
End: 2019-07-08
Attending: INTERNAL MEDICINE
Payer: COMMERCIAL

## 2019-07-08 VITALS
RESPIRATION RATE: 18 BRPM | HEIGHT: 72 IN | TEMPERATURE: 99 F | SYSTOLIC BLOOD PRESSURE: 132 MMHG | WEIGHT: 295.63 LBS | DIASTOLIC BLOOD PRESSURE: 83 MMHG | BODY MASS INDEX: 40.04 KG/M2 | HEART RATE: 59 BPM

## 2019-07-08 DIAGNOSIS — D3A.090 BRONCHIAL CARCINOID TUMORS: Primary | ICD-10-CM

## 2019-07-08 DIAGNOSIS — C7B.8 SECONDARY NEUROENDOCRINE TUMOR OF LIVER: ICD-10-CM

## 2019-07-08 PROCEDURE — 96372 THER/PROPH/DIAG INJ SC/IM: CPT

## 2019-07-08 PROCEDURE — 63600175 PHARM REV CODE 636 W HCPCS: Mod: JG | Performed by: NURSE PRACTITIONER

## 2019-07-08 RX ORDER — LANREOTIDE ACETATE 120 MG/.5ML
120 INJECTION SUBCUTANEOUS
Status: COMPLETED | OUTPATIENT
Start: 2019-07-08 | End: 2019-07-08

## 2019-07-08 RX ORDER — LANREOTIDE ACETATE 120 MG/.5ML
120 INJECTION SUBCUTANEOUS
Status: CANCELLED | OUTPATIENT
Start: 2019-07-08

## 2019-07-08 RX ADMIN — LANREOTIDE ACETATE 120 MG: 120 INJECTION SUBCUTANEOUS at 08:07

## 2019-07-08 NOTE — PLAN OF CARE
Problem: Adult Inpatient Plan of Care  Goal: Plan of Care Review  Outcome: Ongoing (interventions implemented as appropriate)  zometa was not given 2 nurse practioners were not comfortable ordering it pt. Has not had dental clearence. See's dr meija at the end of month.Will discuss with bety.

## 2019-07-24 ENCOUNTER — LAB VISIT (OUTPATIENT)
Dept: LAB | Facility: HOSPITAL | Age: 64
End: 2019-07-24
Attending: INTERNAL MEDICINE
Payer: COMMERCIAL

## 2019-07-24 DIAGNOSIS — C7B.8 SECONDARY NEUROENDOCRINE TUMOR OF LIVER: ICD-10-CM

## 2019-07-24 LAB
ALBUMIN SERPL BCP-MCNC: 3.7 G/DL (ref 3.5–5.2)
ALP SERPL-CCNC: 77 U/L (ref 55–135)
ALT SERPL W/O P-5'-P-CCNC: 22 U/L (ref 10–44)
ANION GAP SERPL CALC-SCNC: 9 MMOL/L (ref 8–16)
AST SERPL-CCNC: 16 U/L (ref 10–40)
BILIRUB SERPL-MCNC: 0.5 MG/DL (ref 0.1–1)
BUN SERPL-MCNC: 14 MG/DL (ref 8–23)
CALCIUM SERPL-MCNC: 9 MG/DL (ref 8.7–10.5)
CHLORIDE SERPL-SCNC: 104 MMOL/L (ref 95–110)
CO2 SERPL-SCNC: 24 MMOL/L (ref 23–29)
CREAT SERPL-MCNC: 1 MG/DL (ref 0.5–1.4)
ERYTHROCYTE [DISTWIDTH] IN BLOOD BY AUTOMATED COUNT: 13.6 % (ref 11.5–14.5)
EST. GFR  (AFRICAN AMERICAN): >60 ML/MIN/1.73 M^2
EST. GFR  (NON AFRICAN AMERICAN): >60 ML/MIN/1.73 M^2
GLUCOSE SERPL-MCNC: 138 MG/DL (ref 70–110)
HCT VFR BLD AUTO: 40 % (ref 40–54)
HGB BLD-MCNC: 13.6 G/DL (ref 14–18)
IMM GRANULOCYTES # BLD AUTO: 0.03 K/UL (ref 0–0.04)
MCH RBC QN AUTO: 29.5 PG (ref 27–31)
MCHC RBC AUTO-ENTMCNC: 34 G/DL (ref 32–36)
MCV RBC AUTO: 87 FL (ref 82–98)
NEUTROPHILS # BLD AUTO: 2.9 K/UL (ref 1.8–7.7)
PLATELET # BLD AUTO: 187 K/UL (ref 150–350)
PMV BLD AUTO: 10 FL (ref 9.2–12.9)
POTASSIUM SERPL-SCNC: 4 MMOL/L (ref 3.5–5.1)
PROT SERPL-MCNC: 6.8 G/DL (ref 6–8.4)
RBC # BLD AUTO: 4.61 M/UL (ref 4.6–6.2)
SODIUM SERPL-SCNC: 137 MMOL/L (ref 136–145)
WBC # BLD AUTO: 4.33 K/UL (ref 3.9–12.7)

## 2019-07-24 PROCEDURE — 80053 COMPREHEN METABOLIC PANEL: CPT

## 2019-07-24 PROCEDURE — 36415 COLL VENOUS BLD VENIPUNCTURE: CPT

## 2019-07-24 PROCEDURE — 85027 COMPLETE CBC AUTOMATED: CPT

## 2019-07-29 ENCOUNTER — OFFICE VISIT (OUTPATIENT)
Dept: HEMATOLOGY/ONCOLOGY | Facility: CLINIC | Age: 64
End: 2019-07-29
Payer: COMMERCIAL

## 2019-07-29 VITALS
DIASTOLIC BLOOD PRESSURE: 76 MMHG | BODY MASS INDEX: 40.79 KG/M2 | SYSTOLIC BLOOD PRESSURE: 111 MMHG | HEIGHT: 72 IN | WEIGHT: 301.13 LBS | TEMPERATURE: 98 F | RESPIRATION RATE: 16 BRPM | HEART RATE: 65 BPM | OXYGEN SATURATION: 96 %

## 2019-07-29 DIAGNOSIS — C7B.8 SECONDARY NEUROENDOCRINE TUMOR OF BONE: ICD-10-CM

## 2019-07-29 DIAGNOSIS — D3A.090 BRONCHIAL CARCINOID TUMORS: Primary | ICD-10-CM

## 2019-07-29 DIAGNOSIS — C7B.8 SECONDARY NEUROENDOCRINE TUMOR OF LIVER: ICD-10-CM

## 2019-07-29 PROCEDURE — 99999 PR PBB SHADOW E&M-EST. PATIENT-LVL III: ICD-10-PCS | Mod: PBBFAC,,, | Performed by: INTERNAL MEDICINE

## 2019-07-29 PROCEDURE — 99214 PR OFFICE/OUTPT VISIT, EST, LEVL IV, 30-39 MIN: ICD-10-PCS | Mod: S$GLB,,, | Performed by: INTERNAL MEDICINE

## 2019-07-29 PROCEDURE — 99214 OFFICE O/P EST MOD 30 MIN: CPT | Mod: S$GLB,,, | Performed by: INTERNAL MEDICINE

## 2019-07-29 PROCEDURE — 3008F BODY MASS INDEX DOCD: CPT | Mod: CPTII,S$GLB,, | Performed by: INTERNAL MEDICINE

## 2019-07-29 PROCEDURE — 99999 PR PBB SHADOW E&M-EST. PATIENT-LVL III: CPT | Mod: PBBFAC,,, | Performed by: INTERNAL MEDICINE

## 2019-07-29 PROCEDURE — 3008F PR BODY MASS INDEX (BMI) DOCUMENTED: ICD-10-PCS | Mod: CPTII,S$GLB,, | Performed by: INTERNAL MEDICINE

## 2019-07-29 NOTE — PROGRESS NOTES
PATIENT: Elroy Rahman  MRN: [id number]  DATE: 7/29/2019      Diagnosis:   1. Bronchial carcinoid tumors    2. Secondary neuroendocrine tumor of liver    3. Secondary neuroendocrine tumor of bone        Chief Complaint: Carcinoid Tumor (bronchial)      Oncologic History:      Oncologic History Bronchial carcinoid, left, diagnosed 1/2016   Metastatic disease to liver and bone 5/2017     Oncologic Treatment Left upper lobectomy 1/2016  CAPTEM 7/2017 - 2/2019 (discontinued due to progression)  Lanreotide 7/2017   Zoledronic acid 7/2017   TACE 10/2018  TACE 3/2019  Lily-177 4/10/19, 6/5/19    Pathology 1/2016: Typical carcinoid tumor, well-differentiated T2a, N0, M0 Ki-67 <1%  6/2017: Liver biopsy, well-differentiated, Ki-67 25%           Subjective:    Interval History: Mr. Rahman is a 63 y.o. male who is seen in follow-up for a bronchial carcinoid tumor.  Since I had seen him last he started on treatment with PRRT.  He has had 2 cycles of treatment thus far.  He states that over the last 3 weeks he developed some vision loss in his left eye.  He saw Ophthalmology who stated he had an orbital mass and wanted to send him for an MRI.  He declined until he saw me.  He states his vision is relatively unchanged but does have difficulty seeing things in the periphery of the left.  His diarrhea has improved.  He has no other new complaints.    His history dates to 1/2016 when he underwent a left upper lobectomy with Dr. Bell for a typical carcinoid tumor.  His pathological staging was T2a, N0, M0 with Ki-67 less than 1% and mitotic count 0 per 10 high-powered fields.  He done well postoperatively, however, in 5/2017 he was undergoing routine surveillance and a chest CT in picked up evidence of progression of disease in the liver.  Dedicated abdominal CT was performed showing multiple liver lesions.  In 6/2017 he underwent a CT-guided liver biopsy which was positive for metastatic neuroendocrine tumor which was  well-differentiated with no overt nuclear atypia, however, the Ki-67 was 25%.  He underwent gallium-68 PET/CT and was found to have widespread disease involving the bone, liver and also mesentery.  He was started on CAPTEM in 7/2017 and also Lanreotide and zoledronic acid.  Scans in 10/2017 had shown stability of disease.  Imaging in 1/2018 had shown stability of disease.  Scans in 4/2018 had shown stability of disease.  Scans in 07/2018 had continued to showed mild growth.  In 10/2018 he underwent TACE.  CAPTEM was discontinued in 02/2019 due to progression.  He underwent TACE in 03/2019.  He was started on treatment with PRRT using Lily-177 on 04/10/2019.      Past Medical History:   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    Sleep apnea     Snores        Past Surgical HIstory:   Past Surgical History:   Procedure Laterality Date    [id number] N/A 6/8/2017    [facility] 2ND FLR    Bka Left     LEFT UPPER LOBECTOMY-LUNG Left 1/28/2016    [facility] 2ND FLR    REPAIR, HERNIA, UMBILICAL, INCARCERATED, LAPAROSCOPIC N/A 11/27/2018    [facility]    THORACOTOMY W/LUNG RESECTION Left 1/28/2016    Performed by Pan [facility] OR 2ND FLR       Family History:   Family History   Problem Relation Age of Onset    Cancer Mother         lung    Cancer Sister     Anesthesia problems Neg Hx        Social History:  reports that he has never smoked. He has never used smokeless tobacco. He reports that he drinks about 0.6 oz of  alcohol per week. He reports that he does not use drugs.    Allergies:  Review of patient's allergies indicates:  No Known Allergies    Medications:  Current Outpatient Medications   Medication Sig Dispense Refill    lanreotide (SOMATULINE DEPOT) 120 mg/0.5 mL Syrg Inject 120 mg into the skin every 28 days.      ondansetron (ZOFRAN) 4 MG tablet Take 1 tablet (4 mg total) by mouth 2 (two) times daily. 15 tablet 0    ondansetron (ZOFRAN) 4 MG tablet Take 1 tablet (4 mg total) by mouth 2 (two) times daily. 15 tablet 0     No current facility-administered medications for this visit.        Review of Systems   Constitutional: Negative for appetite change, chills, fatigue, fever and unexpected weight change.   HENT: Negative for dental problem, sinus pressure and sneezing.    Eyes: Positive for visual disturbance.   Respiratory: Negative for cough, choking, chest tightness and shortness of breath.    Cardiovascular: Negative for chest pain and leg swelling.   Gastrointestinal: Negative for abdominal pain, blood in stool, constipation, diarrhea and nausea.   Genitourinary: Negative for difficulty urinating, dysuria and frequency.   Musculoskeletal: Negative for arthralgias and back pain.   Skin: Negative for rash and wound.   Neurological: Negative for dizziness, light-headedness and headaches.   Hematological: Negative for adenopathy. Does not bruise/bleed easily.   Psychiatric/Behavioral: Negative for sleep disturbance. The patient is not nervous/anxious.        ECOG Performance Status:   ECOG SCORE    0 - Fully active-able to carry on all pre-disease performance without restriction         Objective:      Vitals:   Vitals:    07/29/19 1013   Weight: (!) 136.6 kg (301 lb 2.4 oz)   Height: 6' (1.829 m)     BMI: Body mass index is 40.84 kg/m².    Physical Exam   Constitutional: He is oriented to person, place, and time. He appears well-developed and well-nourished.   HENT:   Head: Normocephalic and atraumatic.   Eyes:  Pupils are equal, round, and reactive to light.   Neck: Normal range of motion. Neck supple.   Cardiovascular: Normal rate and regular rhythm.   Pulmonary/Chest: Effort normal and breath sounds normal. No respiratory distress.   Abdominal: Soft. He exhibits no distension. There is no tenderness.   Musculoskeletal: He exhibits no edema or tenderness.   Left lower extremity prosthesis   Lymphadenopathy:     He has no cervical adenopathy.   Neurological: He is alert and oriented to person, place, and time. No cranial nerve deficit.   Skin: Skin is warm and dry.   Psychiatric: He has a normal mood and affect. His behavior is normal.       Laboratory Data:  Lab Visit on 07/24/2019   Component Date Value Ref Range Status    WBC 07/24/2019 4.33  3.90 - 12.70 K/uL Final    RBC 07/24/2019 4.61  4.60 - 6.20 M/uL Final    Hemoglobin 07/24/2019 13.6* 14.0 - 18.0 g/dL Final    Hematocrit 07/24/2019 40.0  40.0 - 54.0 % Final    Mean Corpuscular Volume 07/24/2019 87  82 - 98 fL Final    Mean Corpuscular Hemoglobin 07/24/2019 29.5  27.0 - 31.0 pg Final    Mean Corpuscular Hemoglobin Conc 07/24/2019 34.0  32.0 - 36.0 g/dL Final    RDW 07/24/2019 13.6  11.5 - 14.5 % Final    Platelets 07/24/2019 187  150 - 350 K/uL Final    MPV 07/24/2019 10.0  9.2 - 12.9 fL Final    Gran # (ANC) 07/24/2019 2.9  1.8 - 7.7 K/uL Final    Comment: The ANC is based on a white cell differential from an   automated cell counter. It has not been microscopically   reviewed for the presence of abnormal cells. Clinical   correlation is required.      Immature Grans (Abs) 07/24/2019 0.03  0.00 - 0.04 K/uL Final    Comment: Mild elevation in immature granulocytes is non specific and   can be seen in a variety of conditions including stress response,   acute inflammation, trauma and pregnancy. Correlation with other   laboratory and clinical findings is essential.      Sodium 07/24/2019 137  136 - 145 mmol/L Final    Potassium 07/24/2019 4.0  3.5 -  5.1 mmol/L Final    Chloride 07/24/2019 104  95 - 110 mmol/L Final    CO2 07/24/2019 24  23 - 29 mmol/L Final    Glucose 07/24/2019 138* 70 - 110 mg/dL Final    BUN, Bld 07/24/2019 14  8 - 23 mg/dL Final    Creatinine 07/24/2019 1.0  0.5 - 1.4 mg/dL Final    Calcium 07/24/2019 9.0  8.7 - 10.5 mg/dL Final    Total Protein 07/24/2019 6.8  6.0 - 8.4 g/dL Final    Albumin 07/24/2019 3.7  3.5 - 5.2 g/dL Final    Total Bilirubin 07/24/2019 0.5  0.1 - 1.0 mg/dL Final    Comment: For infants and newborns, interpretation of results should be based  on gestational age, weight and in agreement with clinical  observations.  Premature Infant recommended reference ranges:  Up to 24 hours.............<8.0 mg/dL  Up to 48 hours............<12.0 mg/dL  3-5 days..................<15.0 mg/dL  6-29 days.................<15.0 mg/dL      Alkaline Phosphatase 07/24/2019 77  55 - 135 U/L Final    AST 07/24/2019 16  10 - 40 U/L Final    ALT 07/24/2019 22  10 - 44 U/L Final    Anion Gap 07/24/2019 9  8 - 16 mmol/L Final    eGFR if African American 07/24/2019 >60.0  >60 mL/min/1.73 m^2 Final    eGFR if non African American 07/24/2019 >60.0  >60 mL/min/1.73 m^2 Final    Comment: Calculation used to obtain the estimated glomerular filtration  rate (eGFR) is the CKD-EPI equation.        Supplemental Diagnosis  Chromogranin Staining:  Intensity: Moderate to strong. Positive cells: 100 (%)  Synaptophysin Staining:  Intensity: Strong. Positive cells: 100 (%)  CD31: 40 vessels per 1 HPF  Factor VIII: 27 vessels per 1 HPF  Ki67: 1 % tumor cells staining  Immunostains performed with appropriate positive and negative controls.  FINAL PATHOLOGIC DIAGNOSIS  1. Lymph node, level XI, excisional biopsy:  Fragments of benign lymph node with anthracosis and sinus histiocytosis (0/1).  2. Lung, lingular staple line, biopsy:  Lung, negative for neoplasm.  3. Lymph node, level X, excisional biopsy:  1 benign lymph node with anthracotic pigment and  sinus histiocytosis (0/1).  4. Lymph node, level XII, excisional biopsy:  2 benign lymph nodes with sinus histiocytosis and anthracotic pigment (0/2).  5. Lung, left upper lobe, lobectomy:  Typical carcinoid tumor (well differentiated neuroendocrine tumor), 4 cm in greatest dimension.  Bronchial and vascular margins are negative for neoplasm.  Uninvolved lung adjacent to the tumor shows emphysematous changes and fibrosis, however remaining lung  appears unremarkable.  Additional immunohistochemical stains for ancillary studies (including synaptophysin, chromogranin and Ki-67)  will be completed and results will be reported in a supplemental report.  See synoptic report in comment section for further details.  6. Lymph node, level VII, excisional biopsy:  2 benign lymph nodes with anthracotic pigment and sinus histiocytosis (0/2).  Comment: Synoptic Report  Specimen: Left upper lobe of lung  Procedure: Lobectomy  Specimen laterality: Left  Tumor size:  Greatest dimension: 4 cm  Tumor focality: Single focus  Histologic type: Typical carcinoid tumor  Visceral pleural invasion: Not identified  Tumor extension: Not identified  Margins: Bronchial and vascular margins are uninvolved by tumor.  Distance of tumor from closest margin: 1 cm from the bronchial surgical margin.  Lymphovascular invasion: Not identified  Ancillary studies:  Immunohistochemical stains for neuroendocrine markers and Ki-67 will be completed and results will follow in a  supplemental report.  Note: The tumor consists of a proliferation of cells in nests with lewis formation. The tumor cells have salt and  pepper chromatin pattern with abundant cytoplasm. There is no significant nuclear atypia. No evidence of  necrosis. Mitotic count is 0 mitoses in 10 high power fields.    Imaging:     MRI 02/04/2019  COMPARISON:  MRI abdomen 07/23/2018.    FINDINGS:  Liver: Hepatic parenchyma shows diffuse signal dropout compatible with hepatic steatosis.  Interval  increase in size of multiple enhancing T2 hyperintense lesions compatible with known metastatic disease.    Index lesions as follows:    Hepatic segment III lesion measures 5.5 cm (series 13, image 41), previously 4.1 cm.    Hepatic segment VIII lesion measures 4.8 cm (series 13, image 44), previously 4.0 cm.    Hepatic and portal veins are patent.    Biliary: Gallbladder is unremarkable.  No intrahepatic or extrahepatic biliary dilatation.    Pancreas: Unremarkable.  No pancreatic ductal dilatation.    Spleen: Normal size.  No focal lesions.    Adrenal glands: Right adrenal nodule demonstrates signal dropout compatible with adenoma.  Left adrenal gland is unremarkable.    Kidneys: No renal masses.  No hydronephrosis.    Miscellaneous: Visualized bowel loops are unremarkable.  No evidence for lymphadenopathy.  Multiple osseous lesions again noted, stable to mildly more pronounced when compared to prior study.      Impression       Findings concerning for progression of disease with interval increase in size of multiple enhancing hepatic lesions.  Additionally, the osseous metastases appear stable to more pronounced when compared to prior study.    RECIST SUMMARY:    Date of prior examination for comparison: MRI abdomen 07/23/2018.    Lesion 1: Hepatic segment III lesion measures 5.5 cm (series 13, image 41), previously 4.1 cm.    Lesion 2: Hepatic segment VIII lesion measures 4.8 cm (series 13, image 44), previously 4.0 cm.                  Assessment:       1. Bronchial carcinoid tumors    2. Secondary neuroendocrine tumor of liver    3. Secondary neuroendocrine tumor of bone           Plan:     Mr. Rahman is tolerating treatment with PRRT well.  Labs been reviewed and appropriate to continue on with his next cycle.  I have encouraged him to follow back up with Dr. 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  I will plan to see him back in another 8 weeks for his 4th and final cycle  of PRRT.  All questions were answered and he is agreeable with this plan.    Jesus London DO, [facility]  Hematology & Oncology  [facility] [place] [address]  ph. [phone]  Fax. [phone]    25 minutes were spent in coordination of patient's care, record review and counseling.  More than 50% of the time was face-to-face.

## 2019-07-29 NOTE — LETTER
July 29, 2019        Jesus Mendoza MD  [address] [place] [address] [address]vd  [address]  [place] [id number]             [place] [facility] H[facility]ology [facility]  [address]  [place] [address]  Phone: [phone]   Patient: Elroy Rahman   MR Number: [id number]   YOB: 1955   Date of Visit: 7/29/2019       Dear Dr. Mendoza:    Thank you for referring Elroy Rahman to me for evaluation. Attached you will find relevant portions of my assessment and plan of care.    If you have questions, please do not hesitate to call me. I look forward to following Elroy Rahamn along with you.    Sincerely,      Jesus London, DO, FACP            CC  No Recipients    Enclosure

## 2019-07-30 ENCOUNTER — PATIENT MESSAGE (OUTPATIENT)
Dept: HEMATOLOGY/ONCOLOGY | Facility: CLINIC | Age: 64
End: 2019-07-30

## 2019-07-30 ENCOUNTER — TELEPHONE (OUTPATIENT)
Dept: NEUROLOGY | Facility: HOSPITAL | Age: 64
End: 2019-07-30

## 2019-07-30 DIAGNOSIS — D3A.090 BRONCHIAL CARCINOID TUMORS: ICD-10-CM

## 2019-07-30 DIAGNOSIS — C7B.8 NEUROENDOCRINE CARCINOMA METASTATIC TO MULTIPLE SITES: ICD-10-CM

## 2019-07-30 DIAGNOSIS — C7A.8 NEUROENDOCRINE CARCINOMA METASTATIC TO MULTIPLE SITES: ICD-10-CM

## 2019-07-30 DIAGNOSIS — C7B.8 SECONDARY NEUROENDOCRINE TUMOR OF LIVER: Primary | ICD-10-CM

## 2019-07-30 NOTE — TELEPHONE ENCOUNTER
Notified Dr. London of message.  Dr. London called opthomologist's office and asked for physician on call to speak to patient.  Will follow up with patient at the end of the day.

## 2019-07-30 NOTE — TELEPHONE ENCOUNTER
Email Correspondence from Princess Barba 07/29/19.    MRN [id number] Elroy Josiah- Called [employer] spoke to Nikkie (ref# [id number]) and she states that the member has met all deductible and co-insurance for the 2019 calendar year. Pt is clear to proceed with no OOP needed.

## 2019-07-30 NOTE — TELEPHONE ENCOUNTER
Notified by nurse that patient called and stated that he has lost vision in his left eye.  I have called Dr. Mendoza who is his ophthalmologist but he is currently out of town.  I have discussed with his nurse who is going to contact the patient and also discuss with Dr. Mendoza's covering partner and will see if he needs to be seen today or tomorrow.  For now, cancel PRRT.

## 2019-07-30 NOTE — TELEPHONE ENCOUNTER
Received call from wife, concern about changes in schedule. She reports that Dr. Mendoza's office gave appointment for next Wed to see Dr. Mendoza and MRI on bases patient was receiving PRRT 7/31.     Advised wife to contact Dr. Mendoza office that PRRT is currently cancelled until further notice, and that MRI can be done ASAP as previously recommended per Dr. London.    Wife to call back with final disposition.

## 2019-07-31 ENCOUNTER — TELEPHONE (OUTPATIENT)
Dept: NEUROLOGY | Facility: HOSPITAL | Age: 64
End: 2019-07-31

## 2019-07-31 DIAGNOSIS — H54.62 VISION LOSS OF LEFT EYE: ICD-10-CM

## 2019-07-31 DIAGNOSIS — C7B.8 SECONDARY NEUROENDOCRINE TUMOR OF BONE: ICD-10-CM

## 2019-07-31 DIAGNOSIS — C7A.8 NEUROENDOCRINE CARCINOMA METASTATIC TO MULTIPLE SITES: ICD-10-CM

## 2019-07-31 DIAGNOSIS — C7A.8 NEUROENDOCRINE CANCER: Primary | ICD-10-CM

## 2019-07-31 DIAGNOSIS — D3A.090 BRONCHIAL CARCINOID TUMORS: Primary | ICD-10-CM

## 2019-07-31 DIAGNOSIS — C7B.8 NEUROENDOCRINE CARCINOMA METASTATIC TO MULTIPLE SITES: ICD-10-CM

## 2019-07-31 RX ORDER — PREDNISONE 50 MG/1
100 TABLET ORAL DAILY
Qty: 60 TABLET | Refills: 0 | Status: SHIPPED | OUTPATIENT
Start: 2019-07-31 | End: 2019-08-26 | Stop reason: SDUPTHER

## 2019-07-31 RX ORDER — PREDNISONE 20 MG/1
40 TABLET ORAL DAILY
Qty: 60 TABLET | Refills: 0 | Status: SHIPPED | OUTPATIENT
Start: 2019-07-31 | End: 2019-08-26 | Stop reason: SDUPTHER

## 2019-07-31 NOTE — PROGRESS NOTES
Spoke to Dr. Mendoza today concerning patients vision.  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  I do get concerning about potential complications of Lily-177 resulting in an inflammatory response causing these symptoms.  I will start patient on systemic steroids and he is going to give him topical eye drops.  We will get an MRI of orbit and also repeat Ga68 PET.

## 2019-07-31 NOTE — TELEPHONE ENCOUNTER
Returned patients call, spoke to Monica. Hope stated that they were called on yesterday and everything was taken care of. Patient has no further questions at this time.

## 2019-07-31 NOTE — TELEPHONE ENCOUNTER
----- Message from Giovanna Castellanos sent at 7/30/2019  9:45 AM CDT -----  Contact: [phone]-self  Pt requesting a callback.

## 2019-08-01 ENCOUNTER — TELEPHONE (OUTPATIENT)
Dept: HEMATOLOGY/ONCOLOGY | Facility: CLINIC | Age: 64
End: 2019-08-01

## 2019-08-01 NOTE — TELEPHONE ENCOUNTER
Spoke with patient today.  He started on prednisone this morning.  He states he is also taking eyedrops prescribed by Ophthalmology.  His vision has improved somewhat.  He was not able to see at all previously but now he states he is able to see some light and make out some shadows.  He has an MRI scheduled for next week and is following up with Ophthalmology and me next week.  Report any new symptoms in the interim.  Have notified [employer] and filing an adverse drug reaction form.

## 2019-08-06 ENCOUNTER — TELEPHONE (OUTPATIENT)
Dept: NEUROLOGY | Facility: HOSPITAL | Age: 64
End: 2019-08-06

## 2019-08-07 ENCOUNTER — OFFICE VISIT (OUTPATIENT)
Dept: HEMATOLOGY/ONCOLOGY | Facility: CLINIC | Age: 64
End: 2019-08-07
Payer: COMMERCIAL

## 2019-08-07 ENCOUNTER — TELEPHONE (OUTPATIENT)
Dept: HEMATOLOGY/ONCOLOGY | Facility: CLINIC | Age: 64
End: 2019-08-07

## 2019-08-07 VITALS
HEART RATE: 59 BPM | HEIGHT: 72 IN | WEIGHT: 302 LBS | TEMPERATURE: 98 F | BODY MASS INDEX: 40.9 KG/M2 | RESPIRATION RATE: 16 BRPM | SYSTOLIC BLOOD PRESSURE: 139 MMHG | OXYGEN SATURATION: 96 % | DIASTOLIC BLOOD PRESSURE: 84 MMHG

## 2019-08-07 DIAGNOSIS — D3A.090 BRONCHIAL CARCINOID TUMORS: Primary | ICD-10-CM

## 2019-08-07 DIAGNOSIS — C7B.8 SECONDARY NEUROENDOCRINE TUMOR OF BONE: ICD-10-CM

## 2019-08-07 DIAGNOSIS — C7B.8 SECONDARY NEUROENDOCRINE TUMOR OF LIVER: ICD-10-CM

## 2019-08-07 DIAGNOSIS — H54.62 VISION LOSS OF LEFT EYE: ICD-10-CM

## 2019-08-07 PROCEDURE — 3008F BODY MASS INDEX DOCD: CPT | Mod: CPTII,S$GLB,, | Performed by: INTERNAL MEDICINE

## 2019-08-07 PROCEDURE — 99999 PR PBB SHADOW E&M-EST. PATIENT-LVL IV: ICD-10-PCS | Mod: PBBFAC,,, | Performed by: INTERNAL MEDICINE

## 2019-08-07 PROCEDURE — 99214 PR OFFICE/OUTPT VISIT, EST, LEVL IV, 30-39 MIN: ICD-10-PCS | Mod: S$GLB,,, | Performed by: INTERNAL MEDICINE

## 2019-08-07 PROCEDURE — 3008F PR BODY MASS INDEX (BMI) DOCUMENTED: ICD-10-PCS | Mod: CPTII,S$GLB,, | Performed by: INTERNAL MEDICINE

## 2019-08-07 PROCEDURE — 99999 PR PBB SHADOW E&M-EST. PATIENT-LVL IV: CPT | Mod: PBBFAC,,, | Performed by: INTERNAL MEDICINE

## 2019-08-07 PROCEDURE — 99214 OFFICE O/P EST MOD 30 MIN: CPT | Mod: S$GLB,,, | Performed by: INTERNAL MEDICINE

## 2019-08-07 RX ORDER — BRIMONIDINE TARTRATE 1 MG/ML
SOLUTION/ DROPS OPHTHALMIC
Refills: 4 | COMMUNITY
Start: 2019-08-01 | End: 2021-09-24

## 2019-08-07 RX ORDER — KETOROLAC TROMETHAMINE 4 MG/ML
SOLUTION/ DROPS OPHTHALMIC
Refills: 0 | Status: ON HOLD | COMMUNITY
Start: 2019-08-02 | End: 2019-09-17

## 2019-08-07 RX ORDER — BROMFENAC SODIUM 0.7 MG/ML
SOLUTION/ DROPS OPHTHALMIC
Refills: 4 | COMMUNITY
Start: 2019-08-02 | End: 2021-09-24

## 2019-08-07 NOTE — TELEPHONE ENCOUNTER
----- Message from Yusra Salas RN sent at 8/7/2019  3:08 PM CDT -----  Next available for ECHO    He needs to see him in 3 weeks the Sept appt is for the treatment he is on.     ----- Message -----  From: Rosalina Baker  Sent: 8/7/2019  10:26 AM  To: Surya Lee Staff-[facility]    Patient is scheduled on 9/23 with  but the below says in 3 weeks, please advise.   Also is the echo to be today or just next available ?    Message Contents   Jesus London, DO, FACP  Cinthia Esquivel    2D echocardiogram now   Please give him a call next week and see how he is doing from vision standpoint.  If improved we can start tapering his steroids   Follow-up 3 weeks

## 2019-08-07 NOTE — Clinical Note
2D echocardiogram nowPlease give him a call next week and see how he is doing from vision standpoint.  If improved we can start tapering his steroidsFollow-up 3 weeks

## 2019-08-07 NOTE — PROGRESS NOTES
PATIENT: Elroy Rahman  MRN: [id number]  DATE: 8/7/2019      Diagnosis:   1. Bronchial carcinoid tumors    2. Secondary neuroendocrine tumor of liver    3. Secondary neuroendocrine tumor of bone    4. Vision loss of left eye        Chief Complaint: Carcinoid Tumor (bronchial)      Oncologic History:      Oncologic History Bronchial carcinoid, left, diagnosed 1/2016   Metastatic disease to liver and bone 5/2017     Oncologic Treatment Left upper lobectomy 1/2016  CAPTEM 7/2017 - 2/2019 (discontinued due to progression)  Lanreotide 7/2017   Zoledronic acid 7/2017   TACE 10/2018  TACE 3/2019  Lily-177 4/10/19, 6/5/19    Pathology 1/2016: Typical carcinoid tumor, well-differentiated T2a, N0, M0 Ki-67 <1%  6/2017: Liver biopsy, well-differentiated, Ki-67 25%           Subjective:    Interval History: Mr. Rahman is a 63 y.o. male who is seen in follow-up for a bronchial carcinoid tumor.  Since I had seen him last he completely lost vision in his left eye.  He did see Ophthalmology who had started him on eyedrops and I had started him on systemic corticosteroids.  His vision has improved but is still not normal. He can make out shadows and things seem to be improving on a daily basis.  He has no other new complaints.    His history dates to 1/2016 when he underwent a left upper lobectomy with Dr. Bell for a typical carcinoid tumor.  His pathological staging was T2a, N0, M0 with Ki-67 less than 1% and mitotic count 0 per 10 high-powered fields.  He done well postoperatively, however, in 5/2017 he was undergoing routine surveillance and a chest CT in picked up evidence of progression of disease in the liver.  Dedicated abdominal CT was performed showing multiple liver lesions.  In 6/2017 he underwent a CT-guided liver biopsy which was positive for metastatic neuroendocrine tumor which was well-differentiated with no overt nuclear atypia, however, the Ki-67 was 25%.  He underwent gallium-68 PET/CT and was found to have  widespread disease involving the bone, liver and also mesentery.  He was started on CAPTEM in 7/2017 and also Lanreotide and zoledronic acid.  Scans in 10/2017 had shown stability of disease.  Imaging in 1/2018 had shown stability of disease.  Scans in 4/2018 had shown stability of disease.  Scans in 07/2018 had continued to showed mild growth.  In 10/2018 he underwent TACE.  CAPTEM was discontinued in 02/2019 due to progression.  He underwent TACE in 03/2019.  He was started on treatment with PRRT using Lily-177 on 04/10/2019.      Past Medical History:   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    Sleep apnea     Snores     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       Past Surgical HIstory:   Past Surgical History:   Procedure Laterality Date    [id number] N/A 6/8/2017    Performed by [facility] at [facility] OR 2ND FLR    Bka Left     LEFT UPPER LOBECTOMY-LUNG Left 1/28/2016    Performed by [facility] Bell [facility] at [facility] OR 2ND FLR    REPAIR, HERNIA, UMBILICAL, INCARCERATED, LAPAROSCOPIC N/A 11/27/2018    Performed by [facility] at [facility] OR    THORACOTOMY W/LUNG RESECTION Left 1/28/2016    Performed by Pan Bell [facility] at [facility] OR 2ND FLR       Family History:   Family History   Problem Relation Age of Onset    Cancer Mother         lung    Cancer Sister     Anesthesia problems Neg Hx        Social History:  reports that he has never smoked. He has never used smokeless tobacco. He reports that he drinks about 0.6 oz of alcohol per week. He reports that he does not use drugs.    Allergies:  Review of patient's  allergies indicates:  No Known Allergies    Medications:  Current Outpatient Medications   Medication Sig Dispense Refill    ALPHAGAN P 0.1 % Drop INT 1 GTT IN OS BID  4    ketorolac 0.4% (ACULAR) 0.4 % Drop INT 1 GTT INTO OS QID  0    lanreotide (SOMATULINE DEPOT) 120 mg/0.5 mL Syrg Inject 120 mg into the skin every 28 days.      ondansetron (ZOFRAN) 4 MG tablet Take 1 tablet (4 mg total) by mouth 2 (two) times daily. 15 tablet 0    ondansetron (ZOFRAN) 4 MG tablet Take 1 tablet (4 mg total) by mouth 2 (two) times daily. 15 tablet 0    predniSONE (DELTASONE) 20 MG tablet Take 2 tablets (40 mg total) by mouth once daily. Take with 50 mg tabs.  Total of 140 mg daily 60 tablet 0    predniSONE (DELTASONE) 50 MG Tab Take 2 tablets (100 mg total) by mouth once daily. Take with 20 mg tablets.  Total of 140 daily. 60 tablet 0    PROLENSA 0.07 % Drop INSTILL 1 DROP IN OS BID  4     No current facility-administered medications for this visit.        Review of Systems   Constitutional: Negative for appetite change, chills, fatigue, fever and unexpected weight change.   HENT: Negative for dental problem, sinus pressure and sneezing.    Eyes: Positive for visual disturbance.   Respiratory: Negative for cough, choking, chest tightness and shortness of breath.    Cardiovascular: Negative for chest pain and leg swelling.   Gastrointestinal: Negative for abdominal pain, blood in stool, constipation, diarrhea and nausea.   Genitourinary: Negative for difficulty urinating, dysuria and frequency.   Musculoskeletal: Negative for arthralgias and back pain.   Skin: Negative for rash and wound.   Neurological: Negative for dizziness, light-headedness and headaches.   Hematological: Negative for adenopathy. Does not bruise/bleed easily.   Psychiatric/Behavioral: Negative for sleep disturbance. The patient is not nervous/anxious.        ECOG Performance Status:   ECOG SCORE    1 - Restricted in strenuous activity-ambulatory and able  to carry out work of a light nature         Objective:      Vitals:   Vitals:    08/07/19 0931   BP: 139/84   Pulse: (!) 59   Resp: 16   Temp: 98.1 °F (36.7 °C)   SpO2: 96%   Weight: (!) 137 kg (302 lb 0.5 oz)   Height: 6' (1.829 m)     BMI: Body mass index is 40.96 kg/m².    Physical Exam   Constitutional: He is oriented to person, place, and time. He appears well-developed and well-nourished.   HENT:   Head: Normocephalic and atraumatic.   Eyes: Pupils are equal, round, and reactive to light.   Neck: Normal range of motion. Neck supple.   Cardiovascular: Normal rate and regular rhythm.   Pulmonary/Chest: Effort normal and breath sounds normal. No respiratory distress.   Abdominal: Soft. He exhibits no distension. There is no tenderness.   Musculoskeletal: He exhibits no edema or tenderness.   Left lower extremity prosthesis   Lymphadenopathy:     He has no cervical adenopathy.   Neurological: He is alert and oriented to person, place, and time. No cranial nerve deficit.   Skin: Skin is warm and dry.   Psychiatric: He has a normal mood and affect. His behavior is normal.       Laboratory Data:  No visits with results within 1 Week(s) from this visit.   Latest known visit with results is:   Lab Visit on 07/24/2019   Component Date Value Ref Range Status    WBC 07/24/2019 4.33  3.90 - 12.70 K/uL Final    RBC 07/24/2019 4.61  4.60 - 6.20 M/uL Final    Hemoglobin 07/24/2019 13.6* 14.0 - 18.0 g/dL Final    Hematocrit 07/24/2019 40.0  40.0 - 54.0 % Final    Mean Corpuscular Volume 07/24/2019 87  82 - 98 fL Final    Mean Corpuscular Hemoglobin 07/24/2019 29.5  27.0 - 31.0 pg Final    Mean Corpuscular Hemoglobin Conc 07/24/2019 34.0  32.0 - 36.0 g/dL Final    RDW 07/24/2019 13.6  11.5 - 14.5 % Final    Platelets 07/24/2019 187  150 - 350 K/uL Final    MPV 07/24/2019 10.0  9.2 - 12.9 fL Final    Gran # (ANC) 07/24/2019 2.9  1.8 - 7.7 K/uL Final    Comment: The ANC is based on a white cell differential from an    automated cell counter. It has not been microscopically   reviewed for the presence of abnormal cells. Clinical   correlation is required.      Immature Grans (Abs) 07/24/2019 0.03  0.00 - 0.04 K/uL Final    Comment: Mild elevation in immature granulocytes is non specific and   can be seen in a variety of conditions including stress response,   acute inflammation, trauma and pregnancy. Correlation with other   laboratory and clinical findings is essential.      Sodium 07/24/2019 137  136 - 145 mmol/L Final    Potassium 07/24/2019 4.0  3.5 - 5.1 mmol/L Final    Chloride 07/24/2019 104  95 - 110 mmol/L Final    CO2 07/24/2019 24  23 - 29 mmol/L Final    Glucose 07/24/2019 138* 70 - 110 mg/dL Final    BUN, Bld 07/24/2019 14  8 - 23 mg/dL Final    Creatinine 07/24/2019 1.0  0.5 - 1.4 mg/dL Final    Calcium 07/24/2019 9.0  8.7 - 10.5 mg/dL Final    Total Protein 07/24/2019 6.8  6.0 - 8.4 g/dL Final    Albumin 07/24/2019 3.7  3.5 - 5.2 g/dL Final    Total Bilirubin 07/24/2019 0.5  0.1 - 1.0 mg/dL Final    Comment: For infants and newborns, interpretation of results should be based  on gestational age, weight and in agreement with clinical  observations.  Premature Infant recommended reference ranges:  Up to 24 hours.............<8.0 mg/dL  Up to 48 hours............<12.0 mg/dL  3-5 days..................<15.0 mg/dL  6-29 days.................<15.0 mg/dL      Alkaline Phosphatase 07/24/2019 77  55 - 135 U/L Final    AST 07/24/2019 16  10 - 40 U/L Final    ALT 07/24/2019 22  10 - 44 U/L Final    Anion Gap 07/24/2019 9  8 - 16 mmol/L Final    eGFR if African American 07/24/2019 >60.0  >60 mL/min/1.73 m^2 Final    eGFR if non African American 07/24/2019 >60.0  >60 mL/min/1.73 m^2 Final    Comment: Calculation used to obtain the estimated glomerular filtration  rate (eGFR) is the CKD-EPI equation.        Supplemental Diagnosis  Chromogranin Staining:  Intensity: Moderate to strong. Positive cells: 100  (%)  Synaptophysin Staining:  Intensity: Strong. Positive cells: 100 (%)  CD31: 40 vessels per 1 HPF  Factor VIII: 27 vessels per 1 HPF  Ki67: 1 % tumor cells staining  Immunostains performed with appropriate positive and negative controls.  FINAL PATHOLOGIC DIAGNOSIS  1. Lymph node, level XI, excisional biopsy:  Fragments of benign lymph node with anthracosis and sinus histiocytosis (0/1).  2. Lung, lingular staple line, biopsy:  Lung, negative for neoplasm.  3. Lymph node, level X, excisional biopsy:  1 benign lymph node with anthracotic pigment and sinus histiocytosis (0/1).  4. Lymph node, level XII, excisional biopsy:  2 benign lymph nodes with sinus histiocytosis and anthracotic pigment (0/2).  5. Lung, left upper lobe, lobectomy:  Typical carcinoid tumor (well differentiated neuroendocrine tumor), 4 cm in greatest dimension.  Bronchial and vascular margins are negative for neoplasm.  Uninvolved lung adjacent to the tumor shows emphysematous changes and fibrosis, however remaining lung  appears unremarkable.  Additional immunohistochemical stains for ancillary studies (including synaptophysin, chromogranin and Ki-67)  will be completed and results will be reported in a supplemental report.  See synoptic report in comment section for further details.  6. Lymph node, level VII, excisional biopsy:  2 benign lymph nodes with anthracotic pigment and sinus histiocytosis (0/2).  Comment: Synoptic Report  Specimen: Left upper lobe of lung  Procedure: Lobectomy  Specimen laterality: Left  Tumor size:  Greatest dimension: 4 cm  Tumor focality: Single focus  Histologic type: Typical carcinoid tumor  Visceral pleural invasion: Not identified  Tumor extension: Not identified  Margins: Bronchial and vascular margins are uninvolved by tumor.  Distance of tumor from closest margin: 1 cm from the bronchial surgical margin.  Lymphovascular invasion: Not identified  Ancillary studies:  Immunohistochemical stains for  neuroendocrine markers and Ki-67 will be completed and results will follow in a  supplemental report.  Note: The tumor consists of a proliferation of cells in nests with lewis formation. The tumor cells have salt and  pepper chromatin pattern with abundant cytoplasm. There is no significant nuclear atypia. No evidence of  necrosis. Mitotic count is 0 mitoses in 10 high power fields.    Imaging:     MRI 02/04/2019  COMPARISON:  MRI abdomen 07/23/2018.    FINDINGS:  Liver: Hepatic parenchyma shows diffuse signal dropout compatible with hepatic steatosis.  Interval increase in size of multiple enhancing T2 hyperintense lesions compatible with known metastatic disease.    Index lesions as follows:    Hepatic segment III lesion measures 5.5 cm (series 13, image 41), previously 4.1 cm.    Hepatic segment VIII lesion measures 4.8 cm (series 13, image 44), previously 4.0 cm.    Hepatic and portal veins are patent.    Biliary: Gallbladder is unremarkable.  No intrahepatic or extrahepatic biliary dilatation.    Pancreas: Unremarkable.  No pancreatic ductal dilatation.    Spleen: Normal size.  No focal lesions.    Adrenal glands: Right adrenal nodule demonstrates signal dropout compatible with adenoma.  Left adrenal gland is unremarkable.    Kidneys: No renal masses.  No hydronephrosis.    Miscellaneous: Visualized bowel loops are unremarkable.  No evidence for lymphadenopathy.  Multiple osseous lesions again noted, stable to mildly more pronounced when compared to prior study.      Impression       Findings concerning for progression of disease with interval increase in size of multiple enhancing hepatic lesions.  Additionally, the osseous metastases appear stable to more pronounced when compared to prior study.    RECIST SUMMARY:    Date of prior examination for comparison: MRI abdomen 07/23/2018.    Lesion 1: Hepatic segment III lesion measures 5.5 cm (series 13, image 41), previously 4.1 cm.    Lesion 2: Hepatic segment  VIII lesion measures 4.8 cm (series 13, image 44), previously 4.0 cm.                  Assessment:       1. Bronchial carcinoid tumors    2. Secondary neuroendocrine tumor of liver    3. Secondary neuroendocrine tumor of bone    4. Vision loss of left eye           Plan:     Mr. Rahman has completed 2 cycles of PRRT which was complicated by an acute loss of vision in his left eye.  His prior gallium 68 PET-CT did show an orbital lesion which possibly is responsible for this and he was started on systemic corticosteroids the possibility inflammation causing this.  His vision has improved but still not back to normal.  We will plan to continue him on with steroids at the current dose for now and wean when vision returns.  He is following up with another ophthalmologist in the coming weeks.  For now, we will hold additional PRRT.  He did ask about starting a baby aspirin which I do think would be a good idea and will also plan to check a 2D echocardiogram.  Additionally, he had asked about going back to work as a  and I told him that since his vision is not back to normal this would not be advisable but could work in a different capacity. I have notified the company and also filed an adverse reaction form to be submitted to the FDA.  He is having an MRI done later this week and will follow up.  He is going to notify us next week as to his progress.    Jesus London DO, FACP  Hematology & Oncology  [facility] [place] [address]  ph. [phone]  Fax. [phone]    25 minutes were spent in coordination of patient's care, record review and counseling.  More than 50% of the time was face-to-face.

## 2019-08-07 NOTE — TELEPHONE ENCOUNTER
Called patient to get his echo scheduled, no answer left message asking pt to call back to confirm/change apt on Friday 8/9

## 2019-08-07 NOTE — LETTER
August 7, 2019        Jesus Mendoza MD  [address] [place] [address] Blvd  [address]  [place] [address]             [facility]  [address]  [place] [address]  Phone: [phone]   Patient: Elroy Rahman   MR Number: [id number]   YOB: 1955   Date of Visit: 8/7/2019       Dear Dr. Mendoza:    Thank you for referring Elroy Rahman to me for evaluation. Attached you will find relevant portions of my assessment and plan of care.    If you have questions, please do not hesitate to call me. I look forward to following Elroy Rahman along with you.    Sincerely,      Jesus London, DO, FACP            CC  No Recipients    Enclosure

## 2019-08-09 ENCOUNTER — HOSPITAL ENCOUNTER (OUTPATIENT)
Dept: CARDIOLOGY | Facility: CLINIC | Age: 64
Discharge: HOME | End: 2019-08-09
Attending: INTERNAL MEDICINE
Payer: COMMERCIAL

## 2019-08-09 VITALS
HEART RATE: 65 BPM | DIASTOLIC BLOOD PRESSURE: 84 MMHG | SYSTOLIC BLOOD PRESSURE: 139 MMHG | WEIGHT: 302 LBS | HEIGHT: 72 IN | BODY MASS INDEX: 40.9 KG/M2

## 2019-08-09 DIAGNOSIS — H54.62 VISION LOSS OF LEFT EYE: ICD-10-CM

## 2019-08-09 DIAGNOSIS — C7B.8 SECONDARY NEUROENDOCRINE TUMOR OF BONE: ICD-10-CM

## 2019-08-09 DIAGNOSIS — C7B.8 SECONDARY NEUROENDOCRINE TUMOR OF LIVER: ICD-10-CM

## 2019-08-09 DIAGNOSIS — D3A.090 BRONCHIAL CARCINOID TUMORS: ICD-10-CM

## 2019-08-09 LAB
ASCENDING AORTA: 4 CM
AV INDEX (PROSTH): 0.85
AV MEAN GRADIENT: 5 MMHG
AV PEAK GRADIENT: 11 MMHG
AV VALVE AREA: 3.19 CM2
AV VELOCITY RATIO: 0.78
BSA FOR ECHO PROCEDURE: 2.64 M2
CV ECHO LV RWT: 0.41 CM
DOP CALC AO PEAK VEL: 1.67 M/S
DOP CALC AO VTI: 33.17 CM
DOP CALC LVOT AREA: 3.8 CM2
DOP CALC LVOT DIAMETER: 2.19 CM
DOP CALC LVOT PEAK VEL: 1.3 M/S
DOP CALC LVOT STROKE VOLUME: 105.95 CM3
DOP CALCLVOT PEAK VEL VTI: 28.14 CM
E WAVE DECELERATION TIME: 274.7 MSEC
E/A RATIO: 0.89
ECHO LV POSTERIOR WALL: 1.04 CM (ref 0.6–1.1)
FRACTIONAL SHORTENING: 31 % (ref 28–44)
INTERVENTRICULAR SEPTUM: 1.15 CM (ref 0.6–1.1)
IVRT: 0.11 MSEC
LA MAJOR: 6.38 CM
LA MINOR: 6.23 CM
LA WIDTH: 4 CM
LEFT ATRIUM SIZE: 4.3 CM
LEFT ATRIUM VOLUME INDEX: 36.3 ML/M2
LEFT ATRIUM VOLUME: 92.17 CM3
LEFT INTERNAL DIMENSION IN SYSTOLE: 3.48 CM (ref 2.1–4)
LEFT VENTRICLE DIASTOLIC VOLUME INDEX: 47.24 ML/M2
LEFT VENTRICLE DIASTOLIC VOLUME: 119.91 ML
LEFT VENTRICLE MASS INDEX: 82 G/M2
LEFT VENTRICLE SYSTOLIC VOLUME INDEX: 19.8 ML/M2
LEFT VENTRICLE SYSTOLIC VOLUME: 50.32 ML
LEFT VENTRICULAR INTERNAL DIMENSION IN DIASTOLE: 5.03 CM (ref 3.5–6)
LEFT VENTRICULAR MASS: 207.85 G
LV SEPTAL E/E' RATIO: 8 M/S
MV PEAK A VEL: 0.63 M/S
MV PEAK E VEL: 0.56 M/S
PISA TR MAX VEL: 2.43 M/S
PULM VEIN S/D RATIO: 1.09
PV PEAK D VEL: 0.47 M/S
PV PEAK S VEL: 0.51 M/S
RA MAJOR: 5.58 CM
RA PRESSURE: 3 MMHG
RA WIDTH: 3.96 CM
RIGHT VENTRICULAR END-DIASTOLIC DIMENSION: 4.06 CM
SINUS: 3.67 CM
STJ: 3.25 CM
TDI SEPTAL: 0.07 M/S
TR MAX PG: 24 MMHG
TRICUSPID ANNULAR PLANE SYSTOLIC EXCURSION: 2.43 CM
TV REST PULMONARY ARTERY PRESSURE: 27 MMHG

## 2019-08-09 PROCEDURE — 93306 TRANSTHORACIC ECHO (TTE) COMPLETE (CUPID ONLY): ICD-10-PCS | Mod: 26,,, | Performed by: INTERNAL MEDICINE

## 2019-08-09 PROCEDURE — 93306 TTE W/DOPPLER COMPLETE: CPT

## 2019-08-09 PROCEDURE — 93306 TTE W/DOPPLER COMPLETE: CPT | Mod: 26,,, | Performed by: INTERNAL MEDICINE

## 2019-08-12 ENCOUNTER — TELEPHONE (OUTPATIENT)
Dept: NEUROLOGY | Facility: HOSPITAL | Age: 64
End: 2019-08-12

## 2019-08-12 NOTE — TELEPHONE ENCOUNTER
"Spoke to patient, he reports being able to see pictures, but everything remains "foggy".  I asked him to call us when his vision returns to the way it was prior to PRRT and Dr. London will begin weaning his steroids.      I also let him know his ECHO was normal.        "

## 2019-08-12 NOTE — TELEPHONE ENCOUNTER
----- Message from Jesus London DO, FACP sent at 8/10/2019  2:15 PM CDT -----  Please let him know echo was normal.  If his vision is back to baseline we can start tapering steroids.

## 2019-08-13 ENCOUNTER — PATIENT MESSAGE (OUTPATIENT)
Dept: HEMATOLOGY/ONCOLOGY | Facility: CLINIC | Age: 64
End: 2019-08-13

## 2019-08-16 ENCOUNTER — TELEPHONE (OUTPATIENT)
Dept: NEUROLOGY | Facility: HOSPITAL | Age: 64
End: 2019-08-16

## 2019-08-19 ENCOUNTER — OFFICE VISIT (OUTPATIENT)
Dept: PHYSICAL MEDICINE AND REHAB | Facility: CLINIC | Age: 64
End: 2019-08-19
Payer: COMMERCIAL

## 2019-08-19 VITALS
BODY MASS INDEX: 40.55 KG/M2 | DIASTOLIC BLOOD PRESSURE: 81 MMHG | HEART RATE: 57 BPM | HEIGHT: 72 IN | WEIGHT: 299.38 LBS | SYSTOLIC BLOOD PRESSURE: 134 MMHG

## 2019-08-19 DIAGNOSIS — S78.112A ABOVE KNEE AMPUTATION OF LEFT LOWER EXTREMITY: Primary | ICD-10-CM

## 2019-08-19 PROCEDURE — 99203 PR OFFICE/OUTPT VISIT, NEW, LEVL III, 30-44 MIN: ICD-10-PCS | Mod: S$GLB,,, | Performed by: PHYSICAL MEDICINE & REHABILITATION

## 2019-08-19 PROCEDURE — 99999 PR PBB SHADOW E&M-EST. PATIENT-LVL III: ICD-10-PCS | Mod: PBBFAC,,, | Performed by: PHYSICAL MEDICINE & REHABILITATION

## 2019-08-19 PROCEDURE — 99203 OFFICE O/P NEW LOW 30 MIN: CPT | Mod: S$GLB,,, | Performed by: PHYSICAL MEDICINE & REHABILITATION

## 2019-08-19 PROCEDURE — 99999 PR PBB SHADOW E&M-EST. PATIENT-LVL III: CPT | Mod: PBBFAC,,, | Performed by: PHYSICAL MEDICINE & REHABILITATION

## 2019-08-19 PROCEDURE — 3008F PR BODY MASS INDEX (BMI) DOCUMENTED: ICD-10-PCS | Mod: CPTII,S$GLB,, | Performed by: PHYSICAL MEDICINE & REHABILITATION

## 2019-08-19 PROCEDURE — 3008F BODY MASS INDEX DOCD: CPT | Mod: CPTII,S$GLB,, | Performed by: PHYSICAL MEDICINE & REHABILITATION

## 2019-08-19 NOTE — PROGRESS NOTES
Subjective:       Patient ID: Elroy Rahman is a 63 y.o. male.    Chief Complaint: amputee evaluation    HPI   Elroy Wills, is 62 y/o male, who is coming first time to clinic for face to face evaluation for Left AKA prosthesis replacement.   He is referred by his PCP, and prosthetist .     HPI;   Pt is Left AKA amputee since age of 19 y/o.   He sustained traumatic amputation and ever since he is having left AKA prosthesis.   He states that he has never thought about being disable. 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   He wears prosthesis all day long , probably > 12 hrs/day.  He is K3 ambulator, have a potential to ambulate variable castillo, to traverse most of envirromental barriers, and may have vocational , or exercise activity,   This current prosthesis is > 5 yrs old and it is all broken and warned out .  Socket is somewhat tight, he might gain a weight, knee and ankle are broken, out of warranty, and beyond repair and unstable to walk.   He is afraid of falls.   He is here for Left AKA prosthesis replacement.     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    Sleep apnea     Snores     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       Past Surgical History:   Procedure Laterality Date    [id number] N/A 6/8/2017    Performed by [facility] at [facility] OR 2ND FLR    Bka Left     LEFT UPPER LOBECTOMY-LUNG Left 1/28/2016    Performed by Pan Bell [facility] at [facility] OR 2ND FLR    REPAIR, HERNIA, UMBILICAL, INCARCERATED, LAPAROSCOPIC N/A 11/27/2018    [facility]     THORACOTOMY W/LUNG RESECTION Left 1/28/2016    [facility] F[facility]       Family History   Problem Relation Age of Onset    Cancer Mother         lung    Cancer Sister     Anesthesia problems Neg Hx        Social History     Socioeconomic History    Marital status:      Spouse name: Not on file    Number of children: Not on file    Years of education: Not on file    Highest education level: Not on file   Occupational History    Not on file   Social Needs    Financial resource strain: Not on file    Food insecurity:     Worry: Not on file     Inability: Not on file    Transportation needs:     Medical: Not on file     Non-medical: Not on file   Tobacco Use    Smoking status: Never Smoker    Smokeless tobacco: Never Used   Substance and Sexual Activity    Alcohol use: Yes     Alcohol/week: 0.6 oz     Types: 1 Cans of beer per week     Comment: rare    Drug use: No    Sexual activity: Not on file   Lifestyle    Physical activity:     Days per week: Not on file     Minutes per session: Not on file    Stress: Not on file   Relationships    Social connections:     Talks on phone: Not on file     Gets together: Not on file     Attends [employer] service: Not on file     Active member of club or organization: Not on file     Attends meetings of clubs or organizations: Not on file     Relationship status: Not on file   Other Topics Concern    Not on file   Social History Narrative    Not on file       Current Outpatient Medications   Medication Sig Dispense Refill    ALPHAGAN P 0.1 % Drop INT 1 GTT IN OS BID  4    ketorolac 0.4% (ACULAR) 0.4 % Drop INT 1 GTT INTO OS QID  0    lanreotide (SOMATULINE DEPOT) 120 mg/0.5 mL Syrg Inject 120 mg into the skin every 28 days.      ondansetron (ZOFRAN) 4 MG tablet Take 1 tablet (4 mg total) by mouth 2 (two) times daily. 15 tablet 0    ondansetron (ZOFRAN) 4 MG tablet Take 1 tablet (4 mg total) by mouth 2 (two) times  daily. 15 tablet 0    predniSONE (DELTASONE) 20 MG tablet Take 2 tablets (40 mg total) by mouth once daily. Take with 50 mg tabs.  Total of 140 mg daily 60 tablet 0    predniSONE (DELTASONE) 50 MG Tab Take 2 tablets (100 mg total) by mouth once daily. Take with 20 mg tablets.  Total of 140 daily. 60 tablet 0    PROLENSA 0.07 % Drop INSTILL 1 DROP IN OS BID  4     No current facility-administered medications for this visit.        Review of patient's allergies indicates:   Allergen Reactions    Epinephrine Other (See Comments)     Carcinoid crisis     Review of Systems   Constitutional: Positive for activity change. Negative for chills, fatigue and fever.   HENT: Negative for congestion and trouble swallowing.    Eyes: Negative for visual disturbance.   Respiratory: Negative for cough, chest tightness and shortness of breath.    Cardiovascular: Negative for chest pain, palpitations and leg swelling.   Gastrointestinal: Negative for abdominal pain and constipation.   Genitourinary: Negative for difficulty urinating.   Musculoskeletal: Positive for gait problem. Negative for arthralgias and back pain.   Skin: Negative for rash and wound.   Neurological: Negative for weakness and numbness.   Psychiatric/Behavioral: The patient is not nervous/anxious.            Objective:      Physical Exam      PHYSICAL EXAM:  GENERAL: The patient is alert, oriented, pleasant.   HEENT: PERRLA  NECK: supple, no masses  CV: S1S2, RRR  LUNGS: CTA bilateral. No wheezing  ABDOMEN: soft, non-tender, non distended  EXTREMITIES: no edema, +2 pulses DP.  SKIN: no skin rash, no skin breakdowns.  MUSCULOSKELETAL:   Gait: prosthetic gait, with left AKA,   Muscle strength 5/5 throughout x4  Extremities ( including residual limb).  He is s/p Left AKA, with appropriate length residual limb, well conically shaped, with no pressure sores.  FROM in all joint including left hip, no hip flexion contracture.   No  joint laxity throughout x4  extremities.   NEUROLOGIC: Cranial nerves II through XII intact.   Deep tendon reflexes is normal, +2 in the x 4 extremities.   Muscle tone is normal.   Sensory is intact to light touch and pinprick throughout x4 extremities.       Assessment:       1. Above knee amputation of left lower extremity        Plan:       Elroy Rahman is a 63 y.o. male with s/p unilateral left amputee, secondary to  Trauma at age of 19 y/o. .   He is very active, motivated and optimistic male that ambulates using prosthesis. He does work, as fisherman.  He is K3 ambulator, have a potential to ambulate variable castillo, to traverse most of envirromental barriers, and may have vocational , or exercise activity,   That demands prosthesis utilization beyond simple locomotion.  Will prescribe unilateral left AKA prosthesis, to evaluate and treat.      RTC prn.     Total time spent face to face with patient was 30 minutes.   More than 50% of that time was spent in counseliing on prosthesis face to face  evaluation and approval process by insurance.  I reviewed Primary care , and other specialty's notes to better coordinate patient's  care.   All questions were answered, and patient voiced understanding.

## 2019-08-19 NOTE — LETTER
August 25, 2019      \[facility]"" [facility] [place]  [address]  [place] [address]           [facility]  [address]  [facility] [address]  Phone: [phone]          Patient: Elroy Rahman   MR Number: [id number]   YOB: 1955   Date of Visit: 8/19/2019       Dear \[facility] [place]:    Thank you for referring Elroy Rahman to me for evaluation. Attached you will find relevant portions of my assessment and plan of care.    If you have questions, please do not hesitate to call me. I look forward to following Elroy Rahman along with you.    Sincerely,    Julieta Dhillon MD    Enclosure  CC:  No Recipients    If you would like to receive this communication electronically, please contact [email or web address] or [phone] to request more information on [facility] Link access.    For providers and/or their staff who would like to refer a patient to [facility], please contact us through our one-stop-shop provider referral line, [facility], at [phone].    If you feel you have received this communication in error or would no longer like to receive these types of communications, please e-mail [email or web address]

## 2019-08-20 ENCOUNTER — HOSPITAL ENCOUNTER (OUTPATIENT)
Dept: RADIOLOGY | Facility: HOSPITAL | Age: 64
Discharge: HOME | End: 2019-08-20
Attending: INTERNAL MEDICINE
Payer: COMMERCIAL

## 2019-08-20 DIAGNOSIS — C7B.8 SECONDARY NEUROENDOCRINE TUMOR OF BONE: ICD-10-CM

## 2019-08-20 DIAGNOSIS — D3A.090 BRONCHIAL CARCINOID TUMORS: ICD-10-CM

## 2019-08-20 DIAGNOSIS — H54.62 VISION LOSS OF LEFT EYE: ICD-10-CM

## 2019-08-20 PROCEDURE — 78815 PET IMAGE W/CT SKULL-THIGH: CPT | Mod: TC

## 2019-08-20 PROCEDURE — 78815 NM PET 68GA DOTATATE WHOLE BODY: ICD-10-PCS | Mod: 26,PS,, | Performed by: RADIOLOGY

## 2019-08-20 PROCEDURE — 78815 PET IMAGE W/CT SKULL-THIGH: CPT | Mod: 26,PS,, | Performed by: RADIOLOGY

## 2019-08-20 PROCEDURE — A9587 GALLIUM GA-68: HCPCS | Mod: TB

## 2019-08-26 ENCOUNTER — OFFICE VISIT (OUTPATIENT)
Dept: HEMATOLOGY/ONCOLOGY | Facility: CLINIC | Age: 64
End: 2019-08-26
Payer: COMMERCIAL

## 2019-08-26 VITALS
RESPIRATION RATE: 16 BRPM | DIASTOLIC BLOOD PRESSURE: 65 MMHG | OXYGEN SATURATION: 95 % | SYSTOLIC BLOOD PRESSURE: 132 MMHG | TEMPERATURE: 98 F | BODY MASS INDEX: 40.7 KG/M2 | HEIGHT: 72 IN | WEIGHT: 300.5 LBS | HEART RATE: 69 BPM

## 2019-08-26 DIAGNOSIS — C7B.8 SECONDARY NEUROENDOCRINE TUMOR OF BONE: ICD-10-CM

## 2019-08-26 DIAGNOSIS — H54.62 VISION LOSS OF LEFT EYE: ICD-10-CM

## 2019-08-26 DIAGNOSIS — D3A.090 BRONCHIAL CARCINOID TUMORS: ICD-10-CM

## 2019-08-26 DIAGNOSIS — C7B.8 SECONDARY NEUROENDOCRINE TUMOR OF LIVER: Primary | ICD-10-CM

## 2019-08-26 PROCEDURE — 3008F PR BODY MASS INDEX (BMI) DOCUMENTED: ICD-10-PCS | Mod: CPTII,S$GLB,, | Performed by: INTERNAL MEDICINE

## 2019-08-26 PROCEDURE — 99214 OFFICE O/P EST MOD 30 MIN: CPT | Mod: S$GLB,,, | Performed by: INTERNAL MEDICINE

## 2019-08-26 PROCEDURE — 99999 PR PBB SHADOW E&M-EST. PATIENT-LVL III: ICD-10-PCS | Mod: PBBFAC,,, | Performed by: INTERNAL MEDICINE

## 2019-08-26 PROCEDURE — 3008F BODY MASS INDEX DOCD: CPT | Mod: CPTII,S$GLB,, | Performed by: INTERNAL MEDICINE

## 2019-08-26 PROCEDURE — 99214 PR OFFICE/OUTPT VISIT, EST, LEVL IV, 30-39 MIN: ICD-10-PCS | Mod: S$GLB,,, | Performed by: INTERNAL MEDICINE

## 2019-08-26 PROCEDURE — 99999 PR PBB SHADOW E&M-EST. PATIENT-LVL III: CPT | Mod: PBBFAC,,, | Performed by: INTERNAL MEDICINE

## 2019-08-26 RX ORDER — PREDNISONE 20 MG/1
40 TABLET ORAL DAILY
Qty: 60 TABLET | Refills: 0 | Status: ON HOLD | OUTPATIENT
Start: 2019-08-26 | End: 2019-09-20 | Stop reason: SDUPTHER

## 2019-08-26 RX ORDER — PREDNISONE 50 MG/1
100 TABLET ORAL DAILY
Qty: 60 TABLET | Refills: 0 | Status: ON HOLD | OUTPATIENT
Start: 2019-08-26 | End: 2019-09-20 | Stop reason: SDUPTHER

## 2019-08-26 NOTE — PROGRESS NOTES
PATIENT: Elroy Rahman  MRN: [id number]  DATE: 8/26/2019      Diagnosis:   1. Secondary neuroendocrine tumor of liver    2. Bronchial carcinoid tumors    3. Vision loss of left eye        Chief Complaint: Follow-up      Oncologic History:      Oncologic History Bronchial carcinoid, left, diagnosed 1/2016   Metastatic disease to liver and bone 5/2017     Oncologic Treatment Left upper lobectomy 1/2016  CAPTEM 7/2017 - 2/2019 (discontinued due to progression)  Lanreotide 7/2017   Zoledronic acid 7/2017   TACE 10/2018  TACE 3/2019  Lily-177 4/10/19, 6/5/19    Pathology 1/2016: Typical carcinoid tumor, well-differentiated T2a, N0, M0 Ki-67 <1%  6/2017: Liver biopsy, well-differentiated, Ki-67 25%           Subjective:    Interval History: Mr. Rahman is a 63 y.o. male who is seen in follow-up for a bronchial carcinoid tumor.  He states he generally has been feeling well. He states his vision continues to slowly improve.  He is seeing Ophthalmology later this week.  He has no other new complaints.    His history dates to 1/2016 when he underwent a left upper lobectomy with Dr. Bell for a typical carcinoid tumor.  His pathological staging was T2a, N0, M0 with Ki-67 less than 1% and mitotic count 0 per 10 high-powered fields.  He done well postoperatively, however, in 5/2017 he was undergoing routine surveillance and a chest CT in picked up evidence of progression of disease in the liver.  Dedicated abdominal CT was performed showing multiple liver lesions.  In 6/2017 he underwent a CT-guided liver biopsy which was positive for metastatic neuroendocrine tumor which was well-differentiated with no overt nuclear atypia, however, the Ki-67 was 25%.  He underwent gallium-68 PET/CT and was found to have widespread disease involving the bone, liver and also mesentery.  He was started on CAPTEM in 7/2017 and also Lanreotide and zoledronic acid.  Scans in 10/2017 had shown stability of disease.  Imaging in 1/2018 had shown  stability of disease.  Scans in 4/2018 had shown stability of disease.  Scans in 07/2018 had continued to showed mild growth.  In 10/2018 he underwent TACE.  CAPTEM was discontinued in 02/2019 due to progression.  He underwent TACE in 03/2019.  He was started on treatment with PRRT using Lily-177 on 04/10/2019.      Past Medical History:   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    Sleep apnea     Snores     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       Past Surgical HIstory:   Past Surgical History:   Procedure Laterality Date    [id number]-KB N/A 6/8/2017    Performed by [facility] Diagnostic Provider at [facility] OR 2ND FLR    Bka Left     LEFT UPPER LOBECTOMY-LUNG Left 1/28/2016    Performed by Pan Bell MD at [facility] OR [facility] FLR    REPAIR, HERNIA, UMBILICAL, INCARCERATED, LAPAROSCOPIC N/A 11/27/2018    Performed by [facility] Guid[facility] at [facility] OR    THORACOTOMY W/LUNG RESECTION Left 1/28/2016    [facility]       Family History:   Family History   Problem Relation Age of Onset    Cancer Mother         lung    Cancer Sister     Anesthesia problems Neg Hx        Social History:  reports that he has never smoked. He has never used smokeless tobacco. He reports that he drinks about 0.6 oz of alcohol per week. He reports that he does not use drugs.    Allergies:  Review of patient's allergies indicates:  No Known Allergies    Medications:  Current Outpatient Medications   Medication Sig Dispense Refill    ALPHAGAN P 0.1 % Drop INT 1 GTT IN OS BID  4    ketorolac 0.4% (ACULAR) 0.4 % Drop INT 1 GTT INTO  OS QID  0    lanreotide (SOMATULINE DEPOT) 120 mg/0.5 mL Syrg Inject 120 mg into the skin every 28 days.      ondansetron (ZOFRAN) 4 MG tablet Take 1 tablet (4 mg total) by mouth 2 (two) times daily. 15 tablet 0    ondansetron (ZOFRAN) 4 MG tablet Take 1 tablet (4 mg total) by mouth 2 (two) times daily. 15 tablet 0    predniSONE (DELTASONE) 20 MG tablet Take 2 tablets (40 mg total) by mouth once daily. Take with 50 mg tabs.  Total of 140 mg daily 60 tablet 0    predniSONE (DELTASONE) 50 MG Tab Take 2 tablets (100 mg total) by mouth once daily. Take with 20 mg tablets.  Total of 140 daily. 60 tablet 0    PROLENSA 0.07 % Drop INSTILL 1 DROP IN OS BID  4     No current facility-administered medications for this visit.        Review of Systems   Constitutional: Negative for appetite change, chills, fatigue, fever and unexpected weight change.   HENT: Negative for dental problem, sinus pressure and sneezing.    Eyes: Positive for visual disturbance.   Respiratory: Negative for cough, choking, chest tightness and shortness of breath.    Cardiovascular: Negative for chest pain and leg swelling.   Gastrointestinal: Negative for abdominal pain, blood in stool, constipation, diarrhea and nausea.   Genitourinary: Negative for difficulty urinating, dysuria and frequency.   Musculoskeletal: Negative for arthralgias and back pain.   Skin: Negative for rash and wound.   Neurological: Negative for dizziness, light-headedness and headaches.   Hematological: Negative for adenopathy. Does not bruise/bleed easily.   Psychiatric/Behavioral: Negative for sleep disturbance. The patient is not nervous/anxious.        ECOG Performance Status:   ECOG SCORE           Objective:      Vitals:   Vitals:    08/26/19 0929   BP: 132/65   BP Location: Right arm   Patient Position: Sitting   BP Method: Large (Automatic)   Pulse: 69   Resp: 16   Temp: 98 °F (36.7 °C)   TempSrc: Oral   SpO2: 95%   Weight: (!) 136.3 kg (300 lb 7.8 oz)   Height: 6'  (1.829 m)     BMI: Body mass index is 40.75 kg/m².    Physical Exam   Constitutional: He is oriented to person, place, and time. He appears well-developed and well-nourished.   HENT:   Head: Normocephalic and atraumatic.   Eyes: Pupils are equal, round, and reactive to light.   Neck: Normal range of motion. Neck supple.   Cardiovascular: Normal rate and regular rhythm.   Pulmonary/Chest: Effort normal and breath sounds normal. No respiratory distress.   Abdominal: Soft. He exhibits no distension. There is no tenderness.   Musculoskeletal: He exhibits no edema or tenderness.   Left lower extremity prosthesis   Lymphadenopathy:     He has no cervical adenopathy.   Neurological: He is alert and oriented to person, place, and time. No cranial nerve deficit.   Skin: Skin is warm and dry.   Psychiatric: He has a normal mood and affect. His behavior is normal.       Laboratory Data:  No visits with results within 1 Week(s) from this visit.   Latest known visit with results is:   Hospital Outpatient Visit on 08/09/2019   Component Date Value Ref Range Status    Ascending aorta 08/09/2019 4.00  cm Final    STJ 08/09/2019 3.25  cm Final    AV mean gradient 08/09/2019 5  mmHg Final    Ao peak todd 08/09/2019 1.67  m/s Final    Ao VTI 08/09/2019 33.17  cm Final    IVRT 08/09/2019 0.11  msec Final    IVS 08/09/2019 1.15* 0.6 - 1.1 cm Final    LA size 08/09/2019 4.30  cm Final    Left Atrium Major Axis 08/09/2019 6.38  cm Final    Left Atrium Minor Axis 08/09/2019 6.23  cm Final    LVIDD 08/09/2019 5.03  3.5 - 6.0 cm Final    LVIDS 08/09/2019 3.48  2.1 - 4.0 cm Final    LVOT diameter 08/09/2019 2.19  cm Final    LVOT peak VTI 08/09/2019 28.14  cm Final    PW 08/09/2019 1.04  0.6 - 1.1 cm Final    MV Peak A Todd 08/09/2019 0.63  m/s Final    E wave decelartion time 08/09/2019 274.70  msec Final    MV Peak E Todd 08/09/2019 0.56  m/s Final    PV Peak D Todd 08/09/2019 0.47  m/s Final    PV Peak S Todd 08/09/2019 0.51   m/s Final    RA Major Axis 08/09/2019 5.58  cm Final    RA Width 08/09/2019 3.96  cm Final    RVDD 08/09/2019 4.06  cm Final    Sinus 08/09/2019 3.67  cm Final    TAPSE 08/09/2019 2.43  cm Final    TR Max Todd 08/09/2019 2.43  m/s Final    TDI SEPTAL 08/09/2019 0.07  m/s Final    LA WIDTH 08/09/2019 4.00  cm Final    LV Diastolic Volume 08/09/2019 119.91  mL Final    LV Systolic Volume 08/09/2019 50.32  mL Final    LVOT peak todd 08/09/2019 1.30  m/s Final    LV SEPTAL E/E' RATIO 08/09/2019 8.00  m/s Final    FS 08/09/2019 31  % Final    LA volume 08/09/2019 92.17  cm3 Final    LV mass 08/09/2019 207.85  g Final    Left Ventricle Relative Wall Thick* 08/09/2019 0.41  cm Final    AV valve area 08/09/2019 3.19  cm2 Final    AV Velocity Ratio 08/09/2019 0.78   Final    AV index (prosthetic) 08/09/2019 0.85   Final    E/A ratio 08/09/2019 0.89   Final    Pulm vein S/D ratio 08/09/2019 1.09   Final    LVOT area 08/09/2019 3.8  cm2 Final    LVOT stroke volume 08/09/2019 105.95  cm3 Final    AV peak gradient 08/09/2019 11  mmHg Final    Triscuspid Valve Regurgitation Pea* 08/09/2019 24  mmHg Final    BSA 08/09/2019 2.64  m2 Final    LV Systolic Volume Index 08/09/2019 19.8  mL/m2 Final    LV Diastolic Volume Index 08/09/2019 47.24  mL/m2 Final    LA Volume Index 08/09/2019 36.3  mL/m2 Final    LV Mass Index 08/09/2019 82  g/m2 Final    Right Atrial Pressure (from IVC) 08/09/2019 3  mmHg Final    TV rest pulmonary artery pressure 08/09/2019 27  mmHg Final     Supplemental Diagnosis  Chromogranin Staining:  Intensity: Moderate to strong. Positive cells: 100 (%)  Synaptophysin Staining:  Intensity: Strong. Positive cells: 100 (%)  CD31: 40 vessels per 1 HPF  Factor VIII: 27 vessels per 1 HPF  Ki67: 1 % tumor cells staining  Immunostains performed with appropriate positive and negative controls.  FINAL PATHOLOGIC DIAGNOSIS  1. Lymph node, level XI, excisional biopsy:  Fragments of benign lymph  node with anthracosis and sinus histiocytosis (0/1).  2. Lung, lingular staple line, biopsy:  Lung, negative for neoplasm.  3. Lymph node, level X, excisional biopsy:  1 benign lymph node with anthracotic pigment and sinus histiocytosis (0/1).  4. Lymph node, level XII, excisional biopsy:  2 benign lymph nodes with sinus histiocytosis and anthracotic pigment (0/2).  5. Lung, left upper lobe, lobectomy:  Typical carcinoid tumor (well differentiated neuroendocrine tumor), 4 cm in greatest dimension.  Bronchial and vascular margins are negative for neoplasm.  Uninvolved lung adjacent to the tumor shows emphysematous changes and fibrosis, however remaining lung  appears unremarkable.  Additional immunohistochemical stains for ancillary studies (including synaptophysin, chromogranin and Ki-67)  will be completed and results will be reported in a supplemental report.  See synoptic report in comment section for further details.  6. Lymph node, level VII, excisional biopsy:  2 benign lymph nodes with anthracotic pigment and sinus histiocytosis (0/2).  Comment: Synoptic Report  Specimen: Left upper lobe of lung  Procedure: Lobectomy  Specimen laterality: Left  Tumor size:  Greatest dimension: 4 cm  Tumor focality: Single focus  Histologic type: Typical carcinoid tumor  Visceral pleural invasion: Not identified  Tumor extension: Not identified  Margins: Bronchial and vascular margins are uninvolved by tumor.  Distance of tumor from closest margin: 1 cm from the bronchial surgical margin.  Lymphovascular invasion: Not identified  Ancillary studies:  Immunohistochemical stains for neuroendocrine markers and Ki-67 will be completed and results will follow in a  supplemental report.  Note: The tumor consists of a proliferation of cells in nests with lewis formation. The tumor cells have salt and  pepper chromatin pattern with abundant cytoplasm. There is no significant nuclear atypia. No evidence of  necrosis. Mitotic count is  0 mitoses in 10 high power fields.    Imaging:     Gallium 68 PET-CT 08/20/2019    COMPARISON:  PET gallium 68 DOTATATE whole-body scan 04/03/2019    FINDINGS:  Quality of the study: Adequate.    Diffuse abnormal foci of increased tracer uptake is present throughout the axial and visualized appendicular skeleton, left orbit, and liver (largest right hepatic lobe lesion is 6 cm, index segment II lesion is 3 cm) noting similar quantity in distribution of lesions and decreased associated radiotracer uptake when compared to the prior examination.  Additional foci of increased radiotracer uptake is present in the prostate (left aspect, eccentric) with an SUV of 30.3  and the thyroid (2 cm hypoattenuating right lobe nodule) with an SUV of 14.4.    Index lesions from PET examination 04/03/2019:    Lesion 1: Left orbit-SUV max 7.5 (previously 23.7)    Lesion 2: Left coracoid process-SUV max 13.3 (previously 28.3)    Lesion 3: Left hepatic lobe segment II-SUV max 42.8 (previously 55.2)    Lesion 4: Left sacral ala-SUV max 17.8 (previously 42.9)    Physiologic distribution of the tracer is seen within the pituitary, salivary, stomach, pancreas uncinate process, spleen, adrenal glands, kidneys and urinary bladder, and bowel.    Incidental CT findings: Status post left upper lobectomy.      Impression       1.  Persistent widely disseminated metastatic disease with decreased uptake compared with prior study.    2.  2 cm right thyroid nodule with increased radiotracer uptake.  Recommend thyroid ultrasound for further evaluation.    3.  Focal, eccentric increased radiotracer uptake in the prostate.  Recommend correlation with laboratory values such as PSA, and consider correlation with dedicated prostate imaging.                  Assessment:       1. Secondary neuroendocrine tumor of liver    2. Bronchial carcinoid tumors    3. Vision loss of left eye           Plan:     Mr. Rahman vision is slowly improving and he remains on  treatment with corticosteroids.  We will continue this for now but would like to start tapering once he has been seen with Ophthalmology and if they are agreeable.  We will continue to hold PRRT for narrow.  I have reviewed the findings of his gallium 68 PET-CT with him today which do show an improvement in his disease burden indicating this treatment is beneficial for him.  I would like to restart this once his vision improves.  I will plan to follow him back up in another 3 weeks and he is to report any new symptoms in the interim.  All questions were answered and he is agreeable with this plan.    Jesus London DO, FACP  Hematology & Oncology  [address] [place] [address]  ph. [phone]  Fax. [phone]    25 minutes were spent in coordination of patient's care, record review and counseling.  More than 50% of the time was face-to-face.

## 2019-08-26 NOTE — LETTER
August 26, 2019        Jesus Mendoza MD  [address] [place] [address] Blvd  [address]  [place] [address]             [facility]  [address]  [place] [address]  Phone: [phone]   Patient: Elroy Rahman   MR Number: [id number]   YOB: 1955   Date of Visit: 8/26/2019       Dear Dr. Mendoza:    Thank you for referring Elroy Rahman to me for evaluation. Attached you will find relevant portions of my assessment and plan of care.    If you have questions, please do not hesitate to call me. I look forward to following Elroy Rahman along with you.    Sincerely,      Jesus London, DO, FACP            CC  No Recipients    Enclosure

## 2019-08-28 ENCOUNTER — TELEPHONE (OUTPATIENT)
Dept: NEUROLOGY | Facility: HOSPITAL | Age: 64
End: 2019-08-28

## 2019-09-03 ENCOUNTER — TELEPHONE (OUTPATIENT)
Dept: NEUROLOGY | Facility: HOSPITAL | Age: 64
End: 2019-09-03

## 2019-09-03 NOTE — TELEPHONE ENCOUNTER
----- Message from Jesus London DO, FACP sent at 9/3/2019 12:57 PM CDT -----  Regarding: RE: On scheduled for PRRT for 9/25/19- should I keep him on ?  prrt still on hold    ----- Message -----  From: Maddie Hughes RN  Sent: 9/3/2019  11:57 AM  To: Jesus London DO, FACP, #  Subject: On scheduled for PRRT for 9/25/19- should I #    ,   is marita to see you 9/16.  He was previously due to have PRRT on 9/25/19. Lab on 9/18.  Do you want me to keep him on the schedule?  Luz Maria

## 2019-09-05 ENCOUNTER — TELEPHONE (OUTPATIENT)
Dept: OPHTHALMOLOGY | Facility: CLINIC | Age: 64
End: 2019-09-05

## 2019-09-05 ENCOUNTER — TELEPHONE (OUTPATIENT)
Dept: HEMATOLOGY/ONCOLOGY | Facility: CLINIC | Age: 64
End: 2019-09-05

## 2019-09-05 DIAGNOSIS — D31.32: Primary | ICD-10-CM

## 2019-09-05 NOTE — TELEPHONE ENCOUNTER
Spoke with patient's ophthalmologist yesterday who indicated overall plateau of vision.  He recommended starting steroid taper.  I have spoken to patient today who states he feels is vision continues to improve.  We will plan to start tapering prednisone and I have asked him to start taking 130 mg daily for the next 5 days and then go to 110 mg daily for 5 days.  Follow-up with me as previously scheduled and report any new symptoms in the interim.  All questions were answered and he is agreeable with this plan.

## 2019-09-11 ENCOUNTER — PATIENT MESSAGE (OUTPATIENT)
Dept: HEMATOLOGY/ONCOLOGY | Facility: CLINIC | Age: 64
End: 2019-09-11

## 2019-09-12 ENCOUNTER — PATIENT MESSAGE (OUTPATIENT)
Dept: HEMATOLOGY/ONCOLOGY | Facility: CLINIC | Age: 64
End: 2019-09-12

## 2019-09-12 DIAGNOSIS — S78.112A ABOVE KNEE AMPUTATION OF LEFT LOWER EXTREMITY: Primary | ICD-10-CM

## 2019-09-12 DIAGNOSIS — H54.62 VISION LOSS OF LEFT EYE: ICD-10-CM

## 2019-09-13 ENCOUNTER — HOSPITAL ENCOUNTER (INPATIENT)
Facility: HOSPITAL | Age: 64
LOS: 8 days | Discharge: HOME HEALTH | DRG: 854 | End: 2019-09-22
Attending: EMERGENCY MEDICINE | Admitting: EMERGENCY MEDICINE
Payer: COMMERCIAL

## 2019-09-13 ENCOUNTER — PATIENT MESSAGE (OUTPATIENT)
Dept: HEMATOLOGY/ONCOLOGY | Facility: CLINIC | Age: 64
End: 2019-09-13

## 2019-09-13 DIAGNOSIS — Z74.09 IMPAIRED MOBILITY AND ADLS: ICD-10-CM

## 2019-09-13 DIAGNOSIS — D3A.090 BRONCHIAL CARCINOID TUMORS: ICD-10-CM

## 2019-09-13 DIAGNOSIS — N10 ACUTE PYELONEPHRITIS: ICD-10-CM

## 2019-09-13 DIAGNOSIS — C7A.090 CARCINOID BRONCHIAL ADENOMA OF LEFT LUNG: ICD-10-CM

## 2019-09-13 DIAGNOSIS — S78.112A ABOVE KNEE AMPUTATION OF LEFT LOWER EXTREMITY: ICD-10-CM

## 2019-09-13 DIAGNOSIS — Z78.9 IMPAIRED MOBILITY AND ADLS: ICD-10-CM

## 2019-09-13 DIAGNOSIS — H54.62 VISION LOSS OF LEFT EYE: ICD-10-CM

## 2019-09-13 DIAGNOSIS — I77.6: ICD-10-CM

## 2019-09-13 DIAGNOSIS — C7B.8 SECONDARY NEUROENDOCRINE TUMORS: Primary | ICD-10-CM

## 2019-09-13 DIAGNOSIS — R78.81 BACTEREMIA: ICD-10-CM

## 2019-09-13 DIAGNOSIS — T87.40 AMPUTATION STUMP INFECTION: ICD-10-CM

## 2019-09-13 DIAGNOSIS — C7B.8 SECONDARY NEUROENDOCRINE TUMOR OF BONE: ICD-10-CM

## 2019-09-13 DIAGNOSIS — M62.81 ACUTE MUSCLE WEAKNESS: ICD-10-CM

## 2019-09-13 DIAGNOSIS — G72.9 MYOPATHY: Primary | ICD-10-CM

## 2019-09-13 DIAGNOSIS — A41.01 SEPSIS DUE TO METHICILLIN SUSCEPTIBLE STAPHYLOCOCCUS AUREUS: ICD-10-CM

## 2019-09-13 DIAGNOSIS — R53.1 WEAKNESS: ICD-10-CM

## 2019-09-13 DIAGNOSIS — I38 ENDOCARDITIS: ICD-10-CM

## 2019-09-13 PROBLEM — N39.0 UTI (URINARY TRACT INFECTION): Status: ACTIVE | Noted: 2019-09-13

## 2019-09-13 LAB
ALBUMIN SERPL BCP-MCNC: 2.5 G/DL (ref 3.5–5.2)
ALP SERPL-CCNC: 119 U/L (ref 55–135)
ALT SERPL W/O P-5'-P-CCNC: 61 U/L (ref 10–44)
ANION GAP SERPL CALC-SCNC: 11 MMOL/L (ref 8–16)
ANISOCYTOSIS BLD QL SMEAR: SLIGHT
AST SERPL-CCNC: 36 U/L (ref 10–40)
BACTERIA #/AREA URNS AUTO: ABNORMAL /HPF
BASOPHILS NFR BLD: 0 % (ref 0–1.9)
BILIRUB SERPL-MCNC: 1.6 MG/DL (ref 0.1–1)
BILIRUB UR QL STRIP: NEGATIVE
BUN SERPL-MCNC: 22 MG/DL (ref 8–23)
CALCIUM SERPL-MCNC: 8.9 MG/DL (ref 8.7–10.5)
CHLORIDE SERPL-SCNC: 99 MMOL/L (ref 95–110)
CK SERPL-CCNC: 591 U/L (ref 20–200)
CLARITY UR REFRACT.AUTO: CLEAR
CO2 SERPL-SCNC: 23 MMOL/L (ref 23–29)
COLOR UR AUTO: YELLOW
CORTIS SERPL-MCNC: 7.6 UG/DL (ref 4.3–22.4)
CREAT SERPL-MCNC: 0.9 MG/DL (ref 0.5–1.4)
CRP SERPL-MCNC: 216.6 MG/L (ref 0–8.2)
DIFFERENTIAL METHOD: ABNORMAL
DOHLE BOD BLD QL SMEAR: PRESENT
EOSINOPHIL NFR BLD: 0 % (ref 0–8)
ERYTHROCYTE [DISTWIDTH] IN BLOOD BY AUTOMATED COUNT: 14.8 % (ref 11.5–14.5)
ERYTHROCYTE [SEDIMENTATION RATE] IN BLOOD BY WESTERGREN METHOD: 75 MM/HR (ref 0–23)
EST. GFR  (AFRICAN AMERICAN): >60 ML/MIN/1.73 M^2
EST. GFR  (NON AFRICAN AMERICAN): >60 ML/MIN/1.73 M^2
GLUCOSE SERPL-MCNC: 136 MG/DL (ref 70–110)
GLUCOSE UR QL STRIP: NEGATIVE
HCT VFR BLD AUTO: 43.4 % (ref 40–54)
HGB BLD-MCNC: 14.4 G/DL (ref 14–18)
HGB UR QL STRIP: ABNORMAL
HYALINE CASTS UR QL AUTO: 0 /LPF
IMM GRANULOCYTES # BLD AUTO: ABNORMAL K/UL (ref 0–0.04)
IMM GRANULOCYTES NFR BLD AUTO: ABNORMAL % (ref 0–0.5)
KETONES UR QL STRIP: NEGATIVE
LACTATE SERPL-SCNC: 1.7 MMOL/L (ref 0.5–2.2)
LEUKOCYTE ESTERASE UR QL STRIP: ABNORMAL
LYMPHOCYTES NFR BLD: 1 % (ref 18–48)
MAGNESIUM SERPL-MCNC: 2 MG/DL (ref 1.6–2.6)
MCH RBC QN AUTO: 30.3 PG (ref 27–31)
MCHC RBC AUTO-ENTMCNC: 33.2 G/DL (ref 32–36)
MCV RBC AUTO: 91 FL (ref 82–98)
MICROSCOPIC COMMENT: ABNORMAL
MONOCYTES NFR BLD: 3 % (ref 4–15)
NEUTROPHILS NFR BLD: 92 % (ref 38–73)
NEUTS BAND NFR BLD MANUAL: 4 %
NITRITE UR QL STRIP: NEGATIVE
NRBC BLD-RTO: 0 /100 WBC
PH UR STRIP: 6 [PH] (ref 5–8)
PLATELET # BLD AUTO: 154 K/UL (ref 150–350)
PLATELET BLD QL SMEAR: ABNORMAL
PMV BLD AUTO: 9.8 FL (ref 9.2–12.9)
POTASSIUM SERPL-SCNC: 4.4 MMOL/L (ref 3.5–5.1)
PROT SERPL-MCNC: 6.6 G/DL (ref 6–8.4)
PROT UR QL STRIP: ABNORMAL
RBC # BLD AUTO: 4.75 M/UL (ref 4.6–6.2)
RBC #/AREA URNS AUTO: 13 /HPF (ref 0–4)
SODIUM SERPL-SCNC: 133 MMOL/L (ref 136–145)
SP GR UR STRIP: 1.01 (ref 1–1.03)
SQUAMOUS #/AREA URNS AUTO: 0 /HPF
TOXIC GRANULES BLD QL SMEAR: PRESENT
TROPONIN I SERPL DL<=0.01 NG/ML-MCNC: 0.11 NG/ML (ref 0–0.03)
TROPONIN I SERPL DL<=0.01 NG/ML-MCNC: 0.17 NG/ML (ref 0–0.03)
URN SPEC COLLECT METH UR: ABNORMAL
WBC # BLD AUTO: 12.78 K/UL (ref 3.9–12.7)
WBC #/AREA URNS AUTO: 22 /HPF (ref 0–5)

## 2019-09-13 PROCEDURE — 82533 TOTAL CORTISOL: CPT

## 2019-09-13 PROCEDURE — 85007 BL SMEAR W/DIFF WBC COUNT: CPT

## 2019-09-13 PROCEDURE — 25000003 PHARM REV CODE 250: Performed by: STUDENT IN AN ORGANIZED HEALTH CARE EDUCATION/TRAINING PROGRAM

## 2019-09-13 PROCEDURE — 83735 ASSAY OF MAGNESIUM: CPT

## 2019-09-13 PROCEDURE — 87086 URINE CULTURE/COLONY COUNT: CPT

## 2019-09-13 PROCEDURE — 99223 PR INITIAL HOSPITAL CARE,LEVL III: ICD-10-PCS | Mod: ,,,

## 2019-09-13 PROCEDURE — 36415 COLL VENOUS BLD VENIPUNCTURE: CPT

## 2019-09-13 PROCEDURE — 87077 CULTURE AEROBIC IDENTIFY: CPT

## 2019-09-13 PROCEDURE — 99285 EMERGENCY DEPT VISIT HI MDM: CPT | Mod: 25

## 2019-09-13 PROCEDURE — 87040 BLOOD CULTURE FOR BACTERIA: CPT

## 2019-09-13 PROCEDURE — 85652 RBC SED RATE AUTOMATED: CPT

## 2019-09-13 PROCEDURE — 96374 THER/PROPH/DIAG INJ IV PUSH: CPT

## 2019-09-13 PROCEDURE — 63600175 PHARM REV CODE 636 W HCPCS: Performed by: STUDENT IN AN ORGANIZED HEALTH CARE EDUCATION/TRAINING PROGRAM

## 2019-09-13 PROCEDURE — 84484 ASSAY OF TROPONIN QUANT: CPT

## 2019-09-13 PROCEDURE — 99285 EMERGENCY DEPT VISIT HI MDM: CPT | Mod: ,,, | Performed by: EMERGENCY MEDICINE

## 2019-09-13 PROCEDURE — 83605 ASSAY OF LACTIC ACID: CPT

## 2019-09-13 PROCEDURE — 99222 PR INITIAL HOSPITAL CARE,LEVL II: ICD-10-PCS | Mod: ,,, | Performed by: INTERNAL MEDICINE

## 2019-09-13 PROCEDURE — 87088 URINE BACTERIA CULTURE: CPT

## 2019-09-13 PROCEDURE — 63600175 PHARM REV CODE 636 W HCPCS: Performed by: EMERGENCY MEDICINE

## 2019-09-13 PROCEDURE — 99223 1ST HOSP IP/OBS HIGH 75: CPT | Mod: ,,,

## 2019-09-13 PROCEDURE — G0378 HOSPITAL OBSERVATION PER HR: HCPCS

## 2019-09-13 PROCEDURE — 82550 ASSAY OF CK (CPK): CPT

## 2019-09-13 PROCEDURE — 87040 BLOOD CULTURE FOR BACTERIA: CPT | Mod: 59

## 2019-09-13 PROCEDURE — 99285 PR EMERGENCY DEPT VISIT,LEVEL V: ICD-10-PCS | Mod: ,,, | Performed by: EMERGENCY MEDICINE

## 2019-09-13 PROCEDURE — 93010 EKG 12-LEAD: ICD-10-PCS | Mod: ,,, | Performed by: INTERNAL MEDICINE

## 2019-09-13 PROCEDURE — 93005 ELECTROCARDIOGRAM TRACING: CPT

## 2019-09-13 PROCEDURE — 99222 1ST HOSP IP/OBS MODERATE 55: CPT | Mod: ,,, | Performed by: INTERNAL MEDICINE

## 2019-09-13 PROCEDURE — 80053 COMPREHEN METABOLIC PANEL: CPT

## 2019-09-13 PROCEDURE — 81001 URINALYSIS AUTO W/SCOPE: CPT

## 2019-09-13 PROCEDURE — 87186 SC STD MICRODIL/AGAR DIL: CPT | Mod: 59

## 2019-09-13 PROCEDURE — 93010 ELECTROCARDIOGRAM REPORT: CPT | Mod: ,,, | Performed by: INTERNAL MEDICINE

## 2019-09-13 PROCEDURE — 85027 COMPLETE CBC AUTOMATED: CPT

## 2019-09-13 PROCEDURE — 86140 C-REACTIVE PROTEIN: CPT

## 2019-09-13 PROCEDURE — 96361 HYDRATE IV INFUSION ADD-ON: CPT

## 2019-09-13 RX ORDER — BRIMONIDINE TARTRATE 1.5 MG/ML
1 SOLUTION/ DROPS OPHTHALMIC 2 TIMES DAILY
Status: DISCONTINUED | OUTPATIENT
Start: 2019-09-14 | End: 2019-09-22 | Stop reason: HOSPADM

## 2019-09-13 RX ORDER — GLUCAGON 1 MG
1 KIT INJECTION
Status: DISCONTINUED | OUTPATIENT
Start: 2019-09-13 | End: 2019-09-22 | Stop reason: HOSPADM

## 2019-09-13 RX ORDER — ONDANSETRON 2 MG/ML
4 INJECTION INTRAMUSCULAR; INTRAVENOUS EVERY 8 HOURS PRN
Status: DISCONTINUED | OUTPATIENT
Start: 2019-09-13 | End: 2019-09-22 | Stop reason: HOSPADM

## 2019-09-13 RX ORDER — ACETAMINOPHEN 325 MG/1
650 TABLET ORAL EVERY 4 HOURS PRN
Status: DISCONTINUED | OUTPATIENT
Start: 2019-09-13 | End: 2019-09-22 | Stop reason: HOSPADM

## 2019-09-13 RX ORDER — IPRATROPIUM BROMIDE AND ALBUTEROL SULFATE 2.5; .5 MG/3ML; MG/3ML
3 SOLUTION RESPIRATORY (INHALATION) EVERY 4 HOURS PRN
Status: DISCONTINUED | OUTPATIENT
Start: 2019-09-13 | End: 2019-09-22 | Stop reason: HOSPADM

## 2019-09-13 RX ORDER — SODIUM CHLORIDE 0.9 % (FLUSH) 0.9 %
10 SYRINGE (ML) INJECTION
Status: DISCONTINUED | OUTPATIENT
Start: 2019-09-13 | End: 2019-09-22 | Stop reason: HOSPADM

## 2019-09-13 RX ORDER — CEFTRIAXONE 1 G/1
1 INJECTION, POWDER, FOR SOLUTION INTRAMUSCULAR; INTRAVENOUS
Status: COMPLETED | OUTPATIENT
Start: 2019-09-13 | End: 2019-09-13

## 2019-09-13 RX ORDER — HYDROCODONE BITARTRATE AND ACETAMINOPHEN 5; 325 MG/1; MG/1
1 TABLET ORAL EVERY 6 HOURS PRN
Status: DISCONTINUED | OUTPATIENT
Start: 2019-09-13 | End: 2019-09-22 | Stop reason: HOSPADM

## 2019-09-13 RX ORDER — SODIUM CHLORIDE, SODIUM LACTATE, POTASSIUM CHLORIDE, CALCIUM CHLORIDE 600; 310; 30; 20 MG/100ML; MG/100ML; MG/100ML; MG/100ML
INJECTION, SOLUTION INTRAVENOUS CONTINUOUS
Status: DISCONTINUED | OUTPATIENT
Start: 2019-09-13 | End: 2019-09-14

## 2019-09-13 RX ORDER — IBUPROFEN 200 MG
16 TABLET ORAL
Status: DISCONTINUED | OUTPATIENT
Start: 2019-09-13 | End: 2019-09-22 | Stop reason: HOSPADM

## 2019-09-13 RX ORDER — KETOROLAC TROMETHAMINE 4 MG/ML
1 SOLUTION/ DROPS OPHTHALMIC 4 TIMES DAILY
Status: DISCONTINUED | OUTPATIENT
Start: 2019-09-14 | End: 2019-09-14

## 2019-09-13 RX ORDER — IBUPROFEN 200 MG
24 TABLET ORAL
Status: DISCONTINUED | OUTPATIENT
Start: 2019-09-13 | End: 2019-09-22 | Stop reason: HOSPADM

## 2019-09-13 RX ADMIN — SODIUM CHLORIDE, SODIUM LACTATE, POTASSIUM CHLORIDE, AND CALCIUM CHLORIDE: .6; .31; .03; .02 INJECTION, SOLUTION INTRAVENOUS at 09:09

## 2019-09-13 RX ADMIN — CEFTRIAXONE SODIUM 1 G: 1 INJECTION, POWDER, FOR SOLUTION INTRAMUSCULAR; INTRAVENOUS at 03:09

## 2019-09-13 RX ADMIN — ACETAMINOPHEN 650 MG: 325 TABLET ORAL at 08:09

## 2019-09-13 RX ADMIN — SODIUM CHLORIDE 1000 ML: 0.9 INJECTION, SOLUTION INTRAVENOUS at 01:09

## 2019-09-13 RX ADMIN — SODIUM CHLORIDE 1000 ML: 0.9 INJECTION, SOLUTION INTRAVENOUS at 03:09

## 2019-09-13 NOTE — ED TRIAGE NOTES
Patient arrives [facility] from home for evaluation of generalized body aches x 3 days - patient states history of liver and lung CA and was told by Dr. London to go to the ED for evaluation

## 2019-09-13 NOTE — CONSULTS
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 Rahman is a 63 y.o. male who is seen in follow-up for a bronchial carcinoid tumor.  He states he generally has been feeling well. He states his vision continues to slowly improve.  He is seeing Ophthalmology later this week.  He has no other new complaints.     His history dates to 1/2016 when he underwent a left upper lobectomy with Dr. Bell for a typical carcinoid tumor.  His pathological staging was T2a, N0, M0 with Ki-67 less than 1% and mitotic count 0 per 10 high-powered fields.  He done well postoperatively, however, in 5/2017 he was undergoing routine surveillance and a chest CT in picked up evidence of progression of disease in the liver.  Dedicated abdominal CT was performed showing multiple liver lesions.  In 6/2017 he underwent a CT-guided liver biopsy which was positive for metastatic neuroendocrine tumor which was well-differentiated with no overt nuclear atypia, however, the Ki-67 was 25%.  He underwent gallium-68 PET/CT and was found to have widespread disease involving the bone, liver and also mesentery.  He was started on CAPTEM in 7/2017 and also Lanreotide and zoledronic acid.  Scans in 10/2017 had shown stability of disease.  Imaging in 1/2018 had shown stability of disease.  Scans in 4/2018 had shown stability of disease.  Scans in 07/2018 had continued to showed mild growth.  In 10/2018 he underwent TACE.  CAPTEM was discontinued in 02/2019 due to progression.  He underwent  TACE in 03/2019.  He was started on treatment with PRRT using Lily-177 on 04/10/2019.       History of Present Illness:  Patient is a 63 y.o. male with PMHx of bronchial carcinoid tumor  (most recently on PRRT), history of L BKA who presents to the ER due to weakness, fatigue, and symptoms of dehydration occurring from this weekend. His daughter had noticed him being weak and sweating more profusely over last weekend and this has progressively worsened. He had been on high dose prednisone to manage possible inflammation noted in globe of his eye secondary to PRRT. The pt was told to take prednisone 130 mg from 9/5 and then to go to 110 mg of prednisone after that point. The pt had trouble using his upper extremities secondary to the weakness in his arms. Denies any chest pain currently. He has chronic erythema noted on his back, which is noted again today. Discussed with family in room who also state that pt is also dehydrated. Pt states that his vision is still cloudy but improved compared to last week.     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 2ND FLR    Bka Left     LEFT UPPER LOBECTOMY-LUNG Left 1/28/2016    Performed by Pan Bell [facility] at [facility] OR 2ND FLR    REPAIR, HERNIA, UMBILICAL, INCARCERATED, LAPAROSCOPIC  N/A 11/27/2018    Per[facility] by [facility] at [facility]    THORACOTOMY W/LUNG RESECTION Left 1/28/2016    Performed by [facility] Bell [facility] at [facility] F[facility]     Family History   Problem Relation Age of Onset    Cancer Mother         lung    Cancer Sister     Anesthesia problems Neg Hx      Social History     Tobacco Use    Smoking status: Never Smoker    Smokeless tobacco: Never Used   Substance Use Topics    Alcohol use: Yes     Alcohol/week: 0.6 oz     Types: 1 Cans of beer per week     Comment: rare    Drug use: No     Review of Systems   Constitutional: Positive for diaphoresis and malaise/fatigue. Negative for chills, fever and weight loss.   Respiratory: Negative for hemoptysis, sputum production and shortness of breath.    Cardiovascular: Negative for chest pain and leg swelling.   Gastrointestinal: Negative for abdominal pain, diarrhea, nausea and vomiting.   Genitourinary: Negative for hematuria.   Musculoskeletal:        + chronic LLE amputation   Neurological: Positive for weakness. Negative for dizziness, sensory change, seizures and headaches.     OBJECTIVE:     Vital Signs:  Temp:  [98.1 °F (36.7 °C)-98.3 °F (36.8 °C)]   Pulse:  [62-90]   Resp:  [15-20]   BP: (120-132)/(69-77)   SpO2:  [95 %-97 %]     Physical Exam   Constitutional: He is oriented to person, place, and time. He appears well-developed and well-nourished. No distress.   HENT:   Head: Normocephalic and atraumatic.   Eyes: Pupils are equal, round, and reactive to light. Conjunctivae and EOM are normal.   Neck: Normal range of motion. Neck supple.   Cardiovascular: Normal rate and regular rhythm. Exam reveals no gallop and no friction rub.   No murmur heard.  Pulmonary/Chest: Effort normal and breath sounds normal. No stridor. No respiratory distress. He has no wheezes. He has no rales.   Abdominal: Soft. Bowel sounds are normal. He exhibits no distension. There is no tenderness. There is no guarding.    Musculoskeletal: Normal range of motion.    + left BKA     Lymphadenopathy:     He has no cervical adenopathy.   Neurological: He is alert and oriented to person, place, and time. No cranial nerve deficit or sensory deficit.   Skin: Skin is warm and dry.   Diffuse erythematous back     Laboratory:  CBC:   Recent Labs   Lab 09/13/19  1316   WBC 12.78*   RBC 4.75   HGB 14.4   HCT 43.4      MCV 91   MCH 30.3   MCHC 33.2     BMP:   Recent Labs   Lab 09/13/19  1316   *   *   K 4.4   CL 99   CO2 23   BUN 22   CREATININE 0.9   CALCIUM 8.9   MG 2.0     CMP:   Recent Labs   Lab 09/13/19  1316   *   CALCIUM 8.9   ALBUMIN 2.5*   PROT 6.6   *   K 4.4   CO2 23   CL 99   BUN 22   CREATININE 0.9   ALKPHOS 119   ALT 61*   AST 36   BILITOT 1.6*     LFTs:   Recent Labs   Lab 09/13/19  1316   ALT 61*   AST 36   ALKPHOS 119   BILITOT 1.6*   PROT 6.6   ALBUMIN 2.5*     Coagulation: No results for input(s): LABPROT, INR, APTT in the last 168 hours.  Cardiac markers:   Recent Labs   Lab 09/13/19  1316   TROPONINI 0.175*     ABGs: No results for input(s): PH, PCO2, PO2, HCO3, POCSATURATED, BE in the last 168 hours.  Microbiology Results (last 7 days)     Procedure Component Value Units Date/Time    Urine culture [[id number]] Collected:  09/13/19 1349    Order Status:  No result Specimen:  Urine Updated:  09/13/19 1432    Blood culture #2 **CANNOT BE ORDERED STAT** [[id number]] Collected:  09/13/19 1348    Order Status:  Sent Specimen:  Blood from Peripheral, Hand, Right Updated:  09/13/19 1401    Blood culture #1 **CANNOT BE ORDERED STAT** [[id number]] Collected:  09/13/19 1348    Order Status:  Sent Specimen:  Blood from Peripheral, Antecubital, Left Updated:  09/13/19 1400        Specimen (12h ago, onward)    None        Recent Labs   Lab 09/13/19 1349   COLORU Yellow   SPECGRAV 1.015   PHUR 6.0   PROTEINUA 1+*   BACTERIA Occasional   NITRITE Negative   LEUKOCYTESUR 1+*   HYALINECASTS 0       Diagnostic  Results:      ASSESSMENT/PLAN:       Plan:    Bronchial Carcinoid, w secondary NET to liver  - PRRT has been on hold,no changes to this plan while he is inpatient  - last gallium PET-CT on 8/20/19 with improvement in burden of disease  - was on steroids due to inflammation of eye thought to be secondary to PRRT    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 London. Our team will follow over weekend, thanks for consult.     Ochoa Rodriguez MD  Hematology/Oncology Fellow, PGY V  [facility]    Attending Addendum:  The patient was seen, examined, and discussed on rounds with the fellow.  I agree with the assessment and plan as outlined for Elroy Rahman.      Jesus London DO, FACP  Hematology & Oncology  [facility] [place] [address]  ph. [phone]  Fax. [phone]

## 2019-09-13 NOTE — ED NOTES
Pt with Hx of neuroendorine cancer of liver and bone, and left lung adenoma arrived to ED via [facility] with CC of generalized body aches 10/10 and weakness x 3 weeks. Pt was told by Dr. Khan to come to ED for further evaluation.   Patient identifiers verified and correct for Elroy Rahman.    LOC: The patient is awake, alert and oriented x 4. Pt is speaking appropriately, no slurred speech.  APPEARANCE: Patient resting comfortably and in no acute distress. Pt is clean and well groomed. No JVD visible. Pt reports pain level of 10/10.  SKIN: Skin is warm dry and intact, and color is consistent with ethnicity. No tenting observed and capillary refill <3 seconds. No clubbing noted to nail beds. No breakdown or brusing visible and mucus membranes moist and acyanotic.  MUSCULOSKELETAL: generalized weakness.   RESPIRATORY: Airway is open and patent. Respirations-unlabored, regular rate, equal bilaterally on inspiration and expiration. No accessory muscle use noted. Lungs clear to auscultation in all fields bilaterally anterior and posterior.   CARDIAC: Patient has regular heart rate and rhythm.  No peripheral edema noted, and patient has no c/o chest pain.  ABDOMEN: Soft and non-tender to palpation with no distention noted. Normoactive bowel sounds X4 quadrants. Pt has no complaints of abnormal bowel movements. Pt reports normal appetite.   NEUROLOGIC: Eyes open spontaneously and facial expression symmetrical. Pt behavior appropriate to situation, and pt follows commands.  Pt reports sensation present in all extremities when touched with a finger. PERRLA  : No complaints of frequency, burning, urgency or blood in the urine.

## 2019-09-13 NOTE — ED PROVIDER NOTES
CC: Generalized Body Aches (Patient arrives [facility] from home for evaluation of generalized body aches x 3 days - patient states history of liver and lung CA and was told by Dr. London to go to the ED for evaluation )      History provided by:   Patient   Family    HPI: Elroy Rahman is a 63 y.o. year old male past medical history of bronchial carcinoid tumor diagnosis 2016, metastatic disease to liver, mesentery and bone 2017 status post left upper lobectomy 2016 followed by Dr. London, started on PRRT in April 2019, currently only on steroids who presents to the ED complaining of generalized weakness, muscle aches, diaphoresis, shivers that started on Tuesday (3 days ago).  He is currently on prednisone and was recently decreased from 135 to130 mg on Tuesday.  He denies headache, fevers, chest pain, shortness of breath, abdominal pain, dysuria, urinary frequency, diarrhea, constipation.  Since Tuesday his weakness has been getting progressively worse up to the point he cannot even get out of bed, cannot put any weight on his right leg.  He has a history of left BKA, does not use at baseline any walker or cane for ambulation.  On Monday he had 2 episodes of incontinence of urine secondary to not being able to make it to the washroom on time however he was aware that he needed to void.  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 OR 2ND FLR    Bka Left     LEFT UPPER LOBECTOMY-LUNG Left 1/28/2016    Performed by Pan Bell [facility] at [facility] OR 2ND FLR    REPAIR, HERNIA, UMBILICAL, INCARCERATED, LAPAROSCOPIC N/A 11/27/2018    Performed by [facility] at [facility] OR    THORACOTOMY W/LUNG RESECTION Left 1/28/2016    Performed by Pan Bell MD at [facility] OR 2ND FLR     Family History   Problem Relation Age of Onset    Cancer Mother         lung    Cancer Sister     Anesthesia problems Neg Hx      No current facility-administered medications on file prior to encounter.      Current Outpatient Medications on File Prior to Encounter   Medication Sig Dispense Refill    ALPHAGAN P 0.1 % Drop INT 1 GTT IN OS BID  4    ketorolac 0.4% (ACULAR) 0.4 % Drop INT 1 GTT INTO OS QID  0    lanreotide (SOMATULINE DEPOT) 120 mg/0.5 mL Syrg Inject 120 mg into the skin every 28 days.      ondansetron (ZOFRAN) 4 MG tablet Take 1 tablet (4 mg total) by mouth 2 (two) times daily. 15 tablet 0    ondansetron (ZOFRAN) 4 MG tablet Take 1 tablet (4 mg total) by mouth 2 (two) times daily. 15 tablet 0    predniSONE (DELTASONE) 20 MG tablet Take 2 tablets (40 mg total) by mouth once daily. Take with 50 mg tabs.  Total of 140 mg daily 60 tablet 0    predniSONE (DELTASONE) 50 MG Tab Take 2 tablets (100 mg total) by mouth once daily. Take with 20 mg tablets.  Total of 140 daily. 60 tablet 0    PROLENSA 0.07 % Drop INSTILL 1 DROP IN OS BID  4     Epinephrine  Social History     Socioeconomic History    Marital status:      Spouse name: Not on file    Number of children: Not on file    Years of education: Not on file    Highest education level: Not on file   Occupational History    Not on file   Social Needs    Financial resource strain: Not on file    Food insecurity:     Worry: Not on file     Inability: Not on file     Transportation needs:     Medical: Not on file     Non-medical: Not on file   Tobacco Use    Smoking status: Never Smoker    Smokeless tobacco: Never Used   Substance and Sexual Activity    Alcohol use: Yes     Alcohol/week: 0.6 oz     Types: 1 Cans of beer per week     Comment: rare    Drug use: No    Sexual activity: Not on file   Lifestyle    Physical activity:     Days per week: Not on file     Minutes per session: Not on file    Stress: Not on file   Relationships    Social connections:     Talks on phone: Not on file     Gets together: Not on file     Attends [employer] service: Not on file     Active member of club or organization: Not on file     Attends meetings of clubs or organizations: Not on file     Relationship status: Not on file   Other Topics Concern    Not on file   Social History Narrative    Not on file       ROS:     Constitutional :  Positive for shivering and diaphoresis   HENT neg for head injury, neg for sore throat  Eyes: neg for visual changes, neg for eye pain  Resp neg for SOB, neg for cough  Cardiac  neg for chest pain, neg for palpitations  GI neg for abd pain, neg for nausea, neg for vomiting   neg for urinary changes  Neuro positive for generalized weakness especially his right leg  Skin neg for skin rash  MSK:  Muscle aches, generalized  ALL: Epinephrine    PHYSICAL EXAM:  Vitals:    09/13/19 1208   BP: 120/74   Pulse: 62   Resp: 18   Temp: 98.3 °F (36.8 °C)         PHYSICAL EXAM:     general: comfortable, in no acute distress, pleasant, well nourished  VS: triage VS reviewed  HENT: NC/AT  Eyes: PERRL, EOMI  CV: RRR, no  murmurs, no rubs, no gallops, no LE edema  Resp: comfortable breathing, speaks in full sentences, CTAB, no wheezing, no crackles, no ronchi  ABD:  soft, ND, + normal BS, NT  Renal: No CVAT  Neuro: AAO x 3, right lower extremity 1/5, bilateral upper extremities 4/5, sensation grossly intact, face symmetric, speech normal  MSK: no deformity, no edema  Skin  with left BKA stump mild erythema but no discharge no tenderness to palpation            DATA & INTERVENTIONS:    LABS reviewed:  Labs Reviewed   COMPREHENSIVE METABOLIC PANEL - Abnormal; Notable for the following components:       Result Value    Sodium 133 (*)     Glucose 136 (*)     Albumin 2.5 (*)     Total Bilirubin 1.6 (*)     ALT 61 (*)     All other components within normal limits   CBC W/ AUTO DIFFERENTIAL - Abnormal; Notable for the following components:    WBC 12.78 (*)     RDW 14.8 (*)     Gran% 92.0 (*)     Lymph% 1.0 (*)     Mono% 3.0 (*)     All other components within normal limits   URINALYSIS, REFLEX TO URINE CULTURE - Abnormal; Notable for the following components:    Protein, UA 1+ (*)     Occult Blood UA 3+ (*)     Leukocytes, UA 1+ (*)     All other components within normal limits    Narrative:     GRAY AND YELLOW  Preferred Collection Type->Urine, Clean Catch   TROPONIN I - Abnormal; Notable for the following components:    Troponin I 0.175 (*)     All other components within normal limits   CK - Abnormal; Notable for the following components:     (*)     All other components within normal limits   SEDIMENTATION RATE - Abnormal; Notable for the following components:    Sed Rate 75 (*)     All other components within normal limits   C-REACTIVE PROTEIN - Abnormal; Notable for the following components:    .6 (*)     All other components within normal limits   URINALYSIS MICROSCOPIC - Abnormal; Notable for the following components:    RBC, UA 13 (*)     WBC, UA 22 (*)     All other components within normal limits    Narrative:     GRAY AND YELLOW  Preferred Collection Type->Urine, Clean Catch   CULTURE, BLOOD   CULTURE, BLOOD   CULTURE, URINE   MAGNESIUM   LACTIC ACID, PLASMA       RADIOLOGY reviewed:  Imaging Results          X-Ray Chest AP Portable (Final result)  Result time 09/13/19 13:31:52    Final result by Charlotte Hammond MD (09/13/19 13:31:52)                 Impression:       No acute disease identified in this patient who has undergone prior partial resection of the left lung.      Electronically signed by: Charlotte Hammond MD  Date:    09/13/2019  Time:    13:31             Narrative:    EXAMINATION:  XR CHEST AP PORTABLE    CLINICAL HISTORY:  weakness;    TECHNIQUE:  Single frontal view of the chest was performed.    COMPARISON:  02/17/2016.    FINDINGS:  Two AP bedside chest radiographs are provided for review.    The patient has undergone prior partial resection of the left lung.  As a result the left lung is smaller than the right with leftward mediastinal shift.    Taking both views into account I detect no acute pulmonary disease and no pleural fluid, lymph node enlargement, cardiac decompensation, pneumothorax, pneumomediastinum, pneumoperitoneum or significant osseous abnormality.                                MEDICATIONS/FLUIDS:  Medications   sodium chloride 0.9% bolus 1,000 mL (has no administration in time range)         MDM:  Elroy Rahman is a 63 y.o. year old male who presents to the ED complaining of shivering, diaphoresis, generalized weakness, myalgia.  On prednisone, decreased prednisone from 135-130 mg daily on Tuesday the same day when he started developing weakness in all his extremities worse in the right lower extremity    No numbness, no back pain to suggest this is spinal compression.     DDX includes but not limited to:  Infectious etiology versus rhabdomyolysis versus steroid induced myopathy versus electrolyte abnormalities versus spinal cord compression       Labs ordered and reviewed:   CBC mild leukocytosis at 12.7, hemoglobin and platelets within normal limits  CMP sodium mildly diminished 0133, potassium within normal limits, normal kidney function  CPK elevated at 591  ESR elevated at 75  CRP elevated 216  UA positive for blood , protein 7+ 1 leukocyte.  Urine microscopy with occasional bacteria, 22 WBCs and 13 RBCs  Magnesium  Troponin  elevated 0.17  Magnesium normal  Lactate 1.7  Blood culture    Medication given in the ED:  IV fluids    CXR (ordered and reviewed):  No infiltrate  Imagings independently visualized: N    EKG (independantly reviewed):  Heart rate of 82, normal sinus rhythm, T-wave inversion in lead 3, no other ischemic changes,     Old records obtained and reviewed:  Office visit from August 26, 2019 with Dr. London: bronchial carcinoid tumor diagnosis 2016, metastatic disease to liver, mesentery and bone 2017 status post left upper lobectomy 2016 followed by Dr. London, started onPRRT in April 2019    2:59 PM  Troponin elevated 0.1 however patient denies any chest pain shortness of breath EKG with no ischemic changes.  Will repeat troponin at 5:00 p.m. elevated CPK and inflammatory markers possibly myositis/myopathy. Will cover with ceftriaxone for possible UTI    After the IVF patient feels better, his muscle pain is improved and feels he can move his right leg better. Will give a 2nd liter IVF    4:47 PM  Family reports the patient did not void despite 2 L NS IV.     UB: 500 ml urine, patient reports he does not feel that he needs to void. Patient asked to try and void    5:09 PM  patient voided 450 ml urine    Case discussed with the consultant:  Oncology    IMPRESSION:  1.) Myopathy with weakness and inability to ambulate  2.) UTI    Dispo: Admission    Critical Care Time: N/A       Mari Burrows MD  09/13/19 2055

## 2019-09-14 PROBLEM — R78.81 BACTEREMIA: Status: ACTIVE | Noted: 2019-09-14

## 2019-09-14 LAB
ALBUMIN SERPL BCP-MCNC: 2.2 G/DL (ref 3.5–5.2)
ALP SERPL-CCNC: 133 U/L (ref 55–135)
ALT SERPL W/O P-5'-P-CCNC: 81 U/L (ref 10–44)
ANION GAP SERPL CALC-SCNC: 10 MMOL/L (ref 8–16)
ASCENDING AORTA: 3.83 CM
AST SERPL-CCNC: 54 U/L (ref 10–40)
AV INDEX (PROSTH): 0.8
AV MEAN GRADIENT: 5 MMHG
AV PEAK GRADIENT: 10 MMHG
AV VALVE AREA: 3.39 CM2
AV VELOCITY RATIO: 0.79
BASOPHILS # BLD AUTO: 0.02 K/UL (ref 0–0.2)
BASOPHILS NFR BLD: 0.2 % (ref 0–1.9)
BILIRUB DIRECT SERPL-MCNC: 1 MG/DL (ref 0.1–0.3)
BILIRUB SERPL-MCNC: 1.6 MG/DL (ref 0.1–1)
BSA FOR ECHO PROCEDURE: 2.58 M2
BUN SERPL-MCNC: 19 MG/DL (ref 8–23)
CALCIUM SERPL-MCNC: 8.9 MG/DL (ref 8.7–10.5)
CHLORIDE SERPL-SCNC: 100 MMOL/L (ref 95–110)
CO2 SERPL-SCNC: 25 MMOL/L (ref 23–29)
CREAT SERPL-MCNC: 0.9 MG/DL (ref 0.5–1.4)
CV ECHO LV RWT: 0.42 CM
DIFFERENTIAL METHOD: ABNORMAL
DOP CALC AO PEAK VEL: 1.58 M/S
DOP CALC AO VTI: 27.56 CM
DOP CALC LVOT AREA: 4.2 CM2
DOP CALC LVOT DIAMETER: 2.32 CM
DOP CALC LVOT PEAK VEL: 1.25 M/S
DOP CALC LVOT STROKE VOLUME: 93.46 CM3
DOP CALCLVOT PEAK VEL VTI: 22.12 CM
E WAVE DECELERATION TIME: 260.11 MSEC
E/A RATIO: 0.86
E/E' RATIO: 6.56 M/S
ECHO LV POSTERIOR WALL: 0.93 CM (ref 0.6–1.1)
EOSINOPHIL # BLD AUTO: 0 K/UL (ref 0–0.5)
EOSINOPHIL NFR BLD: 0 % (ref 0–8)
ERYTHROCYTE [DISTWIDTH] IN BLOOD BY AUTOMATED COUNT: 14.9 % (ref 11.5–14.5)
EST. GFR  (AFRICAN AMERICAN): >60 ML/MIN/1.73 M^2
EST. GFR  (NON AFRICAN AMERICAN): >60 ML/MIN/1.73 M^2
FRACTIONAL SHORTENING: 35 % (ref 28–44)
GLUCOSE SERPL-MCNC: 126 MG/DL (ref 70–110)
HCT VFR BLD AUTO: 41.2 % (ref 40–54)
HGB BLD-MCNC: 13.6 G/DL (ref 14–18)
IMM GRANULOCYTES # BLD AUTO: 0.22 K/UL (ref 0–0.04)
IMM GRANULOCYTES NFR BLD AUTO: 1.8 % (ref 0–0.5)
INTERVENTRICULAR SEPTUM: 0.94 CM (ref 0.6–1.1)
IVRT: 0.11 MSEC
LA MAJOR: 5.77 CM
LA MINOR: 5.84 CM
LA WIDTH: 3.7 CM
LEFT ATRIUM SIZE: 3.7 CM
LEFT ATRIUM VOLUME INDEX: 27.2 ML/M2
LEFT ATRIUM VOLUME: 67.55 CM3
LEFT INTERNAL DIMENSION IN SYSTOLE: 2.9 CM (ref 2.1–4)
LEFT VENTRICLE DIASTOLIC VOLUME INDEX: 36 ML/M2
LEFT VENTRICLE DIASTOLIC VOLUME: 89.54 ML
LEFT VENTRICLE MASS INDEX: 55 G/M2
LEFT VENTRICLE SYSTOLIC VOLUME INDEX: 12.9 ML/M2
LEFT VENTRICLE SYSTOLIC VOLUME: 32.13 ML
LEFT VENTRICULAR INTERNAL DIMENSION IN DIASTOLE: 4.44 CM (ref 3.5–6)
LEFT VENTRICULAR MASS: 136.81 G
LV LATERAL E/E' RATIO: 5.9 M/S
LV SEPTAL E/E' RATIO: 7.38 M/S
LYMPHOCYTES # BLD AUTO: 0.5 K/UL (ref 1–4.8)
LYMPHOCYTES NFR BLD: 4.3 % (ref 18–48)
MAGNESIUM SERPL-MCNC: 2 MG/DL (ref 1.6–2.6)
MCH RBC QN AUTO: 29.9 PG (ref 27–31)
MCHC RBC AUTO-ENTMCNC: 33 G/DL (ref 32–36)
MCV RBC AUTO: 91 FL (ref 82–98)
MONOCYTES # BLD AUTO: 0.3 K/UL (ref 0.3–1)
MONOCYTES NFR BLD: 2.3 % (ref 4–15)
MV PEAK A VEL: 0.69 M/S
MV PEAK E VEL: 0.59 M/S
NEUTROPHILS # BLD AUTO: 11.1 K/UL (ref 1.8–7.7)
NEUTROPHILS NFR BLD: 91.4 % (ref 38–73)
NRBC BLD-RTO: 0 /100 WBC
PHOSPHATE SERPL-MCNC: 2.5 MG/DL (ref 2.7–4.5)
PISA TR MAX VEL: 1.85 M/S
PLATELET # BLD AUTO: 161 K/UL (ref 150–350)
PMV BLD AUTO: 9.5 FL (ref 9.2–12.9)
POCT GLUCOSE: 183 MG/DL (ref 70–110)
POTASSIUM SERPL-SCNC: 4.4 MMOL/L (ref 3.5–5.1)
PROT SERPL-MCNC: 6 G/DL (ref 6–8.4)
PULM VEIN S/D RATIO: 1.35
PV PEAK D VEL: 0.23 M/S
PV PEAK S VEL: 0.31 M/S
RA MAJOR: 4.66 CM
RA PRESSURE: 3 MMHG
RA WIDTH: 3.49 CM
RBC # BLD AUTO: 4.55 M/UL (ref 4.6–6.2)
RIGHT VENTRICULAR END-DIASTOLIC DIMENSION: 3.44 CM
RV TISSUE DOPPLER FREE WALL SYSTOLIC VELOCITY 1 (APICAL 4 CHAMBER VIEW): 17.56 CM/S
SINUS: 3.39 CM
SODIUM SERPL-SCNC: 135 MMOL/L (ref 136–145)
STJ: 2.9 CM
TDI LATERAL: 0.1 M/S
TDI SEPTAL: 0.08 M/S
TDI: 0.09 M/S
TR MAX PG: 14 MMHG
TRICUSPID ANNULAR PLANE SYSTOLIC EXCURSION: 2.43 CM
TV REST PULMONARY ARTERY PRESSURE: 17 MMHG
WBC # BLD AUTO: 12.13 K/UL (ref 3.9–12.7)

## 2019-09-14 PROCEDURE — 85025 COMPLETE CBC W/AUTO DIFF WBC: CPT

## 2019-09-14 PROCEDURE — 25000003 PHARM REV CODE 250: Performed by: STUDENT IN AN ORGANIZED HEALTH CARE EDUCATION/TRAINING PROGRAM

## 2019-09-14 PROCEDURE — 25000003 PHARM REV CODE 250: Performed by: SURGERY

## 2019-09-14 PROCEDURE — 36415 COLL VENOUS BLD VENIPUNCTURE: CPT

## 2019-09-14 PROCEDURE — 80053 COMPREHEN METABOLIC PANEL: CPT

## 2019-09-14 PROCEDURE — 99233 SBSQ HOSP IP/OBS HIGH 50: CPT | Mod: ,,,

## 2019-09-14 PROCEDURE — 25500020 PHARM REV CODE 255: Performed by: HOSPITALIST

## 2019-09-14 PROCEDURE — 11000001 HC ACUTE MED/SURG PRIVATE ROOM

## 2019-09-14 PROCEDURE — 83735 ASSAY OF MAGNESIUM: CPT

## 2019-09-14 PROCEDURE — 63600175 PHARM REV CODE 636 W HCPCS: Performed by: STUDENT IN AN ORGANIZED HEALTH CARE EDUCATION/TRAINING PROGRAM

## 2019-09-14 PROCEDURE — A9585 GADOBUTROL INJECTION: HCPCS | Performed by: HOSPITALIST

## 2019-09-14 PROCEDURE — 82248 BILIRUBIN DIRECT: CPT

## 2019-09-14 PROCEDURE — 99233 PR SUBSEQUENT HOSPITAL CARE,LEVL III: ICD-10-PCS | Mod: ,,,

## 2019-09-14 PROCEDURE — 63600175 PHARM REV CODE 636 W HCPCS: Performed by: HOSPITALIST

## 2019-09-14 PROCEDURE — 84100 ASSAY OF PHOSPHORUS: CPT

## 2019-09-14 RX ORDER — GADOBUTROL 604.72 MG/ML
10 INJECTION INTRAVENOUS
Status: COMPLETED | OUTPATIENT
Start: 2019-09-14 | End: 2019-09-14

## 2019-09-14 RX ORDER — KETOROLAC TROMETHAMINE 5 MG/ML
1 SOLUTION OPHTHALMIC 4 TIMES DAILY
Status: DISCONTINUED | OUTPATIENT
Start: 2019-09-14 | End: 2019-09-22 | Stop reason: HOSPADM

## 2019-09-14 RX ADMIN — CEFTRIAXONE 2 G: 2 INJECTION, SOLUTION INTRAVENOUS at 03:09

## 2019-09-14 RX ADMIN — GADOBUTROL 10 ML: 604.72 INJECTION INTRAVENOUS at 03:09

## 2019-09-14 RX ADMIN — VANCOMYCIN HYDROCHLORIDE 2000 MG: 1 INJECTION, POWDER, LYOPHILIZED, FOR SOLUTION INTRAVENOUS at 08:09

## 2019-09-14 RX ADMIN — HYDROCODONE BITARTRATE AND ACETAMINOPHEN 1 TABLET: 5; 325 TABLET ORAL at 05:09

## 2019-09-14 RX ADMIN — PREDNISONE 110 MG: 20 TABLET ORAL at 08:09

## 2019-09-14 RX ADMIN — BRIMONIDINE TARTRATE 1 DROP: 1.5 SOLUTION OPHTHALMIC at 08:09

## 2019-09-14 RX ADMIN — VANCOMYCIN HYDROCHLORIDE 2500 MG: 1 INJECTION, POWDER, LYOPHILIZED, FOR SOLUTION INTRAVENOUS at 08:09

## 2019-09-14 NOTE — PROGRESS NOTES
Paged IM 1 as patient spiked temperature to 101. Dr. Hassan returned the page, she advise to do another BC and give tylenol. Will continue to monitor.

## 2019-09-14 NOTE — HPI
Mr. 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    Pt reports that on Tuesday he was experiencing chills, diaphoresis and severe muscle weakness. The weakness began suddenly, not concentrated in any one muscle group and progressively worsened. Unfortunately by late that evening he was unable to move around on his own. At baseline pt does have a left LE amputation and is able to ambulate freely with and without his prosthesis. He denies any bowel or bladder incontinence, back pain, numbness, perineal numbness or extremity pain.   Per notes: He was started on PRRT back in April and began prednisone due to inflammation noted in the globe of his eye. Beginning on 9/5 he was to take Prednisone 130 mg for days and then proceed with 110mg for an additional five days. Pt reports that he is compliant with his medication regimen.

## 2019-09-14 NOTE — PLAN OF CARE
Problem: Adult Inpatient Plan of Care  Goal: Plan of Care Review  Outcome: Ongoing (interventions implemented as appropriate)  On going intervention continued. IV maintenance fluid continued.   Assistance given on using urinal, drinking and turning. Generalized body pain restricting patient mobility.   Given hydrocodone-with tylenol for pain with moderate effect. Attended MRI. Spiked fever overnight, 2nd set of BC taken.   Wife at bedside overnight.

## 2019-09-14 NOTE — SUBJECTIVE & OBJECTIVE
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 OR 2ND FLR    Bka Left     HERNIA REPAIR      LEFT UPPER LOBECTOMY-LUNG Left 1/28/2016    Performed by Pan Bell MD at [facility] OR 2ND FLR    REPAIR, HERNIA, UMBILICAL, INCARCERATED, LAPAROSCOPIC N/A 11/27/2018    Performed by Coco Guidry MD at [facility] OR    THORACOTOMY W/LUNG RESECTION Left 1/28/2016    Performed by Pan Bell MD at [facility] OR 2ND FLR       Review of patient's allergies indicates:   Allergen Reactions    Epinephrine Other (See Comments)     Carcinoid crisis       No current facility-administered medications on file prior to encounter.      Current Outpatient Medications on File Prior to Encounter   Medication Sig    ALPHAGAN P 0.1 % Drop INT 1 GTT IN OS BID    ketorolac 0.4% (ACULAR) 0.4 % Drop INT 1 GTT INTO OS QID    lanreotide (SOMATULINE DEPOT) 120 mg/0.5 mL Syrg Inject 120 mg into the skin every 28 days.    ondansetron (ZOFRAN) 4 MG tablet Take 1 tablet (4 mg total) by mouth 2 (two) times daily.    ondansetron (ZOFRAN) 4 MG tablet Take 1 tablet (4 mg total) by mouth 2 (two) times daily.    predniSONE (DELTASONE) 20 MG tablet Take 2 tablets (40 mg total) by mouth once daily. Take with 50 mg tabs.  Total of 140 mg daily    predniSONE (DELTASONE) 50 MG Tab Take 2 tablets (100 mg total) by mouth once daily. Take with 20 mg tablets.   Total of 140 daily.    PROLENSA 0.07 % Drop INSTILL 1 DROP IN OS BID     Family History     Problem Relation (Age of Onset)    Cancer Mother, Sister        Tobacco Use    Smoking status: Never Smoker    Smokeless tobacco: Never Used   Substance and Sexual Activity    Alcohol use: Yes     Alcohol/week: 0.6 oz     Types: 1 Cans of beer per week     Comment: rare    Drug use: No    Sexual activity: Not on file     Review of Systems   Constitutional: Positive for activity change, chills, diaphoresis and fatigue. Negative for appetite change and fever.   HENT: Negative for congestion, postnasal drip and sore throat.    Eyes: Positive for visual disturbance.   Respiratory: Negative for cough, chest tightness and shortness of breath.    Cardiovascular: Negative for chest pain, palpitations and leg swelling.   Gastrointestinal: Positive for nausea. Negative for abdominal pain, constipation, diarrhea and vomiting.   Endocrine: Negative for heat intolerance.   Genitourinary: Negative for difficulty urinating and dysuria.   Musculoskeletal: Negative for arthralgias, back pain and myalgias.   Neurological: Positive for weakness and light-headedness. Negative for numbness and headaches.     Objective:     Vital Signs (Most Recent):  Temp: (!) 101 °F (38.3 °C) (09/13/19 1958)  Pulse: 82 (09/13/19 1958)  Resp: 16 (09/13/19 1958)  BP: 137/82 (09/13/19 1958)  SpO2: 97 % (09/13/19 1958) Vital Signs (24h Range):  Temp:  [97.8 °F (36.6 °C)-101 °F (38.3 °C)] 101 °F (38.3 °C)  Pulse:  [62-90] 82  Resp:  [15-20] 16  SpO2:  [95 %-97 %] 97 %  BP: (120-139)/(62-82) 137/82     Weight: 130.6 kg (288 lb)  Body mass index is 39.06 kg/m².    Physical Exam   Constitutional: He is oriented to person, place, and time. He appears well-developed and well-nourished. He does not appear ill. No distress.   Obese   HENT:   Head: Normocephalic and atraumatic.   Eyes: No scleral icterus.   Neck: Normal range of motion. Neck supple.   Cardiovascular:  Normal rate, regular rhythm and intact distal pulses. Exam reveals no gallop and no friction rub.   No murmur heard.  Pulmonary/Chest: Effort normal and breath sounds normal. No respiratory distress.   Abdominal: Soft. He exhibits distension. There is no tenderness. There is no guarding.   Musculoskeletal:   b/l UE 5/5 muscle strength  RLE: 1/5 muscle strength. Able to move his toes and ankle.   LLE amputation   Neurological: He is alert and oriented to person, place, and time.   Skin: Skin is warm.   Face flushed   Nursing note and vitals reviewed.          Significant Labs:   CBC:   Recent Labs   Lab 09/13/19  1316   WBC 12.78*   HGB 14.4   HCT 43.4        CMP:   Recent Labs   Lab 09/13/19  1316   *   K 4.4   CL 99   CO2 23   *   BUN 22   CREATININE 0.9   CALCIUM 8.9   PROT 6.6   ALBUMIN 2.5*   BILITOT 1.6*   ALKPHOS 119   AST 36   ALT 61*   ANIONGAP 11   EGFRNONAA >60.0       Significant Imaging: I have reviewed all pertinent imaging results/findings within the past 24 hours.

## 2019-09-14 NOTE — PROGRESS NOTES
Received call from lab informing of positive blood culture, Dr. Cleaning from IM 1 informed. No order yet.

## 2019-09-14 NOTE — SUBJECTIVE & OBJECTIVE
Interval History: Pt with a temperature of 101 in the evening, BCx with 3/3 showing gram positive cocci in clusters and UCx on admit showing Staph aureus.   Pt reports slight improvement in his RLE weakness and increased fatigue. He was not able to sleep last night due to all the testing being done.     Review of Systems   Constitutional: Positive for activity change, chills, diaphoresis, fatigue and fever. Negative for appetite change.   HENT: Negative for congestion, postnasal drip and sore throat.    Eyes: Positive for visual disturbance.   Respiratory: Negative for cough, chest tightness and shortness of breath.    Cardiovascular: Negative for chest pain, palpitations and leg swelling.   Gastrointestinal: Positive for nausea. Negative for abdominal pain, constipation, diarrhea and vomiting.   Endocrine: Negative for heat intolerance.   Genitourinary: Negative for difficulty urinating and dysuria.   Musculoskeletal: Negative for arthralgias, back pain and myalgias.   Neurological: Positive for weakness and light-headedness. Negative for numbness and headaches.     Objective:     Vital Signs (Most Recent):  Temp: 99.2 °F (37.3 °C) (09/14/19 1156)  Pulse: 70 (09/14/19 1156)  Resp: 17 (09/14/19 1156)  BP: 114/67 (09/14/19 1156)  SpO2: 96 % (09/14/19 1156) Vital Signs (24h Range):  Temp:  [96.7 °F (35.9 °C)-101 °F (38.3 °C)] 99.2 °F (37.3 °C)  Pulse:  [70-99] 70  Resp:  [16-19] 17  SpO2:  [95 %-97 %] 96 %  BP: (114-139)/(62-82) 114/67     Weight: 130.6 kg (288 lb)  Body mass index is 39.06 kg/m².    Intake/Output Summary (Last 24 hours) at 9/14/2019 1522  Last data filed at 9/14/2019 0829  Gross per 24 hour   Intake 2236.67 ml   Output 2000 ml   Net 236.67 ml      Physical Exam   Constitutional: He is oriented to person, place, and time. He appears well-developed and well-nourished. He does not appear ill. No distress.   Obese   HENT:   Head: Normocephalic and atraumatic.   Eyes: No scleral icterus.   Neck: Normal  range of motion. Neck supple.   Cardiovascular: Normal rate, regular rhythm and intact distal pulses. Exam reveals no gallop and no friction rub.   No murmur heard.  Pulmonary/Chest: Effort normal and breath sounds normal. No respiratory distress.   Abdominal: Soft. He exhibits distension. There is no tenderness. There is no guarding.   Musculoskeletal:   b/l UE 5/5 muscle strength  RLE: 1/5 muscle strength. Able to move his toes and ankle.   LLE amputation with redness and slight warmth.    Neurological: He is alert and oriented to person, place, and time.   Skin: Skin is warm.   Face flushed   Nursing note and vitals reviewed.      Significant Labs:   CBC:   Recent Labs   Lab 09/13/19  1316 09/14/19  0650   WBC 12.78* 12.13   HGB 14.4 13.6*   HCT 43.4 41.2    161     CMP:   Recent Labs   Lab 09/13/19  1316 09/14/19  0650   * 135*   K 4.4 4.4   CL 99 100   CO2 23 25   * 126*   BUN 22 19   CREATININE 0.9 0.9   CALCIUM 8.9 8.9   PROT 6.6 6.0   ALBUMIN 2.5* 2.2*   BILITOT 1.6* 1.6*   ALKPHOS 119 133   AST 36 54*   ALT 61* 81*   ANIONGAP 11 10   EGFRNONAA >60.0 >60.0       Significant Imaging: I have reviewed all pertinent imaging results/findings within the past 24 hours.

## 2019-09-14 NOTE — PROGRESS NOTES
Patient left the unit for MRI, travelled via bed. Alert, not in distress. IV maintenance fluid stopped temporarily.

## 2019-09-14 NOTE — PROGRESS NOTES
[facility] Hospital Medicine  Progress Note    Patient Name: Elroy Rahman  MRN: [id number]  Patient Class: OP- Observation   Admission Date: 9/13/2019  Length of Stay: 0 days  Attending Physician: Austin Obando MD  Primary Care Provider: Wayne Catalan PA-C    [facility] Medicine Team: [facility] MED 1 Paulo Madrigal MD    Subjective:     Principal Problem:Bacteremia        HPI:  Mr. 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    Pt reports that on Tuesday he was experiencing chills, diaphoresis and severe muscle weakness. The weakness began suddenly, not concentrated in any one muscle group and progressively worsened. Unfortunately by late that evening he was unable to move around on his own. At baseline pt does have a left LE amputation and is able to ambulate freely with and without his prosthesis. He denies any bowel or bladder incontinence, back pain, numbness, perineal numbness or extremity pain.   Per notes: He was started on PRRT back in April and began prednisone due to inflammation noted in the globe of his eye. Beginning on 9/5 he was to take Prednisone 130 mg for days and then proceed with 110mg for an additional five days. Pt reports that he is compliant with his medication regimen.     Overview/Hospital Course:  No notes on file    Interval History: Pt with a temperature of 101 in the evening, BCx with 3/4 showing gram positive cocci in clusters and UCx on admit showing Staph aureus.   Pt reports slight improvement in his RLE weakness and increased fatigue. He was not able to sleep last night due to all the testing being done.     Review of Systems   Constitutional: Positive for activity change, chills, diaphoresis, fatigue and  fever. Negative for appetite change.   HENT: Negative for congestion, postnasal drip and sore throat.    Eyes: Positive for visual disturbance.   Respiratory: Negative for cough, chest tightness and shortness of breath.    Cardiovascular: Negative for chest pain, palpitations and leg swelling.   Gastrointestinal: Positive for nausea. Negative for abdominal pain, constipation, diarrhea and vomiting.   Endocrine: Negative for heat intolerance.   Genitourinary: Negative for difficulty urinating and dysuria.   Musculoskeletal: Negative for arthralgias, back pain and myalgias.   Neurological: Positive for weakness and light-headedness. Negative for numbness and headaches.     Objective:     Vital Signs (Most Recent):  Temp: 99.2 °F (37.3 °C) (09/14/19 1156)  Pulse: 70 (09/14/19 1156)  Resp: 17 (09/14/19 1156)  BP: 114/67 (09/14/19 1156)  SpO2: 96 % (09/14/19 1156) Vital Signs (24h Range):  Temp:  [96.7 °F (35.9 °C)-101 °F (38.3 °C)] 99.2 °F (37.3 °C)  Pulse:  [70-99] 70  Resp:  [16-19] 17  SpO2:  [95 %-97 %] 96 %  BP: (114-139)/(62-82) 114/67     Weight: 130.6 kg (288 lb)  Body mass index is 39.06 kg/m².    Intake/Output Summary (Last 24 hours) at 9/14/2019 1522  Last data filed at 9/14/2019 0829  Gross per 24 hour   Intake 2236.67 ml   Output 2000 ml   Net 236.67 ml      Physical Exam   Constitutional: He is oriented to person, place, and time. He appears well-developed and well-nourished. He does not appear ill. No distress.   Obese   HENT:   Head: Normocephalic and atraumatic.   Eyes: No scleral icterus.   Neck: Normal range of motion. Neck supple.   Cardiovascular: Normal rate, regular rhythm and intact distal pulses. Exam reveals no gallop and no friction rub.   No murmur heard.  Pulmonary/Chest: Effort normal and breath sounds normal. No respiratory distress.   Abdominal: Soft. He exhibits distension. There is no tenderness. There is no guarding.   Musculoskeletal:   b/l UE 5/5 muscle strength  RLE: 1/5 muscle  strength. Able to move his toes and ankle.   LLE amputation with redness and slight warmth.    Neurological: He is alert and oriented to person, place, and time.   Skin: Skin is warm.   Face flushed   Nursing note and vitals reviewed.      Significant Labs:   CBC:   Recent Labs   Lab 09/13/19  1316 09/14/19  0650   WBC 12.78* 12.13   HGB 14.4 13.6*   HCT 43.4 41.2    161     CMP:   Recent Labs   Lab 09/13/19  1316 09/14/19  0650   * 135*   K 4.4 4.4   CL 99 100   CO2 23 25   * 126*   BUN 22 19   CREATININE 0.9 0.9   CALCIUM 8.9 8.9   PROT 6.6 6.0   ALBUMIN 2.5* 2.2*   BILITOT 1.6* 1.6*   ALKPHOS 119 133   AST 36 54*   ALT 61* 81*   ANIONGAP 11 10   EGFRNONAA >60.0 >60.0       Significant Imaging: I have reviewed all pertinent imaging results/findings within the past 24 hours.      Assessment/Plan:      * Bacteremia  Pt with diaphoresis and diffuse muscle weakness leading to admission associated with fevers while in the hospital. BCx showing 3/4 gram positive cocci in clusters, UCx positive for staph aureus.     Plan  - Pt started on Vancomycin  - ID consulted appreciate recs  - f//u BCx, f/u UCx sensitivities  - Aggressive fluid resuscitation should patient become acutely hypotensive.       Acute muscle weakness  Pt with a medical history of metastatic carcinoma and corticosteroid use presenting with LE weakness of sudden onset most likely due to bacteremia. BCx growing gram positive cocci in clusters, UCx showing staph aureus.  Cord compression initially suspected due to acute presentation, MRI spine final report pending.   Pt has been on a high dose of steroids 130mg for 5 days and 110 mg for subsequent 5 days. Total course to be 9/5 - 9/15. His high dose of steroids makes him at risk for developing myopathy secondary to their use. (CPK elevated, U/A with 3+ blood and only 13 RBC)    Plan  - f/u final report on MRI thoracic and lumbar spine.   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 He is also s/p left upper lung lobectomy done in 2016. Pt currently undergoing peptide receptor radionucleotide therapy, being followed by Dr. London.    - Oncology following during this admission    UTI (urinary tract infection)  Urinalysis with 1+ protein,22WBC and occasional bacteria. He has leukocytosis of 12.78. UCx positive for staph aureus, f/u sensitivities.     Plan   - Vancomycin  - ID consulted, appreciate recs       VTE Risk Mitigation (From admission, onward)        Ordered     Reason for no Mechanical VTE Prophylaxis  Once      09/13/19 1933     IP VTE HIGH RISK PATIENT  Once      09/13/19 1933     Place sequential compression device  Until discontinued      09/13/19 1641                Paulo Madrigal MD  Department of Hospital Medicine   [facility]

## 2019-09-14 NOTE — ASSESSMENT & PLAN NOTE
Urinalysis with 1+ protein,22WBC and occasional bacteria. He has leukocytosis of 12.78.    Plan   - Ceftriaxone 2g  - f/u UCx

## 2019-09-14 NOTE — ASSESSMENT & PLAN NOTE
Pt with diaphoresis and diffuse muscle weakness leading to admission associated with fevers while in the hospital. BCx showing 3/4 gram positive cocci in clusters, UCx positive for staph aureus.     Plan  - Pt started on Vancomycin  - ID consulted appreciate recs  - f//u BCx, f/u UCx sensitivities  - Aggressive fluid resuscitation should patient become acutely hypotensive.

## 2019-09-14 NOTE — ASSESSMENT & PLAN NOTE
Pt with a medical history of metastatic carcinoma and corticosteroid use presenting with LE weakness of sudden onset most likely due to bacteremia. BCx growing gram positive cocci in clusters, UCx showing staph aureus.  Cord compression initially suspected due to acute presentation, MRI spine final report pending.   Pt has been on a high dose of steroids 130mg for 5 days and 110 mg for subsequent 5 days. Total course to be 9/5 - 9/15. His high dose of steroids makes him at risk for developing myopathy secondary to their use. (CPK elevated, U/A with 3+ blood and only 13 RBC)    Plan  - f/u final report on MRI thoracic and lumbar spine.   - Continue with 110mg of prendisone   - BCx and UCx

## 2019-09-14 NOTE — PT/OT/SLP PROGRESS
Physical Therapy   Not Seen Note    Elroy Rahman   MRN: [id number]     Attempted to see for PT evaluation this morning. Pt refusing PT today stating he didn't get any rest overnight (MRI) and doesn't feel well enough to move today. Attempted to persuade but patient adamant about resting. States he will ask his spouse to bring his LLE prosthesis this evening and he is amenable to PT/OT evaluation on Sunday. No billable units today, will check back tomorrow.    Chay Gamboa, PT  9/14/2019

## 2019-09-14 NOTE — PLAN OF CARE
Problem: Adult Inpatient Plan of Care  Goal: Plan of Care Review  Outcome: Ongoing (interventions implemented as appropriate)  Patient admitted to Observation unit.  Wife present.  Patient denies pain, no signs symptoms of infection and no falls.  Reviewed medical history and plan of care.

## 2019-09-14 NOTE — ASSESSMENT & PLAN NOTE
Pt with a medical history of metastatic carcinoma presenting with LE weakness of sudden onset.   Ddx: Cord compression secondary to mets vs steroid induced myopathy  Pt has been on a high dose of steroids 130mg for 5 days and 110 mg for subsequent 5 days. Total course to be 9/5 - 9/15. His high dose of steroids makes him at risk for developing myopathy secondary to their use. (CPK elevated, U/A with 3+ blood and only 13 RBC)   Additionally, given his history of known bone involvement with his his metastatic cancer and his physical exam findings (4/5 b/l UE strength and his 1/5 RLE strength), sudden onset of weakness resulting in immobility is concerning for an acute etiology.     Plan  - MRI Spine  - Continue with 110mg of prendisone   - f/u AM cortisol level

## 2019-09-14 NOTE — PLAN OF CARE
Problem: Adult Inpatient Plan of Care  Goal: Plan of Care Review  Outcome: Ongoing (interventions implemented as appropriate)  Safety measures maintained. VSS on RA. Call light in reach. Bed in lowest position. Will continue to monitor.

## 2019-09-14 NOTE — ASSESSMENT & PLAN NOTE
Urinalysis with 1+ protein,22WBC and occasional bacteria. He has leukocytosis of 12.78. UCx positive for staph aureus, f/u sensitivities.     Plan   - Vancomycin  - ID consulted, appreciate recs

## 2019-09-14 NOTE — ASSESSMENT & PLAN NOTE
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 He is also s/p left upper lung lobectomy done in 2016. Pt currently undergoing peptide receptor radionucleotide therapy, being followed by Dr. London.    - Oncology following during this admission

## 2019-09-14 NOTE — H&P
[facility] Hospital Medicine  History & Physical    Patient Name: Elroy Rahman  MRN: [id number]  Admission Date: 9/13/2019  Attending Physician: Jesus Nguyen MD   Primary Care Provider: Wayne Catalan PA-C    [facility] Medicine Team: [facility] 1 Paulo Madrigal MD     Patient information was obtained from patient, past medical records and ER records.     Subjective:     Principal Problem:Acute muscle weakness    Chief Complaint:   Chief Complaint   Patient presents with    Generalized Body Aches     Patient arrives [facility] from home for evaluation of generalized body aches x 3 days - patient states history of liver and lung CA and was told by Dr. London to go to the ED for evaluation         HPI: Mr. 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    Pt reports that on Tuesday he was experiencing chills, diaphoresis and severe muscle weakness. The weakness began suddenly, not concentrated in any one muscle group and progressively worsened. Unfortunately by late that evening he was unable to move around on his own. At baseline pt does have a left LE amputation and is able to ambulate freely with and without his prosthesis. He denies any bowel or bladder incontinence, back pain, numbness, perineal numbness or extremity pain.   Per notes: He was started on PRRT back in April and began prednisone due to inflammation noted in the globe of his eye. Beginning on 9/5 he was to take Prednisone 130 mg for days and then proceed with 110mg for an additional five days. Pt reports that he is compliant with his medication regimen.     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       Past Surgical History:   Procedure Laterality Date    ABDOMINAL SURGERY      [id number] N/A 6/8/2017    Performed by [facility] OR 2ND FLR    Bka Left     HERNIA REPAIR      LEFT UPPER LOBECTOMY-LUNG Left 1/28/2016    Performed by Pan Bell [facility] at [facility] OR 2ND FLR    REPAIR, HERNIA, UMBILICAL, INCARCERATED, LAPAROSCOPIC N/A 11/27/2018    Performed by [facility] at [facility] OR    THORACOTOMY W/LUNG RESECTION Left 1/28/2016    Performed by Pan Bell [facility] at [facility] OR 2ND FLR       Review of patient's allergies indicates:   Allergen Reactions    Epinephrine Other (See Comments)     Carcinoid crisis       No current facility-administered medications on file prior to encounter.      Current Outpatient Medications on File Prior to Encounter   Medication Sig    ALPHAGAN P 0.1 % Drop INT 1 GTT IN OS BID    ketorolac 0.4% (ACULAR) 0.4 % Drop INT 1 GTT INTO OS QID    lanreotide (SOMATULINE DEPOT) 120 mg/0.5 mL Syrg Inject 120 mg into the skin every 28 days.    ondansetron (ZOFRAN) 4 MG tablet Take 1 tablet (4 mg total) by mouth 2 (two) times daily.    ondansetron (ZOFRAN) 4 MG tablet Take 1 tablet (4 mg total) by mouth 2 (two) times daily.    predniSONE (DELTASONE) 20 MG tablet Take 2 tablets (40 mg total) by mouth once daily. Take with 50 mg tabs.  Total of 140 mg daily    predniSONE (DELTASONE) 50 MG Tab Take 2 tablets (100 mg total) by mouth once daily. Take with 20 mg tablets.  Total of 140 daily.    PROLENSA 0.07 % Drop INSTILL 1 DROP IN OS BID     Family History     Problem Relation (Age  of Onset)    Cancer Mother, Sister        Tobacco Use    Smoking status: Never Smoker    Smokeless tobacco: Never Used   Substance and Sexual Activity    Alcohol use: Yes     Alcohol/week: 0.6 oz     Types: 1 Cans of beer per week     Comment: rare    Drug use: No    Sexual activity: Not on file     Review of Systems   Constitutional: Positive for activity change, chills, diaphoresis and fatigue. Negative for appetite change and fever.   HENT: Negative for congestion, postnasal drip and sore throat.    Eyes: Positive for visual disturbance.   Respiratory: Negative for cough, chest tightness and shortness of breath.    Cardiovascular: Negative for chest pain, palpitations and leg swelling.   Gastrointestinal: Positive for nausea. Negative for abdominal pain, constipation, diarrhea and vomiting.   Endocrine: Negative for heat intolerance.   Genitourinary: Negative for difficulty urinating and dysuria.   Musculoskeletal: Negative for arthralgias, back pain and myalgias.   Neurological: Positive for weakness and light-headedness. Negative for numbness and headaches.     Objective:     Vital Signs (Most Recent):  Temp: (!) 101 °F (38.3 °C) (09/13/19 1958)  Pulse: 82 (09/13/19 1958)  Resp: 16 (09/13/19 1958)  BP: 137/82 (09/13/19 1958)  SpO2: 97 % (09/13/19 1958) Vital Signs (24h Range):  Temp:  [97.8 °F (36.6 °C)-101 °F (38.3 °C)] 101 °F (38.3 °C)  Pulse:  [62-90] 82  Resp:  [15-20] 16  SpO2:  [95 %-97 %] 97 %  BP: (120-139)/(62-82) 137/82     Weight: 130.6 kg (288 lb)  Body mass index is 39.06 kg/m².    Physical Exam   Constitutional: He is oriented to person, place, and time. He appears well-developed and well-nourished. He does not appear ill. No distress.   Obese   HENT:   Head: Normocephalic and atraumatic.   Eyes: No scleral icterus.   Neck: Normal range of motion. Neck supple.   Cardiovascular: Normal rate, regular rhythm and intact distal pulses. Exam reveals no gallop and no friction rub.   No murmur  heard.  Pulmonary/Chest: Effort normal and breath sounds normal. No respiratory distress.   Abdominal: Soft. He exhibits distension. There is no tenderness. There is no guarding.   Musculoskeletal:   b/l UE 4/5 muscle strength  RLE: 1/5 muscle strength. Able to move his toes and ankle.   LLE amputation   Neurological: He is alert and oriented to person, place, and time.   Skin: Skin is warm.   Face flushed   Nursing note and vitals reviewed.          Significant Labs:   CBC:   Recent Labs   Lab 09/13/19  1316   WBC 12.78*   HGB 14.4   HCT 43.4        CMP:   Recent Labs   Lab 09/13/19  1316   *   K 4.4   CL 99   CO2 23   *   BUN 22   CREATININE 0.9   CALCIUM 8.9   PROT 6.6   ALBUMIN 2.5*   BILITOT 1.6*   ALKPHOS 119   AST 36   ALT 61*   ANIONGAP 11   EGFRNONAA >60.0       Significant Imaging: I have reviewed all pertinent imaging results/findings within the past 24 hours.    Assessment/Plan:     * Acute muscle weakness  Pt with a medical history of metastatic carcinoma presenting with LE weakness of sudden onset.   Ddx: Cord compression secondary to mets vs steroid induced myopathy  Pt has been on a high dose of steroids 130mg for 5 days and 110 mg for subsequent 5 days. Total course to be 9/5 - 9/15. His high dose of steroids makes him at risk for developing myopathy secondary to their use. (CPK elevated, U/A with 3+ blood and only 13 RBC)   Additionally, given his history of known bone involvement with his his metastatic cancer and his physical exam findings (4/5 b/l UE strength and his 1/5 RLE strength), sudden onset of weakness resulting in immobility is concerning for an acute etiology.     Plan  - MRI Spine  - Continue with 110mg of prendisone   - f/u AM cortisol level     Carcinoid bronchial adenoma of left lung  MHx of bronchial carcinoid tumor with metastases to the liver, bone and mesentery. He is also s/p left upper lung lobectomy done in 2016. Pt currently undergoing peptide receptor  radionucleotide therapy, being followed by Dr. London.    - Oncology following during this admission    UTI (urinary tract infection)  Urinalysis with 1+ protein,22WBC and occasional bacteria. He has leukocytosis of 12.78.    Plan   - Ceftriaxone 2g  - f/u UCx        VTE Risk Mitigation (From admission, onward)        Ordered     Reason for no Mechanical VTE Prophylaxis  Once      09/13/19 1933     IP VTE HIGH RISK PATIENT  Once      09/13/19 1933     Place sequential compression device  Until discontinued      09/13/19 1641             Paulo Madrigal MD  Department of Hospital Medicine   [facility]

## 2019-09-14 NOTE — PROGRESS NOTES
Pharmacokinetic Initial Assessment: IV Vancomycin    Assessment/Plan:    Initiate intravenous vancomycin with loading dose of 2500 mg once followed by a maintenance dose of vancomycin 2000mg IV every 12 hours  Desired empiric serum trough concentration is 10 to 20 mcg/mL  Draw vancomycin trough level 30 min prior to fourth dose on 9/15/19 at approximately 1930  Pharmacy will continue to follow and monitor vancomycin.      Please contact pharmacy at extension [phone] with any questions regarding this assessment.     Thank you for the consult,   Maddy Mckeon       Patient brief summary:  Elroy Rahman is a 63 y.o. male initiated on antimicrobial therapy with IV Vancomycin for treatment of suspected bacteremia    Drug Allergies:   Review of patient's allergies indicates:   Allergen Reactions    Epinephrine Other (See Comments)     Carcinoid crisis       Actual Body Weight:   130.6 kg    Renal Function:   Estimated Creatinine Clearance: 118.9 mL/min (based on SCr of 0.9 mg/dL).,     Dialysis Method (if applicable):      CBC (last 72 hours):  Recent Labs   Lab Result Units 09/13/19  1316 09/14/19  0650   WBC K/uL 12.78* 12.13   Hemoglobin g/dL 14.4 13.6*   Hematocrit % 43.4 41.2   Platelets K/uL 154 161   Gran% % 92.0*  --    Lymph% % 1.0*  --    Mono% % 3.0*  --    Eosinophil% % 0.0  --    Basophil% % 0.0  --    Differential Method  Manual  --        Metabolic Panel (last 72 hours):  Recent Labs   Lab Result Units 09/13/19  1316 09/13/19  1349   Sodium mmol/L 133*  --    Potassium mmol/L 4.4  --    Chloride mmol/L 99  --    CO2 mmol/L 23  --    Glucose mg/dL 136*  --    Glucose, UA   --  Negative   BUN, Bld mg/dL 22  --    Creatinine mg/dL 0.9  --    Albumin g/dL 2.5*  --    Total Bilirubin mg/dL 1.6*  --    Alkaline Phosphatase U/L 119  --    AST U/L 36  --    ALT U/L 61*  --    Magnesium mg/dL 2.0  --        Drug levels (last 3 results):  No results for input(s): VANCOMYCINRA, VANCOMYCINPE, VANCOMYCINTR in the  last 72 hours.    Microbiologic Results:  Microbiology Results (last 7 days)     Procedure Component Value Units Date/Time    Blood culture #1 **CANNOT BE ORDERED STAT** [[id number]] Collected:  09/13/19 1348    Order Status:  Completed Specimen:  Blood from Peripheral, Antecubital, Left Updated:  09/14/19 0550     Blood Culture, Routine Gram stain dinorah bottle: Gram positive cocci in clusters resembling Staph       Results called to and read back by: Porfirio Medina RN 09/14/2019  05:49    Blood culture #2 **CANNOT BE ORDERED STAT** [[id number]] Collected:  09/13/19 1348    Order Status:  Completed Specimen:  Blood from Peripheral, Hand, Right Updated:  09/14/19 0549     Blood Culture, Routine Gram stain dinorah bottle: Gram positive cocci in clusters resembling Staph       Results called to and read back by: Porfirio Medina RN 09/14/2019  05:49    Blood culture [[id number]] Collected:  09/13/19 2036    Order Status:  Completed Specimen:  Blood Updated:  09/14/19 0315     Blood Culture, Routine No Growth to date    Blood culture [[id number]]     Order Status:  Canceled Specimen:  Blood     Urine culture [[id number]] Collected:  09/13/19 1349    Order Status:  No result Specimen:  Urine Updated:  09/13/19 1432

## 2019-09-14 NOTE — PROGRESS NOTES
Returned from MRI, patient alert not in distress. IV fluid restarted.   Uneventful transfer and procedure.

## 2019-09-15 PROBLEM — N17.9 AKI (ACUTE KIDNEY INJURY): Status: ACTIVE | Noted: 2019-09-15

## 2019-09-15 LAB
ALBUMIN SERPL BCP-MCNC: 2 G/DL (ref 3.5–5.2)
ALP SERPL-CCNC: 141 U/L (ref 55–135)
ALT SERPL W/O P-5'-P-CCNC: 79 U/L (ref 10–44)
ANION GAP SERPL CALC-SCNC: 8 MMOL/L (ref 8–16)
ANISOCYTOSIS BLD QL SMEAR: SLIGHT
AST SERPL-CCNC: 35 U/L (ref 10–40)
BASOPHILS # BLD AUTO: 0.12 K/UL (ref 0–0.2)
BASOPHILS NFR BLD: 0.8 % (ref 0–1.9)
BILIRUB SERPL-MCNC: 0.7 MG/DL (ref 0.1–1)
BUN SERPL-MCNC: 30 MG/DL (ref 8–23)
CALCIUM SERPL-MCNC: 9.1 MG/DL (ref 8.7–10.5)
CHLORIDE SERPL-SCNC: 100 MMOL/L (ref 95–110)
CO2 SERPL-SCNC: 29 MMOL/L (ref 23–29)
CREAT SERPL-MCNC: 1.4 MG/DL (ref 0.5–1.4)
DIFFERENTIAL METHOD: ABNORMAL
EOSINOPHIL # BLD AUTO: 0 K/UL (ref 0–0.5)
EOSINOPHIL NFR BLD: 0 % (ref 0–8)
ERYTHROCYTE [DISTWIDTH] IN BLOOD BY AUTOMATED COUNT: 14.5 % (ref 11.5–14.5)
EST. GFR  (AFRICAN AMERICAN): >60 ML/MIN/1.73 M^2
EST. GFR  (NON AFRICAN AMERICAN): 53.5 ML/MIN/1.73 M^2
GLUCOSE SERPL-MCNC: 130 MG/DL (ref 70–110)
HCT VFR BLD AUTO: 39.4 % (ref 40–54)
HGB BLD-MCNC: 13.5 G/DL (ref 14–18)
IMM GRANULOCYTES # BLD AUTO: 0.66 K/UL (ref 0–0.04)
IMM GRANULOCYTES NFR BLD AUTO: 4.6 % (ref 0–0.5)
LYMPHOCYTES # BLD AUTO: 0.7 K/UL (ref 1–4.8)
LYMPHOCYTES NFR BLD: 4.7 % (ref 18–48)
MAGNESIUM SERPL-MCNC: 2.3 MG/DL (ref 1.6–2.6)
MCH RBC QN AUTO: 30.3 PG (ref 27–31)
MCHC RBC AUTO-ENTMCNC: 34.3 G/DL (ref 32–36)
MCV RBC AUTO: 88 FL (ref 82–98)
MONOCYTES # BLD AUTO: 0.5 K/UL (ref 0.3–1)
MONOCYTES NFR BLD: 3.5 % (ref 4–15)
NEUTROPHILS # BLD AUTO: 12.5 K/UL (ref 1.8–7.7)
NEUTROPHILS NFR BLD: 86.4 % (ref 38–73)
NRBC BLD-RTO: 0 /100 WBC
PHOSPHATE SERPL-MCNC: 4.1 MG/DL (ref 2.7–4.5)
PLATELET # BLD AUTO: 196 K/UL (ref 150–350)
PLATELET BLD QL SMEAR: ABNORMAL
PMV BLD AUTO: 10.1 FL (ref 9.2–12.9)
POCT GLUCOSE: 122 MG/DL (ref 70–110)
POCT GLUCOSE: 160 MG/DL (ref 70–110)
POIKILOCYTOSIS BLD QL SMEAR: SLIGHT
POTASSIUM SERPL-SCNC: 4.5 MMOL/L (ref 3.5–5.1)
PROT SERPL-MCNC: 6.1 G/DL (ref 6–8.4)
RBC # BLD AUTO: 4.46 M/UL (ref 4.6–6.2)
SODIUM SERPL-SCNC: 137 MMOL/L (ref 136–145)
TOXIC GRANULES BLD QL SMEAR: PRESENT
WBC # BLD AUTO: 14.44 K/UL (ref 3.9–12.7)

## 2019-09-15 PROCEDURE — 25000003 PHARM REV CODE 250: Performed by: SURGERY

## 2019-09-15 PROCEDURE — 85025 COMPLETE CBC W/AUTO DIFF WBC: CPT

## 2019-09-15 PROCEDURE — 99233 PR SUBSEQUENT HOSPITAL CARE,LEVL III: ICD-10-PCS | Mod: ,,,

## 2019-09-15 PROCEDURE — 99223 PR INITIAL HOSPITAL CARE,LEVL III: ICD-10-PCS | Mod: ,,, | Performed by: INTERNAL MEDICINE

## 2019-09-15 PROCEDURE — 97162 PT EVAL MOD COMPLEX 30 MIN: CPT

## 2019-09-15 PROCEDURE — 84100 ASSAY OF PHOSPHORUS: CPT

## 2019-09-15 PROCEDURE — 63600175 PHARM REV CODE 636 W HCPCS

## 2019-09-15 PROCEDURE — 63600175 PHARM REV CODE 636 W HCPCS: Performed by: STUDENT IN AN ORGANIZED HEALTH CARE EDUCATION/TRAINING PROGRAM

## 2019-09-15 PROCEDURE — 99233 SBSQ HOSP IP/OBS HIGH 50: CPT | Mod: ,,,

## 2019-09-15 PROCEDURE — 82962 GLUCOSE BLOOD TEST: CPT

## 2019-09-15 PROCEDURE — 83735 ASSAY OF MAGNESIUM: CPT

## 2019-09-15 PROCEDURE — 97165 OT EVAL LOW COMPLEX 30 MIN: CPT

## 2019-09-15 PROCEDURE — 80202 ASSAY OF VANCOMYCIN: CPT

## 2019-09-15 PROCEDURE — 99223 1ST HOSP IP/OBS HIGH 75: CPT | Mod: ,,, | Performed by: INTERNAL MEDICINE

## 2019-09-15 PROCEDURE — 87040 BLOOD CULTURE FOR BACTERIA: CPT

## 2019-09-15 PROCEDURE — 97530 THERAPEUTIC ACTIVITIES: CPT

## 2019-09-15 PROCEDURE — 20600001 HC STEP DOWN PRIVATE ROOM

## 2019-09-15 PROCEDURE — 36415 COLL VENOUS BLD VENIPUNCTURE: CPT

## 2019-09-15 PROCEDURE — 80053 COMPREHEN METABOLIC PANEL: CPT

## 2019-09-15 PROCEDURE — 97535 SELF CARE MNGMENT TRAINING: CPT

## 2019-09-15 RX ORDER — VANCOMYCIN 2 GRAM/500 ML IN 0.9 % SODIUM CHLORIDE INTRAVENOUS
2000
Status: DISCONTINUED | OUTPATIENT
Start: 2019-09-15 | End: 2019-09-16

## 2019-09-15 RX ADMIN — BRIMONIDINE TARTRATE 1 DROP: 1.5 SOLUTION OPHTHALMIC at 09:09

## 2019-09-15 RX ADMIN — PREDNISONE 110 MG: 20 TABLET ORAL at 09:09

## 2019-09-15 RX ADMIN — Medication 2000 MG: at 11:09

## 2019-09-15 RX ADMIN — KETOROLAC TROMETHAMINE 1 DROP: 5 SOLUTION/ DROPS OPHTHALMIC at 09:09

## 2019-09-15 RX ADMIN — KETOROLAC TROMETHAMINE 1 DROP: 5 SOLUTION/ DROPS OPHTHALMIC at 10:09

## 2019-09-15 RX ADMIN — KETOROLAC TROMETHAMINE 1 DROP: 5 SOLUTION/ DROPS OPHTHALMIC at 01:09

## 2019-09-15 RX ADMIN — SODIUM CHLORIDE, SODIUM LACTATE, POTASSIUM CHLORIDE, AND CALCIUM CHLORIDE 1000 ML: .6; .31; .03; .02 INJECTION, SOLUTION INTRAVENOUS at 10:09

## 2019-09-15 NOTE — ASSESSMENT & PLAN NOTE
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  He is admitted with staphylococcus aureus sepsis with the source likely being his left BKA stump.  Patient does not have a port and denies having any hardware in his body.  TTE is negative.  If his blood cultures remain persistently positive then he will need a VIVI.  For now continue IV vancomycin (goal trough 15-20).  I agree with repeating blood cultures today.  We will continue to follow.

## 2019-09-15 NOTE — ASSESSMENT & PLAN NOTE
Pt with a medical history of metastatic carcinoma and corticosteroid use presenting with LE weakness of sudden onset most likely due to bacteremia. BCx growing gram positive cocci in clusters, UCx showing staph aureus.  Cord compression initially suspected due to acute presentation, MRI spine final report showing metastatic disease without any mass effect. Steroid induced myopathy considered since pt is on a high dose of steroids (CPK elevated, U/A with 3+ blood and only 13 RBC).     Plan  - Continue with 110mg of prendisone   - f/u BCx and UCx sensitivities.

## 2019-09-15 NOTE — ASSESSMENT & PLAN NOTE
Pt with diaphoresis and diffuse muscle weakness leading to admission associated with fevers while in the hospital. BCx and UCx positive for staph aureus.     Plan  - Pt started on Vancomycin  - ID consulted appreciate recs  - f/u BCx and UCx sensitivities  - Aggressive fluid resuscitation should patient become acutely hypotensive.

## 2019-09-15 NOTE — CONSULTS
[facility]  Infectious Disease  Consult Note    Patient Name: Elroy Rahman  MRN: [id number]  Admission Date: 9/13/2019  Hospital Length of Stay: 1 days  Attending Physician: Austin Obando MD  Primary Care Provider: Wayne Catalan PA-C     Isolation Status: No active isolations    Patient information was obtained from patient, spouse/SO, past medical records and ER records.      Inpatient consult to Infectious Diseases  Consult performed by: Roberto Leyva MD  Consult ordered by: Paulo Madrigal MD        Assessment/Plan:     * Bacteremia  64 y/o male with a history of metastatic bronchial carcinoid tumor s/p resection of ALIRIO, chemotherapy and subsequent prednisone therapy.  He is admitted with staphylococcus aureus sepsis with the source likely being his left BKA stump.  Patient does not have a port and denies having any hardware in his body.  TTE is negative.  If his blood cultures remain persistently positive then he will need a VIVI.  For now continue IV vancomycin (goal trough 15-20).  I agree with repeating blood cultures today.  We will continue to follow.        Thank you for your consult. I will follow-up with patient. Please contact us if you have any additional questions.    Roberto Leyva MD  Infectious Disease  [facility]    Subjective:     Principal Problem: Bacteremia    HPI: 64 y/o male with a history of metastatic bronchial carcinoid tumor s/p left upper lobectomy, chemotherapy (last dose in July per patient) followed by prednisone for which he is still on.  He also has a history of a left BKA and utilizes a prosthesis.  Per the patient, he does a poor job of keeping the prosthesis clean.  His wife reports multiple occasions of noticing a foul smelling odor and weaping from the stump.  His wife states that about 3 days prior to admission, she noticed her  looked very tired and fatigued.  She also noticed redness and discoloration to the stump  and there was an odor to it.  The patient continued to feel worse until his wife convinced him to come to the hospital.  On admission, he was found to be febrile and with an elevated WBC count.  Blood and urine cultures were obtained and are positive for staphylococcus aureus.  A TTE was negative for vegetations.      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 OR 2ND FLR    Bka Left     HERNIA REPAIR      LEFT UPPER LOBECTOMY-LUNG Left 1/28/2016    Performed by Pan Bell MD at [facility] OR 2ND FLR    REPAIR, HERNIA, UMBILICAL, INCARCERATED, LAPAROSCOPIC N/A 11/27/2018    Performed by [facility]    THORACOTOMY W/LUNG RESECTION Left 1/28/2016    [facility]       Review of patient's allergies indicates:   Allergen Reactions    Epinephrine Other (See Comments)     Carcinoid crisis       Medications:  Medications Prior to Admission   Medication Sig    ALPHAGAN P 0.1 % Drop INT 1 GTT IN OS BID    ketorolac 0.4% (ACULAR) 0.4 % Drop INT 1 GTT INTO OS QID    lanreotide (SOMATULINE DEPOT) 120 mg/0.5 mL Syrg Inject 120 mg into the skin every 28 days.    ondansetron (ZOFRAN) 4 MG tablet Take 1 tablet (4 mg total) by mouth 2 (two) times daily.    ondansetron (ZOFRAN) 4 MG tablet Take 1 tablet (4 mg total) by mouth 2 (two) times daily.    predniSONE  (DELTASONE) 20 MG tablet Take 2 tablets (40 mg total) by mouth once daily. Take with 50 mg tabs.  Total of 140 mg daily    predniSONE (DELTASONE) 50 MG Tab Take 2 tablets (100 mg total) by mouth once daily. Take with 20 mg tablets.  Total of 140 daily.    PROLENSA 0.07 % Drop INSTILL 1 DROP IN OS BID     Antibiotics (From admission, onward)    Start     Stop Route Frequency Ordered    09/15/19 1100  vancomycin 2 g in 0.9% sodium chloride 500 mL IVPB      -- IV Every 12 hours (non-standard times) 09/15/19 1053        Antifungals (From admission, onward)    None        Antivirals (From admission, onward)    None             There is no immunization history on file for this patient.    Family History     Problem Relation (Age of Onset)    Cancer Mother, Sister        Social History     Socioeconomic History    Marital status:      Spouse name: Not on file    Number of children: Not on file    Years of education: Not on file    Highest education level: Not on file   Occupational History    Not on file   Social Needs    Financial resource strain: Not on file    Food insecurity:     Worry: Not on file     Inability: Not on file    Transportation needs:     Medical: Not on file     Non-medical: Not on file   Tobacco Use    Smoking status: Never Smoker    Smokeless tobacco: Never Used   Substance and Sexual Activity    Alcohol use: Yes     Alcohol/week: 0.6 oz     Types: 1 Cans of beer per week     Comment: rare    Drug use: No    Sexual activity: Not on file   Lifestyle    Physical activity:     Days per week: Not on file     Minutes per session: Not on file    Stress: Not on file   Relationships    Social connections:     Talks on phone: Not on file     Gets together: Not on file     Attends Caodaism service: Not on file     Active member of club or organization: Not on file     Attends meetings of clubs or organizations: Not on file     Relationship status: Not on file   Other Topics Concern     Not on file   Social History Narrative    Not on file     Review of Systems   Constitutional: Positive for activity change, appetite change, fatigue and fever.   Skin: Positive for wound.   All other systems reviewed and are negative.    Objective:     Vital Signs (Most Recent):  Temp: 97.7 °F (36.5 °C) (09/15/19 0722)  Pulse: 81 (09/15/19 0722)  Resp: 18 (09/15/19 0722)  BP: (!) 142/93 (09/15/19 0722)  SpO2: 97 % (09/15/19 0722) Vital Signs (24h Range):  Temp:  [97.6 °F (36.4 °C)-99.2 °F (37.3 °C)] 97.7 °F (36.5 °C)  Pulse:  [56-81] 81  Resp:  [16-18] 18  SpO2:  [93 %-97 %] 97 %  BP: (114-143)/(67-93) 142/93     Weight: 130.6 kg (288 lb)  Body mass index is 39.06 kg/m².    Estimated Creatinine Clearance: 76.5 mL/min (based on SCr of 1.4 mg/dL).    Physical Exam   Constitutional: He is oriented to person, place, and time. He appears well-developed and well-nourished. No distress.   HENT:   Head: Normocephalic and atraumatic.   Right Ear: External ear normal.   Left Ear: External ear normal.   Nose: Nose normal.   Mouth/Throat: Oropharynx is clear and moist. No oropharyngeal exudate.   Eyes: Pupils are equal, round, and reactive to light. Conjunctivae and EOM are normal. Right eye exhibits no discharge. Left eye exhibits no discharge. No scleral icterus.   Neck: Normal range of motion. Neck supple. No JVD present. No tracheal deviation present. No thyromegaly present.   Cardiovascular: Normal rate, regular rhythm, normal heart sounds and intact distal pulses. Exam reveals no gallop and no friction rub.   No murmur heard.  Pulmonary/Chest: Effort normal and breath sounds normal. No stridor. No respiratory distress. He has no wheezes. He has no rales. He exhibits no tenderness.   Abdominal: Soft. Bowel sounds are normal. He exhibits no distension and no mass. There is no tenderness. There is no rebound and no guarding.   Musculoskeletal: Normal range of motion. He exhibits no edema or tenderness.   Left BKA.  There is  an area of erythema around the stump.  There is also a hyperpigmented area near the stump.  There is an area of dry scab.   Lymphadenopathy:     He has no cervical adenopathy.   Neurological: He is alert and oriented to person, place, and time. He has normal reflexes. He displays normal reflexes. No cranial nerve deficit. He exhibits normal muscle tone. Coordination normal.   Skin: Skin is warm. No rash noted. He is not diaphoretic. No erythema. No pallor.   Psychiatric: He has a normal mood and affect. His behavior is normal. Judgment and thought content normal.   Nursing note and vitals reviewed.      Significant Labs:   Microbiology Results (last 7 days)     Procedure Component Value Units Date/Time    Blood culture [[id number]]  (Abnormal) Collected:  09/13/19 2036    Order Status:  Completed Specimen:  Blood Updated:  09/15/19 0850     Blood Culture, Routine Gram stain dinorah bottle: Gram positive cocci in clusters resembling Staph      Positive results previously called      Gram stain aer bottle: Gram positive cocci in clusters resembling Staph      STAPHYLOCOCCUS AUREUS  ID consult required at [facility].  For susceptibility see order #[id number]      Blood culture #2 **CANNOT BE ORDERED STAT** [[id number]]  (Abnormal) Collected:  09/13/19 1348    Order Status:  Completed Specimen:  Blood from Peripheral, Hand, Right Updated:  09/15/19 0849     Blood Culture, Routine Gram stain dinorah bottle: Gram positive cocci in clusters resembling Staph       Results called to and read back by: Porfirio Medina RN 09/14/2019  05:49      Gram stain aer bottle: Gram positive cocci in clusters resembling Staph      STAPHYLOCOCCUS AUREUS  ID consult required at [facility].  For susceptibility see order #[id number]      Blood culture #1 **CANNOT BE ORDERED STAT** [[id number]]  (Abnormal) Collected:  09/13/19 1348    Order Status:  Completed Specimen:   Blood from Peripheral, Antecubital, Left Updated:  09/15/19 0848     Blood Culture, Routine Gram stain dinorah bottle: Gram positive cocci in clusters resembling Staph       Results called to and read back by: Porfirio Medina RN 09/14/2019  05:49      Gram stain aer bottle: Gram positive cocci in clusters resembling Staph      STAPHYLOCOCCUS AUREUS  Susceptibility pending  ID consult required at [facility] and [facility].      Blood culture [[id number]] Collected:  09/15/19 0616    Order Status:  Sent Specimen:  Blood Updated:  09/15/19 0626    Blood culture [[id number]] Collected:  09/15/19 0616    Order Status:  Sent Specimen:  Blood Updated:  09/15/19 0626    Urine culture [[id number]]  (Abnormal) Collected:  09/13/19 1349    Order Status:  Completed Specimen:  Urine Updated:  09/14/19 1229     Urine Culture, Routine STAPHYLOCOCCUS AUREUS  > 100,000 cfu/ml  Susceptibility pending      Narrative:       GRAY AND YELLOW  Preferred Collection Type->Urine, Clean Catch    Blood culture [[id number]]     Order Status:  Canceled Specimen:  Blood           Significant Imaging: I have reviewed all pertinent imaging results/findings within the past 24 hours.

## 2019-09-15 NOTE — PLAN OF CARE
Problem: Occupational Therapy Goal  Goal: Occupational Therapy Goal  Goals to be met by: 9/29/19     Patient will increase functional independence with ADLs by performing:    UE Dressing with [place].  LE Dressing with Moderate Assistance.  Grooming while seated with [place].  Toileting from bedside commode with Moderate Assistance for hygiene and clothing management.   Toilet transfer to bedside commode with Moderate Assistance.    Initiate OT POC     Comments: Samir Rico OTR/L  9/15/2019

## 2019-09-15 NOTE — PT/OT/SLP EVAL
"Physical Therapy Evaluation    Patient Name:  Elroy Rahman   MRN:  [id number]    Recommendations:     Discharge Recommendations:  rehabilitation facility   Discharge Equipment Recommendations: (TBD)   Barriers to discharge: 2 WENDY home    Assessment:     Elroy Rahman is a 63 y.o. male admitted with a medical diagnosis of Bacteremia.  He presents with the following impairments/functional limitations:  weakness, impaired endurance, impaired self care skills, impaired functional mobilty, gait instability, impaired balance, decreased lower extremity function, pain, impaired cardiopulmonary response to activity, decreased upper extremity function Pt. cooperative and tolerated treatment fairly well. Session limited by pt.'s pain and inability to wear prosthesis.    Rehab Prognosis: Good; patient would benefit from acute skilled PT services to address these deficits and reach maximum level of function.    Recent Surgery: * No surgery found *      Plan:     During this hospitalization, patient to be seen 4 x/week to address the identified rehab impairments via gait training, therapeutic activities, therapeutic exercises, wheelchair management/training, neuromuscular re-education and progress toward the following goals:    · Plan of Care Expires:  10/15/19    Subjective     Chief Complaint: pain "all over"  Patient/Family Comments/goals: to go home  Pain/Comfort:  · Pain Rating 1: 7/10  · Location - Orientation 1: generalized  · Location 1: ("all over")  · Pain Addressed 1: Pre-medicate for activity, Reposition, Cessation of Activity  · Pain Rating Post-Intervention 1: 7/10    Patients cultural, spiritual, Roman Catholic conflicts given the current situation: no    Living Environment:  Pt. Lives with spouse in [facility] with 2 WENDY  Prior to admission, patients level of function was mod. indep. with mobility with prosthesis.  Equipment used at home: prosthesis, cane, straight, rollator.  Upon discharge, patient will have " assistance from spouse.    Objective:     Communicated with nursing prior to session.  Patient found supine with peripheral IV, telemetry  upon PT entry to room.    General Precautions: Standard, fall   Orthopedic Precautions:N/A   Braces:       Exams:  · RLE ROM: WFL  · RLE Strength: WFL  · LLE ROM: WFL  · LLE Strength: WFL    Functional Mobility:  · Bed Mobility:     · Rolling Right: moderate assistance  · Scooting: moderate assistance  · Supine to Sit: moderate assistance  · Sit to Supine: moderate assistance  · Transfers:     · Sit to Stand:  maximal assistance with support of PT  · Bed to Chair: total assistance with  no AD  using  Stand Pivot  · Balance: fair sitting and poor standing      Therapeutic Activities and Exercises:   Assisted pt. To/from BS. Discussed therapy needs, goals, and POC.    AM-PAC 6 CLICK MOBILITY  Total Score:11     Patient left supine with all lines intact and call button in reach.    GOALS:   Multidisciplinary Problems     Physical Therapy Goals        Problem: Physical Therapy Goal    Goal Priority Disciplines Outcome Goal Variances Interventions   Physical Therapy Goal     PT, PT/OT Ongoing (interventions implemented as appropriate)     Description:  Goals to be met by: 2019     Patient will increase functional independence with mobility by performin. Supine to sit with Stand-by Assistance  2. Sit to supine with Stand-by Assistance  3. Sit to stand transfer with Contact Guard Assistance with prosthesis  4. Bed to chair transfer with Contact Guard Assistance using Slideboard or prosthesis  5. Gait  x 10 feet with Contact Guard Assistance using Rolling Walker and prosthesis.   6. Wheelchair propulsion x100 feet with Supervision using bilateral upper extremities  7. Ascend/descend 2 stair with Handrail Contact Guard Assistance using prosthesis.   8. 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       Past Surgical History:   Procedure Laterality Date    ABDOMINAL SURGERY      [id number] N/A 6/8/2017    Performed by [facility] Diagnostic Provider at [facility] OR 2ND FLR    Bka Left     HERNIA REPAIR      LEFT UPPER LOBECTOMY-LUNG Left 1/28/2016    Performed by Pan Bell [facility] at [facility] OR 2ND FLR    REPAIR, HERNIA, UMBILICAL, INCARCERATED, LAPAROSCOPIC N/A 11/27/2018    Performed by [facility] at [facility] OR    THORACOTOMY W/LUNG RESECTION Left 1/28/2016    Performed by Pan Bell MD at [facility] OR 2ND FLR       Time Tracking:     PT Received On: 09/15/19  PT Start Time: 0917     PT Stop Time: 0950  PT Total Time (min): 33 min     Billable Minutes: Evaluation 23 and Therapeutic Activity 10      Gabriele Nelson, PT  09/15/2019

## 2019-09-15 NOTE — PT/OT/SLP EVAL
"Occupational Therapy   Evaluation    Name: Elroy Rahman  MRN: [id number]  Admitting Diagnosis:  Bacteremia      Recommendations:     Discharge Recommendations: rehabilitation facility  Discharge Equipment Recommendations:  (tbd)  Barriers to discharge:  None    Assessment:     Elroy Rahman is a 63 y.o. male with a medical diagnosis of Bacteremia.  He presents with impairments listed below. Pt did well to participate in session but did display decreased overall endurance for activity.  Pt displayed global deconditioning requiring increased assist for ADLs and mobility at this time. Pt would benefit from skilled OT services to improve independence and overall occupational functioning.     Performance deficits affecting function: weakness, impaired endurance, impaired self care skills, impaired functional mobilty, gait instability, impaired balance, decreased lower extremity function, decreased upper extremity function, impaired cardiopulmonary response to activity.      Rehab Prognosis: Good; patient would benefit from acute skilled OT services to address these deficits and reach maximum level of function.       Plan:     Patient to be seen 4 x/week to address the above listed problems via self-care/home management, therapeutic activities, therapeutic exercises, neuromuscular re-education  · Plan of Care Expires: 10/15/19  · Plan of Care Reviewed with: patient    Subjective     Chief Complaint: Weakness   Patient/Family Comments/goals: return home    Occupational Profile:  Living Environment: Pt lives w/ spouse in a [facility] w/ 2 steps to enter.   Previous level of function: Indep  Roles and Routines: Pt works full time and job does involve strenuous activity.   Equipment Used at Home:  prosthesis  Assistance upon Discharge: Pt has assistance from spouse.     Pain/Comfort:  · Pain Rating 1: 7/10  · Location - Side 1: Bilateral  · Location - Orientation 1: generalized  · Location 1: ("All over")  · Pain Addressed 1: " Reposition, Distraction, Cessation of Activity  · Pain Rating Post-Intervention 1: (did not rate)    Patients cultural, spiritual, Oriental orthodox conflicts given the current situation:      Objective:     Communicated with: RN prior to session.  Patient found HOB elevated with telemetry upon OT entry to room.    General Precautions: Standard, fall   Orthopedic Precautions:N/A   Braces: N/A     Occupational Performance:    Bed Mobility:    · Patient completed Scooting/Bridging with maximal assistance  · Patient completed Supine to Sit with moderate assistance  · Patient completed Sit to Supine with moderate assistance    Functional Mobility/Transfers:  · Patient completed Sit <> Stand Transfer with maximal assistance  with  no assistive device   · Patient completed Toilet Transfer Step Transfer technique with maximal assistance with  no AD  · Functional Mobility: Pt unable to take steps on this date.    Activities of Daily Living:  · Lower Body Dressing: total assistance donned sock on RLE  · Toileting: maximal assistance and simulated toielting 2/2 pt unable to have  BM      Cognitive/Visual Perceptual:  Cognitive/Psychosocial Skills:     -       Oriented to: Person, Place, Time and Situation   -       Follows Commands/attention:Follows multistep  commands  -       Communication: clear/fluent  -       Memory: No Deficits noted  -       Safety awareness/insight to disability: intact   -       Mood/Affect/Coping skills/emotional control: Appropriate to situation  Visual/Perceptual:      -Intact      Physical Exam:  Balance:    -       Pt displayed good sitting balance at sba and poor overall standing balance at max a  Postural examination/scapula alignment:    -       Rounded shoulders  Skin integrity: Visible skin intact  Upper Extremity Range of Motion:     -       Right Upper Extremity: ~80 degrees of shldr flx  -       Left Upper Extremity: ~80 degrees of shldr flx  Upper Extremity Strength:    -       Right Upper  Extremity: 3/5  -       Left Upper Extremity: 3/5   Strength:    -       Right Upper Extremity: noted weaknes   -       Left Upper Extremity: noted weakness  Fine Motor Coordination:    -       Intact  Gross motor coordination:   WFL    AMPAC 6 Click ADL:  AMPAC Total Score: 16    Treatment & Education:  Pt educated on POC.   Education:    Patient left HOB elevated with all lines intact, call button in reach and spouse present    GOALS:   Multidisciplinary Problems     Occupational Therapy Goals        Problem: Occupational Therapy Goal    Goal Priority Disciplines Outcome Interventions   Occupational Therapy Goal     OT, PT/OT     Description:  Goals to be met by: 9/29/19     Patient will increase functional independence with ADLs by performing:    UE Dressing with [place].  LE Dressing with Moderate Assistance.  Grooming while seated with [place].  Toileting from bedside commode with Moderate Assistance for hygiene and clothing management.   Toilet transfer to bedside commode with Moderate Assistance.                      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       Past Surgical History:   Procedure Laterality Date    ABDOMINAL SURGERY      PLARPS-EDHKAP-LO N/A 6/8/2017    Performed by [facility] Diagnostic Provider at [facility] OR 2ND FLR    Bka Left     HERNIA REPAIR      LEFT UPPER LOBECTOMY-LUNG Left 1/28/2016    Performed by Pan Bell MD at [facility] OR 2ND FLR    REPAIR, HERNIA, UMBILICAL, INCARCERATED, LAPAROSCOPIC N/A 11/27/2018    Performed by  [facility] OR    THORACOTOMY W/LUNG RESECTION Left 1/28/2016    Performed by Pan Bell [facility] at [facility] OR [facility] FLR       Time Tracking:     OT Date of Treatment: 09/15/19  OT Start Time: 0918  OT Stop Time: 0950  OT Total Time (min): 32 min    Billable Minutes:Evaluation 22 minutes  Self Care/Home Management 10 minutes    Samir Rico, OT  9/15/2019

## 2019-09-15 NOTE — PROGRESS NOTES
[facility] Hospital Medicine  Progress Note    Patient Name: Elroy Rahman  MRN: [id number]  Patient Class: IP- Inpatient   Admission Date: 9/13/2019  Length of Stay: 1 days  Attending Physician: Austin Obando MD  Primary Care Provider: Wayne Catalan PA-C    [facility] Medicine Team: [facility]OSP MED 1 Paulo Madrigal MD    Subjective:     Principal Problem:Bacteremia        HPI:  Mr. 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    Pt reports that on Tuesday he was experiencing chills, diaphoresis and severe muscle weakness. The weakness began suddenly, not concentrated in any one muscle group and progressively worsened. Unfortunately by late that evening he was unable to move around on his own. At baseline pt does have a left LE amputation and is able to ambulate freely with and without his prosthesis. He denies any bowel or bladder incontinence, back pain, numbness, perineal numbness or extremity pain.   Per notes: He was started on PRRT back in April and began prednisone due to inflammation noted in the globe of his eye. Beginning on 9/5 he was to take Prednisone 130 mg for days and then proceed with 110mg for an additional five days. Pt reports that he is compliant with his medication regimen.     Overview/Hospital Course:  No notes on file    Interval History:  Pt reports great improvement in his fatigue and some improvement in his weakness since admission.    Review of Systems   Constitutional: Positive for activity change, chills, diaphoresis, fatigue and fever. Negative for appetite change.   HENT: Negative for congestion, postnasal drip and sore throat.    Eyes: Positive for visual disturbance.   Respiratory: Negative for cough, chest  tightness and shortness of breath.    Cardiovascular: Negative for chest pain, palpitations and leg swelling.   Gastrointestinal: Positive for nausea. Negative for abdominal pain, constipation, diarrhea and vomiting.   Endocrine: Negative for heat intolerance.   Genitourinary: Negative for difficulty urinating and dysuria.   Musculoskeletal: Negative for arthralgias, back pain and myalgias.   Neurological: Positive for weakness and light-headedness. Negative for numbness and headaches.     Objective:     Vital Signs (Most Recent):  Temp: 96.8 °F (36 °C) (09/15/19 1512)  Pulse: 69 (09/15/19 1512)  Resp: 17 (09/15/19 1512)  BP: 128/66 (09/15/19 1512)  SpO2: 96 % (09/15/19 1512) Vital Signs (24h Range):  Temp:  [96.8 °F (36 °C)-99 °F (37.2 °C)] 96.8 °F (36 °C)  Pulse:  [56-87] 69  Resp:  [16-18] 17  SpO2:  [93 %-97 %] 96 %  BP: (126-143)/(66-93) 128/66     Weight: 130.6 kg (288 lb)  Body mass index is 39.06 kg/m².    Intake/Output Summary (Last 24 hours) at 9/15/2019 1541  Last data filed at 9/15/2019 1056  Gross per 24 hour   Intake 240 ml   Output 1105 ml   Net -865 ml      Physical Exam   Constitutional: He is oriented to person, place, and time. He appears well-developed and well-nourished. He does not appear ill. No distress.   Obese   HENT:   Head: Normocephalic and atraumatic.   Eyes: No scleral icterus.   Neck: Normal range of motion. Neck supple.   Cardiovascular: Normal rate, regular rhythm and intact distal pulses. Exam reveals no gallop and no friction rub.   No murmur heard.  Pulmonary/Chest: Effort normal and breath sounds normal. No respiratory distress.   Abdominal: Soft. He exhibits distension. There is no tenderness. There is no guarding.   Musculoskeletal:   b/l UE 5/5 muscle strength  RLE: 3/5 muscle strength. Able to move his R foot and bend his right knee  LLE stump redness improving, some scabbing at the base of the stump   Neurological: He is alert and oriented to person, place, and time.    Skin: Skin is warm.   Nursing note and vitals reviewed.      Significant Labs:   CBC:   Recent Labs   Lab 09/14/19  0650 09/15/19  0616   WBC 12.13 14.44*   HGB 13.6* 13.5*   HCT 41.2 39.4*    196     CMP:   Recent Labs   Lab 09/14/19  0650 09/15/19  0615   * 137   K 4.4 4.5    100   CO2 25 29   * 130*   BUN 19 30*   CREATININE 0.9 1.4   CALCIUM 8.9 9.1   PROT 6.0 6.1   ALBUMIN 2.2* 2.0*   BILITOT 1.6* 0.7   ALKPHOS 133 141*   AST 54* 35   ALT 81* 79*   ANIONGAP 10 8   EGFRNONAA >60.0 53.5*       Significant Imaging: I have reviewed all pertinent imaging results/findings within the past 24 hours.      Assessment/Plan:      * Bacteremia  Pt with diaphoresis and diffuse muscle weakness leading to admission associated with fevers while in the hospital. BCx and UCx positive for staph aureus.     Plan  - Pt started on Vancomycin  - ID consulted appreciate recs  - f/u BCx and UCx sensitivities  - Aggressive fluid resuscitation should patient become acutely hypotensive.       Acute muscle weakness  Pt with a medical history of metastatic carcinoma and corticosteroid use presenting with LE weakness of sudden onset most likely due to bacteremia. BCx growing gram positive cocci in clusters, UCx showing staph aureus.  Cord compression initially suspected due to acute presentation, MRI spine final report showing metastatic disease without any mass effect. Steroid induced myopathy considered since pt is on a high dose of steroids (CPK elevated, U/A with 3+ blood and only 13 RBC).     Plan  - Continue with 110mg of prendisone   - f/u BCx and UCx sensitivities.    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 He is also s/p left upper lung lobectomy done in 2016. Pt currently undergoing peptide receptor radionucleotide therapy, being followed by Dr. London.    - Oncology following during this admission    UTI (urinary tract  infection)  Urinalysis with 1+ protein,22WBC and occasional bacteria. He has leukocytosis of 12.78. UCx positive for staph aureus, f/u sensitivities.     Plan   - Vancomycin  - ID consulted, appreciate recs     MAGDI (acute kidney injury)  sCr elevated from baseline (0.9 > 1.4).     Plan  - LR fluid bolus given      VTE Risk Mitigation (From admission, onward)        Ordered     Reason for no Mechanical VTE Prophylaxis  Once      09/13/19 1933     IP VTE HIGH RISK PATIENT  Once      09/13/19 1933     Place sequential compression device  Until discontinued      09/13/19 1641                Paulo Madrigal MD  Department of Hospital Medicine   [facility]

## 2019-09-15 NOTE — HPI
62 y/o male with a history of metastatic bronchial carcinoid tumor s/p left upper lobectomy, chemotherapy (last dose in July per patient) followed by prednisone for which he is still on.  He also has a history of a left BKA and utilizes a prosthesis.  Per the patient, he does a poor job of keeping the prosthesis clean.  His wife reports multiple occasions of noticing a foul smelling odor and weaping from the stump.  His wife states that about 3 days prior to admission, she noticed her  looked very tired and fatigued.  She also noticed redness and discoloration to the stump and there was an odor to it.  The patient continued to feel worse until his wife convinced him to come to the hospital.  On admission, he was found to be febrile and with an elevated WBC count.  Blood and urine cultures were obtained and are positive for staphylococcus aureus.  A TTE was negative for vegetations.

## 2019-09-15 NOTE — NURSING
Mr. Rahman is alert, oriented, trying to sleep, he is receiving LR bolus 1liter now and vancomycin, he is for a trough at 1930 today. We are following his glucose fingersticks while he is on high dose prednisone. His pre breakfast glucose was 122mg/dl, lunch time the technician did not get and he ate his whole meal, we will recheck prior to dinner. His wife is present and supportive. He states he feels weak. He is receiving Rocephin for UTI. His LLE stump is erythematous and has scabbed area and calloused area, He admits it is tender to touch and he is not using his prosthesis right now.

## 2019-09-15 NOTE — SUBJECTIVE & OBJECTIVE
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    Sleep apnea     Snores     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       Past Surgical History:   Procedure Laterality Date    ABDOMINAL SURGERY      [id number] N/A 6/8/2017    Performed by [facility] at [facility] OR 2ND FLR    Bka Left     HERNIA REPAIR      LEFT UPPER LOBECTOMY-LUNG Left 1/28/2016    Performed by Pan Bell MD at [facility] OR 2ND FLR    REPAIR, HERNIA, UMBILICAL, INCARCERATED, LAPAROSCOPIC N/A 11/27/2018    Performed by Coco Guidry MD at [facility] OR    THORACOTOMY W/LUNG RESECTION Left 1/28/2016    Performed by Pan Bell MD at [facility] OR 2ND FLR       Review of patient's allergies indicates:   Allergen Reactions    Epinephrine Other (See Comments)     Carcinoid crisis       Medications:  Medications Prior to Admission   Medication Sig    ALPHAGAN P 0.1 % Drop INT 1 GTT IN OS BID    ketorolac 0.4% (ACULAR) 0.4 % Drop INT 1 GTT INTO OS QID    lanreotide (SOMATULINE DEPOT) 120 mg/0.5 mL Syrg Inject 120 mg into the skin every 28 days.    ondansetron (ZOFRAN) 4 MG tablet Take 1 tablet (4 mg total) by mouth 2 (two) times daily.    ondansetron (ZOFRAN) 4 MG tablet Take 1 tablet (4 mg total) by mouth 2 (two) times daily.    predniSONE (DELTASONE) 20 MG tablet Take 2 tablets (40 mg total) by mouth once daily. Take with 50 mg tabs.  Total of 140 mg daily    predniSONE (DELTASONE) 50 MG Tab Take 2 tablets (100 mg total) by mouth once daily. Take with 20 mg tablets.  Total of 140 daily.    PROLENSA 0.07 % Drop INSTILL 1 DROP IN OS BID     Antibiotics (From  admission, onward)    Start     Stop Route Frequency Ordered    09/15/19 1100  vancomycin 2 g in 0.9% sodium chloride 500 mL IVPB      -- IV Every 12 hours (non-standard times) 09/15/19 1053        Antifungals (From admission, onward)    None        Antivirals (From admission, onward)    None             There is no immunization history on file for this patient.    Family History     Problem Relation (Age of Onset)    Cancer Mother, Sister        Social History     Socioeconomic History    Marital status:      Spouse name: Not on file    Number of children: Not on file    Years of education: Not on file    Highest education level: Not on file   Occupational History    Not on file   Social Needs    Financial resource strain: Not on file    Food insecurity:     Worry: Not on file     Inability: Not on file    Transportation needs:     Medical: Not on file     Non-medical: Not on file   Tobacco Use    Smoking status: Never Smoker    Smokeless tobacco: Never Used   Substance and Sexual Activity    Alcohol use: Yes     Alcohol/week: 0.6 oz     Types: 1 Cans of beer per week     Comment: rare    Drug use: No    Sexual activity: Not on file   Lifestyle    Physical activity:     Days per week: Not on file     Minutes per session: Not on file    Stress: Not on file   Relationships    Social connections:     Talks on phone: Not on file     Gets together: Not on file     Attends Hindu service: Not on file     Active member of club or organization: Not on file     Attends meetings of clubs or organizations: Not on file     Relationship status: Not on file   Other Topics Concern    Not on file   Social History Narrative    Not on file     Review of Systems   Constitutional: Positive for activity change, appetite change, fatigue and fever.   Skin: Positive for wound.   All other systems reviewed and are negative.    Objective:     Vital Signs (Most Recent):  Temp: 97.7 °F (36.5 °C) (09/15/19  0722)  Pulse: 81 (09/15/19 0722)  Resp: 18 (09/15/19 0722)  BP: (!) 142/93 (09/15/19 0722)  SpO2: 97 % (09/15/19 0722) Vital Signs (24h Range):  Temp:  [97.6 °F (36.4 °C)-99.2 °F (37.3 °C)] 97.7 °F (36.5 °C)  Pulse:  [56-81] 81  Resp:  [16-18] 18  SpO2:  [93 %-97 %] 97 %  BP: (114-143)/(67-93) 142/93     Weight: 130.6 kg (288 lb)  Body mass index is 39.06 kg/m².    Estimated Creatinine Clearance: 76.5 mL/min (based on SCr of 1.4 mg/dL).    Physical Exam   Constitutional: He is oriented to person, place, and time. He appears well-developed and well-nourished. No distress.   HENT:   Head: Normocephalic and atraumatic.   Right Ear: External ear normal.   Left Ear: External ear normal.   Nose: Nose normal.   Mouth/Throat: Oropharynx is clear and moist. No oropharyngeal exudate.   Eyes: Pupils are equal, round, and reactive to light. Conjunctivae and EOM are normal. Right eye exhibits no discharge. Left eye exhibits no discharge. No scleral icterus.   Neck: Normal range of motion. Neck supple. No JVD present. No tracheal deviation present. No thyromegaly present.   Cardiovascular: Normal rate, regular rhythm, normal heart sounds and intact distal pulses. Exam reveals no gallop and no friction rub.   No murmur heard.  Pulmonary/Chest: Effort normal and breath sounds normal. No stridor. No respiratory distress. He has no wheezes. He has no rales. He exhibits no tenderness.   Abdominal: Soft. Bowel sounds are normal. He exhibits no distension and no mass. There is no tenderness. There is no rebound and no guarding.   Musculoskeletal: Normal range of motion. He exhibits no edema or tenderness.   Left BKA.  There is an area of erythema around the stump.  There is also a hyperpigmented area near the stump.  There is an area of dry scab.   Lymphadenopathy:     He has no cervical adenopathy.   Neurological: He is alert and oriented to person, place, and time. He has normal reflexes. He displays normal reflexes. No cranial nerve  deficit. He exhibits normal muscle tone. Coordination normal.   Skin: Skin is warm. No rash noted. He is not diaphoretic. No erythema. No pallor.   Psychiatric: He has a normal mood and affect. His behavior is normal. Judgment and thought content normal.   Nursing note and vitals reviewed.      Significant Labs:   Microbiology Results (last 7 days)     Procedure Component Value Units Date/Time    Blood culture [[id number]]  (Abnormal) Collected:  09/13/19 2036    Order Status:  Completed Specimen:  Blood Updated:  09/15/19 0850     Blood Culture, Routine Gram stain dinorah bottle: Gram positive cocci in clusters resembling Staph      Positive results previously called      Gram stain aer bottle: Gram positive cocci in clusters resembling Staph      STAPHYLOCOCCUS AUREUS  ID consult required at [facility].  For susceptibility see order #[id number]      Blood culture #2 **CANNOT BE ORDERED STAT** [[id number]]  (Abnormal) Collected:  09/13/19 1348    Order Status:  Completed Specimen:  Blood from Peripheral, Hand, Right Updated:  09/15/19 0849     Blood Culture, Routine Gram stain dinorah bottle: Gram positive cocci in clusters resembling Staph       Results called to and read back by: Porfirio Medina RN 09/14/2019  05:49      Gram stain aer bottle: Gram positive cocci in clusters resembling Staph      STAPHYLOCOCCUS AUREUS  ID consult required at [facility] and [facility].  For susceptibility see order #[id number]      Blood culture #1 **CANNOT BE ORDERED STAT** [[id number]]  (Abnormal) Collected:  09/13/19 1348    Order Status:  Completed Specimen:  Blood from Peripheral, Antecubital, Left Updated:  09/15/19 0848     Blood Culture, Routine Gram stain dinorah bottle: Gram positive cocci in clusters resembling Staph       Results called to and read back by: Porfirio Medina RN 09/14/2019  05:49      Gram stain aer bottle: Gram positive cocci in clusters resembling  Staph      STAPHYLOCOCCUS AUREUS  Susceptibility pending  ID consult required at [facility] and [facility]   locations.      Blood culture [[id number]] Collected:  09/15/19 0616    Order Status:  Sent Specimen:  Blood Updated:  09/15/19 0626    Blood culture [[id number]] Collected:  09/15/19 0616    Order Status:  Sent Specimen:  Blood Updated:  09/15/19 0626    Urine culture [[id number]]  (Abnormal) Collected:  09/13/19 1349    Order Status:  Completed Specimen:  Urine Updated:  09/14/19 1229     Urine Culture, Routine STAPHYLOCOCCUS AUREUS  > 100,000 cfu/ml  Susceptibility pending      Narrative:       GRAY AND YELLOW  Preferred Collection Type->Urine, Clean Catch    Blood culture [[id number]]     Order Status:  Canceled Specimen:  Blood           Significant Imaging: I have reviewed all pertinent imaging results/findings within the past 24 hours.

## 2019-09-15 NOTE — PLAN OF CARE
Problem: Physical Therapy Goal  Goal: Physical Therapy Goal  Goals to be met by: 2019     Patient will increase functional independence with mobility by performin. Supine to sit with Stand-by Assistance  2. Sit to supine with Stand-by Assistance  3. Sit to stand transfer with Contact Guard Assistance with prosthesis  4. Bed to chair transfer with Contact Guard Assistance using Slideboard or prosthesis  5. Gait  x 10 feet with Contact Guard Assistance using Rolling Walker and prosthesis.   6. Wheelchair propulsion x100 feet with Supervision using bilateral upper extremities  7. Ascend/descend 2 stair with Handrail Contact Guard Assistance using prosthesis.   8. Lower extremity exercise program x15 reps per handout, with supervision    Outcome: Ongoing (interventions implemented as appropriate)  Goals set

## 2019-09-15 NOTE — PLAN OF CARE
Problem: Adult Inpatient Plan of Care  Goal: Plan of Care Review  Outcome: Ongoing (interventions implemented as appropriate)  On going intervention continued. IV antibiotics given as ordered.  VS stable, no fever, no tachycardia. Moderate pain but refused pain medication.   Safety maintained, no fall or injury occurred overnight. Promoted rest and sleep.

## 2019-09-16 PROBLEM — Z74.09 IMPAIRED MOBILITY AND ADLS: Status: ACTIVE | Noted: 2019-09-16

## 2019-09-16 PROBLEM — Z78.9 IMPAIRED MOBILITY AND ADLS: Status: ACTIVE | Noted: 2019-09-16

## 2019-09-16 PROBLEM — A41.01 SEPSIS DUE TO METHICILLIN SUSCEPTIBLE STAPHYLOCOCCUS AUREUS: Status: ACTIVE | Noted: 2019-09-14

## 2019-09-16 LAB
ALBUMIN SERPL BCP-MCNC: 2 G/DL (ref 3.5–5.2)
ALP SERPL-CCNC: 161 U/L (ref 55–135)
ALT SERPL W/O P-5'-P-CCNC: 161 U/L (ref 10–44)
ANION GAP SERPL CALC-SCNC: 10 MMOL/L (ref 8–16)
ANISOCYTOSIS BLD QL SMEAR: SLIGHT
AST SERPL-CCNC: 55 U/L (ref 10–40)
BACTERIA BLD CULT: ABNORMAL
BACTERIA UR CULT: ABNORMAL
BASOPHILS # BLD AUTO: ABNORMAL K/UL (ref 0–0.2)
BASOPHILS NFR BLD: 0 % (ref 0–1.9)
BILIRUB SERPL-MCNC: 0.5 MG/DL (ref 0.1–1)
BUN SERPL-MCNC: 36 MG/DL (ref 8–23)
CALCIUM SERPL-MCNC: 9.1 MG/DL (ref 8.7–10.5)
CHLORIDE SERPL-SCNC: 103 MMOL/L (ref 95–110)
CO2 SERPL-SCNC: 24 MMOL/L (ref 23–29)
CREAT SERPL-MCNC: 1.3 MG/DL (ref 0.5–1.4)
DIFFERENTIAL METHOD: ABNORMAL
EOSINOPHIL # BLD AUTO: ABNORMAL K/UL (ref 0–0.5)
EOSINOPHIL NFR BLD: 0 % (ref 0–8)
ERYTHROCYTE [DISTWIDTH] IN BLOOD BY AUTOMATED COUNT: 14.6 % (ref 11.5–14.5)
EST. GFR  (AFRICAN AMERICAN): >60 ML/MIN/1.73 M^2
EST. GFR  (NON AFRICAN AMERICAN): 58.5 ML/MIN/1.73 M^2
GLUCOSE SERPL-MCNC: 133 MG/DL (ref 70–110)
HCT VFR BLD AUTO: 38 % (ref 40–54)
HGB BLD-MCNC: 13.1 G/DL (ref 14–18)
IMM GRANULOCYTES # BLD AUTO: ABNORMAL K/UL (ref 0–0.04)
IMM GRANULOCYTES NFR BLD AUTO: ABNORMAL % (ref 0–0.5)
LYMPHOCYTES # BLD AUTO: ABNORMAL K/UL (ref 1–4.8)
LYMPHOCYTES NFR BLD: 2 % (ref 18–48)
MAGNESIUM SERPL-MCNC: 2.3 MG/DL (ref 1.6–2.6)
MCH RBC QN AUTO: 30.8 PG (ref 27–31)
MCHC RBC AUTO-ENTMCNC: 34.5 G/DL (ref 32–36)
MCV RBC AUTO: 89 FL (ref 82–98)
MONOCYTES # BLD AUTO: ABNORMAL K/UL (ref 0.3–1)
MONOCYTES NFR BLD: 4 % (ref 4–15)
NEUTROPHILS NFR BLD: 94 % (ref 38–73)
NRBC BLD-RTO: 0 /100 WBC
PHOSPHATE SERPL-MCNC: 4.1 MG/DL (ref 2.7–4.5)
PLATELET # BLD AUTO: 207 K/UL (ref 150–350)
PLATELET BLD QL SMEAR: ABNORMAL
PMV BLD AUTO: 10.1 FL (ref 9.2–12.9)
POCT GLUCOSE: 123 MG/DL (ref 70–110)
POCT GLUCOSE: 149 MG/DL (ref 70–110)
POCT GLUCOSE: 184 MG/DL (ref 70–110)
POTASSIUM SERPL-SCNC: 5 MMOL/L (ref 3.5–5.1)
PROT SERPL-MCNC: 5.9 G/DL (ref 6–8.4)
RBC # BLD AUTO: 4.25 M/UL (ref 4.6–6.2)
SODIUM SERPL-SCNC: 137 MMOL/L (ref 136–145)
VANCOMYCIN TROUGH SERPL-MCNC: 20.6 UG/ML (ref 10–22)
WBC # BLD AUTO: 13.09 K/UL (ref 3.9–12.7)

## 2019-09-16 PROCEDURE — 99232 SBSQ HOSP IP/OBS MODERATE 35: CPT | Mod: ,,, | Performed by: INTERNAL MEDICINE

## 2019-09-16 PROCEDURE — 99232 PR SUBSEQUENT HOSPITAL CARE,LEVL II: ICD-10-PCS | Mod: ,,, | Performed by: INTERNAL MEDICINE

## 2019-09-16 PROCEDURE — 99233 PR SUBSEQUENT HOSPITAL CARE,LEVL III: ICD-10-PCS | Mod: ,,, | Performed by: INTERNAL MEDICINE

## 2019-09-16 PROCEDURE — 63600175 PHARM REV CODE 636 W HCPCS: Performed by: STUDENT IN AN ORGANIZED HEALTH CARE EDUCATION/TRAINING PROGRAM

## 2019-09-16 PROCEDURE — 87077 CULTURE AEROBIC IDENTIFY: CPT

## 2019-09-16 PROCEDURE — 80053 COMPREHEN METABOLIC PANEL: CPT

## 2019-09-16 PROCEDURE — 99233 PR SUBSEQUENT HOSPITAL CARE,LEVL III: ICD-10-PCS | Mod: ,,,

## 2019-09-16 PROCEDURE — 25000003 PHARM REV CODE 250: Performed by: STUDENT IN AN ORGANIZED HEALTH CARE EDUCATION/TRAINING PROGRAM

## 2019-09-16 PROCEDURE — 99252 PR INITIAL INPATIENT CONSULT,LEVL II: ICD-10-PCS | Mod: ,,, | Performed by: NURSE PRACTITIONER

## 2019-09-16 PROCEDURE — 99252 IP/OBS CONSLTJ NEW/EST SF 35: CPT | Mod: ,,, | Performed by: NURSE PRACTITIONER

## 2019-09-16 PROCEDURE — 85007 BL SMEAR W/DIFF WBC COUNT: CPT

## 2019-09-16 PROCEDURE — 99233 SBSQ HOSP IP/OBS HIGH 50: CPT | Mod: ,,,

## 2019-09-16 PROCEDURE — 87186 SC STD MICRODIL/AGAR DIL: CPT

## 2019-09-16 PROCEDURE — 20600001 HC STEP DOWN PRIVATE ROOM

## 2019-09-16 PROCEDURE — 85027 COMPLETE CBC AUTOMATED: CPT

## 2019-09-16 PROCEDURE — 83735 ASSAY OF MAGNESIUM: CPT

## 2019-09-16 PROCEDURE — 87040 BLOOD CULTURE FOR BACTERIA: CPT

## 2019-09-16 PROCEDURE — 63600175 PHARM REV CODE 636 W HCPCS

## 2019-09-16 PROCEDURE — 36415 COLL VENOUS BLD VENIPUNCTURE: CPT

## 2019-09-16 PROCEDURE — 84100 ASSAY OF PHOSPHORUS: CPT

## 2019-09-16 PROCEDURE — 99233 SBSQ HOSP IP/OBS HIGH 50: CPT | Mod: ,,, | Performed by: INTERNAL MEDICINE

## 2019-09-16 RX ORDER — FAMOTIDINE 20 MG/1
20 TABLET, FILM COATED ORAL 2 TIMES DAILY
Status: DISCONTINUED | OUTPATIENT
Start: 2019-09-16 | End: 2019-09-22 | Stop reason: HOSPADM

## 2019-09-16 RX ORDER — INSULIN ASPART 100 [IU]/ML
0-5 INJECTION, SOLUTION INTRAVENOUS; SUBCUTANEOUS
Status: DISCONTINUED | OUTPATIENT
Start: 2019-09-16 | End: 2019-09-22 | Stop reason: HOSPADM

## 2019-09-16 RX ORDER — ENOXAPARIN SODIUM 100 MG/ML
40 INJECTION SUBCUTANEOUS EVERY 24 HOURS
Status: DISCONTINUED | OUTPATIENT
Start: 2019-09-16 | End: 2019-09-18

## 2019-09-16 RX ADMIN — FAMOTIDINE 20 MG: 20 TABLET, FILM COATED ORAL at 08:09

## 2019-09-16 RX ADMIN — KETOROLAC TROMETHAMINE 1 DROP: 5 SOLUTION/ DROPS OPHTHALMIC at 04:09

## 2019-09-16 RX ADMIN — KETOROLAC TROMETHAMINE 1 DROP: 5 SOLUTION/ DROPS OPHTHALMIC at 01:09

## 2019-09-16 RX ADMIN — ENOXAPARIN SODIUM 40 MG: 100 INJECTION SUBCUTANEOUS at 06:09

## 2019-09-16 RX ADMIN — FAMOTIDINE 20 MG: 20 TABLET, FILM COATED ORAL at 10:09

## 2019-09-16 RX ADMIN — BRIMONIDINE TARTRATE 1 DROP: 1.5 SOLUTION OPHTHALMIC at 08:09

## 2019-09-16 RX ADMIN — KETOROLAC TROMETHAMINE 1 DROP: 5 SOLUTION/ DROPS OPHTHALMIC at 08:09

## 2019-09-16 RX ADMIN — DEXTROSE 6 G: 5 SOLUTION INTRAVENOUS at 04:09

## 2019-09-16 RX ADMIN — Medication 2000 MG: at 12:09

## 2019-09-16 RX ADMIN — SODIUM CHLORIDE, SODIUM LACTATE, POTASSIUM CHLORIDE, AND CALCIUM CHLORIDE 1000 ML: .6; .31; .03; .02 INJECTION, SOLUTION INTRAVENOUS at 10:09

## 2019-09-16 RX ADMIN — PREDNISONE 110 MG: 20 TABLET ORAL at 08:09

## 2019-09-16 NOTE — HPI
Elroy Rahman is a 63-year-old male with PMHx of L AKA at age 18 2/2 trauma (recently evaluated by Dr. 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  Patient presented to [facility] on 9/13/19 for generalized weakness (worst in RLE), fatigue, chills, and diaphoresis.  On arrival, found to have staph aureus bacteremia and UTI.  MRI T/L spine showed diffuse metastatic disease without spinal canal or NF stenosis.  Admitted to Hospital Medicine and started on IV vancomycin.  ID following; source likely L AKA stump.      Functional History: Patient lives in [place] with his wife in a single story home with 2 steps to enter.  Prior to admission, he was (I) with ADLs and mobility.  Walks on knees or with prosthesis.  DME: prosthesis

## 2019-09-16 NOTE — HOSPITAL COURSE
9/15/19:  Evaluated by PT and OT.  Bed mobility mod-maxA.  Sit to stand maxA and transfers max-totalA.  LBD totalA.  Toileting maxA.    9/16/19:  No PT or OT.  9/17/19:  Participated with PT and OT.  Bed mobility SBA.  Sit to stand and transfers CGA with RW.  2-3 steps/hops CGA with RW.

## 2019-09-16 NOTE — CONSULTS
Therapy with IV Vancomycin complete and/or consult discontinued by provider. Pharmacy will sign off, please re-consult as needed.    Starla Joseph, PharmD, BCPS  [phone]

## 2019-09-16 NOTE — PLAN OF CARE
Problem: Adult Inpatient Plan of Care  Goal: Plan of Care Review  Plan of care reviewed with patient, spouse, and daughter at beginning of shift. Patient C/O of no pain or nausea. Pt has been put on continuous Ancef. Pt received 1L of LR bolus. Famotidine and Lovenox was added to pt treatment plan. Pt got up and walked to the bathroom with wife once today to have a BM. Stump scab rubbed off and started to bleed. Pressure was held and bleeding stopped.Pt instructed not to get up without somebody there. Pt had 1 BM. No coverage needed with accuchecks. Consult to inpatient rehab was ordered due to weakness. VSS. Afebrile. Bed locked in lowest position. Side rails up x2. Call bell within reach. BADL within reach. Rounding Q1H. Will continue to monitor.

## 2019-09-16 NOTE — PROGRESS NOTES
[facility]  Hematology/Oncology  Progress Note    Patient Name: Elroy Rahman  Admission Date: 9/13/2019  Hospital Length of Stay: 2 days  Code Status: Full Code     Subjective:     HPI:  Oncologic History:       Oncologic History Bronchial carcinoid, left, diagnosed 1/2016   Metastatic disease to liver and bone 5/2017     Oncologic Treatment Left upper lobectomy 1/2016  CAPTEM 7/2017 - 2/2019 (discontinued due to progression)  Lanreotide 7/2017   Zoledronic acid 7/2017   TACE 10/2018  TACE 3/2019  Lily-177 4/10/19, 6/5/19    Pathology 1/2016: Typical carcinoid tumor, well-differentiated T2a, N0, M0 Ki-67 <1%  6/2017: Liver biopsy, well-differentiated, Ki-67 25%        Mr. Rahman is a 63 y.o. male who is seen in follow-up for a bronchial carcinoid tumor.  He states he generally has been feeling well. He states his vision continues to slowly improve.  He is seeing Ophthalmology later this week.  He has no other new complaints.     His history dates to 1/2016 when he underwent a left upper lobectomy with Dr. Bell for a typical carcinoid tumor.  His pathological staging was T2a, N0, M0 with Ki-67 less than 1% and mitotic count 0 per 10 high-powered fields.  He done well postoperatively, however, in 5/2017 he was undergoing routine surveillance and a chest CT in picked up evidence of progression of disease in the liver.  Dedicated abdominal CT was performed showing multiple liver lesions.  In 6/2017 he underwent a CT-guided liver biopsy which was positive for metastatic neuroendocrine tumor which was well-differentiated with no overt nuclear atypia, however, the Ki-67 was 25%.  He underwent gallium-68 PET/CT and was found to have widespread disease involving the bone, liver and also mesentery.  He was started on CAPTEM in 7/2017 and also Lanreotide and zoledronic acid.  Scans in 10/2017 had shown stability of disease.  Imaging in 1/2018 had shown stability of disease.  Scans in 4/2018 had  shown stability of disease.  Scans in 07/2018 had continued to showed mild growth.  In 10/2018 he underwent TACE.  CAPTEM was discontinued in 02/2019 due to progression.  He underwent TACE in 03/2019.  He was started on treatment with PRRT using Lily-177 on 04/10/2019.        History of Present Illness:  Patient is a 63 y.o. male with PMHx of bronchial carcinoid tumor  (most recently on PRRT), history of L BKA who presents to the ER due to weakness, fatigue, and symptoms of dehydration occurring from this weekend. His daughter had noticed him being weak and sweating more profusely over last weekend and this has progressively worsened. He had been on high dose prednisone to manage possible inflammation noted in globe of his eye secondary to PRRT. The pt was told to take prednisone 130 mg from 9/5 and then to go to 110 mg of prednisone after that point. The pt had trouble using his upper extremities secondary to the weakness in his arms. Denies any chest pain currently. He has chronic erythema noted on his back, which is noted again today. Discussed with family in room who also state that pt is also dehydrated. Pt states that his vision is still cloudy but improved compared to last week.     Interval History: Patient feeling much better. Sitting on side of bed. Has been working with therapy. S. Aureus bacteremia x 2 days in blood cultures. ID following.     Oncology Treatment Plan:   [No treatment plan]    Medications:  Continuous Infusions:  Scheduled Meds:   brimonidine 0.15 % OPTH DROP  1 drop Left Eye BID    ceFAZolin(ANCEF) in D5W 500 mL CONTINUOUS INFUSION  6 g Intravenous Q24H    enoxaparin  40 mg Subcutaneous Daily    famotidine  20 mg Oral BID    ketorolac 0.5%  1 drop Left Eye QID    predniSONE  110 mg Oral Daily     PRN Meds:acetaminophen, albuterol-ipratropium, Dextrose 10% Bolus, Dextrose 10% Bolus, glucagon (human recombinant), glucose, glucose, HYDROcodone-acetaminophen, insulin aspart U-100,  ondansetron, sodium chloride 0.9%     Review of Systems   Constitutional: Positive for  malaise/fatigue (improving).   Respiratory: Negative for shortness of breath.    Musculoskeletal:        + chronic LLE amputation   Neurological: Positive for weakness.     Objective:     Vital Signs (Most Recent):  Temp: 98.6 °F (37 °C) (09/16/19 1538)  Pulse: 84 (09/16/19 1538)  Resp: 18 (09/16/19 1538)  BP: 129/81 (09/16/19 1538)  SpO2: 95 % (09/16/19 1538) Vital Signs (24h Range):  Temp:  [97.5 °F (36.4 °C)-98.6 °F (37 °C)] 98.6 °F (37 °C)  Pulse:  [60-84] 84  Resp:  [16-18] 18  SpO2:  [94 %-98 %] 95 %  BP: (120-138)/(75-81) 129/81     Weight: 130.6 kg (288 lb)  Body mass index is 39.06 kg/m².  Body surface area is 2.58 meters squared.      Intake/Output Summary (Last 24 hours) at 9/16/2019 1613  Last data filed at 9/16/2019 0525  Gross per 24 hour   Intake 1100 ml   Output 1950 ml   Net -850 ml       Physical Exam  Constitutional: Sitting up on side of bed. Non-toxic appearance  HENT:   Head: Normocephalic and atraumatic.   Neck: Normal range of motion. Mild erythema and thickening to skin around neck circumferentially.  Pulmonary/Chest: Effort normal.  No respiratory distress.   Musculoskeletal: Normal range of motion.    + left BKA.    Neurological: He is alert and oriented to person, place, and time. Able to actively abduct arms to shoulder height with mild difficulty  Skin: Skin is warm and dry.     Significant Labs:   Recent Lab Results       09/16/19  1229   09/16/19  0841   09/16/19  0830   09/16/19  0826   09/16/19  0540        Albumin         2.0     Alkaline Phosphatase         161     ALT         161     Anion Gap         10     Aniso         Slight     AST         55     Baso #         CANCELED  Comment:  Result canceled by the ancillary.     Basophil%         0.0     BILIRUBIN TOTAL         0.5  Comment:  For infants and newborns, interpretation of results should be based  on gestational age, weight and in  agreement with clinical  observations.  Premature Infant recommended reference ranges:  Up to 24 hours.............<8.0 mg/dL  Up to 48 hours............<12.0 mg/dL  3-5 days..................<15.0 mg/dL  6-29 days.................<15.0 mg/dL       Blood Culture, Routine     No Growth to date[P] No Growth to date[P]       BUN, Bld         36     Calcium         9.1     Chloride         103     CO2         24     Creatinine         1.3     Differential Method         Manual     eGFR if          >60.0     eGFR if non          58.5  Comment:  Calculation used to obtain the estimated glomerular filtration  rate (eGFR) is the CKD-EPI equation.        Eos #         CANCELED  Comment:  Result canceled by the ancillary.     Eosinophil%         0.0     Glucose         133     Gran%         94.0     Hematocrit         38.0     Hemoglobin         13.1     Immature Grans (Abs)         CANCELED  Comment:  Mild elevation in immature granulocytes is non specific and   can be seen in a variety of conditions including stress response,   acute inflammation, trauma and pregnancy. Correlation with other   laboratory and clinical findings is essential.    Result canceled by the ancillary.       Immature Granulocytes         CANCELED  Comment:  Result canceled by the ancillary.     Lymph #         CANCELED  Comment:  Result canceled by the ancillary.     Lymph%         2.0     Magnesium         2.3     MCH         30.8     MCHC         34.5     MCV         89     Mono #         CANCELED  Comment:  Result canceled by the ancillary.     Mono%         4.0     MPV         10.1     nRBC         0     Phosphorus         4.1     Platelet Estimate         Appears normal     Platelets         207     POCT Glucose 149 123           Potassium         5.0     PROTEIN TOTAL         5.9     RBC         4.25     RDW         14.6     Sodium         137     Vancomycin-Trough               WBC         13.09                       09/16/19  0043   09/15/19  2346   09/15/19  1623        Albumin           Alkaline Phosphatase           ALT           Anion Gap           Aniso           AST           Baso #           Basophil%           BILIRUBIN TOTAL           Blood Culture, Routine           BUN, Bld           Calcium           Chloride           CO2           Creatinine           Differential Method           eGFR if            eGFR if non            Eos #           Eosinophil%           Glucose           Gran%           Hematocrit           Hemoglobin           Immature Grans (Abs)           Immature Granulocytes           Lymph #           Lymph%           Magnesium           MCH           MCHC           MCV           Mono #           Mono%           MPV           nRBC           Phosphorus           Platelet Estimate           Platelets           POCT Glucose 184   160     Potassium           PROTEIN TOTAL           RBC           RDW           Sodium           Vancomycin-Trough   20.6       WBC               Assessment/Plan:     * Sepsis due to methicillin susceptible Staphylococcus aureus  Possibly etiology of muscle weakness  Management per ID and primary.     Acute muscle weakness  With mildly elevated CPK.   In setting of high dose steroids initial concern was steroid myopathy.   In light of bacteremia could be generalized inflammation 2/2 infection.   Improving.     Carcinoid bronchial adenoma of left lung  Bronchial Carcinoid, w secondary NET to liver  - PRRT has been on hold,no changes to this plan while he is inpatient  - last gallium PET-CT on 8/20/19 with improvement in burden of disease  - was on steroids due to inflammation of eye thought to be secondary to PRRT  -Recommend continuing prednisone at current dose for another 5 days, then will decrease by 20mg.     Continue supportive care. We will follow along while patient is admitted. Discussed with Dr. London.     Faraz Romero,  MD  Hematology/Oncology  [facility]    Attending Addendum:  The patient was seen, examined, and discussed on rounds with the team.  I agree with the assessment and plan as outlined for Elroy Rahman.  He continues to improve on treatment for bacteremia.  Would recommend we continue to taper his steroids.    Jesus London DO, FACP  Hematology & Oncology  [facility] [place] [address]  ph. [phone]  Fax. [phone]

## 2019-09-16 NOTE — PROGRESS NOTES
Pharmacokinetic Assessment Follow Up: IV Vancomycin    Vancomycin serum concentration assessment(s):    The trough level was drawn correctly and can be used to guide therapy at this time. The measurement is within the desired definitive target range of 10 to 20 mcg/mL.    Vancomycin Regimen Plan:    Continue regimen to Vancomycin 2000 mg IV every 12 hours with next serum trough concentration measured at 1230 prior to 4th dose on 9/17    Drug levels (last 3 results):  Recent Labs   Lab Result Units 09/15/19  2346   Vancomycin-Trough ug/mL 20.6       Pharmacy will continue to follow and monitor vancomycin.    Please contact pharmacy at extension [phone] for questions regarding this assessment.    Thank you for the consult,   Edmond Juan       Patient brief summary:  Elroy Rahman is a 63 y.o. male initiated on antimicrobial therapy with IV Vancomycin for treatment of bacteremia    The patient's current regimen is 2000mg every 12 hours    Drug Allergies:   Review of patient's allergies indicates:   Allergen Reactions    Epinephrine Other (See Comments)     Carcinoid crisis       Actual Body Weight:   130.6kg    Renal Function:   Estimated Creatinine Clearance: 76.5 mL/min (based on SCr of 1.4 mg/dL).,     Dialysis Method (if applicable):  N/A    CBC (last 72 hours):  Recent Labs   Lab Result Units 09/13/19  1316 09/14/19  0650 09/15/19  0616   WBC K/uL 12.78* 12.13 14.44*   Hemoglobin g/dL 14.4 13.6* 13.5*   Hematocrit % 43.4 41.2 39.4*   Platelets K/uL 154 161 196   Gran% % 92.0* 91.4* 86.4*   Lymph% % 1.0* 4.3* 4.7*   Mono% % 3.0* 2.3* 3.5*   Eosinophil% % 0.0 0.0 0.0   Basophil% % 0.0 0.2 0.8   Differential Method  Manual Automated Automated       Metabolic Panel (last 72 hours):  Recent Labs   Lab Result Units 09/13/19  1316 09/13/19  1349 09/14/19  0650 09/15/19  0615   Sodium mmol/L 133*  --  135* 137   Potassium mmol/L 4.4  --  4.4 4.5   Chloride mmol/L 99  --  100 100   CO2 mmol/L 23  --  25 29   Glucose  mg/dL 136*  --  126* 130*   Glucose, UA   --  Negative  --   --    BUN, Bld mg/dL 22  --  19 30*   Creatinine mg/dL 0.9  --  0.9 1.4   Albumin g/dL 2.5*  --  2.2* 2.0*   Total Bilirubin mg/dL 1.6*  --  1.6* 0.7   Alkaline Phosphatase U/L 119  --  133 141*   AST U/L 36  --  54* 35   ALT U/L 61*  --  81* 79*   Magnesium mg/dL 2.0  --  2.0 2.3   Phosphorus mg/dL  --   --  2.5* 4.1       Vancomycin Administrations:  vancomycin given in the last 96 hours                   vancomycin 2 g in 0.9% sodium chloride 500 mL IVPB (mg) 2,000 mg New Bag 09/16/19 0038     2,000 mg New Bag 09/15/19 1113    vancomycin (VANCOCIN) 2,000 mg in dextrose 5 % 250 mL IVPB (mg) 2,000 mg New Bag 09/14/19 2032    vancomycin (VANCOCIN) 2,500 mg in dextrose 5 % 500 mL IVPB (mg) 2,500 mg New Bag 09/14/19 0829                Microbiologic Results:  Microbiology Results (last 7 days)     Procedure Component Value Units Date/Time    Urine culture [[id number]]  (Abnormal)  (Susceptibility) Collected:  09/13/19 1349    Order Status:  Completed Specimen:  Urine Updated:  09/15/19 1421     Urine Culture, Routine STAPHYLOCOCCUS AUREUS  > 100,000 cfu/ml      Narrative:       GRAY AND YELLOW  Preferred Collection Type->Urine, Clean Catch    Blood culture [[id number]] Collected:  09/15/19 0616    Order Status:  Completed Specimen:  Blood Updated:  09/15/19 1315     Blood Culture, Routine No Growth to date    Blood culture [[id number]] Collected:  09/15/19 0616    Order Status:  Completed Specimen:  Blood Updated:  09/15/19 1315     Blood Culture, Routine No Growth to date    Blood culture [[id number]]  (Abnormal) Collected:  09/13/19 2036    Order Status:  Completed Specimen:  Blood Updated:  09/15/19 0850     Blood Culture, Routine Gram stain dinorah bottle: Gram positive cocci in clusters resembling Staph      Positive results previously called      Gram stain aer bottle: Gram positive cocci in clusters resembling Staph      STAPHYLOCOCCUS AUREUS  ID consult  required at [facility].  For susceptibility see order #[id number]      Blood culture #2 **CANNOT BE ORDERED STAT** [[id number]]  (Abnormal) Collected:  09/13/19 1348    Order Status:  Completed Specimen:  Blood from Peripheral, Hand, Right Updated:  09/15/19 0849     Blood Culture, Routine Gram stain dinorah bottle: Gram positive cocci in clusters resembling Staph       Results called to and read back by: Porfirio Medina RN 09/14/2019  05:49      Gram stain aer bottle: Gram positive cocci in clusters resembling Staph      STAPHYLOCOCCUS AUREUS  ID consult required at [facility].  For susceptibility see order #[id number]      Blood culture #1 **CANNOT BE ORDERED STAT** [[id number]]  (Abnormal) Collected:  09/13/19 1348    Order Status:  Completed Specimen:  Blood from Peripheral, Antecubital, Left Updated:  09/15/19 0848     Blood Culture, Routine Gram stain dinorah bottle: Gram positive cocci in clusters resembling Staph       Results called to and read back by: Porfirio Medina RN 09/14/2019  05:49      Gram stain aer bottle: Gram positive cocci in clusters resembling Staph      STAPHYLOCOCCUS AUREUS  Susceptibility pending  ID consult required at [facility].      Blood culture [[id number]]     Order Status:  Canceled Specimen:  Blood

## 2019-09-16 NOTE — SUBJECTIVE & OBJECTIVE
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    Sleep apnea     Snores     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     Past Surgical History:   Procedure Laterality Date    ABDOMINAL SURGERY      WSVKON-EVZILK-CT N/A 6/8/2017    Performed by [facility] Provider at [facility] OR 2ND FLR    Bka Left     HERNIA REPAIR      LEFT UPPER LOBECTOMY-LUNG Left 1/28/2016    Performed by Pan Bell MD at [facility] OR 2ND FLR    REPAIR, HERNIA, UMBILICAL, INCARCERATED, LAPAROSCOPIC N/A 11/27/2018    Performed by Coco Guidry MD at [facility] OR    THORACOTOMY W/LUNG RESECTION Left 1/28/2016    Performed by Pan Bell MD at [facility] OR 2ND FLR     Review of patient's allergies indicates:   Allergen Reactions    Epinephrine Other (See Comments)     Carcinoid crisis       Scheduled Medications:    brimonidine 0.15 % OPTH DROP  1 drop Left Eye BID    enoxaparin  40 mg Subcutaneous Daily    famotidine  20 mg Oral BID    ketorolac 0.5%  1 drop Left Eye QID    lactated ringers  1,000 mL Intravenous Once    predniSONE  110 mg Oral Daily    vancomycin (VANCOCIN) IVPB  2,000 mg Intravenous Q12H       PRN Medications: acetaminophen, albuterol-ipratropium, Dextrose 10% Bolus, Dextrose 10% Bolus, glucagon (human recombinant), glucose, glucose, HYDROcodone-acetaminophen, insulin aspart U-100, ondansetron, sodium chloride 0.9%    Family History     Problem Relation (Age of Onset)    Cancer Mother, Sister        Tobacco Use    Smoking status: Never Smoker    Smokeless tobacco: Never Used   Substance and Sexual Activity    Alcohol use: Yes     Alcohol/week:  0.6 oz     Types: 1 Cans of beer per week     Comment: rare    Drug use: No    Sexual activity: Not on file     Review of Systems   Constitutional: Positive for activity change and fatigue. Negative for chills.   HENT: Negative for drooling, hearing loss, trouble swallowing and voice change.    Eyes: Negative for pain and visual disturbance.   Respiratory: Negative for cough and shortness of breath.    Cardiovascular: Negative for chest pain and palpitations.   Gastrointestinal: Negative for abdominal pain, nausea and vomiting.   Genitourinary: Negative for difficulty urinating and flank pain.   Musculoskeletal: Positive for gait problem. Negative for back pain and neck pain.   Skin: Positive for color change. Negative for rash.   Neurological: Positive for weakness (improving). Negative for numbness and headaches.   Psychiatric/Behavioral: Negative for agitation and hallucinations. The patient is not nervous/anxious.      Objective:     Vital Signs (Most Recent):  Temp: 97.5 °F (36.4 °C) (09/16/19 0738)  Pulse: 62 (09/16/19 0738)  Resp: 18 (09/16/19 0738)  BP: 124/75 (09/16/19 0738)  SpO2: 98 % (09/16/19 0738)    Vital Signs (24h Range):  Temp:  [96.8 °F (36 °C)-98.8 °F (37.1 °C)] 97.5 °F (36.4 °C)  Pulse:  [60-87] 62  Resp:  [16-18] 18  SpO2:  [94 %-98 %] 98 %  BP: (120-138)/(66-85) 124/75     Body mass index is 39.06 kg/m².    Physical Exam   Constitutional: He is oriented to person, place, and time. He appears well-developed and well-nourished. No distress.   HENT:   Head: Normocephalic and atraumatic.   Right Ear: External ear normal.   Left Ear: External ear normal.   Nose: Nose normal.   Eyes: Right eye exhibits no discharge. Left eye exhibits no discharge. No scleral icterus.   Neck: Normal range of motion.   Cardiovascular: Normal rate and intact distal pulses.   Pulmonary/Chest: Effort normal. No respiratory distress.   Abdominal: Soft. He exhibits no distension. There is no tenderness.    Musculoskeletal: Normal range of motion. He exhibits deformity. He exhibits no tenderness.   L AKA stump with redness and scabs, non-tender    Neurological: He is alert and oriented to person, place, and time. No sensory deficit. He exhibits normal muscle tone.   Motor strength: BUE 5/5.  RLE proximal 4-/5, distal 4+/5.    Skin: Skin is warm and dry.   Psychiatric: He has a normal mood and affect. His behavior is normal.   Vitals reviewed.    NEUROLOGICAL EXAMINATION:     MENTAL STATUS   Oriented to person, place, and time.       Diagnostic Results:  Labs: Reviewed  X-Ray: Reviewed  MRI: Reviewed

## 2019-09-16 NOTE — PROGRESS NOTES
[facility] Hospital Medicine  Progress Note    Patient Name: Elroy Rahman  MRN: [id number]  Patient Class: IP- Inpatient   Admission Date: 9/13/2019  Length of Stay: 2 days  Attending Physician: Austin Obando MD  Primary Care Provider: Wayne Catalan PA-C    [facility] Medicine Team: [facility] HOSP MED 1 Paulo Madrigal MD    Subjective:     Principal Problem:Sepsis due to methicillin susceptible Staphylococcus aureus        HPI:  Mr. 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    Pt reports that on Tuesday he was experiencing chills, diaphoresis and severe muscle weakness. The weakness began suddenly, not concentrated in any one muscle group and progressively worsened. Unfortunately by late that evening he was unable to move around on his own. At baseline pt does have a left LE amputation and is able to ambulate freely with and without his prosthesis. He denies any bowel or bladder incontinence, back pain, numbness, perineal numbness or extremity pain.   Per notes: He was started on PRRT back in April and began prednisone due to inflammation noted in the globe of his eye. Beginning on 9/5 he was to take Prednisone 130 mg for days and then proceed with 110mg for an additional five days. Pt reports that he is compliant with his medication regimen.     Overview/Hospital Course:  No notes on file    Interval History: Pt reports feeling much better. Muscle strength and fatigue improving    Review of Systems   Constitutional: Positive for activity change, chills, diaphoresis, fatigue and fever. Negative for appetite change.   HENT: Negative for congestion, postnasal drip and sore throat.    Eyes: Positive for visual disturbance.   Respiratory: Negative  for cough, chest tightness and shortness of breath.    Cardiovascular: Negative for chest pain, palpitations and leg swelling.   Gastrointestinal: Positive for nausea. Negative for abdominal pain, constipation, diarrhea and vomiting.   Endocrine: Negative for heat intolerance.   Genitourinary: Negative for difficulty urinating and dysuria.   Musculoskeletal: Negative for arthralgias, back pain and myalgias.   Neurological: Positive for weakness and light-headedness. Negative for numbness and headaches.     Objective:     Vital Signs (Most Recent):  Temp: 98.5 °F (36.9 °C) (09/16/19 1203)  Pulse: 62 (09/16/19 1203)  Resp: 18 (09/16/19 1203)  BP: 135/75 (09/16/19 1203)  SpO2: 97 % (09/16/19 1203) Vital Signs (24h Range):  Temp:  [96.8 °F (36 °C)-98.5 °F (36.9 °C)] 98.5 °F (36.9 °C)  Pulse:  [60-78] 62  Resp:  [16-18] 18  SpO2:  [94 %-98 %] 97 %  BP: (120-138)/(66-81) 135/75     Weight: 130.6 kg (288 lb)  Body mass index is 39.06 kg/m².    Intake/Output Summary (Last 24 hours) at 9/16/2019 1409  Last data filed at 9/16/2019 0525  Gross per 24 hour   Intake 1100 ml   Output 1950 ml   Net -850 ml      Physical Exam   Constitutional: He is oriented to person, place, and time. He appears well-developed and well-nourished. He does not appear ill. No distress.   Obese   HENT:   Head: Normocephalic and atraumatic.   Eyes: No scleral icterus.   Neck: Normal range of motion. Neck supple.   Cardiovascular: Normal rate, regular rhythm and intact distal pulses. Exam reveals no gallop and no friction rub.   No murmur heard.  Pulmonary/Chest: Effort normal and breath sounds normal. No respiratory distress.   Abdominal: Soft. He exhibits distension. There is no tenderness. There is no guarding.   Musculoskeletal:   b/l UE 5/5 muscle strength  RLE: 3-4/5 muscle strength. Able to move his R foot and bend his right knee  LLE stump redness improving, some scabbing at the base of the stump   Neurological: He is alert and oriented to  person, place, and time.   Skin: Skin is warm.   Nursing note and vitals reviewed.      Significant Labs:   CBC:   Recent Labs   Lab 09/15/19  0616 09/16/19  0540   WBC 14.44* 13.09*   HGB 13.5* 13.1*   HCT 39.4* 38.0*    207     CMP:   Recent Labs   Lab 09/15/19  0615 09/16/19  0540    137   K 4.5 5.0    103   CO2 29 24   * 133*   BUN 30* 36*   CREATININE 1.4 1.3   CALCIUM 9.1 9.1   PROT 6.1 5.9*   ALBUMIN 2.0* 2.0*   BILITOT 0.7 0.5   ALKPHOS 141* 161*   AST 35 55*   ALT 79* 161*   ANIONGAP 8 10   EGFRNONAA 53.5* 58.5*       Significant Imaging: I have reviewed all pertinent imaging results/findings within the past 24 hours.      Assessment/Plan:      * Sepsis due to methicillin susceptible Staphylococcus aureus  Pt with diaphoresis and diffuse muscle weakness leading to admission associated with fevers while in the hospital. BCx and UCx positive for staph aureus.     Plan  - Abx changed to cefazolin based on sensitivities. D/c vancomycin  - ID consulted: following up recs  - Aggressive fluid resuscitation should patient become acutely hypotensive.       UTI (urinary tract infection)  Urinalysis with 1+ protein,22WBC and occasional bacteria. He has leukocytosis of 12.78. UCx positive for staph aureus. Pt without any urinary symptoms     Plan   - Continue cefazolin.   - ID consulted, f/u recs     Acute muscle weakness  Pt with a medical history of metastatic carcinoma and corticosteroid use presenting with LE weakness of sudden onset most likely due to sepsis. BCx growing gram positive cocci in clusters, UCx showing staph aureus. (see sepsis)  Cord compression initially suspected due to acute presentation, MRI spine final report showing metastatic disease without any mass effect. Steroid induced myopathy considered since pt is on a high dose of steroids (CPK elevated, U/A with 3+ blood and only 13 RBC).     Carcinoid bronchial adenoma of left lung  MHx of bronchial carcinoid tumor with  metastases to the liver, bone and mesentery. He is also s/p left upper lung lobectomy done in 2016. Pt currently undergoing peptide receptor radionucleotide therapy, being followed by Dr. London.    - Oncology following during this admission    MAGDI (acute kidney injury)  sCr elevated from baseline (0.9 > 1.4).     Plan  -Fluid resuscitation       VTE Risk Mitigation (From admission, onward)        Ordered     enoxaparin injection 40 mg  Daily      09/16/19 0857     Reason for no Mechanical VTE Prophylaxis  Once      09/13/19 1933     IP VTE HIGH RISK PATIENT  Once      09/13/19 1933     Place sequential compression device  Until discontinued      09/13/19 1641                Paulo Madrigal MD  Department of Hospital Medicine   [facility]

## 2019-09-16 NOTE — PLAN OF CARE
CM met with patient for discharge planning.  Patient states he lives with his spouse in a one story house with two steps to enter with no railing.  Patient states he is independent with ADL's.  Plan is Rehab.    Pharmacy:    [id number] [address] AT [facility] & [address] [address]  Phone: [phone] Fax: [phone]    PCP:  Wayne Catalan PA-C    Payor: BLUE CROSS BLUE SHIELD / Plan: BCBS [facility] HMO / Product Type: HMO /      09/16/19 1147   Discharge Assessment   Assessment Type Discharge Planning Assessment   Confirmed/corrected address and phone number on facesheet? Yes   Assessment information obtained from? Patient   Expected Length of Stay (days) 3   Communicated expected length of stay with patient/caregiver yes   Prior to hospitilization cognitive status: Alert/Oriented   Prior to hospitalization functional status: Independent   Current cognitive status: Alert/Oriented   Current Functional Status: Independent   Facility Arrived From: Emergency room   Lives With spouse   Able to Return to Prior Arrangements yes   Is patient able to care for self after discharge? Yes   Who are your caregiver(s) and their phone number(s)? Hope Josiah - spouse [phone]   Patient's perception of discharge disposition home or selfcare   Readmission Within the Last 30 Days no previous admission in last 30 days   Patient currently being followed by outpatient case management? No   Patient currently receives any other outside agency services? No   Equipment Currently Used at Home prosthesis   Do you have any problems affording any of your prescribed medications? No   Is the patient taking medications as prescribed? yes   Does the patient have transportation home? Yes   Transportation Anticipated family or friend will provide   Does the patient receive services at the [facility]mad[facility] Clinic? No   Discharge Plan A Rehab    Discharge Plan B Home Health   DME  Needed Upon Discharge  none   Patient/Family in Agreement with Plan yes

## 2019-09-16 NOTE — ASSESSMENT & PLAN NOTE
Pt with a medical history of metastatic carcinoma and corticosteroid use presenting with LE weakness of sudden onset most likely due to sepsis. BCx growing gram positive cocci in clusters, UCx showing staph aureus. (see sepsis)  Cord compression initially suspected due to acute presentation, MRI spine final report showing metastatic disease without any mass effect. Steroid induced myopathy considered since pt is on a high dose of steroids (CPK elevated, U/A with 3+ blood and only 13 RBC).

## 2019-09-16 NOTE — PLAN OF CARE
Problem: Adult Inpatient Plan of Care  Goal: Plan of Care Review  Outcome: Ongoing (interventions implemented as appropriate)  Plan of care reviewed with the patient at the beginning of shift. The patient is alert and oriented. GCS 15. Denying complaints at this time. Vanc continued. Cultures positive for gram positive cocci in clusters. VSS. Remained free from falls and injuries throughout shift. Bed in low locked position. Call bell within reach. Will continue to monitor.

## 2019-09-16 NOTE — ASSESSMENT & PLAN NOTE
Urinalysis with 1+ protein,22WBC and occasional bacteria. He has leukocytosis of 12.78. UCx positive for staph aureus. Pt without any urinary symptoms     Plan   - Continue cefazolin.   - ID consulted, f/u recs

## 2019-09-16 NOTE — SUBJECTIVE & OBJECTIVE
Interval History: Patient feeling much better. Sitting on side of bed. Has been working with therapy. S. Aureus bacteremia x 2 days in blood cultures. ID following.     Oncology Treatment Plan:   [No treatment plan]    Medications:  Continuous Infusions:  Scheduled Meds:   brimonidine 0.15 % OPTH DROP  1 drop Left Eye BID    ceFAZolin(ANCEF) in D5W 500 mL CONTINUOUS INFUSION  6 g Intravenous Q24H    enoxaparin  40 mg Subcutaneous Daily    famotidine  20 mg Oral BID    ketorolac 0.5%  1 drop Left Eye QID    predniSONE  110 mg Oral Daily     PRN Meds:acetaminophen, albuterol-ipratropium, Dextrose 10% Bolus, Dextrose 10% Bolus, glucagon (human recombinant), glucose, glucose, HYDROcodone-acetaminophen, insulin aspart U-100, ondansetron, sodium chloride 0.9%     Review of Systems   Constitutional: Positive for  malaise/fatigue (improving).   Respiratory: Negative for shortness of breath.    Musculoskeletal:        + chronic LLE amputation   Neurological: Positive for weakness.     Objective:     Vital Signs (Most Recent):  Temp: 98.6 °F (37 °C) (09/16/19 1538)  Pulse: 84 (09/16/19 1538)  Resp: 18 (09/16/19 1538)  BP: 129/81 (09/16/19 1538)  SpO2: 95 % (09/16/19 1538) Vital Signs (24h Range):  Temp:  [97.5 °F (36.4 °C)-98.6 °F (37 °C)] 98.6 °F (37 °C)  Pulse:  [60-84] 84  Resp:  [16-18] 18  SpO2:  [94 %-98 %] 95 %  BP: (120-138)/(75-81) 129/81     Weight: 130.6 kg (288 lb)  Body mass index is 39.06 kg/m².  Body surface area is 2.58 meters squared.      Intake/Output Summary (Last 24 hours) at 9/16/2019 1612  Last data filed at 9/16/2019 0525  Gross per 24 hour   Intake 1100 ml   Output 1950 ml   Net -850 ml       Physical Exam  Constitutional: Sitting up on side of bed. Non-toxic appearance  HENT:   Head: Normocephalic and atraumatic.   Neck: Normal range of motion. Mild erythema and thickening to skin around neck circumferentially.  Pulmonary/Chest: Effort normal.  No respiratory distress.   Musculoskeletal: Normal  range of motion.    + left BKA.    Neurological: He is alert and oriented to person, place, and time. Able to actively abduct arms to shoulder height with mild difficulty  Skin: Skin is warm and dry.     Significant Labs:   Recent Lab Results       09/16/19  1229   09/16/19  0841   09/16/19  0830   09/16/19  0826   09/16/19  0540        Albumin         2.0     Alkaline Phosphatase         161     ALT         161     Anion Gap         10     Aniso         Slight     AST         55     Baso #         CANCELED  Comment:  Result canceled by the ancillary.     Basophil%         0.0     BILIRUBIN TOTAL         0.5  Comment:  For infants and newborns, interpretation of results should be based  on gestational age, weight and in agreement with clinical  observations.  Premature Infant recommended reference ranges:  Up to 24 hours.............<8.0 mg/dL  Up to 48 hours............<12.0 mg/dL  3-5 days..................<15.0 mg/dL  6-29 days.................<15.0 mg/dL       Blood Culture, Routine     No Growth to date[P] No Growth to date[P]       BUN, Bld         36     Calcium         9.1     Chloride         103     CO2         24     Creatinine         1.3     Differential Method         Manual     eGFR if          >60.0     eGFR if non          58.5  Comment:  Calculation used to obtain the estimated glomerular filtration  rate (eGFR) is the CKD-EPI equation.        Eos #         CANCELED  Comment:  Result canceled by the ancillary.     Eosinophil%         0.0     Glucose         133     Gran%         94.0     Hematocrit         38.0     Hemoglobin         13.1     Immature Grans (Abs)         CANCELED  Comment:  Mild elevation in immature granulocytes is non specific and   can be seen in a variety of conditions including stress response,   acute inflammation, trauma and pregnancy. Correlation with other   laboratory and clinical findings is essential.    Result canceled by the  ancillary.       Immature Granulocytes         CANCELED  Comment:  Result canceled by the ancillary.     Lymph #         CANCELED  Comment:  Result canceled by the ancillary.     Lymph%         2.0     Magnesium         2.3     MCH         30.8     MCHC         34.5     MCV         89     Mono #         CANCELED  Comment:  Result canceled by the ancillary.     Mono%         4.0     MPV         10.1     nRBC         0     Phosphorus         4.1     Platelet Estimate         Appears normal     Platelets         207     POCT Glucose 149 123           Potassium         5.0     PROTEIN TOTAL         5.9     RBC         4.25     RDW         14.6     Sodium         137     Vancomycin-Trough               WBC         13.09                      09/16/19  0043   09/15/19  2346   09/15/19  1623        Albumin           Alkaline Phosphatase           ALT           Anion Gap           Aniso           AST           Baso #           Basophil%           BILIRUBIN TOTAL           Blood Culture, Routine           BUN, Bld           Calcium           Chloride           CO2           Creatinine           Differential Method           eGFR if            eGFR if non            Eos #           Eosinophil%           Glucose           Gran%           Hematocrit           Hemoglobin           Immature Grans (Abs)           Immature Granulocytes           Lymph #           Lymph%           Magnesium           MCH           MCHC           MCV           Mono #           Mono%           MPV           nRBC           Phosphorus           Platelet Estimate           Platelets           POCT Glucose 184   160     Potassium           PROTEIN TOTAL           RBC           RDW           Sodium           Vancomycin-Trough   20.6       WBC

## 2019-09-16 NOTE — ASSESSMENT & PLAN NOTE
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

## 2019-09-16 NOTE — SUBJECTIVE & OBJECTIVE
Interval History: Pt reports feeling much better. Muscle strength and fatigue improving    Review of Systems   Constitutional: Positive for activity change, chills, diaphoresis, fatigue and fever. Negative for appetite change.   HENT: Negative for congestion, postnasal drip and sore throat.    Eyes: Positive for visual disturbance.   Respiratory: Negative for cough, chest tightness and shortness of breath.    Cardiovascular: Negative for chest pain, palpitations and leg swelling.   Gastrointestinal: Positive for nausea. Negative for abdominal pain, constipation, diarrhea and vomiting.   Endocrine: Negative for heat intolerance.   Genitourinary: Negative for difficulty urinating and dysuria.   Musculoskeletal: Negative for arthralgias, back pain and myalgias.   Neurological: Positive for weakness and light-headedness. Negative for numbness and headaches.     Objective:     Vital Signs (Most Recent):  Temp: 98.5 °F (36.9 °C) (09/16/19 1203)  Pulse: 62 (09/16/19 1203)  Resp: 18 (09/16/19 1203)  BP: 135/75 (09/16/19 1203)  SpO2: 97 % (09/16/19 1203) Vital Signs (24h Range):  Temp:  [96.8 °F (36 °C)-98.5 °F (36.9 °C)] 98.5 °F (36.9 °C)  Pulse:  [60-78] 62  Resp:  [16-18] 18  SpO2:  [94 %-98 %] 97 %  BP: (120-138)/(66-81) 135/75     Weight: 130.6 kg (288 lb)  Body mass index is 39.06 kg/m².    Intake/Output Summary (Last 24 hours) at 9/16/2019 1409  Last data filed at 9/16/2019 0525  Gross per 24 hour   Intake 1100 ml   Output 1950 ml   Net -850 ml      Physical Exam   Constitutional: He is oriented to person, place, and time. He appears well-developed and well-nourished. He does not appear ill. No distress.   Obese   HENT:   Head: Normocephalic and atraumatic.   Eyes: No scleral icterus.   Neck: Normal range of motion. Neck supple.   Cardiovascular: Normal rate, regular rhythm and intact distal pulses. Exam reveals no gallop and no friction rub.   No murmur heard.  Pulmonary/Chest: Effort normal and breath sounds normal.  No respiratory distress.   Abdominal: Soft. He exhibits distension. There is no tenderness. There is no guarding.   Musculoskeletal:   b/l UE 5/5 muscle strength  RLE: 3-4/5 muscle strength. Able to move his R foot and bend his right knee  LLE stump redness improving, some scabbing at the base of the stump   Neurological: He is alert and oriented to person, place, and time.   Skin: Skin is warm.   Nursing note and vitals reviewed.      Significant Labs:   CBC:   Recent Labs   Lab 09/15/19  0616 09/16/19  0540   WBC 14.44* 13.09*   HGB 13.5* 13.1*   HCT 39.4* 38.0*    207     CMP:   Recent Labs   Lab 09/15/19  0615 09/16/19  0540    137   K 4.5 5.0    103   CO2 29 24   * 133*   BUN 30* 36*   CREATININE 1.4 1.3   CALCIUM 9.1 9.1   PROT 6.1 5.9*   ALBUMIN 2.0* 2.0*   BILITOT 0.7 0.5   ALKPHOS 141* 161*   AST 35 55*   ALT 79* 161*   ANIONGAP 8 10   EGFRNONAA 53.5* 58.5*       Significant Imaging: I have reviewed all pertinent imaging results/findings within the past 24 hours.

## 2019-09-16 NOTE — ASSESSMENT & PLAN NOTE
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

## 2019-09-16 NOTE — ASSESSMENT & PLAN NOTE
Bronchial Carcinoid, w secondary NET to liver  - PRRT has been on hold,no changes to this plan while he is inpatient  - last gallium PET-CT on 8/20/19 with improvement in burden of disease  - was on steroids due to inflammation of eye thought to be secondary to PRRT  -Recommend continuing prednisone at current dose for another 5 days, then will decrease by 20mg.

## 2019-09-16 NOTE — CONSULTS
Inpatient consult to Physical Medicine Rehab  Consult performed by: TAMELA Gentile  Consult ordered by: Paulo Madrigal MD  Reason for consult: rehab evaluation      Consult received.  Reviewed patient history and current admission.  Rehab team following.  Full consult to follow.    LEONELA Coker, DAVP-C  Physical Medicine & Rehabilitation   09/16/2019  Spectralink: [id number]

## 2019-09-16 NOTE — ASSESSMENT & PLAN NOTE
Pt with diaphoresis and diffuse muscle weakness leading to admission associated with fevers while in the hospital. BCx and UCx positive for staph aureus.     Plan  - Abx changed to cefazolin based on sensitivities. D/c vancomycin  - ID consulted: following up recs  - Aggressive fluid resuscitation should patient become acutely hypotensive.

## 2019-09-16 NOTE — ASSESSMENT & PLAN NOTE
-  (I)/mod(I) with ADLs and mobility at baseline  -  L AKA since age 18--> recent PM&R clinic visit with Dr. Dhillon   -  Related to acute illness with associated weakness   See hospital course for functional status.    Recommendations  -  Encourage mobility, OOB in chair, and early ambulation as appropriate  -  PT/OT evaluate and treat  -  Pain management  -  DVT prophylaxis  -  Monitor for and prevent skin breakdown and pressure ulcers  · Early mobility, repositioning/weight shifting every 20-30 minutes when sitting, turn patient every 2 hours, proper mattress/overlay and chair cushioning, pressure relief/heel protector boots

## 2019-09-16 NOTE — ASSESSMENT & PLAN NOTE
-  Presented with generalized weakness (worst in RLE), fatigue, chills, and diaphoresis  -  Blood and urine cultures +staph aureus  -  ID following--> suspect source to be L AKA stump  -  Management per primary team--> on IV vancomycin

## 2019-09-16 NOTE — CONSULTS
[facility]  Physical Medicine & Rehab  Consult Note    Patient Name: Elroy Rahman  MRN: [id number]  Admission Date: 9/13/2019  Hospital Length of Stay: 2 days  Attending Physician: Austin Obando MD     Inpatient consult to Physical Medicine & Rehabilitation  Consult performed by: Leisa Terrell NP  Consult requested by:  Austin Obando MD    Collaborating Physician: Julieta Dhillon MD  Reason for Consult:  Rehab evaluation     Consults  Subjective:     Principal Problem: Bacteremia    HPI: Elroy Rahman is a 63-year-old male with PMHx of L AKA at age 18 2/2 trauma (recently evaluated by Dr. 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  Patient presented to [facility] on 9/13/19 for generalized weakness (worst in RLE), fatigue, chills, and diaphoresis.  On arrival, found to have staph aureus bacteremia and UTI.  MRI T/L spine showed diffuse metastatic disease without spinal canal or NF stenosis.  Admitted to Hospital Medicine and started on IV vancomycin.  ID following; source likely L AKA stump.      Functional History: Patient lives in [place] with his wife in a single story home with 2 steps to enter.  Prior to admission, he was (I) with ADLs and mobility.  Walks on knees or with prosthesis.  DME: prosthesis      Hospital Course: 9/15/19:  Evaluated by PT and OT.  Bed mobility mod-maxA.  Sit to stand maxA and transfers max-totalA.  LBD totalA.  Toileting maxA.      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 at [facility] OR 2ND FLR    Bka Left     HERNIA REPAIR      LEFT UPPER LOBECTOMY-LUNG Left 1/28/2016    Performed by Pan Bell MD at [facility] OR 2ND FLR    REPAIR, HERNIA, UMBILICAL, INCARCERATED, LAPAROSCOPIC N/A 11/27/2018    Per[facility] by [facility] at [facility] OR    THORACOTOMY W/LUNG RESECTION Left 1/28/2016    Performed by Pan Bell MD at [facility] OR 2ND FLR     Review of patient's allergies indicates:   Allergen Reactions    Epinephrine Other (See Comments)     Carcinoid crisis       Scheduled Medications:    brimonidine 0.15 % OPTH DROP  1 drop Left Eye BID    enoxaparin  40 mg Subcutaneous Daily    famotidine  20 mg Oral BID    ketorolac 0.5%  1 drop Left Eye QID    lactated ringers  1,000 mL Intravenous Once    predniSONE  110 mg Oral Daily    vancomycin (VANCOCIN) IVPB  2,000 mg Intravenous Q12H       PRN Medications: acetaminophen, albuterol-ipratropium, Dextrose 10% Bolus, Dextrose 10% Bolus, glucagon (human recombinant), glucose, glucose, HYDROcodone-acetaminophen, insulin aspart U-100, ondansetron, sodium chloride 0.9%    Family History     Problem Relation (Age of Onset)    Cancer Mother, Sister        Tobacco Use    Smoking status: Never Smoker    Smokeless tobacco: Never Used   Substance and Sexual Activity    Alcohol use: Yes     Alcohol/week: 0.6 oz     Types: 1 Cans of beer per week     Comment: rare    Drug use: No    Sexual activity: Not on file     Review of Systems   Constitutional: Positive for activity change and fatigue. Negative for chills.   HENT: Negative for drooling, hearing loss, trouble swallowing and voice change.    Eyes: Negative for pain and visual disturbance.   Respiratory: Negative for  cough and shortness of breath.    Cardiovascular: Negative for chest pain and palpitations.   Gastrointestinal: Negative for abdominal pain, nausea and vomiting.   Genitourinary: Negative for difficulty urinating and flank pain.   Musculoskeletal: Positive for gait problem. Negative for back pain and neck pain.   Skin: Positive for color change. Negative for rash.   Neurological: Positive for weakness (improving). Negative for numbness and headaches.   Psychiatric/Behavioral: Negative for agitation and hallucinations. The patient is not nervous/anxious.      Objective:     Vital Signs (Most Recent):  Temp: 97.5 °F (36.4 °C) (09/16/19 0738)  Pulse: 62 (09/16/19 0738)  Resp: 18 (09/16/19 0738)  BP: 124/75 (09/16/19 0738)  SpO2: 98 % (09/16/19 0738)    Vital Signs (24h Range):  Temp:  [96.8 °F (36 °C)-98.8 °F (37.1 °C)] 97.5 °F (36.4 °C)  Pulse:  [60-87] 62  Resp:  [16-18] 18  SpO2:  [94 %-98 %] 98 %  BP: (120-138)/(66-85) 124/75     Body mass index is 39.06 kg/m².    Physical Exam   Constitutional: He is oriented to person, place, and time. He appears well-developed and well-nourished. No distress.   HENT:   Head: Normocephalic and atraumatic.   Right Ear: External ear normal.   Left Ear: External ear normal.   Nose: Nose normal.   Eyes: Right eye exhibits no discharge. Left eye exhibits no discharge. No scleral icterus.   Neck: Normal range of motion.   Cardiovascular: Normal rate and intact distal pulses.   Pulmonary/Chest: Effort normal. No respiratory distress.   Abdominal: Soft. He exhibits no distension. There is no tenderness.   Musculoskeletal: Normal range of motion. He exhibits deformity. He exhibits no tenderness.   L AKA stump with redness and scabs, non-tender    Neurological: He is alert and oriented to person, place, and time. No sensory deficit. He exhibits normal muscle tone.   Motor strength: BUE 5/5.  RLE proximal 4-/5, distal 4+/5.    Skin: Skin is warm and dry.   Psychiatric: He has a normal mood  and affect. His behavior is normal.   Vitals reviewed.    Diagnostic Results:  Labs: Reviewed  X-Ray: Reviewed  MRI: Reviewed    Assessment/Plan:     * Bacteremia  -  Presented with generalized weakness (worst in RLE), fatigue, chills, and diaphoresis  -  Blood and urine cultures +staph aureus  -  ID following--> suspect source to be L AKA stump  -  Management per primary team--> on IV vancomycin     UTI (urinary tract infection)  -  Urine culture +staph aureus  -  Management per primary team--> on IV vancomycin     Acute muscle weakness  -  MRI T/L spine showed diffuse metastatic disease without spinal canal or NF stenosis  -  Management per primary team--> most likely 2/2 bacteremia (DDx steroid induced myopathy)     Carcinoid bronchial adenoma of left lung  -  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 Dhillon   -  Related to acute illness with associated weakness   See hospital course for functional status.    Recommendations  -  Encourage mobility, OOB in chair, and early ambulation as appropriate  -  PT/OT evaluate and treat  -  Pain management  -  DVT prophylaxis  -  Monitor for and prevent skin breakdown and pressure ulcers  · Early mobility, repositioning/weight shifting every 20-30 minutes when sitting, turn patient every 2 hours, proper mattress/overlay and chair cushioning, pressure relief/heel protector boots    Patient with therapy/rehab needs.  Strength improving.  Will follow progress with therapy and discuss with rehab team for final post-acute recommendation.    Thank you for your consult.     TAMELA Arias  Department of Physical Medicine & Rehab  [facility]

## 2019-09-16 NOTE — ASSESSMENT & PLAN NOTE
With mildly elevated CPK.   In setting of high dose steroids initial concern was steroid myopathy.   In light of bacteremia could be generalized inflammation 2/2 infection.   Improving.

## 2019-09-17 ENCOUNTER — ANESTHESIA EVENT (OUTPATIENT)
Dept: MEDSURG UNIT | Facility: HOSPITAL | Age: 64
DRG: 854 | End: 2019-09-17
Payer: COMMERCIAL

## 2019-09-17 ENCOUNTER — DOCUMENTATION ONLY (OUTPATIENT)
Dept: HEMATOLOGY/ONCOLOGY | Facility: CLINIC | Age: 64
End: 2019-09-17

## 2019-09-17 PROBLEM — T87.40 AMPUTATION STUMP INFECTION: Status: ACTIVE | Noted: 2019-09-17

## 2019-09-17 PROBLEM — B95.61 MSSA BACTEREMIA: Status: ACTIVE | Noted: 2019-09-17

## 2019-09-17 PROBLEM — R78.81 MSSA BACTEREMIA: Status: ACTIVE | Noted: 2019-09-17

## 2019-09-17 LAB
ALBUMIN SERPL BCP-MCNC: 2.3 G/DL (ref 3.5–5.2)
ALP SERPL-CCNC: 178 U/L (ref 55–135)
ALT SERPL W/O P-5'-P-CCNC: 148 U/L (ref 10–44)
ANION GAP SERPL CALC-SCNC: 11 MMOL/L (ref 8–16)
ANISOCYTOSIS BLD QL SMEAR: SLIGHT
AST SERPL-CCNC: 31 U/L (ref 10–40)
BACTERIA BLD CULT: ABNORMAL
BASOPHILS NFR BLD: 0 % (ref 0–1.9)
BILIRUB SERPL-MCNC: 0.5 MG/DL (ref 0.1–1)
BUN SERPL-MCNC: 36 MG/DL (ref 8–23)
CALCIUM SERPL-MCNC: 9.3 MG/DL (ref 8.7–10.5)
CHLORIDE SERPL-SCNC: 102 MMOL/L (ref 95–110)
CO2 SERPL-SCNC: 27 MMOL/L (ref 23–29)
CREAT SERPL-MCNC: 1.4 MG/DL (ref 0.5–1.4)
DIFFERENTIAL METHOD: ABNORMAL
DOHLE BOD BLD QL SMEAR: PRESENT
EOSINOPHIL NFR BLD: 0 % (ref 0–8)
ERYTHROCYTE [DISTWIDTH] IN BLOOD BY AUTOMATED COUNT: 14.5 % (ref 11.5–14.5)
EST. GFR  (AFRICAN AMERICAN): >60 ML/MIN/1.73 M^2
EST. GFR  (NON AFRICAN AMERICAN): 53.5 ML/MIN/1.73 M^2
GLUCOSE SERPL-MCNC: 123 MG/DL (ref 70–110)
HCT VFR BLD AUTO: 42.6 % (ref 40–54)
HGB BLD-MCNC: 13.7 G/DL (ref 14–18)
IMM GRANULOCYTES # BLD AUTO: ABNORMAL K/UL (ref 0–0.04)
IMM GRANULOCYTES NFR BLD AUTO: ABNORMAL % (ref 0–0.5)
LYMPHOCYTES NFR BLD: 2 % (ref 18–48)
MAGNESIUM SERPL-MCNC: 2.3 MG/DL (ref 1.6–2.6)
MCH RBC QN AUTO: 30.1 PG (ref 27–31)
MCHC RBC AUTO-ENTMCNC: 32.2 G/DL (ref 32–36)
MCV RBC AUTO: 94 FL (ref 82–98)
METAMYELOCYTES NFR BLD MANUAL: 2 %
MONOCYTES NFR BLD: 1 % (ref 4–15)
MYELOCYTES NFR BLD MANUAL: 2 %
NEUTROPHILS NFR BLD: 90 % (ref 38–73)
NEUTS BAND NFR BLD MANUAL: 3 %
NRBC BLD-RTO: 0 /100 WBC
PHOSPHATE SERPL-MCNC: 4.1 MG/DL (ref 2.7–4.5)
PLATELET # BLD AUTO: 247 K/UL (ref 150–350)
PLATELET BLD QL SMEAR: ABNORMAL
PMV BLD AUTO: 9.9 FL (ref 9.2–12.9)
POCT GLUCOSE: 110 MG/DL (ref 70–110)
POCT GLUCOSE: 155 MG/DL (ref 70–110)
POCT GLUCOSE: 188 MG/DL (ref 70–110)
POCT GLUCOSE: 206 MG/DL (ref 70–110)
POCT GLUCOSE: 211 MG/DL (ref 70–110)
POLYCHROMASIA BLD QL SMEAR: ABNORMAL
POTASSIUM SERPL-SCNC: 5 MMOL/L (ref 3.5–5.1)
PROT SERPL-MCNC: 6.6 G/DL (ref 6–8.4)
RBC # BLD AUTO: 4.55 M/UL (ref 4.6–6.2)
SODIUM SERPL-SCNC: 140 MMOL/L (ref 136–145)
TOXIC GRANULES BLD QL SMEAR: PRESENT
WBC # BLD AUTO: 13.08 K/UL (ref 3.9–12.7)

## 2019-09-17 PROCEDURE — 99232 SBSQ HOSP IP/OBS MODERATE 35: CPT | Mod: ,,, | Performed by: NURSE PRACTITIONER

## 2019-09-17 PROCEDURE — 20600001 HC STEP DOWN PRIVATE ROOM

## 2019-09-17 PROCEDURE — 97530 THERAPEUTIC ACTIVITIES: CPT

## 2019-09-17 PROCEDURE — 99233 SBSQ HOSP IP/OBS HIGH 50: CPT | Mod: ,,, | Performed by: INTERNAL MEDICINE

## 2019-09-17 PROCEDURE — 25000003 PHARM REV CODE 250: Performed by: STUDENT IN AN ORGANIZED HEALTH CARE EDUCATION/TRAINING PROGRAM

## 2019-09-17 PROCEDURE — 36415 COLL VENOUS BLD VENIPUNCTURE: CPT

## 2019-09-17 PROCEDURE — 84100 ASSAY OF PHOSPHORUS: CPT

## 2019-09-17 PROCEDURE — 99232 PR SUBSEQUENT HOSPITAL CARE,LEVL II: ICD-10-PCS | Mod: ,,, | Performed by: NURSE PRACTITIONER

## 2019-09-17 PROCEDURE — 99233 PR SUBSEQUENT HOSPITAL CARE,LEVL III: ICD-10-PCS | Mod: ,,, | Performed by: HOSPITALIST

## 2019-09-17 PROCEDURE — 63600175 PHARM REV CODE 636 W HCPCS: Performed by: STUDENT IN AN ORGANIZED HEALTH CARE EDUCATION/TRAINING PROGRAM

## 2019-09-17 PROCEDURE — 99233 SBSQ HOSP IP/OBS HIGH 50: CPT | Mod: ,,, | Performed by: HOSPITALIST

## 2019-09-17 PROCEDURE — 83735 ASSAY OF MAGNESIUM: CPT

## 2019-09-17 PROCEDURE — 85007 BL SMEAR W/DIFF WBC COUNT: CPT

## 2019-09-17 PROCEDURE — 87040 BLOOD CULTURE FOR BACTERIA: CPT

## 2019-09-17 PROCEDURE — 80053 COMPREHEN METABOLIC PANEL: CPT

## 2019-09-17 PROCEDURE — 99233 PR SUBSEQUENT HOSPITAL CARE,LEVL III: ICD-10-PCS | Mod: ,,, | Performed by: INTERNAL MEDICINE

## 2019-09-17 PROCEDURE — 85027 COMPLETE CBC AUTOMATED: CPT

## 2019-09-17 RX ADMIN — BRIMONIDINE TARTRATE 1 DROP: 1.5 SOLUTION OPHTHALMIC at 09:09

## 2019-09-17 RX ADMIN — PREDNISONE 110 MG: 20 TABLET ORAL at 09:09

## 2019-09-17 RX ADMIN — KETOROLAC TROMETHAMINE 1 DROP: 5 SOLUTION/ DROPS OPHTHALMIC at 09:09

## 2019-09-17 RX ADMIN — FAMOTIDINE 20 MG: 20 TABLET, FILM COATED ORAL at 09:09

## 2019-09-17 RX ADMIN — KETOROLAC TROMETHAMINE 1 DROP: 5 SOLUTION/ DROPS OPHTHALMIC at 04:09

## 2019-09-17 RX ADMIN — DEXTROSE 6 G: 5 SOLUTION INTRAVENOUS at 03:09

## 2019-09-17 RX ADMIN — ENOXAPARIN SODIUM 40 MG: 100 INJECTION SUBCUTANEOUS at 06:09

## 2019-09-17 RX ADMIN — KETOROLAC TROMETHAMINE 1 DROP: 5 SOLUTION/ DROPS OPHTHALMIC at 01:09

## 2019-09-17 NOTE — SUBJECTIVE & OBJECTIVE
Interval History:     No adverse events.    Review of Systems   Constitutional: Positive for activity change, appetite change, fatigue and fever.   Skin: Positive for wound.   All other systems reviewed and are negative.    Objective:     Vital Signs (Most Recent):  Temp: 98 °F (36.7 °C) (09/16/19 1935)  Pulse: 76 (09/16/19 1935)  Resp: 18 (09/16/19 1538)  BP: 134/82 (09/16/19 1935)  SpO2: (!) 93 % (09/16/19 1935) Vital Signs (24h Range):  Temp:  [97.5 °F (36.4 °C)-98.6 °F (37 °C)] 98 °F (36.7 °C)  Pulse:  [60-84] 76  Resp:  [16-18] 18  SpO2:  [93 %-98 %] 93 %  BP: (120-135)/(75-82) 134/82     Weight: 130.6 kg (288 lb)  Body mass index is 39.06 kg/m².    Estimated Creatinine Clearance: 82.3 mL/min (based on SCr of 1.3 mg/dL).    Physical Exam   Constitutional: He is oriented to person, place, and time. He appears well-developed and well-nourished. No distress.   HENT:   Head: Normocephalic and atraumatic.   Right Ear: External ear normal.   Left Ear: External ear normal.   Nose: Nose normal.   Mouth/Throat: Oropharynx is clear and moist. No oropharyngeal exudate.   Eyes: Pupils are equal, round, and reactive to light. Conjunctivae and EOM are normal. Right eye exhibits no discharge. Left eye exhibits no discharge. No scleral icterus.   Neck: Normal range of motion. Neck supple. No JVD present. No tracheal deviation present. No thyromegaly present.   Cardiovascular: Normal rate, regular rhythm, normal heart sounds and intact distal pulses. Exam reveals no gallop and no friction rub.   No murmur heard.  Pulmonary/Chest: Effort normal and breath sounds normal. No stridor. No respiratory distress. He has no wheezes. He has no rales. He exhibits no tenderness.   Abdominal: Soft. Bowel sounds are normal. He exhibits no distension and no mass. There is no tenderness. There is no rebound and no guarding.   Musculoskeletal: Normal range of motion. He exhibits no edema or tenderness.   Left BKA.  There is an area of erythema  around the stump.  There is also a hyperpigmented area near the stump.  There is an area of dry scab.   Lymphadenopathy:     He has no cervical adenopathy.   Neurological: He is alert and oriented to person, place, and time. He has normal reflexes. He displays normal reflexes. No cranial nerve deficit. He exhibits normal muscle tone. Coordination normal.   Skin: Skin is warm. No rash noted. He is not diaphoretic. No erythema. No pallor.   Psychiatric: He has a normal mood and affect. His behavior is normal. Judgment and thought content normal.   Nursing note and vitals reviewed.      Significant Labs:   Microbiology Results (last 7 days)     Procedure Component Value Units Date/Time    Blood culture [[id number]] Collected:  09/16/19 0826    Order Status:  Completed Specimen:  Blood Updated:  09/16/19 1545     Blood Culture, Routine No Growth to date    Blood culture [[id number]] Collected:  09/16/19 0830    Order Status:  Completed Specimen:  Blood Updated:  09/16/19 1545     Blood Culture, Routine No Growth to date    Blood culture [[id number]]  (Abnormal) Collected:  09/15/19 0616    Order Status:  Completed Specimen:  Blood Updated:  09/16/19 1104     Blood Culture, Routine Gram stain aer bottle: Gram positive cocci in clusters resembling Staph       Results called to and read back by: Bhavik López RN 09/16/2019        01:23      STAPHYLOCOCCUS AUREUS  ID consult required at [facility],[place],[facility] and [facility]   locations.  For susceptibility see order #[id number]      Blood culture #2 **CANNOT BE ORDERED STAT** [[id number]]  (Abnormal) Collected:  09/13/19 1348    Order Status:  Completed Specimen:  Blood from Peripheral, Hand, Right Updated:  09/16/19 0954     Blood Culture, Routine Gram stain dinorah bottle: Gram positive cocci in clusters resembling Staph       Results called to and read back by: Porfirio Medina RN 09/14/2019  05:49      Gram stain aer bottle: Gram positive cocci in clusters resembling  Staph      STAPHYLOCOCCUS AUREUS  ID consult required at [facility].  For susceptibility see order #[id number]      Blood culture [[id number]]  (Abnormal) Collected:  09/13/19 2036    Order Status:  Completed Specimen:  Blood Updated:  09/16/19 0953     Blood Culture, Routine Gram stain dinorah bottle: Gram positive cocci in clusters resembling Staph      Positive results previously called      Gram stain aer bottle: Gram positive cocci in clusters resembling Staph      STAPHYLOCOCCUS AUREUS  ID consult required at [facility].  For susceptibility see order #[id number]      Blood culture #1 **CANNOT BE ORDERED STAT** [[id number]]  (Abnormal)  (Susceptibility) Collected:  09/13/19 1348    Order Status:  Completed Specimen:  Blood from Peripheral, Antecubital, Left Updated:  09/16/19 0953     Blood Culture, Routine Gram stain dinorah bottle: Gram positive cocci in clusters resembling Staph       Results called to and read back by: Porfirio Medina RN 09/14/2019  05:49      Gram stain aer bottle: Gram positive cocci in clusters resembling Staph      STAPHYLOCOCCUS AUREUS  ID consult required at [facility].      Urine culture [[id number]]  (Abnormal)  (Susceptibility) Collected:  09/13/19 1349    Order Status:  Completed Specimen:  Urine Updated:  09/16/19 0849     Urine Culture, Routine STAPHYLOCOCCUS AUREUS  > 100,000 cfu/ml      Narrative:       GRAY AND YELLOW  Preferred Collection Type->Urine, Clean Catch    Blood culture [[id number]] Collected:  09/15/19 0616    Order Status:  Completed Specimen:  Blood Updated:  09/16/19 0847     Blood Culture, Routine Gram stain aer bottle: Gram positive cocci in clusters resembling Staph       Results called to and read back by:Lesley Handley RN 09/16/2019  08:47    Blood culture [[id number]]     Order Status:  Canceled Specimen:  Blood           Significant  Imaging: I have reviewed all pertinent imaging results/findings within the past 24 hours.

## 2019-09-17 NOTE — PT/OT/SLP PROGRESS
"Physical Therapy Treatment    Patient Name:  Elroy Rahman   MRN:  [id number]  Admitting Diagnosis: Sepsis due to methicillin susceptible Staphylococcus aureus  Recent Surgery: Procedure(s) (LRB):  ECHOCARDIOGRAM, TRANSESOPHAGEAL (N/A)      Recommendations:     Discharge Recommendations:  rehabilitation facility   Discharge Equipment Recommendations: (TBD)   Barriers to discharge: Inaccessible home  2 WENDY home    Plan:     During this hospitalization, patient to be seen 4 x/week to address the above listed problems via gait training, therapeutic activities, therapeutic exercises, neuromuscular re-education  · Plan of Care Expires:  10/15/19   Plan of Care Reviewed with: patient    This Plan of care has been discussed with the patient who was involved in its development and understands and is in agreement with the identified goals and treatment plan    Subjective     Communicated with RN (Lesley) prior to session.     Patient comments: "I'm doing fine.  I just need to sleep!"  Pt's wife reports "They told him not to wear his prosthesis, they don't want him to put any weight on that leg.  Also, they want him using crutches and they ordered them this morning, but nobody has brought them yet"    Pain/Comfort:  · Pain Rating 1: 0/10  · Pain Rating Post-Intervention 1: 0/10    Objective:     Patient found with: peripheral IV, telemetry    Patient found sup in bed upon PT entry to room, agreeable to treatment.  Wife present in the room.    General Precautions: Standard, fall   Orthopedic Precautions:LLE non weight bearing(residual limb)   Braces: N/A       BED MOBILITY (vc's for hand placement sequencing of task):        Rolling to the R:  NT.       Rolling to the L:  sup.        Sup > sit at the EOB:  Mod I, HOB elevated, use of bedrail, exiting on the L side.       Sit > sup:  Mod I.                       SITTING AT THE EDGE OF THE BED    Assistance Level Required: sup for safety      TRANSFERS  (vc's for hand " "placement, sequencing of task and safety)   Patient completed Sit <> Stand Transfer from EOB with min A of 1 for balance and safety with rolling walker x2 trial(s)   Patient completed Stand <> Sit Transfer to EOB with CGA for safety with rolling walker      STANDING       Pt tolerates static standing with RW requiring CGA for safety x1:15 min and then 1:30 min with vc's for safety.     GAIT: NP 2* with c/o being tired     PTA called C&D regarding BSC and AC's and was informed bariatric BSC would be deliver to pt within the hour, however, they don't carry crutches.  Pt and wife were notified and wife stated "I'll bring his crutches from home"    EDUCATION  Patient provided with daily orientation and goals of this PT session. They were educated to call for assistance and to transfer with hospital staff only.  Also, pt was educated on the effects of prolonged immobility and the importance of performing OOB activity and exercises to promote healing and reduce recovery time    Whiteboard updated with correct mobility information. RN/PCT notified.  Pt safe to transfer OOB/BTB with RN/PCT via SQPT: Use no AD with min A of 1 person.    Patient left supine, with  all lines intact, call button in reach, RN notified and wife present    AM-PAC 6 CLICK MOBILITY  Turning over in bed (including adjusting bedclothes, sheets and blankets)?: 4  Sitting down on and standing up from a chair with arms (e.g., wheelchair, bedside commode, etc.): 3  Moving from lying on back to sitting on the side of the bed?: 4  Moving to and from a bed to a chair (including a wheelchair)?: 3  Need to walk in hospital room?: 2  Climbing 3-5 steps with a railing?: 1  Basic Mobility Total Score: 17     Assessment:     Elroy Rahman is a 63 y.o. male admitted with a medical diagnosis of Sepsis due to methicillin susceptible Staphylococcus aureus.  He presents with the following impairments/functional limitations:  weakness, impaired endurance, impaired " self care skills, impaired functional mobilty, gait instability, impaired balance, decreased lower extremity function, decreased safety awareness, orthopedic precautions. requiring assistance and verbal cues for transfers and standing to prevent falls due to instability.  Pt will cont to benefit from skilled PT intervention to address deficits and improve functional mobility.     Rehab Prognosis:  Good; patient would benefit from acute skilled PT services to address these deficits and reach maximum level of function.      GOALS:   Multidisciplinary Problems     Physical Therapy Goals        Problem: Physical Therapy Goal    Goal Priority Disciplines Outcome Goal Variances Interventions   Physical Therapy Goal     PT, PT/OT Ongoing (interventions implemented as appropriate)     Description:  Goals to be met by: 2019     Patient will increase functional independence with mobility by performin. Supine to sit with Stand-by Assistance  2. Sit to supine with Stand-by Assistance  3. Sit to stand transfer with Contact Guard Assistance with prosthesis  4. Bed to chair transfer with Contact Guard Assistance using Slideboard or prosthesis  5. Gait  x 10 feet with Contact Guard Assistance using Rolling Walker and prosthesis.   6. Wheelchair propulsion x100 feet with Supervision using bilateral upper extremities  7. Ascend/descend 2 stair with Handrail Contact Guard Assistance using prosthesis.   8. Lower extremity exercise program x15 reps per handout, with supervision                      Time Tracking:     PT Received On: 19  PT Start Time: 1427     PT Stop Time: 1457  PT Total Time (min): 30 min     Billable Minutes: Therapeutic Activity 30    Treatment Type: Treatment  PT/PTA: PTA     PTA Visit Number: 1       Kimberly Arreaga PTA.  Pager [phone]    2019    .

## 2019-09-17 NOTE — SUBJECTIVE & OBJECTIVE
Interval History: superficial abscess of left stump was probed at bedside- U/S ordered to r/o abscesses- wound care consulted.     Review of Systems   Constitutional: Positive for activity change and diaphoresis. Negative for appetite change, chills, fatigue and fever.   HENT: Negative for congestion, postnasal drip and sore throat.    Eyes: Positive for visual disturbance.   Respiratory: Negative for cough, chest tightness and shortness of breath.    Cardiovascular: Negative for chest pain, palpitations and leg swelling.   Gastrointestinal: Negative for abdominal pain, constipation, diarrhea and vomiting.   Endocrine: Negative for heat intolerance.   Genitourinary: Negative for difficulty urinating and dysuria.   Musculoskeletal: Negative for arthralgias, back pain and myalgias.   Neurological: Positive for weakness and light-headedness. Negative for numbness and headaches.     Objective:     Vital Signs (Most Recent):  Temp: 98.9 °F (37.2 °C) (09/17/19 1100)  Pulse: 71 (09/17/19 1100)  Resp: 18 (09/17/19 1100)  BP: 125/78 (09/17/19 1100)  SpO2: 95 % (09/17/19 1100) Vital Signs (24h Range):  Temp:  [98 °F (36.7 °C)-98.9 °F (37.2 °C)] 98.9 °F (37.2 °C)  Pulse:  [61-84] 71  Resp:  [16-19] 18  SpO2:  [93 %-98 %] 95 %  BP: (117-140)/(68-90) 125/78     Weight: 130.6 kg (288 lb)  Body mass index is 39.06 kg/m².    Intake/Output Summary (Last 24 hours) at 9/17/2019 1347  Last data filed at 9/16/2019 1631  Gross per 24 hour   Intake 920 ml   Output 100 ml   Net 820 ml      Physical Exam   Constitutional: He is oriented to person, place, and time. He appears well-developed and well-nourished. He does not appear ill. No distress.   Obese   HENT:   Head: Normocephalic and atraumatic.   Eyes: No scleral icterus.   Neck: Normal range of motion. Neck supple.   Cardiovascular: Normal rate, regular rhythm and intact distal pulses. Exam reveals no gallop and no friction rub.   No murmur heard.  Pulmonary/Chest: Effort normal and  breath sounds normal. No respiratory distress.   Abdominal: Soft. He exhibits distension. There is no tenderness. There is no guarding.   Musculoskeletal:   b/l UE 5/5 muscle strength  RLE: 3-4/5 muscle strength. Able to move his R foot and bend his right knee  LLE stump redness improving, some scabbing at the base of the stump   Neurological: He is alert and oriented to person, place, and time.   Skin: Skin is warm.   Nursing note and vitals reviewed.      Significant Labs:   CBC:   Recent Labs   Lab 09/16/19  0540 09/17/19  0434   WBC 13.09* 13.08*   HGB 13.1* 13.7*   HCT 38.0* 42.6    247     CMP:   Recent Labs   Lab 09/16/19  0540 09/17/19  0434    140   K 5.0 5.0    102   CO2 24 27   * 123*   BUN 36* 36*   CREATININE 1.3 1.4   CALCIUM 9.1 9.3   PROT 5.9* 6.6   ALBUMIN 2.0* 2.3*   BILITOT 0.5 0.5   ALKPHOS 161* 178*   AST 55* 31   * 148*   ANIONGAP 10 11   EGFRNONAA 58.5* 53.5*       Significant Imaging: I have reviewed all pertinent imaging results/findings within the past 24 hours.

## 2019-09-17 NOTE — PLAN OF CARE
Problem: Physical Therapy Goal  Goal: Physical Therapy Goal  Goals to be met by: 2019     Patient will increase functional independence with mobility by performin. Supine to sit with Stand-by Assistance  2. Sit to supine with Stand-by Assistance  3. Sit to stand transfer with Contact Guard Assistance with prosthesis  4. Bed to chair transfer with Contact Guard Assistance using Slideboard or prosthesis  5. Gait  x 10 feet with Contact Guard Assistance using Rolling Walker and prosthesis.   6. Wheelchair propulsion x100 feet with Supervision using bilateral upper extremities  7. Ascend/descend 2 stair with Handrail Contact Guard Assistance using prosthesis.   8. Lower extremity exercise program x15 reps per handout, with supervision       Discharge Recommendations: Rehab    Pt safe to transfer OOB/BTB with RN/PCT via SQPT: Use no AD with min A of 1 person.    Goals remain appropriate.     Kimberly Arreaga, PTA.   [phone]   2019

## 2019-09-17 NOTE — PLAN OF CARE
Problem: Adult Inpatient Plan of Care  Goal: Plan of Care Review  Outcome: Ongoing (interventions implemented as appropriate)  Plan of care reviewed with the patient at the begging of shift. The patient is alert and oriented. GCS 15. Denying complaints at this time. The did contact the nurse during the night to state that his stump was bleeding. Upon assessment the patient was noted to have puss and blood leaking form the injury. Bandage applied and on call physician notified. Ultrasound of stump ordered. Physician evaluated area, expressed puss from area and packed wound. Currently awaiting US or area for further evaluation. VSS. Afebrile. Pt slept most of night. Remained free from falls and injuries throughout shift. Bed in low locked position. Call bell within reach. Will continue to monitor.

## 2019-09-17 NOTE — ASSESSMENT & PLAN NOTE
-  (I)/mod(I) with ADLs and mobility at baseline  -  L AKA since age 18--> recent PM&R clinic visit with Dr. Dhillon for replacement prosthesis   -  Related to acute illness with associated weakness   See hospital course for functional status.    Recommendations  -  Encourage mobility, OOB in chair, and early ambulation as appropriate  -  PT/OT evaluate and treat  -  Pain management  -  DVT prophylaxis  -  Monitor for and prevent skin breakdown and pressure ulcers  · Early mobility, repositioning/weight shifting every 20-30 minutes when sitting, turn patient every 2 hours, proper mattress/overlay and chair cushioning, pressure relief/heel protector boots

## 2019-09-17 NOTE — TREATMENT PLAN
Called by RN for concern of purulent drainage from L AKA stump.    On my exam, there is erythema/warmth/edema of the anterior portion of his AKA stump with a small 0.5 cm x 0.5 cm wound that was probed with return of small amount purulent bloody drainage. On probing with cotton swab, there does appear to be a small (approximately 1.5 cm x 1.5 cm x 1.5 cm) cavity. Did not appreciate any loculations on probing. Wound washed out with saline flushes and packed with dry gauze. Do not appreciate any fluctuant areas to suggest other superficial abscess. There is a small eschar more posteriorly on the stump that does not appear infected.    Agree with ID's plan to obtain US to evaluate for deeper abscess - ordered. If equivocal, may need further imaging (CT vs. MRI). This is likely the source of his persistent bacteremia.    Continue IV cefazolin. May need re-fitting of prosthesis.

## 2019-09-17 NOTE — PROGRESS NOTES
VIVI order received. No availability on 9/17 schedule. Patient scheduled at 1400 on 9/18. Will need to be NPO x 8 hours. Call VIVI RN at [phone] with any questions.

## 2019-09-17 NOTE — SUBJECTIVE & OBJECTIVE
Interval History 9/17/2019:  Patient is seen for follow-up rehab evaluation and recommendations:  On continuous Ancef for MSSA, ID following.  Stump US pending.  Blood cultures today pending.  VIVI pending.      HPI, Past Medical, Family, and Social History remains the same as documented in the initial encounter.    Scheduled Medications:    brimonidine 0.15 % OPTH DROP  1 drop Left Eye BID    ceFAZolin(ANCEF) in D5W 500 mL CONTINUOUS INFUSION  6 g Intravenous Q24H    enoxaparin  40 mg Subcutaneous Daily    famotidine  20 mg Oral BID    ketorolac 0.5%  1 drop Left Eye QID    predniSONE  110 mg Oral Daily     PRN Medications: acetaminophen, albuterol-ipratropium, Dextrose 10% Bolus, Dextrose 10% Bolus, glucagon (human recombinant), glucose, glucose, HYDROcodone-acetaminophen, insulin aspart U-100, ondansetron, sodium chloride 0.9%    Review of Systems   Constitutional: Positive for activity change and fatigue. Negative for chills.   HENT: Negative for trouble swallowing and voice change.    Eyes: Negative for pain and visual disturbance.   Respiratory: Negative for cough and shortness of breath.    Cardiovascular: Negative for chest pain and palpitations.   Gastrointestinal: Negative for nausea and vomiting.   Genitourinary: Negative for difficulty urinating and flank pain.   Musculoskeletal: Positive for gait problem. Negative for back pain and neck pain.   Skin: Positive for color change and wound.   Neurological: Negative for numbness and headaches.   Psychiatric/Behavioral: Negative for agitation. The patient is not nervous/anxious.      Objective:     Vital Signs (Most Recent):  Temp: 98.9 °F (37.2 °C) (09/17/19 1100)  Pulse: 71 (09/17/19 1100)  Resp: 18 (09/17/19 1100)  BP: 125/78 (09/17/19 1100)  SpO2: 95 % (09/17/19 1100)    Vital Signs (24h Range):  Temp:  [98 °F (36.7 °C)-98.9 °F (37.2 °C)] 98.9 °F (37.2 °C)  Pulse:  [61-84] 71  Resp:  [16-19] 18  SpO2:  [93 %-98 %] 95 %  BP: (117-140)/(68-90) 125/78      Physical Exam   Constitutional: He is oriented to person, place, and time. He appears well-developed and well-nourished. No distress.   HENT:   Head: Normocephalic and atraumatic.   Right Ear: External ear normal.   Left Ear: External ear normal.   Nose: Nose normal.   Eyes: Right eye exhibits no discharge. Left eye exhibits no discharge. No scleral icterus.   Neck: Normal range of motion.   Cardiovascular: Normal rate and intact distal pulses.   Pulmonary/Chest: Effort normal. No respiratory distress.   Abdominal: Soft. He exhibits no distension. There is no tenderness.   Musculoskeletal: Normal range of motion. He exhibits edema and deformity. He exhibits no tenderness.   L AKA stump with dressing CDI   Neurological: He is alert and oriented to person, place, and time. No sensory deficit. He exhibits normal muscle tone.   Skin: Skin is warm and dry.   Psychiatric: He has a normal mood and affect. His behavior is normal.   Vitals reviewed.    Diagnostic Results:  Labs: Reviewed  X-Ray: Reviewed  MRI: Reviewed    NEUROLOGICAL EXAMINATION:     MENTAL STATUS   Oriented to person, place, and time.

## 2019-09-17 NOTE — PT/OT/SLP PROGRESS
Occupational Therapy   Treatment    Name: Elroy Rahman  MRN: [id number]  Admitting Diagnosis:  Sepsis due to methicillin susceptible Staphylococcus aureus       Recommendations:     Discharge Recommendations: rehabilitation facility  Discharge Equipment Recommendations:  (tbd)  Barriers to discharge:  None    Assessment:     Elroy Rahman is a 63 y.o. male with a medical diagnosis of Sepsis due to methicillin susceptible Staphylococcus aureus.  He presents with impairments listed below. Pt did well to participate and tolerate in session. Pt displayed improved ability to perfrom sit to stand and EOB to bed t/f w/o prosthesis. Pt displayed global deconditioning requiring increased assist for ADLs and mobility at this time. Pt would benefit from skilled OT services to improve independence and overall occupational functioning.     Performance deficits affecting function are weakness, impaired endurance, impaired self care skills, impaired functional mobilty, gait instability, decreased lower extremity function.     Rehab Prognosis:  Good; patient would benefit from acute skilled OT services to address these deficits and reach maximum level of function.       Plan:     Patient to be seen 4 x/week to address the above listed problems via self-care/home management, therapeutic activities, therapeutic exercises  · Plan of Care Expires: 10/15/19  · Plan of Care Reviewed with: patient    Subjective     Pain/Comfort:  Pain Rating 1: 0/10  Pain Rating Post-Intervention 1: 0/10    Objective:     Communicated with: RN prior to session.  Patient found HOB elevated with telemetry, peripheral IV upon OT entry to room.    General Precautions: Standard, fall   Orthopedic Precautions:N/A   Braces: N/A     Occupational Performance:     Bed Mobility:    · Patient completed Scooting/Bridging with stand by assistance  · Patient completed Supine to Sit with stand by assistance     Functional Mobility/Transfers:  · Patient completed Sit  <> Stand Transfer with contact guard assistance  with  rolling walker   · Patient completed Bed <> Chair Transfer using Step Transfer technique with contact guard assistance with no assistive device  · Functional Mobility: Pt able to hop 2-3 steps at [facility] w/ RW.     [facility] 6 Click ADL: 18    Treatment & Education:  Pt performed sit<>stand for 3 reps at [facility] w/ RW.   Pt, spouse, and OT discussed overall coordination of care at length. Educated on HH vs Rehab.     Patient left up in chair with all lines intact, call button in reach and spouse presentEducation:      GOALS:   Multidisciplinary Problems     Occupational Therapy Goals        Problem: Occupational Therapy Goal    Goal Priority Disciplines Outcome Interventions   Occupational Therapy Goal     OT, PT/OT Ongoing (interventions implemented as appropriate)    Description:  Goals to be met by: 9/29/19     Patient will increase functional independence with ADLs by performing:    UE Dressing with [place].  LE Dressing with Moderate Assistance.  Grooming while seated with [place].  Toileting from bedside commode with Moderate Assistance for hygiene and clothing management.   Toilet transfer to bedside commode with Moderate Assistance.                      Time Tracking:     OT Date of Treatment: 09/17/19  OT Start Time: 0950  OT Stop Time: 1013  OT Total Time (min): 23 min    Billable Minutes:Therapeutic Activity 23 minutes    Samir Rico, OT  9/17/2019

## 2019-09-17 NOTE — PLAN OF CARE
Problem: Adult Inpatient Plan of Care  Goal: Plan of Care Review  Outcome: Ongoing (interventions implemented as appropriate)  Plan of care reviewed with the patient at the begging of shift. The patient is alert and oriented. GCS 15. Denying complaints at this time. The did contact the nurse during the night to state that his stump was bleeding. Upon assessment the patient was noted to have puss and blood leaking form the injury. Bandage applied and on call physician notified. Ultrasound of stump ordered. Physician evaluated area, expressed puss from area and packed wound. Currently awaiting US or area for further evaluation.

## 2019-09-17 NOTE — PROGRESS NOTES
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  He is admitted with staphylococcus aureus sepsis with the source likely being his left BKA stump.  Patient does not have a port and denies having any hardware in his body.  TTE is negative.  Recommendations as follows;    1.  Check soft tissue ultrasound of his BKA stump to look for abscess.    2.  Check VIVI.    3.  I agree with switching from vancomycin to cefazolin as the isolate has been identified as MSSA.    4.  Continue to monitor blood cultures.        Anticipated Disposition: TBD    Thank you for your consult. I will follow-up with patient. Please contact us if you have any additional questions.    Roberto Leyva MD  Infectious Disease  [facility]    Subjective:     Principal Problem:Sepsis due to methicillin susceptible Staphylococcus aureus    HPI: 62 y/o male with a history of metastatic bronchial carcinoid tumor s/p left upper lobectomy, chemotherapy (last dose in July per patient) followed by prednisone for which he is still on.  He also has a history of a left BKA and utilizes a prosthesis.  Per the patient, he does a poor job of keeping the prosthesis clean.  His wife reports multiple occasions of noticing a foul smelling odor and weaping from the stump.  His wife states that about 3 days prior to admission, she noticed her  looked very tired and fatigued.  She also noticed redness and discoloration to the stump and there was an odor to it.  The patient continued to  feel worse until his wife convinced him to come to the hospital.  On admission, he was found to be febrile and with an elevated WBC count.  Blood and urine cultures were obtained and are positive for staphylococcus aureus.  A TTE was negative for vegetations.    Interval History:     No adverse events.    Review of Systems   Constitutional: Positive for activity change, appetite change, fatigue and fever.   Skin: Positive for wound.   All other systems reviewed and are negative.    Objective:     Vital Signs (Most Recent):  Temp: 98 °F (36.7 °C) (09/16/19 1935)  Pulse: 76 (09/16/19 1935)  Resp: 18 (09/16/19 1538)  BP: 134/82 (09/16/19 1935)  SpO2: (!) 93 % (09/16/19 1935) Vital Signs (24h Range):  Temp:  [97.5 °F (36.4 °C)-98.6 °F (37 °C)] 98 °F (36.7 °C)  Pulse:  [60-84] 76  Resp:  [16-18] 18  SpO2:  [93 %-98 %] 93 %  BP: (120-135)/(75-82) 134/82     Weight: 130.6 kg (288 lb)  Body mass index is 39.06 kg/m².    Estimated Creatinine Clearance: 82.3 mL/min (based on SCr of 1.3 mg/dL).    Physical Exam   Constitutional: He is oriented to person, place, and time. He appears well-developed and well-nourished. No distress.   HENT:   Head: Normocephalic and atraumatic.   Right Ear: External ear normal.   Left Ear: External ear normal.   Nose: Nose normal.   Mouth/Throat: Oropharynx is clear and moist. No oropharyngeal exudate.   Eyes: Pupils are equal, round, and reactive to light. Conjunctivae and EOM are normal. Right eye exhibits no discharge. Left eye exhibits no discharge. No scleral icterus.   Neck: Normal range of motion. Neck supple. No JVD present. No tracheal deviation present. No thyromegaly present.   Cardiovascular: Normal rate, regular rhythm, normal heart sounds and intact distal pulses. Exam reveals no gallop and no friction rub.   No murmur heard.  Pulmonary/Chest: Effort normal and breath sounds normal. No stridor. No respiratory distress. He has no wheezes. He has no rales. He exhibits no tenderness.    Abdominal: Soft. Bowel sounds are normal. He exhibits no distension and no mass. There is no tenderness. There is no rebound and no guarding.   Musculoskeletal: Normal range of motion. He exhibits no edema or tenderness.   Left BKA.  There is an area of erythema around the stump.  There is also a hyperpigmented area near the stump.  There is an area of dry scab.   Lymphadenopathy:     He has no cervical adenopathy.   Neurological: He is alert and oriented to person, place, and time. He has normal reflexes. He displays normal reflexes. No cranial nerve deficit. He exhibits normal muscle tone. Coordination normal.   Skin: Skin is warm. No rash noted. He is not diaphoretic. No erythema. No pallor.   Psychiatric: He has a normal mood and affect. His behavior is normal. Judgment and thought content normal.   Nursing note and vitals reviewed.      Significant Labs:   Microbiology Results (last 7 days)     Procedure Component Value Units Date/Time    Blood culture [[id number]] Collected:  09/16/19 0826    Order Status:  Completed Specimen:  Blood Updated:  09/16/19 1545     Blood Culture, Routine No Growth to date    Blood culture [[id number]] Collected:  09/16/19 0830    Order Status:  Completed Specimen:  Blood Updated:  09/16/19 1545     Blood Culture, Routine No Growth to date    Blood culture [[id number]]  (Abnormal) Collected:  09/15/19 0616    Order Status:  Completed Specimen:  Blood Updated:  09/16/19 1104     Blood Culture, Routine Gram stain aer bottle: Gram positive cocci in clusters resembling Staph       Results called to and read back by: Bhavik López RN 09/16/2019        01:23      STAPHYLOCOCCUS AUREUS  ID consult required at [facility],[place],[facility] and [facility]   locations.  For susceptibility see order #[id number]      Blood culture #2 **CANNOT BE ORDERED STAT** [[id number]]  (Abnormal) Collected:  09/13/19 1348    Order Status:  Completed Specimen:  Blood from Peripheral, Hand, Right Updated:   09/16/19 0954     Blood Culture, Routine Gram stain dinorah bottle: Gram positive cocci in clusters resembling Staph       Results called to and read back by: Porfirio Medina RN 09/14/2019  05:49      Gram stain aer bottle: Gram positive cocci in clusters resembling Staph      STAPHYLOCOCCUS AUREUS  ID consult required at [facility].  For susceptibility see order #[id number]      Blood culture [[id number]]  (Abnormal) Collected:  09/13/19 2036    Order Status:  Completed Specimen:  Blood Updated:  09/16/19 0953     Blood Culture, Routine Gram stain dinorah bottle: Gram positive cocci in clusters resembling Staph      Positive results previously called      Gram stain aer bottle: Gram positive cocci in clusters resembling Staph      STAPHYLOCOCCUS AUREUS  ID consult required at [facility] and [facility].  For susceptibility see order #[id number]      Blood culture #1 **CANNOT BE ORDERED STAT** [[id number]]  (Abnormal)  (Susceptibility) Collected:  09/13/19 1348    Order Status:  Completed Specimen:  Blood from Peripheral, Antecubital, Left Updated:  09/16/19 0953     Blood Culture, Routine Gram stain dinorah bottle: Gram positive cocci in clusters resembling Staph       Results called to and read back by: Porfirio Medina RN 09/14/2019  05:49      Gram stain aer bottle: Gram positive cocci in clusters resembling Staph      STAPHYLOCOCCUS AUREUS  ID consult required at [facility] and [facility].      Urine culture [[id number]]  (Abnormal)  (Susceptibility) Collected:  09/13/19 1349    Order Status:  Completed Specimen:  Urine Updated:  09/16/19 0849     Urine Culture, Routine STAPHYLOCOCCUS AUREUS  > 100,000 cfu/ml      Narrative:       GRAY AND YELLOW  Preferred Collection Type->Urine, Clean Catch    Blood culture [[id number]] Collected:  09/15/19 0616    Order Status:  Completed Specimen:  Blood Updated:  09/16/19 0847     Blood  Culture, Routine Gram stain aer bottle: Gram positive cocci in clusters resembling Staph       Results called to and read back by:Lesley Handley RN 09/16/2019  08:47    Blood culture [[id number]]     Order Status:  Canceled Specimen:  Blood           Significant Imaging: I have reviewed all pertinent imaging results/findings within the past 24 hours.

## 2019-09-17 NOTE — ASSESSMENT & PLAN NOTE
Pt with diaphoresis and diffuse muscle weakness leading to admission associated with fevers while in the hospital. BCx and UCx positive for staph aureus.     Plan  - Continue cefazolin   - ID consulted: following up recs  - blood cultures daily until clears.  - VIVI 09/18 to evaluate for small vegitation- NPO midnight.  - U/S to stump to evaluate for deeper abscesses

## 2019-09-17 NOTE — PROGRESS NOTES
[facility] Hospital Medicine  Progress Note    Patient Name: Elroy Rahman  MRN: [id number]  Patient Class: IP- Inpatient   Admission Date: 9/13/2019  Length of Stay: 3 days  Attending Physician: Emil Day MD  Primary Care Provider: Wayne Catalan PA-C    [facility] Medicine Team: [facility] Pancho Pérez MD    Subjective:     Principal Problem:Sepsis due to methicillin susceptible Staphylococcus aureus        HPI:  Mr. 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    Pt reports that on Tuesday he was experiencing chills, diaphoresis and severe muscle weakness. The weakness began suddenly, not concentrated in any one muscle group and progressively worsened. Unfortunately by late that evening he was unable to move around on his own. At baseline pt does have a left LE amputation and is able to ambulate freely with and without his prosthesis. He denies any bowel or bladder incontinence, back pain, numbness, perineal numbness or extremity pain.   Per notes: He was started on PRRT back in April and began prednisone due to inflammation noted in the globe of his eye. Beginning on 9/5 he was to take Prednisone 130 mg for days and then proceed with 110mg for an additional five days. Pt reports that he is compliant with his medication regimen.     Overview/Hospital Course:  No notes on file    No new subjective & objective note has been filed under this hospital service since the last note was generated.      Assessment/Plan:      * Sepsis due to methicillin susceptible Staphylococcus aureus  Pt with diaphoresis and diffuse muscle weakness leading to admission associated with fevers while in the hospital. BCx and UCx positive for staph  aureus.     Plan  - Continue cefazolin   - ID consulted: following up recs  - blood cultures daily until clears.  - VIVI 09/18 to evaluate for small vegitation- NPO midnight.  - U/S to stump to evaluate for deeper abscesses       MAGDI (acute kidney injury)  sCr elevated from baseline (0.9 > 1.4).     Plan  -Fluid resuscitation     UTI (urinary tract infection)  Urinalysis with 1+ protein,22WBC and occasional bacteria. He has leukocytosis of 12.78. UCx positive for staph aureus. Pt without any urinary symptoms     Plan   - Continue cefazolin.   - ID consulted, f/u recs     Acute muscle weakness  Pt with a medical history of metastatic carcinoma and corticosteroid use presenting with LE weakness of sudden onset most likely due to sepsis. BCx growing gram positive cocci in clusters, UCx showing staph aureus. (see sepsis)  Cord compression initially suspected due to acute presentation, MRI spine final report showing metastatic disease without any mass effect. Steroid induced myopathy considered since pt is on a high dose of steroids (CPK elevated, U/A with 3+ blood and only 13 RBC).     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 He is also s/p left upper lung lobectomy done in 2016. Pt currently undergoing peptide receptor radionucleotide therapy, being followed by Dr. London.    - Oncology following during this admission      VTE Risk Mitigation (From admission, onward)        Ordered     enoxaparin injection 40 mg  Daily      09/16/19 0857     Reason for no Mechanical VTE Prophylaxis  Once      09/13/19 1933     IP VTE HIGH RISK PATIENT  Once      09/13/19 1933     Place sequential compression device  Until discontinued      09/13/19 1641                [facility] Pancho Pérez MD  Department of Hospital Medicine   [facility]

## 2019-09-17 NOTE — ANESTHESIA PREPROCEDURE EVALUATION
[facility]  Anesthesia Pre-Operative Evaluation         Patient Name: Elroy Rahman  YOB: 1955  MRN: [id number]    SUBJECTIVE:     Pre-operative evaluation for Procedure(s) (LRB):  ECHOCARDIOGRAM, TRANSESOPHAGEAL (N/A)     09/17/2019    Elroy Rahman is a 63 y.o. 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 Hospital course also noted with demand ischemia from sepsis (troponins peaked without chest pain and EKG changes), as well as currently with an MAGDI. 3 days prior to admission, his wife noticed her  looking very tired and fatigued. He is diagnosed with sepsis and bacteremia with Staph aureus, with likely source from BKA stump. MRI of L and T spine without evidence of abscess, and TTE without evidence of vegetation. Now going for TTE.    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     Patient now presents for the above procedure(s).      LDA: None documented.       Peripheral IV - Single Lumen 09/13/19 1320 20 G Left Antecubital (Active)   Site Assessment Clean;Dry;No redness;Intact;No swelling 9/16/2019  7:35 PM   Line Status Flushed;Saline locked 9/16/2019  7:35 PM   Dressing Status Clean;Dry;Intact 9/16/2019  7:35 PM   Dressing Intervention Dressing reinforced 9/16/2019  7:35 PM   Dressing Change Due 09/17/19 9/16/2019  7:35 PM   Site Change Due 09/17/19 9/16/2019  7:35 PM   Reason Not Rotated Not due 9/16/2019  7:35 PM   Number of days: 3       Prev airway:     Present Prior to Hospital Arrival?: No; Placement Date: 11/27/18; Placement Time: 0812; Method of Intubation: Direct laryngoscopy; Inserted by: Other (MACO Nunez); Airway Device: Endotracheal Tube; Mask Ventilation: Other (Two hand mask ventilation); Intubated: Postinduction; Blade: Melissa #4; Airway Device Size: 7.5; Style: Cuffed;  Cuff Inflation: Minimal occlusive pressure; Placement Verified By: Auscultation, Capnometry, ETT Condensation; Grade: Grade I; Complicating Factors: Anterior larynx, Obesity, Short neck; Intubation Findings: Positive EtCO2, Bilateral breath sounds, Atraumatic/Condition of teeth unchanged;  Depth of Insertion: 23; Securment: Lips; Complications: None; Breath Sounds: Equal Bilateral; Insertion Attempts: 1; Removal Date: 11/27/18;  Removal Time: 1024    Drips: None documented.      Patient Active Problem List   Diagnosis    Paroxysmal atrial fibrillation    Sleep apnea    Liver lesion    Bronchial carcinoid tumors    Secondary neuroendocrine tumor of liver    Secondary neuroendocrine tumor of bone    Elevated bilirubin    Secondary malignant neoplasm of liver    Incarcerated umbilical hernia    Carcinoid bronchial adenoma of left lung    Secondary malignant neuroendocrine tumor of liver    Vision loss of left eye    Above knee amputation of left lower extremity    Myopathy    Acute muscle weakness    UTI (urinary tract infection)    Sepsis due to methicillin susceptible Staphylococcus aureus    MAGDI (acute kidney injury)    Impaired mobility and ADLs    Amputation stump infection    MSSA bacteremia       Review of patient's allergies indicates:   Allergen Reactions    Epinephrine Other (See Comments)     Carcinoid crisis       Current Inpatient Medications:   brimonidine 0.15 % OPTH DROP  1 drop Left Eye BID    ceFAZolin(ANCEF) in D5W 500 mL CONTINUOUS INFUSION  6 g Intravenous Q24H    enoxaparin  40 mg Subcutaneous Daily    famotidine  20 mg Oral BID    ketorolac 0.5%  1 drop Left Eye QID    predniSONE  110 mg Oral Daily       No current facility-administered medications on file prior to encounter.      Current Outpatient Medications on File Prior to Encounter   Medication Sig Dispense Refill    ALPHAGAN P 0.1 % Drop INT 1 GTT IN OS BID  4    lanreotide (SOMATULINE DEPOT) 120 mg/0.5 mL  Syrg Inject 120 mg into the skin every 28 days.      ondansetron (ZOFRAN) 4 MG tablet Take 1 tablet (4 mg total) by mouth 2 (two) times daily. 15 tablet 0    ondansetron (ZOFRAN) 4 MG tablet Take 1 tablet (4 mg total) by mouth 2 (two) times daily. 15 tablet 0    predniSONE (DELTASONE) 20 MG tablet Take 2 tablets (40 mg total) by mouth once daily. Take with 50 mg tabs.  Total of 140 mg daily 60 tablet 0    predniSONE (DELTASONE) 50 MG Tab Take 2 tablets (100 mg total) by mouth once daily. Take with 20 mg tablets.  Total of 140 daily. 60 tablet 0    PROLENSA 0.07 % Drop INSTILL 1 DROP IN OS BID  4       Past Surgical History:   Procedure Laterality Date    ABDOMINAL SURGERY      IYBAPW-MHHATO-SJ N/A 6/8/2017    Performed by [facility] Diagnostic Provider at [facility] OR [address] [place] [address]    Bka Left     HERNIA REPAIR      LEFT UPPER LOBECTOMY-LUNG Left 1/28/2016    [facility]    REPAIR, HERNIA, UMBILICAL, INCARCERATED, LAPAROSCOPIC N/A 11/27/2018    Performed by Coco Guidry [facility] at [facility] OR    THORACOTOMY W/LUNG RESECTION Left 1/28/2016    Performed by Pan Bell MD at [facility] OR [address] [place] [address]       Social History     Socioeconomic History    Marital status:      Spouse name: Not on file    Number of children: Not on file    Years of education: Not on file    Highest education level: Not on file   Occupational History    Not on file   Social Needs    Financial resource strain: Not on file    Food insecurity:     Worry: Not on file     Inability: Not on file    Transportation needs:     Medical: Not on file     Non-medical: Not on file   Tobacco Use    Smoking status: Never Smoker    Smokeless tobacco: Never Used   Substance and Sexual Activity    Alcohol use: Yes     Alcohol/week: 0.6 oz     Types: 1 Cans of beer per week     Comment: rare    Drug use: No    Sexual activity: Not on file   Lifestyle    Physical activity:     Days per week: Not on file      Minutes per session: Not on file    Stress: Not on file   Relationships    Social connections:     Talks on phone: Not on file     Gets together: Not on file     Attends Tenriism service: Not on file     Active member of club or organization: Not on file     Attends meetings of clubs or organizations: Not on file     Relationship status: Not on file   Other Topics Concern    Not on file   Social History Narrative    Not on file       OBJECTIVE:     Vital Signs Range (Last 24H):  Temp:  [36.7 °C (98 °F)-37 °C (98.6 °F)]   Pulse:  [61-84]   Resp:  [16-19]   BP: (117-140)/(68-90)   SpO2:  [93 %-98 %]       Significant Labs:  Lab Results   Component Value Date    WBC 13.08 (H) 09/17/2019    HGB 13.7 (L) 09/17/2019    HCT 42.6 09/17/2019     09/17/2019     (H) 09/17/2019    AST 31 09/17/2019     09/17/2019    K 5.0 09/17/2019     09/17/2019    CREATININE 1.4 09/17/2019    BUN 36 (H) 09/17/2019    CO2 27 09/17/2019    INR 1.0 02/28/2019       Diagnostic Studies: No relevant studies.    EKG:   Results for orders placed or performed during the hospital encounter of 09/13/19   EKG 12-lead    Collection Time: 09/13/19  1:18 PM    Narrative    Test Reason : R53.1,    Vent. Rate : 082 BPM     Atrial Rate : 082 BPM     P-R Int : 132 ms          QRS Dur : 076 ms      QT Int : 336 ms       P-R-T Axes : 072 039 019 degrees     QTc Int : 392 ms    Age and gender specific analysis  Normal sinus rhythm  Normal ECG  When compared with ECG of 20-NOV-2018 10:40,  Premature atrial complexes are no longer Present  Vent. rate has increased BY  31 BPM  Confirmed by Lokesh Thomas MD ([id number]) on 9/13/2019 8:58:52 PM    Referred By: [email or web address]   SELF           Confirmed By:Lokesh Thomas MD       2D ECHO:  TTE:  Results for orders placed or performed during the hospital encounter of 09/13/19   Echo Color Flow Doppler? Yes   Result Value Ref Range    Ascending aorta 3.83 cm    STJ 2.90 cm    AV mean gradient 5 mmHg    Ao  peak todd 1.58 m/s    Ao VTI 27.56 cm    IVRT 0.11 msec    IVS 0.94 0.6 - 1.1 cm    LA size 3.70 cm    Left Atrium Major Axis 5.77 cm    Left Atrium Minor Axis 5.84 cm    LVIDD 4.44 3.5 - 6.0 cm    LVIDS 2.90 2.1 - 4.0 cm    LVOT diameter 2.32 cm    LVOT peak VTI 22.12 cm    PW 0.93 0.6 - 1.1 cm    MV Peak A Todd 0.69 m/s    E wave decelartion time 260.11 msec    MV Peak E Todd 0.59 m/s    PV Peak D Todd 0.23 m/s    PV Peak S Todd 0.31 m/s    RA Major Axis 4.66 cm    RA Width 3.49 cm    RVDD 3.44 cm    Sinus 3.39 cm    TAPSE 2.43 cm    TR Max Todd 1.85 m/s    TDI LATERAL 0.10 m/s    TDI SEPTAL 0.08 m/s    LA WIDTH 3.70 cm    LV Diastolic Volume 89.54 mL    LV Systolic Volume 32.13 mL    RV S' 17.56 cm/s    LVOT peak todd 1.25 m/s    LV LATERAL E/E' RATIO 5.90 m/s    LV SEPTAL E/E' RATIO 7.38 m/s    FS 35 %    LA volume 67.55 cm3    LV mass 136.81 g    Left Ventricle Relative Wall Thickness 0.42 cm    AV valve area 3.39 cm2    AV Velocity Ratio 0.79     AV index (prosthetic) 0.80     E/A ratio 0.86     Mean e' 0.09 m/s    Pulm vein S/D ratio 1.35     LVOT area 4.2 cm2    LVOT stroke volume 93.46 cm3    AV peak gradient 10 mmHg    E/E' ratio 6.56 m/s    LV Systolic Volume Index 12.9 mL/m2    LV Diastolic Volume Index 36.00 mL/m2    LA Volume Index 27.2 mL/m2    LV Mass Index 55 g/m2    Triscuspid Valve Regurgitation Peak Gradient 14 mmHg    BSA 2.58 m2    Right Atrial Pressure (from IVC) 3 mmHg    TV rest pulmonary artery pressure 17 mmHg    Narrative    · Normal left ventricular systolic function. The estimated ejection   fraction is 60%  · Normal LV diastolic function.  · No wall motion abnormalities.  · Normal right ventricular systolic function.  · Mild tricuspid regurgitation.  · The estimated PA systolic pressure is 17 mm Hg  · Normal central venous pressure (3 mm Hg).  · Small circumferential pericardial effusion.  · Multiple ill-defined echodensities in the liver. Clinical correlation is   required          VIVI:  No  results found for this or any previous visit.    ASSESSMENT/PLAN:                                                                                                                  09/17/2019  Elroy Rahman is a 63 y.o., male.    Anesthesia Evaluation         Review of Systems  Anesthesia Hx:  History of prior surgery of interest to airway management or planning:   Social:  No Alcohol Use, Non-Smoker   Hematology/Oncology:         -- Anemia: Current/Recent Cancer.   EENT/Dental:EENT/Dental Normal   Cardiovascular:  Cardiovascular Normal     Pulmonary:   Sleep Apnea    Renal/:   Chronic Renal Disease (hilario)    Hepatic/GI:   Liver Disease,    Musculoskeletal:  Musculoskeletal Normal    Neurological:   Neuromuscular Disease,    Dermatological:  Skin Normal    Psych:  Psychiatric Normal           Physical Exam  General:  Well nourished, Obesity    Airway/Jaw/Neck:  Airway Findings: Mouth Opening: Normal Tongue: Large  General Airway Assessment: Adult  Mallampati: IV  Improves to III with phonation.  TM Distance: Normal, at least 6 cm  Jaw/Neck Findings:     Neck ROM: Normal ROM      Dental:  Dental Findings: In tact   Chest/Lungs:  Chest/Lungs Findings: Clear to auscultation     Heart/Vascular:  Heart Findings: Rate: Normal  Rhythm: Regular Rhythm  Sounds: Normal        Mental Status:  Mental Status Findings:  Cooperative, Alert and Oriented         Anesthesia Plan  Type of Anesthesia, risks & benefits discussed:  Anesthesia Type:  general, MAC  Patient's Preference:   Intra-op Monitoring Plan: standard ASA monitors  Intra-op Monitoring Plan Comments:   Post Op Pain Control Plan: multimodal analgesia, IV/PO Opioids PRN and per primary service following discharge from PACU  Post Op Pain Control Plan Comments:   Induction:   IV  Beta Blocker:  Patient is not currently on a Beta-Blocker (No further documentation required).       Informed Consent: Patient understands risks and agrees with Anesthesia plan.  Questions  answered. Anesthesia consent signed with patient.  ASA Score: 3     Day of Surgery Review of History & Physical:    H&P update referred to the surgeon.         Ready For Surgery From Anesthesia Perspective.

## 2019-09-17 NOTE — PROGRESS NOTES
[facility]  Physical Medicine & Rehab  Progress Note    Patient Name: Elroy Rahman  MRN: [id number]  Admission Date: 9/13/2019  Length of Stay: 3 days  Attending Physician: Emil Day MD    Subjective:     Principal Problem:Sepsis due to methicillin susceptible Staphylococcus aureus    Hospital Course:   9/15/19:  Evaluated by PT and OT.  Bed mobility mod-maxA.  Sit to stand maxA and transfers max-totalA.  LBD totalA.  Toileting maxA.    9/16/19:  No PT or OT.    Interval History 9/17/2019:  Patient is seen for follow-up rehab evaluation and recommendations:  On continuous Ancef for MSSA, ID following.  Stump US pending.  Blood cultures today pending.  VIVI pending.      HPI, Past Medical, Family, and Social History remains the same as documented in the initial encounter.    Scheduled Medications:    brimonidine 0.15 % OPTH DROP  1 drop Left Eye BID    ceFAZolin(ANCEF) in D5W 500 mL CONTINUOUS INFUSION  6 g Intravenous Q24H    enoxaparin  40 mg Subcutaneous Daily    famotidine  20 mg Oral BID    ketorolac 0.5%  1 drop Left Eye QID    predniSONE  110 mg Oral Daily     PRN Medications: acetaminophen, albuterol-ipratropium, Dextrose 10% Bolus, Dextrose 10% Bolus, glucagon (human recombinant), glucose, glucose, HYDROcodone-acetaminophen, insulin aspart U-100, ondansetron, sodium chloride 0.9%    Review of Systems   Constitutional: Positive for activity change and fatigue. Negative for chills.   HENT: Negative for trouble swallowing and voice change.    Eyes: Negative for pain and visual disturbance.   Respiratory: Negative for cough and shortness of breath.    Cardiovascular: Negative for chest pain and palpitations.   Gastrointestinal: Negative for nausea and vomiting.   Genitourinary: Negative for difficulty urinating and flank pain.   Musculoskeletal: Positive for gait problem. Negative for back pain and neck pain.   Skin: Positive for color change and wound.   Neurological: Negative  for numbness and headaches.   Psychiatric/Behavioral: Negative for agitation. The patient is not nervous/anxious.      Objective:     Vital Signs (Most Recent):  Temp: 98.9 °F (37.2 °C) (09/17/19 1100)  Pulse: 71 (09/17/19 1100)  Resp: 18 (09/17/19 1100)  BP: 125/78 (09/17/19 1100)  SpO2: 95 % (09/17/19 1100)    Vital Signs (24h Range):  Temp:  [98 °F (36.7 °C)-98.9 °F (37.2 °C)] 98.9 °F (37.2 °C)  Pulse:  [61-84] 71  Resp:  [16-19] 18  SpO2:  [93 %-98 %] 95 %  BP: (117-140)/(68-90) 125/78     Physical Exam   Constitutional: He is oriented to person, place, and time. He appears well-developed and well-nourished. No distress.   HENT:   Head: Normocephalic and atraumatic.   Right Ear: External ear normal.   Left Ear: External ear normal.   Nose: Nose normal.   Eyes: Right eye exhibits no discharge. Left eye exhibits no discharge. No scleral icterus.   Neck: Normal range of motion.   Cardiovascular: Normal rate and intact distal pulses.   Pulmonary/Chest: Effort normal. No respiratory distress.   Abdominal: Soft. He exhibits no distension. There is no tenderness.   Musculoskeletal: Normal range of motion. He exhibits edema and deformity. He exhibits no tenderness.   L AKA stump with dressing CDI   Neurological: He is alert and oriented to person, place, and time. No sensory deficit. He exhibits normal muscle tone.   Skin: Skin is warm and dry.   Psychiatric: He has a normal mood and affect. His behavior is normal.   Vitals reviewed.    Diagnostic Results:  Labs: Reviewed  X-Ray: Reviewed  MRI: Reviewed    Assessment/Plan:      * Sepsis due to methicillin susceptible Staphylococcus aureus  -  Presented with generalized weakness (worst in RLE), fatigue, chills, and diaphoresis  -  Blood and urine cultures +staph aureus  -  ID following--> suspect source to be L AKA stump--> US pending    -  Management per primary team--> IV vancomycin changed to continuous Ancef    Acute muscle weakness  -  MRI T/L spine showed diffuse  metastatic disease without spinal canal or NF stenosis  -  Management per primary team--> most likely 2/2 bacteremia (DDx steroid induced myopathy)     Carcinoid bronchial adenoma of left lung  -  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 Dhillon for replacement prosthesis   -  Related to acute illness with associated weakness   See hospital course for functional status.    Recommendations  -  Encourage mobility, OOB in chair, and early ambulation as appropriate  -  PT/OT evaluate and treat  -  Pain management  -  DVT prophylaxis  -  Monitor for and prevent skin breakdown and pressure ulcers  · Early mobility, repositioning/weight shifting every 20-30 minutes when sitting, turn patient every 2 hours, proper mattress/overlay and chair cushioning, pressure relief/heel protector boots    Recommend IRF at this time.  Not ready for discharge- VIVI pending, possible I&D pending.    TAMELA Arias  Department of Physical Medicine & Rehab   [facility]

## 2019-09-17 NOTE — ASSESSMENT & PLAN NOTE
-  Presented with generalized weakness (worst in RLE), fatigue, chills, and diaphoresis  -  Blood and urine cultures +staph aureus  -  ID following--> suspect source to be L AKA stump   -  Management per primary team--> IV vancomycin changed to continuous Ancef

## 2019-09-17 NOTE — ASSESSMENT & PLAN NOTE
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  He is admitted with staphylococcus aureus sepsis with the source likely being his left BKA stump.  Patient does not have a port and denies having any hardware in his body.  TTE is negative.  Recommendations as follows;    1.  Check soft tissue ultrasound of his BKA stump to look for abscess.    2.  Check VIVI.    3.  I agree with switching from vancomycin to cefazolin as the isolate has been identified as MSSA.    4.  Continue to monitor blood cultures.

## 2019-09-17 NOTE — PLAN OF CARE
Problem: Occupational Therapy Goal  Goal: Occupational Therapy Goal  Goals to be met by: 9/29/19     Patient will increase functional independence with ADLs by performing:    UE Dressing with [place].  LE Dressing with Moderate Assistance.  Grooming while seated with [place].  Toileting from bedside commode with Moderate Assistance for hygiene and clothing management.   Toilet transfer to bedside commode with Moderate Assistance.     Outcome: Ongoing (interventions implemented as appropriate)  Continue OT POC     Comments: Samir Rico OTR/L  9/17/2019

## 2019-09-18 ENCOUNTER — ANESTHESIA EVENT (OUTPATIENT)
Dept: SURGERY | Facility: HOSPITAL | Age: 64
DRG: 854 | End: 2019-09-18
Payer: COMMERCIAL

## 2019-09-18 ENCOUNTER — ANESTHESIA (OUTPATIENT)
Dept: MEDSURG UNIT | Facility: HOSPITAL | Age: 64
DRG: 854 | End: 2019-09-18
Payer: COMMERCIAL

## 2019-09-18 PROBLEM — L02.91 ABSCESS: Status: ACTIVE | Noted: 2019-09-18

## 2019-09-18 LAB
ALBUMIN SERPL BCP-MCNC: 2.2 G/DL (ref 3.5–5.2)
ALP SERPL-CCNC: 157 U/L (ref 55–135)
ALT SERPL W/O P-5'-P-CCNC: 94 U/L (ref 10–44)
ANION GAP SERPL CALC-SCNC: 11 MMOL/L (ref 8–16)
ANISOCYTOSIS BLD QL SMEAR: SLIGHT
AST SERPL-CCNC: 22 U/L (ref 10–40)
BACTERIA BLD CULT: ABNORMAL
BASOPHILS # BLD AUTO: ABNORMAL K/UL (ref 0–0.2)
BASOPHILS NFR BLD: 0 % (ref 0–1.9)
BILIRUB SERPL-MCNC: 0.4 MG/DL (ref 0.1–1)
BSA FOR ECHO PROCEDURE: 2.58 M2
BUN SERPL-MCNC: 34 MG/DL (ref 8–23)
CALCIUM SERPL-MCNC: 8.5 MG/DL (ref 8.7–10.5)
CHLORIDE SERPL-SCNC: 101 MMOL/L (ref 95–110)
CO2 SERPL-SCNC: 26 MMOL/L (ref 23–29)
CREAT SERPL-MCNC: 1.4 MG/DL (ref 0.5–1.4)
DACRYOCYTES BLD QL SMEAR: ABNORMAL
DIFFERENTIAL METHOD: ABNORMAL
DOHLE BOD BLD QL SMEAR: PRESENT
EOSINOPHIL # BLD AUTO: ABNORMAL K/UL (ref 0–0.5)
EOSINOPHIL NFR BLD: 0 % (ref 0–8)
ERYTHROCYTE [DISTWIDTH] IN BLOOD BY AUTOMATED COUNT: 14.6 % (ref 11.5–14.5)
EST. GFR  (AFRICAN AMERICAN): >60 ML/MIN/1.73 M^2
EST. GFR  (NON AFRICAN AMERICAN): 53.5 ML/MIN/1.73 M^2
GLUCOSE SERPL-MCNC: 134 MG/DL (ref 70–110)
HCT VFR BLD AUTO: 37.7 % (ref 40–54)
HGB BLD-MCNC: 12.6 G/DL (ref 14–18)
IMM GRANULOCYTES # BLD AUTO: ABNORMAL K/UL (ref 0–0.04)
IMM GRANULOCYTES NFR BLD AUTO: ABNORMAL % (ref 0–0.5)
LYMPHOCYTES # BLD AUTO: ABNORMAL K/UL (ref 1–4.8)
LYMPHOCYTES NFR BLD: 1 % (ref 18–48)
MAGNESIUM SERPL-MCNC: 2.2 MG/DL (ref 1.6–2.6)
MCH RBC QN AUTO: 30.3 PG (ref 27–31)
MCHC RBC AUTO-ENTMCNC: 33.4 G/DL (ref 32–36)
MCV RBC AUTO: 91 FL (ref 82–98)
METAMYELOCYTES NFR BLD MANUAL: 5 %
MONOCYTES # BLD AUTO: ABNORMAL K/UL (ref 0.3–1)
MONOCYTES NFR BLD: 3 % (ref 4–15)
MYELOCYTES NFR BLD MANUAL: 1 %
NEUTROPHILS NFR BLD: 87 % (ref 38–73)
NEUTS BAND NFR BLD MANUAL: 3 %
NRBC BLD-RTO: 0 /100 WBC
PHOSPHATE SERPL-MCNC: 4.2 MG/DL (ref 2.7–4.5)
PLATELET # BLD AUTO: 266 K/UL (ref 150–350)
PLATELET BLD QL SMEAR: ABNORMAL
PMV BLD AUTO: 9.8 FL (ref 9.2–12.9)
POCT GLUCOSE: 187 MG/DL (ref 70–110)
POIKILOCYTOSIS BLD QL SMEAR: SLIGHT
POLYCHROMASIA BLD QL SMEAR: ABNORMAL
POTASSIUM SERPL-SCNC: 4.4 MMOL/L (ref 3.5–5.1)
PROT SERPL-MCNC: 6.1 G/DL (ref 6–8.4)
RBC # BLD AUTO: 4.16 M/UL (ref 4.6–6.2)
SODIUM SERPL-SCNC: 138 MMOL/L (ref 136–145)
TOXIC GRANULES BLD QL SMEAR: PRESENT
WBC # BLD AUTO: 12.77 K/UL (ref 3.9–12.7)

## 2019-09-18 PROCEDURE — 85007 BL SMEAR W/DIFF WBC COUNT: CPT

## 2019-09-18 PROCEDURE — 37000009 HC ANESTHESIA EA ADD 15 MINS

## 2019-09-18 PROCEDURE — 97530 THERAPEUTIC ACTIVITIES: CPT

## 2019-09-18 PROCEDURE — 99233 SBSQ HOSP IP/OBS HIGH 50: CPT | Mod: ,,, | Performed by: INTERNAL MEDICINE

## 2019-09-18 PROCEDURE — D9220A PRA ANESTHESIA: Mod: CRNA,,, | Performed by: NURSE ANESTHETIST, CERTIFIED REGISTERED

## 2019-09-18 PROCEDURE — 85027 COMPLETE CBC AUTOMATED: CPT

## 2019-09-18 PROCEDURE — 63600175 PHARM REV CODE 636 W HCPCS: Performed by: NURSE ANESTHETIST, CERTIFIED REGISTERED

## 2019-09-18 PROCEDURE — 63600175 PHARM REV CODE 636 W HCPCS: Performed by: STUDENT IN AN ORGANIZED HEALTH CARE EDUCATION/TRAINING PROGRAM

## 2019-09-18 PROCEDURE — 99232 SBSQ HOSP IP/OBS MODERATE 35: CPT | Mod: ,,, | Performed by: NURSE PRACTITIONER

## 2019-09-18 PROCEDURE — 99232 SBSQ HOSP IP/OBS MODERATE 35: CPT | Mod: ,,, | Performed by: HOSPITALIST

## 2019-09-18 PROCEDURE — 99232 PR SUBSEQUENT HOSPITAL CARE,LEVL II: ICD-10-PCS | Mod: ,,, | Performed by: NURSE PRACTITIONER

## 2019-09-18 PROCEDURE — D9220A PRA ANESTHESIA: Mod: ANES,,, | Performed by: ANESTHESIOLOGY

## 2019-09-18 PROCEDURE — 99233 PR SUBSEQUENT HOSPITAL CARE,LEVL III: ICD-10-PCS | Mod: ,,, | Performed by: INTERNAL MEDICINE

## 2019-09-18 PROCEDURE — D9220A PRA ANESTHESIA: ICD-10-PCS | Mod: ANES,,, | Performed by: ANESTHESIOLOGY

## 2019-09-18 PROCEDURE — 97116 GAIT TRAINING THERAPY: CPT

## 2019-09-18 PROCEDURE — 80053 COMPREHEN METABOLIC PANEL: CPT

## 2019-09-18 PROCEDURE — D9220A PRA ANESTHESIA: ICD-10-PCS | Mod: CRNA,,, | Performed by: NURSE ANESTHETIST, CERTIFIED REGISTERED

## 2019-09-18 PROCEDURE — 37000008 HC ANESTHESIA 1ST 15 MINUTES

## 2019-09-18 PROCEDURE — 11000001 HC ACUTE MED/SURG PRIVATE ROOM

## 2019-09-18 PROCEDURE — 36415 COLL VENOUS BLD VENIPUNCTURE: CPT

## 2019-09-18 PROCEDURE — 84100 ASSAY OF PHOSPHORUS: CPT

## 2019-09-18 PROCEDURE — 87040 BLOOD CULTURE FOR BACTERIA: CPT

## 2019-09-18 PROCEDURE — 25000003 PHARM REV CODE 250: Performed by: STUDENT IN AN ORGANIZED HEALTH CARE EDUCATION/TRAINING PROGRAM

## 2019-09-18 PROCEDURE — 83735 ASSAY OF MAGNESIUM: CPT

## 2019-09-18 PROCEDURE — 94761 N-INVAS EAR/PLS OXIMETRY MLT: CPT

## 2019-09-18 PROCEDURE — 99232 PR SUBSEQUENT HOSPITAL CARE,LEVL II: ICD-10-PCS | Mod: ,,, | Performed by: HOSPITALIST

## 2019-09-18 PROCEDURE — 25000003 PHARM REV CODE 250: Performed by: NURSE ANESTHETIST, CERTIFIED REGISTERED

## 2019-09-18 RX ORDER — FENTANYL CITRATE 50 UG/ML
25 INJECTION, SOLUTION INTRAMUSCULAR; INTRAVENOUS EVERY 5 MIN PRN
Status: CANCELLED | OUTPATIENT
Start: 2019-09-18

## 2019-09-18 RX ORDER — SODIUM CHLORIDE 9 MG/ML
INJECTION, SOLUTION INTRAVENOUS CONTINUOUS PRN
Status: DISCONTINUED | OUTPATIENT
Start: 2019-09-18 | End: 2019-09-18

## 2019-09-18 RX ORDER — SULFAMETHOXAZOLE AND TRIMETHOPRIM 800; 160 MG/1; MG/1
1 TABLET ORAL DAILY
Status: DISCONTINUED | OUTPATIENT
Start: 2019-09-19 | End: 2019-09-22 | Stop reason: HOSPADM

## 2019-09-18 RX ORDER — LIDOCAINE HYDROCHLORIDE 20 MG/ML
SOLUTION OROPHARYNGEAL
Status: DISCONTINUED | OUTPATIENT
Start: 2019-09-18 | End: 2019-09-18

## 2019-09-18 RX ORDER — PROPOFOL 10 MG/ML
INJECTION, EMULSION INTRAVENOUS CONTINUOUS PRN
Status: DISCONTINUED | OUTPATIENT
Start: 2019-09-18 | End: 2019-09-18

## 2019-09-18 RX ORDER — PROPOFOL 10 MG/ML
INJECTION, EMULSION INTRAVENOUS
Status: DISCONTINUED | OUTPATIENT
Start: 2019-09-18 | End: 2019-09-18

## 2019-09-18 RX ORDER — LIDOCAINE HCL/PF 100 MG/5ML
SYRINGE (ML) INTRAVENOUS
Status: DISCONTINUED | OUTPATIENT
Start: 2019-09-18 | End: 2019-09-18

## 2019-09-18 RX ORDER — MIDAZOLAM HYDROCHLORIDE 1 MG/ML
0.5 INJECTION INTRAMUSCULAR; INTRAVENOUS
Status: CANCELLED | OUTPATIENT
Start: 2019-09-18

## 2019-09-18 RX ADMIN — LIDOCAINE HYDROCHLORIDE 4 ML: 20 SOLUTION OROPHARYNGEAL at 02:09

## 2019-09-18 RX ADMIN — KETOROLAC TROMETHAMINE 1 DROP: 5 SOLUTION/ DROPS OPHTHALMIC at 05:09

## 2019-09-18 RX ADMIN — BRIMONIDINE TARTRATE 1 DROP: 1.5 SOLUTION OPHTHALMIC at 10:09

## 2019-09-18 RX ADMIN — LIDOCAINE HYDROCHLORIDE 100 MG: 20 INJECTION, SOLUTION INTRAVENOUS at 02:09

## 2019-09-18 RX ADMIN — SODIUM CHLORIDE: 0.9 INJECTION, SOLUTION INTRAVENOUS at 02:09

## 2019-09-18 RX ADMIN — KETOROLAC TROMETHAMINE 1 DROP: 5 SOLUTION/ DROPS OPHTHALMIC at 09:09

## 2019-09-18 RX ADMIN — PROPOFOL 150 MCG/KG/MIN: 10 INJECTION, EMULSION INTRAVENOUS at 02:09

## 2019-09-18 RX ADMIN — FAMOTIDINE 20 MG: 20 TABLET, FILM COATED ORAL at 10:09

## 2019-09-18 RX ADMIN — PREDNISONE 110 MG: 20 TABLET ORAL at 10:09

## 2019-09-18 RX ADMIN — PROPOFOL 80 MG: 10 INJECTION, EMULSION INTRAVENOUS at 02:09

## 2019-09-18 RX ADMIN — DEXTROSE 6 G: 5 SOLUTION INTRAVENOUS at 05:09

## 2019-09-18 RX ADMIN — FAMOTIDINE 20 MG: 20 TABLET, FILM COATED ORAL at 09:09

## 2019-09-18 RX ADMIN — KETOROLAC TROMETHAMINE 1 DROP: 5 SOLUTION/ DROPS OPHTHALMIC at 10:09

## 2019-09-18 RX ADMIN — BRIMONIDINE TARTRATE 1 DROP: 1.5 SOLUTION OPHTHALMIC at 09:09

## 2019-09-18 RX ADMIN — SODIUM CHLORIDE, SODIUM LACTATE, POTASSIUM CHLORIDE, AND CALCIUM CHLORIDE 1000 ML: .6; .31; .03; .02 INJECTION, SOLUTION INTRAVENOUS at 11:09

## 2019-09-18 NOTE — CONSULTS
Wound care consulted for left leg stump  PMH: carcinoid bronchial adenoma to the left lung with mets to the bone and liver,  S/p left lobectomy, LE amputation @ ~ 18 years old.   Assessment:   He states he was kneeling down longer than he thought and when he got up his stump was painful/red.   Open area that is red/moist with scant-small amount of serosanguineous drainage. possible I & D tomorrow -   The andre wound is red/blanches, warm.  Callus located on medial aspect of old incision site.  The wound was cleansed with sterile normal saline and packed with AMD packing strips, covered with a mepore dressing.  Discussed with Dr. Hassan and approved orders.   Nursing to continue wound care, wound care will follow-up prn.   B. Nano Vazquez RN, [facility]  [id number]     09/18/19 1100        Wound 09/18/19 1100 Abscess distal Thigh   Date First Assessed/Time First Assessed: 09/18/19 1100   Pre-existing: Yes  Primary Wound Type: (c) Abscess  Side: Left  Orientation: distal  Location: Thigh  Wound Outcome: (c)    Wound Image    Wound WDL ex   Dressing Appearance Intact;Moist drainage   Drainage Amount Small   Drainage Characteristics/Odor Serosanguineous;Tan;No odor   Appearance Red;Moist   Tissue loss description Full thickness   Periwound Area Intact;Dry;Scar tissue;Warm;Redness;Other (see comments)  (callus to old incision wound. )   Wound Edges Open   Wound Length (cm) 0.5 cm   Wound Width (cm) 0.5 cm   Wound Surface Area (cm^2) 0.25 cm^2   Tunneling (depth (cm)/location) 2 cm @ 11 o'clock   Care Cleansed with:;Sterile normal saline;Applied:;Skin Barrier   Dressing Changed;Applied;Island/border   Packing Incision packing removed;Incision packed with  (AMD packing strip)   Packing Inserted  1   Packing Removed 1   Dressing Change Due 09/18/19

## 2019-09-18 NOTE — PROGRESS NOTES
[facility] Hospital Medicine  Progress Note    Patient Name: Elroy Rahman  MRN: [id number]  Patient Class: IP- Inpatient   Admission Date: 9/13/2019  Length of Stay: 4 days  Attending Physician: Emil Day MD  Primary Care Provider: Wayne Catalan PA-C    [facility] Medicine Team: [facility] MED 1 Paulo Madrigal MD    Subjective:     Principal Problem:Sepsis due to methicillin susceptible Staphylococcus aureus        HPI:  Mr. 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    Pt reports that on Tuesday he was experiencing chills, diaphoresis and severe muscle weakness. The weakness began suddenly, not concentrated in any one muscle group and progressively worsened. Unfortunately by late that evening he was unable to move around on his own. At baseline pt does have a left LE amputation and is able to ambulate freely with and without his prosthesis. He denies any bowel or bladder incontinence, back pain, numbness, perineal numbness or extremity pain.   Per notes: He was started on PRRT back in April and began prednisone due to inflammation noted in the globe of his eye. Beginning on 9/5 he was to take Prednisone 130 mg for days and then proceed with 110mg for an additional five days. Pt reports that he is compliant with his medication regimen.     Overview/Hospital Course:  No notes on file    Interval History:  No acute events overnight. Pt to go for VIVI today.    Review of Systems   Constitutional: Positive for activity change, chills, diaphoresis, fatigue and fever. Negative for appetite change.   HENT: Negative for congestion, postnasal drip and sore throat.    Eyes: Positive for visual disturbance.   Respiratory: Negative for cough,  chest tightness and shortness of breath.    Cardiovascular: Negative for chest pain, palpitations and leg swelling.   Gastrointestinal: Positive for nausea. Negative for abdominal pain, constipation, diarrhea and vomiting.   Endocrine: Negative for heat intolerance.   Genitourinary: Negative for difficulty urinating and dysuria.   Musculoskeletal: Negative for arthralgias, back pain and myalgias.   Neurological: Positive for weakness (improved) and light-headedness. Negative for numbness and headaches.     Objective:     Vital Signs (Most Recent):  Temp: 97.9 °F (36.6 °C) (09/18/19 1024)  Pulse: 60 (09/18/19 1024)  Resp: 16 (09/18/19 1024)  BP: 139/78 (09/18/19 1024)  SpO2: 95 % (09/18/19 1024) Vital Signs (24h Range):  Temp:  [97.5 °F (36.4 °C)-98.3 °F (36.8 °C)] 97.9 °F (36.6 °C)  Pulse:  [55-74] 60  Resp:  [16-20] 16  SpO2:  [93 %-98 %] 95 %  BP: (122-143)/(74-88) 139/78     Weight: 130.6 kg (288 lb)  Body mass index is 39.06 kg/m².    Intake/Output Summary (Last 24 hours) at 9/18/2019 1106  Last data filed at 9/17/2019 1557  Gross per 24 hour   Intake 900 ml   Output --   Net 900 ml      Physical Exam   Constitutional: He is oriented to person, place, and time. He appears well-developed and well-nourished. He does not appear ill. No distress.   Obese   HENT:   Head: Normocephalic and atraumatic.   Eyes: No scleral icterus.   Neck: Normal range of motion. Neck supple.   Cardiovascular: Normal rate, regular rhythm and intact distal pulses. Exam reveals no gallop and no friction rub.   No murmur heard.  Pulmonary/Chest: Effort normal and breath sounds normal. No respiratory distress.   Abdominal: Soft. He exhibits distension. There is no tenderness. There is no guarding.   Musculoskeletal:   b/l UE 5/5 muscle strength  RLE: 3-4/5 muscle strength. Able to move his R foot and bend his right knee  LLE stump redness improving, some scabbing at the base of the stump   Neurological: He is alert and oriented to person,  place, and time.   Skin: Skin is warm.   Nursing note and vitals reviewed.      Significant Labs:   CBC:   Recent Labs   Lab 09/17/19  0434 09/18/19  0512   WBC 13.08* 12.77*   HGB 13.7* 12.6*   HCT 42.6 37.7*    266     CMP:   Recent Labs   Lab 09/17/19  0434 09/18/19  0512    138   K 5.0 4.4    101   CO2 27 26   * 134*   BUN 36* 34*   CREATININE 1.4 1.4   CALCIUM 9.3 8.5*   PROT 6.6 6.1   ALBUMIN 2.3* 2.2*   BILITOT 0.5 0.4   ALKPHOS 178* 157*   AST 31 22   * 94*   ANIONGAP 11 11   EGFRNONAA 53.5* 53.5*       Significant Imaging: I have reviewed all pertinent imaging results/findings within the past 24 hours.      Assessment/Plan:      * Sepsis due to methicillin susceptible Staphylococcus aureus  Pt with diaphoresis and diffuse muscle weakness leading to admission associated with fevers while in the hospital. BCx and UCx positive for staph aureus.     Plan  - Continue cefazolin   - ID consulted: following up recs  - blood cultures daily until clears.  - VIVI 09/18 to evaluate for small vegitation  - U/S of left extremity: Small complex fluid collection 2.4 x 1.2 x 2.4   - Orthopedic surgery consulted: f/u recs      UTI (urinary tract infection)  Urinalysis with 1+ protein,22WBC and occasional bacteria. He has leukocytosis of 12.78. UCx positive for staph aureus. Pt without any urinary symptoms     Plan   - Continue cefazolin.   - ID consulted, f/u recs     Acute muscle weakness  Pt with a medical history of metastatic carcinoma and corticosteroid use presenting with LE weakness of sudden onset most likely due to sepsis. BCx growing gram positive cocci in clusters, UCx showing staph aureus. (see sepsis)  Cord compression initially suspected due to acute presentation, MRI spine final report showing metastatic disease without any mass effect. Steroid induced myopathy considered since pt is on a high dose of steroids (CPK elevated, U/A with 3+ blood and only 13 RBC).     Carcinoid  bronchial adenoma of left lung  MHx of bronchial carcinoid tumor with metastases to the liver, bone and mesentery. He is also s/p left upper lung lobectomy done in 2016. Pt currently undergoing peptide receptor radionucleotide therapy, being followed by Dr. London.    - Oncology following during this admission    MAGDI (acute kidney injury)  sCr elevated from baseline (0.9 > 1.4).     Plan  -Fluid resuscitation           VTE Risk Mitigation (From admission, onward)        Ordered     enoxaparin injection 40 mg  Daily      09/16/19 0857     Reason for no Mechanical VTE Prophylaxis  Once      09/13/19 1933     IP VTE HIGH RISK PATIENT  Once      09/13/19 1933     Place sequential compression device  Until discontinued      09/13/19 1641                Paulo Madrigal MD  Department of Hospital Medicine   [facility]

## 2019-09-18 NOTE — ASSESSMENT & PLAN NOTE
Elroy Rahman is a 64 y.o. 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  Blood and urine cultures both positive for MSSA.  Infectious Disease is following, currently recommending IV Ancef.  Patient currently has draining sinus wounds from his amputation stump.  Ultrasound significant for fluid collection around the stump.  Plan to take back to OR for irrigation and debridement of left amputation stump with stump revision tomorrow.  Patient getting VIVI today, will follow results.  All surgical and nonsurgical treatments for his infection were explained to the patient in great detail. All of his questions have been answered.  He wishes to proceed with surgery at this time.  He has been marked, blood, and consented for surgery. NPO at midnight.  Hold anticoagulation until surgery.

## 2019-09-18 NOTE — SUBJECTIVE & OBJECTIVE
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    Sepsis due to methicillin susceptible Staphylococcus aureus 09/14/2019    MSSA bacteremia from Amputation stump infection    Sleep apnea     Snores    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       Past Surgical History:   Procedure Laterality Date    ABDOMINAL SURGERY      BFRNFC-RGUTMF-MB N/A 6/8/2017    Performed by [facility] at [facility] OR 2ND FLR    Bka Left     HERNIA REPAIR      LEFT UPPER LOBECTOMY-LUNG Left 1/28/2016    Performed by Pan Bell MD at [facility] OR 2ND FLR    REPAIR, HERNIA, UMBILICAL, INCARCERATED, LAPAROSCOPIC N/A 11/27/2018    Performed by Coco Guidry MD at [facility] OR    THORACOTOMY W/LUNG RESECTION Left 1/28/2016    Performed by Pan Bell MD at [facility] OR [facility] FLR       Review of patient's allergies indicates:   Allergen Reactions    Epinephrine Other (See Comments)     Carcinoid crisis       No current facility-administered medications on file prior to encounter.      Current Outpatient Medications on File Prior to Encounter   Medication Sig    ALPHAGAN P 0.1 % Drop INT 1 GTT IN OS BID    lanreotide (SOMATULINE DEPOT) 120 mg/0.5 mL Syrg Inject 120 mg into the skin every 28 days.    ondansetron (ZOFRAN) 4 MG tablet Take 1 tablet (4 mg total) by mouth 2 (two) times daily.    ondansetron (ZOFRAN) 4 MG tablet Take 1 tablet (4 mg total) by mouth 2 (two) times daily.    predniSONE (DELTASONE) 20 MG tablet Take 2 tablets (40 mg total) by mouth once daily. Take with 50 mg tabs.  Total of 140 mg daily    predniSONE (DELTASONE) 50 MG Tab Take 2 tablets (100 mg total) by  mouth once daily. Take with 20 mg tablets.  Total of 140 daily.    PROLENSA 0.07 % Drop INSTILL 1 DROP IN OS BID     Family History     Problem Relation (Age of Onset)    Cancer Mother, Sister        Tobacco Use    Smoking status: Never Smoker    Smokeless tobacco: Never Used   Substance and Sexual Activity    Alcohol use: Yes     Alcohol/week: 0.6 oz     Types: 1 Cans of beer per week     Comment: rare    Drug use: No    Sexual activity: Not on file     Review of Systems   Constitution: Negative for chills and decreased appetite.   HENT: Negative for congestion, ear discharge and ear pain.    Eyes: Negative for blurred vision and discharge.   Cardiovascular: Negative for chest pain, claudication, cyanosis and dyspnea on exertion.   Respiratory: Negative for cough and hemoptysis.    Endocrine: Negative for cold intolerance and heat intolerance.   Skin: Negative for color change and nail changes.   Musculoskeletal: Negative for arthritis and back pain.   Gastrointestinal: Negative for bloating and abdominal pain.   Genitourinary: Negative for bladder incontinence and decreased libido.   Neurological: Negative for aphonia and brief paralysis.     Objective:     Vital Signs (Most Recent):  Temp: 97.9 °F (36.6 °C) (09/18/19 1024)  Pulse: 70 (09/18/19 1110)  Resp: 20 (09/18/19 1110)  BP: 115/74 (09/18/19 1110)  SpO2: 98 % (09/18/19 1110) Vital Signs (24h Range):  Temp:  [97.5 °F (36.4 °C)-98.3 °F (36.8 °C)] 97.9 °F (36.6 °C)  Pulse:  [55-74] 70  Resp:  [16-20] 20  SpO2:  [93 %-98 %] 98 %  BP: (115-143)/(74-88) 115/74     Weight: 130.6 kg (288 lb)  Body mass index is 39.06 kg/m².    SpO2: 98 %  O2 Device (Oxygen Therapy): room air      Intake/Output Summary (Last 24 hours) at 9/18/2019 1407  Last data filed at 9/17/2019 1557  Gross per 24 hour   Intake 900 ml   Output --   Net 900 ml       Lines/Drains/Airways     Peripheral Intravenous Line                 Peripheral IV - Single Lumen 09/16/19 1435 22 G  Anterior;Right Forearm 1 day                Physical Exam   Constitutional: He is oriented to person, place, and time. He appears well-developed and well-nourished.   HENT:   Head: Normocephalic and atraumatic.   Nose: Nose normal.   Mouth/Throat: No oropharyngeal exudate.   Eyes: Right eye exhibits no discharge. Left eye exhibits no discharge. No scleral icterus.   Neck: Normal range of motion. Neck supple. No JVD present.   Cardiovascular: Normal rate, regular rhythm, S1 normal and S2 normal. Exam reveals no gallop, no S3, no S4, no distant heart sounds, no friction rub, no midsystolic click and no opening snap.   No murmur heard.  Pulses:       Radial pulses are 2+ on the right side, and 2+ on the left side.        Femoral pulses are 2+ on the right side, and 2+ on the left side.  Pulmonary/Chest: Effort normal and breath sounds normal. No respiratory distress. He has no wheezes. He has no rales. He exhibits no tenderness.   Abdominal: Soft. Bowel sounds are normal. He exhibits no distension. There is no tenderness. There is no rebound.   Musculoskeletal: Normal range of motion. He exhibits no edema, tenderness or deformity.   Lymphadenopathy:     He has no cervical adenopathy.   Neurological: He is alert and oriented to person, place, and time. No cranial nerve deficit.   Skin: Skin is warm and dry.   Psychiatric: He has a normal mood and affect. His behavior is normal.       Significant Labs: All pertinent lab results from the last 24 hours have been reviewed.    Significant Imaging: All pertinent images from the last 24h have been reviewed.

## 2019-09-18 NOTE — SUBJECTIVE & OBJECTIVE
Interval History: No acute overnight events. US demonstrated fluid collection near stump. Ortho consulted for debridement. Family wanting to discuss with each other prior to surgery.    Review of Systems   Constitutional: Positive for activity change and fatigue. Negative for chills.   Eyes: Negative for visual disturbance.   Respiratory: Negative for shortness of breath.    Cardiovascular: Negative for palpitations.   Gastrointestinal: Negative for nausea and vomiting.   Musculoskeletal: Positive for gait problem. Negative for back pain.   Skin: Positive for wound.   Neurological: Negative for headaches.   Psychiatric/Behavioral: Negative for agitation. The patient is not nervous/anxious.      Objective:     Vital Signs (Most Recent):  Temp: 97.4 °F (36.3 °C) (09/18/19 1540)  Pulse: 72 (09/18/19 1646)  Resp: 20 (09/18/19 1646)  BP: 128/78 (09/18/19 1646)  SpO2: (!) 94 % (09/18/19 1646) Vital Signs (24h Range):  Temp:  [97.4 °F (36.3 °C)-98.3 °F (36.8 °C)] 97.4 °F (36.3 °C)  Pulse:  [55-74] 72  Resp:  [16-20] 20  SpO2:  [94 %-99 %] 94 %  BP: (115-143)/(74-88) 128/78     Weight: 130.6 kg (288 lb)  Body mass index is 39.06 kg/m².    Estimated Creatinine Clearance: 75.5 mL/min (based on SCr of 1.4 mg/dL).    Physical Exam   Constitutional: He is oriented to person, place, and time. He appears well-developed and well-nourished. No distress.   HENT:   Head: Normocephalic and atraumatic.   Right Ear: External ear normal.   Left Ear: External ear normal.   Nose: Nose normal.   Eyes: Right eye exhibits no discharge. Left eye exhibits no discharge. No scleral icterus.   Neck: Normal range of motion.   Cardiovascular: Normal rate and intact distal pulses.   Pulmonary/Chest: Effort normal. No respiratory distress.   Abdominal: Soft. He exhibits no distension. There is no tenderness.   Musculoskeletal: Normal range of motion. He exhibits edema and deformity. He exhibits no tenderness (mild at stump site).   L AKA stump with  dressing CDI   Neurological: He is alert and oriented to person, place, and time. No sensory deficit.   Skin: Skin is warm and dry.   Psychiatric: He has a normal mood and affect. His behavior is normal.   Vitals reviewed.      Significant Labs:   Blood Culture:   Recent Labs   Lab 09/16/19  0826 09/16/19  0830 09/17/19  1045 09/18/19  0511 09/18/19 0512   LABBLOO Gram stain aer bottle: Gram positive cocci in clusters resembling Staph   Results called to and read back by: Bhavik López RN 09/17/2019    06:06  STAPHYLOCOCCUS AUREUS  Susceptibility pending  ID consult required at [facility] and [facility]   locations.  * Gram stain aer bottle: Gram positive cocci in clusters resembling Staph   Results called to and read back by:Lesley Handley RN 09/17/2019  11:55  STAPHYLOCOCCUS AUREUS  ID consult required at [facility] and [facility].  For susceptibility see order #[id number]  * No Growth to date  No Growth to date  No Growth to date  No Growth to date No Growth to date No Growth to date     CBC:   Recent Labs   Lab 09/17/19  0434 09/18/19  0512   WBC 13.08* 12.77*   HGB 13.7* 12.6*   HCT 42.6 37.7*    266     CMP:   Recent Labs   Lab 09/17/19  0434 09/18/19  0512    138   K 5.0 4.4    101   CO2 27 26   * 134*   BUN 36* 34*   CREATININE 1.4 1.4   CALCIUM 9.3 8.5*   PROT 6.6 6.1   ALBUMIN 2.3* 2.2*   BILITOT 0.5 0.4   ALKPHOS 178* 157*   AST 31 22   * 94*   ANIONGAP 11 11   EGFRNONAA 53.5* 53.5*     Microbiology Results (last 7 days)     Procedure Component Value Units Date/Time    Blood culture [[id number]] Collected:  09/18/19 0512    Order Status:  Completed Specimen:  Blood Updated:  09/18/19 1315     Blood Culture, Routine No Growth to date    Blood culture [[id number]] Collected:  09/18/19 0511    Order Status:  Completed Specimen:  Blood Updated:  09/18/19 1315     Blood Culture, Routine No Growth to date    Blood culture  [[id number]] Collected:  09/17/19 1045    Order Status:  Completed Specimen:  Blood Updated:  09/18/19 1212     Blood Culture, Routine No Growth to date      No Growth to date    Blood culture [[id number]] Collected:  09/17/19 1045    Order Status:  Completed Specimen:  Blood Updated:  09/18/19 1212     Blood Culture, Routine No Growth to date      No Growth to date    Blood culture [[id number]]  (Abnormal) Collected:  09/15/19 0616    Order Status:  Completed Specimen:  Blood Updated:  09/18/19 0949     Blood Culture, Routine Gram stain aer bottle: Gram positive cocci in clusters resembling Staph       Results called to and read back by:Lesley Handley RN 09/16/2019  08:47      STAPHYLOCOCCUS AUREUS  ID consult required at [facility].  For susceptibility see order #[id number]      Blood culture [[id number]]  (Abnormal) Collected:  09/16/19 0830    Order Status:  Completed Specimen:  Blood Updated:  09/18/19 0921     Blood Culture, Routine Gram stain aer bottle: Gram positive cocci in clusters resembling Staph       Results called to and read back by:Lesley Handley RN 09/17/2019  11:55      STAPHYLOCOCCUS AUREUS  ID consult required at [facility].  For susceptibility see order #[id number]      Blood culture [[id number]]  (Abnormal) Collected:  09/16/19 0826    Order Status:  Completed Specimen:  Blood Updated:  09/18/19 0920     Blood Culture, Routine Gram stain aer bottle: Gram positive cocci in clusters resembling Staph       Results called to and read back by: Bhavik López RN 09/17/2019        06:06      STAPHYLOCOCCUS AUREUS  Susceptibility pending  ID consult required at [facility].      Blood culture [[id number]]  (Abnormal) Collected:  09/15/19 0616    Order Status:  Completed Specimen:  Blood Updated:  09/17/19 1115     Blood Culture, Routine Gram stain aer bottle: Gram positive cocci  in clusters resembling Staph       Results called to and read back by: Bhavik López RN 09/16/2019        01:23      STAPHYLOCOCCUS AUREUS  ID consult required at [facility].  For susceptibility see order #[id number]      Blood culture #2 **CANNOT BE ORDERED STAT** [[id number]]  (Abnormal) Collected:  09/13/19 1348    Order Status:  Completed Specimen:  Blood from Peripheral, Hand, Right Updated:  09/16/19 0954     Blood Culture, Routine Gram stain dinorah bottle: Gram positive cocci in clusters resembling Staph       Results called to and read back by: Porfirio Medina RN 09/14/2019  05:49      Gram stain aer bottle: Gram positive cocci in clusters resembling Staph      STAPHYLOCOCCUS AUREUS  ID consult required at [facility].  For susceptibility see order #[id number]      Blood culture [[id number]]  (Abnormal) Collected:  09/13/19 2036    Order Status:  Completed Specimen:  Blood Updated:  09/16/19 0953     Blood Culture, Routine Gram stain dinorah bottle: Gram positive cocci in clusters resembling Staph      Positive results previously called      Gram stain aer bottle: Gram positive cocci in clusters resembling Staph      STAPHYLOCOCCUS AUREUS  ID consult required at [facility].  For susceptibility see order #[id number]      Blood culture #1 **CANNOT BE ORDERED STAT** [[id number]]  (Abnormal)  (Susceptibility) Collected:  09/13/19 1348    Order Status:  Completed Specimen:  Blood from Peripheral, Antecubital, Left Updated:  09/16/19 0953     Blood Culture, Routine Gram stain dinorah bottle: Gram positive cocci in clusters resembling Staph       Results called to and read back by: Porfirio Medina RN 09/14/2019  05:49      Gram stain aer bottle: Gram positive cocci in clusters resembling Staph      STAPHYLOCOCCUS AUREUS  ID consult required at [facility].[address]    locations.      Urine culture [[id number]]  (Abnormal)  (Susceptibility) Collected:  09/13/19 1349    Order Status:  Completed Specimen:  Urine Updated:  09/16/19 0849     Urine Culture, Routine STAPHYLOCOCCUS AUREUS  > 100,000 cfu/ml      Narrative:       GRAY AND YELLOW  Preferred Collection Type->Urine, Clean Catch    Blood culture [[id number]]     Order Status:  Canceled Specimen:  Blood           Significant Imaging: I have reviewed all pertinent imaging results/findings within the past 24 hours.

## 2019-09-18 NOTE — ASSESSMENT & PLAN NOTE
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  He is admitted with staphylococcus aureus sepsis with the source likely being his left BKA stump.  Patient does not have a port and denies having any hardware in his body.  TTE is negative.    US of AKA stump demonstrated .4 x 1.2 x 2.4 cm complex fluid collection with associated packing material which may represent abscess    Recommendations as follows:    Recommend drainage of fluid collection  Check VIVI (planned for 9/18)  C/w cefazolin for MSSA  Continue to monitor blood cultures.

## 2019-09-18 NOTE — PLAN OF CARE
Problem: Adult Inpatient Plan of Care  Goal: Plan of Care Review  Plan of care reviewed with patient at beginning of shift. Patient C/O of no pain or nausea this shift. Pt continued his Ancef. Pt will be NPO after midnight for a VIVI. PT can now get on the bedside commode and in and out of his chair. Pt went to US to look at his left stump. Wound care was consulted for the stump as well. VSS. Afebrile.Bed locked in lowest position. Side rails up x2. Call bell within reach. BADL within reach. Rounding Q1H. Will continue to monitor.

## 2019-09-18 NOTE — ANESTHESIA PREPROCEDURE EVALUATION
[facility]  Anesthesia Pre-Operative Evaluation         Patient Name: Elroy Rahman  YOB: 1955  MRN: [id number]    SUBJECTIVE:     Pre-operative evaluation for Procedure(s) (LRB):  IRRIGATION AND DEBRIDEMENT, LOWER EXTREMITY - left - cysto tubing - cultures (Left )     09/19/2019    Elroy Rahman is a 64 y.o. 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  Hospitalization c/b demand ischemia and MAGDI.  Recent VIVI completed without vegetations.     Patient now presents for the above procedure(s).      LDA:        Peripheral IV - Single Lumen 09/16/19 1435 22 G Anterior;Right Forearm (Active)   Site Assessment Clean;Dry;Intact;No redness;No swelling 9/18/2019  1:09 AM   Line Status Infusing 9/18/2019  1:09 AM   Dressing Status Clean;Dry;Intact 9/18/2019  1:09 AM   Number of days: 1       Prev airway: Present Prior to Hospital Arrival?: No; Placement Date: 11/27/18; Placement Time: 0812; Method of Intubation: Direct laryngoscopy; Inserted by: Other (MACO Nunez); Airway Device: Endotracheal Tube; Mask Ventilation: Other (Two hand mask ventilation); Intubated: Postinduction; Blade: Melissa #4; Airway Device Size: 7.5; Style: Cuffed; Cuff Inflation: Minimal occlusive pressure; Placement Verified By: Auscultation, Capnometry, ETT Condensation; Grade: Grade I; Complicating Factors: Anterior larynx, Obesity, Short neck    Patient Active Problem List   Diagnosis    Paroxysmal atrial fibrillation    Sleep apnea    Liver lesion    Bronchial carcinoid tumors    Secondary neuroendocrine tumor of liver    Secondary neuroendocrine tumor of bone    Elevated bilirubin    Secondary malignant neoplasm of liver    Incarcerated umbilical hernia    Carcinoid bronchial adenoma of left lung    Secondary malignant  neuroendocrine tumor of liver    Vision loss of left eye    Above knee amputation of left lower extremity    Myopathy    Acute muscle weakness    UTI (urinary tract infection)    Sepsis due to methicillin susceptible Staphylococcus aureus    MAGDI (acute kidney injury)    Impaired mobility and ADLs    Amputation stump infection    MSSA bacteremia    Abscess       Review of patient's allergies indicates:   Allergen Reactions    Epinephrine Other (See Comments)     Carcinoid crisis       Current Inpatient Medications:   brimonidine 0.15 % OPTH DROP  1 drop Left Eye BID    ceFAZolin(ANCEF) in D5W 500 mL CONTINUOUS INFUSION  6 g Intravenous Q24H    famotidine  20 mg Oral BID    ketorolac 0.5%  1 drop Left Eye QID    predniSONE  110 mg Oral Daily    sulfamethoxazole-trimethoprim 800-160mg  1 tablet Oral Daily       No current facility-administered medications on file prior to encounter.      Current Outpatient Medications on File Prior to Encounter   Medication Sig Dispense Refill    ALPHAGAN P 0.1 % Drop Instill 1 drop into left eye twice daily  4    ondansetron (ZOFRAN) 4 MG tablet Take 1 tablet (4 mg total) by mouth 2 (two) times daily. (Patient taking differently: Take 4 mg by mouth daily as needed for Nausea. ) 15 tablet 0    predniSONE (DELTASONE) 20 MG tablet Take 2 tablets (40 mg total) by mouth once daily. Take with 50 mg tabs.  Total of 140 mg daily (Patient taking differently: Take 10 mg by mouth once daily. Take with 50 mg tabs.  Total of 110 mg daily) 60 tablet 0    predniSONE (DELTASONE) 50 MG Tab Take 2 tablets (100 mg total) by mouth once daily. Take with 20 mg tablets.  Total of 140 daily. (Patient taking differently: Take 100 mg by mouth once daily. Take with 20 mg tablets.  Total of 110 daily.) 60 tablet 0    PROLENSA 0.07 % Drop INSTILL 1 DROP INTO LEFT EYE TWICE DAILY  4       Past Surgical History:   Procedure Laterality Date    ABDOMINAL SURGERY      LIPMOW-QFGOGH-NW N/A  6/8/2017    Performed by [facility] at [facility] OR 2ND FLR    Bka Left     HERNIA REPAIR      LEFT UPPER LOBECTOMY-LUNG Left 1/28/2016    Performed by Pan Bell MD at [facility] OR 2ND FLR    REPAIR, HERNIA, UMBILICAL, INCARCERATED, LAPAROSCOPIC N/A 11/27/2018    Performed by [facility] at [facility]    THORACOTOMY W/LUNG RESECTION Left 1/28/2016    Performed by Pan Bell MD at [facility] OR 2ND FLR       Social History     Socioeconomic History    Marital status:      Spouse name: Not on file    Number of children: Not on file    Years of education: Not on file    Highest education level: Not on file   Occupational History    Not on file   Social Needs    Financial resource strain: Not on file    Food insecurity:     Worry: Not on file     Inability: Not on file    Transportation needs:     Medical: Not on file     Non-medical: Not on file   Tobacco Use    Smoking status: Never Smoker    Smokeless tobacco: Never Used   Substance and Sexual Activity    Alcohol use: Yes     Alcohol/week: 0.6 oz     Types: 1 Cans of beer per week     Comment: rare    Drug use: No    Sexual activity: Not on file   Lifestyle    Physical activity:     Days per week: Not on file     Minutes per session: Not on file    Stress: Not on file   Relationships    Social connections:     Talks on phone: Not on file     Gets together: Not on file     Attends Yazidi service: Not on file     Active member of club or organization: Not on file     Attends meetings of clubs or organizations: Not on file     Relationship status: Not on file   Other Topics Concern    Not on file   Social History Narrative    Not on file       OBJECTIVE:     Vital Signs Range (Last 24H):  Temp:  [36.3 °C (97.4 °F)-36.9 °C (98.4 °F)]   Pulse:  [60-85]   Resp:  [16-20]   BP: (107-141)/(59-88)   SpO2:  [94 %-99 %]       Significant Labs:  Lab Results   Component Value Date    WBC 10.76 09/19/2019    HGB 12.9 (L)  09/19/2019    HCT 38.3 (L) 09/19/2019     09/19/2019    ALT 65 (H) 09/19/2019    AST 17 09/19/2019     09/19/2019    K 4.8 09/19/2019     09/19/2019    CREATININE 1.4 09/19/2019    BUN 34 (H) 09/19/2019    CO2 26 09/19/2019    INR 1.0 02/28/2019       Diagnostic Studies: No relevant studies.    EKG:   Results for orders placed or performed during the hospital encounter of 09/13/19   EKG 12-lead    Collection Time: 09/13/19  1:18 PM    Narrative    Test Reason : R53.1,    Vent. Rate : 082 BPM     Atrial Rate : 082 BPM     P-R Int : 132 ms          QRS Dur : 076 ms      QT Int : 336 ms       P-R-T Axes : 072 039 019 degrees     QTc Int : 392 ms    Age and gender specific analysis  Normal sinus rhythm  Normal ECG  When compared with ECG of 20-NOV-2018 10:40,  Premature atrial complexes are no longer Present  Vent. rate has increased BY  31 BPM  Confirmed by Lokesh Thomas MD ([id number]) on 9/13/2019 8:58:52 PM    Referred By: [email or web address]   SELF           Confirmed By:Lokesh Thomas MD       2D ECHO:  TTE:  Results for orders placed or performed during the hospital encounter of 09/13/19   Echo Color Flow Doppler? Yes   Result Value Ref Range    Ascending aorta 3.83 cm    STJ 2.90 cm    AV mean gradient 5 mmHg    Ao peak tamika 1.58 m/s    Ao VTI 27.56 cm    IVRT 0.11 msec    IVS 0.94 0.6 - 1.1 cm    LA size 3.70 cm    Left Atrium Major Axis 5.77 cm    Left Atrium Minor Axis 5.84 cm    LVIDD 4.44 3.5 - 6.0 cm    LVIDS 2.90 2.1 - 4.0 cm    LVOT diameter 2.32 cm    LVOT peak VTI 22.12 cm    PW 0.93 0.6 - 1.1 cm    MV Peak A Tamika 0.69 m/s    E wave decelartion time 260.11 msec    MV Peak E Tamika 0.59 m/s    PV Peak D Tamika 0.23 m/s    PV Peak S Tamika 0.31 m/s    RA Major Axis 4.66 cm    RA Width 3.49 cm    RVDD 3.44 cm    Sinus 3.39 cm    TAPSE 2.43 cm    TR Max Tamika 1.85 m/s    TDI LATERAL 0.10 m/s    TDI SEPTAL 0.08 m/s    LA WIDTH 3.70 cm    LV Diastolic Volume 89.54 mL    LV Systolic Volume 32.13 mL    RV S' 17.56 cm/s     LVOT peak tamika 1.25 m/s    LV LATERAL E/E' RATIO 5.90 m/s    LV SEPTAL E/E' RATIO 7.38 m/s    FS 35 %    LA volume 67.55 cm3    LV mass 136.81 g    Left Ventricle Relative Wall Thickness 0.42 cm    AV valve area 3.39 cm2    AV Velocity Ratio 0.79     AV index (prosthetic) 0.80     E/A ratio 0.86     Mean e' 0.09 m/s    Pulm vein S/D ratio 1.35     LVOT area 4.2 cm2    LVOT stroke volume 93.46 cm3    AV peak gradient 10 mmHg    E/E' ratio 6.56 m/s    LV Systolic Volume Index 12.9 mL/m2    LV Diastolic Volume Index 36.00 mL/m2    LA Volume Index 27.2 mL/m2    LV Mass Index 55 g/m2    Triscuspid Valve Regurgitation Peak Gradient 14 mmHg    BSA 2.58 m2    Right Atrial Pressure (from IVC) 3 mmHg    TV rest pulmonary artery pressure 17 mmHg    Narrative    · Normal left ventricular systolic function. The estimated ejection   fraction is 60%  · Normal LV diastolic function.  · No wall motion abnormalities.  · Normal right ventricular systolic function.  · Mild tricuspid regurgitation.  · The estimated PA systolic pressure is 17 mm Hg  · Normal central venous pressure (3 mm Hg).  · Small circumferential pericardial effusion.  · Multiple ill-defined echodensities in the liver. Clinical correlation is   required          VIVI:  Results for orders placed or performed during the hospital encounter of 09/13/19   Transesophageal echo (VIVI)   Result Value Ref Range    BSA 2.58 m2    Narrative    · Normal left ventricular systolic function.  · Normal appearing left atrial appendage. No thrombus is present in the   appendage.  · Mild mitral regurgitation.  · Mild tricuspid regurgitation.  · No evidence of intracardiac vegetations or masses.          ASSESSMENT/PLAN:       Anesthesia Evaluation    I have reviewed the Patient Summary Reports.    I have reviewed the Nursing Notes.   I have reviewed the Medications.   Prednisone    Review of Systems  Anesthesia Hx:  No problems with previous Anesthesia  History of prior surgery of  interest to airway management or planning: Denies Family Hx of Anesthesia complications.   Denies Personal Hx of Anesthesia complications.   Hematology/Oncology:  Hematology Normal       -- Cancer in past history:  chemotherapy and surgery  Oncology Comments: Bronchial carcinoid tumor (s/p ALIRIO resection)     EENT/Dental:EENT/Dental Normal   Cardiovascular:   Denies Hypertension.  Denies CABG/stent.     Pulmonary:   Denies COPD. Sleep Apnea    Renal/:   Chronic Renal Disease    Hepatic/GI:   Liver Disease,    Neurological:   Denies CVA. Denies Seizures.    Endocrine:  Endocrine Normal Denies Diabetes. Denies Hypothyroidism.    Dermatological:  Skin Normal    Psych:  Psychiatric Normal           Physical Exam  General:  Well nourished, Obesity    Airway/Jaw/Neck:  Airway Findings: Mouth Opening: Normal Tongue: Normal  General Airway Assessment: Adult  Mallampati: III  Improves to II with phonation.  TM Distance: Normal, at least 6 cm  Jaw/Neck Findings:  Micrognathia: Negative Neck ROM: Normal ROM  Neck Findings:  Girth Increased     Eyes/Ears/Nose:  EYES/EARS/NOSE FINDINGS: Normal   Dental:  Dental Findings:    Chest/Lungs:  Chest/Lungs Findings: Clear to auscultation, Normal Respiratory Rate     Heart/Vascular:  Heart Findings: Rate: Normal  Rhythm: Regular Rhythm  Sounds: Normal  Heart murmur: negative    Abdomen:  Abdomen Findings: Normal    Musculoskeletal:  Musculoskeletal Findings: Amputation    Skin:  Skin Findings: Normal    Mental Status:  Mental Status Findings:  Cooperative, Alert and Oriented         Anesthesia Plan  Type of Anesthesia, risks & benefits discussed:  Anesthesia Type:  general, regional, spinal  Patient's Preference:   Intra-op Monitoring Plan: standard ASA monitors  Intra-op Monitoring Plan Comments:   Post Op Pain Control Plan: multimodal analgesia, IV/PO Opioids PRN and per primary service following discharge from PACU  Post Op Pain Control Plan Comments:   Induction:   IV  Beta  Blocker:  Patient is not currently on a Beta-Blocker (No further documentation required).       Informed Consent: Patient understands risks and agrees with Anesthesia plan.  Questions answered. Anesthesia consent signed with patient.  ASA Score: 3     Day of Surgery Review of History & Physical:    H&P update referred to the surgeon.     Anesthesia Plan Notes: PATIENT  MG PREDNISONE DAILY    Octreotide infusion started, bolus given at 0500 today.  Will continue octreotide infusion in OR and then to follow orders from primary team post op         Ready For Surgery From Anesthesia Perspective.

## 2019-09-18 NOTE — PROGRESS NOTES
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  He is admitted with staphylococcus aureus sepsis with the source likely being his left BKA stump.  Patient does not have a port and denies having any hardware in his body.  TTE is negative.    US of AKA stump demonstrated .4 x 1.2 x 2.4 cm complex fluid collection with associated packing material which may represent abscess    Recommendations as follows:    Recommend drainage of fluid collection  Check VIVI (planned for 9/18)  C/w cefazolin for MSSA  Continue to monitor blood cultures.        Thank you for your consult. I will follow-up with patient. Please contact us if you have any additional questions.    Brooks Lozada MD  Infectious Disease  [facility]    Subjective:     Principal Problem:Sepsis due to methicillin susceptible Staphylococcus aureus    HPI: 62 y/o male with a history of metastatic bronchial carcinoid tumor s/p left upper lobectomy, chemotherapy (last dose in July per patient) followed by prednisone for which he is still on.  He also has a history of a left BKA and utilizes a prosthesis.  Per the patient, he does a poor job of keeping the prosthesis clean.  His wife reports multiple occasions of noticing a foul smelling odor and weaping from the stump.  His wife states that about 3 days prior to admission, she noticed her  looked very tired and fatigued.  She also noticed redness and discoloration to the stump and there was an odor to it.  The patient  continued to feel worse until his wife convinced him to come to the hospital.  On admission, he was found to be febrile and with an elevated WBC count.  Blood and urine cultures were obtained and are positive for staphylococcus aureus.  A TTE was negative for vegetations.    Interval History: No acute overnight events. Patient to undergo VIVI tomorrow and US LE today.    Review of Systems   Constitutional: Positive for activity change and fatigue. Negative for chills.   Eyes: Negative for visual disturbance.   Respiratory: Negative for shortness of breath.    Cardiovascular: Negative for palpitations.   Gastrointestinal: Negative for nausea and vomiting.   Musculoskeletal: Positive for gait problem. Negative for back pain.   Skin: Positive for wound.   Neurological: Negative for headaches.   Psychiatric/Behavioral: Negative for agitation. The patient is not nervous/anxious.      Objective:     Vital Signs (Most Recent):  Temp: 97.5 °F (36.4 °C) (09/17/19 1910)  Pulse: 74 (09/17/19 1910)  Resp: 18 (09/17/19 1910)  BP: 131/88 (09/17/19 1910)  SpO2: 95 % (09/17/19 1910) Vital Signs (24h Range):  Temp:  [97.5 °F (36.4 °C)-98.9 °F (37.2 °C)] 97.5 °F (36.4 °C)  Pulse:  [61-74] 74  Resp:  [18-19] 18  SpO2:  [93 %-98 %] 95 %  BP: (117-140)/(68-90) 131/88     Weight: 130.6 kg (288 lb)  Body mass index is 39.06 kg/m².    Estimated Creatinine Clearance: 76.5 mL/min (based on SCr of 1.4 mg/dL).    Physical Exam    Significant Labs:   CBC:   Recent Labs   Lab 09/16/19  0540 09/17/19  0434   WBC 13.09* 13.08*   HGB 13.1* 13.7*   HCT 38.0* 42.6    247     CMP:   Recent Labs   Lab 09/16/19  0540 09/17/19  0434    140   K 5.0 5.0    102   CO2 24 27   * 123*   BUN 36* 36*   CREATININE 1.3 1.4   CALCIUM 9.1 9.3   PROT 5.9* 6.6   ALBUMIN 2.0* 2.3*   BILITOT 0.5 0.5   ALKPHOS 161* 178*   AST 55* 31   * 148*   ANIONGAP 10 11   EGFRNONAA 58.5* 53.5*     Microbiology Results (last 7 days)     Procedure  Component Value Units Date/Time    Blood culture [[id number]] Collected:  09/17/19 1045    Order Status:  Completed Specimen:  Blood Updated:  09/17/19 1915     Blood Culture, Routine No Growth to date    Blood culture [[id number]] Collected:  09/17/19 1045    Order Status:  Completed Specimen:  Blood Updated:  09/17/19 1915     Blood Culture, Routine No Growth to date    Blood culture [[id number]] Collected:  09/16/19 0830    Order Status:  Completed Specimen:  Blood Updated:  09/17/19 1156     Blood Culture, Routine Gram stain aer bottle: Gram positive cocci in clusters resembling Staph       Results called to and read back by:Lesley Handley RN 09/17/2019  11:55    Blood culture [[id number]]  (Abnormal) Collected:  09/15/19 0616    Order Status:  Completed Specimen:  Blood Updated:  09/17/19 1115     Blood Culture, Routine Gram stain aer bottle: Gram positive cocci in clusters resembling Staph       Results called to and read back by: Bhavik López RN 09/16/2019        01:23      STAPHYLOCOCCUS AUREUS  ID consult required at [facility].  For susceptibility see order #[id number]      Blood culture [[id number]]  (Abnormal) Collected:  09/15/19 0616    Order Status:  Completed Specimen:  Blood Updated:  09/17/19 0953     Blood Culture, Routine Gram stain aer bottle: Gram positive cocci in clusters resembling Staph       Results called to and read back by:Lesley Handley RN 09/16/2019  08:47      STAPHYLOCOCCUS AUREUS  ID consult required at [facility].  For susceptibility see order #[id number]      Blood culture [[id number]] Collected:  09/16/19 0826    Order Status:  Completed Specimen:  Blood Updated:  09/17/19 0606     Blood Culture, Routine Gram stain aer bottle: Gram positive cocci in clusters resembling Staph       Results called to and read back by: Bhavik López RN 09/17/2019        06:06    Blood culture #2 **CANNOT BE  ORDERED STAT** [[id number]]  (Abnormal) Collected:  09/13/19 1348    Order Status:  Completed Specimen:  Blood from Peripheral, Hand, Right Updated:  09/16/19 0954     Blood Culture, Routine Gram stain dinorah bottle: Gram positive cocci in clusters resembling Staph       Results called to and read back by: Porfirio Medina RN 09/14/2019  05:49      Gram stain aer bottle: Gram positive cocci in clusters resembling Staph      STAPHYLOCOCCUS AUREUS  ID consult required at [facility].  For susceptibility see order #[id number]      Blood culture [[id number]]  (Abnormal) Collected:  09/13/19 2036    Order Status:  Completed Specimen:  Blood Updated:  09/16/19 0953     Blood Culture, Routine Gram stain dinorah bottle: Gram positive cocci in clusters resembling Staph      Positive results previously called      Gram stain aer bottle: Gram positive cocci in clusters resembling Staph      STAPHYLOCOCCUS AUREUS  ID consult required at [facility].  For susceptibility see order #[id number]      Blood culture #1 **CANNOT BE ORDERED STAT** [[id number]]  (Abnormal)  (Susceptibility) Collected:  09/13/19 1348    Order Status:  Completed Specimen:  Blood from Peripheral, Antecubital, Left Updated:  09/16/19 0953     Blood Culture, Routine Gram stain dinorah bottle: Gram positive cocci in clusters resembling Staph       Results called to and read back by: Porfirio Medina RN 09/14/2019  05:49      Gram stain aer bottle: Gram positive cocci in clusters resembling Staph      STAPHYLOCOCCUS AUREUS  ID consult required at [facility].      Urine culture [[id number]]  (Abnormal)  (Susceptibility) Collected:  09/13/19 1349    Order Status:  Completed Specimen:  Urine Updated:  09/16/19 0849     Urine Culture, Routine STAPHYLOCOCCUS AUREUS  > 100,000 cfu/ml      Narrative:       GRAY AND YELLOW  Preferred Collection Type->Urine,  Clean Catch    Blood culture [[id number]]     Order Status:  Canceled Specimen:  Blood           Significant Imaging: I have reviewed all pertinent imaging results/findings within the past 24 hours.

## 2019-09-18 NOTE — HPI
Reason for VIVI: Endocarditis w/u    62 y/o male with a history of metastatic bronchial carcinoid tumor s/p resection of ALIRIO, chemotherapy and subsequent prednisone therapy.  He is admitted with staphylococcus aureus sepsis with the source likely being his left BKA stump.  Patient does not have a port and denies having any hardware in his body. TTE is negative.     Dysphagia or odynophagia:  No  Liver Disease, esophageal disease, or known varices:  Yes  Upper GI Bleeding: No  Snoring:  Yes  Sleep Apnea:  Yes  Prior neck surgery or radiation:  No  History of anesthetic difficulties:  No  Family history of anesthetic difficulties:  No  Last oral intake:  12 hours ago  Able to move neck in all directions:  Yes  Anticoagulation/Antiplatelets: None    Platelet count:   Lab Results  Component Value Date    09/17/2019    INR:  Lab Results  Component Value Date   INR 1.0 02/28/2019   INR 0.9 10/04/2018   INR 1.0 06/08/2017    Last TTE:   Normal left ventricular systolic function. The estimated ejection fraction is 60%  Normal LV diastolic function.  No wall motion abnormalities.  Normal right ventricular systolic function.  Mild tricuspid regurgitation.  The estimated PA systolic pressure is 17 mm Hg  Normal central venous pressure (3 mm Hg).  Small circumferential pericardial effusion.  Multiple ill-defined echodensities in the liver. Clinical correlation is required     Last VIVI: None on file.    Last EGD: None on file.
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
## 2019-09-18 NOTE — SUBJECTIVE & OBJECTIVE
Interval History: No acute overnight events. Patient to undergo VIVI tomorrow and US LE today.    Review of Systems   Constitutional: Positive for activity change and fatigue. Negative for chills.   Eyes: Negative for visual disturbance.   Respiratory: Negative for shortness of breath.    Cardiovascular: Negative for palpitations.   Gastrointestinal: Negative for nausea and vomiting.   Musculoskeletal: Positive for gait problem. Negative for back pain.   Skin: Positive for wound.   Neurological: Negative for headaches.   Psychiatric/Behavioral: Negative for agitation. The patient is not nervous/anxious.      Objective:     Vital Signs (Most Recent):  Temp: 97.5 °F (36.4 °C) (09/17/19 1910)  Pulse: 74 (09/17/19 1910)  Resp: 18 (09/17/19 1910)  BP: 131/88 (09/17/19 1910)  SpO2: 95 % (09/17/19 1910) Vital Signs (24h Range):  Temp:  [97.5 °F (36.4 °C)-98.9 °F (37.2 °C)] 97.5 °F (36.4 °C)  Pulse:  [61-74] 74  Resp:  [18-19] 18  SpO2:  [93 %-98 %] 95 %  BP: (117-140)/(68-90) 131/88     Weight: 130.6 kg (288 lb)  Body mass index is 39.06 kg/m².    Estimated Creatinine Clearance: 76.5 mL/min (based on SCr of 1.4 mg/dL).    Physical Exam    Significant Labs:   CBC:   Recent Labs   Lab 09/16/19  0540 09/17/19  0434   WBC 13.09* 13.08*   HGB 13.1* 13.7*   HCT 38.0* 42.6    247     CMP:   Recent Labs   Lab 09/16/19  0540 09/17/19  0434    140   K 5.0 5.0    102   CO2 24 27   * 123*   BUN 36* 36*   CREATININE 1.3 1.4   CALCIUM 9.1 9.3   PROT 5.9* 6.6   ALBUMIN 2.0* 2.3*   BILITOT 0.5 0.5   ALKPHOS 161* 178*   AST 55* 31   * 148*   ANIONGAP 10 11   EGFRNONAA 58.5* 53.5*     Microbiology Results (last 7 days)     Procedure Component Value Units Date/Time    Blood culture [[id number]] Collected:  09/17/19 1045    Order Status:  Completed Specimen:  Blood Updated:  09/17/19 1915     Blood Culture, Routine No Growth to date    Blood culture [[id number]] Collected:  09/17/19 1045    Order Status:   Completed Specimen:  Blood Updated:  09/17/19 1915     Blood Culture, Routine No Growth to date    Blood culture [[id number]] Collected:  09/16/19 0830    Order Status:  Completed Specimen:  Blood Updated:  09/17/19 1156     Blood Culture, Routine Gram stain aer bottle: Gram positive cocci in clusters resembling Staph       Results called to and read back by:Lesley Handley RN 09/17/2019  11:55    Blood culture [[id number]]  (Abnormal) Collected:  09/15/19 0616    Order Status:  Completed Specimen:  Blood Updated:  09/17/19 1115     Blood Culture, Routine Gram stain aer bottle: Gram positive cocci in clusters resembling Staph       Results called to and read back by: Bhavik López RN 09/16/2019        01:23      STAPHYLOCOCCUS AUREUS  ID consult required at [facility].  For susceptibility see order #[id number]      Blood culture [[id number]]  (Abnormal) Collected:  09/15/19 0616    Order Status:  Completed Specimen:  Blood Updated:  09/17/19 0953     Blood Culture, Routine Gram stain aer bottle: Gram positive cocci in clusters resembling Staph       Results called to and read back by:Lesley Handley RN 09/16/2019  08:47      STAPHYLOCOCCUS AUREUS  ID consult required at [facility].  For susceptibility see order #[id number]      Blood culture [[id number]] Collected:  09/16/19 0826    Order Status:  Completed Specimen:  Blood Updated:  09/17/19 0606     Blood Culture, Routine Gram stain aer bottle: Gram positive cocci in clusters resembling Staph       Results called to and read back by: Bhavik López RN 09/17/2019        06:06    Blood culture #2 **CANNOT BE ORDERED STAT** [[id number]]  (Abnormal) Collected:  09/13/19 1348    Order Status:  Completed Specimen:  Blood from Peripheral, Hand, Right Updated:  09/16/19 0954     Blood Culture, Routine Gram stain dinorah bottle: Gram positive cocci in clusters resembling Staph       Results  called to and read back by: Porfirio Medina RN 09/14/2019  05:49      Gram stain aer bottle: Gram positive cocci in clusters resembling Staph      STAPHYLOCOCCUS AUREUS  ID consult required at [facility].  For susceptibility see order #[id number]      Blood culture [[id number]]  (Abnormal) Collected:  09/13/19 2036    Order Status:  Completed Specimen:  Blood Updated:  09/16/19 0953     Blood Culture, Routine Gram stain dinorah bottle: Gram positive cocci in clusters resembling Staph      Positive results previously called      Gram stain aer bottle: Gram positive cocci in clusters resembling Staph      STAPHYLOCOCCUS AUREUS  ID consult required at [facility].  For susceptibility see order #[id number]      Blood culture #1 **CANNOT BE ORDERED STAT** [[id number]]  (Abnormal)  (Susceptibility) Collected:  09/13/19 1348    Order Status:  Completed Specimen:  Blood from Peripheral, Antecubital, Left Updated:  09/16/19 0953     Blood Culture, Routine Gram stain dinorah bottle: Gram positive cocci in clusters resembling Staph       Results called to and read back by: Porfirio Medina RN 09/14/2019  05:49      Gram stain aer bottle: Gram positive cocci in clusters resembling Staph      STAPHYLOCOCCUS AUREUS  ID consult required at [facility].      Urine culture [[id number]]  (Abnormal)  (Susceptibility) Collected:  09/13/19 1349    Order Status:  Completed Specimen:  Urine Updated:  09/16/19 0849     Urine Culture, Routine STAPHYLOCOCCUS AUREUS  > 100,000 cfu/ml      Narrative:       GRAY AND YELLOW  Preferred Collection Type->Urine, Clean Catch    Blood culture [[id number]]     Order Status:  Canceled Specimen:  Blood           Significant Imaging: I have reviewed all pertinent imaging results/findings within the past 24 hours.

## 2019-09-18 NOTE — CARE UPDATE
Discussed anesthesia consent w/ patient and family present at bedside. Following a long discussion, patient and family refused to consent for L AKA scheduled for 9/19/19. Per family, there were ongoing discussions w/ surgical team about postponing the procedure until a later date in order to consider additional treatment options.      Purvi Pedroza MD  Anesthesiology, PGY-2

## 2019-09-18 NOTE — ASSESSMENT & PLAN NOTE
-  Presented with generalized weakness (worst in RLE), fatigue, chills, and diaphoresis  -  Blood and urine cultures +staph aureus  -  ID following-->  IV vancomycin changed to continuous Ancef--> VIVI pending   -  Management per primary team--> source likely L AKA stump- US showed fluid collection- Ortho consult pending

## 2019-09-18 NOTE — ASSESSMENT & PLAN NOTE
1. VIVI for evaluation of endocarditis  -No absolute contraindications of esophageal stricture, tumor, perforation, laceration,or diverticulum and/or active GI bleed  -The risks, benefits & alternatives of the procedure were explained to the patient.   -The risks of transesophageal echo include but are not limited to:  Dental trauma, esophageal trauma/perforation, bleeding, laryngospasm/brochospasm, aspiration, sore throat/hoarseness, & dislodgement of the endotracheal tube/nasogastric tube (where applicable).    -The risks of moderate sedation include hypotension, respiratory depression, arrhythmias, bronchospasm, & death.    -Informed consent was obtained. The patient is agreeable to proceed with the procedure and all questions and concerns addressed.    Case discussed with an attending in echocardiography lab.     Further recommendations per attending addendum

## 2019-09-18 NOTE — ASSESSMENT & PLAN NOTE
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  He is admitted with staphylococcus aureus sepsis with the source likely being his left BKA stump.  Patient does not have a port and denies having any hardware in his body.  TTE & VIVI both negative for vegetations. US of AKA stump demonstrated .4 x 1.2 x 2.4 cm complex fluid collection with associated packing material which may represent abscess    Recommendations:  Ortho following for irrigation and debridement of left amputation stump with stump revision tentative for tomorrow pending patient and family decision  C/w cefazolin for MSSA  Continue to monitor blood cultures (would like 2 sets of negative blood cultures on 2 separate days to ensure clearance)  Given patient has been on high dose steroids and will likely be on this for a prolonged period of time, this will place him at risk of PCP therefore recommend starting bactrim for PCP prophylaxis. Given his renal function & MAGDI recommend bactrim 1 tablet SS daily.

## 2019-09-18 NOTE — SUBJECTIVE & OBJECTIVE
Interval History:  No acute events overnight. Pt to go for VIVI today.    Review of Systems   Constitutional: Positive for activity change, chills, diaphoresis, fatigue and fever. Negative for appetite change.   HENT: Negative for congestion, postnasal drip and sore throat.    Eyes: Positive for visual disturbance.   Respiratory: Negative for cough, chest tightness and shortness of breath.    Cardiovascular: Negative for chest pain, palpitations and leg swelling.   Gastrointestinal: Positive for nausea. Negative for abdominal pain, constipation, diarrhea and vomiting.   Endocrine: Negative for heat intolerance.   Genitourinary: Negative for difficulty urinating and dysuria.   Musculoskeletal: Negative for arthralgias, back pain and myalgias.   Neurological: Positive for weakness (improved) and light-headedness. Negative for numbness and headaches.     Objective:     Vital Signs (Most Recent):  Temp: 97.9 °F (36.6 °C) (09/18/19 1024)  Pulse: 60 (09/18/19 1024)  Resp: 16 (09/18/19 1024)  BP: 139/78 (09/18/19 1024)  SpO2: 95 % (09/18/19 1024) Vital Signs (24h Range):  Temp:  [97.5 °F (36.4 °C)-98.3 °F (36.8 °C)] 97.9 °F (36.6 °C)  Pulse:  [55-74] 60  Resp:  [16-20] 16  SpO2:  [93 %-98 %] 95 %  BP: (122-143)/(74-88) 139/78     Weight: 130.6 kg (288 lb)  Body mass index is 39.06 kg/m².    Intake/Output Summary (Last 24 hours) at 9/18/2019 1106  Last data filed at 9/17/2019 1557  Gross per 24 hour   Intake 900 ml   Output --   Net 900 ml      Physical Exam   Constitutional: He is oriented to person, place, and time. He appears well-developed and well-nourished. He does not appear ill. No distress.   Obese   HENT:   Head: Normocephalic and atraumatic.   Eyes: No scleral icterus.   Neck: Normal range of motion. Neck supple.   Cardiovascular: Normal rate, regular rhythm and intact distal pulses. Exam reveals no gallop and no friction rub.   No murmur heard.  Pulmonary/Chest: Effort normal and breath sounds normal. No  respiratory distress.   Abdominal: Soft. He exhibits distension. There is no tenderness. There is no guarding.   Musculoskeletal:   b/l UE 5/5 muscle strength  RLE: 3-4/5 muscle strength. Able to move his R foot and bend his right knee  LLE stump redness improving, some scabbing at the base of the stump   Neurological: He is alert and oriented to person, place, and time.   Skin: Skin is warm.   Nursing note and vitals reviewed.      Significant Labs:   CBC:   Recent Labs   Lab 09/17/19 0434 09/18/19  0512   WBC 13.08* 12.77*   HGB 13.7* 12.6*   HCT 42.6 37.7*    266     CMP:   Recent Labs   Lab 09/17/19 0434 09/18/19  0512    138   K 5.0 4.4    101   CO2 27 26   * 134*   BUN 36* 34*   CREATININE 1.4 1.4   CALCIUM 9.3 8.5*   PROT 6.6 6.1   ALBUMIN 2.3* 2.2*   BILITOT 0.5 0.4   ALKPHOS 178* 157*   AST 31 22   * 94*   ANIONGAP 11 11   EGFRNONAA 53.5* 53.5*       Significant Imaging: I have reviewed all pertinent imaging results/findings within the past 24 hours.

## 2019-09-18 NOTE — PLAN OF CARE
Problem: Physical Therapy Goal  Goal: Physical Therapy Goal  Goals to be met by: 2019     Patient will increase functional independence with mobility by performin. Supine to sit with Stand-by Assistance   2. Sit to supine with Stand-by Assistance  3. Sit to stand transfer with Contact Guard Assistance  4. Bed to chair transfer with Contact Guard Assistance  5. Gait  x 10 feet with Contact Guard Assistance using crutches  6. Wheelchair propulsion x100 feet with Supervision using bilateral upper extremities  7. Ascend/descend 2 stair with Handrail Contact Guard Assistance  8. Lower extremity exercise program x15 reps per handout, with supervision     Outcome: Ongoing (interventions implemented as appropriate)  Progressing towards goals    Salvador Vincent PT,DPT  2019

## 2019-09-18 NOTE — TRANSFER OF CARE
Anesthesia Transfer of Care Note    Patient: Elroy Rahman    Procedure(s) Performed: Procedure(s) (LRB):  ECHOCARDIOGRAM, TRANSESOPHAGEAL (N/A)    Patient location: [facility]    Anesthesia Type: general    Transport from OR: Transported from OR on 2-3 L/min O2 by NC with adequate spontaneous ventilation    Post pain: adequate analgesia    Post assessment: tolerated procedure well and no apparent anesthetic complications    Post vital signs: stable    Level of consciousness: awake and alert    Nausea/Vomiting: no nausea/vomiting    Complications: none    Transfer of care protocol was followed      Last vitals:   Visit Vitals  /80   Pulse 67   Temp 36.5 °C (97.7 °F) (Skin)   Resp 16   Ht 6' (1.829 m)   Wt 130.6 kg (288 lb)   SpO2 99%   BMI 39.06 kg/m²

## 2019-09-18 NOTE — CONSULTS
"[facility]  Cardiology  Consult Note    Patient Name: Elroy Rahman  MRN: [id number]  Admission Date: 9/13/2019  Hospital Length of Stay: 4 days  Code Status: Full Code   Attending Provider: Emil Day MD   Consulting Provider: Zaid Mcgovern MD  Primary Care Physician: Wayne Catalan PA-C  Principal Problem:Sepsis due to methicillin susceptible Staphylococcus aureus    Patient information was obtained from patient, past medical records and ER records.    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  He is admitted with staphylococcus aureus sepsis with the source likely being his left BKA stump.  Patient does not have a port and denies having any hardware in his body. TTE is negative.     He has a "cracked tooth" on the posterior aspect of the right side of the jaw.    Dysphagia or odynophagia:  No  Liver Disease, esophageal disease, or known varices:  Yes  Upper GI Bleeding: No  Snoring:  Yes  Sleep Apnea:  Yes  Prior neck surgery or radiation:  No  History of anesthetic difficulties:  No  Family history of anesthetic difficulties:  No  Last oral intake:  12 hours ago  Able to move neck in all directions:  Yes  Anticoagulation/Antiplatelets: None    Platelet count:   Lab Results  Component Value Date    09/17/2019    INR:  Lab Results  Component Value Date   INR 1.0 02/28/2019   INR 0.9 10/04/2018   INR 1.0 06/08/2017    Last TTE:   Normal left ventricular systolic function. The estimated ejection fraction is 60%  Normal LV diastolic function.  No wall motion abnormalities.  Normal right ventricular systolic function.  Mild tricuspid regurgitation.  The estimated PA systolic pressure is 17 mm Hg  Normal central venous pressure (3 mm Hg).  Small circumferential pericardial " effusion.  Multiple ill-defined echodensities in the liver. Clinical correlation is required     Last VIVI: None on file.    Last EGD: None on file.    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    Bka Left     HERNIA REPAIR      LEFT UPPER LOBECTOMY-LUNG Left 1/28/2016    Performed by Pan Bell MD at [facility] OR [facility]    REPAIR, HERNIA, UMBILICAL, INCARCERATED, LAPAROSCOPIC N/A 11/27/2018    Performed [facility] at [facility] OR    THORACOTOMY W/LUNG RESECTION Left 1/28/2016    Performed by Pan Bell MD at [facility] OR [address] [place] [address]       Review of patient's allergies indicates:   Allergen Reactions    Epinephrine Other (See Comments)     Carcinoid crisis       No current facility-administered medications on file prior to encounter.      Current Outpatient Medications on File Prior to Encounter   Medication Sig    ALPHAGAN P 0.1 % Drop INT 1 GTT IN OS BID    lanreotide (SOMATULINE DEPOT) 120 mg/0.5 mL Syrg Inject 120 mg into the skin every 28 days.    ondansetron (ZOFRAN) 4 MG tablet Take 1 tablet (4 mg total) by mouth 2 (two) times daily.    ondansetron (ZOFRAN) 4 MG tablet Take 1 tablet (4 mg total) by mouth 2 (two) times daily.    predniSONE (DELTASONE) 20 MG tablet Take 2 tablets  (40 mg total) by mouth once daily. Take with 50 mg tabs.  Total of 140 mg daily    predniSONE (DELTASONE) 50 MG Tab Take 2 tablets (100 mg total) by mouth once daily. Take with 20 mg tablets.  Total of 140 daily.    PROLENSA 0.07 % Drop INSTILL 1 DROP IN OS BID     Family History     Problem Relation (Age of Onset)    Cancer Mother, Sister        Tobacco Use    Smoking status: Never Smoker    Smokeless tobacco: Never Used   Substance and Sexual Activity    Alcohol use: Yes     Alcohol/week: 0.6 oz     Types: 1 Cans of beer per week     Comment: rare    Drug use: No    Sexual activity: Not on file     Review of Systems   Constitution: Negative for chills and decreased appetite.   HENT: Negative for congestion, ear discharge and ear pain.    Eyes: Negative for blurred vision and discharge.   Cardiovascular: Negative for chest pain, claudication, cyanosis and dyspnea on exertion.   Respiratory: Negative for cough and hemoptysis.    Endocrine: Negative for cold intolerance and heat intolerance.   Skin: Negative for color change and nail changes.   Musculoskeletal: Negative for arthritis and back pain.   Gastrointestinal: Negative for bloating and abdominal pain.   Genitourinary: Negative for bladder incontinence and decreased libido.   Neurological: Negative for aphonia and brief paralysis.     Objective:     Vital Signs (Most Recent):  Temp: 97.9 °F (36.6 °C) (09/18/19 1024)  Pulse: 70 (09/18/19 1110)  Resp: 20 (09/18/19 1110)  BP: 115/74 (09/18/19 1110)  SpO2: 98 % (09/18/19 1110) Vital Signs (24h Range):  Temp:  [97.5 °F (36.4 °C)-98.3 °F (36.8 °C)] 97.9 °F (36.6 °C)  Pulse:  [55-74] 70  Resp:  [16-20] 20  SpO2:  [93 %-98 %] 98 %  BP: (115-143)/(74-88) 115/74     Weight: 130.6 kg (288 lb)  Body mass index is 39.06 kg/m².    SpO2: 98 %  O2 Device (Oxygen Therapy): room air      Intake/Output Summary (Last 24 hours) at 9/18/2019 1407  Last data filed at 9/17/2019 1557  Gross per 24 hour   Intake 900 ml   Output  --   Net 900 ml       Lines/Drains/Airways     Peripheral Intravenous Line                 Peripheral IV - Single Lumen 09/16/19 1435 22 G Anterior;Right Forearm 1 day                Physical Exam   Constitutional: He is oriented to person, place, and time. He appears well-developed and well-nourished.   HENT:   Head: Normocephalic and atraumatic.   Nose: Nose normal.   Mouth/Throat: No oropharyngeal exudate. Poor denture. Cracked tooth in the posterior aspect of the right side of the jaw.  Eyes: Right eye exhibits no discharge. Left eye exhibits no discharge. No scleral icterus.   Neck: Normal range of motion. Neck supple. No JVD present.   Cardiovascular: Normal rate, regular rhythm, S1 normal and S2 normal. Exam reveals no gallop, no S3, no S4, no distant heart sounds, no friction rub, no midsystolic click and no opening snap.   No murmur heard.  Pulses:       Radial pulses are 2+ on the right side, and 2+ on the left side.        Femoral pulses are 2+ on the right side, and 2+ on the left side.  Pulmonary/Chest: Effort normal and breath sounds normal. No respiratory distress. He has no wheezes. He has no rales. He exhibits no tenderness.   Abdominal: Soft. Bowel sounds are normal. He exhibits no distension. There is no tenderness. There is no rebound.   Musculoskeletal: Normal range of motion. He exhibits no edema, tenderness or deformity.   Lymphadenopathy:     He has no cervical adenopathy.   Neurological: He is alert and oriented to person, place, and time. No cranial nerve deficit.   Skin: Skin is warm and dry.   Psychiatric: He has a normal mood and affect. His behavior is normal.       Significant Labs: All pertinent lab results from the last 24 hours have been reviewed.    Significant Imaging: All pertinent images from the last 24h have been reviewed.      Assessment and Plan:     * Sepsis due to methicillin susceptible Staphylococcus aureus  1. VIVI for evaluation of endocarditis  -No absolute  contraindications of esophageal stricture, tumor, perforation, laceration,or diverticulum and/or active GI bleed  -The risks, benefits & alternatives of the procedure were explained to the patient.   -The risks of transesophageal echo include but are not limited to:  Dental trauma, esophageal trauma/perforation, bleeding, laryngospasm/brochospasm, aspiration, sore throat/hoarseness, & dislodgement of the endotracheal tube/nasogastric tube (where applicable).    -The risks of moderate sedation include hypotension, respiratory depression, arrhythmias, bronchospasm, & death.    -Informed consent was obtained. The patient is agreeable to proceed with the procedure and all questions and concerns addressed.    Case discussed with an attending in echocardiography lab.     Further recommendations per attending addendum          VTE Risk Mitigation (From admission, onward)        Ordered     enoxaparin injection 40 mg  Daily      09/16/19 0857     Reason for no Mechanical VTE Prophylaxis  Once      09/13/19 1933     IP VTE HIGH RISK PATIENT  Once      09/13/19 1933     Place sequential compression device  Until discontinued      09/13/19 1641          Zaid Mcgovern MD  Cardiology   [facility]

## 2019-09-18 NOTE — NURSING TRANSFER
Nursing Transfer Note      9/18/2019     Transfer 7093 from phase 2    Transfer via stretcher    Transfer with iv pump    Transported by [facility]    Medicines sent: none    Chart send with patient: yes    Notified: Shadi floor nurse    Patient reassessed at:     Upon arrival to floor:
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
## 2019-09-18 NOTE — PROGRESS NOTES
[facility]  Physical Medicine & Rehab  Progress Note    Patient Name: Elroy Rahman  MRN: [id number]  Admission Date: 9/13/2019  Length of Stay: 4 days  Attending Physician: Emil Day MD    Subjective:     Principal Problem:Sepsis due to methicillin susceptible Staphylococcus aureus    Hospital Course:   9/15/19:  Evaluated by PT and OT.  Bed mobility mod-maxA.  Sit to stand maxA and transfers max-totalA.  LBD totalA.  Toileting maxA.    9/16/19:  No PT or OT.  9/17/19:  Participated with PT and OT.  Bed mobility SBA.  Sit to stand and transfers CGA with RW.  2-3 steps/hops CGA with RW.      Interval History 9/18/2019:  Patient is seen for follow-up rehab evaluation and recommendations: US showed small complex fluid collection about L knee stump.  Ortho consulted.  VIVI today.  Participating with therapy.     HPI, Past Medical, Family, and Social History remains the same as documented in the initial encounter.    Scheduled Medications:    brimonidine 0.15 % OPTH DROP  1 drop Left Eye BID    ceFAZolin(ANCEF) in D5W 500 mL CONTINUOUS INFUSION  6 g Intravenous Q24H    enoxaparin  40 mg Subcutaneous Daily    famotidine  20 mg Oral BID    ketorolac 0.5%  1 drop Left Eye QID    lactated ringers  1,000 mL Intravenous Once    predniSONE  110 mg Oral Daily     PRN Medications: acetaminophen, albuterol-ipratropium, Dextrose 10% Bolus, Dextrose 10% Bolus, glucagon (human recombinant), glucose, glucose, HYDROcodone-acetaminophen, insulin aspart U-100, ondansetron, sodium chloride 0.9%    Review of Systems   Constitutional: Positive for activity change and fatigue. Negative for chills.   HENT: Negative for trouble swallowing and voice change.    Eyes: Negative for pain and visual disturbance.   Respiratory: Negative for cough and shortness of breath.    Cardiovascular: Negative for chest pain and palpitations.   Gastrointestinal: Negative for nausea and vomiting.   Genitourinary: Negative for  difficulty urinating and flank pain.   Musculoskeletal: Positive for gait problem. Negative for back pain and neck pain.   Skin: Positive for color change and wound.   Neurological: Negative for numbness and headaches.   Psychiatric/Behavioral: Negative for agitation. The patient is not nervous/anxious.      Objective:     Vital Signs (Most Recent):  Temp: 97.9 °F (36.6 °C) (09/18/19 1024)  Pulse: 70 (09/18/19 1110)  Resp: 20 (09/18/19 1110)  BP: 115/74 (09/18/19 1110)  SpO2: 98 % (09/18/19 1110)    Vital Signs (24h Range):  Temp:  [97.5 °F (36.4 °C)-98.3 °F (36.8 °C)] 97.9 °F (36.6 °C)  Pulse:  [55-74] 70  Resp:  [16-20] 20  SpO2:  [93 %-98 %] 98 %  BP: (115-143)/(74-88) 115/74     Physical Exam   Constitutional: He is oriented to person, place, and time. He appears well-developed and well-nourished. No distress.   HENT:   Head: Normocephalic and atraumatic.   Right Ear: External ear normal.   Left Ear: External ear normal.   Nose: Nose normal.   Eyes: Right eye exhibits no discharge. Left eye exhibits no discharge. No scleral icterus.   Neck: Normal range of motion.   Cardiovascular: Normal rate and intact distal pulses.   Pulmonary/Chest: Effort normal. No respiratory distress.   Abdominal: Soft. He exhibits no distension. There is no tenderness.   Musculoskeletal: Normal range of motion. He exhibits edema and deformity. He exhibits no tenderness.   L AKA stump with dressing CDI   Neurological: He is alert and oriented to person, place, and time. No sensory deficit.   Skin: Skin is warm and dry.   Psychiatric: He has a normal mood and affect. His behavior is normal.   Vitals reviewed.    Diagnostic Results:  Labs: Reviewed  X-Ray: Reviewed  MRI: Reviewed  US: Reviewed    Assessment/Plan:      * Sepsis due to methicillin susceptible Staphylococcus aureus  -  Presented with generalized weakness (worst in RLE), fatigue, chills, and diaphoresis  -  Blood and urine cultures +staph aureus  -  ID following-->  IV  vancomycin changed to continuous Ancef--> VIVI pending   -  Management per primary team--> source likely L AKA stump- US showed fluid collection- Ortho consult pending     Acute muscle weakness  -  MRI T/L spine showed diffuse metastatic disease without spinal canal or NF stenosis  -  Management per primary team--> most likely 2/2 bacteremia (DDx steroid induced myopathy)     Carcinoid bronchial adenoma of left lung  -  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 Dhillon for replacement prosthesis   -  Related to acute illness with associated weakness   See hospital course for functional status.    Recommendations  -  Encourage mobility, OOB in chair, and early ambulation as appropriate  -  PT/OT evaluate and treat  -  Pain management  -  DVT prophylaxis  -  Monitor for and prevent skin breakdown and pressure ulcers  · Early mobility, repositioning/weight shifting every 20-30 minutes when sitting, turn patient every 2 hours, proper mattress/overlay and chair cushioning, pressure relief/heel protector boots     Recommended IRF; however, not ready for discharge at this time.  Continue PT and OT.  Will follow for final post-acute recommendation once patient is medically stable and nearing discharge.     TAMELA Arias  Department of Physical Medicine & Rehab   [facility]

## 2019-09-18 NOTE — ASSESSMENT & PLAN NOTE
Pt with diaphoresis and diffuse muscle weakness leading to admission associated with fevers while in the hospital. BCx and UCx positive for staph aureus.     Plan  - Continue cefazolin   - ID consulted: following up recs  - blood cultures daily until clears.  - VIVI 09/18 to evaluate for small vegitation  - U/S of left extremity: Small complex fluid collection 2.4 x 1.2 x 2.4   - Orthopedic surgery consulted: f/u recs

## 2019-09-18 NOTE — SUBJECTIVE & OBJECTIVE
Interval History 9/18/2019:  Patient is seen for follow-up rehab evaluation and recommendations: US showed small complex fluid collection about L knee stump.  Ortho consulted.  VIVI today.  Participating with therapy.     HPI, Past Medical, Family, and Social History remains the same as documented in the initial encounter.    Scheduled Medications:    brimonidine 0.15 % OPTH DROP  1 drop Left Eye BID    ceFAZolin(ANCEF) in D5W 500 mL CONTINUOUS INFUSION  6 g Intravenous Q24H    enoxaparin  40 mg Subcutaneous Daily    famotidine  20 mg Oral BID    ketorolac 0.5%  1 drop Left Eye QID    lactated ringers  1,000 mL Intravenous Once    predniSONE  110 mg Oral Daily     PRN Medications: acetaminophen, albuterol-ipratropium, Dextrose 10% Bolus, Dextrose 10% Bolus, glucagon (human recombinant), glucose, glucose, HYDROcodone-acetaminophen, insulin aspart U-100, ondansetron, sodium chloride 0.9%    Review of Systems   Constitutional: Positive for activity change and fatigue. Negative for chills.   HENT: Negative for trouble swallowing and voice change.    Eyes: Negative for pain and visual disturbance.   Respiratory: Negative for cough and shortness of breath.    Cardiovascular: Negative for chest pain and palpitations.   Gastrointestinal: Negative for nausea and vomiting.   Genitourinary: Negative for difficulty urinating and flank pain.   Musculoskeletal: Positive for gait problem. Negative for back pain and neck pain.   Skin: Positive for color change and wound.   Neurological: Negative for numbness and headaches.   Psychiatric/Behavioral: Negative for agitation. The patient is not nervous/anxious.      Objective:     Vital Signs (Most Recent):  Temp: 97.9 °F (36.6 °C) (09/18/19 1024)  Pulse: 70 (09/18/19 1110)  Resp: 20 (09/18/19 1110)  BP: 115/74 (09/18/19 1110)  SpO2: 98 % (09/18/19 1110)    Vital Signs (24h Range):  Temp:  [97.5 °F (36.4 °C)-98.3 °F (36.8 °C)] 97.9 °F (36.6 °C)  Pulse:  [55-74] 70  Resp:  [16-20]  20  SpO2:  [93 %-98 %] 98 %  BP: (115-143)/(74-88) 115/74     Physical Exam   Constitutional: He is oriented to person, place, and time. He appears well-developed and well-nourished. No distress.   HENT:   Head: Normocephalic and atraumatic.   Right Ear: External ear normal.   Left Ear: External ear normal.   Nose: Nose normal.   Eyes: Right eye exhibits no discharge. Left eye exhibits no discharge. No scleral icterus.   Neck: Normal range of motion.   Cardiovascular: Normal rate and intact distal pulses.   Pulmonary/Chest: Effort normal. No respiratory distress.   Abdominal: Soft. He exhibits no distension. There is no tenderness.   Musculoskeletal: Normal range of motion. He exhibits edema and deformity. He exhibits no tenderness.   L AKA stump with dressing CDI   Neurological: He is alert and oriented to person, place, and time. No sensory deficit.   Skin: Skin is warm and dry.   Psychiatric: He has a normal mood and affect. His behavior is normal.   Vitals reviewed.    Diagnostic Results:  Labs: Reviewed  X-Ray: Reviewed  MRI: Reviewed  US: Reviewed    NEUROLOGICAL EXAMINATION:     MENTAL STATUS   Oriented to person, place, and time.

## 2019-09-18 NOTE — PROGRESS NOTES
[facility]  Infectious Disease  Progress Note    Patient Name: Elroy Rahman  MRN: [id number]  Admission Date: 9/13/2019  Length of Stay: 4 days  Attending Physician: Emil Day MD  Primary Care Provider: Wayne Catalan PA-C    Isolation Status: No active isolations  Assessment/Plan:      * Sepsis due to methicillin susceptible Staphylococcus aureus  64 y/o male with a history of metastatic bronchial carcinoid tumor s/p resection of ALIRIO, chemotherapy and subsequent prednisone therapy.  He is admitted with staphylococcus aureus sepsis with the source likely being his left BKA stump.  Patient does not have a port and denies having any hardware in his body.  TTE & VIVI both negative for vegetations. US of AKA stump demonstrated .4 x 1.2 x 2.4 cm complex fluid collection with associated packing material which may represent abscess    Recommendations:  Ortho following for irrigation and debridement of left amputation stump with stump revision tentative for tomorrow pending patient and family decision  C/w cefazolin for MSSA  Continue to monitor blood cultures (would like 2 sets of negative blood cultures on 2 separate days to ensure clearance)  Given patient has been on high dose steroids and will likely be on this for a prolonged period of time, this will place him at risk of PCP therefore recommend starting bactrim for PCP prophylaxis. Given his renal function & MAGDI recommend bactrim 1 tablet SS daily.        Thank you for your consult. I will follow-up with patient. Please contact us if you have any additional questions.    Brooks Lozada MD  Infectious Disease  [facility]    Subjective:     Principal Problem:Sepsis due to methicillin susceptible Staphylococcus aureus    HPI: 64 y/o male with a history of metastatic bronchial carcinoid tumor s/p left upper lobectomy, chemotherapy (last dose in July per patient) followed by prednisone for which he is still on.  He  also has a history of a left BKA and utilizes a prosthesis.  Per the patient, he does a poor job of keeping the prosthesis clean.  His wife reports multiple occasions of noticing a foul smelling odor and weaping from the stump.  His wife states that about 3 days prior to admission, she noticed her  looked very tired and fatigued.  She also noticed redness and discoloration to the stump and there was an odor to it.  The patient continued to feel worse until his wife convinced him to come to the hospital.  On admission, he was found to be febrile and with an elevated WBC count.  Blood and urine cultures were obtained and are positive for staphylococcus aureus.  A TTE was negative for vegetations.    Interval History: No acute overnight events. US demonstrated fluid collection near stump. Ortho consulted for debridement. Family wanting to discuss with each other prior to surgery.    Review of Systems   Constitutional: Positive for activity change and fatigue. Negative for chills.   Eyes: Negative for visual disturbance.   Respiratory: Negative for shortness of breath.    Cardiovascular: Negative for palpitations.   Gastrointestinal: Negative for nausea and vomiting.   Musculoskeletal: Positive for gait problem. Negative for back pain.   Skin: Positive for wound.   Neurological: Negative for headaches.   Psychiatric/Behavioral: Negative for agitation. The patient is not nervous/anxious.      Objective:     Vital Signs (Most Recent):  Temp: 97.4 °F (36.3 °C) (09/18/19 1540)  Pulse: 72 (09/18/19 1646)  Resp: 20 (09/18/19 1646)  BP: 128/78 (09/18/19 1646)  SpO2: (!) 94 % (09/18/19 1646) Vital Signs (24h Range):  Temp:  [97.4 °F (36.3 °C)-98.3 °F (36.8 °C)] 97.4 °F (36.3 °C)  Pulse:  [55-74] 72  Resp:  [16-20] 20  SpO2:  [94 %-99 %] 94 %  BP: (115-143)/(74-88) 128/78     Weight: 130.6 kg (288 lb)  Body mass index is 39.06 kg/m².    Estimated Creatinine Clearance: 75.5 mL/min (based on SCr of 1.4 mg/dL).    Physical  Exam   Constitutional: He is oriented to person, place, and time. He appears well-developed and well-nourished. No distress.   HENT:   Head: Normocephalic and atraumatic.   Right Ear: External ear normal.   Left Ear: External ear normal.   Nose: Nose normal.   Eyes: Right eye exhibits no discharge. Left eye exhibits no discharge. No scleral icterus.   Neck: Normal range of motion.   Cardiovascular: Normal rate and intact distal pulses.   Pulmonary/Chest: Effort normal. No respiratory distress.   Abdominal: Soft. He exhibits no distension. There is no tenderness.   Musculoskeletal: Normal range of motion. He exhibits edema and deformity. He exhibits no tenderness (mild at stump site).   L AKA stump with dressing CDI   Neurological: He is alert and oriented to person, place, and time. No sensory deficit.   Skin: Skin is warm and dry.   Psychiatric: He has a normal mood and affect. His behavior is normal.   Vitals reviewed.      Significant Labs:   Blood Culture:   Recent Labs   Lab 09/16/19  0826 09/16/19  0830 09/17/19  1045 09/18/19  0511 09/18/19  0512   LABBLOO Gram stain aer bottle: Gram positive cocci in clusters resembling Staph   Results called to and read back by: Bhavik López RN 09/17/2019    06:06  STAPHYLOCOCCUS AUREUS  Susceptibility pending  ID consult required at [facility] and [facility].  * Gram stain aer bottle: Gram positive cocci in clusters resembling Staph   Results called to and read back by:Lesley Handley RN 09/17/2019  11:55  STAPHYLOCOCCUS AUREUS  ID consult required at [facility] and [facility].  For susceptibility see order #[id number]  * No Growth to date  No Growth to date  No Growth to date  No Growth to date No Growth to date No Growth to date     CBC:   Recent Labs   Lab 09/17/19  0434 09/18/19  0512   WBC 13.08* 12.77*   HGB 13.7* 12.6*   HCT 42.6 37.7*    266     CMP:   Recent Labs   Lab 09/17/19  0434  09/18/19  0512    138   K 5.0 4.4    101   CO2 27 26   * 134*   BUN 36* 34*   CREATININE 1.4 1.4   CALCIUM 9.3 8.5*   PROT 6.6 6.1   ALBUMIN 2.3* 2.2*   BILITOT 0.5 0.4   ALKPHOS 178* 157*   AST 31 22   * 94*   ANIONGAP 11 11   EGFRNONAA 53.5* 53.5*     Microbiology Results (last 7 days)     Procedure Component Value Units Date/Time    Blood culture [[id number]] Collected:  09/18/19 0512    Order Status:  Completed Specimen:  Blood Updated:  09/18/19 1315     Blood Culture, Routine No Growth to date    Blood culture [[id number]] Collected:  09/18/19 0511    Order Status:  Completed Specimen:  Blood Updated:  09/18/19 1315     Blood Culture, Routine No Growth to date    Blood culture [[id number]] Collected:  09/17/19 1045    Order Status:  Completed Specimen:  Blood Updated:  09/18/19 1212     Blood Culture, Routine No Growth to date      No Growth to date    Blood culture [[id number]] Collected:  09/17/19 1045    Order Status:  Completed Specimen:  Blood Updated:  09/18/19 1212     Blood Culture, Routine No Growth to date      No Growth to date    Blood culture [[id number]]  (Abnormal) Collected:  09/15/19 0616    Order Status:  Completed Specimen:  Blood Updated:  09/18/19 0949     Blood Culture, Routine Gram stain aer bottle: Gram positive cocci in clusters resembling Staph       Results called to and read back by:Lesley Handley RN 09/16/2019  08:47      STAPHYLOCOCCUS AUREUS  ID consult required at [facility]   locations.  For susceptibility see order #[id number]      Blood culture [[id number]]  (Abnormal) Collected:  09/16/19 0830    Order Status:  Completed Specimen:  Blood Updated:  09/18/19 0921     Blood Culture, Routine Gram stain aer bottle: Gram positive cocci in clusters resembling Staph       Results called to and read back by:Lesley Handley RN 09/17/2019  11:55      STAPHYLOCOCCUS AUREUS  ID consult required at [facility]    [facility].  For susceptibility see order #[id number]      Blood culture [[id number]]  (Abnormal) Collected:  09/16/19 0826    Order Status:  Completed Specimen:  Blood Updated:  09/18/19 0920     Blood Culture, Routine Gram stain aer bottle: Gram positive cocci in clusters resembling Staph       Results called to and read back by: Bhavik López RN 09/17/2019        06:06      STAPHYLOCOCCUS AUREUS  Susceptibility pending  ID consult required at [facility].      Blood culture [[id number]]  (Abnormal) Collected:  09/15/19 0616    Order Status:  Completed Specimen:  Blood Updated:  09/17/19 1115     Blood Culture, Routine Gram stain aer bottle: Gram positive cocci in clusters resembling Staph       Results called to and read back by: Bhavik López RN 09/16/2019        01:23      STAPHYLOCOCCUS AUREUS  ID consult required at [facility].  For susceptibility see order #[id number]      Blood culture #2 **CANNOT BE ORDERED STAT** [[id number]]  (Abnormal) Collected:  09/13/19 1348    Order Status:  Completed Specimen:  Blood from Peripheral, Hand, Right Updated:  09/16/19 0954     Blood Culture, Routine Gram stain dinorah bottle: Gram positive cocci in clusters resembling Staph       Results called to and read back by: Porfirio Medina RN 09/14/2019  05:49      Gram stain aer bottle: Gram positive cocci in clusters resembling Staph      STAPHYLOCOCCUS AUREUS  ID consult required at [facility].  For susceptibility see order #[id number]      Blood culture [[id number]]  (Abnormal) Collected:  09/13/19 2036    Order Status:  Completed Specimen:  Blood Updated:  09/16/19 0953     Blood Culture, Routine Gram stain dinorah bottle: Gram positive cocci in clusters resembling Staph      Positive results previously called      Gram stain aer bottle: Gram positive cocci in clusters resembling Staph       STAPHYLOCOCCUS AUREUS  ID consult required at [facility].  For susceptibility see order #[id number]      Blood culture #1 **CANNOT BE ORDERED STAT** [[id number]]  (Abnormal)  (Susceptibility) Collected:  09/13/19 1348    Order Status:  Completed Specimen:  Blood from Peripheral, Antecubital, Left Updated:  09/16/19 0953     Blood Culture, Routine Gram stain dinorah bottle: Gram positive cocci in clusters resembling Staph       Results called to and read back by: Porfirio Medina RN 09/14/2019  05:49      Gram stain aer bottle: Gram positive cocci in clusters resembling Staph      STAPHYLOCOCCUS AUREUS  ID consult required at [facility].      Urine culture [[id number]]  (Abnormal)  (Susceptibility) Collected:  09/13/19 1349    Order Status:  Completed Specimen:  Urine Updated:  09/16/19 0849     Urine Culture, Routine STAPHYLOCOCCUS AUREUS  > 100,000 cfu/ml      Narrative:       GRAY AND YELLOW  Preferred Collection Type->Urine, Clean Catch    Blood culture [[id number]]     Order Status:  Canceled Specimen:  Blood           Significant Imaging: I have reviewed all pertinent imaging results/findings within the past 24 hours.

## 2019-09-18 NOTE — SUBJECTIVE & OBJECTIVE
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 OR 2ND FLR    Bka Left     HERNIA REPAIR      LEFT UPPER LOBECTOMY-LUNG Left 1/28/2016    Performed by Pan Bell MD at [facility] OR [facility] F[facility]    REPAIR, HERNIA, UMBILICAL, INCARCERATED, LAPAROSCOPIC N/A 11/27/2018    Performed by [facility] at [facility] OR    THORACOTOMY W/LUNG RESECTION Left 1/28/2016    [facility]       Review of patient's allergies indicates:   Allergen Reactions    Epinephrine Other (See Comments)     Carcinoid crisis       Current Facility-Administered Medications   Medication    acetaminophen tablet 650 mg    albuterol-ipratropium 2.5 mg-0.5 mg/3 mL nebulizer solution 3 mL    brimonidine 0.15 % OPTH DROP ophthalmic solution 1 drop    ceFAZolin (ANCEF) 6 g in dextrose 5 % 500 mL CONTINUOUS INFUSION    dextrose 10% (D10W) Bolus    dextrose 10% (D10W) Bolus    enoxaparin injection 40 mg    famotidine tablet 20 mg    glucagon (human recombinant) injection 1 mg    glucose chewable tablet 16 g    glucose chewable tablet 24 g    HYDROcodone-acetaminophen 5-325 mg per tablet 1 tablet    insulin aspart U-100 pen 0-5 Units    ketorolac 0.5% ophthalmic solution 1 drop    lactated ringers bolus  1,000 mL    ondansetron injection 4 mg    predniSONE tablet 110 mg    sodium chloride 0.9% flush 10 mL     Family History     Problem Relation (Age of Onset)    Cancer Mother, Sister        Tobacco Use    Smoking status: Never Smoker    Smokeless tobacco: Never Used   Substance and Sexual Activity    Alcohol use: Yes     Alcohol/week: 0.6 oz     Types: 1 Cans of beer per week     Comment: rare    Drug use: No    Sexual activity: Not on file     ROS   Per primary team note   Objective:     Vital Signs (Most Recent):  Temp: 97.8 °F (36.6 °C) (09/18/19 0458)  Pulse: (!) 55 (09/18/19 0458)  Resp: 20 (09/18/19 0458)  BP: 126/77 (09/18/19 0458)  SpO2: 97 % (09/18/19 0834) Vital Signs (24h Range):  Temp:  [97.5 °F (36.4 °C)-98.9 °F (37.2 °C)] 97.8 °F (36.6 °C)  Pulse:  [55-74] 55  Resp:  [16-20] 20  SpO2:  [93 %-98 %] 97 %  BP: (122-143)/(74-88) 126/77     Weight: 130.6 kg (288 lb)  Height: 6' (182.9 cm)  Body mass index is 39.06 kg/m².      Intake/Output Summary (Last 24 hours) at 9/18/2019 1008  Last data filed at 9/17/2019 1557  Gross per 24 hour   Intake 1380 ml   Output --   Net 1380 ml       Ortho/SPM Exam   LLE:  Through knee amputation  2 separate 1x1cm draining sinuses from the base of the stump, purulent drainage  No swelling  No ecchymosis  +erythema throughout base of stump  No tenderness to palpation of proximal, middle, or distal aspects of femur, tibia, fibula  No tenderness to palpation of foot  Thigh compartments soft and compressible  Painless ROM at hip  SILT thoughout  5/5 strength at hip  2+ femoral pulse  Stump is well perfused          Significant Labs:   CBC:   Recent Labs   Lab 09/17/19 0434 09/18/19  0512   WBC 13.08* 12.77*   HGB 13.7* 12.6*   HCT 42.6 37.7*    266     CMP:   Recent Labs   Lab 09/17/19 0434 09/18/19  0512    138   K 5.0 4.4    101   CO2 27 26   * 134*   BUN 36* 34*   CREATININE 1.4 1.4   CALCIUM 9.3 8.5*   PROT 6.6 6.1   ALBUMIN 2.3* 2.2*    BILITOT 0.5 0.4   ALKPHOS 178* 157*   AST 31 22   * 94*   ANIONGAP 11 11   EGFRNONAA 53.5* 53.5*         Significant Imaging: I have reviewed all pertinent imaging results/findings.   Xray L femur: through knee amputation, no fractures  Ultrasound LLE: fluid collection at base of amputation stump

## 2019-09-19 ENCOUNTER — ANESTHESIA (OUTPATIENT)
Dept: SURGERY | Facility: HOSPITAL | Age: 64
DRG: 854 | End: 2019-09-19
Payer: COMMERCIAL

## 2019-09-19 LAB
ALBUMIN SERPL BCP-MCNC: 2.2 G/DL (ref 3.5–5.2)
ALP SERPL-CCNC: 143 U/L (ref 55–135)
ALT SERPL W/O P-5'-P-CCNC: 65 U/L (ref 10–44)
ANION GAP SERPL CALC-SCNC: 10 MMOL/L (ref 8–16)
ANISOCYTOSIS BLD QL SMEAR: SLIGHT
AST SERPL-CCNC: 17 U/L (ref 10–40)
BACTERIA BLD CULT: ABNORMAL
BASOPHILS NFR BLD: 0 % (ref 0–1.9)
BILIRUB SERPL-MCNC: 0.3 MG/DL (ref 0.1–1)
BUN SERPL-MCNC: 34 MG/DL (ref 8–23)
CALCIUM SERPL-MCNC: 8.6 MG/DL (ref 8.7–10.5)
CHLORIDE SERPL-SCNC: 101 MMOL/L (ref 95–110)
CO2 SERPL-SCNC: 26 MMOL/L (ref 23–29)
CREAT SERPL-MCNC: 1.4 MG/DL (ref 0.5–1.4)
DIFFERENTIAL METHOD: ABNORMAL
DOHLE BOD BLD QL SMEAR: PRESENT
EOSINOPHIL NFR BLD: 0 % (ref 0–8)
ERYTHROCYTE [DISTWIDTH] IN BLOOD BY AUTOMATED COUNT: 14.6 % (ref 11.5–14.5)
EST. GFR  (AFRICAN AMERICAN): >60 ML/MIN/1.73 M^2
EST. GFR  (NON AFRICAN AMERICAN): 53.1 ML/MIN/1.73 M^2
GLUCOSE SERPL-MCNC: 170 MG/DL (ref 70–110)
HCT VFR BLD AUTO: 38.3 % (ref 40–54)
HGB BLD-MCNC: 12.9 G/DL (ref 14–18)
IMM GRANULOCYTES # BLD AUTO: ABNORMAL K/UL (ref 0–0.04)
IMM GRANULOCYTES NFR BLD AUTO: ABNORMAL % (ref 0–0.5)
LYMPHOCYTES NFR BLD: 5 % (ref 18–48)
MAGNESIUM SERPL-MCNC: 2.2 MG/DL (ref 1.6–2.6)
MCH RBC QN AUTO: 31.2 PG (ref 27–31)
MCHC RBC AUTO-ENTMCNC: 33.7 G/DL (ref 32–36)
MCV RBC AUTO: 93 FL (ref 82–98)
METAMYELOCYTES NFR BLD MANUAL: 1 %
MONOCYTES NFR BLD: 3 % (ref 4–15)
MYELOCYTES NFR BLD MANUAL: 1 %
NEUTROPHILS NFR BLD: 87 % (ref 38–73)
NEUTS BAND NFR BLD MANUAL: 3 %
NRBC BLD-RTO: 0 /100 WBC
OVALOCYTES BLD QL SMEAR: ABNORMAL
PHOSPHATE SERPL-MCNC: 3.9 MG/DL (ref 2.7–4.5)
PLATELET # BLD AUTO: 271 K/UL (ref 150–350)
PMV BLD AUTO: 9.7 FL (ref 9.2–12.9)
POCT GLUCOSE: 187 MG/DL (ref 70–110)
POCT GLUCOSE: 232 MG/DL (ref 70–110)
POIKILOCYTOSIS BLD QL SMEAR: SLIGHT
POLYCHROMASIA BLD QL SMEAR: ABNORMAL
POTASSIUM SERPL-SCNC: 4.8 MMOL/L (ref 3.5–5.1)
PROT SERPL-MCNC: 5.7 G/DL (ref 6–8.4)
RBC # BLD AUTO: 4.13 M/UL (ref 4.6–6.2)
SODIUM SERPL-SCNC: 137 MMOL/L (ref 136–145)
TOXIC GRANULES BLD QL SMEAR: PRESENT
WBC # BLD AUTO: 10.76 K/UL (ref 3.9–12.7)

## 2019-09-19 PROCEDURE — 99233 SBSQ HOSP IP/OBS HIGH 50: CPT | Mod: ,,, | Performed by: INTERNAL MEDICINE

## 2019-09-19 PROCEDURE — 99232 PR SUBSEQUENT HOSPITAL CARE,LEVL II: ICD-10-PCS | Mod: ,,, | Performed by: HOSPITALIST

## 2019-09-19 PROCEDURE — 25000003 PHARM REV CODE 250: Performed by: STUDENT IN AN ORGANIZED HEALTH CARE EDUCATION/TRAINING PROGRAM

## 2019-09-19 PROCEDURE — 85027 COMPLETE CBC AUTOMATED: CPT

## 2019-09-19 PROCEDURE — 85007 BL SMEAR W/DIFF WBC COUNT: CPT

## 2019-09-19 PROCEDURE — 97530 THERAPEUTIC ACTIVITIES: CPT

## 2019-09-19 PROCEDURE — 83735 ASSAY OF MAGNESIUM: CPT

## 2019-09-19 PROCEDURE — 99233 PR SUBSEQUENT HOSPITAL CARE,LEVL III: ICD-10-PCS | Mod: ,,, | Performed by: INTERNAL MEDICINE

## 2019-09-19 PROCEDURE — 99232 SBSQ HOSP IP/OBS MODERATE 35: CPT | Mod: ,,, | Performed by: HOSPITALIST

## 2019-09-19 PROCEDURE — 84100 ASSAY OF PHOSPHORUS: CPT

## 2019-09-19 PROCEDURE — 36415 COLL VENOUS BLD VENIPUNCTURE: CPT

## 2019-09-19 PROCEDURE — 80053 COMPREHEN METABOLIC PANEL: CPT

## 2019-09-19 PROCEDURE — 63600175 PHARM REV CODE 636 W HCPCS: Performed by: STUDENT IN AN ORGANIZED HEALTH CARE EDUCATION/TRAINING PROGRAM

## 2019-09-19 PROCEDURE — 27301 PR INCIS/DRAIN THIGH/KNEE ABSCESS,DEEP: ICD-10-PCS | Mod: LT,,, | Performed by: ORTHOPAEDIC SURGERY

## 2019-09-19 PROCEDURE — 27301 DRAIN THIGH/KNEE LESION: CPT | Mod: LT,,, | Performed by: ORTHOPAEDIC SURGERY

## 2019-09-19 PROCEDURE — 11000001 HC ACUTE MED/SURG PRIVATE ROOM

## 2019-09-19 PROCEDURE — 99232 SBSQ HOSP IP/OBS MODERATE 35: CPT | Mod: ,,, | Performed by: INTERNAL MEDICINE

## 2019-09-19 PROCEDURE — 99232 PR SUBSEQUENT HOSPITAL CARE,LEVL II: ICD-10-PCS | Mod: ,,, | Performed by: INTERNAL MEDICINE

## 2019-09-19 RX ORDER — ONDANSETRON 4 MG/1
8 TABLET, FILM COATED ORAL ONCE
Status: DISCONTINUED | OUTPATIENT
Start: 2019-09-20 | End: 2019-09-20

## 2019-09-19 RX ORDER — SODIUM CHLORIDE 0.9 % (FLUSH) 0.9 %
SYRINGE (ML) INJECTION
Status: CANCELLED | OUTPATIENT
Start: 2019-09-19

## 2019-09-19 RX ORDER — OCTREOTIDE ACETATE 100 UG/ML
500 INJECTION, SOLUTION INTRAVENOUS; SUBCUTANEOUS ONCE
Status: COMPLETED | OUTPATIENT
Start: 2019-09-20 | End: 2019-09-20

## 2019-09-19 RX ADMIN — BRIMONIDINE TARTRATE 1 DROP: 1.5 SOLUTION OPHTHALMIC at 09:09

## 2019-09-19 RX ADMIN — KETOROLAC TROMETHAMINE 1 DROP: 5 SOLUTION/ DROPS OPHTHALMIC at 09:09

## 2019-09-19 RX ADMIN — KETOROLAC TROMETHAMINE 1 DROP: 5 SOLUTION/ DROPS OPHTHALMIC at 06:09

## 2019-09-19 RX ADMIN — FAMOTIDINE 20 MG: 20 TABLET, FILM COATED ORAL at 09:09

## 2019-09-19 RX ADMIN — DEXTROSE 6 G: 5 SOLUTION INTRAVENOUS at 06:09

## 2019-09-19 RX ADMIN — SULFAMETHOXAZOLE AND TRIMETHOPRIM 1 TABLET: 800; 160 TABLET ORAL at 11:09

## 2019-09-19 RX ADMIN — PREDNISONE 110 MG: 20 TABLET ORAL at 11:09

## 2019-09-19 RX ADMIN — BRIMONIDINE TARTRATE 1 DROP: 1.5 SOLUTION OPHTHALMIC at 11:09

## 2019-09-19 RX ADMIN — FAMOTIDINE 20 MG: 20 TABLET, FILM COATED ORAL at 11:09

## 2019-09-19 RX ADMIN — KETOROLAC TROMETHAMINE 1 DROP: 5 SOLUTION/ DROPS OPHTHALMIC at 11:09

## 2019-09-19 NOTE — PROGRESS NOTES
[facility]  Infectious Disease  Progress Note    Patient Name: Elroy Rahman  MRN: [id number]  Admission Date: 9/13/2019  Length of Stay: 5 days  Attending Physician: Emil Day MD  Primary Care Provider: Wayne Catalan PA-C    Isolation Status: No active isolations  Assessment/Plan:      * Sepsis due to methicillin susceptible Staphylococcus aureus  62 y/o male with a history of metastatic bronchial carcinoid tumor s/p resection of ALIRIO, chemotherapy and subsequent prednisone therapy.  He is admitted with staphylococcus aureus sepsis with the source likely being his left BKA stump.  Patient does not have a port and denies having any hardware in his body. TTE & VIVI both negative for vegetations. US of AKA stump demonstrated .4 x 1.2 x 2.4 cm complex fluid collection with associated packing material which may represent abscess    Recommendations:  Ortho planning for irrigation and debridement of left stump tomorrow  C/w cefazolin for MSSA  Continue to monitor blood cultures (would like 2 sets of negative blood cultures on 2 separate days to ensure clearance, 9/17 & 9/18 NGTD)  C/w bactrim 1 tablet SS daily given pt on chronic high dose steroids. Monitor renal function and potassium        Thank you for your consult. I will follow-up with patient. Please contact us if you have any additional questions.    Brooks Lozada MD  Infectious Disease  [facility]    Subjective:     Principal Problem:Sepsis due to methicillin susceptible Staphylococcus aureus    HPI: 62 y/o male with a history of metastatic bronchial carcinoid tumor s/p left upper lobectomy, chemotherapy (last dose in July per patient) followed by prednisone for which he is still on.  He also has a history of a left BKA and utilizes a prosthesis.  Per the patient, he does a poor job of keeping the prosthesis clean.  His wife reports multiple occasions of noticing a foul smelling odor and weaping from the  stump.  His wife states that about 3 days prior to admission, she noticed her  looked very tired and fatigued.  She also noticed redness and discoloration to the stump and there was an odor to it.  The patient continued to feel worse until his wife convinced him to come to the hospital.  On admission, he was found to be febrile and with an elevated WBC count.  Blood and urine cultures were obtained and are positive for staphylococcus aureus.  A TTE was negative for vegetations.    Interval History: VIVI negative for vegetations. Patient and family opted to forgo amputation revision and proceed with irrigation and debridement tomorrow. Plan for MRI today.    Review of Systems   Constitutional: Positive for activity change and fatigue. Negative for chills.   Eyes: Negative for visual disturbance.   Respiratory: Negative for shortness of breath.    Cardiovascular: Negative for palpitations.   Gastrointestinal: Negative for nausea and vomiting.   Musculoskeletal: Positive for gait problem. Negative for back pain.   Skin: Positive for wound.   Neurological: Negative for headaches.   Psychiatric/Behavioral: Negative for agitation. The patient is not nervous/anxious.      Objective:     Vital Signs (Most Recent):  Temp: 98 °F (36.7 °C) (09/19/19 1624)  Pulse: 64 (09/19/19 1624)  Resp: 18 (09/19/19 1624)  BP: 114/61 (09/19/19 1624)  SpO2: (!) 92 % (09/19/19 1624) Vital Signs (24h Range):  Temp:  [97.5 °F (36.4 °C)-98.4 °F (36.9 °C)] 98 °F (36.7 °C)  Pulse:  [60-85] 64  Resp:  [18-20] 18  SpO2:  [92 %-99 %] 92 %  BP: (107-126)/(59-75) 114/61     Weight: 130.6 kg (288 lb)  Body mass index is 39.06 kg/m².    Estimated Creatinine Clearance: 75.5 mL/min (based on SCr of 1.4 mg/dL).    Physical Exam   Constitutional: He is oriented to person, place, and time. He appears well-developed and well-nourished. No distress.   HENT:   Head: Normocephalic and atraumatic.   Right Ear: External ear normal.   Left Ear: External ear  normal.   Nose: Nose normal.   Eyes: Right eye exhibits no discharge. Left eye exhibits no discharge. No scleral icterus.   Neck: Normal range of motion.   Cardiovascular: Normal rate and intact distal pulses.   Pulmonary/Chest: Effort normal. No respiratory distress.   Abdominal: Soft. He exhibits no distension. There is no tenderness.   Musculoskeletal: Normal range of motion. He exhibits edema and deformity. He exhibits no tenderness.   L AKA stump with purulent drainage   Neurological: He is alert and oriented to person, place, and time. No sensory deficit.   Skin: Skin is warm and dry.   Psychiatric: He has a normal mood and affect. His behavior is normal.   Vitals reviewed.      Significant Labs:   Blood Culture:   Recent Labs   Lab 09/16/19  0826 09/16/19  0830 09/17/19  1045 09/18/19  0511 09/18/19  0512   LABBLOO Gram stain aer bottle: Gram positive cocci in clusters resembling Staph   Results called to and read back by: Bhavik López RN 09/17/2019    06:06  STAPHYLOCOCCUS AUREUS  ID consult required at [facility] and [facility]   locations.  * Gram stain aer bottle: Gram positive cocci in clusters resembling Staph   Results called to and read back by:Lesley Handley RN 09/17/2019  11:55  STAPHYLOCOCCUS AUREUS  ID consult required at [facility] and [facility].  For susceptibility see order #[id number]  * No Growth to date  No Growth to date  No Growth to date  No Growth to date  No Growth to date  No Growth to date No Growth to date  No Growth to date No Growth to date  No Growth to date     CBC:   Recent Labs   Lab 09/18/19  0512 09/19/19  0420   WBC 12.77* 10.76   HGB 12.6* 12.9*   HCT 37.7* 38.3*    271     CMP:   Recent Labs   Lab 09/18/19  0512 09/19/19  0420    137   K 4.4 4.8    101   CO2 26 26   * 170*   BUN 34* 34*   CREATININE 1.4 1.4   CALCIUM 8.5* 8.6*   PROT 6.1 5.7*   ALBUMIN 2.2* 2.2*   BILITOT 0.4 0.3    ALKPHOS 157* 143*   AST 22 17   ALT 94* 65*   ANIONGAP 11 10   EGFRNONAA 53.5* 53.1*     Microbiology Results (last 7 days)     Procedure Component Value Units Date/Time    Blood culture [[id number]] Collected:  09/17/19 1045    Order Status:  Completed Specimen:  Blood Updated:  09/19/19 1212     Blood Culture, Routine No Growth to date      No Growth to date      No Growth to date    Blood culture [[id number]] Collected:  09/17/19 1045    Order Status:  Completed Specimen:  Blood Updated:  09/19/19 1212     Blood Culture, Routine No Growth to date      No Growth to date      No Growth to date    Blood culture [[id number]]  (Abnormal) Collected:  09/16/19 0830    Order Status:  Completed Specimen:  Blood Updated:  09/19/19 1049     Blood Culture, Routine Gram stain aer bottle: Gram positive cocci in clusters resembling Staph       Results called to and read back by:Lesley Handley RN 09/17/2019  11:55      STAPHYLOCOCCUS AUREUS  ID consult required at [facility].  For susceptibility see order #[id number]      Blood culture [[id number]]  (Abnormal)  (Susceptibility) Collected:  09/16/19 0826    Order Status:  Completed Specimen:  Blood Updated:  09/19/19 1049     Blood Culture, Routine Gram stain aer bottle: Gram positive cocci in clusters resembling Staph       Results called to and read back by: Bhavik López RN 09/17/2019        06:06      STAPHYLOCOCCUS AUREUS  ID consult required at [facility].      Blood culture [[id number]] Collected:  09/18/19 0511    Order Status:  Completed Specimen:  Blood Updated:  09/19/19 0613     Blood Culture, Routine No Growth to date      No Growth to date    Blood culture [[id number]] Collected:  09/18/19 0512    Order Status:  Completed Specimen:  Blood Updated:  09/19/19 0613     Blood Culture, Routine No Growth to date      No Growth to date    Blood culture [[id number]]  (Abnormal) Collected:   09/15/19 0616    Order Status:  Completed Specimen:  Blood Updated:  09/18/19 0949     Blood Culture, Routine Gram stain aer bottle: Gram positive cocci in clusters resembling Staph       Results called to and read back by:Lesley Handley RN 09/16/2019  08:47      STAPHYLOCOCCUS AUREUS  ID consult required at [facility].  For susceptibility see order #[id number]      Blood culture [[id number]]  (Abnormal) Collected:  09/15/19 0616    Order Status:  Completed Specimen:  Blood Updated:  09/17/19 1115     Blood Culture, Routine Gram stain aer bottle: Gram positive cocci in clusters resembling Staph       Results called to and read back by: Bhavik López RN 09/16/2019        01:23      STAPHYLOCOCCUS AUREUS  ID consult required at [facility].  For susceptibility see order #[id number]      Blood culture #2 **CANNOT BE ORDERED STAT** [[id number]]  (Abnormal) Collected:  09/13/19 1348    Order Status:  Completed Specimen:  Blood from Peripheral, Hand, Right Updated:  09/16/19 0954     Blood Culture, Routine Gram stain dinorah bottle: Gram positive cocci in clusters resembling Staph       Results called to and read back by: Porfirio Medina RN 09/14/2019  05:49      Gram stain aer bottle: Gram positive cocci in clusters resembling Staph      STAPHYLOCOCCUS AUREUS  ID consult required at [facility].  For susceptibility see order #[id number]      Blood culture [[id number]]  (Abnormal) Collected:  09/13/19 2036    Order Status:  Completed Specimen:  Blood Updated:  09/16/19 0953     Blood Culture, Routine Gram stain dinorah bottle: Gram positive cocci in clusters resembling Staph      Positive results previously called      Gram stain aer bottle: Gram positive cocci in clusters resembling Staph      STAPHYLOCOCCUS AUREUS  ID consult required at [facility].  For  susceptibility see order #[id number]      Blood culture #1 **CANNOT BE ORDERED STAT** [[id number]]  (Abnormal)  (Susceptibility) Collected:  09/13/19 1348    Order Status:  Completed Specimen:  Blood from Peripheral, Antecubital, Left Updated:  09/16/19 0953     Blood Culture, Routine Gram stain dinorah bottle: Gram positive cocci in clusters resembling Staph       Results called to and read back by: Porfirio Medina RN 09/14/2019  05:49      Gram stain aer bottle: Gram positive cocci in clusters resembling Staph      STAPHYLOCOCCUS AUREUS  ID consult required at [facility] and [facility]   locations.      Urine culture [[id number]]  (Abnormal)  (Susceptibility) Collected:  09/13/19 1349    Order Status:  Completed Specimen:  Urine Updated:  09/16/19 0849     Urine Culture, Routine STAPHYLOCOCCUS AUREUS  > 100,000 cfu/ml      Narrative:       GRAY AND YELLOW  Preferred Collection Type->Urine, Clean Catch    Blood culture [[id number]]     Order Status:  Canceled Specimen:  Blood           Significant Imaging: I have reviewed all pertinent imaging results/findings within the past 24 hours.

## 2019-09-19 NOTE — ASSESSMENT & PLAN NOTE
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  He is admitted with staphylococcus aureus sepsis with the source likely being his left BKA stump.  Patient does not have a port and denies having any hardware in his body. TTE & VIVI both negative for vegetations. US of AKA stump demonstrated .4 x 1.2 x 2.4 cm complex fluid collection with associated packing material which may represent abscess    Recommendations:  Ortho planning for irrigation and debridement of left stump tomorrow  C/w cefazolin for MSSA  Continue to monitor blood cultures (would like 2 sets of negative blood cultures on 2 separate days to ensure clearance, 9/17 & 9/18 NGTD)  C/w bactrim 1 tablet SS daily given pt on chronic high dose steroids. Monitor renal function and potassium

## 2019-09-19 NOTE — PROGRESS NOTES
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 His vision is better as he is able to read the time of clock in his bedroom from his bed, currently on prednisone 110 mg.   Pt is agreeable to going through debridement and washout procedure tomorrow.     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    Sepsis due to methicillin susceptible Staphylococcus aureus 09/14/2019    MSSA bacteremia from Amputation stump infection    Sleep apnea     Snores    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     Past Surgical History:   Procedure Laterality Date    ABDOMINAL SURGERY      LQHIHM-CUYCIL-PF N/A 6/8/2017    Performed by [facility] Provider at [facility] OR 2ND FLR    Bka Left     HERNIA REPAIR      LEFT UPPER LOBECTOMY-LUNG Left 1/28/2016    Performed by Pan Bell MD at [facility] OR 2ND FLR    REPAIR, HERNIA, UMBILICAL, INCARCERATED, LAPAROSCOPIC N/A 11/27/2018     Performed by C[facility] Guidry [facility] at [facility] OR    THORACOTOMY W/LUNG RESECTION Left 1/28/2016    Performed by [facility] Bell [facility] at [facility] OR 2ND FLR     Family History   Problem Relation Age of Onset    Cancer Mother         lung    Cancer Sister     Anesthesia problems Neg Hx      Social History     Tobacco Use    Smoking status: Never Smoker    Smokeless tobacco: Never Used   Substance Use Topics    Alcohol use: Yes     Alcohol/week: 0.6 oz     Types: 1 Cans of beer per week     Comment: rare    Drug use: No     Review of Systems   Constitutional: Positive for diaphoresis and malaise/fatigue. Negative for chills, fever and weight loss.   Respiratory: Negative for hemoptysis, sputum production and shortness of breath.    Cardiovascular: Negative for chest pain and leg swelling.   Gastrointestinal: Negative for abdominal pain, diarrhea, nausea and vomiting.   Genitourinary: Negative for hematuria.   Musculoskeletal:        + chronic LLE amputation   Neurological: Positive for weakness. Negative for dizziness, sensory change, seizures and headaches.     OBJECTIVE:     Vital Signs:  Temp:  [97.5 °F (36.4 °C)-98.4 °F (36.9 °C)]   Pulse:  [60-74]   Resp:  [18-20]   BP: (109-126)/(68-75)   SpO2:  [95 %-99 %]     Physical Exam   Constitutional: He is oriented to person, place, and time. He appears well-developed and well-nourished. No distress.   HENT:   Head: Normocephalic and atraumatic.   Eyes: Pupils are equal, round, and reactive to light. Conjunctivae and EOM are normal.   Neck: Normal range of motion. Neck supple.   Cardiovascular: Normal rate and regular rhythm. Exam reveals no gallop and no friction rub.   No murmur heard.  Pulmonary/Chest: Effort normal and breath sounds normal. No stridor. No respiratory distress. He has no wheezes. He has no rales.   Abdominal: Soft. Bowel sounds are normal. He exhibits no distension. There is no tenderness. There is no guarding.   Musculoskeletal: Normal  range of motion.    + left BKA     Lymphadenopathy:     He has no cervical adenopathy.   Neurological: He is alert and oriented to person, place, and time. No cranial nerve deficit or sensory deficit.   Skin: Skin is warm and dry.   Swelling along L leg stump site     Laboratory:  CBC:   Recent Labs   Lab 09/19/19 0420   WBC 10.76   RBC 4.13*   HGB 12.9*   HCT 38.3*      MCV 93   MCH 31.2*   MCHC 33.7     BMP:   Recent Labs   Lab 09/19/19 0420   *      K 4.8      CO2 26   BUN 34*   CREATININE 1.4   CALCIUM 8.6*   MG 2.2     CMP:   Recent Labs   Lab 09/19/19 0420   *   CALCIUM 8.6*   ALBUMIN 2.2*   PROT 5.7*      K 4.8   CO2 26      BUN 34*   CREATININE 1.4   ALKPHOS 143*   ALT 65*   AST 17   BILITOT 0.3     LFTs:   Recent Labs   Lab 09/19/19 0420   ALT 65*   AST 17   ALKPHOS 143*   BILITOT 0.3   PROT 5.7*   ALBUMIN 2.2*     Coagulation: No results for input(s): LABPROT, INR, APTT in the last 168 hours.  Cardiac markers:   Recent Labs   Lab 09/13/19 1706   TROPONINI 0.112*     ABGs: No results for input(s): PH, PCO2, PO2, HCO3, POCSATURATED, BE in the last 168 hours.  Microbiology Results (last 7 days)     Procedure Component Value Units Date/Time    Blood culture [[id number]] Collected:  09/17/19 1045    Order Status:  Completed Specimen:  Blood Updated:  09/19/19 1212     Blood Culture, Routine No Growth to date      No Growth to date      No Growth to date    Blood culture [[id number]] Collected:  09/17/19 1045    Order Status:  Completed Specimen:  Blood Updated:  09/19/19 1212     Blood Culture, Routine No Growth to date      No Growth to date      No Growth to date    Blood culture [[id number]]  (Abnormal) Collected:  09/16/19 0830    Order Status:  Completed Specimen:  Blood Updated:  09/19/19 1049     Blood Culture, Routine Gram stain aer bottle: Gram positive cocci in clusters resembling Staph       Results called to and read back by:Lesley Handley RN 09/17/2019   11:55      STAPHYLOCOCCUS AUREUS  ID consult required at [facility].  For susceptibility see order #[id number]      Blood culture [[id number]]  (Abnormal)  (Susceptibility) Collected:  09/16/19 0826    Order Status:  Completed Specimen:  Blood Updated:  09/19/19 1049     Blood Culture, Routine Gram stain aer bottle: Gram positive cocci in clusters resembling Staph       Results called to and read back by: Bhavik López RN 09/17/2019        06:06      STAPHYLOCOCCUS AUREUS  ID consult required at [facility].      Blood culture [[id number]] Collected:  09/18/19 0511    Order Status:  Completed Specimen:  Blood Updated:  09/19/19 0613     Blood Culture, Routine No Growth to date      No Growth to date    Blood culture [[id number]] Collected:  09/18/19 0512    Order Status:  Completed Specimen:  Blood Updated:  09/19/19 0613     Blood Culture, Routine No Growth to date      No Growth to date    Blood culture [[id number]]  (Abnormal) Collected:  09/15/19 0616    Order Status:  Completed Specimen:  Blood Updated:  09/18/19 0949     Blood Culture, Routine Gram stain aer bottle: Gram positive cocci in clusters resembling Staph       Results called to and read back by:Lesley Handley RN 09/16/2019  08:47      STAPHYLOCOCCUS AUREUS  ID consult required at [facility].  For susceptibility see order #[id number]      Blood culture [[id number]]  (Abnormal) Collected:  09/15/19 0616    Order Status:  Completed Specimen:  Blood Updated:  09/17/19 1115     Blood Culture, Routine Gram stain aer bottle: Gram positive cocci in clusters resembling Staph       Results called to and read back by: Bhavik López RN 09/16/2019        01:23      STAPHYLOCOCCUS AUREUS  ID consult required at [facility].  For susceptibility see order #[id number]      Blood culture #2  **CANNOT BE ORDERED STAT** [[id number]]  (Abnormal) Collected:  09/13/19 1348    Order Status:  Completed Specimen:  Blood from Peripheral, Hand, Right Updated:  09/16/19 0954     Blood Culture, Routine Gram stain dinorah bottle: Gram positive cocci in clusters resembling Staph       Results called to and read back by: Porfirio Medina RN 09/14/2019  05:49      Gram stain aer bottle: Gram positive cocci in clusters resembling Staph      STAPHYLOCOCCUS AUREUS  ID consult required at [facility].  For susceptibility see order #[id number]      Blood culture [[id number]]  (Abnormal) Collected:  09/13/19 2036    Order Status:  Completed Specimen:  Blood Updated:  09/16/19 0953     Blood Culture, Routine Gram stain dinorah bottle: Gram positive cocci in clusters resembling Staph      Positive results previously called      Gram stain aer bottle: Gram positive cocci in clusters resembling Staph      STAPHYLOCOCCUS AUREUS  ID consult required at [facility].  For susceptibility see order #[id number]      Blood culture #1 **CANNOT BE ORDERED STAT** [[id number]]  (Abnormal)  (Susceptibility) Collected:  09/13/19 1348    Order Status:  Completed Specimen:  Blood from Peripheral, Antecubital, Left Updated:  09/16/19 0953     Blood Culture, Routine Gram stain dinorah bottle: Gram positive cocci in clusters resembling Staph       Results called to and read back by: Porfirio Medina RN 09/14/2019  05:49      Gram stain aer bottle: Gram positive cocci in clusters resembling Staph      STAPHYLOCOCCUS AUREUS  ID consult required at [facility].      Urine culture [[id number]]  (Abnormal)  (Susceptibility) Collected:  09/13/19 1349    Order Status:  Completed Specimen:  Urine Updated:  09/16/19 0849     Urine Culture, Routine STAPHYLOCOCCUS AUREUS  > 100,000 cfu/ml      Narrative:       GRAY AND YELLOW  Preferred Collection  Type->Urine, Clean Catch    Blood culture [[id number]]     Order Status:  Canceled Specimen:  Blood         Specimen (12h ago, onward)    None        Recent Labs   Lab 09/13/19  1349   COLORU Yellow   SPECGRAV 1.015   PHUR 6.0   PROTEINUA 1+*   BACTERIA Occasional   NITRITE Negative   LEUKOCYTESUR 1+*   HYALINECASTS 0       Diagnostic Results:    EXAMINATION:  XR FEMUR 2 VIEW LEFT    CLINICAL HISTORY:  assess distal stump aka level;    TECHNIQUE:  AP and lateral views of the left femur were performed.    COMPARISON:  None}    FINDINGS:  Femur is intact.  BKA at the level of the tibia.  No tibial bone is visualized.  There are a few lucencies in the soft tissues about the stump distally.  Bones are demineralized.  2.3 cm focal sclerosis in the left intertrochanteric region.      Impression       The femur is intact.  There is a BKA at the level of the knee though the femur is intact.  Bones are demineralized.  There are some lucencies in the soft tissues at the stump site.  Correlation with physical findings would be beneficial.    2.3 cm focal sclerotic lesion left intertrochanteric region.  Neoplastic lesion not excluded.    This report was flagged in Epic as abnormal.      Electronically signed by: Salvador Rudd MD  Date: 09/18/2019  Time: 11:33       .      ASSESSMENT/PLAN:     Plan:        Sepsis due to methicillin susceptible Staphylococcus aureus  Secondary to stump infection, also probably lead to his weakness  VIVI negative on 9/20  Decrease steroids prednisone 110 mg, by about 10 mg every 3 days or so and monitor pt's vision during decrease of these steroids  Would start octreotide protocol during surgical procedure tomorrow; would start at 200 ug/h during surgery        Carcinoid bronchial adenoma of left lung  Bronchial Carcinoid, w secondary NET to liver  - PRRT has been on hold,no changes to this plan while he is inpatient  - last gallium PET-CT on 8/20/19 with improvement in burden of disease  - was on  "steroids due to inflammation of eye thought to be secondary to PRRT    Discussed with Dr. Surya Rodriguez MD  Hematology/Oncology Fellow, PGY V  [facility]    Attending Addendum:  The patient was seen, examined, and discussed on rounds with the team.  I agree with the assessment and plan as outlined for Elroy Rahman.  Plans noted for him to go to the OR tomorrow for a washout under general anesthesia.  He does have a significant burden of disease and has previously had an elevated 5 HIAA.  Because of this I would recommend an octreotide infusion protocol listed below in attempts to avoid a carcinoid crisis.    Jesus London DO, [facility]  Hematology & Oncology  [address] [place] [address]  ph. [phone]  Fax. [phone]      The [facility] group uses this protocol for prevention of carcinoid crisis--others use less-we can't speak to their results but even with these "higher than others" type dosing we have had 2 carcinoid crisis out of about 300 OR visits.     Two hours before surgery or procedure give 500 micrograms IV Push of Sandostatin(octreotide acetate).  Premedicate with Zofran 8 mg IV.      Then start a 500 microgram per hour IV infusion of Sandostatin (octreotide acetate)----- start this immediately after the IV push and continue infusion during and after surgery.  No drip necessary for procedures less than 4 hrs.    Depending on the severity and duration of surgery----- taper the infusion over 1-24 hours--say for colonoscopy taper over 1 hour-- after huge liver cases taper over 12-24 hours.     If patient crashes don't use pressors (except as a last resort if the following fails)-- use fluids and 1-5 mg bolus of octreotide (can repeat)-- for malignant hyperthermia use dantrolene in normal doses.    Please feel free to call the clinic with any questions.    Ramo Mejia MD  [facility], Department of Surgery  Director, Neuroendocrine Tumor Program  Cell # " [phone]

## 2019-09-19 NOTE — ASSESSMENT & PLAN NOTE
sCr elevated from baseline (0.9 > 1.4). Pre-renal vs intrarenal. Pt without improvement after fluid bolus, most likely attributed to sepsis secondary to staph aureus (U/A and UCx grew staph aureus)    Plan  - Fluid resuscitation   - Trend creatinine.

## 2019-09-19 NOTE — PT/OT/SLP PROGRESS
Occupational Therapy   Treatment    Name: Elroy Rahman  MRN: [id number]  Admitting Diagnosis:  Sepsis due to methicillin susceptible Staphylococcus aureus  1 Day Post-Op    Recommendations:     Discharge Recommendations: rehabilitation facility  Discharge Equipment Recommendations:  (TBD)  Barriers to discharge:  None    Assessment:     Elroy Rahman is a 64 y.o. male with a medical diagnosis of Sepsis due to methicillin susceptible Staphylococcus aureus.  He presents with the following symptoms. Performance deficits affecting function are weakness, impaired self care skills, impaired functional mobilty, gait instability, impaired balance, decreased lower extremity function. Pt was seen for OT to increase independence and safety awareness for ADL tasks. Pt tolerated OT session well.    Rehab Prognosis:  Good; patient would benefit from acute skilled OT services to address these deficits and reach maximum level of function.       Plan:     Patient to be seen 4 x/week to address the above listed problems via self-care/home management, therapeutic activities, therapeutic exercises  · Plan of Care Expires: 10/15/19  · Plan of Care Reviewed with: patient    Subjective     Pain/Comfort:  · Pain Rating 1: 0/10  · Pain Rating 2: 0/10    Objective:     Communicated with: RN prior to session.  Patient found right sidelying with telemetry, peripheral IV upon OT entry to room.    General Precautions: Standard, fall   Orthopedic Precautions:N/A   Braces: N/A     Occupational Performance:     Bed Mobility:    · Patient completed Rolling/Turning to Left with  [place] with HOB elevated with bed rails  · Patient completed Supine to Sit with supervision with HOB elevated with bed rails    Functional Mobility/Transfers:  · Patient completed Sit <> Stand Transfer with supervision  with  bed rails   · Patient completed Bed <> Bedside Commode Toilet Transfer Stand Pivot technique with SBA with  hand rails on bedside  commode  · Bedside commode placed on right side of bed  · Patient completed Bed <> Bedside Commode Toilet Transfer Stand Pivot technique with SBA with crutches    Activities of Daily Living:  · Lower Body Dressing: supervision seated edge of bed to don/doff socks      [facility] 6 Click ADL: 20    Treatment & Education:  Role of OT and POC  Safety education with transfers   Functional mobility  Importance of OOB activities with supervision to increase strength and endurance for functional tasks  Questions/concerns answered within OT scope of practice    Patient left supine with HOB elevated with all lines intact, call button in reach and all needs metEducation:      GOALS:   Multidisciplinary Problems     Occupational Therapy Goals        Problem: Occupational Therapy Goal    Goal Priority Disciplines Outcome Interventions   Occupational Therapy Goal     OT, PT/OT Ongoing (interventions implemented as appropriate)    Description:  Goals to be met by: 9/29/19     Patient will increase functional independence with ADLs by performing:    UE Dressing with [place].  LE Dressing with Moderate Assistance.  Grooming while seated with [place].  Toileting from bedside commode with Moderate Assistance for hygiene and clothing management.   Toilet transfer to bedside commode with Moderate Assistance. Met                       Time Tracking:     OT Date of Treatment: 09/19/19  OT Start Time: 1328  OT Stop Time: 1341  OT Total Time (min): 13 min    Billable Minutes:Therapeutic Activity 13    TOO Dean  9/19/2019    I certify that I was present in the room directing the student in service delivery and guiding them using my skilled judgment. As the co-signing therapist I have reviewed the students documentation and am responsible for the treatment, assessment, and plan.     QI Maldonado  9/19/2019

## 2019-09-19 NOTE — ANESTHESIA POSTPROCEDURE EVALUATION
Anesthesia Post Evaluation    Patient: Elroy Rahman    Procedure(s) Performed: Procedure(s) (LRB):  ECHOCARDIOGRAM, TRANSESOPHAGEAL (N/A)    Final Anesthesia Type: general  Patient location during evaluation: PACU  Patient participation: Yes- Able to Participate  Level of consciousness: awake and alert  Post-procedure vital signs: reviewed and stable  Pain management: adequate  Airway patency: patent  PONV status at discharge: No PONV  Anesthetic complications: no      Cardiovascular status: blood pressure returned to baseline  Respiratory status: unassisted  Hydration status: euvolemic  Follow-up not needed.          Vitals Value Taken Time   /75 9/19/2019  4:00 AM   Temp 36.9 °C (98.4 °F) 9/19/2019  4:00 AM   Pulse 60 9/19/2019  4:00 AM   Resp 20 9/19/2019  4:00 AM   SpO2 96 % 9/19/2019  4:00 AM         No case tracking events are documented in the log.      Pain/Masood Score: Masood Score: 10 (9/18/2019  3:44 PM)

## 2019-09-19 NOTE — PLAN OF CARE
Problem: Occupational Therapy Goal  Goal: Occupational Therapy Goal  Goals to be met by: 9/29/19     Patient will increase functional independence with ADLs by performing:    UE Dressing with [place].  LE Dressing with Moderate Assistance.  Grooming while seated with [place].  Toileting from bedside commode with Moderate Assistance for hygiene and clothing management.   Toilet transfer to bedside commode with Moderate Assistance. Met     Outcome: Ongoing (interventions implemented as appropriate)  Patient's goals are appropriate.   QI Maldonado  9/19/2019

## 2019-09-19 NOTE — SUBJECTIVE & OBJECTIVE
Interval History: No acute events overnight. Pt was to go for I&D and revision of LLE amputation, he would like to just have the I&D. Reports he feels a lot better and doesn't need his leg cut shorter.     Review of Systems   Constitutional: Positive for activity change, chills, diaphoresis, fatigue and fever. Negative for appetite change.   HENT: Negative for congestion, postnasal drip and sore throat.    Eyes: Positive for visual disturbance.   Respiratory: Negative for cough, chest tightness and shortness of breath.    Cardiovascular: Negative for chest pain, palpitations and leg swelling.   Gastrointestinal: Positive for nausea. Negative for abdominal pain, constipation, diarrhea and vomiting.   Endocrine: Negative for heat intolerance.   Genitourinary: Negative for difficulty urinating and dysuria.   Musculoskeletal: Negative for arthralgias, back pain and myalgias.   Neurological: Positive for weakness (improved) and light-headedness. Negative for numbness and headaches.     Objective:     Vital Signs (Most Recent):  Temp: 97.9 °F (36.6 °C) (09/19/19 1145)  Pulse: 74 (09/19/19 1145)  Resp: 18 (09/19/19 1145)  BP: 109/68 (09/19/19 1145)  SpO2: 95 % (09/19/19 1145) Vital Signs (24h Range):  Temp:  [97.4 °F (36.3 °C)-98.4 °F (36.9 °C)] 97.9 °F (36.6 °C)  Pulse:  [60-85] 74  Resp:  [16-20] 18  SpO2:  [94 %-99 %] 95 %  BP: (107-141)/(59-88) 109/68     Weight: 130.6 kg (288 lb)  Body mass index is 39.06 kg/m².    Intake/Output Summary (Last 24 hours) at 9/19/2019 1440  Last data filed at 9/19/2019 0350  Gross per 24 hour   Intake 600 ml   Output 475 ml   Net 125 ml      Physical Exam   Constitutional: He is oriented to person, place, and time. He appears well-developed and well-nourished. He does not appear ill. No distress.   Obese   HENT:   Head: Normocephalic and atraumatic.   Eyes: No scleral icterus.   Neck: Normal range of motion. Neck supple.   Cardiovascular: Normal rate, regular rhythm and intact distal  pulses. Exam reveals no gallop and no friction rub.   No murmur heard.  Pulmonary/Chest: Effort normal and breath sounds normal. No respiratory distress.   Abdominal: Soft. He exhibits distension. There is no tenderness. There is no guarding.   Musculoskeletal:   b/l UE 5/5 muscle strength  RLE: 3-4/5 muscle strength. Able to move his R foot and bend his right knee  LLE stump redness improving, some scabbing at the base of the stump   Neurological: He is alert and oriented to person, place, and time.   Skin: Skin is warm.   Nursing note and vitals reviewed.      Significant Labs:   CBC:   Recent Labs   Lab 09/18/19  0512 09/19/19  0420   WBC 12.77* 10.76   HGB 12.6* 12.9*   HCT 37.7* 38.3*    271     CMP:   Recent Labs   Lab 09/18/19  0512 09/19/19  0420    137   K 4.4 4.8    101   CO2 26 26   * 170*   BUN 34* 34*   CREATININE 1.4 1.4   CALCIUM 8.5* 8.6*   PROT 6.1 5.7*   ALBUMIN 2.2* 2.2*   BILITOT 0.4 0.3   ALKPHOS 157* 143*   AST 22 17   ALT 94* 65*   ANIONGAP 11 10   EGFRNONAA 53.5* 53.1*       Significant Imaging: I have reviewed all pertinent imaging results/findings within the past 24 hours.

## 2019-09-19 NOTE — PROGRESS NOTES
[facility] Hospital Medicine  Progress Note    Patient Name: Elroy Rahman  MRN: [id number]  Patient Class: IP- Inpatient   Admission Date: 9/13/2019  Length of Stay: 5 days  Attending Physician: Emil Day MD  Primary Care Provider: Wayne Catalan PA-C    [facility] Medicine Team: [facility] 1 Paulo Madrigal MD    Subjective:     Principal Problem:Sepsis due to methicillin susceptible Staphylococcus aureus        HPI:  Mr. 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    Pt reports that on Tuesday he was experiencing chills, diaphoresis and severe muscle weakness. The weakness began suddenly, not concentrated in any one muscle group and progressively worsened. Unfortunately by late that evening he was unable to move around on his own. At baseline pt does have a left LE amputation and is able to ambulate freely with and without his prosthesis. He denies any bowel or bladder incontinence, back pain, numbness, perineal numbness or extremity pain.   Per notes: He was started on PRRT back in April and began prednisone due to inflammation noted in the globe of his eye. Beginning on 9/5 he was to take Prednisone 130 mg for days and then proceed with 110mg for an additional five days. Pt reports that he is compliant with his medication regimen.     Overview/Hospital Course:  No notes on file    Interval History: No acute events overnight. Pt was to go for I&D and revision of LLE amputation, he would like to just have the I&D. Reports he feels a lot better and doesn't need his leg cut shorter.     Review of Systems   Constitutional: Positive for activity change, chills, diaphoresis, fatigue and fever. Negative for appetite change.   HENT:  Negative for congestion, postnasal drip and sore throat.    Eyes: Positive for visual disturbance.   Respiratory: Negative for cough, chest tightness and shortness of breath.    Cardiovascular: Negative for chest pain, palpitations and leg swelling.   Gastrointestinal: Positive for nausea. Negative for abdominal pain, constipation, diarrhea and vomiting.   Endocrine: Negative for heat intolerance.   Genitourinary: Negative for difficulty urinating and dysuria.   Musculoskeletal: Negative for arthralgias, back pain and myalgias.   Neurological: Positive for weakness (improved) and light-headedness. Negative for numbness and headaches.     Objective:     Vital Signs (Most Recent):  Temp: 97.9 °F (36.6 °C) (09/19/19 1145)  Pulse: 74 (09/19/19 1145)  Resp: 18 (09/19/19 1145)  BP: 109/68 (09/19/19 1145)  SpO2: 95 % (09/19/19 1145) Vital Signs (24h Range):  Temp:  [97.4 °F (36.3 °C)-98.4 °F (36.9 °C)] 97.9 °F (36.6 °C)  Pulse:  [60-85] 74  Resp:  [16-20] 18  SpO2:  [94 %-99 %] 95 %  BP: (107-141)/(59-88) 109/68     Weight: 130.6 kg (288 lb)  Body mass index is 39.06 kg/m².    Intake/Output Summary (Last 24 hours) at 9/19/2019 1440  Last data filed at 9/19/2019 0350  Gross per 24 hour   Intake 600 ml   Output 475 ml   Net 125 ml      Physical Exam   Constitutional: He is oriented to person, place, and time. He appears well-developed and well-nourished. He does not appear ill. No distress.   Obese   HENT:   Head: Normocephalic and atraumatic.   Eyes: No scleral icterus.   Neck: Normal range of motion. Neck supple.   Cardiovascular: Normal rate, regular rhythm and intact distal pulses. Exam reveals no gallop and no friction rub.   No murmur heard.  Pulmonary/Chest: Effort normal and breath sounds normal. No respiratory distress.   Abdominal: Soft. He exhibits distension. There is no tenderness. There is no guarding.   Musculoskeletal:   b/l UE 5/5 muscle strength  RLE: 3-4/5 muscle strength. Able to move his R foot and bend  his right knee  LLE stump redness improving, some scabbing at the base of the stump   Neurological: He is alert and oriented to person, place, and time.   Skin: Skin is warm.   Nursing note and vitals reviewed.      Significant Labs:   CBC:   Recent Labs   Lab 09/18/19  0512 09/19/19  0420   WBC 12.77* 10.76   HGB 12.6* 12.9*   HCT 37.7* 38.3*    271     CMP:   Recent Labs   Lab 09/18/19  0512 09/19/19  0420    137   K 4.4 4.8    101   CO2 26 26   * 170*   BUN 34* 34*   CREATININE 1.4 1.4   CALCIUM 8.5* 8.6*   PROT 6.1 5.7*   ALBUMIN 2.2* 2.2*   BILITOT 0.4 0.3   ALKPHOS 157* 143*   AST 22 17   ALT 94* 65*   ANIONGAP 11 10   EGFRNONAA 53.5* 53.1*       Significant Imaging: I have reviewed all pertinent imaging results/findings within the past 24 hours.      Assessment/Plan:      * Sepsis due to methicillin susceptible Staphylococcus aureus  Pt with diaphoresis and diffuse muscle weakness leading to admission associated with fevers while in the hospital. BCx and UCx positive for staph aureus.     Plan  - Continue cefazolin   - ID consulted: following up recs  - blood cultures daily until clear. (last + BCx 9/16)  - VIVI 09/18: No evidence of small vegetation or masses  - U/S of left extremity: Small complex fluid collection 2.4 x 1.2 x 2.4   - Orthopedic surgery consulted: will go for I&D 9/20/2019. Pt refused stump revision.       UTI (urinary tract infection)  Urinalysis with 1+ protein,22WBC and occasional bacteria. He has leukocytosis of 12.78. UCx positive for staph aureus. Pt without any urinary symptoms     Plan   - Continue cefazolin.   - ID consulted, f/u recs     Acute muscle weakness  Pt with a medical history of metastatic carcinoma and corticosteroid use presenting with LE weakness of sudden onset most likely due to sepsis. BCx growing gram positive cocci in clusters, UCx showing staph aureus. (see sepsis)  Cord compression initially suspected due to acute presentation, MRI spine  final report showing metastatic disease without any mass effect. Steroid induced myopathy considered since pt is on a high dose of steroids (CPK elevated, U/A with 3+ blood and only 13 RBC).     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 He is also s/p left upper lung lobectomy done in 2016. Pt currently undergoing peptide receptor radionucleotide therapy, being followed by Dr. London.    - Oncology following during this admission    MAGDI (acute kidney injury)  sCr elevated from baseline (0.9 > 1.4). Pre-renal vs intrarenal. Pt without improvement after fluid bolus, most likely attributed to sepsis secondary to staph aureus (U/A and UCx grew staph aureus)    Plan  - Fluid resuscitation   - Trend creatinine.    Impaired mobility and ADLs  Working with PT/OT    Myopathy          VTE Risk Mitigation (From admission, onward)        Ordered     Reason for no Mechanical VTE Prophylaxis  Once      09/13/19 1933     IP VTE HIGH RISK PATIENT  Once      09/13/19 1933     Place sequential compression device  Until discontinued      09/13/19 1641                Paulo Madrigal MD  Department of Hospital Medicine   [facility]

## 2019-09-19 NOTE — PHARMACY MED REC
"Admission Medication Reconciliation - Pharmacy Consult Note    The home medication history was taken by Jackeline Scruggs, Pharmacy Technician. Based on information gathered and subsequent review by the clinical pharmacist, the items below may need attention.    You may go to "Admission" then "Reconcile Home Medications" tabs to review and/or act upon these items.    No issues noted with the medication reconciliation.    Please address this information as you see fit.  Feel free to contact us if you have any questions or require assistance.    Starla Joseph, PharmD, BCPS  [id number]     PTA Medications   Medication Sig    ALPHAGAN P 0.1 % Drop Instill 1 drop into left eye twice daily    ondansetron (ZOFRAN) 4 MG tablet Take 1 tablet (4 mg total) by mouth 2 (two) times daily. (Patient taking differently: Take 4 mg by mouth daily as needed for Nausea. )    predniSONE (DELTASONE) 20 MG tablet Take 2 tablets (40 mg total) by mouth once daily. Take with 50 mg tabs.  Total of 140 mg daily (Patient taking differently: Take 10 mg by mouth once daily. Take with 50 mg tabs.  Total of 110 mg daily)    predniSONE (DELTASONE) 50 MG Tab Take 2 tablets (100 mg total) by mouth once daily. Take with 20 mg tablets.  Total of 140 daily. (Patient taking differently: Take 100 mg by mouth once daily. Take with 20 mg tablets.  Total of 110 daily.)    PROLENSA 0.07 % Drop INSTILL 1 DROP INTO LEFT EYE TWICE DAILY     .    .          "

## 2019-09-19 NOTE — ASSESSMENT & PLAN NOTE
Pt with diaphoresis and diffuse muscle weakness leading to admission associated with fevers while in the hospital. BCx and UCx positive for staph aureus.     Plan  - Continue cefazolin   - ID consulted: following up recs  - blood cultures daily until clear. (last + BCx 9/16)  - VIVI 09/18: No evidence of small vegetation or masses  - U/S of left extremity: Small complex fluid collection 2.4 x 1.2 x 2.4   - Orthopedic surgery consulted: will go for I&D 9/20/2019. Pt refused stump revision.

## 2019-09-19 NOTE — PROGRESS NOTES
[facility]  Orthopedics  Progress Note    Patient Name: Elroy Rahman  MRN: [id number]  Admission Date: 9/13/2019  Hospital Length of Stay: 5 days  Attending Provider: Emil Day MD  Primary Care Provider: Wayne Catalan PA-C  Follow-up For: Procedure(s) (LRB):  ECHOCARDIOGRAM, TRANSESOPHAGEAL (N/A)    Post-Operative Day: 1 Day Post-Op  Subjective:     Principal Problem:Sepsis due to methicillin susceptible Staphylococcus aureus    Principal Orthopedic Problem:  Left leg amputation stump infection    Interval History:  Patient seen and examined at bedside.  He states that he does not wish to undergo above knee amputation revision today. He has agreed to undergo MRI of the left leg today and will undergo an irrigation and debridement of the left leg amputation stump tomorrow in operating room.    Review of patient's allergies indicates:   Allergen Reactions    Epinephrine Other (See Comments)     Carcinoid crisis       Current Facility-Administered Medications   Medication    acetaminophen tablet 650 mg    albuterol-ipratropium 2.5 mg-0.5 mg/3 mL nebulizer solution 3 mL    brimonidine 0.15 % OPTH DROP ophthalmic solution 1 drop    ceFAZolin (ANCEF) 6 g in dextrose 5 % 500 mL CONTINUOUS INFUSION    dextrose 10% (D10W) Bolus    dextrose 10% (D10W) Bolus    famotidine tablet 20 mg    glucagon (human recombinant) injection 1 mg    glucose chewable tablet 16 g    glucose chewable tablet 24 g    HYDROcodone-acetaminophen 5-325 mg per tablet 1 tablet    insulin aspart U-100 pen 0-5 Units    ketorolac 0.5% ophthalmic solution 1 drop    ondansetron injection 4 mg    predniSONE tablet 110 mg    sodium chloride 0.9% flush 10 mL    sulfamethoxazole-trimethoprim 800-160mg per tablet 1 tablet     Objective:     Vital Signs (Most Recent):  Temp: 98.4 °F (36.9 °C) (09/19/19 0400)  Pulse: 60 (09/19/19 0400)  Resp: 20 (09/19/19 0400)  BP: 122/75 (09/19/19 0400)  SpO2: 96 % (09/19/19  9320) Vital Signs (24h Range):  Temp:  [97.4 °F (36.3 °C)-98.4 °F (36.9 °C)] 98.4 °F (36.9 °C)  Pulse:  [60-85] 60  Resp:  [16-20] 20  SpO2:  [94 %-99 %] 96 %  BP: (107-141)/(59-88) 122/75     Weight: 130.6 kg (288 lb)  Height: 6' (182.9 cm)  Body mass index is 39.06 kg/m².      Intake/Output Summary (Last 24 hours) at 9/19/2019 0885  Last data filed at 9/19/2019 0350  Gross per 24 hour   Intake 600 ml   Output 475 ml   Net 125 ml       Ortho/SPM Exam   LLE:  Through knee amputation  2 separate 1x1cm draining sinuses from the base of the stump, purulent drainage  No swelling  No ecchymosis  +erythema throughout base of stump  No tenderness to palpation of proximal, middle, or distal aspects of femur, tibia, fibula  No tenderness to palpation of foot  Thigh compartments soft and compressible  Painless ROM at hip  SILT thoughout  5/5 strength at hip  2+ femoral pulse  Stump is well perfused    Significant Labs:   CBC:   Recent Labs   Lab 09/18/19  0512 09/19/19  0420   WBC 12.77* 10.76   HGB 12.6* 12.9*   HCT 37.7* 38.3*    271     CMP:   Recent Labs   Lab 09/18/19  0512 09/19/19  0420    137   K 4.4 4.8    101   CO2 26 26   * 170*   BUN 34* 34*   CREATININE 1.4 1.4   CALCIUM 8.5* 8.6*   PROT 6.1 5.7*   ALBUMIN 2.2* 2.2*   BILITOT 0.4 0.3   ALKPHOS 157* 143*   AST 22 17   ALT 94* 65*   ANIONGAP 11 10   EGFRNONAA 53.5* 53.1*     All pertinent labs within the past 24 hours have been reviewed.    Significant Imaging: I have reviewed all pertinent imaging results/findings.    Assessment/Plan:     Amputation stump infection  Elroy Rahman is a 64 y.o. 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    - Blood and urine cultures both positive for MSSA.  Infectious Disease is following, currently recommending IV Ancef.    - MRI L femur to be obtained today  - marked, booked,  consented for surgery tomorrow    Patient not amenable to suggested above knee amputation revision for infection.  He is agreeable to a superficial washout in the operating room tomorrow.  He is cleared for a diet today. He will be NPO at midnight.  Please hold all anticoagulation until surgery.          Cordell Miguel MD  Orthopedics  [facility]

## 2019-09-19 NOTE — ASSESSMENT & PLAN NOTE
Elroy Rahman is a 64 y.o. 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    - Blood and urine cultures both positive for MSSA.  Infectious Disease is following, currently recommending IV Ancef.    - MRI L femur to be obtained today  - marked, booked, consented for surgery tomorrow    Patient not amenable to suggested above knee amputation revision for infection.  He is agreeable to a superficial washout in the operating room tomorrow.  He is cleared for a diet today. He will be NPO at midnight.  Please hold all anticoagulation until surgery.

## 2019-09-19 NOTE — SUBJECTIVE & OBJECTIVE
Interval History: Has remained bacteremic and found to have complex collection in stump of AKA. Planning for superficial washout tomorrow.     Oncology Treatment Plan:   [No treatment plan]    Medications:  Continuous Infusions:  Scheduled Meds:   brimonidine 0.15 % OPTH DROP  1 drop Left Eye BID    ceFAZolin(ANCEF) in D5W 500 mL CONTINUOUS INFUSION  6 g Intravenous Q24H    famotidine  20 mg Oral BID    ketorolac 0.5%  1 drop Left Eye QID    predniSONE  110 mg Oral Daily    sulfamethoxazole-trimethoprim 800-160mg  1 tablet Oral Daily     PRN Meds:acetaminophen, albuterol-ipratropium, Dextrose 10% Bolus, Dextrose 10% Bolus, glucagon (human recombinant), glucose, glucose, HYDROcodone-acetaminophen, insulin aspart U-100, ondansetron, sodium chloride 0.9%     Review of Systems     Constitutional: Positive for  malaise/fatigue (improving).   Respiratory: Negative for shortness of breath.    Musculoskeletal:        + chronic LLE amputation   Neurological: Positive for weakness.     Objective:     Vital Signs (Most Recent):  Temp: 97.9 °F (36.6 °C) (09/19/19 1145)  Pulse: 74 (09/19/19 1145)  Resp: 18 (09/19/19 1145)  BP: 109/68 (09/19/19 1145)  SpO2: 95 % (09/19/19 1145) Vital Signs (24h Range):  Temp:  [97.4 °F (36.3 °C)-98.4 °F (36.9 °C)] 97.9 °F (36.6 °C)  Pulse:  [60-85] 74  Resp:  [16-20] 18  SpO2:  [94 %-99 %] 95 %  BP: (107-141)/(59-88) 109/68     Weight: 130.6 kg (288 lb)  Body mass index is 39.06 kg/m².  Body surface area is 2.58 meters squared.      Intake/Output Summary (Last 24 hours) at 9/19/2019 1426  Last data filed at 9/19/2019 0350  Gross per 24 hour   Intake 600 ml   Output 475 ml   Net 125 ml       Physical Exam    Constitutional: Sitting up on side of bed. Non-toxic appearance  HENT:   Head: Normocephalic and atraumatic.   Neck: Normal range of motion. Mild erythema and thickening to skin around neck circumferentially.  Pulmonary/Chest: Effort normal.  No respiratory distress.   Musculoskeletal:  Normal range of motion.    + chronic LLE amputation  Neurological: He is alert and oriented to person, place, and time. Able to actively abduct arms to shoulder height with mild difficulty  Skin: Skin is warm and dry.     Significant Labs:   Recent Lab Results       09/19/19  0420   09/18/19 2127   09/18/19  1455        Albumin 2.2         Alkaline Phosphatase 143         ALT 65         Anion Gap 10         Aniso Slight         AST 17         BANDS 3.0         Basophil% 0.0         BILIRUBIN TOTAL 0.3  Comment:  For infants and newborns, interpretation of results should be based  on gestational age, weight and in agreement with clinical  observations.  Premature Infant recommended reference ranges:  Up to 24 hours.............<8.0 mg/dL  Up to 48 hours............<12.0 mg/dL  3-5 days..................<15.0 mg/dL  6-29 days.................<15.0 mg/dL           BSA     2.58     BUN, Bld 34         Calcium 8.6         Chloride 101         CO2 26         Creatinine 1.4         Differential Method Manual         Dohle Bodies Present         eGFR if  >60.0         eGFR if non  53.1  Comment:  Calculation used to obtain the estimated glomerular filtration  rate (eGFR) is the CKD-EPI equation.            Eosinophil% 0.0         Glucose 170         Gran% 87.0         Hematocrit 38.3         Hemoglobin 12.9         Immature Grans (Abs) CANCELED  Comment:  Mild elevation in immature granulocytes is non specific and   can be seen in a variety of conditions including stress response,   acute inflammation, trauma and pregnancy. Correlation with other   laboratory and clinical findings is essential.    Result canceled by the ancillary.           Immature Granulocytes CANCELED  Comment:  Result canceled by the ancillary.         Lymph% 5.0         Magnesium 2.2         MCH 31.2         MCHC 33.7         MCV 93         Metamyelocytes 1.0         Mono% 3.0         MPV 9.7         Myelocytes 1.0          nRBC 0         Ovalocytes Occasional         Phosphorus 3.9         Platelets 271         POCT Glucose   187       Poik Slight         Poly Occasional         Potassium 4.8         PROTEIN TOTAL 5.7         RBC 4.13         RDW 14.6         Sodium 137         Toxic Granulation Present         WBC 10.76

## 2019-09-19 NOTE — PLAN OF CARE
Mr. Rahman is scheduled for I&D of LLE stump on 9/20/2019. Given his history of carcinoid bronchial adenoma and prior elevated 5 HIAA the following recommendations were given by his oncologist Dr. London for pre and andre op management to avoid a carcinoid crisis.     Two hours before surgery or procedure give 500 micrograms IV Push of Sandostatin(octreotide acetate).  Premedicate with Zofran 8 mg IV.       Then start a 500 microgram per hour IV infusion of Sandostatin (octreotide acetate)----- start this immediately after the IV push and continue infusion during and after surgery.  No drip necessary for procedures less than 4 hrs.     Depending on the severity and duration of surgery----- taper the infusion over 1-24 hours--say for colonoscopy taper over 1 hour-- after huge liver cases taper over 12-24 hours.     If patient crashes don't use pressors (except as a last resort if the following fails)-- use fluids and 1-5 mg bolus of octreotide (can repeat)-- for malignant hyperthermia use dantrolene in normal doses.

## 2019-09-19 NOTE — SUBJECTIVE & OBJECTIVE
Interval History: VIVI negative for vegetations. Patient and family opted to forgo amputation revision and proceed with irrigation and debridement tomorrow. Plan for MRI today.    Review of Systems   Constitutional: Positive for activity change and fatigue. Negative for chills.   Eyes: Negative for visual disturbance.   Respiratory: Negative for shortness of breath.    Cardiovascular: Negative for palpitations.   Gastrointestinal: Negative for nausea and vomiting.   Musculoskeletal: Positive for gait problem. Negative for back pain.   Skin: Positive for wound.   Neurological: Negative for headaches.   Psychiatric/Behavioral: Negative for agitation. The patient is not nervous/anxious.      Objective:     Vital Signs (Most Recent):  Temp: 98 °F (36.7 °C) (09/19/19 1624)  Pulse: 64 (09/19/19 1624)  Resp: 18 (09/19/19 1624)  BP: 114/61 (09/19/19 1624)  SpO2: (!) 92 % (09/19/19 1624) Vital Signs (24h Range):  Temp:  [97.5 °F (36.4 °C)-98.4 °F (36.9 °C)] 98 °F (36.7 °C)  Pulse:  [60-85] 64  Resp:  [18-20] 18  SpO2:  [92 %-99 %] 92 %  BP: (107-126)/(59-75) 114/61     Weight: 130.6 kg (288 lb)  Body mass index is 39.06 kg/m².    Estimated Creatinine Clearance: 75.5 mL/min (based on SCr of 1.4 mg/dL).    Physical Exam   Constitutional: He is oriented to person, place, and time. He appears well-developed and well-nourished. No distress.   HENT:   Head: Normocephalic and atraumatic.   Right Ear: External ear normal.   Left Ear: External ear normal.   Nose: Nose normal.   Eyes: Right eye exhibits no discharge. Left eye exhibits no discharge. No scleral icterus.   Neck: Normal range of motion.   Cardiovascular: Normal rate and intact distal pulses.   Pulmonary/Chest: Effort normal. No respiratory distress.   Abdominal: Soft. He exhibits no distension. There is no tenderness.   Musculoskeletal: Normal range of motion. He exhibits edema and deformity. He exhibits no tenderness.   L AKA stump with purulent drainage   Neurological:  He is alert and oriented to person, place, and time. No sensory deficit.   Skin: Skin is warm and dry.   Psychiatric: He has a normal mood and affect. His behavior is normal.   Vitals reviewed.      Significant Labs:   Blood Culture:   Recent Labs   Lab 09/16/19  0826 09/16/19  0830 09/17/19  1045 09/18/19  0511 09/18/19  0512   LABBLOO Gram stain aer bottle: Gram positive cocci in clusters resembling Staph   Results called to and read back by: Bhavik López RN 09/17/2019    06:06  STAPHYLOCOCCUS AUREUS  ID consult required at [facility] and [facility]   locations.  * Gram stain aer bottle: Gram positive cocci in clusters resembling Staph   Results called to and read back by:Lesley Handley RN 09/17/2019  11:55  STAPHYLOCOCCUS AUREUS  ID consult required at [facility] and [facility]   locations.  For susceptibility see order #[id number]  * No Growth to date  No Growth to date  No Growth to date  No Growth to date  No Growth to date  No Growth to date No Growth to date  No Growth to date No Growth to date  No Growth to date     CBC:   Recent Labs   Lab 09/18/19  0512 09/19/19  0420   WBC 12.77* 10.76   HGB 12.6* 12.9*   HCT 37.7* 38.3*    271     CMP:   Recent Labs   Lab 09/18/19  0512 09/19/19  0420    137   K 4.4 4.8    101   CO2 26 26   * 170*   BUN 34* 34*   CREATININE 1.4 1.4   CALCIUM 8.5* 8.6*   PROT 6.1 5.7*   ALBUMIN 2.2* 2.2*   BILITOT 0.4 0.3   ALKPHOS 157* 143*   AST 22 17   ALT 94* 65*   ANIONGAP 11 10   EGFRNONAA 53.5* 53.1*     Microbiology Results (last 7 days)     Procedure Component Value Units Date/Time    Blood culture [[id number]] Collected:  09/17/19 1045    Order Status:  Completed Specimen:  Blood Updated:  09/19/19 1212     Blood Culture, Routine No Growth to date      No Growth to date      No Growth to date    Blood culture [[id number]] Collected:  09/17/19 1045    Order Status:  Completed Specimen:  Blood Updated:   09/19/19 1212     Blood Culture, Routine No Growth to date      No Growth to date      No Growth to date    Blood culture [[id number]]  (Abnormal) Collected:  09/16/19 0830    Order Status:  Completed Specimen:  Blood Updated:  09/19/19 1049     Blood Culture, Routine Gram stain aer bottle: Gram positive cocci in clusters resembling Staph       Results called to and read back by:Lesley Handley RN 09/17/2019  11:55      STAPHYLOCOCCUS AUREUS  ID consult required at [facility].  For susceptibility see order #[id number]      Blood culture [[id number]]  (Abnormal)  (Susceptibility) Collected:  09/16/19 0826    Order Status:  Completed Specimen:  Blood Updated:  09/19/19 1049     Blood Culture, Routine Gram stain aer bottle: Gram positive cocci in clusters resembling Staph       Results called to and read back by: Bhavik López RN 09/17/2019        06:06      STAPHYLOCOCCUS AUREUS  ID consult required at [facility].      Blood culture [[id number]] Collected:  09/18/19 0511    Order Status:  Completed Specimen:  Blood Updated:  09/19/19 0613     Blood Culture, Routine No Growth to date      No Growth to date    Blood culture [[id number]] Collected:  09/18/19 0512    Order Status:  Completed Specimen:  Blood Updated:  09/19/19 0613     Blood Culture, Routine No Growth to date      No Growth to date    Blood culture [[id number]]  (Abnormal) Collected:  09/15/19 0616    Order Status:  Completed Specimen:  Blood Updated:  09/18/19 0949     Blood Culture, Routine Gram stain aer bottle: Gram positive cocci in clusters resembling Staph       Results called to and read back by:Lesley Handley RN 09/16/2019  08:47      STAPHYLOCOCCUS AUREUS  ID consult required at [facility].  For susceptibility see order #[id number]      Blood culture [[id number]]  (Abnormal) Collected:  09/15/19 0616    Order Status:   Completed Specimen:  Blood Updated:  09/17/19 1115     Blood Culture, Routine Gram stain aer bottle: Gram positive cocci in clusters resembling Staph       Results called to and read back by: Bhavik López RN 09/16/2019        01:23      STAPHYLOCOCCUS AUREUS  ID consult required at [facility].  For susceptibility see order #[id number]      Blood culture #2 **CANNOT BE ORDERED STAT** [[id number]]  (Abnormal) Collected:  09/13/19 1348    Order Status:  Completed Specimen:  Blood from Peripheral, Hand, Right Updated:  09/16/19 0954     Blood Culture, Routine Gram stain dinorah bottle: Gram positive cocci in clusters resembling Staph       Results called to and read back by: Porfirio Medina RN 09/14/2019  05:49      Gram stain aer bottle: Gram positive cocci in clusters resembling Staph      STAPHYLOCOCCUS AUREUS  ID consult required at [facility].  For susceptibility see order #[id number]      Blood culture [[id number]]  (Abnormal) Collected:  09/13/19 2036    Order Status:  Completed Specimen:  Blood Updated:  09/16/19 0953     Blood Culture, Routine Gram stain dinorah bottle: Gram positive cocci in clusters resembling Staph      Positive results previously called      Gram stain aer bottle: Gram positive cocci in clusters resembling Staph      STAPHYLOCOCCUS AUREUS  ID consult required at [facility].  For susceptibility see order #[id number]      Blood culture #1 **CANNOT BE ORDERED STAT** [[id number]]  (Abnormal)  (Susceptibility) Collected:  09/13/19 1348    Order Status:  Completed Specimen:  Blood from Peripheral, Antecubital, Left Updated:  09/16/19 0953     Blood Culture, Routine Gram stain dinorah bottle: Gram positive cocci in clusters resembling Staph       Results called to and read back by: Porfirio Medina RN 09/14/2019  05:49      Gram stain aer bottle: Gram positive cocci in clusters  resembling Staph      STAPHYLOCOCCUS AUREUS  ID consult required at [facility] and [facility]   locations.      Urine culture [[id number]]  (Abnormal)  (Susceptibility) Collected:  09/13/19 1349    Order Status:  Completed Specimen:  Urine Updated:  09/16/19 0849     Urine Culture, Routine STAPHYLOCOCCUS AUREUS  > 100,000 cfu/ml      Narrative:       GRAY AND YELLOW  Preferred Collection Type->Urine, Clean Catch    Blood culture [[id number]]     Order Status:  Canceled Specimen:  Blood           Significant Imaging: I have reviewed all pertinent imaging results/findings within the past 24 hours.

## 2019-09-19 NOTE — SUBJECTIVE & OBJECTIVE
Principal Problem:Sepsis due to methicillin susceptible Staphylococcus aureus    Principal Orthopedic Problem:  Left leg amputation stump infection    Interval History:  Patient seen and examined at bedside.  He states that he does not wish to undergo above knee amputation revision today. He has agreed to undergo MRI of the left leg today and will undergo an irrigation and debridement of the left leg amputation stump tomorrow in operating room.    Review of patient's allergies indicates:   Allergen Reactions    Epinephrine Other (See Comments)     Carcinoid crisis       Current Facility-Administered Medications   Medication    acetaminophen tablet 650 mg    albuterol-ipratropium 2.5 mg-0.5 mg/3 mL nebulizer solution 3 mL    brimonidine 0.15 % OPTH DROP ophthalmic solution 1 drop    ceFAZolin (ANCEF) 6 g in dextrose 5 % 500 mL CONTINUOUS INFUSION    dextrose 10% (D10W) Bolus    dextrose 10% (D10W) Bolus    famotidine tablet 20 mg    glucagon (human recombinant) injection 1 mg    glucose chewable tablet 16 g    glucose chewable tablet 24 g    HYDROcodone-acetaminophen 5-325 mg per tablet 1 tablet    insulin aspart U-100 pen 0-5 Units    ketorolac 0.5% ophthalmic solution 1 drop    ondansetron injection 4 mg    predniSONE tablet 110 mg    sodium chloride 0.9% flush 10 mL    sulfamethoxazole-trimethoprim 800-160mg per tablet 1 tablet     Objective:     Vital Signs (Most Recent):  Temp: 98.4 °F (36.9 °C) (09/19/19 0400)  Pulse: 60 (09/19/19 0400)  Resp: 20 (09/19/19 0400)  BP: 122/75 (09/19/19 0400)  SpO2: 96 % (09/19/19 0400) Vital Signs (24h Range):  Temp:  [97.4 °F (36.3 °C)-98.4 °F (36.9 °C)] 98.4 °F (36.9 °C)  Pulse:  [60-85] 60  Resp:  [16-20] 20  SpO2:  [94 %-99 %] 96 %  BP: (107-141)/(59-88) 122/75     Weight: 130.6 kg (288 lb)  Height: 6' (182.9 cm)  Body mass index is 39.06 kg/m².      Intake/Output Summary (Last 24 hours) at 9/19/2019 0871  Last data filed at 9/19/2019 0350  Gross per 24 hour    Intake 600 ml   Output 475 ml   Net 125 ml       Ortho/SPM Exam   LLE:  Through knee amputation  2 separate 1x1cm draining sinuses from the base of the stump, purulent drainage  No swelling  No ecchymosis  +erythema throughout base of stump  No tenderness to palpation of proximal, middle, or distal aspects of femur, tibia, fibula  No tenderness to palpation of foot  Thigh compartments soft and compressible  Painless ROM at hip  SILT thoughout  5/5 strength at hip  2+ femoral pulse  Stump is well perfused    Significant Labs:   CBC:   Recent Labs   Lab 09/18/19 0512 09/19/19 0420   WBC 12.77* 10.76   HGB 12.6* 12.9*   HCT 37.7* 38.3*    271     CMP:   Recent Labs   Lab 09/18/19 0512 09/19/19 0420    137   K 4.4 4.8    101   CO2 26 26   * 170*   BUN 34* 34*   CREATININE 1.4 1.4   CALCIUM 8.5* 8.6*   PROT 6.1 5.7*   ALBUMIN 2.2* 2.2*   BILITOT 0.4 0.3   ALKPHOS 157* 143*   AST 22 17   ALT 94* 65*   ANIONGAP 11 10   EGFRNONAA 53.5* 53.1*     All pertinent labs within the past 24 hours have been reviewed.    Significant Imaging: I have reviewed all pertinent imaging results/findings.

## 2019-09-20 LAB
ALBUMIN SERPL BCP-MCNC: 2.5 G/DL (ref 3.5–5.2)
ALP SERPL-CCNC: 136 U/L (ref 55–135)
ALT SERPL W/O P-5'-P-CCNC: 46 U/L (ref 10–44)
ANION GAP SERPL CALC-SCNC: 9 MMOL/L (ref 8–16)
ANISOCYTOSIS BLD QL SMEAR: SLIGHT
AST SERPL-CCNC: 13 U/L (ref 10–40)
BASOPHILS NFR BLD: 0 % (ref 0–1.9)
BILIRUB SERPL-MCNC: 0.4 MG/DL (ref 0.1–1)
BUN SERPL-MCNC: 31 MG/DL (ref 8–23)
CALCIUM SERPL-MCNC: 8.8 MG/DL (ref 8.7–10.5)
CHLORIDE SERPL-SCNC: 101 MMOL/L (ref 95–110)
CO2 SERPL-SCNC: 29 MMOL/L (ref 23–29)
CREAT SERPL-MCNC: 1.3 MG/DL (ref 0.5–1.4)
DIFFERENTIAL METHOD: ABNORMAL
EOSINOPHIL NFR BLD: 0 % (ref 0–8)
ERYTHROCYTE [DISTWIDTH] IN BLOOD BY AUTOMATED COUNT: 14.6 % (ref 11.5–14.5)
EST. GFR  (AFRICAN AMERICAN): >60 ML/MIN/1.73 M^2
EST. GFR  (NON AFRICAN AMERICAN): 58.1 ML/MIN/1.73 M^2
GLUCOSE SERPL-MCNC: 140 MG/DL (ref 70–110)
GRAM STN SPEC: NORMAL
HCT VFR BLD AUTO: 38.8 % (ref 40–54)
HGB BLD-MCNC: 13 G/DL (ref 14–18)
IMM GRANULOCYTES # BLD AUTO: ABNORMAL K/UL (ref 0–0.04)
IMM GRANULOCYTES NFR BLD AUTO: ABNORMAL % (ref 0–0.5)
LYMPHOCYTES NFR BLD: 8 % (ref 18–48)
MAGNESIUM SERPL-MCNC: 2.2 MG/DL (ref 1.6–2.6)
MCH RBC QN AUTO: 30.4 PG (ref 27–31)
MCHC RBC AUTO-ENTMCNC: 33.5 G/DL (ref 32–36)
MCV RBC AUTO: 91 FL (ref 82–98)
METAMYELOCYTES NFR BLD MANUAL: 2 %
MONOCYTES NFR BLD: 1 % (ref 4–15)
NEUTROPHILS NFR BLD: 87 % (ref 38–73)
NEUTS BAND NFR BLD MANUAL: 2 %
NRBC BLD-RTO: 0 /100 WBC
OVALOCYTES BLD QL SMEAR: ABNORMAL
PHOSPHATE SERPL-MCNC: 3.8 MG/DL (ref 2.7–4.5)
PLATELET # BLD AUTO: 307 K/UL (ref 150–350)
PMV BLD AUTO: 9.7 FL (ref 9.2–12.9)
POCT GLUCOSE: 103 MG/DL (ref 70–110)
POCT GLUCOSE: 142 MG/DL (ref 70–110)
POCT GLUCOSE: 152 MG/DL (ref 70–110)
POIKILOCYTOSIS BLD QL SMEAR: SLIGHT
POLYCHROMASIA BLD QL SMEAR: ABNORMAL
POTASSIUM SERPL-SCNC: 5.1 MMOL/L (ref 3.5–5.1)
PROT SERPL-MCNC: 6.2 G/DL (ref 6–8.4)
RBC # BLD AUTO: 4.27 M/UL (ref 4.6–6.2)
SODIUM SERPL-SCNC: 139 MMOL/L (ref 136–145)
WBC # BLD AUTO: 11.67 K/UL (ref 3.9–12.7)

## 2019-09-20 PROCEDURE — 37000008 HC ANESTHESIA 1ST 15 MINUTES: Performed by: ORTHOPAEDIC SURGERY

## 2019-09-20 PROCEDURE — 76942 ECHO GUIDE FOR BIOPSY: CPT | Performed by: STUDENT IN AN ORGANIZED HEALTH CARE EDUCATION/TRAINING PROGRAM

## 2019-09-20 PROCEDURE — 87077 CULTURE AEROBIC IDENTIFY: CPT | Mod: 59

## 2019-09-20 PROCEDURE — 88305 TISSUE EXAM BY PATHOLOGIST: CPT | Performed by: PATHOLOGY

## 2019-09-20 PROCEDURE — 63600175 PHARM REV CODE 636 W HCPCS: Performed by: STUDENT IN AN ORGANIZED HEALTH CARE EDUCATION/TRAINING PROGRAM

## 2019-09-20 PROCEDURE — 64447 FEMORAL SINGLE SHOT: ICD-10-PCS | Mod: 59,LT,, | Performed by: ANESTHESIOLOGY

## 2019-09-20 PROCEDURE — 64447 NJX AA&/STRD FEMORAL NRV IMG: CPT | Performed by: STUDENT IN AN ORGANIZED HEALTH CARE EDUCATION/TRAINING PROGRAM

## 2019-09-20 PROCEDURE — 85027 COMPLETE CBC AUTOMATED: CPT

## 2019-09-20 PROCEDURE — 76937 US GUIDE VASCULAR ACCESS: CPT

## 2019-09-20 PROCEDURE — 63600175 PHARM REV CODE 636 W HCPCS: Performed by: NURSE ANESTHETIST, CERTIFIED REGISTERED

## 2019-09-20 PROCEDURE — 63600175 PHARM REV CODE 636 W HCPCS: Performed by: SURGERY

## 2019-09-20 PROCEDURE — 99233 SBSQ HOSP IP/OBS HIGH 50: CPT | Mod: ,,, | Performed by: INTERNAL MEDICINE

## 2019-09-20 PROCEDURE — D9220A PRA ANESTHESIA: ICD-10-PCS | Mod: ANES,,, | Performed by: ANESTHESIOLOGY

## 2019-09-20 PROCEDURE — 80053 COMPREHEN METABOLIC PANEL: CPT

## 2019-09-20 PROCEDURE — 64445 NJX AA&/STRD SCIATIC NRV IMG: CPT | Performed by: STUDENT IN AN ORGANIZED HEALTH CARE EDUCATION/TRAINING PROGRAM

## 2019-09-20 PROCEDURE — 84100 ASSAY OF PHOSPHORUS: CPT

## 2019-09-20 PROCEDURE — 83735 ASSAY OF MAGNESIUM: CPT

## 2019-09-20 PROCEDURE — 87015 SPECIMEN INFECT AGNT CONCNTJ: CPT | Mod: 59

## 2019-09-20 PROCEDURE — 87075 CULTR BACTERIA EXCEPT BLOOD: CPT | Mod: 59

## 2019-09-20 PROCEDURE — 64447 NJX AA&/STRD FEMORAL NRV IMG: CPT | Mod: 59,LT,, | Performed by: ANESTHESIOLOGY

## 2019-09-20 PROCEDURE — 25000003 PHARM REV CODE 250: Performed by: NURSE ANESTHETIST, CERTIFIED REGISTERED

## 2019-09-20 PROCEDURE — 88305 TISSUE EXAM BY PATHOLOGIST: CPT | Mod: 26,,, | Performed by: PATHOLOGY

## 2019-09-20 PROCEDURE — 11000001 HC ACUTE MED/SURG PRIVATE ROOM

## 2019-09-20 PROCEDURE — 25000003 PHARM REV CODE 250: Performed by: ANESTHESIOLOGY

## 2019-09-20 PROCEDURE — 87205 SMEAR GRAM STAIN: CPT | Mod: 59

## 2019-09-20 PROCEDURE — 37000009 HC ANESTHESIA EA ADD 15 MINS: Performed by: ORTHOPAEDIC SURGERY

## 2019-09-20 PROCEDURE — 36573 INSJ PICC RS&I 5 YR+: CPT

## 2019-09-20 PROCEDURE — 25000003 PHARM REV CODE 250: Performed by: STUDENT IN AN ORGANIZED HEALTH CARE EDUCATION/TRAINING PROGRAM

## 2019-09-20 PROCEDURE — 87070 CULTURE OTHR SPECIMN AEROBIC: CPT | Mod: 59

## 2019-09-20 PROCEDURE — 64445 NJX AA&/STRD SCIATIC NRV IMG: CPT | Mod: 59,LT,, | Performed by: ANESTHESIOLOGY

## 2019-09-20 PROCEDURE — 87116 MYCOBACTERIA CULTURE: CPT | Mod: 59

## 2019-09-20 PROCEDURE — 36415 COLL VENOUS BLD VENIPUNCTURE: CPT

## 2019-09-20 PROCEDURE — 71000015 HC POSTOP RECOV 1ST HR: Performed by: ORTHOPAEDIC SURGERY

## 2019-09-20 PROCEDURE — 99232 PR SUBSEQUENT HOSPITAL CARE,LEVL II: ICD-10-PCS | Mod: ,,, | Performed by: HOSPITALIST

## 2019-09-20 PROCEDURE — 99233 PR SUBSEQUENT HOSPITAL CARE,LEVL III: ICD-10-PCS | Mod: ,,, | Performed by: INTERNAL MEDICINE

## 2019-09-20 PROCEDURE — 64445 POPLITEAL SCIATIC SINGLE INJECTION BLOCK: ICD-10-PCS | Mod: 59,LT,, | Performed by: ANESTHESIOLOGY

## 2019-09-20 PROCEDURE — 87206 SMEAR FLUORESCENT/ACID STAI: CPT | Mod: 91

## 2019-09-20 PROCEDURE — 27201423 OPTIME MED/SURG SUP & DEVICES STERILE SUPPLY: Performed by: ORTHOPAEDIC SURGERY

## 2019-09-20 PROCEDURE — 76942 ECHO GUIDE FOR BIOPSY: CPT | Mod: 26,,, | Performed by: ANESTHESIOLOGY

## 2019-09-20 PROCEDURE — 36000707: Performed by: ORTHOPAEDIC SURGERY

## 2019-09-20 PROCEDURE — 87186 SC STD MICRODIL/AGAR DIL: CPT

## 2019-09-20 PROCEDURE — D9220A PRA ANESTHESIA: Mod: CRNA,,, | Performed by: NURSE ANESTHETIST, CERTIFIED REGISTERED

## 2019-09-20 PROCEDURE — 71000044 HC DOSC ROUTINE RECOVERY FIRST HOUR: Performed by: ORTHOPAEDIC SURGERY

## 2019-09-20 PROCEDURE — 85007 BL SMEAR W/DIFF WBC COUNT: CPT

## 2019-09-20 PROCEDURE — 88305 TISSUE SPECIMEN TO PATHOLOGY - SURGERY: ICD-10-PCS | Mod: 26,,, | Performed by: PATHOLOGY

## 2019-09-20 PROCEDURE — 99232 SBSQ HOSP IP/OBS MODERATE 35: CPT | Mod: ,,, | Performed by: HOSPITALIST

## 2019-09-20 PROCEDURE — C1751 CATH, INF, PER/CENT/MIDLINE: HCPCS

## 2019-09-20 PROCEDURE — 87102 FUNGUS ISOLATION CULTURE: CPT

## 2019-09-20 PROCEDURE — 76942 POPLITEAL SCIATIC SINGLE INJECTION BLOCK: ICD-10-PCS | Mod: 26,,, | Performed by: ANESTHESIOLOGY

## 2019-09-20 PROCEDURE — 36000706: Performed by: ORTHOPAEDIC SURGERY

## 2019-09-20 PROCEDURE — D9220A PRA ANESTHESIA: Mod: ANES,,, | Performed by: ANESTHESIOLOGY

## 2019-09-20 PROCEDURE — D9220A PRA ANESTHESIA: ICD-10-PCS | Mod: CRNA,,, | Performed by: NURSE ANESTHETIST, CERTIFIED REGISTERED

## 2019-09-20 PROCEDURE — 82962 GLUCOSE BLOOD TEST: CPT | Performed by: ORTHOPAEDIC SURGERY

## 2019-09-20 RX ORDER — ONDANSETRON 2 MG/ML
INJECTION INTRAMUSCULAR; INTRAVENOUS
Status: DISCONTINUED | OUTPATIENT
Start: 2019-09-20 | End: 2019-09-20

## 2019-09-20 RX ORDER — SODIUM CHLORIDE 0.9 % (FLUSH) 0.9 %
10 SYRINGE (ML) INJECTION
Status: DISCONTINUED | OUTPATIENT
Start: 2019-09-20 | End: 2019-09-22 | Stop reason: HOSPADM

## 2019-09-20 RX ORDER — SODIUM CHLORIDE 0.9 % (FLUSH) 0.9 %
10 SYRINGE (ML) INJECTION EVERY 6 HOURS
Status: DISCONTINUED | OUTPATIENT
Start: 2019-09-20 | End: 2019-09-22 | Stop reason: HOSPADM

## 2019-09-20 RX ORDER — MIDAZOLAM HYDROCHLORIDE 1 MG/ML
INJECTION, SOLUTION INTRAMUSCULAR; INTRAVENOUS
Status: DISCONTINUED | OUTPATIENT
Start: 2019-09-20 | End: 2019-09-20

## 2019-09-20 RX ORDER — KETAMINE HCL IN 0.9 % NACL 50 MG/5 ML
SYRINGE (ML) INTRAVENOUS
Status: DISCONTINUED | OUTPATIENT
Start: 2019-09-20 | End: 2019-09-20

## 2019-09-20 RX ORDER — PREDNISONE 20 MG/1
10 TABLET ORAL DAILY
Start: 2019-09-20 | End: 2021-07-20

## 2019-09-20 RX ORDER — PROPOFOL 10 MG/ML
VIAL (ML) INTRAVENOUS CONTINUOUS PRN
Status: DISCONTINUED | OUTPATIENT
Start: 2019-09-20 | End: 2019-09-20

## 2019-09-20 RX ORDER — FAMOTIDINE 20 MG/1
20 TABLET, FILM COATED ORAL 2 TIMES DAILY
Qty: 60 TABLET | Refills: 2 | Status: SHIPPED | OUTPATIENT
Start: 2019-09-20 | End: 2021-07-20

## 2019-09-20 RX ORDER — FENTANYL CITRATE 50 UG/ML
25 INJECTION, SOLUTION INTRAMUSCULAR; INTRAVENOUS EVERY 5 MIN PRN
Status: DISCONTINUED | OUTPATIENT
Start: 2019-09-20 | End: 2019-09-20 | Stop reason: HOSPADM

## 2019-09-20 RX ORDER — OCTREOTIDE ACETATE 1000 UG/ML
1000 INJECTION INTRAVENOUS EVERY 5 MIN PRN
Status: DISCONTINUED | OUTPATIENT
Start: 2019-09-20 | End: 2019-09-20 | Stop reason: HOSPADM

## 2019-09-20 RX ORDER — FENTANYL CITRATE 50 UG/ML
INJECTION, SOLUTION INTRAMUSCULAR; INTRAVENOUS
Status: DISCONTINUED | OUTPATIENT
Start: 2019-09-20 | End: 2019-09-20

## 2019-09-20 RX ORDER — MIDAZOLAM HYDROCHLORIDE 1 MG/ML
0.5 INJECTION INTRAMUSCULAR; INTRAVENOUS
Status: DISCONTINUED | OUTPATIENT
Start: 2019-09-20 | End: 2019-09-20 | Stop reason: HOSPADM

## 2019-09-20 RX ORDER — BUPIVACAINE HYDROCHLORIDE AND EPINEPHRINE 5; 5 MG/ML; UG/ML
INJECTION, SOLUTION EPIDURAL; INTRACAUDAL; PERINEURAL
Status: COMPLETED | OUTPATIENT
Start: 2019-09-20 | End: 2019-09-20

## 2019-09-20 RX ORDER — ONDANSETRON 4 MG/1
4 TABLET, FILM COATED ORAL DAILY PRN
Status: ON HOLD
Start: 2019-09-20 | End: 2021-07-22 | Stop reason: SDUPTHER

## 2019-09-20 RX ORDER — PROPOFOL 10 MG/ML
VIAL (ML) INTRAVENOUS
Status: DISCONTINUED | OUTPATIENT
Start: 2019-09-20 | End: 2019-09-20

## 2019-09-20 RX ORDER — PREDNISONE 50 MG/1
100 TABLET ORAL DAILY
Start: 2019-09-20 | End: 2020-09-19

## 2019-09-20 RX ORDER — ONDANSETRON 2 MG/ML
8 INJECTION INTRAMUSCULAR; INTRAVENOUS ONCE
Status: COMPLETED | OUTPATIENT
Start: 2019-09-20 | End: 2019-09-20

## 2019-09-20 RX ORDER — LIDOCAINE HCL/PF 100 MG/5ML
SYRINGE (ML) INTRAVENOUS
Status: DISCONTINUED | OUTPATIENT
Start: 2019-09-20 | End: 2019-09-20

## 2019-09-20 RX ORDER — SULFAMETHOXAZOLE AND TRIMETHOPRIM 800; 160 MG/1; MG/1
1 TABLET ORAL DAILY
Qty: 30 TABLET | Refills: 1 | Status: SHIPPED | OUTPATIENT
Start: 2019-09-21 | End: 2019-10-24

## 2019-09-20 RX ADMIN — ONDANSETRON 4 MG: 2 INJECTION INTRAMUSCULAR; INTRAVENOUS at 02:09

## 2019-09-20 RX ADMIN — FENTANYL CITRATE 50 MCG: 50 INJECTION INTRAMUSCULAR; INTRAVENOUS at 10:09

## 2019-09-20 RX ADMIN — PREDNISONE 110 MG: 20 TABLET ORAL at 06:09

## 2019-09-20 RX ADMIN — PROPOFOL 75 MCG/KG/MIN: 10 INJECTION, EMULSION INTRAVENOUS at 02:09

## 2019-09-20 RX ADMIN — BUPIVACAINE HYDROCHLORIDE AND EPINEPHRINE BITARTRATE 20 ML: 5; .005 INJECTION, SOLUTION EPIDURAL; INTRACAUDAL; PERINEURAL at 10:09

## 2019-09-20 RX ADMIN — BRIMONIDINE TARTRATE 1 DROP: 1.5 SOLUTION OPHTHALMIC at 08:09

## 2019-09-20 RX ADMIN — FENTANYL CITRATE 25 MCG: 50 INJECTION, SOLUTION INTRAMUSCULAR; INTRAVENOUS at 03:09

## 2019-09-20 RX ADMIN — PROPOFOL 50 MG: 10 INJECTION, EMULSION INTRAVENOUS at 02:09

## 2019-09-20 RX ADMIN — DEXTROSE 6 G: 5 SOLUTION INTRAVENOUS at 03:09

## 2019-09-20 RX ADMIN — DEXTROSE 6 G: 5 SOLUTION INTRAVENOUS at 10:09

## 2019-09-20 RX ADMIN — MIDAZOLAM HYDROCHLORIDE 2 MG: 1 INJECTION, SOLUTION INTRAMUSCULAR; INTRAVENOUS at 02:09

## 2019-09-20 RX ADMIN — ONDANSETRON 8 MG: 2 INJECTION INTRAMUSCULAR; INTRAVENOUS at 05:09

## 2019-09-20 RX ADMIN — FAMOTIDINE 20 MG: 20 TABLET, FILM COATED ORAL at 08:09

## 2019-09-20 RX ADMIN — MIDAZOLAM HYDROCHLORIDE 2 MG: 1 INJECTION, SOLUTION INTRAMUSCULAR; INTRAVENOUS at 10:09

## 2019-09-20 RX ADMIN — FAMOTIDINE 20 MG: 20 TABLET, FILM COATED ORAL at 11:09

## 2019-09-20 RX ADMIN — Medication 20 MG: at 02:09

## 2019-09-20 RX ADMIN — SODIUM CHLORIDE 100 ML: 9 INJECTION, SOLUTION INTRAVENOUS at 02:09

## 2019-09-20 RX ADMIN — LIDOCAINE HYDROCHLORIDE 60 MG: 20 INJECTION, SOLUTION INTRAVENOUS at 02:09

## 2019-09-20 RX ADMIN — KETOROLAC TROMETHAMINE 1 DROP: 5 SOLUTION/ DROPS OPHTHALMIC at 08:09

## 2019-09-20 RX ADMIN — OCTREOTIDE ACETATE 500 MCG: 100 INJECTION, SOLUTION INTRAVENOUS; SUBCUTANEOUS at 05:09

## 2019-09-20 RX ADMIN — KETOROLAC TROMETHAMINE 1 DROP: 5 SOLUTION/ DROPS OPHTHALMIC at 05:09

## 2019-09-20 RX ADMIN — OCTREOTIDE ACETATE 500 MCG/HR: 1000 INJECTION, SOLUTION INTRAVENOUS; SUBCUTANEOUS at 09:09

## 2019-09-20 RX ADMIN — BUPIVACAINE HYDROCHLORIDE AND EPINEPHRINE BITARTRATE 20 ML: 5; .005 INJECTION, SOLUTION EPIDURAL; INTRACAUDAL; PERINEURAL at 11:09

## 2019-09-20 RX ADMIN — DEXTROSE 6 G: 5 SOLUTION INTRAVENOUS at 05:09

## 2019-09-20 NOTE — PLAN OF CARE
Problem: Adult Inpatient Plan of Care  Goal: Plan of Care Review  Outcome: Ongoing (interventions implemented as appropriate)     09/20/19 0533 09/20/19 1448   Plan of Care Review   Plan of Care Reviewed With  --  patient   Progress improving  --      Pt returned from surgery aaox4. V/ stable. No complaints at this time. Will continue to monitor and interventions as appropriate.

## 2019-09-20 NOTE — ANESTHESIA PROCEDURE NOTES
Popliteal Sciatic Single Injection Block    Patient location during procedure: pre-op   Block not for primary anesthetic.  Reason for block: at surgeon's request and post-op pain management   Post-op Pain Location: Left knee  Start time: 9/20/2019 11:01 AM  Timeout: 9/20/2019 11:00 AM   End time: 9/20/2019 11:05 AM    Staffing  Authorizing Provider: Wayne Shields MD  Performing Provider: Umesh Cartwright MD    Preanesthetic Checklist  Completed: patient identified, site marked, surgical consent, pre-op evaluation, timeout performed, IV checked, risks and benefits discussed and monitors and equipment checked  Peripheral Block  Patient position: supine  Prep: ChloraPrep  Patient monitoring: heart rate, cardiac monitor, continuous pulse ox, continuous capnometry and frequent blood pressure checks  Block type: popliteal  Laterality: left  Injection technique: single shot  Needle  Needle type: Stimuplex   Needle gauge: 21 G  Needle length: 4 in  Needle localization: anatomical landmarks and ultrasound guidance   -ultrasound image captured on disc.  Assessment  Injection assessment: negative aspiration, negative parasthesia and local visualized surrounding nerve  Paresthesia pain: none  Heart rate change: no  Slow fractionated injection: yes  Additional Notes  VSS.  [facility] RN monitoring vitals throughout procedure.  Patient tolerated procedure well.

## 2019-09-20 NOTE — ANESTHESIA POSTPROCEDURE EVALUATION
Anesthesia Post Evaluation    Patient: Elroy Rahman    Procedure(s) Performed: Procedure(s) (LRB):  IRRIGATION AND DEBRIDEMENT, LOWER EXTREMITY - left - cysto tubing - cultures (Left)    Final Anesthesia Type: general  Patient location during evaluation: PACU  Patient participation: Yes- Able to Participate  Level of consciousness: awake and alert and oriented  Post-procedure vital signs: reviewed and stable  Pain management: adequate  Airway patency: patent  PONV status at discharge: No PONV  Anesthetic complications: no      Cardiovascular status: blood pressure returned to baseline and hemodynamically stable  Respiratory status: unassisted, room air and spontaneous ventilation  Hydration status: euvolemic  Follow-up not needed.          Vitals Value Taken Time   /71 9/20/2019  4:16 PM   Temp 36.1 °C (97 °F) 9/20/2019  3:45 PM   Pulse 51 9/20/2019  4:18 PM   Resp 19 9/20/2019  4:18 PM   SpO2 97 % 9/20/2019  4:18 PM   Vitals shown include unvalidated device data.      No case tracking events are documented in the log.      Pain/Masood Score: Pain Rating Prior to Med Admin: 0 (9/20/2019 10:10 AM)  Masood Score: 10 (9/20/2019  3:45 PM)

## 2019-09-20 NOTE — PLAN OF CARE
09/20/19 1520   Post-Acute Status   Post-Acute Authorization Medications  (coram IV ABX)   Post-Acute Placement Status Referrals Sent   Home Health/Hospice Status Referrals Sent   Discharge Delays None known at this time   Referral sent to Rober diggs and [facility] notified.  Kavya Conrad notified that DC will likely happen on Saturday.

## 2019-09-20 NOTE — ANESTHESIA PROCEDURE NOTES
Femoral Single Shot    Patient location during procedure: pre-op   Block not for primary anesthetic.  Reason for block: at surgeon's request and post-op pain management   Post-op Pain Location: LEFT Leg  Start time: 9/20/2019 10:11 AM  Timeout: 9/20/2019 10:10 AM   End time: 9/20/2019 10:19 AM    Staffing  Authorizing Provider: Wanye Shields MD  Performing Provider: Francisco Peterson MD    Preanesthetic Checklist  Completed: patient identified, site marked, surgical consent, pre-op evaluation, timeout performed, IV checked, risks and benefits discussed and monitors and equipment checked  Peripheral Block  Patient position: supine  Prep: ChloraPrep  Patient monitoring: heart rate, cardiac monitor, continuous pulse ox, continuous capnometry and frequent blood pressure checks  Block type: femoral  Laterality: left  Injection technique: single shot  Needle  Needle type: Stimuplex   Needle gauge: 22 G  Needle length: 4 in  Needle localization: anatomical landmarks and ultrasound guidance   -ultrasound image captured on disc.  Assessment  Injection assessment: negative aspiration, negative parasthesia and local visualized surrounding nerve  Paresthesia pain: none  Heart rate change: no  Slow fractionated injection: yes  Additional Notes  VSS.  [facility] RN monitoring vitals throughout procedure.  Patient tolerated procedure well.

## 2019-09-20 NOTE — PT/OT/SLP DISCHARGE
Physical Therapy Discharge Summary    Name: Elroy Rahman  MRN: [id number]   Principal Problem: Sepsis due to methicillin susceptible Staphylococcus aureus     Patient Discharged from acute Physical Therapy on 2019.  Please refer to prior PT noted date on 2019 for functional status.     Assessment:     Pt underwent AKA on .    Objective:     GOALS:   Multidisciplinary Problems     Physical Therapy Goals        Problem: Physical Therapy Goal    Goal Priority Disciplines Outcome Goal Variances Interventions   Physical Therapy Goal     PT, PT/OT Ongoing (interventions implemented as appropriate)     Description:  Goals to be met by: 2019     Patient will increase functional independence with mobility by performin. Supine to sit with Stand-by Assistance   2. Sit to supine with Stand-by Assistance  3. Sit to stand transfer with Contact Guard Assistance  4. Bed to chair transfer with Contact Guard Assistance  5. Gait  x 10 feet with Contact Guard Assistance using crutches  6. Wheelchair propulsion x100 feet with Supervision using bilateral upper extremities  7. Ascend/descend 2 stair with Handrail Contact Guard Assistance  8. Lower extremity exercise program x15 reps per handout, with supervision                       Reasons for Discontinuation of Therapy Services  Transfer to alternate level of care.      Plan:     Patient Discharged to: [facility].    Madeleine Lawrence, PT  2019

## 2019-09-20 NOTE — NURSING TRANSFER
Nursing Transfer Note      9/20/2019     Transfer To: [address]    Transfer via bed    Transported by [facility]    Medicines sent: IV fluids     Chart send with patient: Yes    Notified: daughter    Patient reassessed at: 9/20/2019     Upon arrival to floor: cardiac monitor applied, patient oriented to room, call bell in reach and bed in lowest position

## 2019-09-20 NOTE — SUBJECTIVE & OBJECTIVE
Interval History: for wash out today by ortho- octreotide protocol started by Dr. downs instruction     Review of Systems   Constitutional: Positive for activity change. Negative for appetite change.   HENT: Negative for congestion, postnasal drip and sore throat.    Eyes: Positive for visual disturbance.   Respiratory: Negative for cough, chest tightness and shortness of breath.    Cardiovascular: Negative for chest pain, palpitations and leg swelling.   Gastrointestinal: Positive for nausea. Negative for abdominal pain, constipation, diarrhea and vomiting.   Endocrine: Negative for heat intolerance.   Genitourinary: Negative for difficulty urinating and dysuria.   Musculoskeletal: Negative for arthralgias, back pain and myalgias.   Neurological: Positive for weakness (improved) and light-headedness. Negative for numbness and headaches.     Objective:     Vital Signs (Most Recent):  Temp: 98.2 °F (36.8 °C) (09/20/19 0829)  Pulse: 60 (09/20/19 0829)  Resp: 19 (09/20/19 0829)  BP: (!) 144/83 (09/20/19 0829)  SpO2: 98 % (09/20/19 0829) Vital Signs (24h Range):  Temp:  [96.6 °F (35.9 °C)-98.2 °F (36.8 °C)] 98.2 °F (36.8 °C)  Pulse:  [60-81] 60  Resp:  [18-20] 19  SpO2:  [92 %-98 %] 98 %  BP: (109-144)/(61-83) 144/83     Weight: 130.6 kg (288 lb)  Body mass index is 39.06 kg/m².    Intake/Output Summary (Last 24 hours) at 9/20/2019 1002  Last data filed at 9/20/2019 0533  Gross per 24 hour   Intake 828.8 ml   Output 1550 ml   Net -721.2 ml      Physical Exam   Constitutional: He is oriented to person, place, and time. He appears well-developed and well-nourished. He does not appear ill. No distress.   Obese   HENT:   Head: Normocephalic and atraumatic.   Eyes: No scleral icterus.   Neck: Normal range of motion. Neck supple.   Cardiovascular: Normal rate, regular rhythm and intact distal pulses. Exam reveals no gallop and no friction rub.   No murmur heard.  Pulmonary/Chest: Effort normal and breath sounds normal. No  respiratory distress.   Abdominal: Soft. He exhibits distension. There is no tenderness. There is no guarding.   Musculoskeletal:   b/l UE 5/5 muscle strength  RLE: 3-4/5 muscle strength. Able to move his R foot and bend his right knee  LLE stump redness improving, some scabbing at the base of the stump   Neurological: He is alert and oriented to person, place, and time.   Skin: Skin is warm.   Nursing note and vitals reviewed.      Significant Labs:   CBC:   Recent Labs   Lab 09/19/19  0420 09/20/19  0730   WBC 10.76 11.67   HGB 12.9* 13.0*   HCT 38.3* 38.8*    307     CMP:   Recent Labs   Lab 09/19/19  0420 09/20/19  0730    139   K 4.8 5.1    101   CO2 26 29   * 140*   BUN 34* 31*   CREATININE 1.4 1.3   CALCIUM 8.6* 8.8   PROT 5.7* 6.2   ALBUMIN 2.2* 2.5*   BILITOT 0.3 0.4   ALKPHOS 143* 136*   AST 17 13   ALT 65* 46*   ANIONGAP 10 9   EGFRNONAA 53.1* 58.1*       Significant Imaging: I have reviewed all pertinent imaging results/findings within the past 24 hours.

## 2019-09-20 NOTE — TRANSFER OF CARE
Anesthesia Transfer of Care Note    Patient: Elroy Rahman    Procedure(s) Performed: Procedure(s) (LRB):  IRRIGATION AND DEBRIDEMENT, LOWER EXTREMITY - left - cysto tubing - cultures (Left)    Patient location: [facility]    Anesthesia Type: regional and MAC    Transport from OR: Transported from OR on room air with adequate spontaneous ventilation    Post pain: adequate analgesia    Post assessment: no apparent anesthetic complications and tolerated procedure well    Post vital signs: stable    Level of consciousness: awake, alert and oriented    Nausea/Vomiting: no nausea/vomiting    Complications: none          Last vitals:   Visit Vitals  /76 (BP Location: Left arm, Patient Position: Lying)   Pulse (!) 57   Temp 36.1 °C (97 °F) (Temporal)   Resp 18   Ht 6' (1.829 m)   Wt 130.6 kg (288 lb)   SpO2 100%   BMI 39.06 kg/m²

## 2019-09-20 NOTE — PT/OT/SLP DISCHARGE
Occupational Therapy Discharge Summary    Elroy Rahman  MRN: [id number]   Principal Problem: Sepsis due to methicillin susceptible Staphylococcus aureus      Patient Discharged from acute Occupational Therapy on .  Please refer to prior OT note dated  for functional status.    Assessment:      Patient was discharged unexpectedly.  Information required to complete an accurate discharge summary is unknown.  Refer to therapy initial evaluation and last progress note for initial and most recent functional status and goal achievement.  Recommendations made may be found in medical record.    Objective:     GOALS:   Multidisciplinary Problems     Occupational Therapy Goals        Problem: Occupational Therapy Goal    Goal Priority Disciplines Outcome Interventions   Occupational Therapy Goal     OT, PT/OT Ongoing (interventions implemented as appropriate)    Description:  Goals to be met by: 19     Patient will increase functional independence with ADLs by performing:    UE Dressing with [place].  LE Dressing with Moderate Assistance.  Grooming while seated with [place].  Toileting from bedside commode with Moderate Assistance for hygiene and clothing management.   Toilet transfer to bedside commode with Moderate Assistance. Met                       Reasons for Discontinuation of Therapy Services  Orders .      Plan:     Patient Discharged to: Hospital room [facility].  Re-order OT when appropriate.  Previous rec for d/c was IP Rehab.     Deon Stinson, OT  2019

## 2019-09-20 NOTE — PT/OT/SLP PROGRESS
Occupational Therapy      Patient Name:  Elroy Rahman   MRN:  [id number]    Patient not seen today secondary to Other (Comment)(Orders  secondary to Sx on 2019). Will follow-up once medicine clears Pt from Sx and re-orders OT.    Deon Stinson, OT  2019

## 2019-09-20 NOTE — ASSESSMENT & PLAN NOTE
Pt with diaphoresis and diffuse muscle weakness leading to admission associated with fevers while in the hospital. BCx and UCx positive for staph aureus.     Plan  - Continue cefazolin   - ID consulted: following up recs  - blood cultures daily until clear. (last + BCx 9/16)  - VIVI 09/18: No evidence of small vegetation or masses  - U/S of left extremity: Small complex fluid collection 2.4 x 1.2 x 2.4   - Orthopedic surgery consulted:  for I&D 9/20/2019. Pt refused stump revision.

## 2019-09-20 NOTE — PROCEDURES
Elryo Rahman is a 64 y.o. male patient.    Temp: 97.4 °F (36.3 °C) (09/20/19 1656)  Pulse: (!) 55 (09/20/19 1656)  Resp: 16 (09/20/19 1656)  BP: (!) 147/79 (09/20/19 1656)  SpO2: 99 % (09/20/19 1656)  Weight: 130.6 kg (288 lb) (09/14/19 1622)  Height: 6' (182.9 cm) (09/14/19 1622)    PICC  Date/Time: 9/20/2019 5:25 PM  Performed by: Shadia Mandujano RN  Consent Done: Yes  Time out: Immediately prior to procedure a time out was called to verify the correct patient, procedure, equipment, support staff and site/side marked as required  Indications: med administration and vascular access  Anesthesia: local infiltration  Local anesthetic: lidocaine 1% without epinephrine  Anesthetic Total (mL): 3  Preparation: skin prepped with ChloraPrep  Skin prep agent dried: skin prep agent completely dried prior to procedure  Sterile barriers: all five maximum sterile barriers used - cap, mask, sterile gown, sterile gloves, and large sterile sheet  Hand hygiene: hand hygiene performed prior to central venous catheter insertion  Location details: right basilic  Catheter type: double lumen  Catheter size: 5 Fr  Catheter Length: 40cm    Ultrasound guidance: yes  Vessel Caliber: medium and patent, compressibility normal  Needle advanced into vessel with real time Ultrasound guidance.  Guidewire confirmed in vessel.  Image recorded and saved.  Sterile sheath used.  Number of attempts: 1  Post-procedure: blood return through all ports, chlorhexidine patch and sterile dressing applied  Specimens: No  Implants: No  Assessment: placement verified by x-ray  Complications: none          Damian Walden  9/20/2019

## 2019-09-20 NOTE — PROGRESS NOTES
[facility] Hospital Medicine  Progress Note    Patient Name: Elroy Rahman  MRN: [id number]  Patient Class: IP- Inpatient   Admission Date: 9/13/2019  Length of Stay: 6 days  Attending Physician: Emil Dya MD  Primary Care Provider: Wayne Catalan PA-C    [facility] Medicine Team: [facility] MED 1 [facility] Pancho Pérez MD    Subjective:     Principal Problem:Sepsis due to methicillin susceptible Staphylococcus aureus        HPI:  Mr. 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    Pt reports that on Tuesday he was experiencing chills, diaphoresis and severe muscle weakness. The weakness began suddenly, not concentrated in any one muscle group and progressively worsened. Unfortunately by late that evening he was unable to move around on his own. At baseline pt does have a left LE amputation and is able to ambulate freely with and without his prosthesis. He denies any bowel or bladder incontinence, back pain, numbness, perineal numbness or extremity pain.   Per notes: He was started on PRRT back in April and began prednisone due to inflammation noted in the globe of his eye. Beginning on 9/5 he was to take Prednisone 130 mg for days and then proceed with 110mg for an additional five days. Pt reports that he is compliant with his medication regimen.     Overview/Hospital Course:  No notes on file    Interval History: for wash out today by ortho- octreotide protocol started by Dr. downs instruction     Review of Systems   Constitutional: Positive for activity change. Negative for appetite change.   HENT: Negative for congestion, postnasal drip and sore throat.    Eyes: Positive for visual disturbance.   Respiratory: Negative for cough, chest  tightness and shortness of breath.    Cardiovascular: Negative for chest pain, palpitations and leg swelling.   Gastrointestinal: Positive for nausea. Negative for abdominal pain, constipation, diarrhea and vomiting.   Endocrine: Negative for heat intolerance.   Genitourinary: Negative for difficulty urinating and dysuria.   Musculoskeletal: Negative for arthralgias, back pain and myalgias.   Neurological: Positive for weakness (improved) and light-headedness. Negative for numbness and headaches.     Objective:     Vital Signs (Most Recent):  Temp: 98.2 °F (36.8 °C) (09/20/19 0829)  Pulse: 60 (09/20/19 0829)  Resp: 19 (09/20/19 0829)  BP: (!) 144/83 (09/20/19 0829)  SpO2: 98 % (09/20/19 0829) Vital Signs (24h Range):  Temp:  [96.6 °F (35.9 °C)-98.2 °F (36.8 °C)] 98.2 °F (36.8 °C)  Pulse:  [60-81] 60  Resp:  [18-20] 19  SpO2:  [92 %-98 %] 98 %  BP: (109-144)/(61-83) 144/83     Weight: 130.6 kg (288 lb)  Body mass index is 39.06 kg/m².    Intake/Output Summary (Last 24 hours) at 9/20/2019 1002  Last data filed at 9/20/2019 0533  Gross per 24 hour   Intake 828.8 ml   Output 1550 ml   Net -721.2 ml      Physical Exam   Constitutional: He is oriented to person, place, and time. He appears well-developed and well-nourished. He does not appear ill. No distress.   Obese   HENT:   Head: Normocephalic and atraumatic.   Eyes: No scleral icterus.   Neck: Normal range of motion. Neck supple.   Cardiovascular: Normal rate, regular rhythm and intact distal pulses. Exam reveals no gallop and no friction rub.   No murmur heard.  Pulmonary/Chest: Effort normal and breath sounds normal. No respiratory distress.   Abdominal: Soft. He exhibits distension. There is no tenderness. There is no guarding.   Musculoskeletal:   b/l UE 5/5 muscle strength  RLE: 3-4/5 muscle strength. Able to move his R foot and bend his right knee  LLE stump redness improving, some scabbing at the base of the stump   Neurological: He is alert and oriented to  person, place, and time.   Skin: Skin is warm.   Nursing note and vitals reviewed.      Significant Labs:   CBC:   Recent Labs   Lab 09/19/19  0420 09/20/19  0730   WBC 10.76 11.67   HGB 12.9* 13.0*   HCT 38.3* 38.8*    307     CMP:   Recent Labs   Lab 09/19/19  0420 09/20/19  0730    139   K 4.8 5.1    101   CO2 26 29   * 140*   BUN 34* 31*   CREATININE 1.4 1.3   CALCIUM 8.6* 8.8   PROT 5.7* 6.2   ALBUMIN 2.2* 2.5*   BILITOT 0.3 0.4   ALKPHOS 143* 136*   AST 17 13   ALT 65* 46*   ANIONGAP 10 9   EGFRNONAA 53.1* 58.1*       Significant Imaging: I have reviewed all pertinent imaging results/findings within the past 24 hours.      Assessment/Plan:      * Sepsis due to methicillin susceptible Staphylococcus aureus  Pt with diaphoresis and diffuse muscle weakness leading to admission associated with fevers while in the hospital. BCx and UCx positive for staph aureus.     Plan  - Continue cefazolin   - ID consulted: following up recs  - blood cultures daily until clear. (last + BCx 9/16)  - VIVI 09/18: No evidence of small vegetation or masses  - U/S of left extremity: Small complex fluid collection 2.4 x 1.2 x 2.4   - Orthopedic surgery consulted:  for I&D 9/20/2019. Pt refused stump revision.       Impaired mobility and ADLs  Working with PT/OT    MAGDI (acute kidney injury)  sCr elevated from baseline (0.9 > 1.4). Pre-renal vs intrarenal. Pt without improvement after fluid bolus, most likely attributed to sepsis secondary to staph aureus (U/A and UCx grew staph aureus)    Plan  - Fluid resuscitation   - Trend creatinine.    UTI (urinary tract infection)  Urinalysis with 1+ protein,22WBC and occasional bacteria. He has leukocytosis of 12.78. UCx positive for staph aureus. Pt without any urinary symptoms     Plan   - Continue cefazolin.   - ID consulted, f/u recs     Acute muscle weakness  Pt with a medical history of metastatic carcinoma and corticosteroid use presenting with LE weakness of  sudden onset most likely due to sepsis. BCx growing gram positive cocci in clusters, UCx showing staph aureus. (see sepsis)  Cord compression initially suspected due to acute presentation, MRI spine final report showing metastatic disease without any mass effect. Steroid induced myopathy considered since pt is on a high dose of steroids (CPK elevated, U/A with 3+ blood and only 13 RBC).     Myopathy        Carcinoid bronchial adenoma of left lung  MHx of bronchial carcinoid tumor with metastases to the liver, bone and mesentery. He is also s/p left upper lung lobectomy done in 2016. Pt currently undergoing peptide receptor radionucleotide therapy, being followed by Dr. London.    - Oncology following during this admission    Octreotide protocol   Two hours before surgery or procedure give 500 micrograms IV Push of Sandostatin(octreotide acetate).  Premedicate with Zofran 8 mg IV.  (done)     Then start a 500 microgram per hour IV infusion of Sandostatin (octreotide acetate)----- start this immediately after the IV push and continue infusion during and after surgery.  No drip necessary for procedures less than 4 hrs.     Depending on the severity and duration of surgery----- taper the infusion over 1-24 hours--say for colonoscopy taper over 1 hour-- after huge liver cases taper over 12-24 hours.     If patient crashes don't use pressors (except as a last resort if the following fails)-- use fluids and 1-5 mg bolus of octreotide (can repeat)-- for malignant hyperthermia use dantrolene in normal doses.      VTE Risk Mitigation (From admission, onward)        Ordered     Reason for no Mechanical VTE Prophylaxis  Once      09/13/19 1933     IP VTE HIGH RISK PATIENT  Once      09/13/19 1933     Place sequential compression device  Until discontinued      09/13/19 1641                [facility] Pancho Pérez MD  Department of Hospital Medicine   [facility]

## 2019-09-20 NOTE — PROGRESS NOTES
[facility]  Orthopedics  Progress Note    Patient Name: Elroy Rahman  MRN: [id number]  Admission Date: 9/13/2019  Hospital Length of Stay: 6 days  Attending Provider: Emil Day MD  Primary Care Provider: Wayne Catalan PA-C  Follow-up For: Procedure(s) (LRB):  IRRIGATION AND DEBRIDEMENT, LOWER EXTREMITY - left - cysto tubing - cultures (Left)    Post-Operative Day: Day of Surgery  Subjective:     Principal Problem:Sepsis due to methicillin susceptible Staphylococcus aureus    Principal Orthopedic Problem:  Left leg amputation stump infection    Interval History:  Patient seen and examined at bedside.  He reports he is ready for I&D of R leg this morning. No complaints.    Review of patient's allergies indicates:   Allergen Reactions    Epinephrine Other (See Comments)     Carcinoid crisis       Current Facility-Administered Medications   Medication    acetaminophen tablet 650 mg    albuterol-ipratropium 2.5 mg-0.5 mg/3 mL nebulizer solution 3 mL    brimonidine 0.15 % OPTH DROP ophthalmic solution 1 drop    ceFAZolin (ANCEF) 6 g in dextrose 5 % 500 mL CONTINUOUS INFUSION    dextrose 10% (D10W) Bolus    dextrose 10% (D10W) Bolus    famotidine tablet 20 mg    fentaNYL injection 25 mcg    glucagon (human recombinant) injection 1 mg    glucose chewable tablet 16 g    glucose chewable tablet 24 g    HYDROcodone-acetaminophen 5-325 mg per tablet 1 tablet    insulin aspart U-100 pen 0-5 Units    ketorolac 0.5% ophthalmic solution 1 drop    midazolam (VERSED) 1 mg/mL injection 0.5 mg    octreotide (SANDOSTATIN) 5,000 mcg in sodium chloride 0.9% 100 mL infusion    octreotide injection 1,000 mcg    ondansetron injection 4 mg    predniSONE tablet 110 mg    sodium chloride 0.9% flush 10 mL    sulfamethoxazole-trimethoprim 800-160mg per tablet 1 tablet     Objective:     Vital Signs (Most Recent):  Temp: 98.2 °F (36.8 °C) (09/20/19 0829)  Pulse: (!) 57 (09/20/19 1230)  Resp:  18 (09/20/19 1230)  BP: 136/76 (09/20/19 1230)  SpO2: 100 % (09/20/19 1230) Vital Signs (24h Range):  Temp:  [96.6 °F (35.9 °C)-98.2 °F (36.8 °C)] 98.2 °F (36.8 °C)  Pulse:  [54-81] 57  Resp:  [14-20] 18  SpO2:  [92 %-100 %] 100 %  BP: (114-146)/(57-83) 136/76     Weight: 130.6 kg (288 lb)  Height: 6' (182.9 cm)  Body mass index is 39.06 kg/m².      Intake/Output Summary (Last 24 hours) at 9/20/2019 1350  Last data filed at 9/20/2019 0533  Gross per 24 hour   Intake 828.8 ml   Output 1550 ml   Net -721.2 ml       Ortho/SPM Exam     LLE:  Through knee amputation  2 separate 1x1cm draining sinuses from the base of the stump, purulent drainage  No swelling  No ecchymosis  +erythema throughout base of stump  No tenderness to palpation of proximal, middle, or distal aspects of femur, tibia, fibula  No tenderness to palpation of foot  Thigh compartments soft and compressible  Painless ROM at hip  SILT thoughout  5/5 strength at hip  2+ femoral pulse  Stump is well perfused    Significant Labs:   CBC:   Recent Labs   Lab 09/19/19  0420 09/20/19  0730   WBC 10.76 11.67   HGB 12.9* 13.0*   HCT 38.3* 38.8*    307     CMP:   Recent Labs   Lab 09/19/19  0420 09/20/19  0730    139   K 4.8 5.1    101   CO2 26 29   * 140*   BUN 34* 31*   CREATININE 1.4 1.3   CALCIUM 8.6* 8.8   PROT 5.7* 6.2   ALBUMIN 2.2* 2.5*   BILITOT 0.3 0.4   ALKPHOS 143* 136*   AST 17 13   ALT 65* 46*   ANIONGAP 10 9   EGFRNONAA 53.1* 58.1*     All pertinent labs within the past 24 hours have been reviewed.    Significant Imaging: I have reviewed all pertinent imaging results/findings.    Assessment/Plan:     Amputation stump infection  Elroy Rahman is a 64 y.o. 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  Plan for I&D of LLE in OR today.     - Blood and urine cultures both positive for MSSA.  Infectious  Disease is following, currently recommending IV Ancef.    - marked, booked, consented for surgery today  -Proceed to OR  -NPO since midnight    The risks, benefits and alternatives to surgery were discussed with the patient at great length.  These include pain, bleeding, infection, vessel/nerve damage, numbness, tingling, complex regional pain syndrome, recurrent infection need for further surgery, scarring, wound healing complications requiring further procedure for soft tissue coverage including flap coverage, cartilage damage,  DVT, PE, arthritis and death.  Patient states an understanding and wishes to proceed with surgery.   All questions were answered.  No guarantees were implied or stated.                 Javan Rosas MD  Orthopedics  [facility]

## 2019-09-20 NOTE — CONSULTS
Double lumen PICC place to right basilic vein. 40cm in length, 0cm exposed. Initial arm circumference 40cm. Lot # [id number].

## 2019-09-20 NOTE — PLAN OF CARE
09/20/19 1348   Discharge Reassessment   Assessment Type Discharge Planning Reassessment   Provided patient/caregiver education on the expected discharge date and the discharge plan Yes   Do you have any problems affording any of your prescribed medications? No   Discharge Plan A Home Health   DME Needed Upon Discharge  none   Anticipated Discharge Disposition Home-Health   Post-Acute Status   Post-Acute Authorization Home Health/Hospice   Home Health/Hospice Status Awaiting Internal Medical Clearance   Discharge Delays None known at this time

## 2019-09-20 NOTE — PLAN OF CARE
[facility]    HOME HEALTH ORDERS  FACE TO FACE ENCOUNTER    Patient Name: Elroy Rahman  YOB: 1955    PCP: Wayne Catalan PA-C   PCP Address: [address] [place] [address] [address] [facility] [place]  PCP Phone Number: [phone]  PCP Fax: [phone]    Encounter Date: 09/20/2019    Admit to Home Health    Diagnoses:  Active Hospital Problems    Diagnosis  POA    *Sepsis due to methicillin susceptible Staphylococcus aureus [A41.01]  Yes    Abscess [L02.91]  Yes    Amputation stump infection [T87.40]  Yes    MSSA bacteremia [R78.81]  Yes    Impaired mobility and ADLs [Z74.09]  No    MAGDI (acute kidney injury) [N17.9]  Yes    Myopathy [G72.9]  Yes    Acute muscle weakness [M62.81]  Yes    UTI (urinary tract infection) [N39.0]  Yes    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]  Yes      Resolved Hospital Problems   No resolved problems to display.       Future Appointments   Date Time Provider Department Center   9/25/2019 10:30 AM PIX, PHOTO MAIN [place] [facility] [address]   9/25/2019 11:30 AM PIX, PHOTO [facility] [address]   9/27/2019  8:30 AM PIX, [facility] [address]           I have seen and examined this patient face to face today. My clinical findings that support the need for the home health skilled services and home bound status are the following:  Weakness/numbness causing balance and gait disturbance due to Weakness/Debility making it taxing to leave home.  Requiring assistive device to leave home due to unsteady gait caused by  Weakness/Debility.    Allergies:  Review of patient's allergies indicates:   Allergen Reactions    Epinephrine Other (See Comments)     Carcinoid crisis       Diet: regular diet    Activities: activity as tolerated    Nursing:   SN to complete comprehensive assessment including routine vital signs. Instruct on disease process and s/s of complications to report to MD. Review/verify  medication list sent home with the patient at time of discharge  and instruct patient/caregiver as needed. Frequency may be adjusted depending on start of care date.    Notify MD if SBP > 160 or < 90; DBP > 90 or < 50; HR > 120 or < 50; Temp > 101      CONSULTS:    Aide to provide assistance with personal care, ADLs, and vital signs.    MISCELLANEOUS CARE:  Home Infusion Therapy:   SN to perform Infusion Therapy/Central Line Care.  Review Central Line Care & Central Line Flush with patient.    Administer (drug and dose):   Cefazolin 6g in dextrose 5% 500mL continuous infusion  Inject 6g into the vein once daily  11 day course of IV Abx, End (10/1/19)      Scrub the Hub: Prior to accessing the line, always perform a 30 second alcohol scrub  Each lumen of the central line is to be flushed at least daily with 10 mL Normal Saline and 3 mL Heparin flush (10 units/mL)  Skilled Nurse (SN) may draw blood from IV access  Blood Draw Procedure:   - Aspirate at least 5 mL of blood   - Discard   - Obtain specimen   - Change injection cap   - Flush with 20 mL Normal Saline followed by a                 3-5 mL Heparin flush (10 units/mL)  Central :   - Sterile dressing changes are done weekly and as needed.   - Use chlor-hexadine scrub to cleanse site, apply Biopatch to insertion site,       apply securement device dressing   - Injection caps are changed weekly and after EVERY lab draw.   - If sterile gauze is under dressing to control oozing,                 dressing change must be performed every 24 hours until gauze is not needed.      Please also draw weekly CBC/CMP/ESR/CRP and fax to ID clinic    WOUND CARE ORDERS  n/a      Medications: Review discharge medications with patient and family and provide education.      Current Discharge Medication List      START taking these medications    Details   dextrose 5 % SolP 500 mL with ceFAZolin 1 gram SolR 6 g Inject 6 g into the vein once daily. (end 10/1/19) for 11  days      famotidine (PEPCID) 20 MG tablet Take 1 tablet (20 mg total) by mouth 2 (two) times daily.  Qty: 60 tablet, Refills: 2      sulfamethoxazole-trimethoprim 800-160mg (BACTRIM DS) 800-160 mg Tab Take 1 tablet by mouth once daily.  Qty: 30 tablet, Refills: 1         CONTINUE these medications which have CHANGED    Details   ondansetron (ZOFRAN) 4 MG tablet Take 1 tablet (4 mg total) by mouth daily as needed for Nausea.      !! predniSONE (DELTASONE) 20 MG tablet Take 0.5 tablets (10 mg total) by mouth once daily. Take with two 50 mg tabs for a total of 110 mg daily    Associated Diagnoses: Bronchial carcinoid tumors; Secondary neuroendocrine tumor of bone; Vision loss of left eye      !! predniSONE (DELTASONE) 50 MG Tab Take 2 tablets (100 mg total) by mouth once daily. Take with 1/2 of the 20 mg tablet. For a total of 110 daily.    Associated Diagnoses: Bronchial carcinoid tumors; Secondary neuroendocrine tumor of bone; Vision loss of left eye       !! - Potential duplicate medications found. Please discuss with provider.      CONTINUE these medications which have NOT CHANGED    Details   ALPHAGAN P 0.1 % Drop Instill 1 drop into left eye twice daily  Refills: 4      PROLENSA 0.07 % Drop INSTILL 1 DROP INTO LEFT EYE TWICE DAILY  Refills: 4         STOP taking these medications       ketorolac 0.4% (ACULAR) 0.4 % Drop Comments:   Reason for Stopping:               I certify that this patient is confined to his home and needs intermittent skilled nursing care.

## 2019-09-20 NOTE — PLAN OF CARE
Problem: Adult Inpatient Plan of Care  Goal: Plan of Care Review  Outcome: Ongoing (interventions implemented as appropriate)  Patient AA&Ox4. VS's stable on RA. Glucose monitoring continued. NPO@MN for Sx. Pre-op orders and checklist completed. Wound care provided per order. Denies needs. Safety intact. Call light and bedside table within reach. Bed in lowest, locked position with side rails up x3. Nonskid socks present. Will continue to monitor.

## 2019-09-20 NOTE — SUBJECTIVE & OBJECTIVE
Principal Problem:Sepsis due to methicillin susceptible Staphylococcus aureus    Principal Orthopedic Problem:  Left leg amputation stump infection    Interval History:  Patient seen and examined at bedside.  He reports he is ready for I&D of R leg this morning. No complaints.    Review of patient's allergies indicates:   Allergen Reactions    Epinephrine Other (See Comments)     Carcinoid crisis       Current Facility-Administered Medications   Medication    acetaminophen tablet 650 mg    albuterol-ipratropium 2.5 mg-0.5 mg/3 mL nebulizer solution 3 mL    brimonidine 0.15 % OPTH DROP ophthalmic solution 1 drop    ceFAZolin (ANCEF) 6 g in dextrose 5 % 500 mL CONTINUOUS INFUSION    dextrose 10% (D10W) Bolus    dextrose 10% (D10W) Bolus    famotidine tablet 20 mg    fentaNYL injection 25 mcg    glucagon (human recombinant) injection 1 mg    glucose chewable tablet 16 g    glucose chewable tablet 24 g    HYDROcodone-acetaminophen 5-325 mg per tablet 1 tablet    insulin aspart U-100 pen 0-5 Units    ketorolac 0.5% ophthalmic solution 1 drop    midazolam (VERSED) 1 mg/mL injection 0.5 mg    octreotide (SANDOSTATIN) 5,000 mcg in sodium chloride 0.9% 100 mL infusion    octreotide injection 1,000 mcg    ondansetron injection 4 mg    predniSONE tablet 110 mg    sodium chloride 0.9% flush 10 mL    sulfamethoxazole-trimethoprim 800-160mg per tablet 1 tablet     Objective:     Vital Signs (Most Recent):  Temp: 98.2 °F (36.8 °C) (09/20/19 0829)  Pulse: (!) 57 (09/20/19 1230)  Resp: 18 (09/20/19 1230)  BP: 136/76 (09/20/19 1230)  SpO2: 100 % (09/20/19 1230) Vital Signs (24h Range):  Temp:  [96.6 °F (35.9 °C)-98.2 °F (36.8 °C)] 98.2 °F (36.8 °C)  Pulse:  [54-81] 57  Resp:  [14-20] 18  SpO2:  [92 %-100 %] 100 %  BP: (114-146)/(57-83) 136/76     Weight: 130.6 kg (288 lb)  Height: 6' (182.9 cm)  Body mass index is 39.06 kg/m².      Intake/Output Summary (Last 24 hours) at 9/20/2019 3420  Last data filed at  9/20/2019 0533  Gross per 24 hour   Intake 828.8 ml   Output 1550 ml   Net -721.2 ml       Ortho/SPM Exam     LLE:  Through knee amputation  2 separate 1x1cm draining sinuses from the base of the stump, purulent drainage  No swelling  No ecchymosis  +erythema throughout base of stump  No tenderness to palpation of proximal, middle, or distal aspects of femur, tibia, fibula  No tenderness to palpation of foot  Thigh compartments soft and compressible  Painless ROM at hip  SILT thoughout  5/5 strength at hip  2+ femoral pulse  Stump is well perfused    Significant Labs:   CBC:   Recent Labs   Lab 09/19/19 0420 09/20/19  0730   WBC 10.76 11.67   HGB 12.9* 13.0*   HCT 38.3* 38.8*    307     CMP:   Recent Labs   Lab 09/19/19 0420 09/20/19  0730    139   K 4.8 5.1    101   CO2 26 29   * 140*   BUN 34* 31*   CREATININE 1.4 1.3   CALCIUM 8.6* 8.8   PROT 5.7* 6.2   ALBUMIN 2.2* 2.5*   BILITOT 0.3 0.4   ALKPHOS 143* 136*   AST 17 13   ALT 65* 46*   ANIONGAP 10 9   EGFRNONAA 53.1* 58.1*     All pertinent labs within the past 24 hours have been reviewed.    Significant Imaging: I have reviewed all pertinent imaging results/findings.

## 2019-09-20 NOTE — PROGRESS NOTES
3 unsuccessful attempts to contact unit for patient octreotide dose. Pharmacy notified states she will call to inquire about medication since medication was sent to unit at 0730.

## 2019-09-20 NOTE — ASSESSMENT & PLAN NOTE
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 He is also s/p left upper lung lobectomy done in 2016. Pt currently undergoing peptide receptor radionucleotide therapy, being followed by Dr. London.    - Oncology following during this admission    Octreotide protocol   Two hours before surgery or procedure give 500 micrograms IV Push of Sandostatin(octreotide acetate).  Premedicate with Zofran 8 mg IV.  (done)     Then start a 500 microgram per hour IV infusion of Sandostatin (octreotide acetate)----- start this immediately after the IV push and continue infusion during and after surgery.  No drip necessary for procedures less than 4 hrs.     Depending on the severity and duration of surgery----- taper the infusion over 1-24 hours--say for colonoscopy taper over 1 hour-- after huge liver cases taper over 12-24 hours.     If patient crashes don't use pressors (except as a last resort if the following fails)-- use fluids and 1-5 mg bolus of octreotide (can repeat)-- for malignant hyperthermia use dantrolene in normal doses.

## 2019-09-20 NOTE — ANESTHESIA PROCEDURE NOTES
Sciatic -Subgluteal Single Shot    Patient location during procedure: pre-op   Block not for primary anesthetic.  Reason for block: at surgeon's request and post-op pain management   Post-op Pain Location: Left Lower Extremity  Start time: 9/20/2019 11:16 AM  Timeout: 9/20/2019 10:10 AM   End time: 9/20/2019 10:24 AM    Staffing  Authorizing Provider: Wayne Shields MD  Performing Provider: Francisco Peterson MD    Preanesthetic Checklist  Completed: patient identified, site marked, surgical consent, pre-op evaluation, timeout performed, IV checked, risks and benefits discussed and monitors and equipment checked  Peripheral Block  Prep: ChloraPrep  Patient monitoring: heart rate, cardiac monitor, continuous pulse ox, continuous capnometry and frequent blood pressure checks  Block type: sciatic  Laterality: left  Injection technique: single shot  Needle  Needle type: Stimuplex   Needle gauge: 21 G  Needle length: 4 in  Needle localization: nerve stimulator and anatomical landmarks     Assessment  Injection assessment: negative aspiration and negative parasthesia  Paresthesia pain: none  Heart rate change: no  Slow fractionated injection: yes  Additional Notes  Performed subgluteal.VSS.  [facility] RN monitoring vitals throughout procedure.  Patient tolerated procedure well.

## 2019-09-20 NOTE — PT/OT/SLP PROGRESS
Physical Therapy      Patient Name:  Elroy Rahman   MRN:  [id number]    Pt underwent AKA on 9/19 with I &D scheduled for 9/20. New orders needed for continued therapy while in hospital. Previous DC rec is IP rehab.    Madeleine Lawrence, PT, DPT  9/20/2019

## 2019-09-21 LAB
ALBUMIN SERPL BCP-MCNC: 2.4 G/DL (ref 3.5–5.2)
ALP SERPL-CCNC: 130 U/L (ref 55–135)
ALT SERPL W/O P-5'-P-CCNC: 38 U/L (ref 10–44)
ANION GAP SERPL CALC-SCNC: 11 MMOL/L (ref 8–16)
ANISOCYTOSIS BLD QL SMEAR: SLIGHT
AST SERPL-CCNC: 17 U/L (ref 10–40)
BASOPHILS # BLD AUTO: ABNORMAL K/UL (ref 0–0.2)
BASOPHILS NFR BLD: 0 % (ref 0–1.9)
BILIRUB SERPL-MCNC: 0.5 MG/DL (ref 0.1–1)
BUN SERPL-MCNC: 32 MG/DL (ref 8–23)
CALCIUM SERPL-MCNC: 8 MG/DL (ref 8.7–10.5)
CHLORIDE SERPL-SCNC: 101 MMOL/L (ref 95–110)
CO2 SERPL-SCNC: 26 MMOL/L (ref 23–29)
CREAT SERPL-MCNC: 1.4 MG/DL (ref 0.5–1.4)
DIFFERENTIAL METHOD: ABNORMAL
EOSINOPHIL # BLD AUTO: ABNORMAL K/UL (ref 0–0.5)
EOSINOPHIL NFR BLD: 0 % (ref 0–8)
ERYTHROCYTE [DISTWIDTH] IN BLOOD BY AUTOMATED COUNT: 14.6 % (ref 11.5–14.5)
EST. GFR  (AFRICAN AMERICAN): >60 ML/MIN/1.73 M^2
EST. GFR  (NON AFRICAN AMERICAN): 53.1 ML/MIN/1.73 M^2
GLUCOSE SERPL-MCNC: 163 MG/DL (ref 70–110)
HCT VFR BLD AUTO: 38.2 % (ref 40–54)
HGB BLD-MCNC: 12.7 G/DL (ref 14–18)
HYPOCHROMIA BLD QL SMEAR: ABNORMAL
IMM GRANULOCYTES # BLD AUTO: ABNORMAL K/UL (ref 0–0.04)
IMM GRANULOCYTES NFR BLD AUTO: ABNORMAL % (ref 0–0.5)
LYMPHOCYTES # BLD AUTO: ABNORMAL K/UL (ref 1–4.8)
LYMPHOCYTES NFR BLD: 2 % (ref 18–48)
MAGNESIUM SERPL-MCNC: 2.2 MG/DL (ref 1.6–2.6)
MCH RBC QN AUTO: 30.4 PG (ref 27–31)
MCHC RBC AUTO-ENTMCNC: 33.2 G/DL (ref 32–36)
MCV RBC AUTO: 91 FL (ref 82–98)
MONOCYTES # BLD AUTO: ABNORMAL K/UL (ref 0.3–1)
MONOCYTES NFR BLD: 1 % (ref 4–15)
MYELOCYTES NFR BLD MANUAL: 3 %
NEUTROPHILS NFR BLD: 94 % (ref 38–73)
NRBC BLD-RTO: 0 /100 WBC
OVALOCYTES BLD QL SMEAR: ABNORMAL
PHOSPHATE SERPL-MCNC: 3.9 MG/DL (ref 2.7–4.5)
PLATELET # BLD AUTO: 307 K/UL (ref 150–350)
PLATELET BLD QL SMEAR: ABNORMAL
PMV BLD AUTO: 9.6 FL (ref 9.2–12.9)
POCT GLUCOSE: 194 MG/DL (ref 70–110)
POIKILOCYTOSIS BLD QL SMEAR: SLIGHT
POLYCHROMASIA BLD QL SMEAR: ABNORMAL
POTASSIUM SERPL-SCNC: 5.1 MMOL/L (ref 3.5–5.1)
PROT SERPL-MCNC: 6 G/DL (ref 6–8.4)
RBC # BLD AUTO: 4.18 M/UL (ref 4.6–6.2)
SODIUM SERPL-SCNC: 138 MMOL/L (ref 136–145)
WBC # BLD AUTO: 9.8 K/UL (ref 3.9–12.7)

## 2019-09-21 PROCEDURE — 11000001 HC ACUTE MED/SURG PRIVATE ROOM

## 2019-09-21 PROCEDURE — 25000003 PHARM REV CODE 250: Performed by: HOSPITALIST

## 2019-09-21 PROCEDURE — 36415 COLL VENOUS BLD VENIPUNCTURE: CPT

## 2019-09-21 PROCEDURE — 99232 PR SUBSEQUENT HOSPITAL CARE,LEVL II: ICD-10-PCS | Mod: ,,, | Performed by: HOSPITALIST

## 2019-09-21 PROCEDURE — 63600175 PHARM REV CODE 636 W HCPCS: Performed by: SURGERY

## 2019-09-21 PROCEDURE — 25000003 PHARM REV CODE 250: Performed by: STUDENT IN AN ORGANIZED HEALTH CARE EDUCATION/TRAINING PROGRAM

## 2019-09-21 PROCEDURE — 85007 BL SMEAR W/DIFF WBC COUNT: CPT

## 2019-09-21 PROCEDURE — 99232 SBSQ HOSP IP/OBS MODERATE 35: CPT | Mod: ,,, | Performed by: HOSPITALIST

## 2019-09-21 PROCEDURE — 97164 PT RE-EVAL EST PLAN CARE: CPT

## 2019-09-21 PROCEDURE — 83735 ASSAY OF MAGNESIUM: CPT

## 2019-09-21 PROCEDURE — 85027 COMPLETE CBC AUTOMATED: CPT

## 2019-09-21 PROCEDURE — 80053 COMPREHEN METABOLIC PANEL: CPT

## 2019-09-21 PROCEDURE — A4216 STERILE WATER/SALINE, 10 ML: HCPCS | Performed by: HOSPITALIST

## 2019-09-21 PROCEDURE — 63600175 PHARM REV CODE 636 W HCPCS: Performed by: STUDENT IN AN ORGANIZED HEALTH CARE EDUCATION/TRAINING PROGRAM

## 2019-09-21 PROCEDURE — 84100 ASSAY OF PHOSPHORUS: CPT

## 2019-09-21 RX ADMIN — KETOROLAC TROMETHAMINE 1 DROP: 5 SOLUTION/ DROPS OPHTHALMIC at 08:09

## 2019-09-21 RX ADMIN — Medication 10 ML: at 07:09

## 2019-09-21 RX ADMIN — Medication 10 ML: at 12:09

## 2019-09-21 RX ADMIN — KETOROLAC TROMETHAMINE 1 DROP: 5 SOLUTION/ DROPS OPHTHALMIC at 12:09

## 2019-09-21 RX ADMIN — KETOROLAC TROMETHAMINE 1 DROP: 5 SOLUTION/ DROPS OPHTHALMIC at 09:09

## 2019-09-21 RX ADMIN — BRIMONIDINE TARTRATE 1 DROP: 1.5 SOLUTION OPHTHALMIC at 08:09

## 2019-09-21 RX ADMIN — FAMOTIDINE 20 MG: 20 TABLET, FILM COATED ORAL at 08:09

## 2019-09-21 RX ADMIN — Medication 10 ML: at 06:09

## 2019-09-21 RX ADMIN — PREDNISONE 110 MG: 20 TABLET ORAL at 08:09

## 2019-09-21 RX ADMIN — KETOROLAC TROMETHAMINE 1 DROP: 5 SOLUTION/ DROPS OPHTHALMIC at 04:09

## 2019-09-21 RX ADMIN — FAMOTIDINE 20 MG: 20 TABLET, FILM COATED ORAL at 09:09

## 2019-09-21 RX ADMIN — SULFAMETHOXAZOLE AND TRIMETHOPRIM 1 TABLET: 800; 160 TABLET ORAL at 08:09

## 2019-09-21 RX ADMIN — OCTREOTIDE ACETATE 300 MCG/HR: 1000 INJECTION, SOLUTION INTRAVENOUS; SUBCUTANEOUS at 01:09

## 2019-09-21 RX ADMIN — BRIMONIDINE TARTRATE 1 DROP: 1.5 SOLUTION OPHTHALMIC at 09:09

## 2019-09-21 RX ADMIN — DEXTROSE 6 G: 5 SOLUTION INTRAVENOUS at 04:09

## 2019-09-21 NOTE — PT/OT/SLP RE-EVAL
Physical Therapy Re-evaluation    Patient Name:  Elroy Rahman   MRN:  [id number]    Recommendations:     Discharge Recommendations:  rehabilitation facility   Discharge Equipment Recommendations: none   Barriers to discharge: Inaccessible home    Assessment:     Elroy Rahman is a 64 y.o. male admitted with a medical diagnosis of Sepsis due to methicillin susceptible Staphylococcus aureus.  He presents with the following impairments/functional limitations:  weakness, impaired endurance, impaired self care skills, impaired balance, gait instability, decreased lower extremity function, decreased ROM, impaired cardiopulmonary response to activity. Pt educated on LLE NWB status, verbalized understanding. Pt performed bed mobility with SBA and tranfers with CGA. Pt not wanting to ambulate this date d/t pain in his arm from IV so would increase pain with crutches. Pt would continue to benefit from skilled PT services to improve functional mobility, balance, and ambulation to return to PLOF.     Rehab Prognosis:  Good; patient would benefit from acute skilled PT services to address these deficits and reach maximum level of function.      Recent Surgery: Procedure(s) (LRB):  IRRIGATION AND DEBRIDEMENT, LOWER EXTREMITY - left - cysto tubing - cultures (Left) 1 Day Post-Op    Plan:     During this hospitalization, patient to be seen 3 x/week to address the above listed problems via gait training, therapeutic activities, therapeutic exercises, wheelchair management/training, neuromuscular re-education  · Plan of Care Expires:  10/21/19   Plan of Care Reviewed with: patient    Subjective     Communicated with RN prior to session.  Patient found supine with peripheral IV, telemetry upon PT entry to room, agreeable to evaluation.      Chief Complaint: not being able to use his prosthesis  Patient comments/goals: to return to PLOF  Pain/Comfort:  · Pain Rating 1: 0/10  · Pain Rating Post-Intervention 1: 0/10    Patients  cultural, spiritual, Druze conflicts given the current situation: no      Objective:     Patient found with: peripheral IV, telemetry     General Precautions: Standard, fall   Orthopedic Precautions:LLE non weight bearing   Braces: (LLE Prosthesis)     Exams:  · Cognitive Exam:  Patient is oriented to Person, Place, Time and Situation  · Sensation:    · -       Intact  · RLE ROM: WFL  · RLE Strength: WFL  · LLE ROM: L AKA  · LLE Strength: L AKA    Functional Mobility:  · Bed Mobility:     · Scooting: stand by assistance  · Supine to Sit: stand by assistance  · Sit to Supine: stand by assistance  · Transfers:     · Sit to Stand:  contact guard assistance with no AD  · Bed to/from BSC: contact guard assistance with  no AD  using  Stand Pivot  · Balance:   · Sitting: Supervision   · Standing: CGA    AM-PAC 6 CLICK MOBILITY  Total Score:19       Therapeutic Activities and Exercises:    -Pt educated on:              -PT roles, expectations, and POC               -Safety with mobility              -Benefits of OOB activities to increase strength and functional mobility               -Performing ther ex for increasing LE ROM and strength              -Discharge recommendations       Patient left supine with all lines intact and call button in reach.    GOALS:   Multidisciplinary Problems     Physical Therapy Goals        Problem: Physical Therapy Goal    Goal Priority Disciplines Outcome Goal Variances Interventions   Physical Therapy Goal     PT, PT/OT Ongoing (interventions implemented as appropriate)     Description:  Goals to be met by: 10/5/2019     Patient will increase functional independence with mobility by performin. Supine to sit with Supervision   2. Sit to supine with Supervision  3. Sit to stand transfer with Supervision  4. Bed to chair transfer with Supervision  5. Gait  x 25 feet with Contact Guard Assistance using crutches  6. Wheelchair propulsion x100 feet with Supervision using bilateral  upper extremities  7. Ascend/descend 2 stair with Handrail Contact Guard Assistance  8. 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    Sepsis due to methicillin susceptible Staphylococcus aureus 09/14/2019    MSSA bacteremia from Amputation stump infection    Sleep apnea     Snores    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       Past Surgical History:   Procedure Laterality Date    ABDOMINAL SURGERY      AMPUTATION, ABOVE KNEE - left - cysto tubing - cultures Left 9/19/2019    Performed by [facility]as MD at [facility] OR 2ND FLR    [id number]-DV N/A 6/8/2017    Performed by [facility] Provider at [facility] OR 2ND FLR    Bka Left     ECHOCARDIOGRAM, TRANSESOPHAGEAL N/A 9/18/2019    Performed by [facility] Provider at [facility] EP LAB    HERNIA REPAIR      LEFT UPPER LOBECTOMY-LUNG Left 1/28/2016    Performed by Pan Bell MD at [facility] OR 2ND FLR    REPAIR, HERNIA, UMBILICAL, INCARCERATED, LAPAROSCOPIC N/A 11/27/2018    [facility]    THORACOTOMY W/LUNG RESECTION Left 1/28/2016    Performed by Pan Bell MD at [facility] OR 2ND FLR       Time Tracking:     PT Received On: 09/21/19  PT Start Time: 1003     PT Stop Time: 1012  PT Total Time (min): 9 min     Billable Minutes: Re-eval 9      Kassandra Bentley, PT  09/21/2019

## 2019-09-21 NOTE — ASSESSMENT & PLAN NOTE
Pt with diaphoresis and diffuse muscle weakness leading to admission associated with fevers while in the hospital. BCx and UCx positive for staph aureus.     Plan  - Continue cefazolin   - ID consulted: following up recs  - blood cultures daily until clear. (last + BCx 9/16)  - VIVI 09/18: No evidence of small vegetation or masses  - U/S of left extremity: Small complex fluid collection 2.4 x 1.2 x 2.4   - Orthopedic surgery consulted: I&D 9/20/2019, cultures sent.

## 2019-09-21 NOTE — PROGRESS NOTES
[facility]  Infectious Disease  Progress Note    Patient Name: Elroy Rahman  MRN: [id number]  Admission Date: 9/13/2019  Length of Stay: 6 days  Attending Physician: Emil Day MD  Primary Care Provider: Wayne Catalan PA-C    Isolation Status: No active isolations  Assessment/Plan:      * Sepsis due to methicillin susceptible Staphylococcus aureus  64 y/o male with a history of metastatic bronchial carcinoid tumor s/p resection of ALIRIO, chemotherapy and subsequent prednisone therapy.  He is admitted with staphylococcus aureus sepsis with the source likely being his left BKA stump.  Patient does not have a port and denies having any hardware in his body. TTE & VIVI both negative for vegetations. US of AKA stump demonstrated .4 x 1.2 x 2.4 cm complex fluid collection. He underwent I &D on 9/20. MRI did not show osteo, but does demonstrate several lesions concerning for metastasis to the femur and pelvic flat bones.    Recommendations:  Continue to monitor blood cultures (would like 2 sets of negative blood cultures on 2 separate days to ensure clearance, 9/17 & 9/18 NGTD)  C/w bactrim 1 tablet SS daily given pt on chronic high dose steroids. Monitor renal function and potassium    Patient will be discharged on cefazolin IV 6g continuous infusion for 4 weeks, estimated end of therapy date 10/15.    Please have the following outpatient labs drawn WEEKLY and faxed to Infectious Diseases clinic at [phone]:  - CBC with diff  - CMP    We will arrange for a follow-up appointment in Infectious Diseases clinic in about 3 weeks.    Prior to discharge, please ensure the patient's follow-up has been scheduled.  If there is still no follow-up scheduled in Infectious Diseases clinic, please send an EPIC message to the Infectious Diseases charge nurse Trinity Salinas.      Thank you for your consult. I will sign off. Please contact us if you have any additional questions.    Brooks Lozada,  MD  Infectious Disease  [facility]    Subjective:     Principal Problem:Sepsis due to methicillin susceptible Staphylococcus aureus    HPI: 64 y/o male with a history of metastatic bronchial carcinoid tumor s/p left upper lobectomy, chemotherapy (last dose in July per patient) followed by prednisone for which he is still on.  He also has a history of a left BKA and utilizes a prosthesis.  Per the patient, he does a poor job of keeping the prosthesis clean.  His wife reports multiple occasions of noticing a foul smelling odor and weaping from the stump.  His wife states that about 3 days prior to admission, she noticed her  looked very tired and fatigued.  She also noticed redness and discoloration to the stump and there was an odor to it.  The patient continued to feel worse until his wife convinced him to come to the hospital.  On admission, he was found to be febrile and with an elevated WBC count.  Blood and urine cultures were obtained and are positive for staphylococcus aureus.  A TTE was negative for vegetations.    Interval History: No acute overnight events. Cultures remain negative. Patient taken to OR today for I&D. MRI did not demonstrate osteo.    Review of Systems   Constitutional: Positive for activity change and fatigue. Negative for chills.   Eyes: Negative for visual disturbance.   Respiratory: Negative for shortness of breath.    Cardiovascular: Negative for palpitations.   Gastrointestinal: Negative for nausea and vomiting.   Musculoskeletal: Positive for gait problem. Negative for back pain.   Skin: Positive for wound.   Neurological: Negative for headaches.   Psychiatric/Behavioral: Negative for agitation. The patient is not nervous/anxious.      Objective:     Vital Signs (Most Recent):  Temp: 98 °F (36.7 °C) (09/20/19 2002)  Pulse: 63 (09/20/19 2002)  Resp: 18 (09/20/19 2002)  BP: 138/78 (09/20/19 2002)  SpO2: (!) 92 % (09/20/19 2002) Vital Signs (24h Range):  Temp:   [96.6 °F (35.9 °C)-98.2 °F (36.8 °C)] 98 °F (36.7 °C)  Pulse:  [52-81] 63  Resp:  [14-20] 18  SpO2:  [92 %-100 %] 92 %  BP: (115-147)/(57-83) 138/78     Weight: 130.6 kg (288 lb)  Body mass index is 39.06 kg/m².    Estimated Creatinine Clearance: 81.3 mL/min (based on SCr of 1.3 mg/dL).    Physical Exam   Constitutional: He is oriented to person, place, and time. He appears well-developed and well-nourished. No distress.   HENT:   Head: Normocephalic and atraumatic.   Right Ear: External ear normal.   Left Ear: External ear normal.   Nose: Nose normal.   Eyes: Right eye exhibits no discharge. Left eye exhibits no discharge. No scleral icterus.   Neck: Normal range of motion.   Cardiovascular: Normal rate and intact distal pulses.   Pulmonary/Chest: Effort normal. No respiratory distress.   Abdominal: Soft. He exhibits no distension. There is no tenderness.   Musculoskeletal: Normal range of motion. He exhibits edema and deformity. He exhibits no tenderness.   L AKA stump bandaged   Neurological: He is alert and oriented to person, place, and time. No sensory deficit.   Skin: Skin is warm and dry.   Psychiatric: He has a normal mood and affect. His behavior is normal.   Vitals reviewed.      Significant Labs:   Blood Culture:   Recent Labs   Lab 09/16/19  0826 09/16/19  0830 09/17/19  1045 09/18/19  0511 09/18/19  0512   LABBLOO Gram stain aer bottle: Gram positive cocci in clusters resembling Staph   Results called to and read back by: Bhavik López RN 09/17/2019    06:06  STAPHYLOCOCCUS AUREUS  ID consult required at [facility] and [facility].  * Gram stain aer bottle: Gram positive cocci in clusters resembling Staph   Results called to and read back by:Lesley Handley RN 09/17/2019  11:55  STAPHYLOCOCCUS AUREUS  ID consult required at [facility].  For susceptibility see order #[id number]  * No Growth to date  No Growth to date  No  Growth to date  No Growth to date  No Growth to date  No Growth to date  No Growth to date  No Growth to date No Growth to date  No Growth to date  No Growth to date No Growth to date  No Growth to date  No Growth to date     CBC:   Recent Labs   Lab 09/19/19  0420 09/20/19  0730   WBC 10.76 11.67   HGB 12.9* 13.0*   HCT 38.3* 38.8*    307     CMP:   Recent Labs   Lab 09/19/19  0420 09/20/19  0730    139   K 4.8 5.1    101   CO2 26 29   * 140*   BUN 34* 31*   CREATININE 1.4 1.3   CALCIUM 8.6* 8.8   PROT 5.7* 6.2   ALBUMIN 2.2* 2.5*   BILITOT 0.3 0.4   ALKPHOS 143* 136*   AST 17 13   ALT 65* 46*   ANIONGAP 10 9   EGFRNONAA 53.1* 58.1*     Microbiology Results (last 7 days)     Procedure Component Value Units Date/Time    Gram stain [[id number]] Collected:  09/20/19 1513    Order Status:  Completed Specimen:  Abscess from Leg, Left Updated:  09/20/19 1656     Gram Stain Result Rare WBC's      Rare Gram positive cocci    Narrative:       No. 2    Gram stain [[id number]] Collected:  09/20/19 1506    Order Status:  Completed Specimen:  Abscess from Leg, Left Updated:  09/20/19 1649     Gram Stain Result Rare WBC's      No organisms seen    Gram stain [[id number]] Collected:  09/20/19 1517    Order Status:  Completed Specimen:  Abscess from Leg, Left Updated:  09/20/19 1649     Gram Stain Result No WBC's      No organisms seen    Narrative:       No.3    Culture, Anaerobe [[id number]] Collected:  09/20/19 1513    Order Status:  Sent Specimen:  Abscess from Leg, Left Updated:  09/20/19 1541    Fungus culture [[id number]] Collected:  09/20/19 1506    Order Status:  Sent Specimen:  Abscess from Leg, Left Updated:  09/20/19 1541    Aerobic culture [[id number]] Collected:  09/20/19 1506    Order Status:  Sent Specimen:  Abscess from Leg, Left Updated:  09/20/19 1540    AFB Culture & Smear [[id number]] Collected:  09/20/19 1506    Order Status:  Sent Specimen:  Abscess from Leg, Left Updated:   09/20/19 1540    AFB Culture & Smear [[id number]] Collected:  09/20/19 1513    Order Status:  Sent Specimen:  Abscess from Leg, Left Updated:  09/20/19 1539    Fungus culture [[id number]] Collected:  09/20/19 1513    Order Status:  Sent Specimen:  Abscess from Leg, Left Updated:  09/20/19 1539    Culture, Anaerobe [[id number]] Collected:  09/20/19 1513    Order Status:  Sent Specimen:  Abscess from Leg, Left Updated:  09/20/19 1539    Aerobic culture [[id number]] Collected:  09/20/19 1506    Order Status:  Sent Specimen:  Abscess from Leg, Left Updated:  09/20/19 1539    Culture, Anaerobe [[id number]] Collected:  09/20/19 1517    Order Status:  Sent Specimen:  Abscess from Leg, Left Updated:  09/20/19 1538    Aerobic culture [[id number]] Collected:  09/20/19 1517    Order Status:  Sent Specimen:  Abscess from Leg, Left Updated:  09/20/19 1537    AFB Culture & Smear [[id number]] Collected:  09/20/19 1517    Order Status:  Sent Specimen:  Abscess from Leg, Left Updated:  09/20/19 1537    Fungus culture [[id number]] Collected:  09/20/19 1517    Order Status:  Sent Specimen:  Abscess from Leg, Left Updated:  09/20/19 1537    Culture, Anaerobe [[id number]]     Order Status:  Canceled Specimen:  Abscess from Leg, Left     Aerobic culture [[id number]]     Order Status:  Canceled Specimen:  Abscess from Leg, Left     Fungus culture [[id number]]     Order Status:  Canceled Specimen:  Abscess from Leg, Left     Gram stain [[id number]]     Order Status:  Canceled Specimen:  Abscess from Leg, Left     AFB Culture & Smear [[id number]]     Order Status:  Canceled Specimen:  Abscess from Leg, Left     Blood culture [[id number]] Collected:  09/17/19 1045    Order Status:  Completed Specimen:  Blood Updated:  09/20/19 1212     Blood Culture, Routine No Growth to date      No Growth to date      No Growth to date      No Growth to date    Blood culture [[id number]] Collected:  09/17/19 1045    Order Status:  Completed Specimen:  Blood Updated:   09/20/19 1212     Blood Culture, Routine No Growth to date      No Growth to date      No Growth to date      No Growth to date    Blood culture [[id number]] Collected:  09/18/19 0512    Order Status:  Completed Specimen:  Blood Updated:  09/20/19 0613     Blood Culture, Routine No Growth to date      No Growth to date      No Growth to date    Blood culture [[id number]] Collected:  09/18/19 0511    Order Status:  Completed Specimen:  Blood Updated:  09/20/19 0612     Blood Culture, Routine No Growth to date      No Growth to date      No Growth to date    Blood culture [[id number]]  (Abnormal) Collected:  09/16/19 0830    Order Status:  Completed Specimen:  Blood Updated:  09/19/19 1049     Blood Culture, Routine Gram stain aer bottle: Gram positive cocci in clusters resembling Staph       Results called to and read back by:Lesley Handley RN 09/17/2019  11:55      STAPHYLOCOCCUS AUREUS  ID consult required at [facility].  For susceptibility see order #[id number]      Blood culture [[id number]]  (Abnormal)  (Susceptibility) Collected:  09/16/19 0826    Order Status:  Completed Specimen:  Blood Updated:  09/19/19 1049     Blood Culture, Routine Gram stain aer bottle: Gram positive cocci in clusters resembling Staph       Results called to and read back by: Bhavik López RN 09/17/2019        06:06      STAPHYLOCOCCUS AUREUS  ID consult required at [facility].      Blood culture [[id number]]  (Abnormal) Collected:  09/15/19 0616    Order Status:  Completed Specimen:  Blood Updated:  09/18/19 0949     Blood Culture, Routine Gram stain aer bottle: Gram positive cocci in clusters resembling Staph       Results called to and read back by:Lesley Handley RN 09/16/2019  08:47      STAPHYLOCOCCUS AUREUS  ID consult required at [facility].  For susceptibility see order #[id number]      Blood culture  [[id number]]  (Abnormal) Collected:  09/15/19 0616    Order Status:  Completed Specimen:  Blood Updated:  09/17/19 1115     Blood Culture, Routine Gram stain aer bottle: Gram positive cocci in clusters resembling Staph       Results called to and read back by: Bhavik López RN 09/16/2019        01:23      STAPHYLOCOCCUS AUREUS  ID consult required at [facility].  For susceptibility see order #[id number]      Blood culture #2 **CANNOT BE ORDERED STAT** [[id number]]  (Abnormal) Collected:  09/13/19 1348    Order Status:  Completed Specimen:  Blood from Peripheral, Hand, Right Updated:  09/16/19 0954     Blood Culture, Routine Gram stain dinorah bottle: Gram positive cocci in clusters resembling Staph       Results called to and read back by: Porfirio Medina RN 09/14/2019  05:49      Gram stain aer bottle: Gram positive cocci in clusters resembling Staph      STAPHYLOCOCCUS AUREUS  ID consult required at [facility].  For susceptibility see order #[id number]      Blood culture [[id number]]  (Abnormal) Collected:  09/13/19 2036    Order Status:  Completed Specimen:  Blood Updated:  09/16/19 0953     Blood Culture, Routine Gram stain dinorah bottle: Gram positive cocci in clusters resembling Staph      Positive results previously called      Gram stain aer bottle: Gram positive cocci in clusters resembling Staph      STAPHYLOCOCCUS AUREUS  ID consult required at [facility].  For susceptibility see order #[id number]      Blood culture #1 **CANNOT BE ORDERED STAT** [[id number]]  (Abnormal)  (Susceptibility) Collected:  09/13/19 1348    Order Status:  Completed Specimen:  Blood from Peripheral, Antecubital, Left Updated:  09/16/19 0953     Blood Culture, Routine Gram stain dinorah bottle: Gram positive cocci in clusters resembling Staph       Results called to and read back by: Porfirio Medina RN 09/14/2019   05:49      Gram stain aer bottle: Gram positive cocci in clusters resembling Staph      STAPHYLOCOCCUS AUREUS  ID consult required at [facility] and [facility]   locations.      Urine culture [[id number]]  (Abnormal)  (Susceptibility) Collected:  09/13/19 1349    Order Status:  Completed Specimen:  Urine Updated:  09/16/19 0849     Urine Culture, Routine STAPHYLOCOCCUS AUREUS  > 100,000 cfu/ml      Narrative:       GRAY AND YELLOW  Preferred Collection Type->Urine, Clean Catch    Blood culture [[id number]]     Order Status:  Canceled Specimen:  Blood           Significant Imaging: I have reviewed all pertinent imaging results/findings within the past 24 hours.

## 2019-09-21 NOTE — ASSESSMENT & PLAN NOTE
Elroy Rahman is a 64 y.o. 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    S/p stump I&D on 9/20/19    - Blood and urine cultures both positive for MSSA.   PICC line in place  - Abx: IV ancef, PO bactrim  - surgical dressing c/d/i  - penrose drain in place, plan to remove tomorrow  - pain control per primary  - dvt ppx per primary     plan to remove Penrose drain tomorrow and change dressing.

## 2019-09-21 NOTE — PROGRESS NOTES
[facility]  Orthopedics  Progress Note    Patient Name: Elroy Rahman  MRN: [id number]  Admission Date: 9/13/2019  Hospital Length of Stay: 7 days  Attending Provider: Emil Day MD  Primary Care Provider: Wayne Catalan PA-C  Follow-up For: Procedure(s) (LRB):  IRRIGATION AND DEBRIDEMENT, LOWER EXTREMITY - left - cysto tubing - cultures (Left)    Post-Operative Day: 1 Day Post-Op  Subjective:     Principal Problem:Sepsis due to methicillin susceptible Staphylococcus aureus    Principal Orthopedic Problem:  Left leg amputation stump infection    Interval History:  Patient seen and examined at bedside.    No acute events overnight.  His left stump is wrapped in a surgical dressing and Ace wrap.  It is clean, dry and intact.  He states that he has no pain currently.  Plan to pull his drain and change his dressing tomorrow.  PICC line is in place.    Review of patient's allergies indicates:   Allergen Reactions    Epinephrine Other (See Comments)     Carcinoid crisis       Current Facility-Administered Medications   Medication    acetaminophen tablet 650 mg    albuterol-ipratropium 2.5 mg-0.5 mg/3 mL nebulizer solution 3 mL    brimonidine 0.15 % OPTH DROP ophthalmic solution 1 drop    ceFAZolin (ANCEF) 6 g in dextrose 5 % 500 mL CONTINUOUS INFUSION    dextrose 10% (D10W) Bolus    dextrose 10% (D10W) Bolus    famotidine tablet 20 mg    glucagon (human recombinant) injection 1 mg    glucose chewable tablet 16 g    glucose chewable tablet 24 g    HYDROcodone-acetaminophen 5-325 mg per tablet 1 tablet    insulin aspart U-100 pen 0-5 Units    ketorolac 0.5% ophthalmic solution 1 drop    octreotide (SANDOSTATIN) 5,000 mcg in sodium chloride 0.9% 100 mL infusion    ondansetron injection 4 mg    predniSONE tablet 110 mg    sodium chloride 0.9% flush 10 mL    sodium chloride 0.9% flush 10 mL    And    sodium chloride 0.9% flush 10 mL    sulfamethoxazole-trimethoprim 800-160mg  per tablet 1 tablet     Objective:     Vital Signs (Most Recent):  Temp: 97.9 °F (36.6 °C) (09/21/19 0031)  Pulse: (!) 59 (09/21/19 0031)  Resp: 18 (09/21/19 0031)  BP: 120/69 (09/21/19 0031)  SpO2: 96 % (09/21/19 0031) Vital Signs (24h Range):  Temp:  [97 °F (36.1 °C)-98.2 °F (36.8 °C)] 97.9 °F (36.6 °C)  Pulse:  [52-65] 59  Resp:  [14-19] 18  SpO2:  [92 %-100 %] 96 %  BP: (115-147)/(57-83) 120/69     Weight: 130.6 kg (288 lb)  Height: 6' (182.9 cm)  Body mass index is 39.06 kg/m².      Intake/Output Summary (Last 24 hours) at 9/21/2019 0641  Last data filed at 9/20/2019 1702  Gross per 24 hour   Intake 120 ml   Output --   Net 120 ml       Ortho/SPM Exam     LLE:  Through knee amputation  Surgical dressing and Ace wrap in place   pain is controlled  SILT distally  Penrose drain in place   no drainage    Significant Labs:   CBC:   Recent Labs   Lab 09/20/19  0730   WBC 11.67   HGB 13.0*   HCT 38.8*        CMP:   Recent Labs   Lab 09/20/19  0730      K 5.1      CO2 29   *   BUN 31*   CREATININE 1.3   CALCIUM 8.8   PROT 6.2   ALBUMIN 2.5*   BILITOT 0.4   ALKPHOS 136*   AST 13   ALT 46*   ANIONGAP 9   EGFRNONAA 58.1*     All pertinent labs within the past 24 hours have been reviewed.    Significant Imaging: I have reviewed all pertinent imaging results/findings.    Assessment/Plan:     Amputation stump infection  Elroy Rahman is a 64 y.o. 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    S/p stump I&D on 9/20/19    - Blood and urine cultures both positive for MSSA.   PICC line in place  - Abx: IV ancef, PO bactrim  - surgical dressing c/d/i  - penrose drain in place, plan to remove tomorrow  - pain control per primary  - dvt ppx per primary     plan to remove Penrose drain tomorrow and change dressing.          Cordell Miguel MD  Orthopedics  [facility]  [place]

## 2019-09-21 NOTE — SUBJECTIVE & OBJECTIVE
Principal Problem:Sepsis due to methicillin susceptible Staphylococcus aureus    Principal Orthopedic Problem:  Left leg amputation stump infection    Interval History:  Patient seen and examined at bedside.    No acute events overnight.  His left stump is wrapped in a surgical dressing and Ace wrap.  It is clean, dry and intact.  He states that he has no pain currently.  Plan to pull his drain and change his dressing tomorrow.  PICC line is in place.    Review of patient's allergies indicates:   Allergen Reactions    Epinephrine Other (See Comments)     Carcinoid crisis       Current Facility-Administered Medications   Medication    acetaminophen tablet 650 mg    albuterol-ipratropium 2.5 mg-0.5 mg/3 mL nebulizer solution 3 mL    brimonidine 0.15 % OPTH DROP ophthalmic solution 1 drop    ceFAZolin (ANCEF) 6 g in dextrose 5 % 500 mL CONTINUOUS INFUSION    dextrose 10% (D10W) Bolus    dextrose 10% (D10W) Bolus    famotidine tablet 20 mg    glucagon (human recombinant) injection 1 mg    glucose chewable tablet 16 g    glucose chewable tablet 24 g    HYDROcodone-acetaminophen 5-325 mg per tablet 1 tablet    insulin aspart U-100 pen 0-5 Units    ketorolac 0.5% ophthalmic solution 1 drop    octreotide (SANDOSTATIN) 5,000 mcg in sodium chloride 0.9% 100 mL infusion    ondansetron injection 4 mg    predniSONE tablet 110 mg    sodium chloride 0.9% flush 10 mL    sodium chloride 0.9% flush 10 mL    And    sodium chloride 0.9% flush 10 mL    sulfamethoxazole-trimethoprim 800-160mg per tablet 1 tablet     Objective:     Vital Signs (Most Recent):  Temp: 97.9 °F (36.6 °C) (09/21/19 0031)  Pulse: (!) 59 (09/21/19 0031)  Resp: 18 (09/21/19 0031)  BP: 120/69 (09/21/19 0031)  SpO2: 96 % (09/21/19 0031) Vital Signs (24h Range):  Temp:  [97 °F (36.1 °C)-98.2 °F (36.8 °C)] 97.9 °F (36.6 °C)  Pulse:  [52-65] 59  Resp:  [14-19] 18  SpO2:  [92 %-100 %] 96 %  BP: (115-147)/(57-83) 120/69     Weight: 130.6 kg (288  lb)  Height: 6' (182.9 cm)  Body mass index is 39.06 kg/m².      Intake/Output Summary (Last 24 hours) at 9/21/2019 0641  Last data filed at 9/20/2019 1702  Gross per 24 hour   Intake 120 ml   Output --   Net 120 ml       Ortho/SPM Exam     LLE:  Through knee amputation  Surgical dressing and Ace wrap in place   pain is controlled  SILT distally  Penrose drain in place   no drainage    Significant Labs:   CBC:   Recent Labs   Lab 09/20/19  0730   WBC 11.67   HGB 13.0*   HCT 38.8*        CMP:   Recent Labs   Lab 09/20/19  0730      K 5.1      CO2 29   *   BUN 31*   CREATININE 1.3   CALCIUM 8.8   PROT 6.2   ALBUMIN 2.5*   BILITOT 0.4   ALKPHOS 136*   AST 13   ALT 46*   ANIONGAP 9   EGFRNONAA 58.1*     All pertinent labs within the past 24 hours have been reviewed.    Significant Imaging: I have reviewed all pertinent imaging results/findings.

## 2019-09-21 NOTE — OP NOTE
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  He had a traumatic left leg amputation as a child approximately 40+ years ago.  He underwent a through knee amputation.  Since that time he has been using a prosthesis and alternatively ambulated on his knees.  He is quite happy with his functional level.     A few days prior to admission he noticed that his stump got red and started draining purulent fluid. He was subsequently admitted the hospital and diagnosed with MSSA bacteremia. , ESR 75, WBC 13. Presumed cause of sepsis was from stump.      He was seen and evaluated by multiple teams including Orthopedics.  He was started on broad-spectrum antibiotics.  X-ray imaging and MRI imaging of the left stump indicated an abscess in the left distal thigh that appeared to be contained to the subcutaneous tissue with some surrounding edema.  There is no evidence of osteomyelitis.     Orthopedics was consulted for assessment and evaluation his extremity.  He felt that this would benefit from surgical drainage.  We consulted him on the risks and benefits of the operative procedure which included but were not limited to pain damage to nearby structures per the infection need for further procedures including multiple irrigation and debridements long-term wound VAC  split-thickness thin skin graft higher level amputation sepsis death multiple medical complications including heart attack stroke blood clot.  Patient agreed to the procedure and expressed understanding the risks and benefits.  He consented to proceed.    OPERATIVE PROCEDURE:  Patient was met in the preop hold area the correct side and site of surgery marked and verified.  He was brought back to the operative suite.  He is under monitored anesthesia care. A previous block was placed by our anesthesia colleagues.  The left lower extremity was prepped and draped normal sterile fashion.  A time-out was performed verifying the correct site of surgery the left lower extremity.  The amputation stump was thoroughly evaluated. He had prior regular scar along the posterior aspect of his thigh which looked like a lateral skin flap that had been advanced.  On the distal medial aspect of the stump there was a heavy area of callus and a small scab.  This did not appear rather indurated was not draining any purulent fluid. On the lateral aspect of the distal stump he had a sinus tract that would previously probe appeared to and in these subcutaneous tissues. This was approximately 1 x 1 cm.  This area was marked in an ellipse fashion. Skin was incised with a scalpel removing the entirety of the sinus tract.  It was sent for final pathology.  Some purulent material was readily expressed.  Further deeper subcutaneous tissue was sharply cut with a knife to clean healthy tissue was identified.  Further deeper areas were sharply curetted of any devitalized tissue.  The bone was not visible.  The infection did not appear to penetrate the fascia. We then irrigated thoroughly with 3 L of saline.  Hemostasis achieved electrocautery. Wounds closed with 3 O nylon sutures and a Penrose drain was left in place. Incision was covered with Xeroform dry gauze and Ace wrap.    The patient tolerated the procedure well.  Prior to final closure all  counts were confirmed correct.  He was then aroused from anesthesia and taken PACU for further recovery.    POSTOPERATIVE PLAN:  64-year-old male with prior metastatic bronchial carcinoid tumor prior chemo, last dose on July and concurrent prednisone treatment with baseline 40+ year left lower extremity through knee amputation he now has MSSA bacteremia and sepsis likely due to a left stump abscess.  He is now status post I and D of his left lower extremity stump 09/20/2019    Admit to the hospital post surgery.  Continue antibiotics per Infectious Disease and medical management per the consulting and primary teams.  Dressing change postoperative day 2 with removal of Penrose drain at that time.   Simple dry gauze dressing OK that time.  Secure with tape, Tegaderm, or Ace wrap for patient comfort and tolerance.    No prosthesis wear until fully healed.  No walking on knees until fully healed.  Anticoagulation per primary team    Do not anticipate further surgical intervention at this time on last the wound deteriorates or continues to express purulent drainage.    Follow-up orthopedic clinic 2 weeks postop for suture removal.  If wound appears healed can follow up at 6 weeks postop, at that time would likely be cleared to resume prosthesis wear.

## 2019-09-21 NOTE — ASSESSMENT & PLAN NOTE
64 y/o male with a history of metastatic bronchial carcinoid tumor s/p resection of ALIRIO, chemotherapy and subsequent prednisone therapy.  He is admitted with staphylococcus aureus sepsis with the source likely being his left BKA stump.  Patient does not have a port and denies having any hardware in his body. TTE & VIVI both negative for vegetations. US of AKA stump demonstrated .4 x 1.2 x 2.4 cm complex fluid collection. He underwent I &D on 9/20. MRI did not show osteo, but does demonstrate several lesions concerning for metastasis to the femur and pelvic flat bones.    Recommendations:  Continue to monitor blood cultures (would like 2 sets of negative blood cultures on 2 separate days to ensure clearance, 9/17 & 9/18 NGTD)  C/w bactrim 1 tablet SS daily given pt on chronic high dose steroids. Monitor renal function and potassium    Patient will be discharged on cefazolin IV 6g continuous infusion for 4 weeks, estimated end of therapy date 10/15.    Please have the following outpatient labs drawn WEEKLY and faxed to Infectious Diseases clinic at [phone]:  - CBC with diff  - CMP    We will arrange for a follow-up appointment in Infectious Diseases clinic in about 3 weeks.    Prior to discharge, please ensure the patient's follow-up has been scheduled.  If there is still no follow-up scheduled in Infectious Diseases clinic, please send an EPIC message to the Infectious Diseases charge nurse Trinity Salinas.

## 2019-09-21 NOTE — PLAN OF CARE
Problem: Adult Inpatient Plan of Care  Goal: Plan of Care Review  Outcome: Ongoing (interventions implemented as appropriate)     09/15/19 1727 09/21/19 0754   Plan of Care Review   Plan of Care Reviewed With  --  patient;spouse   Progress  --  improving   Outcome Summary Mr. Rahman is receiving IV antibiotic therapy for postive urine and blood cultures. Source is likely left lower extremity stump that has swelling, warm, erythema and scabbed over areas.  --

## 2019-09-21 NOTE — PLAN OF CARE
Problem: Adult Inpatient Plan of Care  Goal: Plan of Care Review     09/21/19 0721 09/21/19 2868   Plan of Care Review   Plan of Care Reviewed With  --  patient   Progress improving  --      Pt aaox4. V/ stable. Family at bedside. No complaints at this time. Will continue to monitor and interventions as appropriate.

## 2019-09-21 NOTE — SUBJECTIVE & OBJECTIVE
Interval History: No acute overnight events. Cultures remain negative. Patient taken to OR today for I&D. MRI did not demonstrate osteo.    Review of Systems   Constitutional: Positive for activity change and fatigue. Negative for chills.   Eyes: Negative for visual disturbance.   Respiratory: Negative for shortness of breath.    Cardiovascular: Negative for palpitations.   Gastrointestinal: Negative for nausea and vomiting.   Musculoskeletal: Positive for gait problem. Negative for back pain.   Skin: Positive for wound.   Neurological: Negative for headaches.   Psychiatric/Behavioral: Negative for agitation. The patient is not nervous/anxious.      Objective:     Vital Signs (Most Recent):  Temp: 98 °F (36.7 °C) (09/20/19 2002)  Pulse: 63 (09/20/19 2002)  Resp: 18 (09/20/19 2002)  BP: 138/78 (09/20/19 2002)  SpO2: (!) 92 % (09/20/19 2002) Vital Signs (24h Range):  Temp:  [96.6 °F (35.9 °C)-98.2 °F (36.8 °C)] 98 °F (36.7 °C)  Pulse:  [52-81] 63  Resp:  [14-20] 18  SpO2:  [92 %-100 %] 92 %  BP: (115-147)/(57-83) 138/78     Weight: 130.6 kg (288 lb)  Body mass index is 39.06 kg/m².    Estimated Creatinine Clearance: 81.3 mL/min (based on SCr of 1.3 mg/dL).    Physical Exam   Constitutional: He is oriented to person, place, and time. He appears well-developed and well-nourished. No distress.   HENT:   Head: Normocephalic and atraumatic.   Right Ear: External ear normal.   Left Ear: External ear normal.   Nose: Nose normal.   Eyes: Right eye exhibits no discharge. Left eye exhibits no discharge. No scleral icterus.   Neck: Normal range of motion.   Cardiovascular: Normal rate and intact distal pulses.   Pulmonary/Chest: Effort normal. No respiratory distress.   Abdominal: Soft. He exhibits no distension. There is no tenderness.   Musculoskeletal: Normal range of motion. He exhibits edema and deformity. He exhibits no tenderness.   L AKA stump bandaged   Neurological: He is alert and oriented to person, place, and time.  No sensory deficit.   Skin: Skin is warm and dry.   Psychiatric: He has a normal mood and affect. His behavior is normal.   Vitals reviewed.      Significant Labs:   Blood Culture:   Recent Labs   Lab 09/16/19  0826 09/16/19  0830 09/17/19  1045 09/18/19  0511 09/18/19  0512   LABBLOO Gram stain aer bottle: Gram positive cocci in clusters resembling Staph   Results called to and read back by: Bhavik López RN 09/17/2019    06:06  STAPHYLOCOCCUS AUREUS  ID consult required at [facility] and [facility]   locations.  * Gram stain aer bottle: Gram positive cocci in clusters resembling Staph   Results called to and read back by:Lesley Handley RN 09/17/2019  11:55  STAPHYLOCOCCUS AUREUS  ID consult required at [facility] and [facility]   locations.  For susceptibility see order #[id number]  * No Growth to date  No Growth to date  No Growth to date  No Growth to date  No Growth to date  No Growth to date  No Growth to date  No Growth to date No Growth to date  No Growth to date  No Growth to date No Growth to date  No Growth to date  No Growth to date     CBC:   Recent Labs   Lab 09/19/19  0420 09/20/19  0730   WBC 10.76 11.67   HGB 12.9* 13.0*   HCT 38.3* 38.8*    307     CMP:   Recent Labs   Lab 09/19/19  0420 09/20/19  0730    139   K 4.8 5.1    101   CO2 26 29   * 140*   BUN 34* 31*   CREATININE 1.4 1.3   CALCIUM 8.6* 8.8   PROT 5.7* 6.2   ALBUMIN 2.2* 2.5*   BILITOT 0.3 0.4   ALKPHOS 143* 136*   AST 17 13   ALT 65* 46*   ANIONGAP 10 9   EGFRNONAA 53.1* 58.1*     Microbiology Results (last 7 days)     Procedure Component Value Units Date/Time    Gram stain [[id number]] Collected:  09/20/19 6731    Order Status:  Completed Specimen:  Abscess from Leg, Left Updated:  09/20/19 0525     Gram Stain Result Rare WBC's      Rare Gram positive cocci    Narrative:       No. 2    Gram stain [[id number]] Collected:  09/20/19 1506    Order Status:   Completed Specimen:  Abscess from Leg, Left Updated:  09/20/19 1649     Gram Stain Result Rare WBC's      No organisms seen    Gram stain [[id number]] Collected:  09/20/19 1517    Order Status:  Completed Specimen:  Abscess from Leg, Left Updated:  09/20/19 1649     Gram Stain Result No WBC's      No organisms seen    Narrative:       No.3    Culture, Anaerobe [[id number]] Collected:  09/20/19 1513    Order Status:  Sent Specimen:  Abscess from Leg, Left Updated:  09/20/19 1541    Fungus culture [[id number]] Collected:  09/20/19 1506    Order Status:  Sent Specimen:  Abscess from Leg, Left Updated:  09/20/19 1541    Aerobic culture [[id number]] Collected:  09/20/19 1506    Order Status:  Sent Specimen:  Abscess from Leg, Left Updated:  09/20/19 1540    AFB Culture & Smear [[id number]] Collected:  09/20/19 1506    Order Status:  Sent Specimen:  Abscess from Leg, Left Updated:  09/20/19 1540    AFB Culture & Smear [[id number]] Collected:  09/20/19 1513    Order Status:  Sent Specimen:  Abscess from Leg, Left Updated:  09/20/19 1539    Fungus culture [[id number]] Collected:  09/20/19 1513    Order Status:  Sent Specimen:  Abscess from Leg, Left Updated:  09/20/19 1539    Culture, Anaerobe [[id number]] Collected:  09/20/19 1513    Order Status:  Sent Specimen:  Abscess from Leg, Left Updated:  09/20/19 1539    Aerobic culture [[id number]] Collected:  09/20/19 1506    Order Status:  Sent Specimen:  Abscess from Leg, Left Updated:  09/20/19 1539    Culture, Anaerobe [[id number]] Collected:  09/20/19 1517    Order Status:  Sent Specimen:  Abscess from Leg, Left Updated:  09/20/19 1538    Aerobic culture [[id number]] Collected:  09/20/19 1517    Order Status:  Sent Specimen:  Abscess from Leg, Left Updated:  09/20/19 1537    AFB Culture & Smear [[id number]] Collected:  09/20/19 1517    Order Status:  Sent Specimen:  Abscess from Leg, Left Updated:  09/20/19 1537    Fungus culture [[id number]] Collected:  09/20/19 1007    Order Status:   Sent Specimen:  Abscess from Leg, Left Updated:  09/20/19 1537    Culture, Anaerobe [[id number]]     Order Status:  Canceled Specimen:  Abscess from Leg, Left     Aerobic culture [[id number]]     Order Status:  Canceled Specimen:  Abscess from Leg, Left     Fungus culture [[id number]]     Order Status:  Canceled Specimen:  Abscess from Leg, Left     Gram stain [[id number]]     Order Status:  Canceled Specimen:  Abscess from Leg, Left     AFB Culture & Smear [[id number]]     Order Status:  Canceled Specimen:  Abscess from Leg, Left     Blood culture [[id number]] Collected:  09/17/19 1045    Order Status:  Completed Specimen:  Blood Updated:  09/20/19 1212     Blood Culture, Routine No Growth to date      No Growth to date      No Growth to date      No Growth to date    Blood culture [[id number]] Collected:  09/17/19 1045    Order Status:  Completed Specimen:  Blood Updated:  09/20/19 1212     Blood Culture, Routine No Growth to date      No Growth to date      No Growth to date      No Growth to date    Blood culture [[id number]] Collected:  09/18/19 0512    Order Status:  Completed Specimen:  Blood Updated:  09/20/19 0613     Blood Culture, Routine No Growth to date      No Growth to date      No Growth to date    Blood culture [[id number]] Collected:  09/18/19 0511    Order Status:  Completed Specimen:  Blood Updated:  09/20/19 0612     Blood Culture, Routine No Growth to date      No Growth to date      No Growth to date    Blood culture [[id number]]  (Abnormal) Collected:  09/16/19 0830    Order Status:  Completed Specimen:  Blood Updated:  09/19/19 1049     Blood Culture, Routine Gram stain aer bottle: Gram positive cocci in clusters resembling Staph       Results called to and read back by:Lesley Handley RN 09/17/2019  11:55      STAPHYLOCOCCUS AUREUS  ID consult required at [facility] and [facility].  For susceptibility see order #[id number]      Blood culture [[id number]]  (Abnormal)   (Susceptibility) Collected:  09/16/19 0826    Order Status:  Completed Specimen:  Blood Updated:  09/19/19 1049     Blood Culture, Routine Gram stain aer bottle: Gram positive cocci in clusters resembling Staph       Results called to and read back by: Bhavik López RN 09/17/2019        06:06      STAPHYLOCOCCUS AUREUS  ID consult required at [facility].      Blood culture [[id number]]  (Abnormal) Collected:  09/15/19 0616    Order Status:  Completed Specimen:  Blood Updated:  09/18/19 0949     Blood Culture, Routine Gram stain aer bottle: Gram positive cocci in clusters resembling Staph       Results called to and read back by:Lesley Handley RN 09/16/2019  08:47      STAPHYLOCOCCUS AUREUS  ID consult required at [facility].  For susceptibility see order #[id number]      Blood culture [[id number]]  (Abnormal) Collected:  09/15/19 0616    Order Status:  Completed Specimen:  Blood Updated:  09/17/19 1115     Blood Culture, Routine Gram stain aer bottle: Gram positive cocci in clusters resembling Staph       Results called to and read back by: Bhavik López RN 09/16/2019        01:23      STAPHYLOCOCCUS AUREUS  ID consult required at [facility].  For susceptibility see order #[id number]      Blood culture #2 **CANNOT BE ORDERED STAT** [[id number]]  (Abnormal) Collected:  09/13/19 1348    Order Status:  Completed Specimen:  Blood from Peripheral, Hand, Right Updated:  09/16/19 0954     Blood Culture, Routine Gram stain dinorah bottle: Gram positive cocci in clusters resembling Staph       Results called to and read back by: Porfirio Medina RN 09/14/2019  05:49      Gram stain aer bottle: Gram positive cocci in clusters resembling Staph      STAPHYLOCOCCUS AUREUS  ID consult required at [facility].  For susceptibility see order #[id number]      Blood  culture [[id number]]  (Abnormal) Collected:  09/13/19 2036    Order Status:  Completed Specimen:  Blood Updated:  09/16/19 0953     Blood Culture, Routine Gram stain dinorah bottle: Gram positive cocci in clusters resembling Staph      Positive results previously called      Gram stain aer bottle: Gram positive cocci in clusters resembling Staph      STAPHYLOCOCCUS AUREUS  ID consult required at [facility].  For susceptibility see order #[id number]      Blood culture #1 **CANNOT BE ORDERED STAT** [[id number]]  (Abnormal)  (Susceptibility) Collected:  09/13/19 1348    Order Status:  Completed Specimen:  Blood from Peripheral, Antecubital, Left Updated:  09/16/19 0953     Blood Culture, Routine Gram stain dinorah bottle: Gram positive cocci in clusters resembling Staph       Results called to and read back by: Porfirio Medina RN 09/14/2019  05:49      Gram stain aer bottle: Gram positive cocci in clusters resembling Staph      STAPHYLOCOCCUS AUREUS  ID consult required at [facility].      Urine culture [[id number]]  (Abnormal)  (Susceptibility) Collected:  09/13/19 1349    Order Status:  Completed Specimen:  Urine Updated:  09/16/19 0849     Urine Culture, Routine STAPHYLOCOCCUS AUREUS  > 100,000 cfu/ml      Narrative:       GRAY AND YELLOW  Preferred Collection Type->Urine, Clean Catch    Blood culture [[id number]]     Order Status:  Canceled Specimen:  Blood           Significant Imaging: I have reviewed all pertinent imaging results/findings within the past 24 hours.

## 2019-09-21 NOTE — PLAN OF CARE
Problem: Physical Therapy Goal  Goal: Physical Therapy Goal  Goals to be met by: 10/5/2019     Patient will increase functional independence with mobility by performin. Supine to sit with Supervision   2. Sit to supine with Supervision  3. Sit to stand transfer with Supervision  4. Bed to chair transfer with Supervision  5. Gait  x 25 feet with Contact Guard Assistance using crutches  6. Wheelchair propulsion x100 feet with Supervision using bilateral upper extremities  7. Ascend/descend 2 stair with Handrail Contact Guard Assistance  8. Lower extremity exercise program x15 reps per handout, with supervision      Outcome: Ongoing (interventions implemented as appropriate)  Re-eval completed, goals updated, POC established.   Kassandra Bentley, PT  2019

## 2019-09-21 NOTE — PROGRESS NOTES
64-year-old male with longstanding left through knee amputation.  Admitted 09/13/2019 for sepsis and left stump infection  Orthopedics consult 09/18/2019  Left stump I&D 09/20/2019    Now greatly improved with surgical treatment and medical management.  Doing well  Mild serosanguineous drainage from incisional dressing, expected in setting of Penrose drain placement    Plan  Dressing change tomorrow  Anticipate discharge pending appearance of wound tomorrow from orthopedic perspective, however defer discharge placement and other medical issues to primary team.    Follow-up 3 weeks postop for suture removal, as he will likely need to leave sutures in slightly longer  Anticipate approximately 6 weeks of no prosthesis wear, however further determination will be made at follow-up appointments

## 2019-09-21 NOTE — PROGRESS NOTES
[facility] Hospital Medicine  Progress Note    Patient Name: Elroy Rahman  MRN: [id number]  Patient Class: IP- Inpatient   Admission Date: 9/13/2019  Length of Stay: 7 days  Attending Physician: Emil Day MD  Primary Care Provider: Wayne Catalan PA-C    [facility] Medicine Team: [facility]P MED 1 Paulo Madrigal MD    Subjective:     Principal Problem:Sepsis due to methicillin susceptible Staphylococcus aureus        HPI:  Mr. 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    Pt reports that on Tuesday he was experiencing chills, diaphoresis and severe muscle weakness. The weakness began suddenly, not concentrated in any one muscle group and progressively worsened. Unfortunately by late that evening he was unable to move around on his own. At baseline pt does have a left LE amputation and is able to ambulate freely with and without his prosthesis. He denies any bowel or bladder incontinence, back pain, numbness, perineal numbness or extremity pain.   Per notes: He was started on PRRT back in April and began prednisone due to inflammation noted in the globe of his eye. Beginning on 9/5 he was to take Prednisone 130 mg for days and then proceed with 110mg for an additional five days. Pt reports that he is compliant with his medication regimen.     Overview/Hospital Course:  No notes on file    Interval History:   No acute events overnight. Pt with no complaints at this time.     Review of Systems   Constitutional: Positive for activity change. Negative for appetite change.   HENT: Negative for congestion, postnasal drip and sore throat.    Eyes: Positive for visual disturbance.   Respiratory: Negative for cough, chest tightness and shortness  of breath.    Cardiovascular: Negative for chest pain, palpitations and leg swelling.   Gastrointestinal: Positive for nausea. Negative for abdominal pain, constipation, diarrhea and vomiting.   Endocrine: Negative for heat intolerance.   Genitourinary: Negative for difficulty urinating and dysuria.   Musculoskeletal: Negative for arthralgias, back pain and myalgias.   Neurological: Positive for weakness (improved) and light-headedness. Negative for numbness and headaches.     Objective:     Vital Signs (Most Recent):  Temp: 97.9 °F (36.6 °C) (09/21/19 1158)  Pulse: 63 (09/21/19 1158)  Resp: 18 (09/21/19 1158)  BP: 126/76 (09/21/19 1158)  SpO2: 95 % (09/21/19 1158) Vital Signs (24h Range):  Temp:  [97 °F (36.1 °C)-98 °F (36.7 °C)] 97.9 °F (36.6 °C)  Pulse:  [52-65] 63  Resp:  [16-19] 18  SpO2:  [92 %-99 %] 95 %  BP: (120-147)/(68-84) 126/76     Weight: 130.6 kg (288 lb)  Body mass index is 39.06 kg/m².    Intake/Output Summary (Last 24 hours) at 9/21/2019 1525  Last data filed at 9/21/2019 1400  Gross per 24 hour   Intake 360 ml   Output --   Net 360 ml      Physical Exam   Constitutional: He is oriented to person, place, and time. He appears well-developed and well-nourished. He does not appear ill. No distress.   Obese   HENT:   Head: Normocephalic and atraumatic.   Eyes: No scleral icterus.   Neck: Normal range of motion. Neck supple.   Cardiovascular: Normal rate, regular rhythm and intact distal pulses. Exam reveals no gallop and no friction rub.   No murmur heard.  Pulmonary/Chest: Effort normal and breath sounds normal. No respiratory distress.   Abdominal: Soft. He exhibits distension. There is no tenderness. There is no guarding.   Musculoskeletal:   b/l UE 5/5 muscle strength  RLE: 3-4/5 muscle strength. Able to move his R foot and bend his right knee  LLE stump dressing clean dry and intact   Neurological: He is alert and oriented to person, place, and time.   Skin: Skin is warm.   Nursing note and vitals  reviewed.      Significant Labs:   CBC:   Recent Labs   Lab 09/20/19  0730 09/21/19  0537   WBC 11.67 9.80   HGB 13.0* 12.7*   HCT 38.8* 38.2*    307     CMP:   Recent Labs   Lab 09/20/19  0730 09/21/19  0537    138   K 5.1 5.1    101   CO2 29 26   * 163*   BUN 31* 32*   CREATININE 1.3 1.4   CALCIUM 8.8 8.0*   PROT 6.2 6.0   ALBUMIN 2.5* 2.4*   BILITOT 0.4 0.5   ALKPHOS 136* 130   AST 13 17   ALT 46* 38   ANIONGAP 9 11   EGFRNONAA 58.1* 53.1*       Significant Imaging: I have reviewed all pertinent imaging results/findings within the past 24 hours.      Assessment/Plan:      * Sepsis due to methicillin susceptible Staphylococcus aureus  Pt with diaphoresis and diffuse muscle weakness leading to admission associated with fevers while in the hospital. BCx and UCx positive for staph aureus.     Plan  - Continue cefazolin   - ID consulted: following up recs  - blood cultures daily until clear. (last + BCx 9/16)  - VIVI 09/18: No evidence of small vegetation or masses  - U/S of left extremity: Small complex fluid collection 2.4 x 1.2 x 2.4   - Orthopedic surgery consulted: I&D 9/20/2019, cultures sent.      UTI (urinary tract infection)  Urinalysis with 1+ protein,22WBC and occasional bacteria. He has leukocytosis of 12.78. UCx positive for staph aureus. Pt without any urinary symptoms     Plan   - Continue cefazolin.   - ID consulted, f/u recs     Acute muscle weakness  Pt with a medical history of metastatic carcinoma and corticosteroid use presenting with LE weakness of sudden onset most likely due to sepsis. BCx growing gram positive cocci in clusters, UCx showing staph aureus. (see sepsis)  Cord compression initially suspected due to acute presentation, MRI spine final report showing metastatic disease without any mass effect. Steroid induced myopathy considered since pt is on a high dose of steroids (CPK elevated, U/A with 3+ blood and only 13 RBC).     Carcinoid bronchial adenoma of left  lung  MHx of bronchial carcinoid tumor with metastases to the liver, bone and mesentery. He is also s/p left upper lung lobectomy done in 2016. Pt currently undergoing peptide receptor radionucleotide therapy, being followed by Dr. London.    - Oncology following during this admission    Octreotide protocol   Two hours before surgery or procedure give 500 micrograms IV Push of Sandostatin(octreotide acetate).  Premedicate with Zofran 8 mg IV.  (done)     Then start a 500 microgram per hour IV infusion of Sandostatin (octreotide acetate)----- start this immediately after the IV push and continue infusion during and after surgery.  No drip necessary for procedures less than 4 hrs.     Depending on the severity and duration of surgery----- taper the infusion over 1-24 hours--say for colonoscopy taper over 1 hour-- after huge liver cases taper over 12-24 hours.     If patient crashes don't use pressors (except as a last resort if the following fails)-- use fluids and 1-5 mg bolus of octreotide (can repeat)-- for malignant hyperthermia use dantrolene in normal doses.    MAGDI (acute kidney injury)  sCr elevated from baseline (0.9 > 1.4). Pre-renal vs intrarenal. Pt without improvement after fluid bolus, most likely attributed to sepsis secondary to staph aureus (U/A and UCx grew staph aureus)    Plan  - Fluid resuscitation   - Trend creatinine.    Impaired mobility and ADLs  Working with PT/OT    Myopathy          VTE Risk Mitigation (From admission, onward)        Ordered     Reason for no Mechanical VTE Prophylaxis  Once      09/13/19 1933     IP VTE HIGH RISK PATIENT  Once      09/13/19 1933     Place sequential compression device  Until discontinued      09/13/19 1641                Paulo Madrigal MD  Department of Hospital Medicine   [facility]

## 2019-09-21 NOTE — ASSESSMENT & PLAN NOTE
64 y/o male with a history of metastatic bronchial carcinoid tumor s/p resection of ALIRIO, chemotherapy and subsequent prednisone therapy.  He is admitted with staphylococcus aureus sepsis with the source likely being his left BKA stump.  Patient does not have a port and denies having any hardware in his body. TTE & VIVI both negative for vegetations. US of AKA stump demonstrated .4 x 1.2 x 2.4 cm complex fluid collection. He underwent I &D on 9/20. MRI did not show osteo, but does demonstrate several lesions concerning for metastasis to the femur and pelvic flat bones.    Recommendations:  Continue to monitor blood cultures (would like 2 sets of negative blood cultures on 2 separate days to ensure clearance, 9/17 & 9/18 NGTD)  C/w bactrim 1 tablet SS daily given pt on chronic high dose steroids. Monitor renal function and potassium    Patient will be discharged on cefazolin IV 6g continuous infusion for 4 weeks, estimated end of therapy date 10/15.    Please have the following outpatient labs drawn WEEKLY and faxed to Infectious Diseases clinic at [phone]:  - CBC with diff  - CMP  - Vanc trough    If vanc trough is not in the appropriate range of 15-20 prior to discharge, the patient needs a vanc trough due before their fourth outpatient dose.    We will arrange for a follow-up appointment in Infectious Diseases clinic in about 3 weeks.    Prior to discharge, please ensure the patient's follow-up has been scheduled.  If there is still no follow-up scheduled in Infectious Diseases clinic, please send an EPIC message to the Infectious Diseases charge nurse Trinity Salinas.

## 2019-09-21 NOTE — SUBJECTIVE & OBJECTIVE
Interval History:   No acute events overnight. Pt with no complaints at this time.     Review of Systems   Constitutional: Positive for activity change. Negative for appetite change.   HENT: Negative for congestion, postnasal drip and sore throat.    Eyes: Positive for visual disturbance.   Respiratory: Negative for cough, chest tightness and shortness of breath.    Cardiovascular: Negative for chest pain, palpitations and leg swelling.   Gastrointestinal: Positive for nausea. Negative for abdominal pain, constipation, diarrhea and vomiting.   Endocrine: Negative for heat intolerance.   Genitourinary: Negative for difficulty urinating and dysuria.   Musculoskeletal: Negative for arthralgias, back pain and myalgias.   Neurological: Positive for weakness (improved) and light-headedness. Negative for numbness and headaches.     Objective:     Vital Signs (Most Recent):  Temp: 97.9 °F (36.6 °C) (09/21/19 1158)  Pulse: 63 (09/21/19 1158)  Resp: 18 (09/21/19 1158)  BP: 126/76 (09/21/19 1158)  SpO2: 95 % (09/21/19 1158) Vital Signs (24h Range):  Temp:  [97 °F (36.1 °C)-98 °F (36.7 °C)] 97.9 °F (36.6 °C)  Pulse:  [52-65] 63  Resp:  [16-19] 18  SpO2:  [92 %-99 %] 95 %  BP: (120-147)/(68-84) 126/76     Weight: 130.6 kg (288 lb)  Body mass index is 39.06 kg/m².    Intake/Output Summary (Last 24 hours) at 9/21/2019 1525  Last data filed at 9/21/2019 1400  Gross per 24 hour   Intake 360 ml   Output --   Net 360 ml      Physical Exam   Constitutional: He is oriented to person, place, and time. He appears well-developed and well-nourished. He does not appear ill. No distress.   Obese   HENT:   Head: Normocephalic and atraumatic.   Eyes: No scleral icterus.   Neck: Normal range of motion. Neck supple.   Cardiovascular: Normal rate, regular rhythm and intact distal pulses. Exam reveals no gallop and no friction rub.   No murmur heard.  Pulmonary/Chest: Effort normal and breath sounds normal. No respiratory distress.   Abdominal:  Soft. He exhibits distension. There is no tenderness. There is no guarding.   Musculoskeletal:   b/l UE 5/5 muscle strength  RLE: 3-4/5 muscle strength. Able to move his R foot and bend his right knee  LLE stump dressing clean dry and intact   Neurological: He is alert and oriented to person, place, and time.   Skin: Skin is warm.   Nursing note and vitals reviewed.      Significant Labs:   CBC:   Recent Labs   Lab 09/20/19  0730 09/21/19  0537   WBC 11.67 9.80   HGB 13.0* 12.7*   HCT 38.8* 38.2*    307     CMP:   Recent Labs   Lab 09/20/19  0730 09/21/19  0537    138   K 5.1 5.1    101   CO2 29 26   * 163*   BUN 31* 32*   CREATININE 1.3 1.4   CALCIUM 8.8 8.0*   PROT 6.2 6.0   ALBUMIN 2.5* 2.4*   BILITOT 0.4 0.5   ALKPHOS 136* 130   AST 13 17   ALT 46* 38   ANIONGAP 9 11   EGFRNONAA 58.1* 53.1*       Significant Imaging: I have reviewed all pertinent imaging results/findings within the past 24 hours.

## 2019-09-22 VITALS
HEART RATE: 64 BPM | HEIGHT: 72 IN | DIASTOLIC BLOOD PRESSURE: 81 MMHG | RESPIRATION RATE: 18 BRPM | SYSTOLIC BLOOD PRESSURE: 142 MMHG | TEMPERATURE: 98 F | OXYGEN SATURATION: 96 % | WEIGHT: 288 LBS | BODY MASS INDEX: 39.01 KG/M2

## 2019-09-22 LAB
ALBUMIN SERPL BCP-MCNC: 2.4 G/DL (ref 3.5–5.2)
ALP SERPL-CCNC: 114 U/L (ref 55–135)
ALT SERPL W/O P-5'-P-CCNC: 29 U/L (ref 10–44)
ANION GAP SERPL CALC-SCNC: 9 MMOL/L (ref 8–16)
ANISOCYTOSIS BLD QL SMEAR: SLIGHT
AST SERPL-CCNC: 13 U/L (ref 10–40)
BACTERIA BLD CULT: NORMAL
BACTERIA BLD CULT: NORMAL
BASOPHILS NFR BLD: 0 % (ref 0–1.9)
BILIRUB SERPL-MCNC: 0.5 MG/DL (ref 0.1–1)
BUN SERPL-MCNC: 32 MG/DL (ref 8–23)
CALCIUM SERPL-MCNC: 7.8 MG/DL (ref 8.7–10.5)
CHLORIDE SERPL-SCNC: 102 MMOL/L (ref 95–110)
CO2 SERPL-SCNC: 27 MMOL/L (ref 23–29)
CREAT SERPL-MCNC: 1.2 MG/DL (ref 0.5–1.4)
DIFFERENTIAL METHOD: ABNORMAL
EOSINOPHIL NFR BLD: 0 % (ref 0–8)
ERYTHROCYTE [DISTWIDTH] IN BLOOD BY AUTOMATED COUNT: 14.1 % (ref 11.5–14.5)
EST. GFR  (AFRICAN AMERICAN): >60 ML/MIN/1.73 M^2
EST. GFR  (NON AFRICAN AMERICAN): >60 ML/MIN/1.73 M^2
GLUCOSE SERPL-MCNC: 126 MG/DL (ref 70–110)
HCT VFR BLD AUTO: 37.2 % (ref 40–54)
HGB BLD-MCNC: 12 G/DL (ref 14–18)
IMM GRANULOCYTES # BLD AUTO: ABNORMAL K/UL (ref 0–0.04)
IMM GRANULOCYTES NFR BLD AUTO: ABNORMAL % (ref 0–0.5)
LYMPHOCYTES NFR BLD: 10 % (ref 18–48)
MAGNESIUM SERPL-MCNC: 2.2 MG/DL (ref 1.6–2.6)
MCH RBC QN AUTO: 30.1 PG (ref 27–31)
MCHC RBC AUTO-ENTMCNC: 32.3 G/DL (ref 32–36)
MCV RBC AUTO: 93 FL (ref 82–98)
MONOCYTES NFR BLD: 2 % (ref 4–15)
MYELOCYTES NFR BLD MANUAL: 1 %
NEUTROPHILS NFR BLD: 83 % (ref 38–73)
NEUTS BAND NFR BLD MANUAL: 4 %
NRBC BLD-RTO: 0 /100 WBC
PHOSPHATE SERPL-MCNC: 3.1 MG/DL (ref 2.7–4.5)
PLATELET # BLD AUTO: 297 K/UL (ref 150–350)
PMV BLD AUTO: 9.2 FL (ref 9.2–12.9)
POCT GLUCOSE: 107 MG/DL (ref 70–110)
POCT GLUCOSE: 158 MG/DL (ref 70–110)
POLYCHROMASIA BLD QL SMEAR: ABNORMAL
POTASSIUM SERPL-SCNC: 4.3 MMOL/L (ref 3.5–5.1)
PROT SERPL-MCNC: 5.8 G/DL (ref 6–8.4)
RBC # BLD AUTO: 3.99 M/UL (ref 4.6–6.2)
SODIUM SERPL-SCNC: 138 MMOL/L (ref 136–145)
WBC # BLD AUTO: 9.76 K/UL (ref 3.9–12.7)

## 2019-09-22 PROCEDURE — 83735 ASSAY OF MAGNESIUM: CPT

## 2019-09-22 PROCEDURE — 80053 COMPREHEN METABOLIC PANEL: CPT

## 2019-09-22 PROCEDURE — 85027 COMPLETE CBC AUTOMATED: CPT

## 2019-09-22 PROCEDURE — 99238 HOSP IP/OBS DSCHRG MGMT 30/<: CPT | Mod: ,,, | Performed by: HOSPITALIST

## 2019-09-22 PROCEDURE — 99238 PR HOSPITAL DISCHARGE DAY,<30 MIN: ICD-10-PCS | Mod: ,,, | Performed by: HOSPITALIST

## 2019-09-22 PROCEDURE — 25000003 PHARM REV CODE 250: Performed by: STUDENT IN AN ORGANIZED HEALTH CARE EDUCATION/TRAINING PROGRAM

## 2019-09-22 PROCEDURE — 84100 ASSAY OF PHOSPHORUS: CPT

## 2019-09-22 PROCEDURE — 63600175 PHARM REV CODE 636 W HCPCS: Performed by: STUDENT IN AN ORGANIZED HEALTH CARE EDUCATION/TRAINING PROGRAM

## 2019-09-22 PROCEDURE — A4216 STERILE WATER/SALINE, 10 ML: HCPCS | Performed by: HOSPITALIST

## 2019-09-22 PROCEDURE — 85007 BL SMEAR W/DIFF WBC COUNT: CPT

## 2019-09-22 PROCEDURE — 25000003 PHARM REV CODE 250: Performed by: HOSPITALIST

## 2019-09-22 RX ADMIN — KETOROLAC TROMETHAMINE 1 DROP: 5 SOLUTION/ DROPS OPHTHALMIC at 08:09

## 2019-09-22 RX ADMIN — PREDNISONE 110 MG: 20 TABLET ORAL at 08:09

## 2019-09-22 RX ADMIN — FAMOTIDINE 20 MG: 20 TABLET, FILM COATED ORAL at 08:09

## 2019-09-22 RX ADMIN — Medication 10 ML: at 12:09

## 2019-09-22 RX ADMIN — KETOROLAC TROMETHAMINE 1 DROP: 5 SOLUTION/ DROPS OPHTHALMIC at 12:09

## 2019-09-22 RX ADMIN — Medication 10 ML: at 06:09

## 2019-09-22 RX ADMIN — DEXTROSE 6 G: 5 SOLUTION INTRAVENOUS at 01:09

## 2019-09-22 RX ADMIN — SULFAMETHOXAZOLE AND TRIMETHOPRIM 1 TABLET: 800; 160 TABLET ORAL at 08:09

## 2019-09-22 RX ADMIN — BRIMONIDINE TARTRATE 1 DROP: 1.5 SOLUTION OPHTHALMIC at 08:09

## 2019-09-22 NOTE — PLAN OF CARE
Problem: Fall Injury Risk  Goal: Absence of Fall and Fall-Related Injury  9/22/2019 0453 by Jorge Au LPN  Outcome: Ongoing, Progressing  9/22/2019 0451 by Jorge Au LPN  Outcome: Ongoing, Progressing     Problem: Adult Inpatient Plan of Care  Goal: Plan of Care Review  9/22/2019 0453 by Jorge Au LPN  Outcome: Ongoing, Progressing  9/22/2019 0451 by Jorge Au LPN  Outcome: Ongoing, Progressing  Goal: Patient-Specific Goal (Individualization)  9/22/2019 0453 by Jorge Au LPN  Outcome: Ongoing, Progressing  9/22/2019 0451 by Jorge Au LPN  Outcome: Ongoing, Progressing  Goal: Absence of Hospital-Acquired Illness or Injury  9/22/2019 0453 by Jorge Au LPN  Outcome: Ongoing, Progressing  9/22/2019 0451 by Jorge Au LPN  Outcome: Ongoing, Progressing  Goal: Optimal Comfort and Wellbeing  9/22/2019 0453 by Jorge Au LPN  Outcome: Ongoing, Progressing  9/22/2019 0451 by Jorge Au LPN  Outcome: Ongoing, Progressing  Goal: Readiness for Transition of Care  9/22/2019 0453 by Jorge Au LPN  Outcome: Ongoing, Progressing  9/22/2019 0451 by Jorge Au LPN  Outcome: Ongoing, Progressing  Goal: Rounds/Family Conference  9/22/2019 0453 by Jorge Au LPN  Outcome: Ongoing, Progressing  9/22/2019 0451 by Jorge Au LPN  Outcome: Ongoing, Progressing     Problem: Skin Injury Risk Increased  Goal: Skin Health and Integrity  9/22/2019 0453 by Jorge Au LPN  Outcome: Ongoing, Progressing  9/22/2019 0451 by Jorge Au LPN  Outcome: Ongoing, Progressing     Problem: Pain Acute  Goal: Optimal Pain Control  9/22/2019 0453 by Jorge Au LPN  Outcome: Ongoing, Progressing  9/22/2019 0451 by Jorge Au LPN  Outcome: Ongoing, Progressing     Problem: Mobility Impairment  Goal: Optimal Mobility  9/22/2019 0453 by Jorge Au LPN  Outcome: Ongoing, Progressing  9/22/2019 0451 by Jorge Au LPN  Outcome: Ongoing, Progressing      Problem: Infection  Goal: Infection Symptom Resolution  9/22/2019 0453 by Jorge Au LPN  Outcome: Ongoing, Progressing  9/22/2019 0451 by Jorge Au LPN  Outcome: Ongoing, Progressing

## 2019-09-22 NOTE — HOSPITAL COURSE
Pt arrived on 9/13 with significant whole body weakness associated with chills and diaphoresis that had been occurring for three days. He was subsequently found to have staph aureus bacteremia and bacturia. He was treated with vancomyin initially and regained his baseline strength within three days of admission. The source was thought to be his LLE stump which began draining purulent fluid on 9/16. Ultrasound showed 2.4 x 1.2 x 2.4 cm complex fluid collection that was subsequently drained by orthopedic surgery. Pt initially offered a stump revision but ultimately decided against that surgery. Of note due to pt's carcinoid bronchial adenoma, assistance was required from hematology/oncology for pre and andre op management with octreotide. Pt underwent procedure on 9/20 without any complications.   Given sensitivities from the urine and blood cultures, pt was transitioned to cefazolin and discharged home with assistance from home health on 9/22.

## 2019-09-22 NOTE — ASSESSMENT & PLAN NOTE
Pt with diaphoresis and diffuse muscle weakness leading to admission associated with fevers while in the hospital. BCx and UCx positive for staph aureus.     Plan  - Continue cefazolin   - ID consulted: will need cefazolin IV 6g continuous infusion for 4 weeks. Estimated end of therapy date 10/15  - Blood cultures daily until clear. (last + BCx 9/16)  - VIVI 09/18: No evidence of small vegetation or masses  - U/S of left extremity: Small complex fluid collection 2.4 x 1.2 x 2.4 - Drained by orthopedic surgery.

## 2019-09-22 NOTE — SUBJECTIVE & OBJECTIVE
Principal Problem:Sepsis due to methicillin susceptible Staphylococcus aureus    Principal Orthopedic Problem:  Left leg amputation stump infection    Interval History:  Patient seen and examined at bedside.    No acute events overnight.  Penrose drain pulled a bedside. No active drainage. Sutures in place. Stump redressed.     Review of patient's allergies indicates:   Allergen Reactions    Epinephrine Other (See Comments)     Carcinoid crisis       Current Facility-Administered Medications   Medication    acetaminophen tablet 650 mg    albuterol-ipratropium 2.5 mg-0.5 mg/3 mL nebulizer solution 3 mL    brimonidine 0.15 % OPTH DROP ophthalmic solution 1 drop    ceFAZolin (ANCEF) 6 g in dextrose 5 % 500 mL CONTINUOUS INFUSION    dextrose 10% (D10W) Bolus    dextrose 10% (D10W) Bolus    famotidine tablet 20 mg    glucagon (human recombinant) injection 1 mg    glucose chewable tablet 16 g    glucose chewable tablet 24 g    HYDROcodone-acetaminophen 5-325 mg per tablet 1 tablet    insulin aspart U-100 pen 0-5 Units    ketorolac 0.5% ophthalmic solution 1 drop    ondansetron injection 4 mg    predniSONE tablet 110 mg    sodium chloride 0.9% flush 10 mL    sodium chloride 0.9% flush 10 mL    And    sodium chloride 0.9% flush 10 mL    sulfamethoxazole-trimethoprim 800-160mg per tablet 1 tablet     Objective:     Vital Signs (Most Recent):  Temp: 97.9 °F (36.6 °C) (09/22/19 0757)  Pulse: (!) 57 (09/22/19 0757)  Resp: 16 (09/22/19 0757)  BP: 136/69 (09/22/19 0757)  SpO2: 98 % (09/22/19 0757) Vital Signs (24h Range):  Temp:  [96.5 °F (35.8 °C)-97.9 °F (36.6 °C)] 97.9 °F (36.6 °C)  Pulse:  [57-75] 57  Resp:  [16-20] 16  SpO2:  [94 %-99 %] 98 %  BP: (123-136)/(61-80) 136/69     Weight: 130.6 kg (288 lb)  Height: 6' (182.9 cm)  Body mass index is 39.06 kg/m².      Intake/Output Summary (Last 24 hours) at 9/22/2019 0815  Last data filed at 9/21/2019 1400  Gross per 24 hour   Intake 120 ml   Output --   Net  120 ml       Ortho/SPM Exam     LLE:  Through knee amputation  Surgical dressing and Ace wrap in place   pain is controlled  SILT distally   no drainage    Significant Labs:   CBC:   Recent Labs   Lab 09/21/19  0537 09/22/19  0534   WBC 9.80 9.76   HGB 12.7* 12.0*   HCT 38.2* 37.2*    297     CMP:   Recent Labs   Lab 09/21/19  0537 09/22/19  0534    138   K 5.1 4.3    102   CO2 26 27   * 126*   BUN 32* 32*   CREATININE 1.4 1.2   CALCIUM 8.0* 7.8*   PROT 6.0 5.8*   ALBUMIN 2.4* 2.4*   BILITOT 0.5 0.5   ALKPHOS 130 114   AST 17 13   ALT 38 29   ANIONGAP 11 9   EGFRNONAA 53.1* >60.0     All pertinent labs within the past 24 hours have been reviewed.    Significant Imaging: I have reviewed all pertinent imaging results/findings.

## 2019-09-22 NOTE — PROGRESS NOTES
[facility]  Orthopedics  Progress Note    Patient Name: Elroy Rahman  MRN: [id number]  Admission Date: 9/13/2019  Hospital Length of Stay: 8 days  Attending Provider: Emil Day MD  Primary Care Provider: Wayne Catalan PA-C  Follow-up For: Procedure(s) (LRB):  IRRIGATION AND DEBRIDEMENT, LOWER EXTREMITY - left - cysto tubing - cultures (Left)    Post-Operative Day: 2 Days Post-Op  Subjective:     Principal Problem:Sepsis due to methicillin susceptible Staphylococcus aureus    Principal Orthopedic Problem:  Left leg amputation stump infection    Interval History:  Patient seen and examined at bedside.    No acute events overnight.  Penrose drain pulled a bedside. No active drainage. Sutures in place. Stump redressed.     Review of patient's allergies indicates:   Allergen Reactions    Epinephrine Other (See Comments)     Carcinoid crisis       Current Facility-Administered Medications   Medication    acetaminophen tablet 650 mg    albuterol-ipratropium 2.5 mg-0.5 mg/3 mL nebulizer solution 3 mL    brimonidine 0.15 % OPTH DROP ophthalmic solution 1 drop    ceFAZolin (ANCEF) 6 g in dextrose 5 % 500 mL CONTINUOUS INFUSION    dextrose 10% (D10W) Bolus    dextrose 10% (D10W) Bolus    famotidine tablet 20 mg    glucagon (human recombinant) injection 1 mg    glucose chewable tablet 16 g    glucose chewable tablet 24 g    HYDROcodone-acetaminophen 5-325 mg per tablet 1 tablet    insulin aspart U-100 pen 0-5 Units    ketorolac 0.5% ophthalmic solution 1 drop    ondansetron injection 4 mg    predniSONE tablet 110 mg    sodium chloride 0.9% flush 10 mL    sodium chloride 0.9% flush 10 mL    And    sodium chloride 0.9% flush 10 mL    sulfamethoxazole-trimethoprim 800-160mg per tablet 1 tablet     Objective:     Vital Signs (Most Recent):  Temp: 97.9 °F (36.6 °C) (09/22/19 0757)  Pulse: (!) 57 (09/22/19 0757)  Resp: 16 (09/22/19 0757)  BP: 136/69 (09/22/19 0757)  SpO2: 98 %  (09/22/19 0757) Vital Signs (24h Range):  Temp:  [96.5 °F (35.8 °C)-97.9 °F (36.6 °C)] 97.9 °F (36.6 °C)  Pulse:  [57-75] 57  Resp:  [16-20] 16  SpO2:  [94 %-99 %] 98 %  BP: (123-136)/(61-80) 136/69     Weight: 130.6 kg (288 lb)  Height: 6' (182.9 cm)  Body mass index is 39.06 kg/m².      Intake/Output Summary (Last 24 hours) at 9/22/2019 0815  Last data filed at 9/21/2019 1400  Gross per 24 hour   Intake 120 ml   Output --   Net 120 ml       Ortho/SPM Exam     LLE:  Through knee amputation  Surgical dressing and Ace wrap in place   pain is controlled  SILT distally   no drainage    Significant Labs:   CBC:   Recent Labs   Lab 09/21/19  0537 09/22/19  0534   WBC 9.80 9.76   HGB 12.7* 12.0*   HCT 38.2* 37.2*    297     CMP:   Recent Labs   Lab 09/21/19  0537 09/22/19  0534    138   K 5.1 4.3    102   CO2 26 27   * 126*   BUN 32* 32*   CREATININE 1.4 1.2   CALCIUM 8.0* 7.8*   PROT 6.0 5.8*   ALBUMIN 2.4* 2.4*   BILITOT 0.5 0.5   ALKPHOS 130 114   AST 17 13   ALT 38 29   ANIONGAP 11 9   EGFRNONAA 53.1* >60.0     All pertinent labs within the past 24 hours have been reviewed.    Significant Imaging: I have reviewed all pertinent imaging results/findings.    Assessment/Plan:     Amputation stump infection  Elroy Rahman is a 64 y.o. 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    S/p stump I&D on 9/20/19    - Blood and urine cultures both positive for MSSA.   PICC line in place  - Abx: IV ancef, PO bactrim  - surgical dressing c/d/i  - penrose drain removed today  - pain control per primary  - dvt ppx per primary    Will continue to follow.           Cordell Miguel MD  Orthopedics  [facility]

## 2019-09-22 NOTE — DISCHARGE SUMMARY
[facility] Hospital Medicine  Discharge Summary      Patient Name: Elroy Rahman  MRN: [id number]  Admission Date: 9/13/2019  Hospital Length of Stay: 8 days  Discharge Date and Time:  09/22/2019 3:14 PM  Attending Physician: Emil Day MD   Discharging Provider: Paulo Madrigal MD  Primary Care Provider: Wayne Catalan PA-C  [facility] Medicine Team: [facility] MED 1 Paulo Madrigal MD    HPI:   Mr. 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    Pt reports that on Tuesday he was experiencing chills, diaphoresis and severe muscle weakness. The weakness began suddenly, not concentrated in any one muscle group and progressively worsened. Unfortunately by late that evening he was unable to move around on his own. At baseline pt does have a left LE amputation and is able to ambulate freely with and without his prosthesis. He denies any bowel or bladder incontinence, back pain, numbness, perineal numbness or extremity pain.   Per notes: He was started on PRRT back in April and began prednisone due to inflammation noted in the globe of his eye. Beginning on 9/5 he was to take Prednisone 130 mg for days and then proceed with 110mg for an additional five days. Pt reports that he is compliant with his medication regimen.     Procedure(s) (LRB):  IRRIGATION AND DEBRIDEMENT, LOWER EXTREMITY - left - cysto tubing - cultures (Left)      Hospital Course:   Pt arrived on 9/13 with significant whole body weakness associated with chills and diaphoresis that had been occurring for three days. He was subsequently found to have staph aureus bacteremia and bacturia. He was treated with vancomyin initially and regained his baseline strength within three  days of admission. The source was thought to be his LLE stump which began draining purulent fluid on 9/16. Ultrasound showed 2.4 x 1.2 x 2.4 cm complex fluid collection that was subsequently drained by orthopedic surgery. Pt initially offered a stump revision but ultimately decided against that surgery. Of note due to pt's carcinoid bronchial adenoma, assistance was required from hematology/oncology for pre and andre op management with octreotide. Pt underwent procedure on 9/20 without any complications.   Given sensitivities from the urine and blood cultures, pt was transitioned to cefazolin and discharged home with assistance from home health on 9/22.      Review of Systems   Constitutional: Negative for chills, diaphoresis, fever, malaise/fatigue and weight loss.   HENT: Negative for congestion.    Eyes: Negative for blurred vision.   Respiratory: Negative for cough and shortness of breath.    Cardiovascular: Negative for chest pain and leg swelling.   Gastrointestinal: Negative for abdominal pain, diarrhea, nausea and vomiting.   Genitourinary: Negative for hematuria.   Musculoskeletal: Negative for back pain and myalgias.   Neurological: Negative for dizziness, sensory change, seizures and headaches.   Endo/Heme/Allergies: Does not bruise/bleed easily.   Psychiatric/Behavioral: Negative for depression.     Physical Exam   Constitutional: He is oriented to person, place, and time. No distress.   HENT:   Head: Normocephalic and atraumatic.   Eyes: No scleral icterus.   Neck: Normal range of motion.   Cardiovascular: Normal rate and regular rhythm.   No murmur heard.  Pulmonary/Chest: Effort normal. No respiratory distress.   Abdominal: Soft. There is no tenderness. There is no guarding.   Musculoskeletal: He exhibits no edema or tenderness.   LLE stump dressing clean dry and intact.    Neurological: He is alert and oriented to person, place, and time.   Skin: Skin is warm and dry. He is not diaphoretic.   Nursing  note and vitals reviewed.      Consults:   Consults (From admission, onward)        Status Ordering Provider     Inpatient consult to Hematology  Once     Provider:  (Not yet assigned)    Completed SHERON DURAN     Inpatient consult to Infectious Diseases  Once     Provider:  (Not yet assigned)    Completed NIDA VILLAFUERTE     Inpatient consult to Oncology  Once     Provider:  (Not yet assigned)    Acknowledged SHERON DURAN     Inpatient consult to Orthopedic Surgery  Once     Provider:  (Not yet assigned)    Completed NIDA VILLAFUERTE     Inpatient consult to Physical Medicine Rehab  Once     Provider:  (Not yet assigned)    Completed NIDA VILLAFUERTE     Inpatient consult to PICC team ([facility])  Once     Provider:  (Not yet assigned)    Completed MARIS BROWN     Pharmacy to dose Vancomycin consult  Once     Provider:  (Not yet assigned)    Completed NIDA VILLAFUERTE          * Sepsis due to methicillin susceptible Staphylococcus aureus  Pt with diaphoresis and diffuse muscle weakness leading to admission associated with fevers while in the hospital. BCx and UCx positive for staph aureus.     Plan  - Continue cefazolin   - ID consulted: will need cefazolin IV 6g continuous infusion for 4 weeks. Estimated end of therapy date 10/15  - Blood cultures daily until clear. (last + BCx 9/16)  - VIVI 09/18: No evidence of small vegetation or masses  - U/S of left extremity: Small complex fluid collection 2.4 x 1.2 x 2.4 - Drained by orthopedic surgery.       UTI (urinary tract infection)  Urinalysis with 1+ protein,22WBC and occasional bacteria. He has leukocytosis of 12.78. UCx positive for staph aureus. Pt without any urinary symptoms     Plan   - Continue cefazolin.     Acute muscle weakness  Pt with a medical history of metastatic carcinoma and corticosteroid use presenting with LE weakness of sudden onset most likely due to sepsis. BCx growing gram positive cocci in clusters, UCx  showing staph aureus. (see sepsis)  Cord compression initially suspected due to acute presentation, MRI spine final report showing metastatic disease without any mass effect. Steroid induced myopathy considered since pt is on a high dose of steroids (CPK elevated, U/A with 3+ blood and only 13 RBC).     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 He is also s/p left upper lung lobectomy done in 2016. Pt currently undergoing peptide receptor radionucleotide therapy, being followed by Dr. London.    - Oncology following during this admission    MAGDI (acute kidney injury)  sCr elevated from baseline (0.9 > 1.4). Pre-renal vs intrarenal. Pt without improvement after fluid bolus, most likely attributed to sepsis secondary to staph aureus (U/A and UCx grew staph aureus)    Plan  - Fluid resuscitation   - Trend creatinine.    Impaired mobility and ADLs  Working with PT/OT    Myopathy          Final Active Diagnoses:    Diagnosis Date Noted POA    PRINCIPAL PROBLEM:  Sepsis due to methicillin susceptible Staphylococcus aureus [A41.01] 09/14/2019 Yes    Acute muscle weakness [M62.81] 09/13/2019 Yes    UTI (urinary tract infection) [N39.0] 09/13/2019 Yes    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]  Yes    MAGDI (acute kidney injury) [N17.9] 09/15/2019 Yes    Abscess [L02.91] 09/18/2019 Yes    Amputation stump infection [T87.40] 09/17/2019 Yes    MSSA bacteremia [R78.81] 09/17/2019 Yes    Impaired mobility and ADLs [Z74.09] 09/16/2019 No    Myopathy [G72.9] 09/13/2019 Yes      Problems Resolved During this Admission:       Discharged Condition: stable    Disposition: Home or Self Care    Follow Up:  Follow-up Information     Orthopedic surgery In 3 weeks.           Wayne Catalan PA-C.    Specialty:  Emergency Medicine  Why:  As needed  Contact information:  [address] [place] [address] [address]  [address]  [facility]  [facility]  "[id number]  [phone]                 Patient Instructions:      COMMODE FOR HOME USE     Order Specific Question Answer Comments   Type: Drop arm    Height: 6' (1.829 m)    Weight: 130.6 kg (288 lb)    Does patient have medical equipment at home? prosthesis    Does patient have medical equipment at home? cane, straight    Does patient have medical equipment at home? crutches    Length of need (1-99 months): 99      WHEELCHAIR FOR HOME USE     Order Specific Question Answer Comments   Hours in W/C per day: 3    Type of Wheelchair: Standard    Size(Width): 20"    Leg Support: STD footrests    Arm Height: Swing away patient transfers d/t AKA   Lap Belt: Velcro    Cushion: Basic    Height: 6' (1.829 m)    Weight: 130.6 kg (288 lb)    Does patient have medical equipment at home? prosthesis    Does patient have medical equipment at home? cane, straight    Does patient have medical equipment at home? crutches    Length of need (1-99 months): 99    Please check all that apply: Caregiver is capable and willing to operate wheelchair safely.    Please check all that apply: Patient's upper body strength is sufficient for propulsion.    Please check all that apply: The patient requires the use of a w/c for activities of daily living within the Home.    Please check all that apply: Patient mobility limitations cannot be sufficiently resolved by the use of other ambulatory therapies.        Significant Diagnostic Studies: Labs:   CMP   Recent Labs   Lab 09/21/19 0537 09/22/19  0534    138   K 5.1 4.3    102   CO2 26 27   * 126*   BUN 32* 32*   CREATININE 1.4 1.2   CALCIUM 8.0* 7.8*   PROT 6.0 5.8*   ALBUMIN 2.4* 2.4*   BILITOT 0.5 0.5   ALKPHOS 130 114   AST 17 13   ALT 38 29   ANIONGAP 11 9   ESTGFRAFRICA >60.0 >60.0   EGFRNONAA 53.1* >60.0    and CBC   Recent Labs   Lab 09/21/19 0537 09/22/19  0534   WBC 9.80 9.76   HGB 12.7* 12.0*   HCT 38.2* 37.2*    297       Pending Diagnostic Studies:     None    "      Medications:  Reconciled Home Medications:      Medication List      START taking these medications    dextrose 5 % SolP 500 mL with ceFAZolin 1 gram SolR 6 g  Inject 6 g into the vein once daily. (end 10/1/19) for 11 days     famotidine 20 MG tablet  Commonly known as:  PEPCID  Take 1 tablet (20 mg total) by mouth 2 (two) times daily.     sulfamethoxazole-trimethoprim 800-160mg 800-160 mg Tab  Commonly known as:  BACTRIM DS  Take 1 tablet by mouth once daily.        CHANGE how you take these medications    * predniSONE 50 MG Tab  Commonly known as:  DELTASONE  Take 2 tablets (100 mg total) by mouth once daily. Take with 1/2 of the 20 mg tablet. For a total of 110 daily.  What changed:  additional instructions     * predniSONE 20 MG tablet  Commonly known as:  DELTASONE  Take 0.5 tablets (10 mg total) by mouth once daily. Take with two 50 mg tabs for a total of 110 mg daily  What changed:    · how much to take  · additional instructions         * This list has 2 medication(s) that are the same as other medications prescribed for you. Read the directions carefully, and ask your doctor or other care provider to review them with you.            CONTINUE taking these medications    ALPHAGAN P 0.1 % Drop  Generic drug:  brimonidine 0.1%  Instill 1 drop into left eye twice daily     ondansetron 4 MG tablet  Commonly known as:  ZOFRAN  Take 1 tablet (4 mg total) by mouth daily as needed for Nausea.     PROLENSA 0.07 % Drop  Generic drug:  bromfenac  INSTILL 1 DROP INTO LEFT EYE TWICE DAILY        STOP taking these medications    ketorolac 0.4% 0.4 % Drop  Commonly known as:  ACULAR            Indwelling Lines/Drains at time of discharge:   Lines/Drains/Airways     Peripherally Inserted Central Catheter Line                 PICC Double Lumen 09/20/19 1725 right basilic 1 day          Drain                 Open Drain 09/20/19 1525 Left Leg Penrose 1/4 inch 1 day                Time spent on the discharge of patient: 30  minutes  Patient was seen and examined on the date of discharge and determined to be suitable for discharge.         Paulo Madrigal MD  Department of Hospital Medicine  [facility]

## 2019-09-22 NOTE — ASSESSMENT & PLAN NOTE
Elroy Rahman is a 64 y.o. 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    S/p stump I&D on 9/20/19    - Blood and urine cultures both positive for MSSA.   PICC line in place  - Abx: IV ancef, PO bactrim  - surgical dressing c/d/i  - penrose drain removed today  - pain control per primary  - dvt ppx per primary    Will continue to follow.

## 2019-09-22 NOTE — PROGRESS NOTES
Pt aaox4. V/s stable.Discharge teaching presented to pt and spouse. Understanding verbalized. Bedside commode, wheel chair, infusion equipment delivered to bedside. Pt transported off unit via wheelchair with family at side. No complaints at this time.

## 2019-09-22 NOTE — ASSESSMENT & PLAN NOTE
Urinalysis with 1+ protein,22WBC and occasional bacteria. He has leukocytosis of 12.78. UCx positive for staph aureus. Pt without any urinary symptoms     Plan   - Continue cefazolin.

## 2019-09-22 NOTE — PLAN OF CARE
CM received a call from pt's nurse - infusion company teaching did not occur yesterday w/ pt/spouse or delivery. CM contacted [facility] on call and spoke w/ Yulissa - she will arrange for med to be mixed and delivered as well as the on call nurse to present to bedside for teaching and connection. Approx delivery time is 1-2 hrs. Pt can d/c after teaching and med delivery and connection. CM updated pt's nurse.    Update: pt's nurse call and stated Rober is at bedside for teaching and delivery. She stated that pt's spouse is requesting a w/c and drop arm BSC for home. CM spoke w/ MD and then ordered DME. DME orders forwarded to Emerson white/ [employer] and he will deliver items to bedside.

## 2019-09-22 NOTE — PLAN OF CARE
Problem: Fall Injury Risk  Goal: Absence of Fall and Fall-Related Injury  Outcome: Ongoing, Progressing     Problem: Adult Inpatient Plan of Care  Goal: Plan of Care Review  Outcome: Ongoing, Progressing  Goal: Patient-Specific Goal (Individualization)  Outcome: Ongoing, Progressing  Goal: Absence of Hospital-Acquired Illness or Injury  Outcome: Ongoing, Progressing  Goal: Optimal Comfort and Wellbeing  Outcome: Ongoing, Progressing  Goal: Readiness for Transition of Care  Outcome: Ongoing, Progressing  Goal: Rounds/Family Conference  Outcome: Ongoing, Progressing     Problem: Skin Injury Risk Increased  Goal: Skin Health and Integrity  Outcome: Ongoing, Progressing     Problem: Pain Acute  Goal: Optimal Pain Control  Outcome: Ongoing, Progressing     Problem: Mobility Impairment  Goal: Optimal Mobility  Outcome: Ongoing, Progressing     Problem: Infection  Goal: Infection Symptom Resolution  Outcome: Ongoing, Progressing

## 2019-09-23 ENCOUNTER — TELEPHONE (OUTPATIENT)
Dept: HEMATOLOGY/ONCOLOGY | Facility: CLINIC | Age: 64
End: 2019-09-23

## 2019-09-23 LAB
BACTERIA BLD CULT: NORMAL
BACTERIA BLD CULT: NORMAL
BACTERIA SPEC AEROBE CULT: ABNORMAL

## 2019-09-23 NOTE — PLAN OF CARE
Patient discharged home with [facility] for home IV antibiotics and a wheelchair.    Future Appointments   Date Time Provider Department Center   9/25/2019 10:30 AM PIX, PHOTO MAIN CAMPUS [facility]   9/25/2019 11:30 AM PIX, PHOTO MAIN CAMPUS [facility]   9/27/2019  8:30 AM PIX, PHOTO MAIN [place] [facility]   10/15/2019  3:00 PM Roberto Leyva MD [facility] ID [facility]        09/23/19 0845   Final Note   Assessment Type Final Discharge Note   Anticipated Discharge Disposition Home-Health   Right Care Referral Info   Post Acute Recommendation Home-care   Referral Type Home Infusion   Facility Name [facility]

## 2019-09-23 NOTE — TELEPHONE ENCOUNTER
----- Message from Ochoa Rodriguez MD sent at 9/22/2019 11:35 PM CDT -----  Pt was discharged today from hospital after irrigation and debridement of his left knee stump, we had been following him during the week in the hospital. He will need follow-up soon in clinic, with tapering steroids further and discussing tx, thanks..

## 2019-09-24 LAB
BACTERIA SPEC ANAEROBE CULT: NORMAL

## 2019-09-24 PROCEDURE — G0180 PR HOME HEALTH MD CERTIFICATION: ICD-10-PCS | Mod: ,,, | Performed by: INTERNAL MEDICINE

## 2019-09-24 PROCEDURE — G0180 MD CERTIFICATION HHA PATIENT: HCPCS | Mod: ,,, | Performed by: INTERNAL MEDICINE

## 2019-09-27 ENCOUNTER — TELEPHONE (OUTPATIENT)
Dept: NEUROLOGY | Facility: HOSPITAL | Age: 64
End: 2019-09-27

## 2019-09-27 ENCOUNTER — PATIENT MESSAGE (OUTPATIENT)
Dept: HEMATOLOGY/ONCOLOGY | Facility: CLINIC | Age: 64
End: 2019-09-27

## 2019-09-27 NOTE — TELEPHONE ENCOUNTER
----- Message from Kaye Andrade sent at 9/27/2019 10:15 AM CDT -----  Contact: Karissa white/ Savita [phone]  RR---Karissa is calling in regards to the pt and needing the status of his Therapy.    Please call.

## 2019-09-27 NOTE — TELEPHONE ENCOUNTER
Returned call to [facility],spoke with Marcia. Gave update on patients status. Indicated that patient will likely resume Somatuline Depot once seen per Dr. London on 9/30/19. Patient is still eligible for copay assistance.

## 2019-09-30 ENCOUNTER — LAB VISIT (OUTPATIENT)
Dept: LAB | Facility: HOSPITAL | Age: 64
End: 2019-09-30
Attending: INTERNAL MEDICINE
Payer: COMMERCIAL

## 2019-09-30 ENCOUNTER — TELEPHONE (OUTPATIENT)
Dept: INFECTIOUS DISEASES | Facility: CLINIC | Age: 64
End: 2019-09-30

## 2019-09-30 ENCOUNTER — OFFICE VISIT (OUTPATIENT)
Dept: HEMATOLOGY/ONCOLOGY | Facility: CLINIC | Age: 64
End: 2019-09-30
Payer: COMMERCIAL

## 2019-09-30 VITALS
HEART RATE: 98 BPM | BODY MASS INDEX: 39.06 KG/M2 | OXYGEN SATURATION: 98 % | DIASTOLIC BLOOD PRESSURE: 89 MMHG | RESPIRATION RATE: 16 BRPM | HEIGHT: 72 IN | SYSTOLIC BLOOD PRESSURE: 136 MMHG | TEMPERATURE: 98 F

## 2019-09-30 DIAGNOSIS — R78.81 MSSA BACTEREMIA: ICD-10-CM

## 2019-09-30 DIAGNOSIS — C7B.8 SECONDARY MALIGNANT NEUROENDOCRINE TUMOR OF LIVER: Primary | ICD-10-CM

## 2019-09-30 DIAGNOSIS — B95.61 MSSA BACTEREMIA: ICD-10-CM

## 2019-09-30 DIAGNOSIS — C7B.8 SECONDARY NEUROENDOCRINE TUMOR OF LIVER: ICD-10-CM

## 2019-09-30 DIAGNOSIS — D3A.090 BRONCHIAL CARCINOID TUMORS: ICD-10-CM

## 2019-09-30 LAB
ALBUMIN SERPL BCP-MCNC: 3.2 G/DL (ref 3.5–5.2)
ALP SERPL-CCNC: 112 U/L (ref 55–135)
ALT SERPL W/O P-5'-P-CCNC: 17 U/L (ref 10–44)
ANION GAP SERPL CALC-SCNC: 11 MMOL/L (ref 8–16)
AST SERPL-CCNC: 19 U/L (ref 10–40)
BILIRUB SERPL-MCNC: 0.6 MG/DL (ref 0.1–1)
BUN SERPL-MCNC: 26 MG/DL (ref 8–23)
CALCIUM SERPL-MCNC: 8.7 MG/DL (ref 8.7–10.5)
CHLORIDE SERPL-SCNC: 103 MMOL/L (ref 95–110)
CO2 SERPL-SCNC: 27 MMOL/L (ref 23–29)
CREAT SERPL-MCNC: 1.4 MG/DL (ref 0.5–1.4)
ERYTHROCYTE [DISTWIDTH] IN BLOOD BY AUTOMATED COUNT: 14.8 % (ref 11.5–14.5)
EST. GFR  (AFRICAN AMERICAN): >60 ML/MIN/1.73 M^2
EST. GFR  (NON AFRICAN AMERICAN): 53.1 ML/MIN/1.73 M^2
GLUCOSE SERPL-MCNC: 119 MG/DL (ref 70–110)
HCT VFR BLD AUTO: 42.6 % (ref 40–54)
HGB BLD-MCNC: 14.1 G/DL (ref 14–18)
IMM GRANULOCYTES # BLD AUTO: 0.5 K/UL (ref 0–0.04)
MCH RBC QN AUTO: 30.3 PG (ref 27–31)
MCHC RBC AUTO-ENTMCNC: 33.1 G/DL (ref 32–36)
MCV RBC AUTO: 92 FL (ref 82–98)
NEUTROPHILS # BLD AUTO: 7 K/UL (ref 1.8–7.7)
PLATELET # BLD AUTO: 162 K/UL (ref 150–350)
PMV BLD AUTO: 9.3 FL (ref 9.2–12.9)
POTASSIUM SERPL-SCNC: 4.5 MMOL/L (ref 3.5–5.1)
PROT SERPL-MCNC: 7.2 G/DL (ref 6–8.4)
RBC # BLD AUTO: 4.65 M/UL (ref 4.6–6.2)
SODIUM SERPL-SCNC: 141 MMOL/L (ref 136–145)
WBC # BLD AUTO: 8.63 K/UL (ref 3.9–12.7)

## 2019-09-30 PROCEDURE — 99214 OFFICE O/P EST MOD 30 MIN: CPT | Mod: S$GLB,,, | Performed by: INTERNAL MEDICINE

## 2019-09-30 PROCEDURE — 36415 COLL VENOUS BLD VENIPUNCTURE: CPT

## 2019-09-30 PROCEDURE — 80053 COMPREHEN METABOLIC PANEL: CPT

## 2019-09-30 PROCEDURE — 99999 PR PBB SHADOW E&M-EST. PATIENT-LVL III: ICD-10-PCS | Mod: PBBFAC,,, | Performed by: INTERNAL MEDICINE

## 2019-09-30 PROCEDURE — 85027 COMPLETE CBC AUTOMATED: CPT

## 2019-09-30 PROCEDURE — 99999 PR PBB SHADOW E&M-EST. PATIENT-LVL III: CPT | Mod: PBBFAC,,, | Performed by: INTERNAL MEDICINE

## 2019-09-30 PROCEDURE — 3008F BODY MASS INDEX DOCD: CPT | Mod: CPTII,S$GLB,, | Performed by: INTERNAL MEDICINE

## 2019-09-30 PROCEDURE — 99214 PR OFFICE/OUTPT VISIT, EST, LEVL IV, 30-39 MIN: ICD-10-PCS | Mod: S$GLB,,, | Performed by: INTERNAL MEDICINE

## 2019-09-30 PROCEDURE — 3008F PR BODY MASS INDEX (BMI) DOCUMENTED: ICD-10-PCS | Mod: CPTII,S$GLB,, | Performed by: INTERNAL MEDICINE

## 2019-09-30 NOTE — LETTER
September 30, 2019        Jesus Mendoza MD  [address] [place] [address] Blvd  [address]  [place] [address]             [facility]  [address]  [place] [address]  Phone: [phone]   Patient: Elroy Rahman   MR Number: [id number]   YOB: 1955   Date of Visit: 9/30/2019       Dear Dr. Mendoza:    Thank you for referring Elroy Rahman to me for evaluation. Attached you will find relevant portions of my assessment and plan of care.    If you have questions, please do not hesitate to call me. I look forward to following Elroy Rahman along with you.    Sincerely,      Jesus London, DO, FACP            CC  No Recipients    Enclosure

## 2019-09-30 NOTE — PROGRESS NOTES
PATIENT: Elroy Rahman  MRN: [id number]  DATE: 9/30/2019      Diagnosis:   1. Secondary malignant neuroendocrine tumor of liver    2. Bronchial carcinoid tumors    3. MSSA bacteremia        Chief Complaint: Follow-up      Oncologic History:      Oncologic History Bronchial carcinoid, left, diagnosed 1/2016   Metastatic disease to liver and bone 5/2017     Oncologic Treatment Left upper lobectomy 1/2016  CAPTEM 7/2017 - 2/2019 (discontinued due to progression)  Lanreotide 7/2017   Zoledronic acid 7/2017   TACE 10/2018  TACE 3/2019  Lily-177 4/10/19, 6/5/19    Pathology 1/2016: Typical carcinoid tumor, well-differentiated T2a, N0, M0 Ki-67 <1%  6/2017: Liver biopsy, well-differentiated, Ki-67 25%           Subjective:    Interval History: Mr. Rahman is a 64 y.o. male who is seen in follow-up for a bronchial carcinoid tumor.  Since I had seen him last he was hospitalized bacteremia.  This was felt to be related to his left lower extremity stump infection and he underwent a washout.  He is antibiotics by IV.  He remains steroids.  His vision has dramatically improved but he states is still not back to normal.  He has no other new complaints.    His history dates to 1/2016 when he underwent a left upper lobectomy with Dr. Bell for a typical carcinoid tumor.  His pathological staging was T2a, N0, M0 with Ki-67 less than 1% and mitotic count 0 per 10 high-powered fields.  He done well postoperatively, however, in 5/2017 he was undergoing routine surveillance and a chest CT in picked up evidence of progression of disease in the liver.  Dedicated abdominal CT was performed showing multiple liver lesions.  In 6/2017 he underwent a CT-guided liver biopsy which was positive for metastatic neuroendocrine tumor which was well-differentiated with no overt nuclear atypia, however, the Ki-67 was 25%.  He underwent gallium-68 PET/CT and was found to have widespread disease involving the bone, liver and also mesentery.  He was  started on CAPTEM in 7/2017 and also Lanreotide and zoledronic acid.  Scans in 10/2017 had shown stability of disease.  Imaging in 1/2018 had shown stability of disease.  Scans in 4/2018 had shown stability of disease.  Scans in 07/2018 had continued to showed mild growth.  In 10/2018 he underwent TACE.  CAPTEM was discontinued in 02/2019 due to progression.  He underwent TACE in 03/2019.  He was started on treatment with PRRT using Lily-177 on 04/10/2019.      Past Medical History:   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    Sepsis due to methicillin susceptible Staphylococcus aureus 09/14/2019    MSSA bacteremia from Amputation stump infection    Sleep apnea     Snores    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       Past Surgical HIstory:   Past Surgical History:   Procedure Laterality Date    ABDOMINAL SURGERY      Bka Left     HERNIA REPAIR      IRRIGATION AND DEBRIDEMENT OF LOWER EXTREMITY Left 9/20/2019    Procedure: IRRIGATION AND DEBRIDEMENT, LOWER EXTREMITY - left - cysto tubing - cultures;  Surgeon: Jesus Arias MD;  Location: [address];  Service: Orthopedics;  Laterality: Left;    LAPAROSCOPIC REPAIR OF INCARCERATED UMBILICAL HERNIA N/A 11/27/2018    Procedure: REPAIR, HERNIA, UMBILICAL, INCARCERATED, LAPAROSCOPIC;  Surgeon: Coco Guidry MD;  Location: [facility] OR;  Service: General;  Laterality: N/A;  video    TRANSESOPHAGEAL ECHOCARDIOGRAPHY N/A 9/18/2019    Procedure: ECHOCARDIOGRAM, TRANSESOPHAGEAL;  Surgeon: Grace Diagnostic Provider;  Location: [facility] EP LAB;  Service: Anesthesiology;  Laterality: N/A;       Family History:   Family  History   Problem Relation Age of Onset    Cancer Mother         lung    Cancer Sister     Anesthesia problems Neg Hx        Social History:  reports that he has never smoked. He has never used smokeless tobacco. He reports that he drinks about 1.0 standard drinks of alcohol per week. He reports that he does not use drugs.    Allergies:  Review of patient's allergies indicates:  No Known Allergies    Medications:  Current Outpatient Medications   Medication Sig Dispense Refill    ALPHAGAN P 0.1 % Drop Instill 1 drop into left eye twice daily  4    dextrose 5 % SolP 500 mL with ceFAZolin 1 gram SolR 6 g Inject 6 g into the vein once daily. (end 10/1/19) for 11 days      famotidine (PEPCID) 20 MG tablet Take 1 tablet (20 mg total) by mouth 2 (two) times daily. 60 tablet 2    ondansetron (ZOFRAN) 4 MG tablet Take 1 tablet (4 mg total) by mouth daily as needed for Nausea.      predniSONE (DELTASONE) 20 MG tablet Take 0.5 tablets (10 mg total) by mouth once daily. Take with two 50 mg tabs for a total of 110 mg daily      predniSONE (DELTASONE) 50 MG Tab Take 2 tablets (100 mg total) by mouth once daily. Take with 1/2 of the 20 mg tablet. For a total of 110 daily.      PROLENSA 0.07 % Drop INSTILL 1 DROP INTO LEFT EYE TWICE DAILY  4    sulfamethoxazole-trimethoprim 800-160mg (BACTRIM DS) 800-160 mg Tab Take 1 tablet by mouth once daily. 30 tablet 1     No current facility-administered medications for this visit.        Review of Systems   Constitutional: Negative for appetite change, chills, fatigue, fever and unexpected weight change.   HENT: Negative for dental problem, sinus pressure and sneezing.    Eyes: Positive for visual disturbance.   Respiratory: Negative for cough, choking, chest tightness and shortness of breath.    Cardiovascular: Negative for chest pain and leg swelling.   Gastrointestinal: Negative for abdominal pain, blood in stool, constipation, diarrhea and nausea.   Genitourinary: Negative  for difficulty urinating, dysuria and frequency.   Musculoskeletal: Negative for arthralgias and back pain.   Skin: Negative for rash and wound.   Neurological: Negative for dizziness, light-headedness and headaches.   Hematological: Negative for adenopathy. Does not bruise/bleed easily.   Psychiatric/Behavioral: Negative for sleep disturbance. The patient is not nervous/anxious.        ECOG Performance Status:   ECOG SCORE    1 - Restricted in strenuous activity-ambulatory and able to carry out work of a light nature         Objective:      Vitals:   Vitals:    09/30/19 1058   BP: 136/89   BP Location: Left arm   Patient Position: Sitting   BP Method: Large (Automatic)   Pulse: 98   Resp: 16   Temp: 98.2 °F (36.8 °C)   TempSrc: Oral   SpO2: 98%   Height: 6' (1.829 m)     BMI: Body mass index is 39.06 kg/m².    Physical Exam   Constitutional: He is oriented to person, place, and time. He appears well-developed and well-nourished.   Sitting in wheelchair   HENT:   Head: Normocephalic and atraumatic.   Eyes: Pupils are equal, round, and reactive to light.   Neck: Normal range of motion. Neck supple.   Cardiovascular: Normal rate and regular rhythm.   Pulmonary/Chest: Effort normal and breath sounds normal. No respiratory distress.   Abdominal: Soft. He exhibits no distension. There is no tenderness.   Musculoskeletal: He exhibits no edema or tenderness.   Left lower extremity dressing in place   Lymphadenopathy:     He has no cervical adenopathy.   Neurological: He is alert and oriented to person, place, and time. No cranial nerve deficit.   Skin: Skin is warm and dry.   Psychiatric: He has a normal mood and affect. His behavior is normal.       Laboratory Data:  Lab Visit on 09/30/2019   Component Date Value Ref Range Status    WBC 09/30/2019 8.63  3.90 - 12.70 K/uL Final    RBC 09/30/2019 4.65  4.60 - 6.20 M/uL Final    Hemoglobin 09/30/2019 14.1  14.0 - 18.0 g/dL Final    Hematocrit 09/30/2019 42.6  40.0 - 54.0  % Final    Mean Corpuscular Volume 09/30/2019 92  82 - 98 fL Final    Mean Corpuscular Hemoglobin 09/30/2019 30.3  27.0 - 31.0 pg Final    Mean Corpuscular Hemoglobin Conc 09/30/2019 33.1  32.0 - 36.0 g/dL Final    RDW 09/30/2019 14.8* 11.5 - 14.5 % Final    Platelets 09/30/2019 162  150 - 350 K/uL Final    MPV 09/30/2019 9.3  9.2 - 12.9 fL Final    Gran # (ANC) 09/30/2019 7.0  1.8 - 7.7 K/uL Final    Comment: The ANC is based on a white cell differential from an   automated cell counter. It has not been microscopically   reviewed for the presence of abnormal cells. Clinical   correlation is required.      Immature Grans (Abs) 09/30/2019 0.50* 0.00 - 0.04 K/uL Final    Comment: Mild elevation in immature granulocytes is non specific and   can be seen in a variety of conditions including stress response,   acute inflammation, trauma and pregnancy. Correlation with other   laboratory and clinical findings is essential.       Supplemental Diagnosis  Chromogranin Staining:  Intensity: Moderate to strong. Positive cells: 100 (%)  Synaptophysin Staining:  Intensity: Strong. Positive cells: 100 (%)  CD31: 40 vessels per 1 HPF  Factor VIII: 27 vessels per 1 HPF  Ki67: 1 % tumor cells staining  Immunostains performed with appropriate positive and negative controls.  FINAL PATHOLOGIC DIAGNOSIS  1. Lymph node, level XI, excisional biopsy:  Fragments of benign lymph node with anthracosis and sinus histiocytosis (0/1).  2. Lung, lingular staple line, biopsy:  Lung, negative for neoplasm.  3. Lymph node, level X, excisional biopsy:  1 benign lymph node with anthracotic pigment and sinus histiocytosis (0/1).  4. Lymph node, level XII, excisional biopsy:  2 benign lymph nodes with sinus histiocytosis and anthracotic pigment (0/2).  5. Lung, left upper lobe, lobectomy:  Typical carcinoid tumor (well differentiated neuroendocrine tumor), 4 cm in greatest dimension.  Bronchial and vascular margins are negative for  neoplasm.  Uninvolved lung adjacent to the tumor shows emphysematous changes and fibrosis, however remaining lung  appears unremarkable.  Additional immunohistochemical stains for ancillary studies (including synaptophysin, chromogranin and Ki-67)  will be completed and results will be reported in a supplemental report.  See synoptic report in comment section for further details.  6. Lymph node, level VII, excisional biopsy:  2 benign lymph nodes with anthracotic pigment and sinus histiocytosis (0/2).  Comment: Synoptic Report  Specimen: Left upper lobe of lung  Procedure: Lobectomy  Specimen laterality: Left  Tumor size:  Greatest dimension: 4 cm  Tumor focality: Single focus  Histologic type: Typical carcinoid tumor  Visceral pleural invasion: Not identified  Tumor extension: Not identified  Margins: Bronchial and vascular margins are uninvolved by tumor.  Distance of tumor from closest margin: 1 cm from the bronchial surgical margin.  Lymphovascular invasion: Not identified  Ancillary studies:  Immunohistochemical stains for neuroendocrine markers and Ki-67 will be completed and results will follow in a  supplemental report.  Note: The tumor consists of a proliferation of cells in nests with lewis formation. The tumor cells have salt and  pepper chromatin pattern with abundant cytoplasm. There is no significant nuclear atypia. No evidence of  necrosis. Mitotic count is 0 mitoses in 10 high power fields.    Imaging:     Gallium 68 PET-CT 08/20/2019    COMPARISON:  PET gallium 68 DOTATATE whole-body scan 04/03/2019    FINDINGS:  Quality of the study: Adequate.    Diffuse abnormal foci of increased tracer uptake is present throughout the axial and visualized appendicular skeleton, left orbit, and liver (largest right hepatic lobe lesion is 6 cm, index segment II lesion is 3 cm) noting similar quantity in distribution of lesions and decreased associated radiotracer uptake when compared to the prior examination.   Additional foci of increased radiotracer uptake is present in the prostate (left aspect, eccentric) with an SUV of 30.3  and the thyroid (2 cm hypoattenuating right lobe nodule) with an SUV of 14.4.    Index lesions from PET examination 04/03/2019:    Lesion 1: Left orbit-SUV max 7.5 (previously 23.7)    Lesion 2: Left coracoid process-SUV max 13.3 (previously 28.3)    Lesion 3: Left hepatic lobe segment II-SUV max 42.8 (previously 55.2)    Lesion 4: Left sacral ala-SUV max 17.8 (previously 42.9)    Physiologic distribution of the tracer is seen within the pituitary, salivary, stomach, pancreas uncinate process, spleen, adrenal glands, kidneys and urinary bladder, and bowel.    Incidental CT findings: Status post left upper lobectomy.      Impression       1.  Persistent widely disseminated metastatic disease with decreased uptake compared with prior study.    2.  2 cm right thyroid nodule with increased radiotracer uptake.  Recommend thyroid ultrasound for further evaluation.    3.  Focal, eccentric increased radiotracer uptake in the prostate.  Recommend correlation with laboratory values such as PSA, and consider correlation with dedicated prostate imaging.                  Assessment:       1. Secondary malignant neuroendocrine tumor of liver    2. Bronchial carcinoid tumors    3. MSSA bacteremia           Plan:     Mr. Rahman vision continues to improve and hopefully will come back to normal.  We will plan to continue to taper his steroid dosage given him recommendations on tapering.  He remains on antibiotics and sees surgery later this week.  He should resume Lanreotide.  He is also going to follow back up with ophthalmology and get an MRI as suggested by.  We will need to make some decisions about further treatment with Lutathera as to more cycles of treatment would be indicated.  I will plan to see him back in another 3 weeks for further discussion.  Report any new symptoms in the interim.  All questions  were answered and he is agreeable with this plan.    Jesus London DO, FACP  Hematology & Oncology  [address] [place] [address]  ph. [phone]  Fax. [phone]    25 minutes were spent in coordination of patient's care, record review and counseling.  More than 50% of the time was face-to-face.

## 2019-10-02 ENCOUNTER — PATIENT MESSAGE (OUTPATIENT)
Dept: ADMINISTRATIVE | Facility: OTHER | Age: 64
End: 2019-10-02

## 2019-10-02 ENCOUNTER — PATIENT MESSAGE (OUTPATIENT)
Dept: NEUROLOGY | Facility: HOSPITAL | Age: 64
End: 2019-10-02

## 2019-10-03 ENCOUNTER — INFUSION (OUTPATIENT)
Dept: INFUSION THERAPY | Facility: HOSPITAL | Age: 64
End: 2019-10-03
Attending: INTERNAL MEDICINE
Payer: COMMERCIAL

## 2019-10-03 DIAGNOSIS — C7B.8 SECONDARY NEUROENDOCRINE TUMOR OF LIVER: ICD-10-CM

## 2019-10-03 DIAGNOSIS — C7B.8 SECONDARY MALIGNANT NEUROENDOCRINE TUMOR OF LIVER: Primary | ICD-10-CM

## 2019-10-03 DIAGNOSIS — C78.7 SECONDARY MALIGNANT NEOPLASM OF LIVER: ICD-10-CM

## 2019-10-03 DIAGNOSIS — C7B.8 SECONDARY NEUROENDOCRINE TUMOR OF BONE: ICD-10-CM

## 2019-10-03 DIAGNOSIS — C7A.090 CARCINOID BRONCHIAL ADENOMA OF LEFT LUNG: ICD-10-CM

## 2019-10-03 DIAGNOSIS — D3A.090 BRONCHIAL CARCINOID TUMORS: ICD-10-CM

## 2019-10-03 PROCEDURE — 63600175 PHARM REV CODE 636 W HCPCS: Mod: JG | Performed by: INTERNAL MEDICINE

## 2019-10-03 PROCEDURE — 96372 THER/PROPH/DIAG INJ SC/IM: CPT

## 2019-10-03 RX ORDER — LANREOTIDE ACETATE 120 MG/.5ML
120 INJECTION SUBCUTANEOUS ONCE
Status: COMPLETED | OUTPATIENT
Start: 2019-10-03 | End: 2019-10-03

## 2019-10-03 RX ORDER — SODIUM CHLORIDE 9 MG/ML
INJECTION, SOLUTION INTRAVENOUS ONCE
Status: CANCELLED | OUTPATIENT
Start: 2019-10-23

## 2019-10-03 RX ORDER — SODIUM CHLORIDE 0.9 % (FLUSH) 0.9 %
10 SYRINGE (ML) INJECTION
Status: CANCELLED | OUTPATIENT
Start: 2019-10-23

## 2019-10-03 RX ORDER — LANREOTIDE ACETATE 120 MG/.5ML
120 INJECTION SUBCUTANEOUS
Status: CANCELLED
Start: 2019-10-23

## 2019-10-03 RX ORDER — LANREOTIDE ACETATE 120 MG/.5ML
120 INJECTION SUBCUTANEOUS ONCE
Status: CANCELLED | OUTPATIENT
Start: 2019-10-23

## 2019-10-03 RX ORDER — ACETAMINOPHEN 325 MG/1
650 TABLET ORAL
Status: CANCELLED | OUTPATIENT
Start: 2019-10-23

## 2019-10-03 RX ORDER — ARGININE 100 %
1000 CRYSTALS MISCELLANEOUS
Status: CANCELLED | OUTPATIENT
Start: 2019-10-23

## 2019-10-03 RX ADMIN — LANREOTIDE ACETATE 120 MG: 120 INJECTION SUBCUTANEOUS at 10:10

## 2019-10-04 NOTE — ADDENDUM NOTE
Addendum  created 10/04/19 1303 by Francisco Peterson MD    Diagnosis association updated, Intraprocedure Blocks edited, Sign clinical note

## 2019-10-08 ENCOUNTER — PATIENT MESSAGE (OUTPATIENT)
Dept: NEUROLOGY | Facility: HOSPITAL | Age: 64
End: 2019-10-08

## 2019-10-08 DIAGNOSIS — B37.49 YEAST DERMATITIS OF PENIS: Primary | ICD-10-CM

## 2019-10-09 RX ORDER — FLUCONAZOLE 100 MG/1
100 TABLET ORAL DAILY
Qty: 7 TABLET | Refills: 0 | Status: ON HOLD | OUTPATIENT
Start: 2019-10-09 | End: 2021-07-22 | Stop reason: HOSPADM

## 2019-10-09 RX ORDER — FLUCONAZOLE 200 MG/1
200 TABLET ORAL DAILY
Qty: 1 TABLET | Refills: 0 | Status: SHIPPED | OUTPATIENT
Start: 2019-10-09 | End: 2021-07-20

## 2019-10-11 ENCOUNTER — OFFICE VISIT (OUTPATIENT)
Dept: ORTHOPEDICS | Facility: CLINIC | Age: 64
End: 2019-10-11
Payer: COMMERCIAL

## 2019-10-11 VITALS
RESPIRATION RATE: 18 BRPM | DIASTOLIC BLOOD PRESSURE: 77 MMHG | TEMPERATURE: 98 F | SYSTOLIC BLOOD PRESSURE: 112 MMHG | HEART RATE: 85 BPM

## 2019-10-11 DIAGNOSIS — T87.40 AMPUTATION STUMP INFECTION: Primary | ICD-10-CM

## 2019-10-11 PROCEDURE — 99999 PR PBB SHADOW E&M-EST. PATIENT-LVL III: CPT | Mod: PBBFAC,,, | Performed by: PHYSICIAN ASSISTANT

## 2019-10-11 PROCEDURE — 99024 PR POST-OP FOLLOW-UP VISIT: ICD-10-PCS | Mod: S$GLB,,, | Performed by: PHYSICIAN ASSISTANT

## 2019-10-11 PROCEDURE — 99024 POSTOP FOLLOW-UP VISIT: CPT | Mod: S$GLB,,, | Performed by: PHYSICIAN ASSISTANT

## 2019-10-11 PROCEDURE — 99999 PR PBB SHADOW E&M-EST. PATIENT-LVL III: ICD-10-PCS | Mod: PBBFAC,,, | Performed by: PHYSICIAN ASSISTANT

## 2019-10-11 NOTE — PROGRESS NOTES
Principal Orthopedic Problem: Left leg through knee amputation stump abscess     Relevant Medical History: bronchial carcinoid tumor (most recently on PRRT) with metastasis to the spine, mesentery, and liver    HPI: Mr. Rahman is 64 year old male who presented to the ED on 09/18/2019 with left left amputation stump drainage. He was treated with I&D pm 09/20/2019.     Mr. Rahman is here today for a post-operative visit after a   I&D left thigh subcutaneous tissue and skin, 3 by 8 cm  by Dr. Arias on 09/20/219.    MICRO: STAPHYLOCOCCUS AUREUS: cefazolin projected end date on 10/15/2019 has follow up with ID    Interval History:  he reports that he is doing ok.   he is at home.  Pain is controlled.    Patient current has PICC line in place. He is taking antibiotics as prescribed.   he denies fever, chills, and sweats since the time of the surgery.     Physical exam:  Dressing taken down.  Incision is clean, dry and intact.  Sutures left in place,     RADS: All pertinent images were reviewed by myself:   none done today    Assessment:  Post-op visit ( 3 weeks)    Plan:  Current care, treatment plan, precautions, activity level/ modifications, limitations, rehabilitation exercises and proposed future treatment were discussed with the patient. We discussed the need to monitor for changes in symptoms and condition and report them to the physician.  Discussed importance of compliance with all appointments and follow up examinations.   - keep area covered  - do not use prosthesis  - pain medication: no refill needed,    Pain medication refill policy provided to patient for review.   - he is to continue antibiotics as prescribed  - Patient is to return to clinic in 1 weeks  At time consider removal of sutures.      If there are any questions prior to scheduled follow up, the patient was instructed to contact the office

## 2019-10-15 ENCOUNTER — INFUSION (OUTPATIENT)
Dept: INFECTIOUS DISEASES | Facility: HOSPITAL | Age: 64
End: 2019-10-15
Attending: INTERNAL MEDICINE
Payer: COMMERCIAL

## 2019-10-15 ENCOUNTER — OFFICE VISIT (OUTPATIENT)
Dept: INFECTIOUS DISEASES | Facility: CLINIC | Age: 64
End: 2019-10-15
Payer: COMMERCIAL

## 2019-10-15 ENCOUNTER — EXTERNAL HOME HEALTH (OUTPATIENT)
Dept: HOME HEALTH SERVICES | Facility: HOSPITAL | Age: 64
End: 2019-10-15
Payer: COMMERCIAL

## 2019-10-15 ENCOUNTER — OFFICE VISIT (OUTPATIENT)
Dept: ORTHOPEDICS | Facility: CLINIC | Age: 64
End: 2019-10-15
Payer: COMMERCIAL

## 2019-10-15 VITALS
HEART RATE: 84 BPM | TEMPERATURE: 98 F | DIASTOLIC BLOOD PRESSURE: 85 MMHG | SYSTOLIC BLOOD PRESSURE: 132 MMHG | HEIGHT: 72 IN | BODY MASS INDEX: 39 KG/M2 | WEIGHT: 287.94 LBS

## 2019-10-15 VITALS
DIASTOLIC BLOOD PRESSURE: 82 MMHG | RESPIRATION RATE: 18 BRPM | TEMPERATURE: 99 F | HEART RATE: 97 BPM | SYSTOLIC BLOOD PRESSURE: 125 MMHG

## 2019-10-15 DIAGNOSIS — T87.40 AMPUTATION STUMP INFECTION: Primary | ICD-10-CM

## 2019-10-15 DIAGNOSIS — L02.91 ABSCESS: Primary | ICD-10-CM

## 2019-10-15 DIAGNOSIS — A49.01 MSSA (METHICILLIN SUSCEPTIBLE STAPHYLOCOCCUS AUREUS) INFECTION: ICD-10-CM

## 2019-10-15 PROCEDURE — 99024 POSTOP FOLLOW-UP VISIT: CPT | Mod: S$GLB,,, | Performed by: PHYSICIAN ASSISTANT

## 2019-10-15 PROCEDURE — 3008F BODY MASS INDEX DOCD: CPT | Mod: CPTII,S$GLB,, | Performed by: INTERNAL MEDICINE

## 2019-10-15 PROCEDURE — 3008F PR BODY MASS INDEX (BMI) DOCUMENTED: ICD-10-PCS | Mod: CPTII,S$GLB,, | Performed by: INTERNAL MEDICINE

## 2019-10-15 PROCEDURE — 99999 PR PBB SHADOW E&M-EST. PATIENT-LVL III: CPT | Mod: PBBFAC,,, | Performed by: INTERNAL MEDICINE

## 2019-10-15 PROCEDURE — 99999 PR PBB SHADOW E&M-EST. PATIENT-LVL IV: CPT | Mod: PBBFAC,,, | Performed by: PHYSICIAN ASSISTANT

## 2019-10-15 PROCEDURE — 99999 PR PBB SHADOW E&M-EST. PATIENT-LVL III: ICD-10-PCS | Mod: PBBFAC,,, | Performed by: INTERNAL MEDICINE

## 2019-10-15 PROCEDURE — 99024 PR POST-OP FOLLOW-UP VISIT: ICD-10-PCS | Mod: S$GLB,,, | Performed by: PHYSICIAN ASSISTANT

## 2019-10-15 PROCEDURE — 99215 OFFICE O/P EST HI 40 MIN: CPT | Mod: S$GLB,,, | Performed by: INTERNAL MEDICINE

## 2019-10-15 PROCEDURE — 99999 PR PBB SHADOW E&M-EST. PATIENT-LVL IV: ICD-10-PCS | Mod: PBBFAC,,, | Performed by: PHYSICIAN ASSISTANT

## 2019-10-15 PROCEDURE — 99215 PR OFFICE/OUTPT VISIT, EST, LEVL V, 40-54 MIN: ICD-10-PCS | Mod: S$GLB,,, | Performed by: INTERNAL MEDICINE

## 2019-10-15 RX ORDER — CEFADROXIL 1000 MG/1
1 TABLET ORAL 2 TIMES DAILY
Qty: 14 TABLET | Refills: 0 | Status: SHIPPED | OUTPATIENT
Start: 2019-10-15 | End: 2019-10-22

## 2019-10-15 NOTE — PROGRESS NOTES
PICC line removed from right upper arm. PICC catheter tip visualized and intact. Pressure dressing applied with vaseline occlusive dressing , 2x2 gauze and secured with coban.  No redness, ecchymosis, edema, swelling, or drainage noted at site. Pt. instructed to leave dressing in place for 24 hours. Pt. also instructed not to immerse in water for three days.  Patient verbalized understanding.  Observed patient for 30 minutes with no signs of bleeding to right upper arm.  Patient left in NAD.

## 2019-10-15 NOTE — PROGRESS NOTES
Principal Orthopedic Problem: Left leg through knee amputation stump abscess     Relevant Medical History: bronchial carcinoid tumor (most recently on PRRT) with metastasis to the spine, mesentery, and liver    HPI: Mr. Rahman is 64 year old male who presented to the ED on 09/18/2019 with left left amputation stump drainage. He was treated with I&D pm 09/20/2019.     Mr. Rahman is here today for a post-operative visit after a   I&D left thigh subcutaneous tissue and skin, 3 by 8 cm  by Dr. Arias on 09/20/219.    MICRO: STAPHYLOCOCCUS AUREUS: cefazolin projected end date on 10/15/2019 has follow up with ID    Interval History:  he reports that he is doing ok.   he is at home.  Pain is controlled.    Patient current has PICC line in place. He is taking antibiotics as prescribed.   he denies fever, chills, and sweats since the time of the surgery.     Physical exam:  Dressing taken down.  Incision is clean, dry and intact.  Sutures removed without difficulty    RADS: All pertinent images were reviewed by myself:   none done today    Assessment:  Post-op visit ( 4 weeks)    Plan:  Current care, treatment plan, precautions, activity level/ modifications, limitations, rehabilitation exercises and proposed future treatment were discussed with the patient. We discussed the need to monitor for changes in symptoms and condition and report them to the physician.  Discussed importance of compliance with all appointments and follow up examinations.   - The patient was advised to keep the incision clean and dry for the next 24 hours after which he may wash the area with antibacterial soap in the shower. Will not submerge until the incision is completely healed  -Patient was advised to monitor wound closely and multiple times daily for any problems. Call clinic immediately or report to ED for immediate medical attention for any complications including reopening of wound, drainage, purulence, redness, streaking, odor, pain out of  proportion, fever, chills, etc.   - do not use prosthesis until incision is completely healed  - pain medication: no refill needed,    Pain medication refill policy provided to patient for review.   - he is to continue antibiotics as prescribed, has follow up with ID today.   - Patient is to return to clinic as needed     If there are any questions prior to scheduled follow up, the patient was instructed to contact the office

## 2019-10-15 NOTE — PROGRESS NOTES
Subjective:      Patient ID: Elroy Rahman is a 64 y.o. male.    Chief Complaint:Hospital Follow Up    History of Present Illness    62 y/o male with a history of metastatic bronchial carcinoid tumor s/p resection of ALIRIO, chemotherapy and subsequent prednisone therapy.  He is admitted with MSSA bacteremia and  sepsis with the source likely being his left BKA stump.  Patient does not have a port and denies having any hardware in his body. TTE & VIVI both negative for vegetations. US of AKA stump demonstrated .4 x 1.2 x 2.4 cm complex fluid collection. He underwent I &D on 9/20. MRI did not show osteo.  He has now completed 4 weeks of IV cefazolin.  He is here today for follow up.    Review of Systems   All other systems reviewed and are negative.    Objective:   Physical Exam   Constitutional: He is oriented to person, place, and time. He appears well-developed and well-nourished. No distress.   HENT:   Head: Normocephalic and atraumatic.   Right Ear: External ear normal.   Left Ear: External ear normal.   Nose: Nose normal.   Mouth/Throat: Oropharynx is clear and moist. No oropharyngeal exudate.   Eyes: Pupils are equal, round, and reactive to light. Conjunctivae and EOM are normal. Right eye exhibits no discharge. Left eye exhibits no discharge. No scleral icterus.   Neck: Normal range of motion. Neck supple. No JVD present. No tracheal deviation present. No thyromegaly present.   Cardiovascular: Normal rate, regular rhythm, normal heart sounds and intact distal pulses. Exam reveals no gallop and no friction rub.   No murmur heard.  Pulmonary/Chest: Effort normal and breath sounds normal. No stridor. No respiratory distress. He has no wheezes. He has no rales. He exhibits no tenderness.   Abdominal: Soft. Bowel sounds are normal. He exhibits no distension and no mass. There is no tenderness. There is no rebound and no guarding.   Musculoskeletal: Normal range of motion. He exhibits no edema or tenderness.   Left  AKA stump surgical wound is healing well.   Lymphadenopathy:     He has no cervical adenopathy.   Neurological: He is alert and oriented to person, place, and time. He has normal reflexes. He displays normal reflexes. No cranial nerve deficit. He exhibits normal muscle tone. Coordination normal.   Skin: Skin is warm. No rash noted. He is not diaphoretic. No erythema. No pallor.   Psychiatric: He has a normal mood and affect. His behavior is normal. Judgment and thought content normal.   Nursing note and vitals reviewed.    Assessment:       1. Abscess    2. MSSA (methicillin susceptible Staphylococcus aureus) infection        64 y/o male with a history of metastatic bronchial carcinoid tumor s/p resection of ALIRIO, chemotherapy and subsequent prednisone therapy.  He is admitted with MSSA bacteremia and  sepsis with the source likely being his left BKA stump.  Patient does not have a port and denies having any hardware in his body. TTE & VIVI both negative for vegetations. US of AKA stump demonstrated .4 x 1.2 x 2.4 cm complex fluid collection. He underwent I &D on 9/20. MRI did not show osteo.  He has now completed 4 weeks of IV cefazolin.  He is here today for follow up.    The surgical wound is mostly healed.  The stump looks good.  The patient is doing well.  I will discontinue the IV cefazolin.  I will switch the patient to oral cefadroxil for an additional week.  Plan:       Abscess  -     Nursing communication; Future; Expected date: 10/15/2019  -     cefadroxil (DURICEF) 1 gram tablet; Take 1 tablet (1 g total) by mouth 2 (two) times daily. for 7 days  Dispense: 14 tablet; Refill: 0    MSSA (methicillin susceptible Staphylococcus aureus) infection  -     Nursing communication; Future; Expected date: 10/15/2019  -     cefadroxil (DURICEF) 1 gram tablet; Take 1 tablet (1 g total) by mouth 2 (two) times daily. for 7 days  Dispense: 14 tablet; Refill: 0

## 2019-10-21 ENCOUNTER — PATIENT MESSAGE (OUTPATIENT)
Dept: ORTHOPEDICS | Facility: CLINIC | Age: 64
End: 2019-10-21

## 2019-10-21 ENCOUNTER — TELEPHONE (OUTPATIENT)
Dept: ORTHOPEDICS | Facility: CLINIC | Age: 64
End: 2019-10-21

## 2019-10-21 ENCOUNTER — OFFICE VISIT (OUTPATIENT)
Dept: NEUROLOGY | Facility: HOSPITAL | Age: 64
End: 2019-10-21
Attending: INTERNAL MEDICINE
Payer: COMMERCIAL

## 2019-10-21 VITALS
TEMPERATURE: 98 F | HEART RATE: 83 BPM | HEIGHT: 72 IN | BODY MASS INDEX: 39.05 KG/M2 | DIASTOLIC BLOOD PRESSURE: 78 MMHG | SYSTOLIC BLOOD PRESSURE: 117 MMHG

## 2019-10-21 DIAGNOSIS — D3A.090 BRONCHIAL CARCINOID TUMORS: ICD-10-CM

## 2019-10-21 DIAGNOSIS — C7B.8 SECONDARY NEUROENDOCRINE TUMOR OF BONE: ICD-10-CM

## 2019-10-21 DIAGNOSIS — C7B.8 SECONDARY MALIGNANT NEUROENDOCRINE TUMOR OF LIVER: Primary | ICD-10-CM

## 2019-10-21 PROCEDURE — 99215 OFFICE O/P EST HI 40 MIN: CPT | Performed by: INTERNAL MEDICINE

## 2019-10-21 PROCEDURE — 3008F BODY MASS INDEX DOCD: CPT | Mod: CPTII,,, | Performed by: INTERNAL MEDICINE

## 2019-10-21 PROCEDURE — 3008F PR BODY MASS INDEX (BMI) DOCUMENTED: ICD-10-PCS | Mod: CPTII,,, | Performed by: INTERNAL MEDICINE

## 2019-10-21 PROCEDURE — 99214 OFFICE O/P EST MOD 30 MIN: CPT | Mod: ,,, | Performed by: INTERNAL MEDICINE

## 2019-10-21 PROCEDURE — 99214 PR OFFICE/OUTPT VISIT, EST, LEVL IV, 30-39 MIN: ICD-10-PCS | Mod: ,,, | Performed by: INTERNAL MEDICINE

## 2019-10-21 NOTE — PROGRESS NOTES
PATIENT: Elroy Rahman  MRN: [id number]  DATE: 10/21/2019      Diagnosis:   1. Secondary malignant neuroendocrine tumor of liver    2. Secondary neuroendocrine tumor of bone    3. Bronchial carcinoid tumors        Chief Complaint: No chief complaint on file.      Oncologic History:      Oncologic History Bronchial carcinoid, left, diagnosed 1/2016   Metastatic disease to liver and bone 5/2017     Oncologic Treatment Left upper lobectomy 1/2016  CAPTEM 7/2017 - 2/2019 (discontinued due to progression)  Lanreotide 7/2017   Zoledronic acid 7/2017   TACE 10/2018  TACE 3/2019  Lily-177 4/10/19, 6/5/19    Pathology 1/2016: Typical carcinoid tumor, well-differentiated T2a, N0, M0 Ki-67 <1%  6/2017: Liver biopsy, well-differentiated, Ki-67 25%           Subjective:    Interval History: Mr. Rahman is a 64 y.o. male who is seen in follow-up for a bronchial carcinoid tumor.  He states he has generally been feeling well. He is now off IV antibiotics and completing a course of oral antibiotics.  His prednisone is now down to 30 mg any continues to wean.  He does state that his wound opened 3 days ago but he is not contacted Orthopedics about this.  He also states that his vision has improved but not quite back to normal.  He has no other new complaints.    His history dates to 1/2016 when he underwent a left upper lobectomy with Dr. Bell for a typical carcinoid tumor.  His pathological staging was T2a, N0, M0 with Ki-67 less than 1% and mitotic count 0 per 10 high-powered fields.  He done well postoperatively, however, in 5/2017 he was undergoing routine surveillance and a chest CT in picked up evidence of progression of disease in the liver.  Dedicated abdominal CT was performed showing multiple liver lesions.  In 6/2017 he underwent a CT-guided liver biopsy which was positive for metastatic neuroendocrine tumor which was well-differentiated with no overt nuclear atypia, however, the Ki-67 was 25%.  He underwent gallium-68  PET/CT and was found to have widespread disease involving the bone, liver and also mesentery.  He was started on CAPTEM in 7/2017 and also Lanreotide and zoledronic acid.  Scans in 10/2017 had shown stability of disease.  Imaging in 1/2018 had shown stability of disease.  Scans in 4/2018 had shown stability of disease.  Scans in 07/2018 had continued to showed mild growth.  In 10/2018 he underwent TACE.  CAPTEM was discontinued in 02/2019 due to progression.  He underwent TACE in 03/2019.  He was started on treatment with PRRT using Lily-177 on 04/10/2019.      Past Medical History:   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    Sepsis due to methicillin susceptible Staphylococcus aureus 09/14/2019    MSSA bacteremia from Amputation stump infection    Sleep apnea     Snores    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       Past Surgical HIstory:   Past Surgical History:   Procedure Laterality Date    ABDOMINAL SURGERY      Bka Left     HERNIA REPAIR      IRRIGATION AND DEBRIDEMENT OF LOWER EXTREMITY Left 9/20/2019    Procedure: IRRIGATION AND DEBRIDEMENT, LOWER EXTREMITY - left - cysto tubing - cultures;  Surgeon: Jesus Arias MD;  Location: [address];  Service: Orthopedics;  Laterality: Left;    LAPAROSCOPIC REPAIR OF INCARCERATED UMBILICAL HERNIA N/A 11/27/2018    Procedure: REPAIR, HERNIA, UMBILICAL, INCARCERATED, LAPAROSCOPIC;  Surgeon: Coco Guidry MD;  Location: [facility] OR;  Service: General;  Laterality: N/A;  video    TRANSESOPHAGEAL ECHOCARDIOGRAPHY N/A 9/18/2019    Procedure: ECHOCARDIOGRAM, TRANSESOPHAGEAL;  Surgeon: [facility] Diagnostic Provider;   Location: [facility];  Service: Anesthesiology;  Laterality: N/A;       Family History:   Family History   Problem Relation Age of Onset    Cancer Mother         lung    Cancer Sister     Anesthesia problems Neg Hx        Social History:  reports that he has never smoked. He has never used smokeless tobacco. He reports that he drinks about 1.0 standard drinks of alcohol per week. He reports that he does not use drugs.    Allergies:  Review of patient's allergies indicates:  No Known Allergies    Medications:  Current Outpatient Medications   Medication Sig Dispense Refill    ALPHAGAN P 0.1 % Drop Instill 1 drop into left eye twice daily  4    cefadroxil (DURICEF) 1 gram tablet Take 1 tablet (1 g total) by mouth 2 (two) times daily. for 7 days 14 tablet 0    famotidine (PEPCID) 20 MG tablet Take 1 tablet (20 mg total) by mouth 2 (two) times daily. 60 tablet 2    fluconazole (DIFLUCAN) 100 MG tablet Take 1 tablet (100 mg total) by mouth once daily. Take 200mg on day 1, then 100mg days 2-7 7 tablet 0    fluconazole (DIFLUCAN) 200 MG Tab Take 1 tablet (200 mg total) by mouth once daily. 1 tablet 0    ondansetron (ZOFRAN) 4 MG tablet Take 1 tablet (4 mg total) by mouth daily as needed for Nausea.      predniSONE (DELTASONE) 20 MG tablet Take 0.5 tablets (10 mg total) by mouth once daily. Take with two 50 mg tabs for a total of 110 mg daily      predniSONE (DELTASONE) 50 MG Tab Take 2 tablets (100 mg total) by mouth once daily. Take with 1/2 of the 20 mg tablet. For a total of 110 daily.      PROLENSA 0.07 % Drop INSTILL 1 DROP INTO LEFT EYE TWICE DAILY  4    sulfamethoxazole-trimethoprim 800-160mg (BACTRIM DS) 800-160 mg Tab Take 1 tablet by mouth once daily. 30 tablet 1     No current facility-administered medications for this visit.        Review of Systems   Constitutional: Negative for appetite change, chills, fatigue, fever and unexpected weight change.   HENT: Negative for dental problem, sinus  pressure and sneezing.    Eyes: Positive for visual disturbance.   Respiratory: Negative for cough, choking, chest tightness and shortness of breath.    Cardiovascular: Negative for chest pain and leg swelling.   Gastrointestinal: Negative for abdominal pain, blood in stool, constipation, diarrhea and nausea.   Genitourinary: Negative for difficulty urinating, dysuria and frequency.   Musculoskeletal: Negative for arthralgias and back pain.   Skin: Positive for wound. Negative for rash.   Neurological: Negative for dizziness, light-headedness and headaches.   Hematological: Negative for adenopathy. Does not bruise/bleed easily.   Psychiatric/Behavioral: Negative for sleep disturbance. The patient is not nervous/anxious.        ECOG Performance Status:   ECOG SCORE    1 - Restricted in strenuous activity-ambulatory and able to carry out work of a light nature         Objective:      Vitals:   Vitals:    10/21/19 0833   BP: 117/78   BP Location: Left arm   Patient Position: Sitting   BP Method: Medium (Automatic)   Pulse: 83   Temp: 97.8 °F (36.6 °C)   TempSrc: Oral   Height: 6' (1.829 m)     BMI: Body mass index is 39.05 kg/m².    Physical Exam   Constitutional: He is oriented to person, place, and time. He appears well-developed and well-nourished.   Sitting in wheelchair   HENT:   Head: Normocephalic and atraumatic.   Eyes: Pupils are equal, round, and reactive to light.   Neck: Normal range of motion. Neck supple.   Cardiovascular: Normal rate and regular rhythm.   Pulmonary/Chest: Effort normal and breath sounds normal. No respiratory distress.   Abdominal: Soft. He exhibits no distension. There is no tenderness.   Musculoskeletal: He exhibits no edema or tenderness.   Left lower extremity dressing in place   Lymphadenopathy:     He has no cervical adenopathy.   Neurological: He is alert and oriented to person, place, and time. No cranial nerve deficit.   Skin: Skin is warm and dry.   Psychiatric: He has a normal  mood and affect. His behavior is normal.       Laboratory Data:  No visits with results within 1 Week(s) from this visit.   Latest known visit with results is:   Lab Visit on 09/30/2019   Component Date Value Ref Range Status    WBC 09/30/2019 8.63  3.90 - 12.70 K/uL Final    RBC 09/30/2019 4.65  4.60 - 6.20 M/uL Final    Hemoglobin 09/30/2019 14.1  14.0 - 18.0 g/dL Final    Hematocrit 09/30/2019 42.6  40.0 - 54.0 % Final    Mean Corpuscular Volume 09/30/2019 92  82 - 98 fL Final    Mean Corpuscular Hemoglobin 09/30/2019 30.3  27.0 - 31.0 pg Final    Mean Corpuscular Hemoglobin Conc 09/30/2019 33.1  32.0 - 36.0 g/dL Final    RDW 09/30/2019 14.8* 11.5 - 14.5 % Final    Platelets 09/30/2019 162  150 - 350 K/uL Final    MPV 09/30/2019 9.3  9.2 - 12.9 fL Final    Gran # (ANC) 09/30/2019 7.0  1.8 - 7.7 K/uL Final    Comment: The ANC is based on a white cell differential from an   automated cell counter. It has not been microscopically   reviewed for the presence of abnormal cells. Clinical   correlation is required.      Immature Grans (Abs) 09/30/2019 0.50* 0.00 - 0.04 K/uL Final    Comment: Mild elevation in immature granulocytes is non specific and   can be seen in a variety of conditions including stress response,   acute inflammation, trauma and pregnancy. Correlation with other   laboratory and clinical findings is essential.      Sodium 09/30/2019 141  136 - 145 mmol/L Final    Potassium 09/30/2019 4.5  3.5 - 5.1 mmol/L Final    Chloride 09/30/2019 103  95 - 110 mmol/L Final    CO2 09/30/2019 27  23 - 29 mmol/L Final    Glucose 09/30/2019 119* 70 - 110 mg/dL Final    BUN, Bld 09/30/2019 26* 8 - 23 mg/dL Final    Creatinine 09/30/2019 1.4  0.5 - 1.4 mg/dL Final    Calcium 09/30/2019 8.7  8.7 - 10.5 mg/dL Final    Total Protein 09/30/2019 7.2  6.0 - 8.4 g/dL Final    Albumin 09/30/2019 3.2* 3.5 - 5.2 g/dL Final    Total Bilirubin 09/30/2019 0.6  0.1 - 1.0 mg/dL Final    Comment: For infants  and newborns, interpretation of results should be based  on gestational age, weight and in agreement with clinical  observations.  Premature Infant recommended reference ranges:  Up to 24 hours.............<8.0 mg/dL  Up to 48 hours............<12.0 mg/dL  3-5 days..................<15.0 mg/dL  6-29 days.................<15.0 mg/dL      Alkaline Phosphatase 09/30/2019 112  55 - 135 U/L Final    AST 09/30/2019 19  10 - 40 U/L Final    ALT 09/30/2019 17  10 - 44 U/L Final    Anion Gap 09/30/2019 11  8 - 16 mmol/L Final    eGFR if African American 09/30/2019 >60.0  >60 mL/min/1.73 m^2 Final    eGFR if non African American 09/30/2019 53.1* >60 mL/min/1.73 m^2 Final    Comment: Calculation used to obtain the estimated glomerular filtration  rate (eGFR) is the CKD-EPI equation.        Supplemental Diagnosis  Chromogranin Staining:  Intensity: Moderate to strong. Positive cells: 100 (%)  Synaptophysin Staining:  Intensity: Strong. Positive cells: 100 (%)  CD31: 40 vessels per 1 HPF  Factor VIII: 27 vessels per 1 HPF  Ki67: 1 % tumor cells staining  Immunostains performed with appropriate positive and negative controls.  FINAL PATHOLOGIC DIAGNOSIS  1. Lymph node, level XI, excisional biopsy:  Fragments of benign lymph node with anthracosis and sinus histiocytosis (0/1).  2. Lung, lingular staple line, biopsy:  Lung, negative for neoplasm.  3. Lymph node, level X, excisional biopsy:  1 benign lymph node with anthracotic pigment and sinus histiocytosis (0/1).  4. Lymph node, level XII, excisional biopsy:  2 benign lymph nodes with sinus histiocytosis and anthracotic pigment (0/2).  5. Lung, left upper lobe, lobectomy:  Typical carcinoid tumor (well differentiated neuroendocrine tumor), 4 cm in greatest dimension.  Bronchial and vascular margins are negative for neoplasm.  Uninvolved lung adjacent to the tumor shows emphysematous changes and fibrosis, however remaining lung  appears unremarkable.  Additional  immunohistochemical stains for ancillary studies (including synaptophysin, chromogranin and Ki-67)  will be completed and results will be reported in a supplemental report.  See synoptic report in comment section for further details.  6. Lymph node, level VII, excisional biopsy:  2 benign lymph nodes with anthracotic pigment and sinus histiocytosis (0/2).  Comment: Synoptic Report  Specimen: Left upper lobe of lung  Procedure: Lobectomy  Specimen laterality: Left  Tumor size:  Greatest dimension: 4 cm  Tumor focality: Single focus  Histologic type: Typical carcinoid tumor  Visceral pleural invasion: Not identified  Tumor extension: Not identified  Margins: Bronchial and vascular margins are uninvolved by tumor.  Distance of tumor from closest margin: 1 cm from the bronchial surgical margin.  Lymphovascular invasion: Not identified  Ancillary studies:  Immunohistochemical stains for neuroendocrine markers and Ki-67 will be completed and results will follow in a  supplemental report.  Note: The tumor consists of a proliferation of cells in nests with lewis formation. The tumor cells have salt and  pepper chromatin pattern with abundant cytoplasm. There is no significant nuclear atypia. No evidence of  necrosis. Mitotic count is 0 mitoses in 10 high power fields.    Imaging:     Gallium 68 PET-CT 08/20/2019    COMPARISON:  PET gallium 68 DOTATATE whole-body scan 04/03/2019    FINDINGS:  Quality of the study: Adequate.    Diffuse abnormal foci of increased tracer uptake is present throughout the axial and visualized appendicular skeleton, left orbit, and liver (largest right hepatic lobe lesion is 6 cm, index segment II lesion is 3 cm) noting similar quantity in distribution of lesions and decreased associated radiotracer uptake when compared to the prior examination.  Additional foci of increased radiotracer uptake is present in the prostate (left aspect, eccentric) with an SUV of 30.3  and the thyroid (2 cm  hypoattenuating right lobe nodule) with an SUV of 14.4.    Index lesions from PET examination 04/03/2019:    Lesion 1: Left orbit-SUV max 7.5 (previously 23.7)    Lesion 2: Left coracoid process-SUV max 13.3 (previously 28.3)    Lesion 3: Left hepatic lobe segment II-SUV max 42.8 (previously 55.2)    Lesion 4: Left sacral ala-SUV max 17.8 (previously 42.9)    Physiologic distribution of the tracer is seen within the pituitary, salivary, stomach, pancreas uncinate process, spleen, adrenal glands, kidneys and urinary bladder, and bowel.    Incidental CT findings: Status post left upper lobectomy.      Impression       1.  Persistent widely disseminated metastatic disease with decreased uptake compared with prior study.    2.  2 cm right thyroid nodule with increased radiotracer uptake.  Recommend thyroid ultrasound for further evaluation.    3.  Focal, eccentric increased radiotracer uptake in the prostate.  Recommend correlation with laboratory values such as PSA, and consider correlation with dedicated prostate imaging.                  Assessment:       1. Secondary malignant neuroendocrine tumor of liver    2. Secondary neuroendocrine tumor of bone    3. Bronchial carcinoid tumors           Plan:     Mr. Rahman is doing well clinically and continuing to improve.  I have asked him to contact Orthopedics to address his open wound.  He will complete his course of antibiotics.  I have also asked him to follow up with Ophthalmology.  We will hold off on further PRRT for now but I will plan to check a CT of the chest and MRI of the abdomen in 1 month.  If these continue to be stable will hold off on further PRRT.  If there is any detrimental changes we will need to discuss further treatment.  He is to report any new symptoms in the interim.  All questions were answered and he is agreeable with this plan.    Jesus London, DO, FACP  Hematology & Oncology  [facility] [place] [address]  ph.  [phone]  Fax. [phone]    25 minutes were spent in coordination of patient's care, record review and counseling.  More than 50% of the time was face-to-face.

## 2019-10-21 NOTE — TELEPHONE ENCOUNTER
Spoke with pt    Scheduled pt to see Kym Mendoza PA-C tomorrow as requested.   Cancelled appointment for tomorrow with Dr Arias as it was scheduled incorrectly

## 2019-10-22 ENCOUNTER — PATIENT MESSAGE (OUTPATIENT)
Dept: ORTHOPEDICS | Facility: CLINIC | Age: 64
End: 2019-10-22

## 2019-10-22 ENCOUNTER — OFFICE VISIT (OUTPATIENT)
Dept: ORTHOPEDICS | Facility: CLINIC | Age: 64
End: 2019-10-22
Payer: COMMERCIAL

## 2019-10-22 VITALS
RESPIRATION RATE: 18 BRPM | SYSTOLIC BLOOD PRESSURE: 120 MMHG | DIASTOLIC BLOOD PRESSURE: 77 MMHG | TEMPERATURE: 98 F | HEART RATE: 90 BPM

## 2019-10-22 DIAGNOSIS — Z48.89 ENCOUNTER FOR POSTOPERATIVE WOUND CHECK: Primary | ICD-10-CM

## 2019-10-22 DIAGNOSIS — T87.40 AMPUTATION STUMP INFECTION: ICD-10-CM

## 2019-10-22 PROCEDURE — 99999 PR PBB SHADOW E&M-EST. PATIENT-LVL IV: ICD-10-PCS | Mod: PBBFAC,,, | Performed by: PHYSICIAN ASSISTANT

## 2019-10-22 PROCEDURE — 99024 PR POST-OP FOLLOW-UP VISIT: ICD-10-PCS | Mod: S$GLB,,, | Performed by: PHYSICIAN ASSISTANT

## 2019-10-22 PROCEDURE — 99024 POSTOP FOLLOW-UP VISIT: CPT | Mod: S$GLB,,, | Performed by: PHYSICIAN ASSISTANT

## 2019-10-22 PROCEDURE — 99999 PR PBB SHADOW E&M-EST. PATIENT-LVL IV: CPT | Mod: PBBFAC,,, | Performed by: PHYSICIAN ASSISTANT

## 2019-10-23 ENCOUNTER — PATIENT MESSAGE (OUTPATIENT)
Dept: NEUROLOGY | Facility: HOSPITAL | Age: 64
End: 2019-10-23

## 2019-10-24 ENCOUNTER — TELEPHONE (OUTPATIENT)
Dept: INFECTIOUS DISEASES | Facility: CLINIC | Age: 64
End: 2019-10-24

## 2019-10-24 ENCOUNTER — PATIENT MESSAGE (OUTPATIENT)
Dept: INFECTIOUS DISEASES | Facility: CLINIC | Age: 64
End: 2019-10-24

## 2019-10-24 DIAGNOSIS — B59 PNEUMONIA DUE TO PNEUMOCYSTIS JIROVECII, UNSPECIFIED LATERALITY, UNSPECIFIED PART OF LUNG: Primary | ICD-10-CM

## 2019-10-24 DIAGNOSIS — A49.01 MSSA (METHICILLIN SUSCEPTIBLE STAPHYLOCOCCUS AUREUS) INFECTION: ICD-10-CM

## 2019-10-24 DIAGNOSIS — L02.91 ABSCESS: ICD-10-CM

## 2019-10-24 LAB
FUNGUS SPEC CULT: NORMAL

## 2019-10-24 RX ORDER — SULFAMETHOXAZOLE AND TRIMETHOPRIM 800; 160 MG/1; MG/1
1 TABLET ORAL DAILY
Qty: 30 TABLET | Refills: 1 | Status: ON HOLD | OUTPATIENT
Start: 2019-10-24 | End: 2021-07-22 | Stop reason: HOSPADM

## 2019-10-24 RX ORDER — CEFADROXIL 1000 MG/1
1 TABLET ORAL 2 TIMES DAILY
Qty: 20 TABLET | Refills: 0 | Status: SHIPPED | OUTPATIENT
Start: 2019-10-24 | End: 2019-11-03

## 2019-10-24 NOTE — PROGRESS NOTES
Principal Orthopedic Problem: Left leg through knee amputation stump abscess     Relevant Medical History: bronchial carcinoid tumor (most recently on PRRT) with metastasis to the spine, mesentery, and liver    HPI: Mr. Rahman is 64 year old male who presented to the ED on 09/18/2019 with left left amputation stump drainage. He was treated with I&D pm 09/20/2019.     Mr. Rahman is here today for a post-operative visit after a   I&D left thigh subcutaneous tissue and skin, 3 by 8 cm  by Dr. Arias on 09/20/219.    MICRO: STAPHYLOCOCCUS AUREUS: cefazolin projected end date on 10/15/2019 has follow up with ID    Interval History:  he reports that he is doing ok.   he is at home.  Pain is controlled.     He reports that a few days ago he got down on his floor to clean up a mess and when getting up he was having trouble so he placed his stump on the ground and placed all his weight on it to help him.  After that he noticed that it had opened up. He called his daughter and son who are EMT's who treated the wound with cleaning solution I think peroxide and with steri strips. He has keep these in place.  he denies fever, chills, and sweats since the time of the surgery.     Physical exam:  Dressing taken down.  Incision is clean, dry, some dehiscence with granulation tissue, no erythema or increased warmth.     RADS: All pertinent images were reviewed by myself:   none done today    Assessment:  Post-op visit ( 5 weeks)    Plan:  Current care, treatment plan, precautions, activity level/ modifications, limitations, rehabilitation exercises and proposed future treatment were discussed with the patient. We discussed the need to monitor for changes in symptoms and condition and report them to the physician.  Discussed importance of compliance with all appointments and follow up examinations.   -  he may wash the area with antibacterial soap in the shower. Will not submerge until the incision is completely healed  -Patient was  advised to monitor wound closely and multiple times daily for any problems. Call clinic immediately or report to ED for immediate medical attention for any complications including reopening of wound, drainage, purulence, redness, streaking, odor, pain out of proportion, fever, chills, etc.   - do not use prosthesis until incision is completely healed  -he was seen by myself and Loulou in wound care. At this time we will treat the wound with fei. Which he will change every 2 days. Order placed for wound care nurse [facility] to come to assist.    - pain medication: no refill needed,    Pain medication refill policy provided to patient for review.   - Patient is to return to clinic in 10 days for wound check.     If there are any questions prior to scheduled follow up, the patient was instructed to contact the office

## 2019-10-25 ENCOUNTER — TELEPHONE (OUTPATIENT)
Dept: ORTHOPEDICS | Facility: CLINIC | Age: 64
End: 2019-10-25

## 2019-10-25 ENCOUNTER — PATIENT MESSAGE (OUTPATIENT)
Dept: INFECTIOUS DISEASES | Facility: CLINIC | Age: 64
End: 2019-10-25

## 2019-10-25 PROCEDURE — G0180 MD CERTIFICATION HHA PATIENT: HCPCS | Mod: ,,, | Performed by: ORTHOPAEDIC SURGERY

## 2019-10-25 PROCEDURE — G0180 PR HOME HEALTH MD CERTIFICATION: ICD-10-PCS | Mod: ,,, | Performed by: ORTHOPAEDIC SURGERY

## 2019-10-25 NOTE — TELEPHONE ENCOUNTER
Spoke with pt.   Scheduled pt to come into clinic 10/29 for a wound check with Dr Hoang.    Pt verbalized understanding

## 2019-10-28 ENCOUNTER — PATIENT MESSAGE (OUTPATIENT)
Dept: ADMINISTRATIVE | Facility: OTHER | Age: 64
End: 2019-10-28

## 2019-10-29 ENCOUNTER — OFFICE VISIT (OUTPATIENT)
Dept: ORTHOPEDICS | Facility: CLINIC | Age: 64
End: 2019-10-29
Payer: COMMERCIAL

## 2019-10-29 VITALS
DIASTOLIC BLOOD PRESSURE: 87 MMHG | RESPIRATION RATE: 18 BRPM | HEART RATE: 83 BPM | SYSTOLIC BLOOD PRESSURE: 126 MMHG | TEMPERATURE: 98 F

## 2019-10-29 DIAGNOSIS — T87.40 AMPUTATION STUMP INFECTION: Primary | ICD-10-CM

## 2019-10-29 DIAGNOSIS — Z48.89 ENCOUNTER FOR POSTOPERATIVE WOUND CHECK: ICD-10-CM

## 2019-10-29 PROCEDURE — 99999 PR PBB SHADOW E&M-EST. PATIENT-LVL III: CPT | Mod: PBBFAC,,, | Performed by: ORTHOPAEDIC SURGERY

## 2019-10-29 PROCEDURE — 99024 PR POST-OP FOLLOW-UP VISIT: ICD-10-PCS | Mod: S$GLB,,, | Performed by: ORTHOPAEDIC SURGERY

## 2019-10-29 PROCEDURE — 99024 POSTOP FOLLOW-UP VISIT: CPT | Mod: S$GLB,,, | Performed by: ORTHOPAEDIC SURGERY

## 2019-10-29 PROCEDURE — 99999 PR PBB SHADOW E&M-EST. PATIENT-LVL III: ICD-10-PCS | Mod: PBBFAC,,, | Performed by: ORTHOPAEDIC SURGERY

## 2019-10-29 NOTE — PROGRESS NOTES
CC:  Postop wound check left through knee amputation stump I&D    HPI:  64-year-old male longstanding left through knee amputation  I&D left stump abscess 09/20/2019  Surgical wound dehiscence postop, , approximately 1 month postop after accidentally put impression wound. Has been getting home health care dressing changes daily  Has completed outpatient course of IV antibiotics.    SUBJECTIVE:  Doing well  Denies fevers, chills, wound drainage  Denies pain at stump.    OBJECTIVE:  PE  Left lower extremity through knee amputation stump with approximately 8 cm wound with healthy appearing granulation tissue lateral distal aspect of stump at site of surgical I&d.  No surrounding erythema    XRAYS:  None    ASSESSMENT:  64M, remote hx of L through knee amp  09/20/2019 - I&D left lower extremity  Now with open wound left lower extremity stump, doing well with local wound care per home health    PLAN:  Continue current plan with home health directed wound care daily.  No wear of prosthesis until wound healed.  Follow-up 4 weeks for repeat wound check.

## 2019-11-13 ENCOUNTER — EXTERNAL HOME HEALTH (OUTPATIENT)
Dept: HOME HEALTH SERVICES | Facility: HOSPITAL | Age: 64
End: 2019-11-13
Payer: COMMERCIAL

## 2019-11-19 ENCOUNTER — PATIENT MESSAGE (OUTPATIENT)
Dept: ADMINISTRATIVE | Facility: OTHER | Age: 64
End: 2019-11-19

## 2019-11-22 LAB
ACID FAST MOD KINY STN SPEC: NORMAL
MYCOBACTERIUM SPEC QL CULT: NORMAL

## 2019-11-27 ENCOUNTER — TELEPHONE (OUTPATIENT)
Dept: ORTHOPEDICS | Facility: CLINIC | Age: 64
End: 2019-11-27

## 2019-11-27 ENCOUNTER — PATIENT MESSAGE (OUTPATIENT)
Dept: NEUROLOGY | Facility: HOSPITAL | Age: 64
End: 2019-11-27

## 2019-11-27 ENCOUNTER — PATIENT MESSAGE (OUTPATIENT)
Dept: INFECTIOUS DISEASES | Facility: CLINIC | Age: 64
End: 2019-11-27

## 2019-11-27 NOTE — TELEPHONE ENCOUNTER
Spoke with pt and his wife.   Pt unable to keep his appointment with Dr Hoang on 12/4 as he has an appointment in [place] that day.     Pt requested 10 am on 12/10      Scheduled as requested

## 2019-12-03 ENCOUNTER — HOSPITAL ENCOUNTER (OUTPATIENT)
Dept: RADIOLOGY | Facility: HOSPITAL | Age: 64
Discharge: HOME | End: 2019-12-03
Attending: INTERNAL MEDICINE
Payer: COMMERCIAL

## 2019-12-03 DIAGNOSIS — C7B.8 SECONDARY NEUROENDOCRINE TUMOR OF BONE: ICD-10-CM

## 2019-12-03 DIAGNOSIS — D3A.090 BRONCHIAL CARCINOID TUMORS: ICD-10-CM

## 2019-12-03 DIAGNOSIS — C7B.8 SECONDARY MALIGNANT NEUROENDOCRINE TUMOR OF LIVER: ICD-10-CM

## 2019-12-03 PROCEDURE — 74183 MRI ABD W/O CNTR FLWD CNTR: CPT | Mod: 26,,, | Performed by: RADIOLOGY

## 2019-12-03 PROCEDURE — 71250 CT THORAX DX C-: CPT | Mod: TC

## 2019-12-03 PROCEDURE — 25500020 PHARM REV CODE 255: Performed by: INTERNAL MEDICINE

## 2019-12-03 PROCEDURE — 74183 MRI ABDOMEN W WO CONTRAST: ICD-10-PCS | Mod: 26,,, | Performed by: RADIOLOGY

## 2019-12-03 PROCEDURE — 71250 CT THORAX DX C-: CPT | Mod: 26,,, | Performed by: RADIOLOGY

## 2019-12-03 PROCEDURE — 71250 CT CHEST WITHOUT CONTRAST: ICD-10-PCS | Mod: 26,,, | Performed by: RADIOLOGY

## 2019-12-03 PROCEDURE — 74183 MRI ABD W/O CNTR FLWD CNTR: CPT | Mod: TC

## 2019-12-03 PROCEDURE — A9585 GADOBUTROL INJECTION: HCPCS | Performed by: INTERNAL MEDICINE

## 2019-12-03 RX ORDER — GADOBUTROL 604.72 MG/ML
10 INJECTION INTRAVENOUS
Status: COMPLETED | OUTPATIENT
Start: 2019-12-03 | End: 2019-12-03

## 2019-12-03 RX ADMIN — GADOBUTROL 10 ML: 604.72 INJECTION INTRAVENOUS at 12:12

## 2019-12-04 ENCOUNTER — OFFICE VISIT (OUTPATIENT)
Dept: NEUROLOGY | Facility: HOSPITAL | Age: 64
End: 2019-12-04
Attending: INTERNAL MEDICINE
Payer: COMMERCIAL

## 2019-12-04 VITALS
OXYGEN SATURATION: 97 % | WEIGHT: 257.94 LBS | HEIGHT: 71 IN | TEMPERATURE: 98 F | SYSTOLIC BLOOD PRESSURE: 113 MMHG | BODY MASS INDEX: 36.11 KG/M2 | DIASTOLIC BLOOD PRESSURE: 78 MMHG | HEART RATE: 79 BPM

## 2019-12-04 DIAGNOSIS — C7B.8 SECONDARY NEUROENDOCRINE TUMOR OF LIVER: ICD-10-CM

## 2019-12-04 DIAGNOSIS — C7B.8 SECONDARY NEUROENDOCRINE TUMOR OF BONE: ICD-10-CM

## 2019-12-04 DIAGNOSIS — D3A.090 BRONCHIAL CARCINOID TUMORS: Primary | ICD-10-CM

## 2019-12-04 PROCEDURE — 3008F BODY MASS INDEX DOCD: CPT | Mod: CPTII,,, | Performed by: INTERNAL MEDICINE

## 2019-12-04 PROCEDURE — 99214 OFFICE O/P EST MOD 30 MIN: CPT | Mod: ,,, | Performed by: INTERNAL MEDICINE

## 2019-12-04 PROCEDURE — 99214 OFFICE O/P EST MOD 30 MIN: CPT | Performed by: INTERNAL MEDICINE

## 2019-12-04 PROCEDURE — 99214 PR OFFICE/OUTPT VISIT, EST, LEVL IV, 30-39 MIN: ICD-10-PCS | Mod: ,,, | Performed by: INTERNAL MEDICINE

## 2019-12-04 PROCEDURE — 3008F PR BODY MASS INDEX (BMI) DOCUMENTED: ICD-10-PCS | Mod: CPTII,,, | Performed by: INTERNAL MEDICINE

## 2019-12-04 RX ORDER — LANREOTIDE ACETATE 120 MG/.5ML
120 INJECTION SUBCUTANEOUS
Status: CANCELLED | OUTPATIENT
Start: 2020-04-16

## 2019-12-04 RX ORDER — LANREOTIDE ACETATE 120 MG/.5ML
120 INJECTION SUBCUTANEOUS
Status: CANCELLED | OUTPATIENT
Start: 2020-01-06

## 2019-12-04 RX ORDER — LANREOTIDE ACETATE 120 MG/.5ML
120 INJECTION SUBCUTANEOUS
Status: CANCELLED | OUTPATIENT
Start: 2019-12-04

## 2019-12-04 RX ORDER — SODIUM CHLORIDE 0.9 % (FLUSH) 0.9 %
10 SYRINGE (ML) INJECTION
Status: CANCELLED | OUTPATIENT
Start: 2019-12-04

## 2019-12-04 RX ORDER — LANREOTIDE ACETATE 120 MG/.5ML
120 INJECTION SUBCUTANEOUS
Status: CANCELLED | OUTPATIENT
Start: 2020-07-26

## 2019-12-04 RX ORDER — HEPARIN 100 UNIT/ML
500 SYRINGE INTRAVENOUS
Status: CANCELLED | OUTPATIENT
Start: 2019-12-04

## 2019-12-04 NOTE — PROGRESS NOTES
PATIENT: Elroy Rahman  MRN: [id number]  DATE: 12/4/2019      Diagnosis:   1. Bronchial carcinoid tumors    2. Secondary neuroendocrine tumor of liver    3. Secondary neuroendocrine tumor of bone        Chief Complaint: Follow-up (1 month follow up)      Oncologic History:      Oncologic History Bronchial carcinoid, left, diagnosed 1/2016   Metastatic disease to liver and bone 5/2017     Oncologic Treatment Left upper lobectomy 1/2016  CAPTEM 7/2017 - 2/2019 (discontinued due to progression)  Lanreotide 7/2017   Zoledronic acid 7/2017   TACE 10/2018  TACE 3/2019  Lily-177 4/10/19, 6/5/19    Pathology 1/2016: Typical carcinoid tumor, well-differentiated T2a, N0, M0 Ki-67 <1%  6/2017: Liver biopsy, well-differentiated, Ki-67 25%           Subjective:    Interval History: Mr. Rahman is a 64 y.o. male who is seen in follow-up for a bronchial carcinoid tumor.  He states he has generally been feeling well.  He has completed his course of steroids.  A few weeks back he states that he thought he caught a virus and felt run down.  He did not seek medical attention at that time.  His vision has not worsened since I had seen him last but still not back to a point where he was previously.  He is not follow back up with ophthalmology yet.  He remains on treatment for his lower extremity wound and follows with wound care.  He has been more active and has no other new complaints.      His history dates to 1/2016 when he underwent a left upper lobectomy with Dr. Bell for a typical carcinoid tumor.  His pathological staging was T2a, N0, M0 with Ki-67 less than 1% and mitotic count 0 per 10 high-powered fields.  He done well postoperatively, however, in 5/2017 he was undergoing routine surveillance and a chest CT in picked up evidence of progression of disease in the liver.  Dedicated abdominal CT was performed showing multiple liver lesions.  In 6/2017 he underwent a CT-guided liver biopsy which was positive for metastatic  neuroendocrine tumor which was well-differentiated with no overt nuclear atypia, however, the Ki-67 was 25%.  He underwent gallium-68 PET/CT and was found to have widespread disease involving the bone, liver and also mesentery.  He was started on CAPTEM in 7/2017 and also Lanreotide and zoledronic acid.  Scans in 10/2017 had shown stability of disease.  Imaging in 1/2018 had shown stability of disease.  Scans in 4/2018 had shown stability of disease.  Scans in 07/2018 had continued to showed mild growth.  In 10/2018 he underwent TACE.  CAPTEM was discontinued in 02/2019 due to progression.  He underwent TACE in 03/2019.  He was started on treatment with PRRT using Lily-177 on 04/10/2019.      Past Medical History:   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    Sepsis due to methicillin susceptible Staphylococcus aureus 09/14/2019    MSSA bacteremia from Amputation stump infection    Sleep apnea     Snores    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       Past Surgical HIstory:   Past Surgical History:   Procedure Laterality Date    ABDOMINAL SURGERY      Bka Left     HERNIA REPAIR      IRRIGATION AND DEBRIDEMENT OF LOWER EXTREMITY Left 9/20/2019    Procedure: IRRIGATION AND DEBRIDEMENT, LOWER EXTREMITY - left - cysto tubing - cultures;  Surgeon: Jesus Arias MD;  Location: [address];  Service: Orthopedics;  Laterality: Left;    LAPAROSCOPIC REPAIR OF INCARCERATED UMBILICAL HERNIA N/A 11/27/2018    Procedure: REPAIR, HERNIA, UMBILICAL, INCARCERATED, LAPAROSCOPIC;  Surgeon: Coco Guidry MD;  Location: [facility] OR;  Service: General;  Laterality: N/A;  video     TRANSESOPHAGEAL ECHOCARDIOGRAPHY N/A 9/18/2019    Procedure: ECHOCARDIOGRAM, TRANSESOPHAGEAL;  Surgeon: Grace Diagnostic Provider;  Location: [facility] EP LAB;  Service: Anesthesiology;  Laterality: N/A;       Family History:   Family History   Problem Relation Age of Onset    Cancer Mother         lung    Cancer Sister     Anesthesia problems Neg Hx        Social History:  reports that he has never smoked. He has never used smokeless tobacco. He reports that he drinks about 1.0 standard drinks of alcohol per week. He reports that he does not use drugs.    Allergies:  Review of patient's allergies indicates:  No Known Allergies    Medications:  Current Outpatient Medications   Medication Sig Dispense Refill    ondansetron (ZOFRAN) 4 MG tablet Take 1 tablet (4 mg total) by mouth daily as needed for Nausea.      ALPHAGAN P 0.1 % Drop Instill 1 drop into left eye twice daily  4    famotidine (PEPCID) 20 MG tablet Take 1 tablet (20 mg total) by mouth 2 (two) times daily. (Patient not taking: Reported on 12/4/2019) 60 tablet 2    fluconazole (DIFLUCAN) 100 MG tablet Take 1 tablet (100 mg total) by mouth once daily. Take 200mg on day 1, then 100mg days 2-7 (Patient not taking: Reported on 12/4/2019) 7 tablet 0    fluconazole (DIFLUCAN) 200 MG Tab Take 1 tablet (200 mg total) by mouth once daily. (Patient not taking: Reported on 12/4/2019) 1 tablet 0    predniSONE (DELTASONE) 20 MG tablet Take 0.5 tablets (10 mg total) by mouth once daily. Take with two 50 mg tabs for a total of 110 mg daily (Patient not taking: Reported on 12/4/2019)      predniSONE (DELTASONE) 50 MG Tab Take 2 tablets (100 mg total) by mouth once daily. Take with 1/2 of the 20 mg tablet. For a total of 110 daily. (Patient not taking: Reported on 12/4/2019)      PROLENSA 0.07 % Drop INSTILL 1 DROP INTO LEFT EYE TWICE DAILY  4    sulfamethoxazole-trimethoprim 800-160mg (BACTRIM DS) 800-160 mg Tab Take 1 tablet by mouth once daily. (Patient not  "taking: Reported on 12/4/2019) 30 tablet 1     No current facility-administered medications for this visit.        Review of Systems   Constitutional: Negative for appetite change, chills, fatigue, fever and unexpected weight change.   HENT: Negative for dental problem, sinus pressure and sneezing.    Eyes: Positive for visual disturbance.   Respiratory: Negative for cough, choking, chest tightness and shortness of breath.    Cardiovascular: Negative for chest pain and leg swelling.   Gastrointestinal: Negative for abdominal pain, blood in stool, constipation, diarrhea and nausea.   Genitourinary: Negative for difficulty urinating, dysuria and frequency.   Musculoskeletal: Negative for arthralgias and back pain.   Skin: Positive for wound. Negative for rash.   Neurological: Negative for dizziness, light-headedness and headaches.   Hematological: Negative for adenopathy. Does not bruise/bleed easily.   Psychiatric/Behavioral: Negative for sleep disturbance. The patient is not nervous/anxious.        ECOG Performance Status:   ECOG SCORE    1 - Restricted in strenuous activity-ambulatory and able to carry out work of a light nature         Objective:      Vitals:   Vitals:    12/04/19 0844   BP: 113/78   BP Location: Right arm   Patient Position: Sitting   BP Method: Large (Automatic)   Pulse: 79   Temp: 97.7 °F (36.5 °C)   TempSrc: Oral   SpO2: 97%   Weight: 117 kg (257 lb 15 oz)   Height: 5' 11" (1.803 m)     BMI: Body mass index is 35.98 kg/m².    Physical Exam   Constitutional: He is oriented to person, place, and time. He appears well-developed and well-nourished.   Sitting in wheelchair   HENT:   Head: Normocephalic and atraumatic.   Eyes: Pupils are equal, round, and reactive to light.   Neck: Normal range of motion. Neck supple.   Cardiovascular: Normal rate and regular rhythm.   Pulmonary/Chest: Effort normal and breath sounds normal. No respiratory distress.   Abdominal: Soft. He exhibits no distension. " There is no tenderness.   Musculoskeletal: He exhibits no edema or tenderness.   Left lower extremity dressing in place   Lymphadenopathy:     He has no cervical adenopathy.   Neurological: He is alert and oriented to person, place, and time. No cranial nerve deficit.   Skin: Skin is warm and dry.   Psychiatric: He has a normal mood and affect. His behavior is normal.       Laboratory Data:  Lab Visit on 12/03/2019   Component Date Value Ref Range Status    WBC 12/03/2019 6.62  3.90 - 12.70 K/uL Final    RBC 12/03/2019 3.42* 4.60 - 6.20 M/uL Final    Hemoglobin 12/03/2019 10.0* 14.0 - 18.0 g/dL Final    Hematocrit 12/03/2019 32.0* 40.0 - 54.0 % Final    Mean Corpuscular Volume 12/03/2019 94  82 - 98 fL Final    Mean Corpuscular Hemoglobin 12/03/2019 29.2  27.0 - 31.0 pg Final    Mean Corpuscular Hemoglobin Conc 12/03/2019 31.3* 32.0 - 36.0 g/dL Final    RDW 12/03/2019 14.0  11.5 - 14.5 % Final    Platelets 12/03/2019 361* 150 - 350 K/uL Final    MPV 12/03/2019 9.1* 9.2 - 12.9 fL Final    Immature Granulocytes 12/03/2019 0.8* 0.0 - 0.5 % Final    Gran # (ANC) 12/03/2019 4.7  1.8 - 7.7 K/uL Final    Immature Grans (Abs) 12/03/2019 0.05* 0.00 - 0.04 K/uL Final    Comment: Mild elevation in immature granulocytes is non specific and   can be seen in a variety of conditions including stress response,   acute inflammation, trauma and pregnancy. Correlation with other   laboratory and clinical findings is essential.      Lymph # 12/03/2019 1.1  1.0 - 4.8 K/uL Final    Mono # 12/03/2019 0.6  0.3 - 1.0 K/uL Final    Eos # 12/03/2019 0.1  0.0 - 0.5 K/uL Final    Baso # 12/03/2019 0.07  0.00 - 0.20 K/uL Final    nRBC 12/03/2019 0  0 /100 WBC Final    Gran% 12/03/2019 71.5  38.0 - 73.0 % Final    Lymph% 12/03/2019 16.8* 18.0 - 48.0 % Final    Mono% 12/03/2019 8.6  4.0 - 15.0 % Final    Eosinophil% 12/03/2019 1.2  0.0 - 8.0 % Final    Basophil% 12/03/2019 1.1  0.0 - 1.9 % Final    Differential Method  12/03/2019 Automated   Final    Sodium 12/03/2019 141  136 - 145 mmol/L Final    Potassium 12/03/2019 3.7  3.5 - 5.1 mmol/L Final    Chloride 12/03/2019 106  95 - 110 mmol/L Final    CO2 12/03/2019 24  23 - 29 mmol/L Final    Glucose 12/03/2019 114* 70 - 110 mg/dL Final    BUN, Bld 12/03/2019 9  8 - 23 mg/dL Final    Creatinine 12/03/2019 1.1  0.5 - 1.4 mg/dL Final    Calcium 12/03/2019 9.1  8.7 - 10.5 mg/dL Final    Total Protein 12/03/2019 7.3  6.0 - 8.4 g/dL Final    Albumin 12/03/2019 2.9* 3.5 - 5.2 g/dL Final    Total Bilirubin 12/03/2019 0.4  0.1 - 1.0 mg/dL Final    Comment: For infants and newborns, interpretation of results should be based  on gestational age, weight and in agreement with clinical  observations.  Premature Infant recommended reference ranges:  Up to 24 hours.............<8.0 mg/dL  Up to 48 hours............<12.0 mg/dL  3-5 days..................<15.0 mg/dL  6-29 days.................<15.0 mg/dL      Alkaline Phosphatase 12/03/2019 87  55 - 135 U/L Final    AST 12/03/2019 16  10 - 40 U/L Final    ALT 12/03/2019 29  10 - 44 U/L Final    Anion Gap 12/03/2019 11  8 - 16 mmol/L Final    eGFR if African American 12/03/2019 >60.0  >60 mL/min/1.73 m^2 Final    eGFR if non African American 12/03/2019 >60.0  >60 mL/min/1.73 m^2 Final    Comment: Calculation used to obtain the estimated glomerular filtration  rate (eGFR) is the CKD-EPI equation.        Supplemental Diagnosis  Chromogranin Staining:  Intensity: Moderate to strong. Positive cells: 100 (%)  Synaptophysin Staining:  Intensity: Strong. Positive cells: 100 (%)  CD31: 40 vessels per 1 HPF  Factor VIII: 27 vessels per 1 HPF  Ki67: 1 % tumor cells staining  Immunostains performed with appropriate positive and negative controls.  FINAL PATHOLOGIC DIAGNOSIS  1. Lymph node, level XI, excisional biopsy:  Fragments of benign lymph node with anthracosis and sinus histiocytosis (0/1).  2. Lung, lingular staple line, biopsy:  Lung,  negative for neoplasm.  3. Lymph node, level X, excisional biopsy:  1 benign lymph node with anthracotic pigment and sinus histiocytosis (0/1).  4. Lymph node, level XII, excisional biopsy:  2 benign lymph nodes with sinus histiocytosis and anthracotic pigment (0/2).  5. Lung, left upper lobe, lobectomy:  Typical carcinoid tumor (well differentiated neuroendocrine tumor), 4 cm in greatest dimension.  Bronchial and vascular margins are negative for neoplasm.  Uninvolved lung adjacent to the tumor shows emphysematous changes and fibrosis, however remaining lung  appears unremarkable.  Additional immunohistochemical stains for ancillary studies (including synaptophysin, chromogranin and Ki-67)  will be completed and results will be reported in a supplemental report.  See synoptic report in comment section for further details.  6. Lymph node, level VII, excisional biopsy:  2 benign lymph nodes with anthracotic pigment and sinus histiocytosis (0/2).  Comment: Synoptic Report  Specimen: Left upper lobe of lung  Procedure: Lobectomy  Specimen laterality: Left  Tumor size:  Greatest dimension: 4 cm  Tumor focality: Single focus  Histologic type: Typical carcinoid tumor  Visceral pleural invasion: Not identified  Tumor extension: Not identified  Margins: Bronchial and vascular margins are uninvolved by tumor.  Distance of tumor from closest margin: 1 cm from the bronchial surgical margin.  Lymphovascular invasion: Not identified  Ancillary studies:  Immunohistochemical stains for neuroendocrine markers and Ki-67 will be completed and results will follow in a  supplemental report.  Note: The tumor consists of a proliferation of cells in nests with lewis formation. The tumor cells have salt and  pepper chromatin pattern with abundant cytoplasm. There is no significant nuclear atypia. No evidence of  necrosis. Mitotic count is 0 mitoses in 10 high power fields.    Imaging:     MRI 12/2/19    COMPARISON:  MRI abdomen  02/04/2019.    FINDINGS:  Lungs, heart, and pericardium show no significant abnormalities.    Liver is normal in size and demonstrates subtle dropout of signal in the out of phase imaging suggesting hepatic steatosis.  Marked interval decrease in size of numerous previously identified enhancing and diffusion restricting lesions within the liver parenchyma.    Index lesions:    -Hepatic segment III lesion now measures 2.7 cm (series 11, image 31), previously 5.5 cm.  This lesion shows a central nonenhancing component suggesting interval necrosis.  Another adjacent and more medial left hepatic lobe lesion is markedly decreased in size with also evidence of central necrosis.    -Hepatic segment VIII lesion now measures 4.0 cm (series 11, image 30), previously 4.8 cm.  This lesion largely shows a homogeneous enhancing component.    No definite new hepatic lesions.  Portal vein and branches are patent.  Superior mesenteric and splenic veins are patent.    Gallbladder is normal.  No intra or extrahepatic biliary ductal dilatation.  There is been interval development of at least 4 enhancing lesions within the spleen with the largest measuring at 2.2 cm anteriorly (series 12, image 44).  Small splenule seen anterior to the spleen.    Stomach, pancreas, and left adrenal glands show no significant abnormalities.  Stable right adrenal gland adenoma noted measuring at 3.4 cm.    Kidneys are normal in size and location.  No renal mass or hydronephrosis.  Small bilateral T2 hyperintensities within both kidneys suggestive of renal cysts.    No bowel obstruction or inflammation.  No lymphadenopathy in the abdomen.  No ascites.  Abdominal aorta is normal in course and caliber.    Multiple osseous lesions again identified within the thoracic and lumbar spine appearing similar in distribution in comparison to the prior exam.    Extraperitoneal soft tissues show no significant abnormalities.      Impression       Remarkable decrease in  size of several enhancing and diffusion-restricting hepatic lesions in this patient with history of neuroendocrine tumor, greater improvement on the left compared to the right, as described above.    Interval development of multiple enhancing splenic lesions with restricted diffusion, concerning for splenic metastases.    Stable osseous metastatic disease.    RECIST SUMMARY:    Date of prior examination for comparison: MRI abdomen 02/04/2019.    Lesion 1: Hepatic segment III lesion.  2.7 cm. Series 11 image 31.  Prior measurement 5.5 cm.    Lesion 2: Hepatic segment VIII lesion.  4.0 cm. Series 11 image 30.  Prior measurement 4.8 cm.    Lesion 3: New splenic nodule.  2.2 cm.  Series 12, image 44. Not previously present.    This report was flagged in Epic as abnormal.     CT 12/03/2019    COMPARISON:  CT chest without contrast: 11/05/2018    FINDINGS:  Additional history:    Carcinoid tumor of the lingula; status post left upper lobectomy including the lingula.    The patient has widespread neuroendocrine tumor consistent with metastatic disease as revealed on PET gadolinium 68 Dotatate study performed 08/20/2019.    Base of Neck: No significant abnormality detected; PET-CT of 08/20/2019 revealed a 2 cm lesion in the right lobe of the thyroid gland with increased tracer uptake; report of that study recommends thyroid ultrasound for further evaluation..    Extrathoracic soft tissues: Normal.    Aorta: Left-sided aortic arch.  There is calcific atherosclerosis in the coronary arteries and aorta.  No aneurysm.    Heart: Normal size. No pericardial fluid.    Pulmonary vasculature:    -Pulmonary arteries distribute normally following left upper lobectomy and lingular resection.    As observed on earlier chest CTs including that dated 05/17/2017 there is a stable mild convex contour of the lateral margin of the left pulmonary artery at the site of resection of the left upper lobe artery.  Report of the chest CT performed  "May 2017 describes "this is a normal anatomic variant reflecting the native contour of the left pulmonary artery is seen on preoperative studies of 01/21/2016 and 12/31/2015".    -Left superior pulmonary vein has been ligated.  Left inferior pulmonary vein and right pulmonary veins return to the left atrium in typical fashion..    Darya/Mediastinum:    -No pathologic trang enlargement.    There is an ovoid soft tissue nodule less than 1 cm in size located in the fat overlying the right hemidiaphragm at the inferior margin of the right middle lobe, unchanged compared with 05/17/2017.  It is well seen on sagittal series 604, image 75 today, and on 05/17/2017 sagittal series 401, image 24.  Stability over a period of more than 2 years argues for benign nature.    Airways: Patent.  Surgical staples overlying the left upper lobe and lingular bronchi appear stable in location and distribution compared to the recent study of 11/05/2018.  This stability argues against recurrence disease in the left hilum.    Lungs/Pleura:    Status post resection of the left upper lobe and lingula.    0.4 cm nodule previously identified in the posterior basal segment of the left lower lobe (11/05/2018 axial series 4, image 333) is stable today (12/03/2019 axial series 4, image 288).  Such stability over a period of 13 months is reassuring even in light of the patient's history of neoplasm with metastatic disease.    Stable left lower lobe posterior basal segment nodule measures 0.4 cm (series 4, image 288) previously 0.4 cm.  No new pulmonary disease.  No focal consolidation or pneumothorax.  No pleural effusion or thickening.    Esophagus: Normal.    Upper Abdomen:    -On today's CT there are several hypodense lesions plus scattered foci of dense material in the liver.  Hepatic findings are not significantly changed compared to prior chest CT dated 11/05/2018.  However recent PET gallium 68 Dotate study performed 8/21/2019 revealed a lesion " in the left hepatic lobe concerning for metastatic disease.    -Stable 3.2 cm right adrenal adenoma.    Bones:    -Status post partial resection of the left 6 than 7th ribs for left thoracotomy in this patient status post left upper lobectomy.    -There are multiple sclerotic lesions in the thoracic spine, similar compared to prior and consistent with the patient's known metastatic disease.    -No acute fracture.      Impression       Overall, no significant interval change compared to prior CT exam on 11/05/2018.    1. Stable postoperative changes of left upper lobectomy.  There is a stable 0.4 cm pulmonary nodule.  No new pulmonary disease identified.  2. Numerous hepatic lesions and sclerotic osseous lesions in the spine, not significantly changed.  However Pet Asyozen99 Dotate obtained 8/21/2019 revealed metastatic lesions including in the left lobe of the liver that are not well appreciated on the current study.  3. Stable right adrenal adenoma.                  Assessment:       1. Bronchial carcinoid tumors    2. Secondary neuroendocrine tumor of liver    3. Secondary neuroendocrine tumor of bone           Plan:     Mr. Rahman continues to improve clinically.  Review of his CT of the chest shows overall stability of his disease.  Review of his MRI shows an improvement in the volume of his liver disease.  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  I will discuss his case in tumor Board.  I have encouraged him to follow back up with ophthalmology which she states he will do after he is done taking care of his wound.  Additionally, I have noted his albumin is low and will plan to repeat labs in 2 weeks and had a pre-albumin.  I have also noted his hemoglobin has also dropped.  He denies any bleeding or dark stools.  We will also repeat this in 2 weeks given his recent infection.  Will notify him tumor board recommendations.  He  should continue with Lanreotide and zoledronic acid.  Multiple questions were answered and he is agreeable with this plan.    Jesus oLndon DO, [facility]  Hematology & Oncology  [facility] [place] [address]  ph. [phone]  Fax. [phone]    25 minutes were spent in coordination of patient's care, record review and counseling.  More than 50% of the time was face-to-face.

## 2019-12-04 NOTE — PATIENT INSTRUCTIONS
We will speak about you in tumor board     Have labs in 2 weeks    Have your injection and zometa next Tuesday

## 2019-12-10 ENCOUNTER — PATIENT MESSAGE (OUTPATIENT)
Dept: ORTHOPEDICS | Facility: CLINIC | Age: 64
End: 2019-12-10

## 2019-12-10 ENCOUNTER — OFFICE VISIT (OUTPATIENT)
Dept: ORTHOPEDICS | Facility: CLINIC | Age: 64
End: 2019-12-10
Payer: COMMERCIAL

## 2019-12-10 ENCOUNTER — CONFERENCE (OUTPATIENT)
Dept: NEUROLOGY | Facility: HOSPITAL | Age: 64
End: 2019-12-10

## 2019-12-10 ENCOUNTER — INFUSION (OUTPATIENT)
Dept: INFUSION THERAPY | Facility: HOSPITAL | Age: 64
End: 2019-12-10
Attending: INTERNAL MEDICINE
Payer: COMMERCIAL

## 2019-12-10 VITALS
SYSTOLIC BLOOD PRESSURE: 140 MMHG | TEMPERATURE: 98 F | DIASTOLIC BLOOD PRESSURE: 78 MMHG | RESPIRATION RATE: 18 BRPM | HEART RATE: 63 BPM

## 2019-12-10 DIAGNOSIS — C78.7 SECONDARY MALIGNANT NEOPLASM OF LIVER: ICD-10-CM

## 2019-12-10 DIAGNOSIS — T87.40 AMPUTATION STUMP INFECTION: Primary | ICD-10-CM

## 2019-12-10 DIAGNOSIS — C7B.8 SECONDARY NEUROENDOCRINE TUMOR OF LIVER: ICD-10-CM

## 2019-12-10 DIAGNOSIS — C7B.8 SECONDARY NEUROENDOCRINE TUMOR OF BONE: ICD-10-CM

## 2019-12-10 DIAGNOSIS — D3A.090 BRONCHIAL CARCINOID TUMORS: ICD-10-CM

## 2019-12-10 DIAGNOSIS — C7A.090 CARCINOID BRONCHIAL ADENOMA OF LEFT LUNG: ICD-10-CM

## 2019-12-10 DIAGNOSIS — C7B.8 SECONDARY MALIGNANT NEUROENDOCRINE TUMOR OF LIVER: Primary | ICD-10-CM

## 2019-12-10 PROCEDURE — 99999 PR PBB SHADOW E&M-EST. PATIENT-LVL II: ICD-10-PCS | Mod: PBBFAC,,, | Performed by: ORTHOPAEDIC SURGERY

## 2019-12-10 PROCEDURE — 99999 PR PBB SHADOW E&M-EST. PATIENT-LVL II: CPT | Mod: PBBFAC,,, | Performed by: ORTHOPAEDIC SURGERY

## 2019-12-10 PROCEDURE — 99024 POSTOP FOLLOW-UP VISIT: CPT | Mod: S$GLB,,, | Performed by: ORTHOPAEDIC SURGERY

## 2019-12-10 PROCEDURE — 63600175 PHARM REV CODE 636 W HCPCS: Mod: JG | Performed by: INTERNAL MEDICINE

## 2019-12-10 PROCEDURE — 96372 THER/PROPH/DIAG INJ SC/IM: CPT | Mod: 59

## 2019-12-10 PROCEDURE — 99024 PR POST-OP FOLLOW-UP VISIT: ICD-10-PCS | Mod: S$GLB,,, | Performed by: ORTHOPAEDIC SURGERY

## 2019-12-10 PROCEDURE — 96365 THER/PROPH/DIAG IV INF INIT: CPT

## 2019-12-10 RX ORDER — LANREOTIDE ACETATE 120 MG/.5ML
120 INJECTION SUBCUTANEOUS
Status: CANCELLED
Start: 2019-12-18

## 2019-12-10 RX ORDER — SODIUM CHLORIDE 9 MG/ML
INJECTION, SOLUTION INTRAVENOUS ONCE
Status: CANCELLED | OUTPATIENT
Start: 2019-12-18

## 2019-12-10 RX ORDER — ARGININE 100 %
1000 CRYSTALS MISCELLANEOUS
Status: CANCELLED | OUTPATIENT
Start: 2019-12-18

## 2019-12-10 RX ORDER — LANREOTIDE ACETATE 120 MG/.5ML
120 INJECTION SUBCUTANEOUS ONCE
Status: CANCELLED | OUTPATIENT
Start: 2019-12-18

## 2019-12-10 RX ORDER — SODIUM CHLORIDE 0.9 % (FLUSH) 0.9 %
10 SYRINGE (ML) INJECTION
Status: DISCONTINUED | OUTPATIENT
Start: 2019-12-10 | End: 2019-12-10 | Stop reason: HOSPADM

## 2019-12-10 RX ORDER — LANREOTIDE ACETATE 120 MG/.5ML
120 INJECTION SUBCUTANEOUS
Status: COMPLETED | OUTPATIENT
Start: 2019-12-10 | End: 2019-12-10

## 2019-12-10 RX ORDER — SODIUM CHLORIDE 0.9 % (FLUSH) 0.9 %
10 SYRINGE (ML) INJECTION
Status: CANCELLED | OUTPATIENT
Start: 2019-12-18

## 2019-12-10 RX ORDER — ACETAMINOPHEN 325 MG/1
650 TABLET ORAL
Status: CANCELLED | OUTPATIENT
Start: 2019-12-18

## 2019-12-10 RX ORDER — HEPARIN 100 UNIT/ML
500 SYRINGE INTRAVENOUS
Status: CANCELLED | OUTPATIENT
Start: 2019-12-18

## 2019-12-10 RX ADMIN — LANREOTIDE ACETATE 120 MG: 120 INJECTION SUBCUTANEOUS at 12:12

## 2019-12-10 RX ADMIN — SODIUM CHLORIDE 4 MG: 9 INJECTION, SOLUTION INTRAVENOUS at 11:12

## 2019-12-10 RX ADMIN — SODIUM CHLORIDE: 9 INJECTION, SOLUTION INTRAVENOUS at 11:12

## 2019-12-10 NOTE — PLAN OF CARE
1210  Infusion completed, injection administered, pt tolerated all well; pt instructed to increase water hydration x 3 days r/t Zometa; discussed when to contact MD, when to report to ER; pt declined AVS, pt and spouse verbalized understanding of all discussed and when to report next

## 2019-12-10 NOTE — PROGRESS NOTES
CC:  Postop wound check left through knee amputation stump I&D     HPI:  64-year-old male longstanding left through knee amputation    I&D left stump abscess 09/20/2019  Surgical wound dehiscence postop, 10.22.19, approximately 1 month postop after accidentally put pressure on stump.     Has been getting home health care dressing changes daily  Has completed outpatient course of IV antibiotics.     SUBJECTIVE:  Doing well  Denies fevers, chills, wound drainage  Denies pain at stump.     OBJECTIVE:  PE  Left lower extremity through knee amputation stump with approximately 8 cm healed scar at lateral distal aspect of stump at site of surgical I&d.    No surrounding erythema, drainage, signs of infection     XRAYS:  None     ASSESSMENT:  64M, remote hx of L through knee amp  09/20/2019 - I&D left lower extremity  Wound dehiscence postop  Healed with secondary intention and daily dressing changes  Doing well, eager to get back to work      PLAN:  Gradually resume prosthesis wear.  Start with 10 min at a time and increase gradually over several weeks to months  Follow-up as needed

## 2019-12-10 NOTE — TELEPHONE ENCOUNTER
[facility]      NEUROENDOCRINE TUMOR MULTIDISCIPLINARY TUMOR BOARD  _____________________________________________________________________    PRESENTER:   Jesus London DO    REASON FOR PRESENTATION:  Treatment Plan and Scan Review    ATTENDEES:   Surgery:              MD TOMÁS Corbin MD T. Ramcharan, MD M. Maluccio, MD  Interventional Radiology - Medardo Worrell MD  Nuclear Medicine - Salvador Obando MD  Pathology - Todd Bonds MD  Oncology - Jesus London DO  Gastroenterology - Not present   Palliative Care- SANIA Zambrano MD- not present  Research- Hilda Kelly, phD  Nutritional Support- Tracie Weil-Cavalier, RDN  Nursing    PATIENT STATUS:  Established Patient    PATIENT SUMMARY:  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    Sepsis due to methicillin susceptible Staphylococcus aureus 09/14/2019    MSSA bacteremia from Amputation stump infection    Sleep apnea     Snores    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       Past Surgical History:   Procedure Laterality Date    ABDOMINAL SURGERY      Bka Left     HERNIA REPAIR      IRRIGATION AND DEBRIDEMENT OF LOWER EXTREMITY Left 9/20/2019    Procedure: IRRIGATION AND DEBRIDEMENT, LOWER EXTREMITY - left - cysto tubing - cultures;  Surgeon: Jesus Arias MD;  Location: [facility] OR [address] [place] [address];  Service: Orthopedics;  Laterality: Left;    LAPAROSCOPIC REPAIR OF INCARCERATED UMBILICAL HERNIA N/A 11/27/2018    Procedure: REPAIR, HERNIA, UMBILICAL, INCARCERATED, LAPAROSCOPIC;  Surgeon: Coco Guidry MD;  Location: [facility];  Service: General;  Laterality:  N/A;  video    TRANSESOPHAGEAL ECHOCARDIOGRAPHY N/A 9/18/2019    Procedure: ECHOCARDIOGRAM, TRANSESOPHAGEAL;  Surgeon: Grace Diagnostic Provider;  Location: [facility] EP LAB;  Service: Anesthesiology;  Laterality: N/A;     ________________________________________________________________    DISCUSSION:    Overall good response to bilobar cTACE. Timing of follow-ups makes eval somewhat difficult, but overall good response to rihgt lobe cTACE 10/5/2018 and left lobe cTACE 3/1/2019. Lesions in the spleen are not new and are similar to scans going back to 7/23/2018. Has widespread osseous and scattered peritoneal tumor deposits which are only partially visualized on MRI but appear anatomically similar to prior.Residual disease in segment 5/8 can be retreated. PRRT on progression is another option. There is also a very receptor positive lesion in the prostate.    BOARD RECOMMENDATIONS:     Repeat CT chest abd pelvis in 3 mos. Consult urology for left prostate lesion.

## 2019-12-12 ENCOUNTER — TELEPHONE (OUTPATIENT)
Dept: HOME HEALTH SERVICES | Facility: HOSPITAL | Age: 64
End: 2019-12-12

## 2019-12-13 ENCOUNTER — TELEPHONE (OUTPATIENT)
Dept: NEUROLOGY | Facility: HOSPITAL | Age: 64
End: 2019-12-13

## 2019-12-13 DIAGNOSIS — N42.89 PROSTATE MASS: Primary | ICD-10-CM

## 2020-01-10 ENCOUNTER — OFFICE VISIT (OUTPATIENT)
Dept: UROLOGY | Facility: CLINIC | Age: 65
End: 2020-01-10
Payer: COMMERCIAL

## 2020-01-10 ENCOUNTER — LAB VISIT (OUTPATIENT)
Dept: LAB | Facility: HOSPITAL | Age: 65
End: 2020-01-10
Attending: STUDENT IN AN ORGANIZED HEALTH CARE EDUCATION/TRAINING PROGRAM
Payer: COMMERCIAL

## 2020-01-10 VITALS — DIASTOLIC BLOOD PRESSURE: 84 MMHG | TEMPERATURE: 98 F | HEART RATE: 75 BPM | SYSTOLIC BLOOD PRESSURE: 137 MMHG

## 2020-01-10 DIAGNOSIS — Z12.5 ENCOUNTER FOR PROSTATE CANCER SCREENING: ICD-10-CM

## 2020-01-10 DIAGNOSIS — R39.89 ABNORMAL PROSTATE EXAM: Primary | ICD-10-CM

## 2020-01-10 DIAGNOSIS — R39.89 ABNORMAL PROSTATE EXAM: ICD-10-CM

## 2020-01-10 LAB
ANION GAP SERPL CALC-SCNC: 10 MMOL/L (ref 8–16)
BUN SERPL-MCNC: 11 MG/DL (ref 8–23)
CALCIUM SERPL-MCNC: 9.1 MG/DL (ref 8.7–10.5)
CHLORIDE SERPL-SCNC: 106 MMOL/L (ref 95–110)
CO2 SERPL-SCNC: 27 MMOL/L (ref 23–29)
COMPLEXED PSA SERPL-MCNC: 3.5 NG/ML (ref 0–4)
CREAT SERPL-MCNC: 1.2 MG/DL (ref 0.5–1.4)
EST. GFR  (AFRICAN AMERICAN): >60 ML/MIN/1.73 M^2
EST. GFR  (NON AFRICAN AMERICAN): >60 ML/MIN/1.73 M^2
GLUCOSE SERPL-MCNC: 104 MG/DL (ref 70–110)
POTASSIUM SERPL-SCNC: 4.2 MMOL/L (ref 3.5–5.1)
SODIUM SERPL-SCNC: 143 MMOL/L (ref 136–145)

## 2020-01-10 PROCEDURE — 99204 OFFICE O/P NEW MOD 45 MIN: CPT | Mod: S$GLB,,, | Performed by: STUDENT IN AN ORGANIZED HEALTH CARE EDUCATION/TRAINING PROGRAM

## 2020-01-10 PROCEDURE — 84153 ASSAY OF PSA TOTAL: CPT

## 2020-01-10 PROCEDURE — 36415 COLL VENOUS BLD VENIPUNCTURE: CPT

## 2020-01-10 PROCEDURE — 80048 BASIC METABOLIC PNL TOTAL CA: CPT

## 2020-01-10 PROCEDURE — 99204 PR OFFICE/OUTPT VISIT, NEW, LEVL IV, 45-59 MIN: ICD-10-PCS | Mod: S$GLB,,, | Performed by: STUDENT IN AN ORGANIZED HEALTH CARE EDUCATION/TRAINING PROGRAM

## 2020-01-10 PROCEDURE — 99999 PR PBB SHADOW E&M-EST. PATIENT-LVL III: ICD-10-PCS | Mod: PBBFAC,,, | Performed by: STUDENT IN AN ORGANIZED HEALTH CARE EDUCATION/TRAINING PROGRAM

## 2020-01-10 PROCEDURE — 99999 PR PBB SHADOW E&M-EST. PATIENT-LVL III: CPT | Mod: PBBFAC,,, | Performed by: STUDENT IN AN ORGANIZED HEALTH CARE EDUCATION/TRAINING PROGRAM

## 2020-01-10 RX ORDER — TAMSULOSIN HYDROCHLORIDE 0.4 MG/1
0.4 CAPSULE ORAL DAILY
Qty: 30 CAPSULE | Refills: 11 | Status: SHIPPED | OUTPATIENT
Start: 2020-01-10 | End: 2020-02-20 | Stop reason: SDUPTHER

## 2020-01-10 NOTE — LETTER
January 10, 2020      Jesus London DO, FACP  [address] [facility] [address]           [facility]  [address], [address]  [facility] [address]  Phone: [phone]          Patient: Elroy Rahman   MR Number: [id number]   YOB: 1955   Date of Visit: 1/10/2020       Dear Dr. Jesus London:    Thank you for referring Elroy Rahman to me for evaluation. Attached you will find relevant portions of my assessment and plan of care.    If you have questions, please do not hesitate to call me. I look forward to following Elroy Rahman along with you.    Sincerely,    Jillian Hernández MD    Enclosure  CC:  No Recipients    If you would like to receive this communication electronically, please contact [email or web address] or [phone] to request more information on [employer] Link access.    For providers and/or their staff who would like to refer a patient to [facility], please contact us through our one-stop-shop provider referral line, [facility], at [phone].    If you feel you have received this communication in error or would no longer like to receive these types of communications, please e-mail [email or web address]

## 2020-01-10 NOTE — PROGRESS NOTES
Subjective:       Patient ID: Elroy Rahman is a 64 y.o. male.    Chief Complaint: Prostate Mass  This is a 64 y.o.  male patient that is new to me.  The patient is referred to me by NET team for abnormal prostate finding on PET CT scan. The scan was obtained in 8/2019 and demonstrated - Focal, eccentric increased radiotracer uptake in the prostate. He does not have a personal or family history of prostate cancer.    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     He has not had a PSA checked for quite some time.      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    Sepsis due to methicillin susceptible Staphylococcus aureus 09/14/2019    MSSA bacteremia from Amputation stump infection    Sleep apnea     Snores    Urinary tract infection     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       Past Surgical History:   Procedure Laterality Date    ABDOMINAL SURGERY      Bka Left     HERNIA REPAIR      IRRIGATION AND DEBRIDEMENT OF LOWER EXTREMITY Left 9/20/2019    Procedure: IRRIGATION AND DEBRIDEMENT, LOWER EXTREMITY - left - cysto tubing - cultures;  Surgeon: Jesus Arias MD;  Location: [address];  Service: Orthopedics;  Laterality: Left;    LAPAROSCOPIC REPAIR OF INCARCERATED UMBILICAL HERNIA N/A 11/27/2018    Procedure: REPAIR, HERNIA, UMBILICAL, INCARCERATED, LAPAROSCOPIC;  Surgeon: Coco Guidry MD;  Location: [facility];  Service: General;  Laterality: N/A;  video     TRANSESOPHAGEAL ECHOCARDIOGRAPHY N/A 9/18/2019    Procedure: ECHOCARDIOGRAM, TRANSESOPHAGEAL;  Surgeon: Grace Diagnostic Provider;  Location: [facility] EP LAB;  Service: Anesthesiology;  Laterality: N/A;       Family History   Problem Relation Age of Onset    Cancer Mother         lung    Cancer Sister     Anesthesia problems Neg Hx        Social History     Tobacco Use    Smoking status: Never Smoker    Smokeless tobacco: Never Used   Substance Use Topics    Alcohol use: Yes     Alcohol/week: 1.0 standard drinks     Types: 1 Cans of beer per week     Comment: rare    Drug use: No       Current Outpatient Medications on File Prior to Visit   Medication Sig Dispense Refill    ALPHAGAN P 0.1 % Drop Instill 1 drop into left eye twice daily  4    famotidine (PEPCID) 20 MG tablet Take 1 tablet (20 mg total) by mouth 2 (two) times daily. (Patient not taking: Reported on 1/10/2020) 60 tablet 2    fluconazole (DIFLUCAN) 100 MG tablet Take 1 tablet (100 mg total) by mouth once daily. Take 200mg on day 1, then 100mg days 2-7 7 tablet 0    fluconazole (DIFLUCAN) 200 MG Tab Take 1 tablet (200 mg total) by mouth once daily. (Patient not taking: Reported on 1/10/2020) 1 tablet 0    ondansetron (ZOFRAN) 4 MG tablet Take 1 tablet (4 mg total) by mouth daily as needed for Nausea. (Patient not taking: Reported on 1/10/2020)      predniSONE (DELTASONE) 20 MG tablet Take 0.5 tablets (10 mg total) by mouth once daily. Take with two 50 mg tabs for a total of 110 mg daily (Patient not taking: Reported on 1/10/2020)      predniSONE (DELTASONE) 50 MG Tab Take 2 tablets (100 mg total) by mouth once daily. Take with 1/2 of the 20 mg tablet. For a total of 110 daily. (Patient not taking: Reported on 1/10/2020)      PROLENSA 0.07 % Drop INSTILL 1 DROP INTO LEFT EYE TWICE DAILY  4    sulfamethoxazole-trimethoprim 800-160mg (BACTRIM DS) 800-160 mg Tab Take 1 tablet by mouth once daily. (Patient not taking: Reported on 1/10/2020) 30  tablet 1     No current facility-administered medications on file prior to visit.        Review of patient's allergies indicates:   Allergen Reactions    Epinephrine Other (See Comments)     Carcinoid crisis       Review of Systems   Constitutional: Negative for chills.   HENT: Negative for congestion.    Eyes: Negative for visual disturbance.   Respiratory: Negative for shortness of breath.    Cardiovascular: Negative for chest pain.   Gastrointestinal: Negative for abdominal distention.   Musculoskeletal: Negative for gait problem.   Skin: Negative for color change.   Neurological: Negative for dizziness.   Psychiatric/Behavioral: Negative for agitation.       Objective:      Physical Exam   Constitutional: He appears well-developed and well-nourished.   HENT:   Head: Normocephalic.   Eyes: Pupils are equal, round, and reactive to light.   Neck: Normal range of motion.   Cardiovascular: Intact distal pulses.   Pulmonary/Chest: Effort normal.   Abdominal: Soft.   Genitourinary:   Genitourinary Comments: 30-35 g no hard overt nodules, gland possibly slightly firm, nonboggy   Musculoskeletal: Normal range of motion.   History of BKA with prosthesis in place   Neurological: He is alert.   Skin: Skin is warm and dry.   Psychiatric: He has a normal mood and affect.       Assessment:       1. Abnormal prostate exam    2. Encounter for prostate cancer screening        Plan:       1. Will check PSA and BMP.  2. MRI prostate.  3. Because of his carcinoid history and abnormal PET CT scan, I counseled the patient that we should investigate the prostate further with a prostate biopsy. If PIRADs 3 or greater lesion on MRI, will recommend a uronav guided bx with Dr. Apodaca and return to me for pathology review. If no significant lesions identified on MRI, will perform a generalized prostate biopsy in the office.       Abnormal prostate exam  -     PSA, Screening; Future; Expected date: 01/10/2020  -     Basic metabolic panel;  Future; Expected date: 01/10/2020  -     MRI Prostate W W/O Contrast; Future; Expected date: 01/10/2020  -     tamsulosin (FLOMAX) 0.4 mg Cap; Take 1 capsule (0.4 mg total) by mouth once daily.  Dispense: 30 capsule; Refill: 11    Encounter for prostate cancer screening  -     PSA, Screening; Future; Expected date: 01/10/2020  -     Basic metabolic panel; Future; Expected date: 01/10/2020  -     MRI Prostate W W/O Contrast; Future; Expected date: 01/10/2020  -     tamsulosin (FLOMAX) 0.4 mg Cap; Take 1 capsule (0.4 mg total) by mouth once daily.  Dispense: 30 capsule; Refill: 11

## 2020-01-14 ENCOUNTER — PATIENT MESSAGE (OUTPATIENT)
Dept: NEUROLOGY | Facility: HOSPITAL | Age: 65
End: 2020-01-14

## 2020-01-18 ENCOUNTER — PATIENT MESSAGE (OUTPATIENT)
Dept: NEUROLOGY | Facility: HOSPITAL | Age: 65
End: 2020-01-18

## 2020-01-19 ENCOUNTER — PATIENT MESSAGE (OUTPATIENT)
Dept: NEUROLOGY | Facility: HOSPITAL | Age: 65
End: 2020-01-19

## 2020-01-27 ENCOUNTER — INFUSION (OUTPATIENT)
Dept: INFUSION THERAPY | Facility: HOSPITAL | Age: 65
End: 2020-01-27
Attending: STUDENT IN AN ORGANIZED HEALTH CARE EDUCATION/TRAINING PROGRAM
Payer: COMMERCIAL

## 2020-01-27 VITALS — DIASTOLIC BLOOD PRESSURE: 85 MMHG | SYSTOLIC BLOOD PRESSURE: 140 MMHG | RESPIRATION RATE: 18 BRPM | HEART RATE: 79 BPM

## 2020-01-27 DIAGNOSIS — C7B.8 SECONDARY MALIGNANT NEUROENDOCRINE TUMOR OF LIVER: Primary | ICD-10-CM

## 2020-01-27 DIAGNOSIS — C7B.8 SECONDARY NEUROENDOCRINE TUMOR OF LIVER: ICD-10-CM

## 2020-01-27 DIAGNOSIS — C7B.8 SECONDARY NEUROENDOCRINE TUMOR OF BONE: ICD-10-CM

## 2020-01-27 DIAGNOSIS — D3A.090 BRONCHIAL CARCINOID TUMORS: ICD-10-CM

## 2020-01-27 PROCEDURE — 63600175 PHARM REV CODE 636 W HCPCS: Mod: JG | Performed by: INTERNAL MEDICINE

## 2020-01-27 PROCEDURE — 96372 THER/PROPH/DIAG INJ SC/IM: CPT

## 2020-01-27 RX ORDER — LANREOTIDE ACETATE 120 MG/.5ML
120 INJECTION SUBCUTANEOUS
Status: COMPLETED | OUTPATIENT
Start: 2020-01-27 | End: 2020-01-27

## 2020-01-27 RX ADMIN — LANREOTIDE ACETATE 120 MG: 120 INJECTION SUBCUTANEOUS at 11:01

## 2020-01-27 NOTE — NURSING
1130  Pt here for lanreotide injection, no new complaints or concerns at present; SQ injection to right upper buttock, tolerated well; pt instructed to contact MD for any needs or concerns; pt declined AVS, pt verbalized understanding of all discussed and when to report next

## 2020-02-03 ENCOUNTER — PATIENT MESSAGE (OUTPATIENT)
Dept: UROLOGY | Facility: CLINIC | Age: 65
End: 2020-02-03

## 2020-02-13 ENCOUNTER — HOSPITAL ENCOUNTER (OUTPATIENT)
Dept: RADIOLOGY | Facility: HOSPITAL | Age: 65
Discharge: HOME | End: 2020-02-13
Attending: STUDENT IN AN ORGANIZED HEALTH CARE EDUCATION/TRAINING PROGRAM
Payer: COMMERCIAL

## 2020-02-13 DIAGNOSIS — Z12.5 ENCOUNTER FOR PROSTATE CANCER SCREENING: ICD-10-CM

## 2020-02-13 DIAGNOSIS — R39.89 ABNORMAL PROSTATE EXAM: ICD-10-CM

## 2020-02-13 PROCEDURE — 25500020 PHARM REV CODE 255: Performed by: STUDENT IN AN ORGANIZED HEALTH CARE EDUCATION/TRAINING PROGRAM

## 2020-02-13 PROCEDURE — A9585 GADOBUTROL INJECTION: HCPCS | Performed by: STUDENT IN AN ORGANIZED HEALTH CARE EDUCATION/TRAINING PROGRAM

## 2020-02-13 PROCEDURE — 72197 MRI PELVIS W/O & W/DYE: CPT | Mod: TC

## 2020-02-13 PROCEDURE — 72197 MRI PELVIS W/O & W/DYE: CPT | Mod: 26,,, | Performed by: RADIOLOGY

## 2020-02-13 PROCEDURE — 72197 MRI PROSTATE W W/O CONTRAST: ICD-10-PCS | Mod: 26,,, | Performed by: RADIOLOGY

## 2020-02-13 RX ORDER — GADOBUTROL 604.72 MG/ML
10 INJECTION INTRAVENOUS
Status: COMPLETED | OUTPATIENT
Start: 2020-02-13 | End: 2020-02-13

## 2020-02-13 RX ADMIN — GADOBUTROL 10 ML: 604.72 INJECTION INTRAVENOUS at 05:02

## 2020-02-20 ENCOUNTER — OFFICE VISIT (OUTPATIENT)
Dept: UROLOGY | Facility: CLINIC | Age: 65
End: 2020-02-20
Payer: COMMERCIAL

## 2020-02-20 VITALS — HEART RATE: 60 BPM | DIASTOLIC BLOOD PRESSURE: 80 MMHG | SYSTOLIC BLOOD PRESSURE: 138 MMHG | TEMPERATURE: 98 F

## 2020-02-20 DIAGNOSIS — Z76.89 ENCOUNTER TO ESTABLISH CARE: ICD-10-CM

## 2020-02-20 DIAGNOSIS — R39.89 ABNORMAL PROSTATE EXAM: ICD-10-CM

## 2020-02-20 DIAGNOSIS — Z12.5 ENCOUNTER FOR PROSTATE CANCER SCREENING: Primary | ICD-10-CM

## 2020-02-20 PROCEDURE — 99214 OFFICE O/P EST MOD 30 MIN: CPT | Mod: S$GLB,,, | Performed by: STUDENT IN AN ORGANIZED HEALTH CARE EDUCATION/TRAINING PROGRAM

## 2020-02-20 PROCEDURE — 99999 PR PBB SHADOW E&M-EST. PATIENT-LVL III: ICD-10-PCS | Mod: PBBFAC,,, | Performed by: STUDENT IN AN ORGANIZED HEALTH CARE EDUCATION/TRAINING PROGRAM

## 2020-02-20 PROCEDURE — 99214 PR OFFICE/OUTPT VISIT, EST, LEVL IV, 30-39 MIN: ICD-10-PCS | Mod: S$GLB,,, | Performed by: STUDENT IN AN ORGANIZED HEALTH CARE EDUCATION/TRAINING PROGRAM

## 2020-02-20 PROCEDURE — 99999 PR PBB SHADOW E&M-EST. PATIENT-LVL III: CPT | Mod: PBBFAC,,, | Performed by: STUDENT IN AN ORGANIZED HEALTH CARE EDUCATION/TRAINING PROGRAM

## 2020-02-20 RX ORDER — TAMSULOSIN HYDROCHLORIDE 0.4 MG/1
0.4 CAPSULE ORAL DAILY
Qty: 90 CAPSULE | Refills: 3 | Status: ON HOLD | OUTPATIENT
Start: 2020-02-20 | End: 2021-07-22

## 2020-02-20 NOTE — PROGRESS NOTES
Subjective:       Patient ID: Elroy Rahman is a 64 y.o. male.    Chief Complaint: Follow-up (Test results)  This is a 64 y.o.  male patient that is an established patient of mine.  The patient is referred to me by NET team for abnormal prostate finding on PET CT scan. The scan was obtained in 8/2019 and demonstrated - Focal, eccentric increased radiotracer uptake in the prostate. He does not have a personal or family history of prostate cancer. 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 He has not had a PSA checked for quite some time prior to seeing me.     2/20/20  He returns back for a followup. He underwent a PSA check which returned back within the normal range for his age group. He also underwent an MRI of the prostate which did not demonstrate any suspicious lesions. Volume 56.72 on MRI.         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    Sepsis due to methicillin susceptible Staphylococcus aureus 09/14/2019    MSSA bacteremia from Amputation stump infection    Sleep apnea     Snores    Urinary tract infection     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       Past Surgical History:   Procedure Laterality Date    ABDOMINAL SURGERY      Bka Left     HERNIA REPAIR      IRRIGATION AND DEBRIDEMENT OF LOWER EXTREMITY Left 9/20/2019    Procedure: IRRIGATION AND DEBRIDEMENT, LOWER EXTREMITY - left - cysto tubing -  cultures;  Surgeon: Jesus Arias MD;  Location: [address] [facility];  Service: Orthopedics;  Laterality: Left;    LAPAROSCOPIC REPAIR OF INCARCERATED UMBILICAL HERNIA N/A 11/27/2018    Procedure: REPAIR, HERNIA, UMBILICAL, INCARCERATED, LAPAROSCOPIC;  Surgeon: Coco Guidry MD;  Location: [facility] OR;  Service: General;  Laterality: N/A;  video    TRANSESOPHAGEAL ECHOCARDIOGRAPHY N/A 9/18/2019    Procedure: ECHOCARDIOGRAM, TRANSESOPHAGEAL;  Surgeon: Grace Diagnostic Provider;  Location: [facility] EP LAB;  Service: Anesthesiology;  Laterality: N/A;       Family History   Problem Relation Age of Onset    Cancer Mother         lung    Cancer Sister     Anesthesia problems Neg Hx        Social History     Tobacco Use    Smoking status: Never Smoker    Smokeless tobacco: Never Used   Substance Use Topics    Alcohol use: Yes     Alcohol/week: 1.0 standard drinks     Types: 1 Cans of beer per week     Comment: rare    Drug use: No       Current Outpatient Medications on File Prior to Visit   Medication Sig Dispense Refill    ALPHAGAN P 0.1 % Drop Instill 1 drop into left eye twice daily  4    famotidine (PEPCID) 20 MG tablet Take 1 tablet (20 mg total) by mouth 2 (two) times daily. (Patient not taking: Reported on 1/10/2020) 60 tablet 2    fluconazole (DIFLUCAN) 100 MG tablet Take 1 tablet (100 mg total) by mouth once daily. Take 200mg on day 1, then 100mg days 2-7 7 tablet 0    fluconazole (DIFLUCAN) 200 MG Tab Take 1 tablet (200 mg total) by mouth once daily. (Patient not taking: Reported on 1/10/2020) 1 tablet 0    ondansetron (ZOFRAN) 4 MG tablet Take 1 tablet (4 mg total) by mouth daily as needed for Nausea. (Patient not taking: Reported on 1/10/2020)      predniSONE (DELTASONE) 20 MG tablet Take 0.5 tablets (10 mg total) by mouth once daily. Take with two 50 mg tabs for a total of 110 mg daily (Patient not taking: Reported on 1/10/2020)      predniSONE (DELTASONE) 50 MG Tab Take 2 tablets (100 mg  total) by mouth once daily. Take with 1/2 of the 20 mg tablet. For a total of 110 daily. (Patient not taking: Reported on 1/10/2020)      PROLENSA 0.07 % Drop INSTILL 1 DROP INTO LEFT EYE TWICE DAILY  4    sulfamethoxazole-trimethoprim 800-160mg (BACTRIM DS) 800-160 mg Tab Take 1 tablet by mouth once daily. (Patient not taking: Reported on 1/10/2020) 30 tablet 1    [DISCONTINUED] tamsulosin (FLOMAX) 0.4 mg Cap Take 1 capsule (0.4 mg total) by mouth once daily. 30 capsule 11     No current facility-administered medications on file prior to visit.        Review of patient's allergies indicates:   Allergen Reactions    Epinephrine Other (See Comments)     Carcinoid crisis       Review of Systems    Objective:      Physical Exam    Assessment:       1. Encounter for prostate cancer screening    2. Abnormal prostate exam    3. Encounter to establish care        Plan:       1. I counseled the patient about the MRI of the prostate and that it did not demonstrate any suspicious lesions. I also reviewed the images of the MRI of the prostate and the PET Ct from 8/20/19. The suspicious spot on PET CT scan when paired with the MRI of the prostate is most likely the prostatic urethra. Patient has a right lobe enlargement greater than left that likely is contributing to the asymmetry.     2. His PSA returned back within the normal range.   PSA ranges by age and ethnicity:    Age range  -American -American  40-49  0-2.5  0-2.0   0-2.0  50-59  0-3.5  0-4.0   0-3.0  60-69  0-4.5  0-4.5   0-4.0  70-79  0-6.5  0-5.5   0-5.0    3. Discussed that in the absence of a significant lesion on the MRI, a PSA within the normal range, and comparison images with the PET CT that suggest that it was falsely read as a prostatic lesion, I would tend to lean towards monitoring with yearly PSAs. I counseled the patient that if he desires to proceed with biopsy I certainly can accommodate that.    4. He elected for yearly PSAs.  PSA in 1 year prior to visit for EBEN.  5. Continue flomax.   6. Referral for PCP placed.    Encounter for prostate cancer screening  -     PSA, Screening; Future; Expected date: 02/20/2021  -     tamsulosin (FLOMAX) 0.4 mg Cap; Take 1 capsule (0.4 mg total) by mouth once daily.  Dispense: 90 capsule; Refill: 3    Abnormal prostate exam  -     tamsulosin (FLOMAX) 0.4 mg Cap; Take 1 capsule (0.4 mg total) by mouth once daily.  Dispense: 90 capsule; Refill: 3    Encounter to establish care  -     Ambulatory referral/consult to Internal Medicine; Future; Expected date: 02/27/2020

## 2020-03-02 ENCOUNTER — INFUSION (OUTPATIENT)
Dept: INFUSION THERAPY | Facility: HOSPITAL | Age: 65
End: 2020-03-02
Attending: STUDENT IN AN ORGANIZED HEALTH CARE EDUCATION/TRAINING PROGRAM
Payer: COMMERCIAL

## 2020-03-02 DIAGNOSIS — D3A.090 BRONCHIAL CARCINOID TUMORS: ICD-10-CM

## 2020-03-02 DIAGNOSIS — C7B.8 SECONDARY NEUROENDOCRINE TUMOR OF BONE: ICD-10-CM

## 2020-03-02 DIAGNOSIS — C7B.8 SECONDARY MALIGNANT NEUROENDOCRINE TUMOR OF LIVER: Primary | ICD-10-CM

## 2020-03-02 DIAGNOSIS — C7B.8 SECONDARY NEUROENDOCRINE TUMOR OF LIVER: ICD-10-CM

## 2020-03-02 PROCEDURE — 96372 THER/PROPH/DIAG INJ SC/IM: CPT

## 2020-03-02 PROCEDURE — 63600175 PHARM REV CODE 636 W HCPCS: Mod: JG | Performed by: INTERNAL MEDICINE

## 2020-03-02 RX ORDER — LANREOTIDE ACETATE 120 MG/.5ML
120 INJECTION SUBCUTANEOUS
Status: COMPLETED | OUTPATIENT
Start: 2020-03-02 | End: 2020-03-02

## 2020-03-02 RX ADMIN — LANREOTIDE ACETATE 120 MG: 120 INJECTION SUBCUTANEOUS at 11:03

## 2020-03-13 ENCOUNTER — PATIENT MESSAGE (OUTPATIENT)
Dept: NEUROLOGY | Facility: HOSPITAL | Age: 65
End: 2020-03-13

## 2020-03-16 ENCOUNTER — TELEPHONE (OUTPATIENT)
Dept: NEUROLOGY | Facility: HOSPITAL | Age: 65
End: 2020-03-16

## 2020-03-16 ENCOUNTER — PATIENT MESSAGE (OUTPATIENT)
Dept: NEUROLOGY | Facility: HOSPITAL | Age: 65
End: 2020-03-16

## 2020-03-16 DIAGNOSIS — C7A.090 MALIGNANT CARCINOID TUMOR OF THE BRONCHUS AND LUNG: ICD-10-CM

## 2020-03-16 DIAGNOSIS — H54.61 VISION LOSS, RIGHT EYE: ICD-10-CM

## 2020-03-16 DIAGNOSIS — H54.62 VISION LOSS OF LEFT EYE: Primary | ICD-10-CM

## 2020-03-16 DIAGNOSIS — C7B.8 SECONDARY MALIGNANT NEUROENDOCRINE TUMOR OF LIVER: ICD-10-CM

## 2020-03-16 NOTE — TELEPHONE ENCOUNTER
Spoke to patient, let him know to start taking whatever he needs to take for his eye, will let Dr. London know.

## 2020-03-18 ENCOUNTER — HOSPITAL ENCOUNTER (OUTPATIENT)
Dept: RADIOLOGY | Facility: HOSPITAL | Age: 65
Discharge: HOME | End: 2020-03-18
Attending: INTERNAL MEDICINE
Payer: COMMERCIAL

## 2020-03-18 DIAGNOSIS — C7B.8 SECONDARY MALIGNANT NEUROENDOCRINE TUMOR OF LIVER: ICD-10-CM

## 2020-03-18 DIAGNOSIS — H54.61 VISION LOSS, RIGHT EYE: ICD-10-CM

## 2020-03-18 DIAGNOSIS — C7A.090 MALIGNANT CARCINOID TUMOR OF THE BRONCHUS AND LUNG: ICD-10-CM

## 2020-03-18 DIAGNOSIS — H54.62 VISION LOSS OF LEFT EYE: ICD-10-CM

## 2020-03-18 LAB
CREAT SERPL-MCNC: 1.2 MG/DL (ref 0.5–1.4)
SAMPLE: NORMAL

## 2020-03-18 PROCEDURE — 70553 MRI BRAIN STEM W/O & W/DYE: CPT | Mod: 26,,, | Performed by: RADIOLOGY

## 2020-03-18 PROCEDURE — 70543 MRI ORBT/FAC/NCK W/O &W/DYE: CPT | Mod: 26,,, | Performed by: RADIOLOGY

## 2020-03-18 PROCEDURE — 25500020 PHARM REV CODE 255: Performed by: INTERNAL MEDICINE

## 2020-03-18 PROCEDURE — 70553 MRI HEAD-ORBITS W/WO CONTRAST (XPD): ICD-10-PCS | Mod: 26,,, | Performed by: RADIOLOGY

## 2020-03-18 PROCEDURE — 70543 MRI ORBT/FAC/NCK W/O &W/DYE: CPT | Mod: TC

## 2020-03-18 PROCEDURE — 70553 MRI BRAIN STEM W/O & W/DYE: CPT

## 2020-03-18 PROCEDURE — 70543 MRI HEAD-ORBITS W/WO CONTRAST (XPD): ICD-10-PCS | Mod: 26,,, | Performed by: RADIOLOGY

## 2020-03-18 PROCEDURE — A9585 GADOBUTROL INJECTION: HCPCS | Performed by: INTERNAL MEDICINE

## 2020-03-18 RX ORDER — GADOBUTROL 604.72 MG/ML
10 INJECTION INTRAVENOUS
Status: COMPLETED | OUTPATIENT
Start: 2020-03-18 | End: 2020-03-18

## 2020-03-18 RX ADMIN — GADOBUTROL 10 ML: 604.72 INJECTION INTRAVENOUS at 01:03

## 2020-03-20 ENCOUNTER — PATIENT MESSAGE (OUTPATIENT)
Dept: NEUROLOGY | Facility: HOSPITAL | Age: 65
End: 2020-03-20

## 2020-03-23 ENCOUNTER — TELEPHONE (OUTPATIENT)
Dept: NEUROLOGY | Facility: HOSPITAL | Age: 65
End: 2020-03-23

## 2020-03-23 NOTE — TELEPHONE ENCOUNTER
Spoke with patient about him canceling his GA68 tomorrow due to Covid-19. However, the patient would like to keep his virtual visit on 3/25 because he had is MRI of the orbit and would like to review that.Per the patient here is an update on the patients eyesight; he has not gotten better, but the eye doctor lanced his eye and injected steroids into his eye and stated it takes about 14 days to heal. Patient verbalized understanding.

## 2020-03-25 ENCOUNTER — PATIENT MESSAGE (OUTPATIENT)
Dept: NEUROLOGY | Facility: HOSPITAL | Age: 65
End: 2020-03-25

## 2020-03-25 ENCOUNTER — OFFICE VISIT (OUTPATIENT)
Dept: NEUROLOGY | Facility: HOSPITAL | Age: 65
End: 2020-03-25
Attending: INTERNAL MEDICINE
Payer: COMMERCIAL

## 2020-03-25 DIAGNOSIS — D3A.090 BRONCHIAL CARCINOID TUMORS: Primary | ICD-10-CM

## 2020-03-25 DIAGNOSIS — C7B.8 SECONDARY NEUROENDOCRINE TUMOR OF BONE: ICD-10-CM

## 2020-03-25 DIAGNOSIS — C7B.8 SECONDARY NEUROENDOCRINE TUMOR OF LIVER: ICD-10-CM

## 2020-03-25 DIAGNOSIS — H54.62 VISION LOSS OF LEFT EYE: ICD-10-CM

## 2020-03-25 PROCEDURE — 99214 PR OFFICE/OUTPT VISIT, EST, LEVL IV, 30-39 MIN: ICD-10-PCS | Mod: 95,,, | Performed by: INTERNAL MEDICINE

## 2020-03-25 PROCEDURE — 99214 OFFICE O/P EST MOD 30 MIN: CPT | Mod: 95,,, | Performed by: INTERNAL MEDICINE

## 2020-03-25 NOTE — LETTER
March 25, 2020        Jesus Mendoza MD  [address] [place] [address] Blvd  [address]  [place] [address]             [facility] [place] [address]  Phone: [phone]  Fax: [phone]   Patient: Elroy Rahman   MR Number: [id number]   YOB: 1955   Date of Visit: 3/25/2020       Dear Dr. Mendoza:    Thank you for referring Elroy Rahman to me for evaluation. Attached you will find relevant portions of my assessment and plan of care.    If you have questions, please do not hesitate to call me. I look forward to following Elroy Rahman along with you.    Sincerely,      Jesus London DO, FACP            CC  No Recipients    Enclosure

## 2020-03-25 NOTE — Clinical Note
Please send a copy of the disk of the MRI of the orbit to Dr. Menodza today.  He has an appointment with him next weekMRI abdomen, CT chest in MayFollow-up in May

## 2020-03-25 NOTE — PROGRESS NOTES
PATIENT: Elroy Rahman  MRN: [id number]  DATE: 3/25/2020      Diagnosis:   1. Bronchial carcinoid tumors    2. Secondary neuroendocrine tumor of liver    3. Secondary neuroendocrine tumor of bone    4. Vision loss of left eye        Chief Complaint: Follow-up      Oncologic History:      Oncologic History Bronchial carcinoid, left, diagnosed 1/2016   Metastatic disease to liver and bone 5/2017     Oncologic Treatment Left upper lobectomy 1/2016  CAPTEM 7/2017 - 2/2019 (discontinued due to progression)  Lanreotide 7/2017   Zoledronic acid 7/2017   TACE 10/2018  TACE 3/2019  Lily-177 4/10/19, 6/5/19    Pathology 1/2016: Typical carcinoid tumor, well-differentiated T2a, N0, M0 Ki-67 <1%  6/2017: Liver biopsy, well-differentiated, Ki-67 25%       The patient location is:  home  The chief complaint leading to consultation is:  Follow-up pulmonary carcinoid tumor  Visit type: Virtual visit with synchronous audio and video  Total time spent with patient:  20 min  Each patient to whom he or she provides medical services by telemedicine is:  (1) informed of the relationship between the physician and patient and the respective role of any other health care provider with respect to management of the patient; and (2) notified that he or she may decline to receive medical services by telemedicine and may withdraw from such care at any time.    Notes:         Subjective:    Interval History: Mr. Rahman is a 64 y.o. male who is seen in follow-up for a bronchial carcinoid tumor.  He is seen virtually today.  He states that he generally has been feeling well.  He had seen Dr. Mendoza last week for changes in his vision and it was found that he has developed a potential new lesion within his right eye.  He tells me he had an injection in this area.  He has since been placed on corticosteroids.  He states otherwise he feels great.  He has no other new complaints.    His history dates to 1/2016 when he underwent a left upper lobectomy  with Dr. Bell for a typical carcinoid tumor.  His pathological staging was T2a, N0, M0 with Ki-67 less than 1% and mitotic count 0 per 10 high-powered fields.  He done well postoperatively, however, in 5/2017 he was undergoing routine surveillance and a chest CT in picked up evidence of progression of disease in the liver.  Dedicated abdominal CT was performed showing multiple liver lesions.  In 6/2017 he underwent a CT-guided liver biopsy which was positive for metastatic neuroendocrine tumor which was well-differentiated with no overt nuclear atypia, however, the Ki-67 was 25%.  He underwent gallium-68 PET/CT and was found to have widespread disease involving the bone, liver and also mesentery.  He was started on CAPTEM in 7/2017 and also Lanreotide and zoledronic acid.  Scans in 10/2017 had shown stability of disease.  Imaging in 1/2018 had shown stability of disease.  Scans in 4/2018 had shown stability of disease.  Scans in 07/2018 had continued to showed mild growth.  In 10/2018 he underwent TACE.  CAPTEM was discontinued in 02/2019 due to progression.  He underwent TACE in 03/2019.  He was started on treatment with PRRT using Lily-177 on 04/10/2019.      Past Medical History:   Past Medical History:   Diagnosis Date    Allergy     Carcinoid bronchial adenoma of left lung     PRRT    Chemotherapy follow-up examination 8/25/2017    Cough with hemoptysis     mild/intermittent    Elevated bilirubin 7/2/2018    Hx of BKA, left     above knee    Kidney stone     Mild heartburn     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    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    Sepsis due to methicillin susceptible Staphylococcus aureus 09/14/2019    MSSA bacteremia from Amputation stump infection    Sleep apnea     Snores    Urinary tract infection     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       Past Surgical HIstory:   Past Surgical History:   Procedure Laterality Date    ABDOMINAL  SURGERY      Bka Left     HERNIA REPAIR      IRRIGATION AND DEBRIDEMENT OF LOWER EXTREMITY Left 9/20/2019    Procedure: IRRIGATION AND DEBRIDEMENT, LOWER EXTREMITY - left - cysto tubing - cultures;  Surgeon: Jesus Arias MD;  [address]  Service: Orthopedics;  Laterality: Left;    LAPAROSCOPIC REPAIR OF INCARCERATED UMBILICAL HERNIA N/A 11/27/2018    Procedure: REPAIR, HERNIA, UMBILICAL, INCARCERATED, LAPAROSCOPIC;  Surgeon: Ccoo Guidry MD;  Location: [facility] OR;  Service: General;  Laterality: N/A;  video    TRANSESOPHAGEAL ECHOCARDIOGRAPHY N/A 9/18/2019    Procedure: ECHOCARDIOGRAM, TRANSESOPHAGEAL;  Surgeon: Grace Diagnostic Provider;  Location: [facility] EP LAB;  Service: Anesthesiology;  Laterality: N/A;       Family History:   Family History   Problem Relation Age of Onset    Cancer Mother         lung    Cancer Sister     Anesthesia problems Neg Hx        Social History:  reports that he has never smoked. He has never used smokeless tobacco. He reports that he drinks about 1.0 standard drinks of alcohol per week. He reports that he does not use drugs.    Allergies:  Review of patient's allergies indicates:  No Known Allergies    Medications:  Current Outpatient Medications   Medication Sig Dispense Refill    ALPHAGAN P 0.1 % Drop Instill 1 drop into left eye twice daily  4    famotidine (PEPCID) 20 MG tablet Take 1 tablet (20 mg total) by mouth 2 (two) times daily. 60 tablet 2    fluconazole (DIFLUCAN) 100 MG tablet Take 1 tablet (100 mg total) by mouth once daily. Take 200mg on day 1, then 100mg days 2-7 7 tablet 0    fluconazole (DIFLUCAN) 200 MG Tab Take 1 tablet (200 mg total) by mouth once daily. 1 tablet 0    ondansetron (ZOFRAN) 4 MG tablet Take 1 tablet (4 mg total) by mouth daily as needed for Nausea.      predniSONE (DELTASONE) 20 MG tablet Take 0.5 tablets (10 mg total) by mouth once daily. Take with two 50 mg tabs for a total of 110 mg daily      predniSONE  (DELTASONE) 50 MG Tab Take 2 tablets (100 mg total) by mouth once daily. Take with 1/2 of the 20 mg tablet. For a total of 110 daily.      PROLENSA 0.07 % Drop INSTILL 1 DROP INTO LEFT EYE TWICE DAILY  4    sulfamethoxazole-trimethoprim 800-160mg (BACTRIM DS) 800-160 mg Tab Take 1 tablet by mouth once daily. 30 tablet 1    tamsulosin (FLOMAX) 0.4 mg Cap Take 1 capsule (0.4 mg total) by mouth once daily. 90 capsule 3    tobramycin sulfate 0.3% (TOBREX) 0.3 % ophthalmic ointment Apply a thin layer into right eye four times a day as directed 3.5 g 12     No current facility-administered medications for this visit.        Review of Systems   Constitutional: Negative for appetite change, chills, fatigue, fever and unexpected weight change.   HENT: Negative for dental problem, sinus pressure and sneezing.    Eyes: Positive for visual disturbance.   Respiratory: Negative for cough, choking, chest tightness and shortness of breath.    Cardiovascular: Negative for chest pain and leg swelling.   Gastrointestinal: Negative for abdominal pain, blood in stool, constipation, diarrhea and nausea.   Genitourinary: Negative for difficulty urinating, dysuria and frequency.   Musculoskeletal: Negative for arthralgias and back pain.   Skin: Positive for wound. Negative for rash.   Neurological: Negative for dizziness, light-headedness and headaches.   Hematological: Negative for adenopathy. Does not bruise/bleed easily.   Psychiatric/Behavioral: Negative for sleep disturbance. The patient is not nervous/anxious.        ECOG Performance Status: 0   ECOG SCORE           Objective:      Vitals:   There were no vitals filed for this visit.  BMI: There is no height or weight on file to calculate BMI.    Physical Exam   Constitutional: He appears well-developed and well-nourished.       Laboratory Data:  No visits with results within 1 Week(s) from this visit.   Latest known visit with results is:   Hospital Outpatient Visit on  03/18/2020   Component Date Value Ref Range Status    POC Creatinine 03/18/2020 1.2  0.5 - 1.4 mg/dL Final    Sample 03/18/2020 VENOUS   Final     Supplemental Diagnosis  Chromogranin Staining:  Intensity: Moderate to strong. Positive cells: 100 (%)  Synaptophysin Staining:  Intensity: Strong. Positive cells: 100 (%)  CD31: 40 vessels per 1 HPF  Factor VIII: 27 vessels per 1 HPF  Ki67: 1 % tumor cells staining  Immunostains performed with appropriate positive and negative controls.  FINAL PATHOLOGIC DIAGNOSIS  1. Lymph node, level XI, excisional biopsy:  Fragments of benign lymph node with anthracosis and sinus histiocytosis (0/1).  2. Lung, lingular staple line, biopsy:  Lung, negative for neoplasm.  3. Lymph node, level X, excisional biopsy:  1 benign lymph node with anthracotic pigment and sinus histiocytosis (0/1).  4. Lymph node, level XII, excisional biopsy:  2 benign lymph nodes with sinus histiocytosis and anthracotic pigment (0/2).  5. Lung, left upper lobe, lobectomy:  Typical carcinoid tumor (well differentiated neuroendocrine tumor), 4 cm in greatest dimension.  Bronchial and vascular margins are negative for neoplasm.  Uninvolved lung adjacent to the tumor shows emphysematous changes and fibrosis, however remaining lung  appears unremarkable.  Additional immunohistochemical stains for ancillary studies (including synaptophysin, chromogranin and Ki-67)  will be completed and results will be reported in a supplemental report.  See synoptic report in comment section for further details.  6. Lymph node, level VII, excisional biopsy:  2 benign lymph nodes with anthracotic pigment and sinus histiocytosis (0/2).  Comment: Synoptic Report  Specimen: Left upper lobe of lung  Procedure: Lobectomy  Specimen laterality: Left  Tumor size:  Greatest dimension: 4 cm  Tumor focality: Single focus  Histologic type: Typical carcinoid tumor  Visceral pleural invasion: Not identified  Tumor extension: Not  identified  Margins: Bronchial and vascular margins are uninvolved by tumor.  Distance of tumor from closest margin: 1 cm from the bronchial surgical margin.  Lymphovascular invasion: Not identified  Ancillary studies:  Immunohistochemical stains for neuroendocrine markers and Ki-67 will be completed and results will follow in a  supplemental report.  Note: The tumor consists of a proliferation of cells in nests with lewis formation. The tumor cells have salt and  pepper chromatin pattern with abundant cytoplasm. There is no significant nuclear atypia. No evidence of  necrosis. Mitotic count is 0 mitoses in 10 high power fields.    Imaging:     MRI 03/18/2020    COMPARISON:  Gallium 68 Dotatate PET-CT 08/20/2019    FINDINGS:  Orbits/Optic Nerves:    Thin sheet like focus of asymmetric increased enhancement along the posteromedial retinal margin of the left globe corresponding to the area of increased uptake on PET-CT 08/20/2019.  This measures over 1.4 cm in length.  No nodular component.  Right globe is unremarkable.    Asymmetric increased T2 signal throughout the intraorbital in intracanalicular segments of the left optic nerve without associated enhancement.  Probable associated volume loss suggesting atrophy.  Right optic nerve unremarkable.  No large intraorbital mass.  No proptosis.  No focal inflammatory change.    The optic chiasm is unremarkable.    Remainder of the Intracranial Compartment:    Ventricles and sulci are normal in size for age without evidence of hydrocephalus.    No extra-axial blood or fluid collections.  Nodular enhancing extra-axial lesion along the right aspect of the falx near the vertex measuring 0.7 x 0.3 cm, corresponding to focus of increased uptake on PET-CT 08/28/2019.  No significant mass effect.  No apparent extension into the adjacent superior sagittal sinus.    Subcentimeter periventricular focus of heterogeneous signal and susceptibility artifact extending from the left  cingulate gyrus into the body of the corpus callosum, in keeping with a cavernous malformation.  Adjacent curvilinear enhancement reflective of a developmental venous anomaly.  Few scattered T2/FLAIR hyperintense foci throughout the supratentorial white matter in keeping with chronic microvascular ischemic disease.  No recent or remote major vascular distribution infarct.  No parenchymal mass.  No abnormal parenchymal or leptomeningeal enhancement.    Normal vascular flow voids are preserved.    Skull/Extracranial Contents (limited evaluation):    Bone marrow signal intensity is normal.    Left mastoid effusion.    Subcentimeter focus of abnormal signal and enhancement in the soft tissues overlying right parietal bone corresponding to increased uptake prior PET-CT (sequence 9 image 16)..      Impression       1. Thin focus of enhancement along the posteromedial retinal margin of the left globe, corresponding to region of increased uptake on PET-CT 08/20/2019 as above.  Lesion thought to reflect metastatic disease, recommend correlation with ophthalmological evaluation.  2. Subcentimeter nodular enhancing focus in the right parietal extracranial soft tissues, corresponding to abnormal uptake on prior PET-CT.  Lesion also thought to reflect metastatic disease.  3. Subcentimeter nodular enhancing extra-axial lesion along the right aspect of the falx corresponding to focus of increased uptake on prior PET-CT 08/20/2019.  This could reflect and additional metastatic site or meningioma.  No significant intracranial mass effect or subjacent parenchymal edema.  4. Increased T2 signal throughout the intraorbital and intracanalicular segments of the left optic nerve without associated enhancement.  Probable associated volume loss, favoring atrophy over edema.  5. Subcentimeter cavernous malformation in the left paramedian frontal lobe with small associated developmental venous anomaly.  This report was flagged in Epic as  abnormal.                  Assessment:       1. Bronchial carcinoid tumors    2. Secondary neuroendocrine tumor of liver    3. Secondary neuroendocrine tumor of bone    4. Vision loss of left eye           Plan:     Mr. Rahman continues to follow with ophthalmology for his visual changes and was recently found to have a potential lesion within the right eye.  He was started on corticosteroids and states that his vision seems to be improving.  Review of his MRI does not show any new lesions on the right but will send these images over to Dr. Mendoza for him to review.  Will plan to continue on with lanreotide and repeat conventional imaging to include a CT of the chest and MRI of the abdomen in 05/2020.  He is following up with Ophthalmology next week and would defer corticosteroid tapering to them.  He is to report any new symptoms in the interim.  All questions were answered and he is agreeable with this plan.    Jesus London DO, FACP  Hematology & Oncology  [facility] [place] [address]  ph. [phone]  Fax. [phone]    25 minutes were spent in coordination of patient's care, record review and counseling.  More than 50% of the time was face-to-face.

## 2020-03-30 ENCOUNTER — INFUSION (OUTPATIENT)
Dept: INFUSION THERAPY | Facility: HOSPITAL | Age: 65
End: 2020-03-30
Attending: STUDENT IN AN ORGANIZED HEALTH CARE EDUCATION/TRAINING PROGRAM
Payer: COMMERCIAL

## 2020-03-30 DIAGNOSIS — C7B.8 SECONDARY NEUROENDOCRINE TUMOR OF BONE: ICD-10-CM

## 2020-03-30 DIAGNOSIS — C7B.8 SECONDARY MALIGNANT NEUROENDOCRINE TUMOR OF LIVER: Primary | ICD-10-CM

## 2020-03-30 DIAGNOSIS — C7B.8 SECONDARY NEUROENDOCRINE TUMOR OF LIVER: ICD-10-CM

## 2020-03-30 DIAGNOSIS — D3A.090 BRONCHIAL CARCINOID TUMORS: ICD-10-CM

## 2020-03-30 PROCEDURE — 63600175 PHARM REV CODE 636 W HCPCS: Mod: JG | Performed by: INTERNAL MEDICINE

## 2020-03-30 PROCEDURE — 96372 THER/PROPH/DIAG INJ SC/IM: CPT

## 2020-03-30 RX ORDER — LANREOTIDE ACETATE 120 MG/.5ML
120 INJECTION SUBCUTANEOUS
Status: COMPLETED | OUTPATIENT
Start: 2020-03-30 | End: 2020-03-30

## 2020-03-30 RX ADMIN — LANREOTIDE ACETATE 120 MG: 120 INJECTION SUBCUTANEOUS at 09:03

## 2020-03-30 NOTE — NURSING
Patient received lanreotide injection, tolerated well.  Patient reports vision difficulty in both eyes, states Surya is aware.  Patient given wheelchair escort to care upon discharge

## 2020-04-01 ENCOUNTER — TELEPHONE (OUTPATIENT)
Dept: NEUROLOGY | Facility: HOSPITAL | Age: 65
End: 2020-04-01

## 2020-04-10 ENCOUNTER — PATIENT MESSAGE (OUTPATIENT)
Dept: INFECTIOUS DISEASES | Facility: CLINIC | Age: 65
End: 2020-04-10

## 2020-04-14 ENCOUNTER — TELEPHONE (OUTPATIENT)
Dept: NEUROLOGY | Facility: HOSPITAL | Age: 65
End: 2020-04-14

## 2020-04-14 DIAGNOSIS — D3A.090 BRONCHIAL CARCINOID TUMORS: ICD-10-CM

## 2020-04-14 DIAGNOSIS — C7B.8 SECONDARY NEUROENDOCRINE TUMOR OF LIVER: Primary | ICD-10-CM

## 2020-04-14 NOTE — TELEPHONE ENCOUNTER
Confirmed he plans on getting Ga68, orders placed for COVID test prior to lanreotide.  Changed appt to virtual.

## 2020-04-15 NOTE — TELEPHONE ENCOUNTER
----- Message from Jesus London DO, FACP sent at 3/25/2020 12:55 PM CDT -----  Please send a copy of the disk of the MRI of the orbit to Dr. Mendoza today.  He has an appointment with him next week  MRI abdomen, CT chest in May  Follow-up in May

## 2020-04-22 ENCOUNTER — PATIENT MESSAGE (OUTPATIENT)
Dept: NEUROLOGY | Facility: HOSPITAL | Age: 65
End: 2020-04-22

## 2020-04-24 ENCOUNTER — HOSPITAL ENCOUNTER (OUTPATIENT)
Dept: RADIOLOGY | Facility: HOSPITAL | Age: 65
Discharge: HOME | End: 2020-04-24
Attending: INTERNAL MEDICINE
Payer: COMMERCIAL

## 2020-04-24 ENCOUNTER — LAB VISIT (OUTPATIENT)
Dept: INTERNAL MEDICINE | Facility: CLINIC | Age: 65
End: 2020-04-24
Payer: COMMERCIAL

## 2020-04-24 DIAGNOSIS — H54.62 VISION LOSS OF LEFT EYE: ICD-10-CM

## 2020-04-24 DIAGNOSIS — D3A.090 BRONCHIAL CARCINOID TUMORS: ICD-10-CM

## 2020-04-24 DIAGNOSIS — C7B.8 SECONDARY MALIGNANT NEUROENDOCRINE TUMOR OF LIVER: ICD-10-CM

## 2020-04-24 DIAGNOSIS — C7B.8 SECONDARY NEUROENDOCRINE TUMOR OF LIVER: ICD-10-CM

## 2020-04-24 DIAGNOSIS — H54.61 VISION LOSS, RIGHT EYE: ICD-10-CM

## 2020-04-24 DIAGNOSIS — C7A.090 MALIGNANT CARCINOID TUMOR OF THE BRONCHUS AND LUNG: ICD-10-CM

## 2020-04-24 LAB — SARS-COV-2 RNA RESP QL NAA+PROBE: NOT DETECTED

## 2020-04-24 PROCEDURE — 78815 NM PET 68GA DOTATATE WHOLE BODY: ICD-10-PCS | Mod: 26,PS,, | Performed by: RADIOLOGY

## 2020-04-24 PROCEDURE — A9587 GALLIUM GA-68: HCPCS

## 2020-04-24 PROCEDURE — 78815 PET IMAGE W/CT SKULL-THIGH: CPT | Mod: 26,PS,, | Performed by: RADIOLOGY

## 2020-04-24 PROCEDURE — U0002 COVID-19 LAB TEST NON-CDC: HCPCS

## 2020-04-24 PROCEDURE — 78815 PET IMAGE W/CT SKULL-THIGH: CPT | Mod: TC

## 2020-04-27 ENCOUNTER — INFUSION (OUTPATIENT)
Dept: INFUSION THERAPY | Facility: HOSPITAL | Age: 65
End: 2020-04-27
Attending: PHYSICIAN ASSISTANT
Payer: COMMERCIAL

## 2020-04-27 ENCOUNTER — OFFICE VISIT (OUTPATIENT)
Dept: NEUROLOGY | Facility: HOSPITAL | Age: 65
End: 2020-04-27
Attending: INTERNAL MEDICINE
Payer: COMMERCIAL

## 2020-04-27 DIAGNOSIS — C7B.8 SECONDARY NEUROENDOCRINE TUMOR OF LIVER: ICD-10-CM

## 2020-04-27 DIAGNOSIS — D3A.090 BRONCHIAL CARCINOID TUMORS: ICD-10-CM

## 2020-04-27 DIAGNOSIS — C78.7 SECONDARY MALIGNANT NEOPLASM OF LIVER: ICD-10-CM

## 2020-04-27 DIAGNOSIS — C7B.8 SECONDARY NEUROENDOCRINE TUMOR OF BONE: ICD-10-CM

## 2020-04-27 DIAGNOSIS — C7A.090 CARCINOID BRONCHIAL ADENOMA OF LEFT LUNG: ICD-10-CM

## 2020-04-27 DIAGNOSIS — H54.62 VISION LOSS OF LEFT EYE: ICD-10-CM

## 2020-04-27 DIAGNOSIS — C7B.8 SECONDARY MALIGNANT NEUROENDOCRINE TUMOR OF LIVER: Primary | ICD-10-CM

## 2020-04-27 DIAGNOSIS — D3A.090 BRONCHIAL CARCINOID TUMORS: Primary | ICD-10-CM

## 2020-04-27 PROCEDURE — 63600175 PHARM REV CODE 636 W HCPCS: Mod: JG | Performed by: INTERNAL MEDICINE

## 2020-04-27 PROCEDURE — 99214 OFFICE O/P EST MOD 30 MIN: CPT | Mod: 95,,, | Performed by: INTERNAL MEDICINE

## 2020-04-27 PROCEDURE — 99214 PR OFFICE/OUTPT VISIT, EST, LEVL IV, 30-39 MIN: ICD-10-PCS | Mod: 95,,, | Performed by: INTERNAL MEDICINE

## 2020-04-27 PROCEDURE — 96372 THER/PROPH/DIAG INJ SC/IM: CPT

## 2020-04-27 RX ORDER — SODIUM CHLORIDE 0.9 % (FLUSH) 0.9 %
10 SYRINGE (ML) INJECTION
Status: CANCELLED | OUTPATIENT
Start: 2020-05-04

## 2020-04-27 RX ORDER — LANREOTIDE ACETATE 120 MG/.5ML
120 INJECTION SUBCUTANEOUS ONCE
Status: COMPLETED | OUTPATIENT
Start: 2020-04-27 | End: 2020-04-27

## 2020-04-27 RX ORDER — HEPARIN 100 UNIT/ML
500 SYRINGE INTRAVENOUS
Status: CANCELLED | OUTPATIENT
Start: 2020-05-04

## 2020-04-27 RX ORDER — LANREOTIDE ACETATE 120 MG/.5ML
120 INJECTION SUBCUTANEOUS ONCE
Status: CANCELLED | OUTPATIENT
Start: 2020-05-04

## 2020-04-27 RX ADMIN — LANREOTIDE ACETATE 120 MG: 120 INJECTION SUBCUTANEOUS at 09:04

## 2020-04-27 NOTE — LETTER
April 27, 2020        Jesus Mendoza MD  [address] [place] [address] [address]  [address]  [place] [address]             [facility] [place] [address]  Phone: [phone]  Fax: [phone]   Patient: Elroy Rahman   MR Number: [id number]   YOB: 1955   Date of Visit: 4/27/2020       Dear Dr. Mendoza:    Thank you for referring Elroy Rahman to me for evaluation. Attached you will find relevant portions of my assessment and plan of care.    If you have questions, please do not hesitate to call me. I look forward to following Elroy Rahman along with you.    Sincerely,      Jesus London DO, FACP            CC  No Recipients    Enclosure

## 2020-04-27 NOTE — PROGRESS NOTES
PATIENT: Elroy Rahman  MRN: [id number]  DATE: 4/27/2020      Diagnosis:   1. Bronchial carcinoid tumors    2. Secondary neuroendocrine tumor of liver    3. Secondary neuroendocrine tumor of bone    4. Vision loss of left eye        Chief Complaint: No chief complaint on file.      Oncologic History:      Oncologic History Bronchial carcinoid, left, diagnosed 1/2016   Metastatic disease to liver and bone 5/2017     Oncologic Treatment Left upper lobectomy 1/2016  CAPTEM 7/2017 - 2/2019 (discontinued due to progression)  Lanreotide 7/2017   Zoledronic acid 7/2017   TACE 10/2018  TACE 3/2019  Lily-177 4/10/19, 6/5/19    Pathology 1/2016: Typical carcinoid tumor, well-differentiated T2a, N0, M0 Ki-67 <1%  6/2017: Liver biopsy, well-differentiated, Ki-67 25%       The patient location is:  home  The chief complaint leading to consultation is:  Follow-up pulmonary carcinoid tumor  Visit type: Virtual visit with synchronous audio and video  Total time spent with patient:  25 min  Each patient to whom he or she provides medical services by telemedicine is:  (1) informed of the relationship between the physician and patient and the respective role of any other health care provider with respect to management of the patient; and (2) notified that he or she may decline to receive medical services by telemedicine and may withdraw from such care at any time.    Notes:         Subjective:    Interval History: Mr. Rahman is a 64 y.o. male who is seen in follow-up for a bronchial carcinoid tumor.  He is seen virtually today.  He states he has been feeling great.  He still has some ongoing vision loss and remains on steroids.  He is following up with Dr. Mendoza tomorrow.  He is otherwise staying active but maintaining social distancing.  He has no other new complaints.    His history dates to 1/2016 when he underwent a left upper lobectomy with Dr. Bell for a typical carcinoid tumor.  His pathological staging was T2a, N0, M0  with Ki-67 less than 1% and mitotic count 0 per 10 high-powered fields.  He done well postoperatively, however, in 5/2017 he was undergoing routine surveillance and a chest CT in picked up evidence of progression of disease in the liver.  Dedicated abdominal CT was performed showing multiple liver lesions.  In 6/2017 he underwent a CT-guided liver biopsy which was positive for metastatic neuroendocrine tumor which was well-differentiated with no overt nuclear atypia, however, the Ki-67 was 25%.  He underwent gallium-68 PET/CT and was found to have widespread disease involving the bone, liver and also mesentery.  He was started on CAPTEM in 7/2017 and also Lanreotide and zoledronic acid.  Scans in 10/2017 had shown stability of disease.  Imaging in 1/2018 had shown stability of disease.  Scans in 4/2018 had shown stability of disease.  Scans in 07/2018 had continued to showed mild growth.  In 10/2018 he underwent TACE.  CAPTEM was discontinued in 02/2019 due to progression.  He underwent TACE in 03/2019.  He was started on treatment with PRRT using Lily-177 on 04/10/2019.  His 2nd cycle was in 06/2019 and following this he experienced vision loss of his left eye.  This was hypothesized to have resulted from inflammatory response is due to in orbital lesion and PRRT was discontinued and corticosteroids were initiated with improvement of his symptoms.      Past Medical History:   Past Medical History:   Diagnosis Date    Allergy     Carcinoid bronchial adenoma of left lung     PRRT    Chemotherapy follow-up examination 8/25/2017    Cough with hemoptysis     mild/intermittent    Elevated bilirubin 7/2/2018    Hx of BKA, left     above knee    Kidney stone     Mild heartburn     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    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    Sepsis due to methicillin susceptible Staphylococcus aureus 09/14/2019    MSSA bacteremia from Amputation stump infection     Sleep apnea     Snores    Urinary tract infection     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       Past Surgical HIstory:   Past Surgical History:   Procedure Laterality Date    ABDOMINAL SURGERY      Bka Left     HERNIA REPAIR      IRRIGATION AND DEBRIDEMENT OF LOWER EXTREMITY Left 9/20/2019    Procedure: IRRIGATION AND DEBRIDEMENT, LOWER EXTREMITY - left - cysto tubing - cultures;  Surgeon: Jesus Arias MD;  [address]  Service: Orthopedics;  Laterality: Left;    LAPAROSCOPIC REPAIR OF INCARCERATED UMBILICAL HERNIA N/A 11/27/2018    Procedure: REPAIR, HERNIA, UMBILICAL, INCARCERATED, LAPAROSCOPIC;  Surgeon: Coco Guidry MD;  Location: [facility] OR;  Service: General;  Laterality: N/A;  video    TRANSESOPHAGEAL ECHOCARDIOGRAPHY N/A 9/18/2019    Procedure: ECHOCARDIOGRAM, TRANSESOPHAGEAL;  Surgeon: Grace Diagnostic Provider;  Location: [facility] EP LAB;  Service: Anesthesiology;  Laterality: N/A;       Family History:   Family History   Problem Relation Age of Onset    Cancer Mother         lung    Cancer Sister     Anesthesia problems Neg Hx        Social History:  reports that he has never smoked. He has never used smokeless tobacco. He reports that he drinks about 1.0 standard drinks of alcohol per week. He reports that he does not use drugs.    Allergies:  Review of patient's allergies indicates:  No Known Allergies    Medications:  Current Outpatient Medications   Medication Sig Dispense Refill    ALPHAGAN P 0.1 % Drop Instill 1 drop into left eye twice daily  4    famotidine (PEPCID) 20 MG tablet Take 1 tablet (20 mg total) by mouth 2 (two) times daily. 60 tablet 2    fluconazole (DIFLUCAN) 100 MG tablet Take 1 tablet (100 mg total) by mouth once daily. Take 200mg on day 1, then 100mg days 2-7 7 tablet 0    fluconazole (DIFLUCAN) 200 MG Tab Take 1 tablet (200 mg total) by mouth once daily. 1 tablet 0    ondansetron (ZOFRAN) 4 MG tablet Take 1 tablet (4 mg total) by mouth  daily as needed for Nausea.      predniSONE (DELTASONE) 20 MG tablet Take 0.5 tablets (10 mg total) by mouth once daily. Take with two 50 mg tabs for a total of 110 mg daily      predniSONE (DELTASONE) 50 MG Tab Take 2 tablets (100 mg total) by mouth once daily. Take with 1/2 of the 20 mg tablet. For a total of 110 daily.      PROLENSA 0.07 % Drop INSTILL 1 DROP INTO LEFT EYE TWICE DAILY  4    sulfamethoxazole-trimethoprim 800-160mg (BACTRIM DS) 800-160 mg Tab Take 1 tablet by mouth once daily. 30 tablet 1    tamsulosin (FLOMAX) 0.4 mg Cap Take 1 capsule (0.4 mg total) by mouth once daily. 90 capsule 3    tobramycin sulfate 0.3% (TOBREX) 0.3 % ophthalmic ointment Apply a thin layer into right eye four times a day as directed 3.5 g 12     No current facility-administered medications for this visit.        Review of Systems   Constitutional: Negative for appetite change, chills, fatigue, fever and unexpected weight change.   HENT: Negative for dental problem, sinus pressure and sneezing.    Eyes: Positive for visual disturbance.   Respiratory: Negative for cough, choking, chest tightness and shortness of breath.    Cardiovascular: Negative for chest pain and leg swelling.   Gastrointestinal: Negative for abdominal pain, blood in stool, constipation, diarrhea and nausea.   Genitourinary: Negative for difficulty urinating, dysuria and frequency.   Musculoskeletal: Negative for arthralgias and back pain.   Skin: Negative for rash and wound.   Neurological: Negative for dizziness, light-headedness and headaches.   Hematological: Negative for adenopathy. Does not bruise/bleed easily.   Psychiatric/Behavioral: Negative for sleep disturbance. The patient is not nervous/anxious.        ECOG Performance Status: 0   ECOG SCORE           Objective:      Vitals:   There were no vitals filed for this visit.  BMI: There is no height or weight on file to calculate BMI.    Physical Exam   Constitutional: He appears  well-developed and well-nourished.       Laboratory Data:  Lab Visit on 04/24/2020   Component Date Value Ref Range Status    SARS-CoV2 (COVID-19) Qualitative P* 04/24/2020 Not Detected  Not Detected Final    Comment: This test utilizes a real-time reverse transcription  polymerase chain reaction procedure to amplify and   detect the SARS-CoV-2 and detect the SARS-CoV-2 N2 and E nucleic  acid targets. The analytical sensitivity (limit of detection) of   this assay is 250 copies/mL.  A Detected result implies that the patient is infected with the  SARS-CoV-2 virus and is presumed to be contagious.    A Not Detected result implies that the SARS-CoV-2 target nucleic  acids are not present above the limit of detection.  It does not  rule out the possibility of COVID-19 and should not be the sole  basis for treatment decisions. If COVID-19 is strongly suspected  based on clinical and epidemiological history, re-testing should  be considered.  This test is only for use under Food and Drug   Administration s Emergency Use Authorization (EUA).   Commercial reagents are provided by [email or web address].  Performance characteristics of the EUA have been   independently verified by [facility]   Department of [place]                           thology and Laboratory Medicine.         Supplemental Diagnosis  Chromogranin Staining:  Intensity: Moderate to strong. Positive cells: 100 (%)  Synaptophysin Staining:  Intensity: Strong. Positive cells: 100 (%)  CD31: 40 vessels per 1 HPF  Factor VIII: 27 vessels per 1 HPF  Ki67: 1 % tumor cells staining  Immunostains performed with appropriate positive and negative controls.  FINAL PATHOLOGIC DIAGNOSIS  1. Lymph node, level XI, excisional biopsy:  Fragments of benign lymph node with anthracosis and sinus histiocytosis (0/1).  2. Lung, lingular staple line, biopsy:  Lung, negative for neoplasm.  3. Lymph node, level X, excisional biopsy:  1 benign lymph node with anthracotic pigment and sinus  histiocytosis (0/1).  4. Lymph node, level XII, excisional biopsy:  2 benign lymph nodes with sinus histiocytosis and anthracotic pigment (0/2).  5. Lung, left upper lobe, lobectomy:  Typical carcinoid tumor (well differentiated neuroendocrine tumor), 4 cm in greatest dimension.  Bronchial and vascular margins are negative for neoplasm.  Uninvolved lung adjacent to the tumor shows emphysematous changes and fibrosis, however remaining lung  appears unremarkable.  Additional immunohistochemical stains for ancillary studies (including synaptophysin, chromogranin and Ki-67)  will be completed and results will be reported in a supplemental report.  See synoptic report in comment section for further details.  6. Lymph node, level VII, excisional biopsy:  2 benign lymph nodes with anthracotic pigment and sinus histiocytosis (0/2).  Comment: Synoptic Report  Specimen: Left upper lobe of lung  Procedure: Lobectomy  Specimen laterality: Left  Tumor size:  Greatest dimension: 4 cm  Tumor focality: Single focus  Histologic type: Typical carcinoid tumor  Visceral pleural invasion: Not identified  Tumor extension: Not identified  Margins: Bronchial and vascular margins are uninvolved by tumor.  Distance of tumor from closest margin: 1 cm from the bronchial surgical margin.  Lymphovascular invasion: Not identified  Ancillary studies:  Immunohistochemical stains for neuroendocrine markers and Ki-67 will be completed and results will follow in a  supplemental report.  Note: The tumor consists of a proliferation of cells in nests with lewis formation. The tumor cells have salt and  pepper chromatin pattern with abundant cytoplasm. There is no significant nuclear atypia. No evidence of  necrosis. Mitotic count is 0 mitoses in 10 high power fields.    Imaging:     Gallium 68 PET-CT 04/24/2020    COMPARISON:  Gallium PET 08/20/2019, 04/03/2019, 06/21/2017.    FINDINGS:  Quality of the study: Adequate.    Redemonstration of innumerable  abnormal foci of increased uptake throughout the axial and appendicular skeleton, left orbit, and liver which is overall similar in distribution when compared to prior examination.  There is overall decreased uptake within the scattered foci of increased uptake as compared to the prior examination dated 08/20/2019.  Some of the corresponding liver lesions also appear to have decreased in overall size on the CT portion of the examination.    Index lesions as follows:    Left orbit SUV max 2.8 (previously 7.5).    Left coracoid process SUV max 4.9 (previously 13.3).    Left hepatic lobe lesion segment II SUV max 18 (previously 42.8).    Left sacral ala lesion SUV max 9.3 (previously 17.8).    There is continued tracer uptake within the inferior right thyroid lobe with SUV max 11 (previously 14.4).  There is diffuse uptake within the prostate without definite focus identified.    There is a focus of uptake along the right aspect of the falx near the vertex likely correlating with findings on prior MRI.  Right extra calvarial parietal focus of uptake also correlates with findings on prior MRI.    Physiologic uptake of the tracer is seen within the pituitary, liver, spleen, kidneys, and bowel.  There are findings consistent with a right adrenal adenoma.  Numerous sclerotic bony foci are identified consistent with patient's diffuse osseous metastatic disease.    Incidental CT findings: Status post left upper lobectomy.  There is a fat containing right inguinal hernia.      Impression       Findings consistent with widely disseminated metastatic disease with interval decreased tracer uptake compared to the prior examination dated 08/20/2019.  No new lesions are identified.  There also appears to be interval decrease in the size of some of the corresponding liver lesions which are poorly defined on the noncontrast CT scan.  Findings suggestive of favorable response to therapy.                  Assessment:       1. Bronchial  carcinoid tumors    2. Secondary neuroendocrine tumor of liver    3. Secondary neuroendocrine tumor of bone    4. Vision loss of left eye           Plan:     Mr. Rahman is doing well from an oncologic standpoint.  I have reviewed the findings of his gallium 68 PET-CT and he was able to see the images from last week and compare these to images that were done prior to initiation of PRRT.  He has had a very dramatic response to therapy with a decrease in size of multiple lesions without the appearance of new lesions.  From my standpoint, I would recommend he continue on with Lanreotide and zoledronic acid.  His main issue is now his visual changes in both eyes.  He is following up with ophthalmology tomorrow and we have sent the MRI of his orbits previously.  I will plan to follow-up with him in another 8 weeks and we will plan to repeat imaging at 4 months.  He is to report any new symptoms in the interim.  All questions were answered and he is agreeable with this plan.    Jesus London DO, [facility]  Hematology & Oncology  [facility] [place] [address]  ph. [phone]  Fax. [phone]    25 minutes were spent in coordination of patient's care, record review and counseling.  More than 50% of the time was face-to-face.

## 2020-05-21 DIAGNOSIS — Z13.9 SCREENING FOR CONDITION: Primary | ICD-10-CM

## 2020-05-21 NOTE — PROGRESS NOTES
05/21/2020      In an effort to protect our immunocompromised patients from potential exposure to COVID-19, [facility] will now require all patients receiving an infusion, an injection, and/or radiation therapy to be tested for COVID-19 prior to their appointment.  All patients currently under treatment will be tested immediately, and patients initiating new treatment cycles or with one-time appointments (injections, transfusions, etc.) must be tested within 72 hours of their appointment.     Placed COVID-19 test order for patient.  A member of our team is to contact the patient in the near future to explain this process and the rationale behind it, to ask the COVID-19 screening questions, and to get the patient scheduled for their COVID-19 test.     The above was completed in accordance with instructions and guidelines set forth by [facility].     Signed,    Demetrius Daniel, ALEXX     Date:  05/21/2020

## 2020-05-22 ENCOUNTER — LAB VISIT (OUTPATIENT)
Dept: INTERNAL MEDICINE | Facility: CLINIC | Age: 65
End: 2020-05-22
Payer: COMMERCIAL

## 2020-05-22 ENCOUNTER — TELEPHONE (OUTPATIENT)
Dept: NEUROLOGY | Facility: HOSPITAL | Age: 65
End: 2020-05-22

## 2020-05-22 DIAGNOSIS — Z13.9 SCREENING FOR UNSPECIFIED CONDITION: Primary | ICD-10-CM

## 2020-05-22 DIAGNOSIS — Z13.9 SCREENING FOR CONDITION: ICD-10-CM

## 2020-05-22 LAB — SARS-COV-2 RNA RESP QL NAA+PROBE: NOT DETECTED

## 2020-05-22 PROCEDURE — U0003 INFECTIOUS AGENT DETECTION BY NUCLEIC ACID (DNA OR RNA); SEVERE ACUTE RESPIRATORY SYNDROME CORONAVIRUS 2 (SARS-COV-2) (CORONAVIRUS DISEASE [COVID-19]), AMPLIFIED PROBE TECHNIQUE, MAKING USE OF HIGH THROUGHPUT TECHNOLOGIES AS DESCRIBED BY CMS-2020-01-R: HCPCS

## 2020-05-24 ENCOUNTER — PATIENT MESSAGE (OUTPATIENT)
Dept: NEUROLOGY | Facility: HOSPITAL | Age: 65
End: 2020-05-24

## 2020-05-24 DIAGNOSIS — Z79.52 LONG TERM CURRENT USE OF SYSTEMIC STEROIDS: ICD-10-CM

## 2020-05-24 DIAGNOSIS — H54.62 VISION LOSS OF LEFT EYE: ICD-10-CM

## 2020-05-24 DIAGNOSIS — D3A.090 BRONCHIAL CARCINOID TUMORS: Primary | ICD-10-CM

## 2020-05-25 ENCOUNTER — INFUSION (OUTPATIENT)
Dept: INFUSION THERAPY | Facility: HOSPITAL | Age: 65
End: 2020-05-25
Attending: INTERNAL MEDICINE
Payer: COMMERCIAL

## 2020-05-25 DIAGNOSIS — D3A.090 BRONCHIAL CARCINOID TUMORS: ICD-10-CM

## 2020-05-25 DIAGNOSIS — C7B.8 SECONDARY NEUROENDOCRINE TUMOR OF LIVER: ICD-10-CM

## 2020-05-25 DIAGNOSIS — C7B.8 SECONDARY MALIGNANT NEUROENDOCRINE TUMOR OF LIVER: Primary | ICD-10-CM

## 2020-05-25 DIAGNOSIS — C7B.8 SECONDARY NEUROENDOCRINE TUMOR OF BONE: ICD-10-CM

## 2020-05-25 PROCEDURE — 96372 THER/PROPH/DIAG INJ SC/IM: CPT

## 2020-05-25 PROCEDURE — 63600175 PHARM REV CODE 636 W HCPCS: Mod: JG | Performed by: INTERNAL MEDICINE

## 2020-05-25 RX ORDER — LANREOTIDE ACETATE 120 MG/.5ML
120 INJECTION SUBCUTANEOUS
Status: CANCELLED | OUTPATIENT
Start: 2023-09-27

## 2020-05-25 RX ORDER — LANREOTIDE ACETATE 120 MG/.5ML
120 INJECTION SUBCUTANEOUS
Status: CANCELLED | OUTPATIENT
Start: 2024-04-07

## 2020-05-25 RX ORDER — SODIUM CHLORIDE 0.9 % (FLUSH) 0.9 %
10 SYRINGE (ML) INJECTION
Status: CANCELLED | OUTPATIENT
Start: 2020-06-29

## 2020-05-25 RX ORDER — LANREOTIDE ACETATE 120 MG/.5ML
120 INJECTION SUBCUTANEOUS
Status: CANCELLED | OUTPATIENT
Start: 2021-02-04

## 2020-05-25 RX ORDER — LANREOTIDE ACETATE 120 MG/.5ML
120 INJECTION SUBCUTANEOUS
Status: CANCELLED | OUTPATIENT
Start: 2022-09-06

## 2020-05-25 RX ORDER — LANREOTIDE ACETATE 120 MG/.5ML
120 INJECTION SUBCUTANEOUS
Status: CANCELLED | OUTPATIENT
Start: 2021-08-16

## 2020-05-25 RX ORDER — LANREOTIDE ACETATE 120 MG/.5ML
120 INJECTION SUBCUTANEOUS
Status: CANCELLED | OUTPATIENT
Start: 2022-02-25

## 2020-05-25 RX ORDER — HEPARIN 100 UNIT/ML
500 SYRINGE INTRAVENOUS
Status: CANCELLED | OUTPATIENT
Start: 2020-06-29

## 2020-05-25 RX ORDER — LANREOTIDE ACETATE 120 MG/.5ML
120 INJECTION SUBCUTANEOUS
Status: CANCELLED | OUTPATIENT
Start: 2023-03-18

## 2020-05-25 RX ORDER — LANREOTIDE ACETATE 120 MG/.5ML
120 INJECTION SUBCUTANEOUS
Status: COMPLETED | OUTPATIENT
Start: 2020-05-25 | End: 2020-05-25

## 2020-05-25 RX ADMIN — LANREOTIDE ACETATE 120 MG: 120 INJECTION SUBCUTANEOUS at 10:05

## 2020-06-22 ENCOUNTER — INFUSION (OUTPATIENT)
Dept: INFUSION THERAPY | Facility: HOSPITAL | Age: 65
End: 2020-06-22
Attending: INTERNAL MEDICINE
Payer: COMMERCIAL

## 2020-06-22 ENCOUNTER — OFFICE VISIT (OUTPATIENT)
Dept: NEUROLOGY | Facility: HOSPITAL | Age: 65
End: 2020-06-22
Attending: INTERNAL MEDICINE
Payer: COMMERCIAL

## 2020-06-22 VITALS
HEIGHT: 71 IN | SYSTOLIC BLOOD PRESSURE: 122 MMHG | HEART RATE: 88 BPM | RESPIRATION RATE: 16 BRPM | OXYGEN SATURATION: 99 % | BODY MASS INDEX: 39.08 KG/M2 | WEIGHT: 279.13 LBS | DIASTOLIC BLOOD PRESSURE: 82 MMHG | TEMPERATURE: 98 F

## 2020-06-22 DIAGNOSIS — D3A.090 BRONCHIAL CARCINOID TUMORS: Primary | ICD-10-CM

## 2020-06-22 DIAGNOSIS — R53.81 PHYSICAL DECONDITIONING: ICD-10-CM

## 2020-06-22 DIAGNOSIS — C7B.8 SECONDARY NEUROENDOCRINE TUMOR OF LIVER: ICD-10-CM

## 2020-06-22 DIAGNOSIS — D3A.090 BRONCHIAL CARCINOID TUMORS: ICD-10-CM

## 2020-06-22 DIAGNOSIS — C7B.8 SECONDARY MALIGNANT NEUROENDOCRINE TUMOR OF LIVER: Primary | ICD-10-CM

## 2020-06-22 DIAGNOSIS — C7B.8 SECONDARY NEUROENDOCRINE TUMOR OF BONE: ICD-10-CM

## 2020-06-22 PROCEDURE — 99215 OFFICE O/P EST HI 40 MIN: CPT | Mod: 25 | Performed by: INTERNAL MEDICINE

## 2020-06-22 PROCEDURE — 63600175 PHARM REV CODE 636 W HCPCS: Mod: JG | Performed by: INTERNAL MEDICINE

## 2020-06-22 PROCEDURE — 96372 THER/PROPH/DIAG INJ SC/IM: CPT

## 2020-06-22 PROCEDURE — 99214 OFFICE O/P EST MOD 30 MIN: CPT | Mod: ,,, | Performed by: INTERNAL MEDICINE

## 2020-06-22 PROCEDURE — 3008F BODY MASS INDEX DOCD: CPT | Mod: CPTII,,, | Performed by: INTERNAL MEDICINE

## 2020-06-22 PROCEDURE — 99214 PR OFFICE/OUTPT VISIT, EST, LEVL IV, 30-39 MIN: ICD-10-PCS | Mod: ,,, | Performed by: INTERNAL MEDICINE

## 2020-06-22 PROCEDURE — 3008F PR BODY MASS INDEX (BMI) DOCUMENTED: ICD-10-PCS | Mod: CPTII,,, | Performed by: INTERNAL MEDICINE

## 2020-06-22 RX ORDER — KETOROLAC TROMETHAMINE 4 MG/ML
SOLUTION/ DROPS OPHTHALMIC
COMMUNITY
Start: 2020-05-21 | End: 2021-09-24

## 2020-06-22 RX ORDER — LANREOTIDE ACETATE 120 MG/.5ML
120 INJECTION SUBCUTANEOUS
Status: COMPLETED | OUTPATIENT
Start: 2020-06-22 | End: 2020-06-22

## 2020-06-22 RX ORDER — LOTEPREDNOL ETABONATE 3.8 MG/G
GEL OPHTHALMIC
COMMUNITY
Start: 2020-05-21 | End: 2021-09-24

## 2020-06-22 RX ADMIN — LANREOTIDE ACETATE 120 MG: 120 INJECTION SUBCUTANEOUS at 09:06

## 2020-06-22 NOTE — LETTER
June 22, 2020        Jesus Mendoza MD  [address] [place] [address] [address]vd  [address]  [place] [address]             [facility] [place] [address]  Phone: [phone]  Fax: [phone]   Patient: Elroy Rahman   MR Number: [id number]   YOB: 1955   Date of Visit: 6/22/2020       Dear Dr. Mendoza:    Thank you for referring Elroy Rahman to me for evaluation. Attached you will find relevant portions of my assessment and plan of care.    If you have questions, please do not hesitate to call me. I look forward to following Elroy Rahman along with you.    Sincerely,      Jesus London DO, FACP            CC  No Recipients    Enclosure

## 2020-06-22 NOTE — PROGRESS NOTES
PATIENT: Elroy Rahman  MRN: [id number]  DATE: 6/22/2020      Diagnosis:   1. Bronchial carcinoid tumors    2. Secondary neuroendocrine tumor of liver    3. Physical deconditioning        Chief Complaint: Carcinoid Tumor (bronchial)      Oncologic History:      Oncologic History Bronchial carcinoid, left, diagnosed 1/2016   Metastatic disease to liver and bone 5/2017     Oncologic Treatment Left upper lobectomy 1/2016  CAPTEM 7/2017 - 2/2019 (discontinued due to progression)  Lanreotide 7/2017   Zoledronic acid 7/2017   TACE 10/2018  TACE 3/2019  Lily-177 4/10/19, 6/5/19    Pathology 1/2016: Typical carcinoid tumor, well-differentiated T2a, N0, M0 Ki-67 <1%  6/2017: Liver biopsy, well-differentiated, Ki-67 25%         Subjective:    Interval History: Mr. Rahman is a 64 y.o. male who is seen in follow-up for a bronchial carcinoid tumor.  He states that his vision has slowly improved since I had seen him last.  He remains on treatment with corticosteroids but has been tapering these down.  He currently is on prednisone 20 mg. He states that he has not been very active and his wife confirms that he stays in the chair most of the day and has difficulty getting himself up to ambulate.  He did notice some difficulty breathing and EMS was called a few weeks ago.  He was assessed at this time and his oxygen saturation was in the high 80s but he declined follow-up in the ER and his symptoms have improved.  Overall, he states he feels much better than he had previously.  He has no other new complaints.    His history dates to 1/2016 when he underwent a left upper lobectomy with Dr. Bell for a typical carcinoid tumor.  His pathological staging was T2a, N0, M0 with Ki-67 less than 1% and mitotic count 0 per 10 high-powered fields.  He done well postoperatively, however, in 5/2017 he was undergoing routine surveillance and a chest CT in picked up evidence of progression of disease in the liver.  Dedicated abdominal CT was  performed showing multiple liver lesions.  In 6/2017 he underwent a CT-guided liver biopsy which was positive for metastatic neuroendocrine tumor which was well-differentiated with no overt nuclear atypia, however, the Ki-67 was 25%.  He underwent gallium-68 PET/CT and was found to have widespread disease involving the bone, liver and also mesentery.  He was started on CAPTEM in 7/2017 and also Lanreotide and zoledronic acid.  Scans in 10/2017 had shown stability of disease.  Imaging in 1/2018 had shown stability of disease.  Scans in 4/2018 had shown stability of disease.  Scans in 07/2018 had continued to showed mild growth.  In 10/2018 he underwent TACE.  CAPTEM was discontinued in 02/2019 due to progression.  He underwent TACE in 03/2019.  He was started on treatment with PRRT using Lily-177 on 04/10/2019.  His 2nd cycle was in 06/2019 and following this he experienced vision loss of his left eye.  This was hypothesized to have resulted from inflammatory response is due to in orbital lesion and PRRT was discontinued and corticosteroids were initiated with improvement of his symptoms.      Past Medical History:   Past Medical History:   Diagnosis Date    Allergy     Carcinoid bronchial adenoma of left lung     PRRT    Chemotherapy follow-up examination 8/25/2017    Cough with hemoptysis     mild/intermittent    Elevated bilirubin 7/2/2018    Hx of BKA, left     above knee    Kidney stone     Mild heartburn     Physical deconditioning 6/22/2020    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    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    Sepsis due to methicillin susceptible Staphylococcus aureus 09/14/2019    MSSA bacteremia from Amputation stump infection    Sleep apnea     Snores    Urinary tract infection     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       Past Surgical HIstory:   Past Surgical History:   Procedure Laterality Date    ABDOMINAL SURGERY      Bka Left     HERNIA  REPAIR      IRRIGATION AND DEBRIDEMENT OF LOWER EXTREMITY Left 9/20/2019    Procedure: IRRIGATION AND DEBRIDEMENT, LOWER EXTREMITY - left - cysto tubing - cultures;  Surgeon: Jesus Arias MD;  [address]  Service: Orthopedics;  Laterality: Left;    LAPAROSCOPIC REPAIR OF INCARCERATED UMBILICAL HERNIA N/A 11/27/2018    Procedure: REPAIR, HERNIA, UMBILICAL, INCARCERATED, LAPAROSCOPIC;  Surgeon: Coco Guidry MD;  Location: [facility];  Service: General;  Laterality: N/A;  video    TRANSESOPHAGEAL ECHOCARDIOGRAPHY N/A 9/18/2019    Procedure: ECHOCARDIOGRAM, TRANSESOPHAGEAL;  Surgeon: Grace Diagnostic Provider;  [facility];  Service: Anesthesiology;  Laterality: N/A;       Family History:   Family History   Problem Relation Age of Onset    Cancer Mother         lung    Cancer Sister     Anesthesia problems Neg Hx        Social History:  reports that he has never smoked. He has never used smokeless tobacco. He reports current alcohol use of about 1.0 standard drinks of alcohol per week. He reports that he does not use drugs.    Allergies:  Review of patient's allergies indicates:  No Known Allergies    Medications:  Current Outpatient Medications   Medication Sig Dispense Refill    ALPHAGAN P 0.1 % Drop Instill 1 drop into left eye twice daily  4    ketorolac 0.4% (ACULAR) 0.4 % Drop INT 1 GTT IN OS QID      LOTEMAX SM 0.38 % DrpG SKYLER 1 GTT IN OU BID      ondansetron (ZOFRAN) 4 MG tablet Take 1 tablet (4 mg total) by mouth daily as needed for Nausea.      predniSONE (DELTASONE) 20 MG tablet Take 0.5 tablets (10 mg total) by mouth once daily. Take with two 50 mg tabs for a total of 110 mg daily (Patient taking differently: Take 20 mg by mouth once daily. Take with two 50 mg tabs for a total of 110 mg daily)      PROLENSA 0.07 % Drop INSTILL 1 DROP INTO LEFT EYE TWICE DAILY  4    tamsulosin (FLOMAX) 0.4 mg Cap Take 1 capsule (0.4 mg total) by mouth once daily. 90 capsule  3    tobramycin sulfate 0.3% (TOBREX) 0.3 % ophthalmic ointment Apply a thin layer into right eye four times a day as directed 3.5 g 12    famotidine (PEPCID) 20 MG tablet Take 1 tablet (20 mg total) by mouth 2 (two) times daily. 60 tablet 2    fluconazole (DIFLUCAN) 100 MG tablet Take 1 tablet (100 mg total) by mouth once daily. Take 200mg on day 1, then 100mg days 2-7 (Patient not taking: Reported on 6/22/2020) 7 tablet 0    fluconazole (DIFLUCAN) 200 MG Tab Take 1 tablet (200 mg total) by mouth once daily. 1 tablet 0    predniSONE (DELTASONE) 50 MG Tab Take 2 tablets (100 mg total) by mouth once daily. Take with 1/2 of the 20 mg tablet. For a total of 110 daily.      sulfamethoxazole-trimethoprim 800-160mg (BACTRIM DS) 800-160 mg Tab Take 1 tablet by mouth once daily. 30 tablet 1     No current facility-administered medications for this visit.        Review of Systems   Constitutional: Negative for appetite change, chills, fatigue, fever and unexpected weight change.   HENT: Negative for dental problem, sinus pressure and sneezing.    Eyes: Positive for visual disturbance.   Respiratory: Negative for cough, choking, chest tightness and shortness of breath.    Cardiovascular: Negative for chest pain and leg swelling.   Gastrointestinal: Negative for abdominal pain, blood in stool, constipation, diarrhea and nausea.   Genitourinary: Negative for difficulty urinating, dysuria and frequency.   Musculoskeletal: Negative for arthralgias and back pain.   Skin: Negative for rash and wound.   Neurological: Negative for dizziness, light-headedness and headaches.   Hematological: Negative for adenopathy. Does not bruise/bleed easily.   Psychiatric/Behavioral: Negative for sleep disturbance. The patient is not nervous/anxious.        ECOG Performance Status: 0   ECOG SCORE    1 - Restricted in strenuous activity-ambulatory and able to carry out work of a light nature         Objective:      Vitals:   Vitals:     "06/22/20 1135   BP: 122/82   Pulse: 88   Resp: 16   Temp: 97.7 °F (36.5 °C)   SpO2: 99%   Weight: 126.6 kg (279 lb 1.6 oz)   Height: 5' 11" (1.803 m)     BMI: Body mass index is 38.93 kg/m².    Physical Exam  Constitutional:       Appearance: He is well-developed.      Comments: Sitting in wheelchair   HENT:      Head: Normocephalic and atraumatic.   Eyes:      Pupils: Pupils are equal, round, and reactive to light.   Neck:      Musculoskeletal: Normal range of motion and neck supple.   Cardiovascular:      Rate and Rhythm: Normal rate and regular rhythm.   Pulmonary:      Effort: Pulmonary effort is normal. No respiratory distress.      Breath sounds: Normal breath sounds.   Abdominal:      General: There is no distension.      Palpations: Abdomen is soft.      Tenderness: There is no abdominal tenderness.   Musculoskeletal:         General: No tenderness.      Right lower leg: Edema present.      Comments: Left lower extremity prosthesis   Lymphadenopathy:      Cervical: No cervical adenopathy.   Skin:     General: Skin is warm and dry.   Neurological:      Mental Status: He is alert and oriented to person, place, and time.      Cranial Nerves: No cranial nerve deficit.   Psychiatric:         Behavior: Behavior normal.         Laboratory Data:  No visits with results within 1 Week(s) from this visit.   Latest known visit with results is:   Lab Visit on 05/22/2020   Component Date Value Ref Range Status    SARS-CoV2 (COVID-19) Qualitative P* 05/22/2020 Not Detected  Not Detected Final    Comment: This test utilizes a real-time reverse transcription  polymerase chain reaction procedure to amplify and   detect the SARS-CoV-2 and detect the SARS-CoV-2 N2 and E nucleic  acid targets. The analytical sensitivity (limit of detection) of   this assay is 250 copies/mL.  A Detected result implies that the patient is infected with the  SARS-CoV-2 virus and is presumed to be contagious.    A Not Detected result implies that " the SARS-CoV-2 target nucleic  acids are not present above the limit of detection.  It does not  rule out the possibility of COVID-19 and should not be the sole  basis for treatment decisions. If COVID-19 is strongly suspected  based on clinical and epidemiological history, re-testing should  be considered.  This test is only for use under Food and Drug   Administration s Emergency Use Authorization (EUA).   Commercial reagents are provided by [employer].  Performance characteristics of the EUA have been   independently verified by [facility]   Department of [place]                           thology and Laboratory Medicine.         Supplemental Diagnosis  Chromogranin Staining:  Intensity: Moderate to strong. Positive cells: 100 (%)  Synaptophysin Staining:  Intensity: Strong. Positive cells: 100 (%)  CD31: 40 vessels per 1 HPF  Factor VIII: 27 vessels per 1 HPF  Ki67: 1 % tumor cells staining  Immunostains performed with appropriate positive and negative controls.  FINAL PATHOLOGIC DIAGNOSIS  1. Lymph node, level XI, excisional biopsy:  Fragments of benign lymph node with anthracosis and sinus histiocytosis (0/1).  2. Lung, lingular staple line, biopsy:  Lung, negative for neoplasm.  3. Lymph node, level X, excisional biopsy:  1 benign lymph node with anthracotic pigment and sinus histiocytosis (0/1).  4. Lymph node, level XII, excisional biopsy:  2 benign lymph nodes with sinus histiocytosis and anthracotic pigment (0/2).  5. Lung, left upper lobe, lobectomy:  Typical carcinoid tumor (well differentiated neuroendocrine tumor), 4 cm in greatest dimension.  Bronchial and vascular margins are negative for neoplasm.  Uninvolved lung adjacent to the tumor shows emphysematous changes and fibrosis, however remaining lung  appears unremarkable.  Additional immunohistochemical stains for ancillary studies (including synaptophysin, chromogranin and Ki-67)  will be completed and results will be reported in a supplemental  report.  See synoptic report in comment section for further details.  6. Lymph node, level VII, excisional biopsy:  2 benign lymph nodes with anthracotic pigment and sinus histiocytosis (0/2).  Comment: Synoptic Report  Specimen: Left upper lobe of lung  Procedure: Lobectomy  Specimen laterality: Left  Tumor size:  Greatest dimension: 4 cm  Tumor focality: Single focus  Histologic type: Typical carcinoid tumor  Visceral pleural invasion: Not identified  Tumor extension: Not identified  Margins: Bronchial and vascular margins are uninvolved by tumor.  Distance of tumor from closest margin: 1 cm from the bronchial surgical margin.  Lymphovascular invasion: Not identified  Ancillary studies:  Immunohistochemical stains for neuroendocrine markers and Ki-67 will be completed and results will follow in a  supplemental report.  Note: The tumor consists of a proliferation of cells in nests with lewis formation. The tumor cells have salt and  pepper chromatin pattern with abundant cytoplasm. There is no significant nuclear atypia. No evidence of  necrosis. Mitotic count is 0 mitoses in 10 high power fields.    Imaging:     Gallium 68 PET-CT 04/24/2020    COMPARISON:  Gallium PET 08/20/2019, 04/03/2019, 06/21/2017.    FINDINGS:  Quality of the study: Adequate.    Redemonstration of innumerable abnormal foci of increased uptake throughout the axial and appendicular skeleton, left orbit, and liver which is overall similar in distribution when compared to prior examination.  There is overall decreased uptake within the scattered foci of increased uptake as compared to the prior examination dated 08/20/2019.  Some of the corresponding liver lesions also appear to have decreased in overall size on the CT portion of the examination.    Index lesions as follows:    Left orbit SUV max 2.8 (previously 7.5).    Left coracoid process SUV max 4.9 (previously 13.3).    Left hepatic lobe lesion segment II SUV max 18 (previously  42.8).    Left sacral ala lesion SUV max 9.3 (previously 17.8).    There is continued tracer uptake within the inferior right thyroid lobe with SUV max 11 (previously 14.4).  There is diffuse uptake within the prostate without definite focus identified.    There is a focus of uptake along the right aspect of the falx near the vertex likely correlating with findings on prior MRI.  Right extra calvarial parietal focus of uptake also correlates with findings on prior MRI.    Physiologic uptake of the tracer is seen within the pituitary, liver, spleen, kidneys, and bowel.  There are findings consistent with a right adrenal adenoma.  Numerous sclerotic bony foci are identified consistent with patient's diffuse osseous metastatic disease.    Incidental CT findings: Status post left upper lobectomy.  There is a fat containing right inguinal hernia.      Impression       Findings consistent with widely disseminated metastatic disease with interval decreased tracer uptake compared to the prior examination dated 08/20/2019.  No new lesions are identified.  There also appears to be interval decrease in the size of some of the corresponding liver lesions which are poorly defined on the noncontrast CT scan.  Findings suggestive of favorable response to therapy.                  Assessment:       1. Bronchial carcinoid tumors    2. Secondary neuroendocrine tumor of liver    3. Physical deconditioning           Plan:     Mr. Rahman continues to have ongoing visual difficulty but this seems to be improving.  He is following up with ophthalmology later this week.  His last gallium 68 PET-CT did not show any evidence progressive disease.  I think many of his problems are related to his high-dose corticosteroids which have been tapered off.  He is extremely deconditioned and have offered referral to physical therapy, however, he declined stating he will plan to make himself more active in the coming weeks.  I will plan to see him back  in another 6 weeks and repeat a CT of the chest an MRI of the abdomen at that time.  He is report any new symptoms in the interim.  Multiple questions were answered and he is agreeable with this plan.      Jesus London DO, [facility]  Hematology & Oncology  [facility] [place] [address]  ph. [phone]  Fax. [phone]    25 minutes were spent in coordination of patient's care, record review and counseling.  More than 50% of the time was face-to-face.

## 2020-06-24 DIAGNOSIS — E87.6 HYPOKALEMIA: Primary | ICD-10-CM

## 2020-06-24 RX ORDER — POTASSIUM CHLORIDE 20 MEQ/1
40 TABLET, EXTENDED RELEASE ORAL DAILY
Qty: 4 TABLET | Refills: 0 | Status: SHIPPED | OUTPATIENT
Start: 2020-06-24 | End: 2020-06-26

## 2020-06-24 NOTE — TELEPHONE ENCOUNTER
----- Message from Jesus London DO, FACP sent at 6/24/2020  1:57 PM CDT -----  Does he take potassium?  His was 3.3      He should take KCl 40meq x 1 over the next 2 days.

## 2020-07-17 RX ORDER — SODIUM CHLORIDE 0.9 % (FLUSH) 0.9 %
10 SYRINGE (ML) INJECTION
Status: CANCELLED | OUTPATIENT
Start: 2020-08-16

## 2020-07-17 RX ORDER — LANREOTIDE ACETATE 120 MG/.5ML
120 INJECTION SUBCUTANEOUS ONCE
Status: CANCELLED | OUTPATIENT
Start: 2020-07-20

## 2020-07-17 RX ORDER — HEPARIN 100 UNIT/ML
500 SYRINGE INTRAVENOUS
Status: CANCELLED | OUTPATIENT
Start: 2020-08-16

## 2020-07-27 ENCOUNTER — INFUSION (OUTPATIENT)
Dept: INFUSION THERAPY | Facility: HOSPITAL | Age: 65
End: 2020-07-27
Attending: INTERNAL MEDICINE
Payer: COMMERCIAL

## 2020-07-27 DIAGNOSIS — C7B.8 SECONDARY NEUROENDOCRINE TUMOR OF LIVER: ICD-10-CM

## 2020-07-27 DIAGNOSIS — C7B.8 SECONDARY NEUROENDOCRINE TUMOR OF BONE: ICD-10-CM

## 2020-07-27 DIAGNOSIS — D3A.090 BRONCHIAL CARCINOID TUMORS: ICD-10-CM

## 2020-07-27 DIAGNOSIS — C78.7 SECONDARY MALIGNANT NEOPLASM OF LIVER: ICD-10-CM

## 2020-07-27 DIAGNOSIS — C7A.090 CARCINOID BRONCHIAL ADENOMA OF LEFT LUNG: ICD-10-CM

## 2020-07-27 DIAGNOSIS — C7B.8 SECONDARY MALIGNANT NEUROENDOCRINE TUMOR OF LIVER: Primary | ICD-10-CM

## 2020-07-27 PROCEDURE — 96372 THER/PROPH/DIAG INJ SC/IM: CPT

## 2020-07-27 PROCEDURE — 63600175 PHARM REV CODE 636 W HCPCS: Mod: JG | Performed by: INTERNAL MEDICINE

## 2020-07-27 RX ORDER — LANREOTIDE ACETATE 120 MG/.5ML
120 INJECTION SUBCUTANEOUS ONCE
Status: COMPLETED | OUTPATIENT
Start: 2020-07-27 | End: 2020-07-27

## 2020-07-27 RX ORDER — LANREOTIDE ACETATE 120 MG/.5ML
120 INJECTION SUBCUTANEOUS ONCE
Status: CANCELLED | OUTPATIENT
Start: 2020-07-27

## 2020-07-27 RX ADMIN — LANREOTIDE ACETATE 120 MG: 120 INJECTION SUBCUTANEOUS at 09:07

## 2020-08-05 ENCOUNTER — HOSPITAL ENCOUNTER (OUTPATIENT)
Dept: RADIOLOGY | Facility: HOSPITAL | Age: 65
Discharge: HOME | End: 2020-08-05
Attending: INTERNAL MEDICINE
Payer: COMMERCIAL

## 2020-08-05 DIAGNOSIS — R53.81 PHYSICAL DECONDITIONING: ICD-10-CM

## 2020-08-05 DIAGNOSIS — D3A.090 BRONCHIAL CARCINOID TUMORS: ICD-10-CM

## 2020-08-05 DIAGNOSIS — C7B.8 SECONDARY NEUROENDOCRINE TUMOR OF LIVER: ICD-10-CM

## 2020-08-05 PROCEDURE — A9585 GADOBUTROL INJECTION: HCPCS | Performed by: INTERNAL MEDICINE

## 2020-08-05 PROCEDURE — 71250 CT CHEST WITHOUT CONTRAST: ICD-10-PCS | Mod: 26,,, | Performed by: RADIOLOGY

## 2020-08-05 PROCEDURE — 25500020 PHARM REV CODE 255: Performed by: INTERNAL MEDICINE

## 2020-08-05 PROCEDURE — 71250 CT THORAX DX C-: CPT | Mod: 26,,, | Performed by: RADIOLOGY

## 2020-08-05 PROCEDURE — 71250 CT THORAX DX C-: CPT | Mod: TC

## 2020-08-05 PROCEDURE — 74183 MRI ABD W/O CNTR FLWD CNTR: CPT | Mod: 26,,, | Performed by: RADIOLOGY

## 2020-08-05 PROCEDURE — 74183 MRI ABDOMEN W WO CONTRAST: ICD-10-PCS | Mod: 26,,, | Performed by: RADIOLOGY

## 2020-08-05 PROCEDURE — 74183 MRI ABD W/O CNTR FLWD CNTR: CPT | Mod: TC

## 2020-08-05 RX ORDER — GADOBUTROL 604.72 MG/ML
10 INJECTION INTRAVENOUS
Status: COMPLETED | OUTPATIENT
Start: 2020-08-05 | End: 2020-08-05

## 2020-08-05 RX ADMIN — GADOBUTROL 10 ML: 604.72 INJECTION INTRAVENOUS at 10:08

## 2020-08-10 ENCOUNTER — OFFICE VISIT (OUTPATIENT)
Dept: NEUROLOGY | Facility: HOSPITAL | Age: 65
End: 2020-08-10
Attending: INTERNAL MEDICINE
Payer: COMMERCIAL

## 2020-08-10 VITALS
SYSTOLIC BLOOD PRESSURE: 122 MMHG | BODY MASS INDEX: 38.94 KG/M2 | DIASTOLIC BLOOD PRESSURE: 78 MMHG | HEIGHT: 71 IN | HEART RATE: 80 BPM | OXYGEN SATURATION: 97 % | WEIGHT: 278.13 LBS

## 2020-08-10 DIAGNOSIS — D3A.090 BRONCHIAL CARCINOID TUMORS: Primary | ICD-10-CM

## 2020-08-10 DIAGNOSIS — C7B.8 SECONDARY NEUROENDOCRINE TUMOR OF LIVER: ICD-10-CM

## 2020-08-10 DIAGNOSIS — C7B.8 SECONDARY NEUROENDOCRINE TUMOR OF BONE: ICD-10-CM

## 2020-08-10 PROCEDURE — 3008F PR BODY MASS INDEX (BMI) DOCUMENTED: ICD-10-PCS | Mod: CPTII,,, | Performed by: INTERNAL MEDICINE

## 2020-08-10 PROCEDURE — 3008F BODY MASS INDEX DOCD: CPT | Mod: CPTII,,, | Performed by: INTERNAL MEDICINE

## 2020-08-10 PROCEDURE — 99215 OFFICE O/P EST HI 40 MIN: CPT | Performed by: INTERNAL MEDICINE

## 2020-08-10 PROCEDURE — 99214 PR OFFICE/OUTPT VISIT, EST, LEVL IV, 30-39 MIN: ICD-10-PCS | Mod: ,,, | Performed by: INTERNAL MEDICINE

## 2020-08-10 PROCEDURE — 99214 OFFICE O/P EST MOD 30 MIN: CPT | Mod: ,,, | Performed by: INTERNAL MEDICINE

## 2020-08-10 NOTE — PROGRESS NOTES
PATIENT: Elroy Rahman  MRN: [id number]  DATE: 8/10/2020      Diagnosis:   1. Bronchial carcinoid tumors    2. Secondary neuroendocrine tumor of liver    3. Secondary neuroendocrine tumor of bone        Chief Complaint: Follow-up      Oncologic History:      Oncologic History Bronchial carcinoid, left, diagnosed 1/2016   Metastatic disease to liver and bone 5/2017     Oncologic Treatment Left upper lobectomy 1/2016  CAPTEM 7/2017 - 2/2019 (discontinued due to progression)  Lanreotide 7/2017   Zoledronic acid 7/2017   TACE 10/2018  TACE 3/2019  Lily-177 4/10/19, 6/5/19    Pathology 1/2016: Typical carcinoid tumor, well-differentiated T2a, N0, M0 Ki-67 <1%  6/2017: Liver biopsy, well-differentiated, Ki-67 25%         Subjective:    Interval History: Mr. Rahman is a 64 y.o. male who is seen in follow-up for a bronchial carcinoid tumor.  He states he has generally been feeling well.  He is now off corticosteroids.  He did have some difficulty weaning himself off but is now completely off.  His vision has improved any is planning on calling his ophthalmologist for a follow-up appointment as he recently underwent imaging to his eye.  He has tried to become more active but still has significant weakness but is eating more.  He otherwise is without new complaints.    His history dates to 1/2016 when he underwent a left upper lobectomy with Dr. Bell for a typical carcinoid tumor.  His pathological staging was T2a, N0, M0 with Ki-67 less than 1% and mitotic count 0 per 10 high-powered fields.  He done well postoperatively, however, in 5/2017 he was undergoing routine surveillance and a chest CT in picked up evidence of progression of disease in the liver.  Dedicated abdominal CT was performed showing multiple liver lesions.  In 6/2017 he underwent a CT-guided liver biopsy which was positive for metastatic neuroendocrine tumor which was well-differentiated with no overt nuclear atypia, however, the Ki-67 was 25%.  He  underwent gallium-68 PET/CT and was found to have widespread disease involving the bone, liver and also mesentery.  He was started on CAPTEM in 7/2017 and also Lanreotide and zoledronic acid.  Scans in 10/2017 had shown stability of disease.  Imaging in 1/2018 had shown stability of disease.  Scans in 4/2018 had shown stability of disease.  Scans in 07/2018 had continued to showed mild growth.  In 10/2018 he underwent TACE.  CAPTEM was discontinued in 02/2019 due to progression.  He underwent TACE in 03/2019.  He was started on treatment with PRRT using Lily-177 on 04/10/2019.  His 2nd cycle was in 06/2019 and following this he experienced vision loss of his left eye.  This was hypothesized to have resulted from inflammatory response is due to in orbital lesion and PRRT was discontinued and corticosteroids were initiated with improvement of his symptoms.      Past Medical History:   Past Medical History:   Diagnosis Date    Allergy     Carcinoid bronchial adenoma of left lung     PRRT    Chemotherapy follow-up examination 8/25/2017    Cough with hemoptysis     mild/intermittent    Elevated bilirubin 7/2/2018    Hx of BKA, left     above knee    Kidney stone     Mild heartburn     Physical deconditioning 6/22/2020    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    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    Sepsis due to methicillin susceptible Staphylococcus aureus 09/14/2019    MSSA bacteremia from Amputation stump infection    Sleep apnea     Snores    Urinary tract infection     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       Past Surgical HIstory:   Past Surgical History:   Procedure Laterality Date    ABDOMINAL SURGERY      Bka Left     HERNIA REPAIR      IRRIGATION AND DEBRIDEMENT OF LOWER EXTREMITY Left 9/20/2019    Procedure: IRRIGATION AND DEBRIDEMENT, LOWER EXTREMITY - left - cysto tubing - cultures;  Surgeon: Jesus Arias MD;  Location: [facility] OR [address] [place] [address];  Service:  Orthopedics;  Laterality: Left;    LAPAROSCOPIC REPAIR OF INCARCERATED UMBILICAL HERNIA N/A 11/27/2018    Procedure: REPAIR, HERNIA, UMBILICAL, INCARCERATED, LAPAROSCOPIC;  Surgeon: Coco Guidry MD;  Location: [facility] OR;  Service: General;  Laterality: N/A;  video    TRANSESOPHAGEAL ECHOCARDIOGRAPHY N/A 9/18/2019    Procedure: ECHOCARDIOGRAM, TRANSESOPHAGEAL;  Surgeon: Grace Diagnostic Provider;  Location: [facility] EP LAB;  Service: Anesthesiology;  Laterality: N/A;       Family History:   Family History   Problem Relation Age of Onset    Cancer Mother         lung    Cancer Sister     Anesthesia problems Neg Hx        Social History:  reports that he has never smoked. He has never used smokeless tobacco. He reports current alcohol use of about 1.0 standard drinks of alcohol per week. He reports that he does not use drugs.    Allergies:  Review of patient's allergies indicates:  No Known Allergies    Medications:  Current Outpatient Medications   Medication Sig Dispense Refill    ondansetron (ZOFRAN) 4 MG tablet Take 1 tablet (4 mg total) by mouth daily as needed for Nausea.      ALPHAGAN P 0.1 % Drop Instill 1 drop into left eye twice daily  4    famotidine (PEPCID) 20 MG tablet Take 1 tablet (20 mg total) by mouth 2 (two) times daily. (Patient not taking: Reported on 8/10/2020) 60 tablet 2    fluconazole (DIFLUCAN) 100 MG tablet Take 1 tablet (100 mg total) by mouth once daily. Take 200mg on day 1, then 100mg days 2-7 (Patient not taking: Reported on 8/10/2020) 7 tablet 0    fluconazole (DIFLUCAN) 200 MG Tab Take 1 tablet (200 mg total) by mouth once daily. (Patient not taking: Reported on 8/10/2020) 1 tablet 0    ketorolac 0.4% (ACULAR) 0.4 % Drop INT 1 GTT IN OS QID      LOTEMAX SM 0.38 % DrpG SKYLER 1 GTT IN OU BID      predniSONE (DELTASONE) 20 MG tablet Take 0.5 tablets (10 mg total) by mouth once daily. Take with two 50 mg tabs for a total of 110 mg daily (Patient not taking: Reported on  8/10/2020)      predniSONE (DELTASONE) 50 MG Tab Take 2 tablets (100 mg total) by mouth once daily. Take with 1/2 of the 20 mg tablet. For a total of 110 daily. (Patient not taking: Reported on 8/10/2020)      PROLENSA 0.07 % Drop INSTILL 1 DROP INTO LEFT EYE TWICE DAILY  4    sulfamethoxazole-trimethoprim 800-160mg (BACTRIM DS) 800-160 mg Tab Take 1 tablet by mouth once daily. (Patient not taking: Reported on 8/10/2020) 30 tablet 1    tamsulosin (FLOMAX) 0.4 mg Cap Take 1 capsule (0.4 mg total) by mouth once daily. (Patient not taking: Reported on 8/10/2020) 90 capsule 3    tobramycin sulfate 0.3% (TOBREX) 0.3 % ophthalmic ointment Apply a thin layer into right eye four times a day as directed (Patient not taking: Reported on 8/10/2020) 3.5 g 12     No current facility-administered medications for this visit.        Review of Systems   Constitutional: Negative for appetite change, chills, fatigue, fever and unexpected weight change.   HENT: Negative for dental problem, sinus pressure and sneezing.    Eyes: Positive for visual disturbance.   Respiratory: Negative for cough, choking, chest tightness and shortness of breath.    Cardiovascular: Negative for chest pain and leg swelling.   Gastrointestinal: Negative for abdominal pain, blood in stool, constipation, diarrhea and nausea.   Genitourinary: Negative for difficulty urinating, dysuria and frequency.   Musculoskeletal: Negative for arthralgias and back pain.   Skin: Negative for rash and wound.   Neurological: Negative for dizziness, light-headedness and headaches.   Hematological: Negative for adenopathy. Does not bruise/bleed easily.   Psychiatric/Behavioral: Negative for sleep disturbance. The patient is not nervous/anxious.        ECOG Performance Status: 0   ECOG SCORE    1 - Restricted in strenuous activity-ambulatory and able to carry out work of a light nature         Objective:      Vitals:   Vitals:    08/10/20 1030   BP: 122/78   BP Location:  "Left arm   Patient Position: Sitting   BP Method: Medium (Automatic)   Pulse: 80   SpO2: 97%   Weight: 126.1 kg (278 lb 1.8 oz)   Height: 5' 11" (1.803 m)     BMI: Body mass index is 38.79 kg/m².    Physical Exam  Constitutional:       Appearance: Normal appearance.   HENT:      Head: Normocephalic.   Eyes:      General: No scleral icterus.  Pulmonary:      Effort: Pulmonary effort is normal. No respiratory distress.   Neurological:      General: No focal deficit present.      Mental Status: He is alert and oriented to person, place, and time.   Psychiatric:         Mood and Affect: Mood normal.         Laboratory Data:  Lab Visit on 08/05/2020   Component Date Value Ref Range Status    WBC 08/05/2020 5.72  3.90 - 12.70 K/uL Final    RBC 08/05/2020 3.58* 4.60 - 6.20 M/uL Final    Hemoglobin 08/05/2020 10.2* 14.0 - 18.0 g/dL Final    Hematocrit 08/05/2020 34.7* 40.0 - 54.0 % Final    Mean Corpuscular Volume 08/05/2020 97  82 - 98 fL Final    Mean Corpuscular Hemoglobin 08/05/2020 28.5  27.0 - 31.0 pg Final    Mean Corpuscular Hemoglobin Conc 08/05/2020 29.4* 32.0 - 36.0 g/dL Final    RDW 08/05/2020 15.5* 11.5 - 14.5 % Final    Platelets 08/05/2020 342  150 - 350 K/uL Final    MPV 08/05/2020 9.3  9.2 - 12.9 fL Final    Gran # (ANC) 08/05/2020 3.6  1.8 - 7.7 K/uL Final    Comment: The ANC is based on a white cell differential from an   automated cell counter. It has not been microscopically   reviewed for the presence of abnormal cells. Clinical   correlation is required.      Immature Grans (Abs) 08/05/2020 0.03  0.00 - 0.04 K/uL Final    Comment: Mild elevation in immature granulocytes is non specific and   can be seen in a variety of conditions including stress response,   acute inflammation, trauma and pregnancy. Correlation with other   laboratory and clinical findings is essential.      Sodium 08/05/2020 145  136 - 145 mmol/L Final    Potassium 08/05/2020 4.0  3.5 - 5.1 mmol/L Final    Chloride " 08/05/2020 105  95 - 110 mmol/L Final    CO2 08/05/2020 27  23 - 29 mmol/L Final    Glucose 08/05/2020 147* 70 - 110 mg/dL Final    BUN, Bld 08/05/2020 7* 8 - 23 mg/dL Final    Creatinine 08/05/2020 1.4  0.5 - 1.4 mg/dL Final    Calcium 08/05/2020 8.6* 8.7 - 10.5 mg/dL Final    Total Protein 08/05/2020 6.8  6.0 - 8.4 g/dL Final    Albumin 08/05/2020 2.5* 3.5 - 5.2 g/dL Final    Total Bilirubin 08/05/2020 0.5  0.1 - 1.0 mg/dL Final    Comment: For infants and newborns, interpretation of results should be based  on gestational age, weight and in agreement with clinical  observations.  Premature Infant recommended reference ranges:  Up to 24 hours.............<8.0 mg/dL  Up to 48 hours............<12.0 mg/dL  3-5 days..................<15.0 mg/dL  6-29 days.................<15.0 mg/dL      Alkaline Phosphatase 08/05/2020 109  55 - 135 U/L Final    AST 08/05/2020 11  10 - 40 U/L Final    ALT 08/05/2020 13  10 - 44 U/L Final    Anion Gap 08/05/2020 13  8 - 16 mmol/L Final    eGFR if African American 08/05/2020 >60.0  >60 mL/min/1.73 m^2 Final    eGFR if non African American 08/05/2020 53.1* >60 mL/min/1.73 m^2 Final    Comment: Calculation used to obtain the estimated glomerular filtration  rate (eGFR) is the CKD-EPI equation.        Supplemental Diagnosis  Chromogranin Staining:  Intensity: Moderate to strong. Positive cells: 100 (%)  Synaptophysin Staining:  Intensity: Strong. Positive cells: 100 (%)  CD31: 40 vessels per 1 HPF  Factor VIII: 27 vessels per 1 HPF  Ki67: 1 % tumor cells staining  Immunostains performed with appropriate positive and negative controls.  FINAL PATHOLOGIC DIAGNOSIS  1. Lymph node, level XI, excisional biopsy:  Fragments of benign lymph node with anthracosis and sinus histiocytosis (0/1).  2. Lung, lingular staple line, biopsy:  Lung, negative for neoplasm.  3. Lymph node, level X, excisional biopsy:  1 benign lymph node with anthracotic pigment and sinus histiocytosis  (0/1).  4. Lymph node, level XII, excisional biopsy:  2 benign lymph nodes with sinus histiocytosis and anthracotic pigment (0/2).  5. Lung, left upper lobe, lobectomy:  Typical carcinoid tumor (well differentiated neuroendocrine tumor), 4 cm in greatest dimension.  Bronchial and vascular margins are negative for neoplasm.  Uninvolved lung adjacent to the tumor shows emphysematous changes and fibrosis, however remaining lung  appears unremarkable.  Additional immunohistochemical stains for ancillary studies (including synaptophysin, chromogranin and Ki-67)  will be completed and results will be reported in a supplemental report.  See synoptic report in comment section for further details.  6. Lymph node, level VII, excisional biopsy:  2 benign lymph nodes with anthracotic pigment and sinus histiocytosis (0/2).  Comment: Synoptic Report  Specimen: Left upper lobe of lung  Procedure: Lobectomy  Specimen laterality: Left  Tumor size:  Greatest dimension: 4 cm  Tumor focality: Single focus  Histologic type: Typical carcinoid tumor  Visceral pleural invasion: Not identified  Tumor extension: Not identified  Margins: Bronchial and vascular margins are uninvolved by tumor.  Distance of tumor from closest margin: 1 cm from the bronchial surgical margin.  Lymphovascular invasion: Not identified  Ancillary studies:  Immunohistochemical stains for neuroendocrine markers and Ki-67 will be completed and results will follow in a  supplemental report.  Note: The tumor consists of a proliferation of cells in nests with lewis formation. The tumor cells have salt and  pepper chromatin pattern with abundant cytoplasm. There is no significant nuclear atypia. No evidence of  necrosis. Mitotic count is 0 mitoses in 10 high power fields.    Imaging:     CT 08/05/2020  COMPARISON:  PET DOTATATE 04/24/2020.  CT chest 12/03/2019.     FINDINGS:  Additional history: Carcinoid tumor of the lingula.  Status post left upper lobectomy including  the lingula.  Widespread neuroendocrine tumor consistent with metastatic disease has been previously revealed by PET gadolinium study, 08/20/2019.     Neck base: Stable 1.8 cm hypodense right thyroid nodule, avid on PET/CT of 08/20/2019.  Report of the PET/CT recommends thyroid ultrasound for further evaluation.     Chest wall/axilla: Bilateral gynecomastia.     Heart/pericardium: Heart is normal size.  No pericardial fluid or calcification.     Mediastinum/fanta: Normal mediastinal fat and hilar contours allowing for resection of the left upper lobe and lingula.     Aorta: Left-sided aortic arch with 3 vessels branch.  Normal tapering of the thoracic aorta with mild atherosclerotic calcification.  Coronary calcification.     Pulmonary vasculature: Pulmonary arteries distribute normally following left upper lobectomy and lingular resection.  The left superior pulmonary vein has been ligated.  Left inferior pulmonary vein and right pulmonary veins drains into the left atrium in typical fashion.     Esophagus: Normal.     Upper abdomen: Multiple high attenuation hepatic lesions noted on this nonenhanced chest CT, consistent with hepatic metastasis.  Stable right adrenal adenoma.     Airway: Postsurgical changes from left upper lobectomy and lingular resection with resection of the left upper lobe and lingular bronchi.  No evidence of local recurrence.     Lungs:     -Postsurgical changes of left upper lobectomy and lingular resection.     -Stable ovoid soft tissue nodule less than 1 cm in size located in the fat overlying the right hemidiaphragm at the inferior margin of the right middle lobe (sagittal series 603, image 75; axial series 2, image 60), stable in size and back to study from 05/17/2017.  Such stability size is highly suggestive of benign nature.     New: Right lower lobe, posterior basal segment subsolid nodule.  0.7 cm soft tissue component (axial series 4, image 279) with ground-glass halo measuring at  least 1.3 cm (axial series 4, image 278).     Pleura: No pleural fluid or calcification.     Bones:Degenerative changes of the spine.  Multiple blastic lesions scattered throughout the axial and appendicular skeleton, consistent with osseous metastases.  Postsurgical changes of partial resection of the left 6th and 7th ribs for left thoracotomy.     Impression:     In this patient with history of bronchial carcinoid status post left upper lobectomy and lingular resection,     New subsolid opacity identified in the periphery in the posterior basal segment of the right lower lobe (see axial series 4, images 279 and 278).  Radiographic appearance is nonspecific.  Differential diagnosis includes infection, noninfectious inflammation, postinflammatory change and unusual neoplasm.  For such subsolid nodules the Fleischner society 2017 guidelines recommend repeat chest CT at 3 months (November 2020).     Multiple hepatic and osseous lesions consistent with metastases, better seen on gallium 68 DOTATATE scan.     Stable right adrenal adenoma.      MRI 08/05/2020  COMPARISON:  MRI abdomen with and without contrast dated 12/03/2019; PET-CT dated 04/24/2020     FINDINGS:  Pulmonary Bases: No pleural effusion     Liver: There is diffuse signal dropout in keeping with steatosis.  Similar distribution of intrahepatic metastases with index lesions as below:     * hepatic segment VIII.  3.8 cm.  Series 11, image 45.  Previously 4.0 cm.  (Craniocaudal measurement approximately 2.8 cm on today's exam, previously by my measurement 3.2 cm).     * hepatic segment III.  2.4 cm.  Series 11, image 43.  Previously 2.7 cm.     Few non index lesions appear slightly more conspicuous in the interval for reference near the gallbladder fossa measuring 1.1 cm, series 11, image 52; previously 0.8 cm by my measurement previously on series 13, image 53).  No definite new lesion.     Bile Ducts: No biliary dilatation.     Gallbladder: Containing small  stones.     Pancreas: Fatty atrophic changes.     Spleen: Redemonstration of enhancing intrasplenic lesions.  Degree of enhancement is slightly less conspicuous in the interval.  Largest nodule measuring 1.9 cm, previously 2.2 cm (series 12, image 42).     Proximal Gastrointestinal tract: Normal caliber     Mesentery: No discrete mesenteric mass.     Adrenal Glands: Redemonstration of right adrenal adenoma.  Left adrenal gland is unremarkable.     Kidneys: Enhance symmetrically without hydronephrosis.  Tiny right renal cysts.     Aorta and Abdominal Vasculature: The main portal vein, SMV, aorta, and IVC are patent.     Lymph nodes: No pathologically enlarged lymph node.     Body wall: Normal     Other: No free fluid.  Similar distribution of known osseous metastatic disease, better demonstrated on comparison PET.     Impression:     Similar distribution of intrahepatic lesions with similar to slight decreased size of index lesions as below.  Few non-index lesions appear slightly more conspicuous in the interval.  This may be confounded by worsening degree of steatosis in the interval.  Close attention at follow-up recommended at a minimum.     Redemonstration of intrasplenic nodules, the largest of which measures similar to slightly smaller in the interval.     Known osseous metastatic disease, better demonstrated on comparison PET.     RECIST SUMMARY:     Date of prior examination for comparison: MRI abdomen with and without contrast dated 12/03/2019     Lesion 1: hepatic segment VIII. 4.0 cm. Series 11, image 45. Previously 4.0 cm.     Lesion 2: Hepatic segment III.  2.5 cm. Series 11, image 43.  Previously 2.7 cm.          Assessment:       1. Bronchial carcinoid tumors    2. Secondary neuroendocrine tumor of liver    3. Secondary neuroendocrine tumor of bone           Plan:     Mr. Rahman continues to improve clinically.  Review of his scans show overall stability of his disease.  At this point I would recommend  we continue on with lanreotide and zoledronic acid.  Labs been reviewed and appropriate to continue treatment I am concerned about his ongoing weakness and have recommended physical therapy which he is agreeable with.  He is going to follow with Ophthalmology.  I will plan to see him back in another 3 months and repeat imaging at that time.  He is to report any new symptoms in the interim.  All questions were answered and he is agreeable with this plan.    Jesus London DO, FACP  Hematology & Oncology  [facility] [place] [address]  ph. [phone]  Fax. [phone]    25 minutes were spent in coordination of patient's care, record review and counseling.  More than 50% of the time was face-to-face.

## 2020-08-21 ENCOUNTER — CLINICAL SUPPORT (OUTPATIENT)
Dept: REHABILITATION | Facility: HOSPITAL | Age: 65
End: 2020-08-21
Attending: INTERNAL MEDICINE
Payer: COMMERCIAL

## 2020-08-21 DIAGNOSIS — D3A.090 BRONCHIAL CARCINOID TUMORS: ICD-10-CM

## 2020-08-21 DIAGNOSIS — R53.1 DECREASED STRENGTH, ENDURANCE, AND MOBILITY: ICD-10-CM

## 2020-08-21 DIAGNOSIS — C7B.8 SECONDARY NEUROENDOCRINE TUMOR OF LIVER: ICD-10-CM

## 2020-08-21 DIAGNOSIS — C7B.8 SECONDARY NEUROENDOCRINE TUMOR OF BONE: ICD-10-CM

## 2020-08-21 DIAGNOSIS — R26.89 BALANCE PROBLEM: ICD-10-CM

## 2020-08-21 DIAGNOSIS — Z74.09 DECREASED STRENGTH, ENDURANCE, AND MOBILITY: ICD-10-CM

## 2020-08-21 DIAGNOSIS — R68.89 DECREASED STRENGTH, ENDURANCE, AND MOBILITY: ICD-10-CM

## 2020-08-21 DIAGNOSIS — R26.9 GAIT ABNORMALITY: ICD-10-CM

## 2020-08-21 PROCEDURE — 97162 PT EVAL MOD COMPLEX 30 MIN: CPT | Mod: PN

## 2020-08-21 NOTE — PLAN OF CARE
"[facility]  Physical Therapy Neurological Rehabilitation Initial Evaluation    Name: Elroy HAQUE [facility]  Clinic Number: [id number]    Therapy Diagnosis:   Encounter Diagnoses   Name Primary?    Bronchial carcinoid tumors     Secondary neuroendocrine tumor of liver     Secondary neuroendocrine tumor of bone     Decreased strength, endurance, and mobility     Balance problem     Gait abnormality      Physician: Jesus London DO,*    Physician Orders: PT Eval and Treat     Medical Diagnosis from Referral:   D3A.090 (ICD-10-CM) - Bronchial carcinoid tumors   C7B.8 (ICD-10-CM) - Secondary neuroendocrine tumor of liver   C7B.8 (ICD-10-CM) - Secondary neuroendocrine tumor of bone     Evaluation Date: 8/21/2020  Authorization Period Expiration: 12/31/2020  Plan of Care Expiration: 11/21/2020  Visit # / Visits authorized: 1/ 30    Time In: 10:00 am   Time Out: 10:45 am   Total Billable Time: 45 minutes    Precautions: Standard, Immunosuppression and Cancer    Subjective   Date of onset: increasing weakness over the last year  History of current condition - Elroy reports: history of cancer with medication regiment including corticosteroids in the last year. Pt reports his MD suggested therapy in order to combat weakness, deconditioning. Pt reports no falls recently. Pt reports hx of  L AKA and independent with prosthetic since he was 18. Pt reports he got new prosthesis about 7 months ago. Pt reports he has been using the cane to walk due to issues with his eyesight. Reports everything "looks like midnight". Pt reports he is unable to stand for prolonged periods. Pt reports he gets tired when walking. Pt reports some difficulty getting out of the tub.      Medical History:   Past Medical History:   Diagnosis Date    Allergy     Carcinoid bronchial adenoma of left lung     PRRT    Chemotherapy follow-up examination 8/25/2017    Cough with hemoptysis     mild/intermittent    Elevated " "bilirubin 7/2/2018    Hx of BKA, left     above knee    Kidney stone     Mild heartburn     Physical deconditioning 6/22/2020    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    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    Sepsis due to methicillin susceptible Staphylococcus aureus 09/14/2019    MSSA bacteremia from Amputation stump infection    Sleep apnea     Snores    Urinary tract infection     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       Surgical History:   Elroy Rahman  has a past surgical history that includes Bka (Left); Laparoscopic repair of incarcerated umbilical hernia (N/A, 11/27/2018); Abdominal surgery; Hernia repair; Transesophageal echocardiography (N/A, 9/18/2019); and Irrigation and debridement of lower extremity (Left, 9/20/2019).    Medications:   Elroy has a current medication list which includes the following prescription(s): alphagan p, famotidine, fluconazole, fluconazole, ketorolac 0.4%, lotemax sm, ondansetron, prednisone, prednisone, prolensa, sulfamethoxazole-trimethoprim 800-160mg, tamsulosin, and tobramycin sulfate 0.3%.    Allergies:   Review of patient's allergies indicates:   Allergen Reactions    Epinephrine Other (See Comments)     Carcinoid crisis        Imaging, MRI studies: Head Orbit 3/16/2020             Prior Therapy: no  Social History: lives with their spouse  Falls: none   DME: cane, no home modifications  Exercise Routine / History: very active, fishing    Occupation: , not working recently  Prior Level of Function: increasing limitations due to weakness, prior very active able to do all activities independently  Current Level of Function: limited in endurance, limited in distance walking, needs help getting out of tub, difficulty balancing to put on pants    Pain:  Current 0/10, worst 5/10, best 0/10   Location: L limb phantom pain     Pts goals: "get my endurance back"    Objective     - Follows commands: yes  - Speech: no " "deficits no    Mental status: alert, oriented to person, place, and time  Appearance: Well groomed  Behavior:  calm, cooperative and adequate rapport can be established  Attention Span and Concentration:  Normal    Dominant hand:  right     Posture Alignment :slouched posture    Skin integrity:  Intact    Visual/Auditory: reports limitations in vision, states everything "looks like midnight"   Tracking:Intact  Saccades: Impaired: slight overshoot noted horizontally  Acuity:Intact  Peripheral field: impaired    Coordination:   - UE coordination: finger to nose: NT                       Pronation/ supination: decreased at fast speed  - LE coordination:  alternating toe taps: NT due to prosthesis                                Heel to shin: NT    ROM:   UPPER EXTREMITY--AROM/PROM  (R) UE: WFLs  (L) UE: WFLs           RANGE OF MOTION--LOWER EXTREMITIES  (R) LE Hip: normal   Knee: normal   Ankle: normal    (L) LE: Hip: flexion normal, extension not formally observed   Knee: n/a prosthetic   Ankle: n/a prosthetic    Strength: manual muscle test grades below   Upper Extremity Strength   RUE LUE   Shoulder Flexion: 4/5 4/5   Shoulder Abduction: 4/5 4/5   Shoulder External  Rotation:   4/5 4/5   Shoulder Internal  Rotation:   4/5 4/5   Elbow Flexion:     5/5 5/5   Elbow Extension: 5/5 5/5     Lower Extremity Strength   RLE LLE   Hip Flexion: 4-/5 3/5   Hip Extension:  NT NT   Hip Abduction: 4/5 3+/5   Hip Adduction: 4/5 3+/5   Knee Extension: 5/5 N/a   Knee Flexion: 4/5 N/a   Ankle Dorsiflexion: 5/5 N/a     Abdominal Strength: 3+/5       Evaluation   Single Limb Stance R LE NT  (<10 sec = HIGH FALL RISK)   Single Limb Stance L LE NT  (<10 sec = HIGH FALL RISK)   5 times sit-stand NT due to prosthesis this session  >12 sec= fall risk for general elderly  >16 sec= fall risk for Parkinson's disease  >10 sec= balance/vestibular dysfunction (<61 y/o)  >14.2 sec= balance/vestibular dysfunction (>61 y/o)  >12 sec= fall risk for CVA "     Postural control:  MCTSIB:  1. Eyes Open/feet together/Firm: 30 seconds, minimal sway  2. Eyes Closed/feet together/Firm: 30 seconds, moderate sway  3. Eyes Open/feet together/Foam: 3 seconds  4. Eyes Closed/feet together/Foam: 3 seconds    Gait Assessment:   - AD used: cane  - Assistance: mod I  - Distance: 100 ft     GAIT DEVIATIONS:  -Decreased stance time on L LE  -Decreased step length on R LE  -Decreased gait speed  -Increased L hip flexion during swing  -B hip drop during stance phase    * Above impairments indicate decreased gait safety and increased fall risk.    Impairments contributing to deviations: impaired motor control, decreased strength, decreased endurance, balance impairments, control of prosthetitc    Endurance Deficit: yes      Evaluation   Timed Up and Go 19.6 sec (21+19+19)  < 20 sec safe for independent transfers,     < 30 sec assist required for transfers   Self Selected Gait speed 0.5 m/sec (6m/12s)   Fast gait speed 0.75 m/sec (6m/8 s)     Functional Mobility (Bed mobility, transfers)  Bed mobility: Mod I  Supine to sit: Mod I  Sit to supine: Mod I  Rolling: Mod I  Transfers to bed: Mod I  Transfers to toilet: Mod I  Sit to stand:  Mod I      TREATMENT   Treatment Time In: 00  Treatment Time Out: 00  Total Treatment time separate from Evaluation: 00 minutes    Home Exercises and Patient Education Provided    Education provided:   - role of PT, plan of care, goals, scheduling limitations, cancellation policies    Written Home Exercises Provided: HEP to be provided next session    Assessment   Elroy is a 64 y.o. male referred to outpatient Physical Therapy with a medical diagnosis of bronchial carcinoid tumors with secondary tumor of liver and bone. Pt presents to evaluation today with complaints of deconditioning and decreased strength. Pt with history of L AKA when he was 18 yrs old. Pt presents today with decreased B LE strength, decreased endurance, balance impairments, poor  tolerance to activities, increased fall risk, and decreased safety. Pt with increased time to complete TUG demonstrating increased fall risk. Pt also with decreased self- selected and fast gait speeds compared to age related norms, demonstrating increased fall risk. Pt with balance impairments with eyes closed and while standing on foam demonstrating decreased vestibular response. Pt with some visual impairments including dark vision and decreased peripheral vision. Pt reporting significant fatigue at end of session today, demonstrating decreased endurance and tolerance to activities. Pt would benefit from skilled PT services in order to address listed deficits, establish HEP, improve safety, maximize level of function, and promote physical independence.     Pt prognosis is Good.   Pt will benefit from skilled outpatient Physical Therapy to address the deficits stated above and in the chart below, provide pt/family education, and to maximize pt's level of independence.     Plan of care discussed with patient: Yes  Pt's spiritual, cultural and educational needs considered and patient is agreeable to the plan of care and goals as stated below:     Anticipated Barriers for therapy: progression of metastatic disease, cx treatment    Medical Necessity is demonstrated by the following  History  Co-morbidities and personal factors that may impact the plan of care Co-morbidities:   Hx of L AKA, vision loss of L eye, elevated bilirubin, metastatic cancer, sleep apnea    Personal Factors:   no deficits     moderate   Examination  Body Structures and Functions, activity limitations and participation restrictions that may impact the plan of care Body Regions:   lower extremities  trunk    Body Systems:    ROM  strength  gross coordinated movement  balance  gait  transfers  transitions  motor control    Participation Restrictions:   Ability to walk long distances in community    Activity limitations:   Learning and applying  knowledge  no deficits    General Tasks and Commands  no deficits    Communication  no deficits    Mobility  lifting and carrying objects  walking  driving (bike, car, motorcycle)    Self care  no deficits    Domestic Life  shopping  cooking  doing house work (cleaning house, washing dishes, laundry)  assisting others    Interactions/Relationships  no deficits    Life Areas  no deficits    Community and Social Life  no deficits         moderate   Clinical Presentation stable and uncomplicated low   Decision Making/ Complexity Score: moderate     Goals:  Short Term Goals: 4 weeks   1. Pt to improve MMT score by > 1/2 in order to improve tolerance to daily activities.   2. Pt to improve TUG score to < / = 17 seconds in order to improve safety.  3. Pt to improve self selected gait speed to < / = 0.6 m/s in order to improve tolerance to community ambulation.  4. Pt to demonstrate ability to walk 300 ft with one rest break in order to improve endurance.   5. Pt to balance on condition 3 and 4 of MCTSIB for > / = 10 seconds in order to improve safety.  6. Pt to demo independence with HEP in order to maximize physical activity at home.     Long Term Goals: 10 weeks   1. Pt to improve MMT score by > 1 in order to improve tolerance to daily activities.   2. Pt to improve TUG score to < / = 13 seconds in order to improve safety.  3. Pt to improve self selected gait speed to < / = 0.7 m/s in order to improve tolerance to community ambulation.  4. Pt to demonstrate ability to walk 500 ft with one rest break in order to improve endurance.   5. Pt to balance on condition 3 and 4 of MCTSIB for > / = 30 seconds in order to improve safety.      Plan   Plan of care Certification: 8/21/2020 to 11/21/2020.    Outpatient Physical Therapy 2 times weekly for 10 weeks to include the following interventions: Gait Training, Manual Therapy, Moist Heat/ Ice, Neuromuscular Re-ed, Patient Education, Self Care, Therapeutic Activites and  Therapeutic Exercise.     Helen Garcia, PT, DPT

## 2020-08-24 ENCOUNTER — INFUSION (OUTPATIENT)
Dept: INFUSION THERAPY | Facility: HOSPITAL | Age: 65
End: 2020-08-24
Attending: INTERNAL MEDICINE
Payer: COMMERCIAL

## 2020-08-24 DIAGNOSIS — C7A.090 CARCINOID BRONCHIAL ADENOMA OF LEFT LUNG: ICD-10-CM

## 2020-08-24 DIAGNOSIS — C7B.8 SECONDARY MALIGNANT NEUROENDOCRINE TUMOR OF LIVER: Primary | ICD-10-CM

## 2020-08-24 DIAGNOSIS — C7B.8 SECONDARY NEUROENDOCRINE TUMOR OF LIVER: ICD-10-CM

## 2020-08-24 DIAGNOSIS — D3A.090 BRONCHIAL CARCINOID TUMORS: ICD-10-CM

## 2020-08-24 DIAGNOSIS — C7B.8 SECONDARY NEUROENDOCRINE TUMOR OF BONE: ICD-10-CM

## 2020-08-24 DIAGNOSIS — C78.7 SECONDARY MALIGNANT NEOPLASM OF LIVER: ICD-10-CM

## 2020-08-24 PROCEDURE — 96372 THER/PROPH/DIAG INJ SC/IM: CPT

## 2020-08-24 PROCEDURE — 63600175 PHARM REV CODE 636 W HCPCS: Mod: JG | Performed by: INTERNAL MEDICINE

## 2020-08-24 RX ORDER — LANREOTIDE ACETATE 120 MG/.5ML
120 INJECTION SUBCUTANEOUS ONCE
Status: CANCELLED | OUTPATIENT
Start: 2020-08-24

## 2020-08-24 RX ORDER — LANREOTIDE ACETATE 120 MG/.5ML
120 INJECTION SUBCUTANEOUS ONCE
Status: COMPLETED | OUTPATIENT
Start: 2020-08-24 | End: 2020-08-24

## 2020-08-24 RX ADMIN — LANREOTIDE ACETATE 120 MG: 120 INJECTION SUBCUTANEOUS at 09:08

## 2020-09-02 ENCOUNTER — CLINICAL SUPPORT (OUTPATIENT)
Dept: REHABILITATION | Facility: HOSPITAL | Age: 65
End: 2020-09-02
Attending: INTERNAL MEDICINE
Payer: COMMERCIAL

## 2020-09-02 DIAGNOSIS — R26.89 BALANCE PROBLEM: ICD-10-CM

## 2020-09-02 DIAGNOSIS — R68.89 DECREASED STRENGTH, ENDURANCE, AND MOBILITY: Primary | ICD-10-CM

## 2020-09-02 DIAGNOSIS — Z74.09 DECREASED STRENGTH, ENDURANCE, AND MOBILITY: Primary | ICD-10-CM

## 2020-09-02 DIAGNOSIS — R53.1 DECREASED STRENGTH, ENDURANCE, AND MOBILITY: Primary | ICD-10-CM

## 2020-09-02 DIAGNOSIS — R26.9 GAIT ABNORMALITY: ICD-10-CM

## 2020-09-02 PROCEDURE — 97110 THERAPEUTIC EXERCISES: CPT | Mod: PN,CQ

## 2020-09-02 NOTE — PROGRESS NOTES
Physical Therapy Daily Treatment Note     Name: Elroy HAQUE [facility] Number: [id number]    Therapy Diagnosis:   Encounter Diagnoses   Name Primary?    Decreased strength, endurance, and mobility Yes    Balance problem     Gait abnormality      Physician: Jesus London DO,*    Visit Date: 9/2/2020    Physician Orders: PT Eval and Treat      Medical Diagnosis from Referral:   D3A.090 (ICD-10-CM) - Bronchial carcinoid tumors   C7B.8 (ICD-10-CM) - Secondary neuroendocrine tumor of liver   C7B.8 (ICD-10-CM) - Secondary neuroendocrine tumor of bone      Evaluation Date: 8/21/2020  Authorization Period Expiration: 12/31/2020  Plan of Care Expiration: 11/21/2020  Visit # / Visits authorized: 2/ 30     Time In: 1433  Time Out: 1518  Total Billable Time: 45 minutes     Precautions: Standard, Immunosuppression and Cancer      Subjective     Pt reports: he is doing well today, no complaints   He was compliant with home exercise program.  Response to previous treatment: good  Functional change: ongoing    Pain: 0/10  Location:       Objective     Elroy received therapeutic exercises to develop strength, endurance for 45 minutes including:  Nustep lvl 2.5 7 mins  STS 2x12 from blue mat   SLR 2x10 B  Hip abduction 2x15 RTB  Hip adduction 2x15 ball squeeze  Bridges 2x10    Elroy received the following manual therapy techniques: were applied to the:  for  minutes, including:      Elroy participated in neuromuscular re-education activities to improve:  for  minutes. The following activities were included:        Elroy received the following direct contact modalities after being cleared for contraindications:     Elroy received the following supervised modalities after being cleared for contradictions:     Elroy received hot pack for  minutes to .    Elroy received cold pack for  minutes to .      Home Exercises Provided and Patient Education Provided     Education provided:   - HEP NV    Written Home  Exercises Provided: NV.  Exercises were reviewed and Elroy was able to demonstrate them prior to the end of the session.  Elroy demonstrated n/a understanding of the education provided.     See EMR under n/a for exercises provided nv.    Assessment     Pt tolerated 1st session well. Pt entered clinic with SPC, slight antalgic gait pattern secondary to prosthetic. Pt did well with all performed therex today. Started Nustep at level 3 and dropped down to 1.5 half way through 7 minutes 2* fatigue. Pt required rest breaks between all sets of therex 2* muscular fatigue. Pt performed STS better with blocks from blue mat and placement of LLE prior to standing   Elroy is progressing well towards his goals.   Pt prognosis is Good.     Pt will continue to benefit from skilled outpatient physical therapy to address the deficits listed in the problem list box on initial evaluation, provide pt/family education and to maximize pt's level of independence in the home and community environment.     Pt's spiritual, cultural and educational needs considered and pt agreeable to plan of care and goals.     Anticipated barriers to physical therapy: history of Cx    Goals:  Short Term Goals: 4 weeks   1. Pt to improve MMT score by > 1/2 in order to improve tolerance to daily activities.   2. Pt to improve TUG score to < / = 17 seconds in order to improve safety.  3. Pt to improve self selected gait speed to < / = 0.6 m/s in order to improve tolerance to community ambulation.  4. Pt to demonstrate ability to walk 300 ft with one rest break in order to improve endurance.   5. Pt to balance on condition 3 and 4 of MCTSIB for > / = 10 seconds in order to improve safety.  6. Pt to demo independence with HEP in order to maximize physical activity at home.      Long Term Goals: 10 weeks   1. Pt to improve MMT score by > 1 in order to improve tolerance to daily activities.   2. Pt to improve TUG score to < / = 13 seconds in order to  improve safety.  3. Pt to improve self selected gait speed to < / = 0.7 m/s in order to improve tolerance to community ambulation.  4. Pt to demonstrate ability to walk 500 ft with one rest break in order to improve endurance.   5. Pt to balance on condition 3 and 4 of MCTSIB for > / = 30 seconds in order to improve safety.      Plan   Outpatient Physical Therapy 2 times weekly for 10 weeks to include the following interventions: Gait Training, Manual Therapy, Moist Heat/ Ice, Neuromuscular Re-ed, Patient Education, Self Care, Therapeutic Activites and Therapeutic Exercise.       Williams Tran, PTA

## 2020-09-09 ENCOUNTER — CLINICAL SUPPORT (OUTPATIENT)
Dept: REHABILITATION | Facility: HOSPITAL | Age: 65
End: 2020-09-09
Payer: COMMERCIAL

## 2020-09-09 DIAGNOSIS — R53.1 DECREASED STRENGTH, ENDURANCE, AND MOBILITY: Primary | ICD-10-CM

## 2020-09-09 DIAGNOSIS — Z74.09 DECREASED STRENGTH, ENDURANCE, AND MOBILITY: Primary | ICD-10-CM

## 2020-09-09 DIAGNOSIS — R68.89 DECREASED STRENGTH, ENDURANCE, AND MOBILITY: Primary | ICD-10-CM

## 2020-09-09 DIAGNOSIS — R26.89 BALANCE PROBLEM: ICD-10-CM

## 2020-09-09 DIAGNOSIS — R26.9 GAIT ABNORMALITY: ICD-10-CM

## 2020-09-09 PROCEDURE — 97112 NEUROMUSCULAR REEDUCATION: CPT | Mod: PN

## 2020-09-09 PROCEDURE — 97110 THERAPEUTIC EXERCISES: CPT | Mod: PN

## 2020-09-09 NOTE — PROGRESS NOTES
Physical Therapy Daily Treatment Note     Name: Elroy HAQUE [facility]  Clinic Number: [id number]    Therapy Diagnosis:   Encounter Diagnoses   Name Primary?    Decreased strength, endurance, and mobility Yes    Balance problem     Gait abnormality      Physician: Jesus London DO,*    Visit Date: 9/9/2020    Physician Orders: PT Eval and Treat      Medical Diagnosis from Referral:   D3A.090 (ICD-10-CM) - Bronchial carcinoid tumors   C7B.8 (ICD-10-CM) - Secondary neuroendocrine tumor of liver   C7B.8 (ICD-10-CM) - Secondary neuroendocrine tumor of bone      Evaluation Date: 8/21/2020  Authorization Period Expiration: 12/31/2020  Plan of Care Expiration: 11/21/2020  Visit # / Visits authorized: 3/ 30     Time In: 823 am  Time Out: 900 am  Total Billable Time: 37 minutes     Precautions: Standard, Immunosuppression and Cancer      Subjective     Pt reports: he feels like he is getting back into a normal routine, more confident in strength    He was compliant with home exercise program.  Response to previous treatment: good  Functional change: ongoing    Pain: 0/10  Location:       Objective     Elroy received therapeutic exercises to develop strength, endurance for 27 minutes including:    Nustep lvl 2.5 7 mins  STS 2x10 from high low (17 in)  SLR 2x10 B   Hip abduction 2x15 RTB  Hip adduction 2x15 ball squeeze  Bridges 2x10    +Lateral walking in // bars x 3 laps    Elroy received the following manual therapy techniques: were applied to the:  for  minutes, including:      Elroy participated in neuromuscular re-education activities to improve:  for 10 minutes. The following activities were included:    +NBOS firm 2x30  +NBOS firm eyes closed 2x30  +WBOS foam  2x30  +WBOS eyes closed foam 2x30  +NBOS foam eyes open 2x30    Elroy received the following direct contact modalities after being cleared for contraindications:     Elroy received the following supervised modalities after being cleared for  contradictions:     Elroy received hot pack for  minutes to .    Elroy received cold pack for  minutes to .      Home Exercises Provided and Patient Education Provided     Education provided:   - HEP NV    Written Home Exercises Provided: NV.  Exercises were reviewed and Elroy was able to demonstrate them prior to the end of the session.  Elroy demonstrated n/a understanding of the education provided.     See EMR under n/a for exercises provided nv.    Assessment     Pt tolerated session well with no adverse response. Addition of standing balance activities this session in order to challenge vestibular response and hip/ankle strategies. Pt with most difficulty completing vision eliminated activities and balance on foam surface. Plan to continue addition of standing strengthening/endurance/balance activities next session to pt tolerance.     Elroy is progressing well towards his goals.   Pt prognosis is Good.     Pt will continue to benefit from skilled outpatient physical therapy to address the deficits listed in the problem list box on initial evaluation, provide pt/family education and to maximize pt's level of independence in the home and community environment.     Pt's spiritual, cultural and educational needs considered and pt agreeable to plan of care and goals.     Anticipated barriers to physical therapy: history of Cx    Goals:  Short Term Goals: 4 weeks   1. Pt to improve MMT score by > 1/2 in order to improve tolerance to daily activities.   2. Pt to improve TUG score to < / = 17 seconds in order to improve safety.  3. Pt to improve self selected gait speed to < / = 0.6 m/s in order to improve tolerance to community ambulation.  4. Pt to demonstrate ability to walk 300 ft with one rest break in order to improve endurance.   5. Pt to balance on condition 3 and 4 of MCTSIB for > / = 10 seconds in order to improve safety.  6. Pt to demo independence with HEP in order to maximize physical  activity at home.      Long Term Goals: 10 weeks   1. Pt to improve MMT score by > 1 in order to improve tolerance to daily activities.   2. Pt to improve TUG score to < / = 13 seconds in order to improve safety.  3. Pt to improve self selected gait speed to < / = 0.7 m/s in order to improve tolerance to community ambulation.  4. Pt to demonstrate ability to walk 500 ft with one rest break in order to improve endurance.   5. Pt to balance on condition 3 and 4 of MCTSIB for > / = 30 seconds in order to improve safety.      Plan   Outpatient Physical Therapy 2 times weekly for 10 weeks to include the following interventions: Gait Training, Manual Therapy, Moist Heat/ Ice, Neuromuscular Re-ed, Patient Education, Self Care, Therapeutic Activites and Therapeutic Exercise.       Helen Garcia, PT   9/9/2020

## 2020-09-16 ENCOUNTER — CLINICAL SUPPORT (OUTPATIENT)
Dept: REHABILITATION | Facility: HOSPITAL | Age: 65
End: 2020-09-16
Attending: INTERNAL MEDICINE
Payer: COMMERCIAL

## 2020-09-16 ENCOUNTER — TELEPHONE (OUTPATIENT)
Dept: NEUROLOGY | Facility: HOSPITAL | Age: 65
End: 2020-09-16

## 2020-09-16 DIAGNOSIS — Z74.09 DECREASED STRENGTH, ENDURANCE, AND MOBILITY: Primary | ICD-10-CM

## 2020-09-16 DIAGNOSIS — R68.89 DECREASED STRENGTH, ENDURANCE, AND MOBILITY: Primary | ICD-10-CM

## 2020-09-16 DIAGNOSIS — R26.89 BALANCE PROBLEM: ICD-10-CM

## 2020-09-16 DIAGNOSIS — R53.1 DECREASED STRENGTH, ENDURANCE, AND MOBILITY: Primary | ICD-10-CM

## 2020-09-16 DIAGNOSIS — R26.9 GAIT ABNORMALITY: ICD-10-CM

## 2020-09-16 PROCEDURE — 97110 THERAPEUTIC EXERCISES: CPT | Mod: PN

## 2020-09-16 PROCEDURE — 97112 NEUROMUSCULAR REEDUCATION: CPT | Mod: PN

## 2020-09-16 NOTE — PROGRESS NOTES
Physical Therapy Daily Treatment Note     Name: Elroy HAQUE [facility]  Clinic Number: [id number]    Therapy Diagnosis:   Encounter Diagnoses   Name Primary?   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  Response to previous treatment: good  Functional change: ongoing    Pain: 0/10  Location:       Objective     Elroy received therapeutic exercises to develop strength, endurance for 28 minutes including:    Nustep lvl 2.5 7 mins  STS 2x10 from high low (17 in)  SLR 2x10 B   Hip abduction 2x15 RTB  Hip adduction 2x15 ball squeeze  Bridges 2x10    Lateral walking in // bars x 3 laps  +Standing hip abduction 2x10  +Standing hip extension 2x10    Elroy received the following manual therapy techniques: were applied to the:  for  minutes, including:      Elroy participated in neuromuscular re-education activities to improve:  for 10 minutes. The following activities were included:    NBOS firm 2x30  NBOS firm eyes closed 2x30  WBOS foam  2x30  WBOS eyes closed foam 2x30  NBOS foam eyes open 2x30    Elroy received the following direct contact modalities after being cleared for contraindications:     Elroy received the following supervised modalities after being cleared  for contradictions:     Elroy received hot pack for  minutes to .    Elroy received cold pack for  minutes to .      Home Exercises Provided and Patient Education Provided     Education provided:   - HEP NV    Written Home Exercises Provided: NV.  Exercises were reviewed and Elroy was able to demonstrate them prior to the end of the session.  Elroy demonstrated n/a understanding of the education provided.     See EMR under n/a for exercises provided nv.    Assessment     Pt tolerated session well with no adverse response. Addition of standing hip strengthening this session with good performance. Requiring vc for decreased compensatory trunk lean. Pt requiring 1 rest break throughout session following standing activities. Plan to continue progression of standing activities to challenge LE strength, endurance as appropriate.     Elroy is progressing well towards his goals.   Pt prognosis is Good.     Pt will continue to benefit from skilled outpatient physical therapy to address the deficits listed in the problem list box on initial evaluation, provide pt/family education and to maximize pt's level of independence in the home and community environment.     Pt's spiritual, cultural and educational needs considered and pt agreeable to plan of care and goals.     Anticipated barriers to physical therapy: history of Cx    Goals:  Short Term Goals: 4 weeks   1. Pt to improve MMT score by > 1/2 in order to improve tolerance to daily activities.   2. Pt to improve TUG score to < / = 17 seconds in order to improve safety.  3. Pt to improve self selected gait speed to < / = 0.6 m/s in order to improve tolerance to community ambulation.  4. Pt to demonstrate ability to walk 300 ft with one rest break in order to improve endurance.   5. Pt to balance on condition 3 and 4 of MCTSIB for > / = 10 seconds in order to improve safety.  6. Pt to demo independence with HEP in order to maximize physical activity at home.       Long Term Goals: 10 weeks   1. Pt to improve MMT score by > 1 in order to improve tolerance to daily activities.   2. Pt to improve TUG score to < / = 13 seconds in order to improve safety.  3. Pt to improve self selected gait speed to < / = 0.7 m/s in order to improve tolerance to community ambulation.  4. Pt to demonstrate ability to walk 500 ft with one rest break in order to improve endurance.   5. Pt to balance on condition 3 and 4 of MCTSIB for > / = 30 seconds in order to improve safety.      Plan   Outpatient Physical Therapy 2 times weekly for 10 weeks to include the following interventions: Gait Training, Manual Therapy, Moist Heat/ Ice, Neuromuscular Re-ed, Patient Education, Self Care, Therapeutic Activites and Therapeutic Exercise.       Helen Garcia, PT   9/16/2020

## 2020-09-18 ENCOUNTER — CLINICAL SUPPORT (OUTPATIENT)
Dept: REHABILITATION | Facility: HOSPITAL | Age: 65
End: 2020-09-18
Attending: INTERNAL MEDICINE
Payer: COMMERCIAL

## 2020-09-18 DIAGNOSIS — R68.89 DECREASED STRENGTH, ENDURANCE, AND MOBILITY: ICD-10-CM

## 2020-09-18 DIAGNOSIS — R26.89 BALANCE PROBLEM: ICD-10-CM

## 2020-09-18 DIAGNOSIS — Z74.09 DECREASED STRENGTH, ENDURANCE, AND MOBILITY: ICD-10-CM

## 2020-09-18 DIAGNOSIS — R26.9 GAIT ABNORMALITY: ICD-10-CM

## 2020-09-18 DIAGNOSIS — R53.1 DECREASED STRENGTH, ENDURANCE, AND MOBILITY: ICD-10-CM

## 2020-09-18 PROCEDURE — 97110 THERAPEUTIC EXERCISES: CPT | Mod: PN

## 2020-09-18 PROCEDURE — 97112 NEUROMUSCULAR REEDUCATION: CPT | Mod: PN

## 2020-09-18 NOTE — PROGRESS NOTES
Physical Therapy Daily Treatment Note     Name: Elroy HAQUE [facility]  Clinic Number: [id number]    Therapy Diagnosis:   Encounter Diagnoses   Name Primary?   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     He was compliant with home exercise program.  Response to previous treatment: good  Functional change: ongoing    Pain: 0/10  Location:       Objective     Elroy received therapeutic exercises to develop strength, endurance for 25 minutes including:    Nustep lvl 2.5 7 mins  STS 2x10 from high low (17 in)  SLR 2x10 B   Hip abduction 2x15 RTB  Hip adduction 2x15 ball squeeze  Bridges 2x10    Lateral walking in // bars x 3 laps  Standing hip abduction 2x10  Standing hip extension 2x10  +Mini squats 2x10    Elroy received the following manual therapy techniques: were applied to the:  for  minutes, including:      Elroy participated in neuromuscular re-education activities to improve:  for 10 minutes. The following activities were included:    NBOS firm 2x30  NBOS firm eyes closed 2x30  WBOS foam  2x30  WBOS eyes closed foam 2x30  NBOS foam eyes open 2x30    Elroy received the following direct contact modalities after being cleared for contraindications:     Elroy received the following supervised modalities after being  cleared for contradictions:     Elroy received hot pack for  minutes to .    Elroy received cold pack for  minutes to .      Home Exercises Provided and Patient Education Provided     Education provided:   - HEP NV    Written Home Exercises Provided: NV.  Exercises were reviewed and Elroy was able to demonstrate them prior to the end of the session.  Elroy demonstrated n/a understanding of the education provided.     See EMR under n/a for exercises provided nv.    Assessment     Pt tolerated session well with no adverse response. Pt with appropriate level of ms fatigue noted throughout mat and standing there-ex. No LOB noted during balance activities this session, moderate sway noted with eyes closed. Addition of mini squats to challenge posterior chain musculature. Pt remains appropriate for therapy at this time.     Elroy is progressing well towards his goals.   Pt prognosis is Good.     Pt will continue to benefit from skilled outpatient physical therapy to address the deficits listed in the problem list box on initial evaluation, provide pt/family education and to maximize pt's level of independence in the home and community environment.     Pt's spiritual, cultural and educational needs considered and pt agreeable to plan of care and goals.     Anticipated barriers to physical therapy: history of Cx    Goals:  Short Term Goals: 4 weeks   1. Pt to improve MMT score by > 1/2 in order to improve tolerance to daily activities.   2. Pt to improve TUG score to < / = 17 seconds in order to improve safety.  3. Pt to improve self selected gait speed to < / = 0.6 m/s in order to improve tolerance to community ambulation.  4. Pt to demonstrate ability to walk 300 ft with one rest break in order to improve endurance.   5. Pt to balance on condition 3 and 4 of MCTSIB for > / = 10 seconds in order to improve safety.  6. Pt to demo independence with HEP in order to maximize physical activity at home.      Long  Term Goals: 10 weeks   1. Pt to improve MMT score by > 1 in order to improve tolerance to daily activities.   2. Pt to improve TUG score to < / = 13 seconds in order to improve safety.  3. Pt to improve self selected gait speed to < / = 0.7 m/s in order to improve tolerance to community ambulation.  4. Pt to demonstrate ability to walk 500 ft with one rest break in order to improve endurance.   5. Pt to balance on condition 3 and 4 of MCTSIB for > / = 30 seconds in order to improve safety.      Plan   Outpatient Physical Therapy 2 times weekly for 10 weeks to include the following interventions: Gait Training, Manual Therapy, Moist Heat/ Ice, Neuromuscular Re-ed, Patient Education, Self Care, Therapeutic Activites and Therapeutic Exercise.       Helen Garcia, PT   9/18/2020

## 2020-09-21 ENCOUNTER — INFUSION (OUTPATIENT)
Dept: INFUSION THERAPY | Facility: HOSPITAL | Age: 65
End: 2020-09-21
Attending: INTERNAL MEDICINE
Payer: COMMERCIAL

## 2020-09-21 DIAGNOSIS — C7A.090 CARCINOID BRONCHIAL ADENOMA OF LEFT LUNG: ICD-10-CM

## 2020-09-21 DIAGNOSIS — D3A.090 BRONCHIAL CARCINOID TUMORS: ICD-10-CM

## 2020-09-21 DIAGNOSIS — C7B.8 SECONDARY NEUROENDOCRINE TUMOR OF BONE: ICD-10-CM

## 2020-09-21 DIAGNOSIS — C7B.8 SECONDARY MALIGNANT NEUROENDOCRINE TUMOR OF LIVER: Primary | ICD-10-CM

## 2020-09-21 DIAGNOSIS — C78.7 SECONDARY MALIGNANT NEOPLASM OF LIVER: ICD-10-CM

## 2020-09-21 DIAGNOSIS — C7B.8 SECONDARY NEUROENDOCRINE TUMOR OF LIVER: ICD-10-CM

## 2020-09-21 PROCEDURE — 63600175 PHARM REV CODE 636 W HCPCS: Mod: JG | Performed by: INTERNAL MEDICINE

## 2020-09-21 PROCEDURE — 96372 THER/PROPH/DIAG INJ SC/IM: CPT

## 2020-09-21 RX ORDER — LANREOTIDE ACETATE 120 MG/.5ML
120 INJECTION SUBCUTANEOUS ONCE
Status: CANCELLED | OUTPATIENT
Start: 2020-09-21

## 2020-09-21 RX ORDER — LANREOTIDE ACETATE 120 MG/.5ML
120 INJECTION SUBCUTANEOUS ONCE
Status: COMPLETED | OUTPATIENT
Start: 2020-09-21 | End: 2020-09-21

## 2020-09-21 RX ADMIN — LANREOTIDE ACETATE 120 MG: 120 INJECTION SUBCUTANEOUS at 09:09

## 2020-09-21 NOTE — NURSING
Patient tolerated Lanreotide with no complications. VSS. Pt instructed to call MD with any problems. NAD. Pt discharged home independently.

## 2020-09-23 ENCOUNTER — CLINICAL SUPPORT (OUTPATIENT)
Dept: REHABILITATION | Facility: HOSPITAL | Age: 65
End: 2020-09-23
Attending: INTERNAL MEDICINE
Payer: COMMERCIAL

## 2020-09-23 DIAGNOSIS — R26.9 GAIT ABNORMALITY: ICD-10-CM

## 2020-09-23 DIAGNOSIS — R26.89 BALANCE PROBLEM: ICD-10-CM

## 2020-09-23 DIAGNOSIS — R53.1 DECREASED STRENGTH, ENDURANCE, AND MOBILITY: Primary | ICD-10-CM

## 2020-09-23 DIAGNOSIS — Z74.09 DECREASED STRENGTH, ENDURANCE, AND MOBILITY: Primary | ICD-10-CM

## 2020-09-23 DIAGNOSIS — R68.89 DECREASED STRENGTH, ENDURANCE, AND MOBILITY: Primary | ICD-10-CM

## 2020-09-23 PROCEDURE — 97110 THERAPEUTIC EXERCISES: CPT | Mod: PN

## 2020-09-23 PROCEDURE — 97112 NEUROMUSCULAR REEDUCATION: CPT | Mod: PN

## 2020-09-23 NOTE — PROGRESS NOTES
Physical Therapy Daily Treatment Note     Name: Elroy Rahman  Clinic Number: [id number]    Therapy Diagnosis:   Encounter Diagnoses   Name Primary?   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 States he is feeling stronger.     He was compliant with home exercise program.  Response to previous treatment: good  Functional change: ongoing    Pain: 0/10  Location:       Objective     Strength: manual muscle test grades below   Upper Extremity Strength    RUE LUE   Shoulder Flexion: 4/5 4/5   Shoulder Abduction: 4/5 4/5   Shoulder External  Rotation:    4/5 4/5   Shoulder Internal  Rotation:    4/5 4/5   Elbow Flexion:       5/5 5/5   Elbow Extension: 5/5 5/5      Lower Extremity Strength    RLE LLE   Hip Flexion: 4-/5 3/5   Hip Extension:  NT NT   Hip Abduction: 4/5 3+/5   Hip Adduction: 4/5 3+/5   Knee Extension: 5/5 N/a   Knee Flexion: 4/5 N/a   Ankle Dorsiflexion: 5/5 N/a       Postural control:  MCTSIB:  1. Eyes Open/feet together/Firm: 30 seconds, minimal sway  2. Eyes Closed/feet together/Firm: 30 seconds, moderate sway  3. Eyes Open/feet together/Foam: 30 seconds  4. Eyes Closed/feet together/Foam: 22 seconds     Gait Assessment:   - AD used: none  - Assistance: mod I  - Distance: 200 ft      GAIT DEVIATIONS:  -Decreased  stance time on L LE  -Decreased step length on R LE  -Decreased gait speed  -Increased L hip flexion during swing  -B hip drop during stance phase     * Above impairments indicate decreased gait safety and increased fall risk.     Impairments contributing to deviations: impaired motor control, decreased strength, decreased endurance, balance impairments, control of prosthetitc     Endurance Deficit: yes        Evaluation   Timed Up and Go 14 sec (13+14+15)  < 20 sec safe for independent transfers,     < 30 sec assist required for transfers   Self Selected Gait speed 0.75 m/sec (6m/8s)   Fast gait speed 1.00 m/sec (6m/6s)       Elroy received therapeutic exercises to develop strength, endurance for 33 minutes including:    Nustep lvl 3 - 7 mins  STS 2x10 from high low (17 in)  SLR 2x10 B   Hip abduction 2x15 RTB  Hip adduction 2x15 ball squeeze  Bridges 2x10    Lateral walking in // bars x 3 laps  Standing hip abduction 2x10  Standing hip extension 2x10  Mini squats 2x10     Elroy received the following manual therapy techniques: were applied to the:  for  minutes, including:      Elroy participated in neuromuscular re-education activities to improve:  for 10 minutes. The following activities were included:    NBOS firm 2x30  NBOS firm eyes closed 2x30  WBOS foam  2x30  WBOS eyes closed foam 2x30  NBOS foam eyes open 2x30    +Plyo throws at trampoline x 20  +Lateral plyo throws at trampoline x 20 ea direction    Elroy received the following direct contact modalities after being cleared for contraindications:     Elroy received the following supervised modalities after being cleared for contradictions:     Elroy received hot pack for  minutes to .    Elroy received cold pack for  minutes to .      Home Exercises Provided and Patient Education Provided     Education provided:   - HEP NV    Written Home Exercises Provided: NV.  Exercises were reviewed and Elroy was able to demonstrate them prior  to the end of the session.  Elroy demonstrated n/a understanding of the education provided.     See EMR under n/a for exercises provided nv.    Assessment     Pt tolerated session well with no adverse response.Progress note performed this session. Pt with improvements noted in endurance throughout session as demonstrated by decreased frequency of rest breaks required. Pt also ambulating in clinic without cane during last few sessions. Pt with improved balance as noted by improved performance on MCTSIB. Pt able to pass all conditions except 4, also improving balance time on condition 4. Pt with noted improvement in TUG score, indicating decreased fall risk. Pt also demonstrating improved functional mobility and improve self-selected/fast gait speeds. Overall, pt making good improvements in functional mobility, gait, and endurance since beginning therapy. However, pt continues with decreased B LE strength, endurance, and tolerance to daily activities. Pt met 2/5 STGs at this time, all other goals addressed and remain appropriate. Pt would continue to benefit from skilled PT services in order to address listed deficits and maximize functional mobility.    Elroy is progressing well towards his goals.   Pt prognosis is Good.     Pt will continue to benefit from skilled outpatient physical therapy to address the deficits listed in the problem list box on initial evaluation, provide pt/family education and to maximize pt's level of independence in the home and community environment.     Pt's spiritual, cultural and educational needs considered and pt agreeable to plan of care and goals.     Anticipated barriers to physical therapy: history of Cx    Goals:  Short Term Goals: 4 weeks   1. Pt to improve MMT score by > 1/2 in order to improve tolerance to daily activities.   2. Pt to improve TUG score to < / = 17 seconds in order to improve safety.  3. Pt to improve self selected gait speed to < / = 0.6 m/s in order to  improve tolerance to community ambulation.  4. Pt to demonstrate ability to walk 300 ft with one rest break in order to improve endurance.   5. Pt to balance on condition 3 and 4 of MCTSIB for > / = 10 seconds in order to improve safety.  6. Pt to demo independence with HEP in order to maximize physical activity at home.      Long Term Goals: 10 weeks   1. Pt to improve MMT score by > 1 in order to improve tolerance to daily activities. ongoing  2. Pt to improve TUG score to < / = 13 seconds in order to improve safety. ongoing  3. Pt to improve self selected gait speed to < / = 0.7 m/s in order to improve tolerance to community ambulation. MET 9/23/2020  4. Pt to demonstrate ability to walk 500 ft with one rest break in order to improve endurance. MET 9/23/2020  5. Pt to balance on condition 3 and 4 of MCTSIB for > / = 30 seconds in order to improve safety. -ongoing      Plan   Outpatient Physical Therapy 2 times weekly for 10 weeks to include the following interventions: Gait Training, Manual Therapy, Moist Heat/ Ice, Neuromuscular Re-ed, Patient Education, Self Care, Therapeutic Activites and Therapeutic Exercise.       Helen Garcia, PT   9/23/2020

## 2020-09-25 ENCOUNTER — CLINICAL SUPPORT (OUTPATIENT)
Dept: REHABILITATION | Facility: HOSPITAL | Age: 65
End: 2020-09-25
Attending: INTERNAL MEDICINE
Payer: COMMERCIAL

## 2020-09-25 DIAGNOSIS — R68.89 DECREASED STRENGTH, ENDURANCE, AND MOBILITY: Primary | ICD-10-CM

## 2020-09-25 DIAGNOSIS — R26.89 BALANCE PROBLEM: ICD-10-CM

## 2020-09-25 DIAGNOSIS — Z74.09 DECREASED STRENGTH, ENDURANCE, AND MOBILITY: Primary | ICD-10-CM

## 2020-09-25 DIAGNOSIS — R53.1 DECREASED STRENGTH, ENDURANCE, AND MOBILITY: Primary | ICD-10-CM

## 2020-09-25 DIAGNOSIS — R26.9 GAIT ABNORMALITY: ICD-10-CM

## 2020-09-25 PROCEDURE — 97112 NEUROMUSCULAR REEDUCATION: CPT | Mod: PN

## 2020-09-25 PROCEDURE — 97110 THERAPEUTIC EXERCISES: CPT | Mod: PN

## 2020-09-25 NOTE — PROGRESS NOTES
Physical Therapy Daily Treatment Note     Name: Elroy HAQUE [facility]  Clinic Number: [id number]    Therapy Diagnosis:   Encounter Diagnoses   Name Primary?   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     He was compliant with home exercise program.  Response to previous treatment: good  Functional change: ongoing    Pain: 0/10  Location:       Objective     Elroy received therapeutic exercises to develop strength, endurance for 18 minutes including:    Nustep lvl 3 - 7 mins  STS 2x10 from high low (17 in)  SLR 2x10 B   Hip abduction 2x15 RTB  Hip adduction 2x15 ball squeeze  Bridges 2x10    Lateral walking in // bars YTB around ankles 3 laps x 2 trials  Standing hip abduction 2x10  Standing hip extension 2x10  Mini squats 2x10     +Walking with KB x 25 feet (2 laps with 7.5#, 2 laps with 15#)    Elroy received the following manual therapy techniques: were applied to the:  for  minutes, including:      Elroy participated in neuromuscular re-education activities to improve:  for 25 minutes. The following activities were included:    NBOS firm 2x30  NBOS firm eyes closed 2x30  WBOS foam  2x30  WBOS eyes closed foam 2x30  NBOS foam eyes open 2x30  Plyo throws to trampoline +on foam x 20  Lateral throws to trampoline +on foam  x 20 ea direction    Elroy received the following direct contact modalities after being cleared for contraindications:     Elroy received the following supervised modalities after being cleared for contradictions:     Elroy received hot pack for  minutes to .    Elroy received cold pack for  minutes to .      Home Exercises Provided and Patient Education Provided     Education provided:   - HEP NV    Written Home Exercises Provided: NV.  Exercises were reviewed and Elroy was able to demonstrate them prior to the end of the session.  Elroy demonstrated n/a understanding of the education provided.     See EMR under n/a for exercises provided nv.    Assessment     Pt tolerated session well with no adverse response. Addition of foam balance during plyo throws this session. No outright LOB noted during activity, CGA assist from PT throughout for safety. Pt endurance adequately challenged this session with plyo throws and walking with KB, requiring seated rest break between each. Plan to continue therapy 1x per week in order to address limited endurance, impaired balance, and decreased functional mobility.     Elroy is progressing well towards his goals.   Pt prognosis is Good.     Pt will continue to benefit from skilled outpatient physical therapy to address the deficits listed in the problem list box on initial evaluation, provide pt/family education and to maximize pt's level of independence in the home and community environment.     Pt's spiritual, cultural and educational needs considered and pt agreeable to plan of care and goals.     Anticipated barriers to physical therapy: history of Cx    Goals:  Short Term Goals: 4 weeks   1. Pt to improve MMT score by > 1/2 in order to improve tolerance to daily activities.   2. Pt to improve TUG score to < / = 17 seconds in order to improve safety.  3. Pt to improve self selected gait speed to < / = 0.6 m/s in order to improve tolerance to community  ambulation.  4. Pt to demonstrate ability to walk 300 ft with one rest break in order to improve endurance.   5. Pt to balance on condition 3 and 4 of MCTSIB for > / = 10 seconds in order to improve safety.  6. Pt to demo independence with HEP in order to maximize physical activity at home.      Long Term Goals: 10 weeks   1. Pt to improve MMT score by > 1 in order to improve tolerance to daily activities.   2. Pt to improve TUG score to < / = 13 seconds in order to improve safety.  3. Pt to improve self selected gait speed to < / = 0.7 m/s in order to improve tolerance to community ambulation.  4. Pt to demonstrate ability to walk 500 ft with one rest break in order to improve endurance.   5. Pt to balance on condition 3 and 4 of MCTSIB for > / = 30 seconds in order to improve safety.      Plan   Outpatient Physical Therapy 2 times weekly for 10 weeks to include the following interventions: Gait Training, Manual Therapy, Moist Heat/ Ice, Neuromuscular Re-ed, Patient Education, Self Care, Therapeutic Activites and Therapeutic Exercise.       Helen Garcia, PT   9/25/2020

## 2020-09-29 ENCOUNTER — PATIENT MESSAGE (OUTPATIENT)
Dept: PHYSICAL MEDICINE AND REHAB | Facility: CLINIC | Age: 65
End: 2020-09-29

## 2020-09-30 ENCOUNTER — CLINICAL SUPPORT (OUTPATIENT)
Dept: REHABILITATION | Facility: HOSPITAL | Age: 65
End: 2020-09-30
Attending: INTERNAL MEDICINE
Payer: COMMERCIAL

## 2020-09-30 DIAGNOSIS — R53.1 DECREASED STRENGTH, ENDURANCE, AND MOBILITY: Primary | ICD-10-CM

## 2020-09-30 DIAGNOSIS — R26.9 GAIT ABNORMALITY: ICD-10-CM

## 2020-09-30 DIAGNOSIS — R26.89 BALANCE PROBLEM: ICD-10-CM

## 2020-09-30 DIAGNOSIS — R68.89 DECREASED STRENGTH, ENDURANCE, AND MOBILITY: Primary | ICD-10-CM

## 2020-09-30 DIAGNOSIS — Z74.09 DECREASED STRENGTH, ENDURANCE, AND MOBILITY: Primary | ICD-10-CM

## 2020-09-30 PROCEDURE — 97112 NEUROMUSCULAR REEDUCATION: CPT | Mod: PN

## 2020-09-30 PROCEDURE — 97110 THERAPEUTIC EXERCISES: CPT | Mod: PN

## 2020-09-30 NOTE — PROGRESS NOTES
Physical Therapy Daily Treatment Note     Name: Elroy HAQUE [facility]  Clinic Number: [id number]    Therapy Diagnosis:   Encounter Diagnoses   Name Primary?   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     He was compliant with home exercise program.  Response to previous treatment: good  Functional change: ongoing    Pain: not rated/10  Location:       Objective     Elroy received therapeutic exercises to develop strength, endurance for 20 minutes including:    Nustep lvl 3 -6 mins  STS 2x10 from high low (17 in)  SLR 2x10 B   Hip abduction 2x15 RTB  Hip adduction 2x15 ball squeeze  Bridges 2x10    Lateral walking in // bars YTB around ankles 3 laps x 2 trials  Standing hip abduction 2x10  Standing hip extension 2x10  Mini squats x10     Freemotion   +Rows 2x10 (7# 1 set, 10# 2 sets)  +Shoulder ext 10# 3x30    Walking with KB x 25 feet 15#    Elroy received the following manual therapy techniques: were applied to the:  for  minutes, including:      Elroy participated in neuromuscular re-education activities to improve:  for 20 minutes. The following activities were included:    NBOS firm 2x30  NBOS firm eyes closed 2x30  WBOS foam  2x30  WBOS eyes closed foam 2x30  NBOS foam eyes open  2x30  +WBOS on foam overhead lifts med ball 2x10  Plyo throws to trampoline +on foam x 20  Lateral throws to trampoline +on foam x 20 ea direction    Elroy received the following direct contact modalities after being cleared for contraindications:     Elroy received the following supervised modalities after being cleared for contradictions:     Elroy received hot pack for  minutes to .    Elroy received cold pack for  minutes to .      Home Exercises Provided and Patient Education Provided     Education provided:   - HEP NV    Written Home Exercises Provided: NV.  Exercises were reviewed and Elroy was able to demonstrate them prior to the end of the session.  Elroy demonstrated n/a understanding of the education provided.     See EMR under n/a for exercises provided nv.    Assessment     Pt tolerated session well with no adverse response. Held plyo activities this session secondary to pt reports of pain in low back this AM. Addition of UE strengthening on free motion with good muscular challenge. Pt also tolerated addition of overhead lifts on foam with no LOB noted. Pt remains appropriate for therapy at this time, continue PT 1x per week to work on endurance, balance, tolerance to activities.     Elroy is progressing well towards his goals.   Pt prognosis is Good.     Pt will continue to benefit from skilled outpatient physical therapy to address the deficits listed in the problem list box on initial evaluation, provide pt/family education and to maximize pt's level of independence in the home and community environment.     Pt's spiritual, cultural and educational needs considered and pt agreeable to plan of care and goals.     Anticipated barriers to physical therapy: history of Cx    Goals:  Short Term Goals: 4 weeks   1. Pt to improve MMT score by > 1/2 in order to improve tolerance to daily activities.   2. Pt to improve TUG score to < / = 17 seconds in order to improve safety.  3. Pt to  improve self selected gait speed to < / = 0.6 m/s in order to improve tolerance to community ambulation.  4. Pt to demonstrate ability to walk 300 ft with one rest break in order to improve endurance.   5. Pt to balance on condition 3 and 4 of MCTSIB for > / = 10 seconds in order to improve safety.  6. Pt to demo independence with HEP in order to maximize physical activity at home.      Long Term Goals: 10 weeks   1. Pt to improve MMT score by > 1 in order to improve tolerance to daily activities.   2. Pt to improve TUG score to < / = 13 seconds in order to improve safety.  3. Pt to improve self selected gait speed to < / = 0.7 m/s in order to improve tolerance to community ambulation.  4. Pt to demonstrate ability to walk 500 ft with one rest break in order to improve endurance.   5. Pt to balance on condition 3 and 4 of MCTSIB for > / = 30 seconds in order to improve safety.      Plan   Outpatient Physical Therapy 2 times weekly for 10 weeks to include the following interventions: Gait Training, Manual Therapy, Moist Heat/ Ice, Neuromuscular Re-ed, Patient Education, Self Care, Therapeutic Activites and Therapeutic Exercise.       Helen Garcia, PT   9/30/2020

## 2020-10-02 ENCOUNTER — CLINICAL SUPPORT (OUTPATIENT)
Dept: REHABILITATION | Facility: HOSPITAL | Age: 65
End: 2020-10-02
Payer: COMMERCIAL

## 2020-10-02 DIAGNOSIS — R68.89 DECREASED STRENGTH, ENDURANCE, AND MOBILITY: Primary | ICD-10-CM

## 2020-10-02 DIAGNOSIS — R53.1 DECREASED STRENGTH, ENDURANCE, AND MOBILITY: Primary | ICD-10-CM

## 2020-10-02 DIAGNOSIS — R26.89 BALANCE PROBLEM: ICD-10-CM

## 2020-10-02 DIAGNOSIS — R26.9 GAIT ABNORMALITY: ICD-10-CM

## 2020-10-02 DIAGNOSIS — Z74.09 DECREASED STRENGTH, ENDURANCE, AND MOBILITY: Primary | ICD-10-CM

## 2020-10-02 PROCEDURE — 97110 THERAPEUTIC EXERCISES: CPT | Mod: PN,CQ

## 2020-10-02 NOTE — PROGRESS NOTES
Physical Therapy Daily Treatment Note     Name: Elroy HAQUE [facility]  Clinic Number: [id number]    Therapy Diagnosis:   Encounter Diagnoses   Name Primary?   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     He was compliant with home exercise program.  Response to previous treatment: good  Functional change: ongoing    Pain: 7/10  Location: low back    Objective     Elroy received therapeutic exercises to develop strength, endurance for 20 minutes including:    Nustep lvl 3 -6 mins  Hip abduction 2x15 GTB  Hip adduction 2x15 ball squeeze abdominal brace  Supine Lat pull down c/ abdominal brace GTB 3x10      Lateral walking in // bars YTB around ankles 3 laps x 2 trials  Standing hip abduction 2x10  Standing hip extension 2x10  Mini squats x10   Bridges 2x10  STS 2x10 from high low (17 in)       Freemotion   +Rows 3x10 10#  +Shoulder ext 10# 3x10  Walking with KB x 25 feet 15#    Elroy received the following manual therapy techniques: were applied to the:  for  minutes, including:      Elroy participated in neuromuscular re-education activities to improve:  for 20 minutes. The following activities were included:    NBOS firm 2x30  NBOS firm eyes closed 2x30  WBOS foam  2x30  WBOS eyes closed foam 2x30  NBOS  foam eyes open 2x30  +WBOS on foam overhead lifts med ball 2x10  Plyo throws to trampoline +on foam x 20  Lateral throws to trampoline +on foam x 20 ea direction    Elroy received the following direct contact modalities after being cleared for contraindications:     Elroy received the following supervised modalities after being cleared for contradictions:     Elroy received hot pack for  minutes to .    Elroy received cold pack for  minutes to .      Home Exercises Provided and Patient Education Provided     Education provided:   - HEP NV    Written Home Exercises Provided: NV.  Exercises were reviewed and Elroy was able to demonstrate them prior to the end of the session.  Elroy demonstrated n/a understanding of the education provided.     See EMR under n/a for exercises provided nv.    Assessment     Pt tolerated session well with no adverse response. Held plyo activities and over head balance activities 2* back pain. Pt perform core strengthening in supine to address back pain. All other exercises performed with no adverse affects. Assess pt back pain NV and re introduce plyo and higher level balance.     Elroy is progressing well towards his goals.   Pt prognosis is Good.     Pt will continue to benefit from skilled outpatient physical therapy to address the deficits listed in the problem list box on initial evaluation, provide pt/family education and to maximize pt's level of independence in the home and community environment.     Pt's spiritual, cultural and educational needs considered and pt agreeable to plan of care and goals.     Anticipated barriers to physical therapy: history of Cx    Goals:  Short Term Goals: 4 weeks   1. Pt to improve MMT score by > 1/2 in order to improve tolerance to daily activities.   2. Pt to improve TUG score to < / = 17 seconds in order to improve safety.  3. Pt to improve self selected gait speed to < / = 0.6 m/s in order to improve tolerance to community  ambulation.  4. Pt to demonstrate ability to walk 300 ft with one rest break in order to improve endurance.   5. Pt to balance on condition 3 and 4 of MCTSIB for > / = 10 seconds in order to improve safety.  6. Pt to demo independence with HEP in order to maximize physical activity at home.      Long Term Goals: 10 weeks   1. Pt to improve MMT score by > 1 in order to improve tolerance to daily activities.   2. Pt to improve TUG score to < / = 13 seconds in order to improve safety.  3. Pt to improve self selected gait speed to < / = 0.7 m/s in order to improve tolerance to community ambulation.  4. Pt to demonstrate ability to walk 500 ft with one rest break in order to improve endurance.   5. Pt to balance on condition 3 and 4 of MCTSIB for > / = 30 seconds in order to improve safety.      Plan   Outpatient Physical Therapy 2 times weekly for 10 weeks to include the following interventions: Gait Training, Manual Therapy, Moist Heat/ Ice, Neuromuscular Re-ed, Patient Education, Self Care, Therapeutic Activites and Therapeutic Exercise.       Williams Tran, PTA   10/2/2020

## 2020-10-06 ENCOUNTER — CLINICAL SUPPORT (OUTPATIENT)
Dept: REHABILITATION | Facility: HOSPITAL | Age: 65
End: 2020-10-06
Payer: COMMERCIAL

## 2020-10-06 DIAGNOSIS — R26.9 GAIT ABNORMALITY: ICD-10-CM

## 2020-10-06 DIAGNOSIS — Z74.09 DECREASED STRENGTH, ENDURANCE, AND MOBILITY: ICD-10-CM

## 2020-10-06 DIAGNOSIS — R26.89 BALANCE PROBLEM: ICD-10-CM

## 2020-10-06 DIAGNOSIS — R53.1 DECREASED STRENGTH, ENDURANCE, AND MOBILITY: ICD-10-CM

## 2020-10-06 DIAGNOSIS — R68.89 DECREASED STRENGTH, ENDURANCE, AND MOBILITY: ICD-10-CM

## 2020-10-06 PROCEDURE — 97112 NEUROMUSCULAR REEDUCATION: CPT | Mod: PN

## 2020-10-06 PROCEDURE — 97110 THERAPEUTIC EXERCISES: CPT | Mod: PN

## 2020-10-06 NOTE — PROGRESS NOTES
Physical Therapy Daily Treatment Note     Name: Elroy HAQUE [facility]  Clinic Number: [id number]    Therapy Diagnosis:   Encounter Diagnoses   Name Primary?   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  Response to previous treatment: good  Functional change: ongoing    Pain: 3/10  Location: low back    Objective     Elroy received therapeutic exercises to develop strength, endurance for 23 minutes including:    Nustep lvl 3 -6 mins  Seated Hip abduction 2x15 GTB  Seated Hip adduction 2x15 ball squeeze abdominal brace  Supine Lat pull down c/ abdominal brace GTB 3x10    Lateral walking in // bars YTB around ankles 3 laps x 2 trials  Standing hip abduction 2x10  Standing hip extension 2x10  Mini squats x15   Bridges 2x10  STS 2x10 from high low (18 in)       Freemotion   Rows 3x10 10#  Shoulder ext 10# 3x10    Walking with KB x 2 laps 15#    Elroy received the following manual therapy techniques: were applied to the:  for  minutes, including:      Elroy participated in neuromuscular re-education activities to improve:  for 20 minutes. The following activities were included:    NBOS firm 2x30  NBOS firm eyes closed 2x30  WBOS foam  2x30  WBOS eyes closed foam 2x30  NBOS foam eyes open  2x30  +WBOS on foam overhead lifts med ball 2x10  Plyo throws to trampoline +on foam x 20  Lateral throws to trampoline +on foam x 20 ea direction    Elroy received the following direct contact modalities after being cleared for contraindications:     Elroy received the following supervised modalities after being cleared for contradictions:     Elroy received hot pack for  minutes to .    Elroy received cold pack for minutes to .      Home Exercises Provided and Patient Education Provided     Education provided:   - HEP NV    Written Home Exercises Provided: NV.  Exercises were reviewed and Elroy was able to demonstrate them prior to the end of the session.  Elroy demonstrated n/a understanding of the education provided.     See EMR under n/a for exercises provided nv.    Assessment     Pt tolerated session well with no adverse response. Held plyo activities and over head balance activities again today 2/2 continued back pain. All therex completed without adverse effects. PT trialed sit<> stand from plyo box c/ Airex pad but pt was unable to complete task. Improved balance noted with static balance overground and Airex balance. Pt displayed appropriate ankle strategy with RLE. He fatigued with standing exercises this date.   Elroy is progressing well towards his goals.   Pt prognosis is Good.     Pt will continue to benefit from skilled outpatient physical therapy to address the deficits listed in the problem list box on initial evaluation, provide pt/family education and to maximize pt's level of independence in the home and community environment.     Pt's spiritual, cultural and educational needs considered and pt agreeable to plan of care and goals.     Anticipated barriers to physical therapy: history of Cx    Goals:  Short Term Goals: 4 weeks   1. Pt to improve MMT score by > 1/2 in order to improve tolerance to daily activities.   2. Pt to improve TUG score to < / = 17 seconds in order to  improve safety.  3. Pt to improve self selected gait speed to < / = 0.6 m/s in order to improve tolerance to community ambulation.  4. Pt to demonstrate ability to walk 300 ft with one rest break in order to improve endurance.   5. Pt to balance on condition 3 and 4 of MCTSIB for > / = 10 seconds in order to improve safety.  6. Pt to demo independence with HEP in order to maximize physical activity at home.      Long Term Goals: 10 weeks   1. Pt to improve MMT score by > 1 in order to improve tolerance to daily activities.   2. Pt to improve TUG score to < / = 13 seconds in order to improve safety.  3. Pt to improve self selected gait speed to < / = 0.7 m/s in order to improve tolerance to community ambulation.  4. Pt to demonstrate ability to walk 500 ft with one rest break in order to improve endurance.   5. Pt to balance on condition 3 and 4 of MCTSIB for > / = 30 seconds in order to improve safety.      Plan   Outpatient Physical Therapy 2 times weekly for 10 weeks to include the following interventions: Gait Training, Manual Therapy, Moist Heat/ Ice, Neuromuscular Re-ed, Patient Education, Self Care, Therapeutic Activites and Therapeutic Exercise.       Florencia Nolan, PT,DPT  10/6/2020

## 2020-10-16 ENCOUNTER — CLINICAL SUPPORT (OUTPATIENT)
Dept: REHABILITATION | Facility: HOSPITAL | Age: 65
End: 2020-10-16
Payer: COMMERCIAL

## 2020-10-16 DIAGNOSIS — R53.1 DECREASED STRENGTH, ENDURANCE, AND MOBILITY: ICD-10-CM

## 2020-10-16 DIAGNOSIS — R68.89 DECREASED STRENGTH, ENDURANCE, AND MOBILITY: ICD-10-CM

## 2020-10-16 DIAGNOSIS — R26.89 BALANCE PROBLEM: ICD-10-CM

## 2020-10-16 DIAGNOSIS — Z74.09 DECREASED STRENGTH, ENDURANCE, AND MOBILITY: ICD-10-CM

## 2020-10-16 DIAGNOSIS — R26.9 GAIT ABNORMALITY: ICD-10-CM

## 2020-10-16 PROCEDURE — 97110 THERAPEUTIC EXERCISES: CPT | Mod: PN

## 2020-10-16 PROCEDURE — 97112 NEUROMUSCULAR REEDUCATION: CPT | Mod: PN

## 2020-10-16 NOTE — PROGRESS NOTES
Physical Therapy Daily Treatment Note     Name: Elroy HAQUE [facility]  Clinic Number: [id number]    Therapy Diagnosis:   Encounter Diagnoses   Name Primary?   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 His back is feeling a little better.     He was compliant with home exercise program.  Response to previous treatment: good  Functional change: ongoing    Pain: 3/10  Location: low back    Objective     Elroy received therapeutic exercises to develop strength, endurance for 25 minutes including:    Nustep lvl 3 -6 mins  Seated Hip abduction 2x15 GTB  Seated Hip adduction 2x15 ball squeeze abdominal brace  Supine Lat pull down c/ abdominal brace GTB 3x10    Lateral walking in // bars YTB around ankles 3 laps x 2 trials  Standing hip abduction 2x10  Standing hip extension 2x10  Mini squats x20  Bridges 2x10  STS 2x10 from high low (18 in)       Freemotion   Rows 3x10 10#  Shoulder ext 10# 3x10    Walking with KB x 3 laps 15#    Elroy received the following manual therapy techniques: were applied to the:  for  minutes, including:      Elroy participated in neuromuscular re-education activities to improve:  for 20 minutes. The following activities were included:    NBOS firm 2x30  NBOS firm eyes closed 2x30  WBOS foam  2x30  WBOS eyes closed  foam 2x30  NBOS foam eyes open 2x30  +NBOS foam eyes closed 2x30  WBOS on foam overhead lifts med ball 2x10  Plyo throws to trampoline +on foam x 20  Lateral throws to trampoline +on foam x 20 ea direction    Elroy received the following direct contact modalities after being cleared for contraindications:     Elroy received the following supervised modalities after being cleared for contradictions:     Elroy received hot pack for  minutes to .    Elroy received cold pack for minutes to .      Home Exercises Provided and Patient Education Provided     Education provided:   - HEP NV    Written Home Exercises Provided: NV.  Exercises were reviewed and Elroy was able to demonstrate them prior to the end of the session.  Elroy demonstrated n/a understanding of the education provided.     See EMR under n/a for exercises provided nv.    Assessment     Pt tolerated session well with no adverse response. VCs for correct form with standing rows today. Pt demonstrated good ankle strategy and minimal postural sway with all static balance exercises. Plyo exercises held again this date 2/2 lingering back pain. Pt fatigued with treatment and is continuing to respond well to treatment plan.     Elroy is progressing well towards his goals.   Pt prognosis is Good.     Pt will continue to benefit from skilled outpatient physical therapy to address the deficits listed in the problem list box on initial evaluation, provide pt/family education and to maximize pt's level of independence in the home and community environment.     Pt's spiritual, cultural and educational needs considered and pt agreeable to plan of care and goals.     Anticipated barriers to physical therapy: history of Cx    Goals:  Short Term Goals: 4 weeks   1. Pt to improve MMT score by > 1/2 in order to improve tolerance to daily activities.   2. Pt to improve TUG score to < / = 17 seconds in order to improve safety.  3. Pt to improve self selected  gait speed to < / = 0.6 m/s in order to improve tolerance to community ambulation.  4. Pt to demonstrate ability to walk 300 ft with one rest break in order to improve endurance.   5. Pt to balance on condition 3 and 4 of MCTSIB for > / = 10 seconds in order to improve safety.  6. Pt to demo independence with HEP in order to maximize physical activity at home.      Long Term Goals: 10 weeks   1. Pt to improve MMT score by > 1 in order to improve tolerance to daily activities.   2. Pt to improve TUG score to < / = 13 seconds in order to improve safety.  3. Pt to improve self selected gait speed to < / = 0.7 m/s in order to improve tolerance to community ambulation.  4. Pt to demonstrate ability to walk 500 ft with one rest break in order to improve endurance.   5. Pt to balance on condition 3 and 4 of MCTSIB for > / = 30 seconds in order to improve safety.      Plan   Outpatient Physical Therapy 2 times weekly for 10 weeks to include the following interventions: Gait Training, Manual Therapy, Moist Heat/ Ice, Neuromuscular Re-ed, Patient Education, Self Care, Therapeutic Activites and Therapeutic Exercise.     Florencia Nolan, PT,DPT  10/16/2020

## 2020-10-19 ENCOUNTER — CLINICAL SUPPORT (OUTPATIENT)
Dept: REHABILITATION | Facility: HOSPITAL | Age: 65
End: 2020-10-19
Payer: COMMERCIAL

## 2020-10-19 DIAGNOSIS — R26.9 GAIT ABNORMALITY: ICD-10-CM

## 2020-10-19 DIAGNOSIS — Z74.09 DECREASED STRENGTH, ENDURANCE, AND MOBILITY: Primary | ICD-10-CM

## 2020-10-19 DIAGNOSIS — R53.1 DECREASED STRENGTH, ENDURANCE, AND MOBILITY: Primary | ICD-10-CM

## 2020-10-19 DIAGNOSIS — R26.89 BALANCE PROBLEM: ICD-10-CM

## 2020-10-19 DIAGNOSIS — R68.89 DECREASED STRENGTH, ENDURANCE, AND MOBILITY: Primary | ICD-10-CM

## 2020-10-19 PROCEDURE — 97110 THERAPEUTIC EXERCISES: CPT | Mod: PN

## 2020-10-19 PROCEDURE — 97112 NEUROMUSCULAR REEDUCATION: CPT | Mod: PN

## 2020-10-19 NOTE — PROGRESS NOTES
Physical Therapy Daily Treatment Note     Name: Elroy HAQUE [facility]  Clinic Number: [id number]    Therapy Diagnosis:   Encounter Diagnoses   Name Primary?   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     He was compliant with home exercise program.  Response to previous treatment: good  Functional change: ongoing    Pain: 3/10  Location: low back    Objective     Elroy received therapeutic exercises to develop strength, endurance for 10 minutes including:    Nustep lvl 3 -6 mins  Seated Hip abduction 2x15 GTB  Seated Hip adduction 2x15 ball squeeze abdominal brace  Supine Lat pull down c/ abdominal brace GTB 3x10    Lateral walking in // bars YTB around ankles 3 laps x 2 trials  Standing hip abduction 2x10  Standing hip extension 2x10  Mini squats x20  Bridges 2x10  STS 2x10 from high low (18 in)     Freemotion   Rows 3x10 10#  Shoulder ext 10# 3x10    Walking with KB x 3 laps 15#    Elroy received the following manual therapy techniques: were applied to the:  for  minutes, including:      Elroy participated in neuromuscular re-education activities to improve:  for 30 minutes. The following activities were included:    NBOS firm 2x30  NBOS firm eyes closed 2x30  WBOS foam  2x30  WBOS  eyes closed foam 2x30  NBOS foam eyes open 2x30  NBOS foam eyes closed 2x30  WBOS on foam overhead lifts med ball 9# 2x10  +Throwing/catching 9# med ball standing on foam 2x10  Plyo throws to trampoline +on foam x 20  Lateral throws to trampoline +on foam x 20 ea direction    Elroy received the following direct contact modalities after being cleared for contraindications:     Elroy received the following supervised modalities after being cleared for contradictions:     Elroy received hot pack for  minutes to .    Elroy received cold pack for minutes to .      Home Exercises Provided and Patient Education Provided     Education provided:   - HEP NV    Written Home Exercises Provided: NV.  Exercises were reviewed and Elroy was able to demonstrate them prior to the end of the session.  Elroy demonstrated n/a understanding of the education provided.     See EMR under n/a for exercises provided nv.    Assessment     Pt tolerated session well with no adverse response. Pt with most noted fatigue today during lateral walking with YTB. Pt requiring 1 rest break during session today, demonstrating improved endurance to exercise. Pt remains appropriate for therapy at this time, progress toward independent management as appropriate.     Elroy is progressing well towards his goals.   Pt prognosis is Good.     Pt will continue to benefit from skilled outpatient physical therapy to address the deficits listed in the problem list box on initial evaluation, provide pt/family education and to maximize pt's level of independence in the home and community environment.     Pt's spiritual, cultural and educational needs considered and pt agreeable to plan of care and goals.     Anticipated barriers to physical therapy: history of Cx    Goals:  Short Term Goals: 4 weeks   1. Pt to improve MMT score by > 1/2 in order to improve tolerance to daily activities.   2. Pt to improve TUG score to < / = 17 seconds in order to  improve safety.  3. Pt to improve self selected gait speed to < / = 0.6 m/s in order to improve tolerance to community ambulation.  4. Pt to demonstrate ability to walk 300 ft with one rest break in order to improve endurance.   5. Pt to balance on condition 3 and 4 of MCTSIB for > / = 10 seconds in order to improve safety.  6. Pt to demo independence with HEP in order to maximize physical activity at home.      Long Term Goals: 10 weeks   1. Pt to improve MMT score by > 1 in order to improve tolerance to daily activities.   2. Pt to improve TUG score to < / = 13 seconds in order to improve safety.  3. Pt to improve self selected gait speed to < / = 0.7 m/s in order to improve tolerance to community ambulation.  4. Pt to demonstrate ability to walk 500 ft with one rest break in order to improve endurance.   5. Pt to balance on condition 3 and 4 of MCTSIB for > / = 30 seconds in order to improve safety.      Plan   Outpatient Physical Therapy 2 times weekly for 10 weeks to include the following interventions: Gait Training, Manual Therapy, Moist Heat/ Ice, Neuromuscular Re-ed, Patient Education, Self Care, Therapeutic Activites and Therapeutic Exercise.     Helen Garcia, PT,DPT  10/19/2020

## 2020-11-03 ENCOUNTER — PATIENT MESSAGE (OUTPATIENT)
Dept: NEUROLOGY | Facility: HOSPITAL | Age: 65
End: 2020-11-03

## 2020-11-04 RX ORDER — SODIUM CHLORIDE 0.9 % (FLUSH) 0.9 %
10 SYRINGE (ML) INJECTION
Status: CANCELLED | OUTPATIENT
Start: 2020-11-08

## 2020-11-04 RX ORDER — HEPARIN 100 UNIT/ML
500 SYRINGE INTRAVENOUS
Status: CANCELLED | OUTPATIENT
Start: 2020-11-08

## 2020-11-05 ENCOUNTER — INFUSION (OUTPATIENT)
Dept: INFUSION THERAPY | Facility: HOSPITAL | Age: 65
End: 2020-11-05
Payer: COMMERCIAL

## 2020-11-05 DIAGNOSIS — C7B.8 SECONDARY NEUROENDOCRINE TUMOR OF BONE: ICD-10-CM

## 2020-11-05 DIAGNOSIS — C78.7 SECONDARY MALIGNANT NEOPLASM OF LIVER: ICD-10-CM

## 2020-11-05 DIAGNOSIS — C7B.8 SECONDARY MALIGNANT NEUROENDOCRINE TUMOR OF LIVER: Primary | ICD-10-CM

## 2020-11-05 DIAGNOSIS — C7A.090 CARCINOID BRONCHIAL ADENOMA OF LEFT LUNG: ICD-10-CM

## 2020-11-05 DIAGNOSIS — D3A.090 BRONCHIAL CARCINOID TUMORS: ICD-10-CM

## 2020-11-05 DIAGNOSIS — C7B.8 SECONDARY NEUROENDOCRINE TUMOR OF LIVER: ICD-10-CM

## 2020-11-05 PROCEDURE — 63600175 PHARM REV CODE 636 W HCPCS: Mod: JG | Performed by: INTERNAL MEDICINE

## 2020-11-05 PROCEDURE — 96372 THER/PROPH/DIAG INJ SC/IM: CPT

## 2020-11-05 RX ORDER — LANREOTIDE ACETATE 120 MG/.5ML
120 INJECTION SUBCUTANEOUS ONCE
Status: CANCELLED | OUTPATIENT
Start: 2020-11-05

## 2020-11-05 RX ORDER — LANREOTIDE ACETATE 120 MG/.5ML
120 INJECTION SUBCUTANEOUS ONCE
Status: COMPLETED | OUTPATIENT
Start: 2020-11-05 | End: 2020-11-05

## 2020-11-05 RX ADMIN — LANREOTIDE ACETATE 120 MG: 120 INJECTION SUBCUTANEOUS at 10:11

## 2020-11-05 NOTE — NURSING
Pt arrived for Lanreotide injection. Pt ambulatory to chair. Pt w/ unsteady balance/stiffness due to hx of L hip replacement. Pt NAD on assessment. Denies any major concerns. Appetite stable. Lanreotide administered deep sub q to R upper outer gluteal, tolerated well. Band aid applied. AVS declined. Pt ambulatory out of injection suite independently.

## 2020-11-16 ENCOUNTER — HOSPITAL ENCOUNTER (OUTPATIENT)
Dept: RADIOLOGY | Facility: HOSPITAL | Age: 65
Discharge: HOME | End: 2020-11-16
Attending: INTERNAL MEDICINE
Payer: COMMERCIAL

## 2020-11-16 DIAGNOSIS — C7B.8 SECONDARY NEUROENDOCRINE TUMOR OF LIVER: ICD-10-CM

## 2020-11-16 DIAGNOSIS — C7B.8 SECONDARY NEUROENDOCRINE TUMOR OF BONE: ICD-10-CM

## 2020-11-16 DIAGNOSIS — D3A.090 BRONCHIAL CARCINOID TUMORS: ICD-10-CM

## 2020-11-16 PROCEDURE — 71250 CT CHEST WITHOUT CONTRAST: ICD-10-PCS | Mod: 26,,, | Performed by: RADIOLOGY

## 2020-11-16 PROCEDURE — 74183 MRI ABD W/O CNTR FLWD CNTR: CPT | Mod: TC

## 2020-11-16 PROCEDURE — 74183 MRI ABD W/O CNTR FLWD CNTR: CPT | Mod: 26,,, | Performed by: RADIOLOGY

## 2020-11-16 PROCEDURE — A9585 GADOBUTROL INJECTION: HCPCS | Performed by: INTERNAL MEDICINE

## 2020-11-16 PROCEDURE — 25500020 PHARM REV CODE 255: Performed by: INTERNAL MEDICINE

## 2020-11-16 PROCEDURE — 71250 CT THORAX DX C-: CPT | Mod: TC

## 2020-11-16 PROCEDURE — 71250 CT THORAX DX C-: CPT | Mod: 26,,, | Performed by: RADIOLOGY

## 2020-11-16 PROCEDURE — 74183 MRI ABDOMEN W WO CONTRAST: ICD-10-PCS | Mod: 26,,, | Performed by: RADIOLOGY

## 2020-11-16 RX ORDER — GADOBUTROL 604.72 MG/ML
10 INJECTION INTRAVENOUS
Status: COMPLETED | OUTPATIENT
Start: 2020-11-16 | End: 2020-11-16

## 2020-11-16 RX ADMIN — GADOBUTROL 10 ML: 604.72 INJECTION INTRAVENOUS at 11:11

## 2020-11-18 ENCOUNTER — TELEPHONE (OUTPATIENT)
Dept: NEUROLOGY | Facility: HOSPITAL | Age: 65
End: 2020-11-18

## 2020-11-18 ENCOUNTER — OFFICE VISIT (OUTPATIENT)
Dept: NEUROLOGY | Facility: HOSPITAL | Age: 65
End: 2020-11-18
Attending: INTERNAL MEDICINE
Payer: COMMERCIAL

## 2020-11-18 VITALS
HEART RATE: 75 BPM | WEIGHT: 281.75 LBS | HEIGHT: 71 IN | OXYGEN SATURATION: 99 % | TEMPERATURE: 98 F | DIASTOLIC BLOOD PRESSURE: 77 MMHG | SYSTOLIC BLOOD PRESSURE: 125 MMHG | BODY MASS INDEX: 39.44 KG/M2

## 2020-11-18 DIAGNOSIS — C7A.090 MALIGNANT CARCINOID TUMOR OF BRONCHUS AND LUNG: ICD-10-CM

## 2020-11-18 DIAGNOSIS — C7B.8 SECONDARY MALIGNANT NEUROENDOCRINE TUMOR OF LIVER: Primary | ICD-10-CM

## 2020-11-18 PROCEDURE — 1126F PR PAIN SEVERITY QUANTIFIED, NO PAIN PRESENT: ICD-10-PCS | Mod: ,,, | Performed by: INTERNAL MEDICINE

## 2020-11-18 PROCEDURE — 99214 OFFICE O/P EST MOD 30 MIN: CPT | Mod: ,,, | Performed by: INTERNAL MEDICINE

## 2020-11-18 PROCEDURE — 1126F AMNT PAIN NOTED NONE PRSNT: CPT | Mod: ,,, | Performed by: INTERNAL MEDICINE

## 2020-11-18 PROCEDURE — 3008F BODY MASS INDEX DOCD: CPT | Mod: CPTII,,, | Performed by: INTERNAL MEDICINE

## 2020-11-18 PROCEDURE — 3008F PR BODY MASS INDEX (BMI) DOCUMENTED: ICD-10-PCS | Mod: CPTII,,, | Performed by: INTERNAL MEDICINE

## 2020-11-18 PROCEDURE — 99215 OFFICE O/P EST HI 40 MIN: CPT | Performed by: INTERNAL MEDICINE

## 2020-11-18 PROCEDURE — 99214 PR OFFICE/OUTPT VISIT, EST, LEVL IV, 30-39 MIN: ICD-10-PCS | Mod: ,,, | Performed by: INTERNAL MEDICINE

## 2020-11-18 RX ORDER — BRINZOLAMIDE 10 MG/ML
SUSPENSION/ DROPS OPHTHALMIC
COMMUNITY
Start: 2020-10-26 | End: 2021-09-24

## 2020-11-18 NOTE — PATIENT INSTRUCTIONS
Have your injection on December 3    Follow up in 3 months    I will call you next week to tell you if you need a thyroid U/S

## 2020-11-18 NOTE — PROGRESS NOTES
PATIENT: Elroy Rahman  MRN: [id number]  DATE: 11/18/2020      Diagnosis:   1. Secondary malignant neuroendocrine tumor of liver    2. Malignant carcinoid tumor of bronchus and lung        Chief Complaint: Follow-up      Oncologic History:      Oncologic History Bronchial carcinoid, left, diagnosed 1/2016   Metastatic disease to liver and bone 5/2017     Oncologic Treatment Left upper lobectomy 1/2016  CAPTEM 7/2017 - 2/2019 (discontinued due to progression)  Lanreotide 7/2017   Zoledronic acid 7/2017   TACE 10/2018  TACE 3/2019  Lily-177 4/10/19, 6/5/19    Pathology 1/2016: Typical carcinoid tumor, well-differentiated T2a, N0, M0 Ki-67 <1%  6/2017: Liver biopsy, well-differentiated, Ki-67 25%         Subjective:    Interval History: Mr. Rahman is a 65 y.o. male who is seen in follow-up for a bronchial carcinoid tumor.  He states he has generally been feeling well. His vision continues to improve.He completed physical therapy last month and has been ambulating without difficulty.  He is otherwise doing well and has no new complaints.    His history dates to 1/2016 when he underwent a left upper lobectomy with Dr. Bell for a typical carcinoid tumor.  His pathological staging was T2a, N0, M0 with Ki-67 less than 1% and mitotic count 0 per 10 high-powered fields.  He done well postoperatively, however, in 5/2017 he was undergoing routine surveillance and a chest CT in picked up evidence of progression of disease in the liver.  Dedicated abdominal CT was performed showing multiple liver lesions.  In 6/2017 he underwent a CT-guided liver biopsy which was positive for metastatic neuroendocrine tumor which was well-differentiated with no overt nuclear atypia, however, the Ki-67 was 25%.  He underwent gallium-68 PET/CT and was found to have widespread disease involving the bone, liver and also mesentery.  He was started on CAPTEM in 7/2017 and also Lanreotide and zoledronic acid.  Scans in 10/2017 had shown stability  of disease.  Imaging in 1/2018 had shown stability of disease.  Scans in 4/2018 had shown stability of disease.  Scans in 07/2018 had continued to showed mild growth.  In 10/2018 he underwent TACE.  CAPTEM was discontinued in 02/2019 due to progression.  He underwent TACE in 03/2019.  He was started on treatment with PRRT using Lily-177 on 04/10/2019.  His 2nd cycle was in 06/2019 and following this he experienced vision loss of his left eye.  This was hypothesized to have resulted from inflammatory response is due to in orbital lesion and PRRT was discontinued and corticosteroids were initiated with improvement of his symptoms.  Subsequent imaging have shown stable disease.      Past Medical History:   Past Medical History:   Diagnosis Date    Allergy     Carcinoid bronchial adenoma of left lung     PRRT    Chemotherapy follow-up examination 8/25/2017    Cough with hemoptysis     mild/intermittent    Elevated bilirubin 7/2/2018    Hx of BKA, left     above knee    Kidney stone     Mild heartburn     Physical deconditioning 6/22/2020    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    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    Sepsis due to methicillin susceptible Staphylococcus aureus 09/14/2019    MSSA bacteremia from Amputation stump infection    Sleep apnea     Snores    Urinary tract infection     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       Past Surgical HIstory:   Past Surgical History:   Procedure Laterality Date    ABDOMINAL SURGERY      Bka Left     HERNIA REPAIR      IRRIGATION AND DEBRIDEMENT OF LOWER EXTREMITY Left 9/20/2019    Procedure: IRRIGATION AND DEBRIDEMENT, LOWER EXTREMITY - left - cysto tubing - cultures;  Surgeon: Jesus Arias MD;  Location: [facility] OR [address] [place] [address];  Service: Orthopedics;  Laterality: Left;    LAPAROSCOPIC REPAIR OF INCARCERATED UMBILICAL HERNIA N/A 11/27/2018    Procedure: REPAIR, HERNIA, UMBILICAL, INCARCERATED, LAPAROSCOPIC;  Surgeon:  Coco Guidry MD;  Location: [facility] OR;  Service: General;  Laterality: N/A;  video    TRANSESOPHAGEAL ECHOCARDIOGRAPHY N/A 9/18/2019    Procedure: ECHOCARDIOGRAM, TRANSESOPHAGEAL;  Surgeon: Dbc Diagnostic Provider;  Location: [facility];  Service: Anesthesiology;  Laterality: N/A;       Family History:   Family History   Problem Relation Age of Onset    Cancer Mother         lung    Cancer Sister     Anesthesia problems Neg Hx        Social History:  reports that he has never smoked. He has never used smokeless tobacco. He reports current alcohol use of about 1.0 standard drinks of alcohol per week. He reports that he does not use drugs.    Allergies:  Review of patient's allergies indicates:  No Known Allergies    Medications:  Current Outpatient Medications   Medication Sig Dispense Refill    ALPHAGAN P 0.1 % Drop Instill 1 drop into left eye twice daily  4    AZOPT 1 % ophthalmic suspension SHAKE LQ AND INT 1 GTT IN OU BID UTD      famotidine (PEPCID) 20 MG tablet Take 1 tablet (20 mg total) by mouth 2 (two) times daily. 60 tablet 2    ketorolac 0.4% (ACULAR) 0.4 % Drop INT 1 GTT IN OS QID      LOTEMAX SM 0.38 % DrpG SKYLER 1 GTT IN OU BID      ondansetron (ZOFRAN) 4 MG tablet Take 1 tablet (4 mg total) by mouth daily as needed for Nausea.      PROLENSA 0.07 % Drop INSTILL 1 DROP INTO LEFT EYE TWICE DAILY  4    tamsulosin (FLOMAX) 0.4 mg Cap Take 1 capsule (0.4 mg total) by mouth once daily. 90 capsule 3    tobramycin sulfate 0.3% (TOBREX) 0.3 % ophthalmic ointment Apply a thin layer into right eye four times a day as directed 3.5 g 12    fluconazole (DIFLUCAN) 100 MG tablet Take 1 tablet (100 mg total) by mouth once daily. Take 200mg on day 1, then 100mg days 2-7 (Patient not taking: Reported on 11/18/2020) 7 tablet 0    fluconazole (DIFLUCAN) 200 MG Tab Take 1 tablet (200 mg total) by mouth once daily. (Patient not taking: Reported on 11/18/2020) 1 tablet 0    predniSONE (DELTASONE) 20 MG  "tablet Take 0.5 tablets (10 mg total) by mouth once daily. Take with two 50 mg tabs for a total of 110 mg daily (Patient not taking: Reported on 11/18/2020)      sulfamethoxazole-trimethoprim 800-160mg (BACTRIM DS) 800-160 mg Tab Take 1 tablet by mouth once daily. (Patient not taking: Reported on 11/18/2020) 30 tablet 1     No current facility-administered medications for this visit.        Review of Systems   Constitutional: Negative for appetite change, chills, fatigue, fever and unexpected weight change.   HENT: Negative for dental problem, sinus pressure and sneezing.    Eyes: Positive for visual disturbance.   Respiratory: Negative for cough, choking, chest tightness and shortness of breath.    Cardiovascular: Negative for chest pain and leg swelling.   Gastrointestinal: Negative for abdominal pain, blood in stool, constipation, diarrhea and nausea.   Genitourinary: Negative for difficulty urinating, dysuria and frequency.   Musculoskeletal: Negative for arthralgias and back pain.   Skin: Negative for rash and wound.   Neurological: Negative for dizziness, light-headedness and headaches.   Hematological: Negative for adenopathy. Does not bruise/bleed easily.   Psychiatric/Behavioral: Negative for sleep disturbance. The patient is not nervous/anxious.        ECOG Performance Status: 0   ECOG SCORE    1 - Restricted in strenuous activity-ambulatory and able to carry out work of a light nature         Objective:      Vitals:   Vitals:    11/18/20 1128   BP: 125/77   BP Location: Right arm   Patient Position: Sitting   BP Method: Large (Automatic)   Pulse: 75   Temp: 98 °F (36.7 °C)   TempSrc: Oral   SpO2: 99%   Weight: 127.8 kg (281 lb 12 oz)   Height: 5' 11" (1.803 m)     BMI: Body mass index is 39.3 kg/m².    Physical Exam  Constitutional:       Appearance: Normal appearance.   HENT:      Head: Normocephalic.   Eyes:      General: No scleral icterus.  Pulmonary:      Effort: Pulmonary effort is normal. No " respiratory distress.   Neurological:      General: No focal deficit present.      Mental Status: He is alert and oriented to person, place, and time.   Psychiatric:         Mood and Affect: Mood normal.         Laboratory Data:  Lab Visit on 11/16/2020   Component Date Value Ref Range Status    WBC 11/16/2020 5.14  3.90 - 12.70 K/uL Final    RBC 11/16/2020 4.31* 4.60 - 6.20 M/uL Final    Hemoglobin 11/16/2020 12.0* 14.0 - 18.0 g/dL Final    Hematocrit 11/16/2020 38.3* 40.0 - 54.0 % Final    MCV 11/16/2020 89  82 - 98 fL Final    MCH 11/16/2020 27.8  27.0 - 31.0 pg Final    MCHC 11/16/2020 31.3* 32.0 - 36.0 g/dL Final    RDW 11/16/2020 14.6* 11.5 - 14.5 % Final    Platelets 11/16/2020 228  150 - 350 K/uL Final    MPV 11/16/2020 9.5  9.2 - 12.9 fL Final    Gran # (ANC) 11/16/2020 3.2  1.8 - 7.7 K/uL Final    Comment: The ANC is based on a white cell differential from an   automated cell counter. It has not been microscopically   reviewed for the presence of abnormal cells. Clinical   correlation is required.      Immature Grans (Abs) 11/16/2020 0.02  0.00 - 0.04 K/uL Final    Comment: Mild elevation in immature granulocytes is non specific and   can be seen in a variety of conditions including stress response,   acute inflammation, trauma and pregnancy. Correlation with other   laboratory and clinical findings is essential.      Sodium 11/16/2020 138  136 - 145 mmol/L Final    Potassium 11/16/2020 4.3  3.5 - 5.1 mmol/L Final    Chloride 11/16/2020 104  95 - 110 mmol/L Final    CO2 11/16/2020 25  23 - 29 mmol/L Final    Glucose 11/16/2020 121* 70 - 110 mg/dL Final    BUN 11/16/2020 15  8 - 23 mg/dL Final    Creatinine 11/16/2020 1.4  0.5 - 1.4 mg/dL Final    Calcium 11/16/2020 9.0  8.7 - 10.5 mg/dL Final    Total Protein 11/16/2020 7.3  6.0 - 8.4 g/dL Final    Albumin 11/16/2020 3.6  3.5 - 5.2 g/dL Final    Total Bilirubin 11/16/2020 0.6  0.1 - 1.0 mg/dL Final    Comment: For infants and  newborns, interpretation of results should be based  on gestational age, weight and in agreement with clinical  observations.  Premature Infant recommended reference ranges:  Up to 24 hours.............<8.0 mg/dL  Up to 48 hours............<12.0 mg/dL  3-5 days..................<15.0 mg/dL  6-29 days.................<15.0 mg/dL      Alkaline Phosphatase 11/16/2020 91  55 - 135 U/L Final    AST 11/16/2020 10  10 - 40 U/L Final    ALT 11/16/2020 11  10 - 44 U/L Final    Anion Gap 11/16/2020 9  8 - 16 mmol/L Final    eGFR if African American 11/16/2020 >60.0  >60 mL/min/1.73 m^2 Final    eGFR if non African American 11/16/2020 52.7* >60 mL/min/1.73 m^2 Final    Comment: Calculation used to obtain the estimated glomerular filtration  rate (eGFR) is the CKD-EPI equation.       Prealbumin 11/16/2020 24  20 - 43 mg/dL Final     Supplemental Diagnosis  Chromogranin Staining:  Intensity: Moderate to strong. Positive cells: 100 (%)  Synaptophysin Staining:  Intensity: Strong. Positive cells: 100 (%)  CD31: 40 vessels per 1 HPF  Factor VIII: 27 vessels per 1 HPF  Ki67: 1 % tumor cells staining  Immunostains performed with appropriate positive and negative controls.  FINAL PATHOLOGIC DIAGNOSIS  1. Lymph node, level XI, excisional biopsy:  Fragments of benign lymph node with anthracosis and sinus histiocytosis (0/1).  2. Lung, lingular staple line, biopsy:  Lung, negative for neoplasm.  3. Lymph node, level X, excisional biopsy:  1 benign lymph node with anthracotic pigment and sinus histiocytosis (0/1).  4. Lymph node, level XII, excisional biopsy:  2 benign lymph nodes with sinus histiocytosis and anthracotic pigment (0/2).  5. Lung, left upper lobe, lobectomy:  Typical carcinoid tumor (well differentiated neuroendocrine tumor), 4 cm in greatest dimension.  Bronchial and vascular margins are negative for neoplasm.  Uninvolved lung adjacent to the tumor shows emphysematous changes and fibrosis, however remaining  lung  appears unremarkable.  Additional immunohistochemical stains for ancillary studies (including synaptophysin, chromogranin and Ki-67)  will be completed and results will be reported in a supplemental report.  See synoptic report in comment section for further details.  6. Lymph node, level VII, excisional biopsy:  2 benign lymph nodes with anthracotic pigment and sinus histiocytosis (0/2).  Comment: Synoptic Report  Specimen: Left upper lobe of lung  Procedure: Lobectomy  Specimen laterality: Left  Tumor size:  Greatest dimension: 4 cm  Tumor focality: Single focus  Histologic type: Typical carcinoid tumor  Visceral pleural invasion: Not identified  Tumor extension: Not identified  Margins: Bronchial and vascular margins are uninvolved by tumor.  Distance of tumor from closest margin: 1 cm from the bronchial surgical margin.  Lymphovascular invasion: Not identified  Ancillary studies:  Immunohistochemical stains for neuroendocrine markers and Ki-67 will be completed and results will follow in a  supplemental report.  Note: The tumor consists of a proliferation of cells in nests with lewis formation. The tumor cells have salt and  pepper chromatin pattern with abundant cytoplasm. There is no significant nuclear atypia. No evidence of  necrosis. Mitotic count is 0 mitoses in 10 high power fields.    Imaging:    MRI 11/16/2020  COMPARISON:  MRI 08/05/2020; dotatate PET-CT 04/24/2020     FINDINGS:  Liver: Normal homogeneous background signal.  Redemonstration of several enhancing hepatic metastases demonstrating stable distribution when compared to MRI 08/05/2020.  No definite new lesions on today's study.  No new lesions demonstrate decreased central enhancement when compared to the most recent prior MR suggesting necrosis.  Index lesions detailed below:     -hepatic segment VIII.  4.3 x 2.6 cm (series 13, image 38; series 16, image 52), previously 3.8 x 2.8 cm on 08/05/2020.     -hepatic segment III.  2.4 cm  (series 13, image 37), previously 2.4 cm on 08/05/2020.     -hepatic segment V. 1.2 cm (series 13, image 47), previously 1.1 cm     Hepatic and portal veins are patent.     Biliary: Gallbladder is unremarkable.  No intrahepatic or extrahepatic biliary dilatation.     Pancreas: Atrophic.  No focal lesion.  No pancreatic ductal dilatation.     Spleen: Normal size.  Prior enhancing splenic lesions are less conspicuous on today's study.  Index nodule measures 1.6 cm (series 15, image 38), previously 1.9 cm.     Adrenal glands: Stable right adrenal adenoma.  The left adrenal gland is unremarkable.     Kidneys: Tiny right renal cyst.  No solid renal masses.  No hydronephrosis.     Miscellaneous: Visualized bowel loops are unremarkable.  No evidence for lymphadenopathy.  Multiple metastatic lesions throughout the spine, similar to prior.     Impression:     1. Similar distribution of intrahepatic metastatic disease with slight increased size of an index lesion in segment VIII as above.  Many lesions demonstrate decreased central enhancement on today's study suggesting necrosis.  No definite new lesions.  2. Prior splenic lesions are less conspicuous on today's study.  Index lesion described above.  3. Multiple metastatic lesions throughout the spine, similar to prior.      CT 11/16/2020  COMPARISON:  CT chest 08/05/2020     FINDINGS:  Base of Neck: No acute abnormalities.  Redemonstration of a hypoattenuating thyroid nodule within the right lobe of the thyroid gland.  Nodule measures approximately 1.9 cm (axial series 2, image 16) and warrants dedicated ultrasound evaluation based on size and patient age if not already performed.     Thoracic soft tissues: Normal.     Aorta: Left-sided aortic arch which demonstrates mild fusiform dilatation measuring up to 4.2 cm in the ascending thoracic aorta (coronal series 604, image 93) and up to 3.3 cm in the proximal descending thoracic aorta.  Mild atherosclerotic changes including  abnormal calcification of the aortic valve annulus.     Heart: Normal in size without abnormal pericardial fluid.  Calcific atherosclerosis of the coronary arteries in a multivessel distribution.     Pulmonary vasculature: Postsurgical changes status post left upper lobectomy.  Remaining pulmonary arteries distribute normally.  Three pulmonary veins present.     Darya/Mediastinum: No mediastinal lymphadenopathy.  Hilar contours incompletely characterized given lack of intravenous contrast however no obvious abnormality present.     Airways: Trachea remains midline and patent.  Proximal airways patent.     Lungs/Pleura: Redemonstration of postsurgical change status post left upper lobectomy and lingular resection.  No new discrete lesion.     Stable ovoid soft tissue nodule less than 1 cm in size located in the fat overlying the right hemidiaphragm at the inferior margin of the right middle lobe (sagittal series 603, image 117; axial series 2, image 70).  Such stability in size remains highly suggestive of benign etiology.     Previously identified pulmonary nodule in the posterior basal segment of the right lower lobe is less conspicuous on today's exam with trace ground-glass opacification remaining within the area, measuring 0.5 cm in greatest diameter (axial series 4, image 323).     Esophagus: Normal.     Upper Abdomen: No acute abnormalities.  Lipid rich adenoma involving the right adrenal measuring 3.3 cm on today's exam.  Layering hyperdense material within the gallbladder compatible with sludge and/or small stones.  Nonspecific 1.1 cm soft tissue density anterior to the spleen, similar to priors.  Widespread heterogeneous parenchymal attenuation within the left and right hepatic lobes compatible with known hepatic metastases.  Largest area of abnormal attenuation within the right hepatic lobe measuring 5.8 x 5.4 cm (axial series 2, image 93).     Bones: Redemonstration of axial skeleton osseous metastases  involving numerous ribs, the sternum, and multiple vertebral bodies.  No acute fracture.     Impression:     In this patient with history of bronchial carcinoid status post left upper lobectomy including lingular section, there has been interval improvement/decrease in size of previously identified sub solid opacity within the periphery of the posterior basal segment of the right lower lobe with minimal ground-glass opacification remaining on today's exam (axial series 4, image 323).  No new discrete lesions.     Multiple hepatic and osseous lesions compatible with known metastases.  Findings better characterized on prior DOTATE PET-CT 04/24/2020 and MRI abdomen 08/05/2020.     Right-sided thyroid nodule which warrants dedicated ultrasound based on size criteria.     Stable right adrenal adenoma.     This report was flagged in Epic as containing an incidental finding.               Assessment:       1. Secondary malignant neuroendocrine tumor of liver    2. Malignant carcinoid tumor of bronchus and lung           Plan:     Mr. Rahman is doing well clinically.  His vision continues to improve and he continues to follow with Dr. Mendoza from ophthalmology.  His scans show overall stability of his disease.  He does have slight enlargement of a liver lesion and will discuss in tumor board.  He also has a thyroid nodule which appears stable going back several years but will also review.  He should remain on lanreotide and zoledronic acid and I will plan to see him back in 3 months with repeat images.  He's to report any new symptoms in the interim.  All questions were answered and he is agreeable with this plan.        Jesus London DO, FACP  Hematology & Oncology  [address] [place] [address]  ph. [phone]  Fax. [phone]    25 minutes were spent in coordination of patient's care, record review and counseling.  More than 50% of the time was face-to-face.

## 2020-11-25 ENCOUNTER — OFFICE VISIT (OUTPATIENT)
Dept: NEUROLOGY | Facility: HOSPITAL | Age: 65
End: 2020-11-25
Attending: INTERNAL MEDICINE
Payer: COMMERCIAL

## 2020-11-25 ENCOUNTER — TELEPHONE (OUTPATIENT)
Dept: NEUROLOGY | Facility: HOSPITAL | Age: 65
End: 2020-11-25

## 2020-11-25 DIAGNOSIS — C7B.8 SECONDARY NEUROENDOCRINE TUMOR OF LIVER: ICD-10-CM

## 2020-11-25 DIAGNOSIS — E04.1 THYROID NODULE: ICD-10-CM

## 2020-11-25 DIAGNOSIS — C7B.8 SECONDARY NEUROENDOCRINE TUMOR OF BONE: ICD-10-CM

## 2020-11-25 DIAGNOSIS — C7A.090 MALIGNANT CARCINOID TUMOR OF BRONCHUS AND LUNG: Primary | ICD-10-CM

## 2020-11-25 DIAGNOSIS — C7B.8 SECONDARY NEUROENDOCRINE TUMOR OF LIVER: Primary | ICD-10-CM

## 2020-11-25 PROCEDURE — 99213 OFFICE O/P EST LOW 20 MIN: CPT | Mod: 95,,, | Performed by: INTERNAL MEDICINE

## 2020-11-25 PROCEDURE — 99213 PR OFFICE/OUTPT VISIT, EST, LEVL III, 20-29 MIN: ICD-10-PCS | Mod: 95,,, | Performed by: INTERNAL MEDICINE

## 2020-11-25 NOTE — PROGRESS NOTES
PATIENT: Elroy Rahman  MRN: [id number]  DATE: 11/25/2020      Diagnosis:   1. Malignant carcinoid tumor of bronchus and lung    2. Secondary neuroendocrine tumor of liver    3. Secondary neuroendocrine tumor of bone    4. Thyroid nodule        Chief Complaint: No chief complaint on file.      Oncologic History:      Oncologic History Bronchial carcinoid, left, diagnosed 1/2016   Metastatic disease to liver and bone 5/2017     Oncologic Treatment Left upper lobectomy 1/2016  CAPTEM 7/2017 - 2/2019 (discontinued due to progression)  Lanreotide 7/2017   Zoledronic acid 7/2017   TACE 10/2018  TACE 3/2019  Lily-177 4/10/19, 6/5/19    Pathology 1/2016: Typical carcinoid tumor, well-differentiated T2a, N0, M0 Ki-67 <1%  6/2017: Liver biopsy, well-differentiated, Ki-67 25%       The patient location is:  home  The chief complaint leading to consultation is:  Follow-up pulmonary carcinoid tumor    Visit type: audio only    Face to Face time with patient:  5 min  Fifteen minutes of total time spent on the encounter, which includes face to face time and non-face to face time preparing to see the patient (eg, review of tests), Obtaining and/or reviewing separately obtained history, Documenting clinical information in the electronic or other health record, Independently interpreting results (not separately reported) and communicating results to the patient/family/caregiver, or Care coordination (not separately reported).         Each patient to whom he or she provides medical services by telemedicine is:  (1) informed of the relationship between the physician and patient and the respective role of any other health care provider with respect to management of the patient; and (2) notified that he or she may decline to receive medical services by telemedicine and may withdraw from such care at any time.    Notes:       Subjective:    Interval History: Mr. Rahman is a 65 y.o. male who is seen in follow-up for a bronchial carcinoid  tumor.  He states he has been feeling well.  He has been tending to his wife who is in the hospital right now within infection.  He is otherwise active and has no new complaints.    His history dates to 1/2016 when he underwent a left upper lobectomy with Dr. Bell for a typical carcinoid tumor.  His pathological staging was T2a, N0, M0 with Ki-67 less than 1% and mitotic count 0 per 10 high-powered fields.  He done well postoperatively, however, in 5/2017 he was undergoing routine surveillance and a chest CT in picked up evidence of progression of disease in the liver.  Dedicated abdominal CT was performed showing multiple liver lesions.  In 6/2017 he underwent a CT-guided liver biopsy which was positive for metastatic neuroendocrine tumor which was well-differentiated with no overt nuclear atypia, however, the Ki-67 was 25%.  He underwent gallium-68 PET/CT and was found to have widespread disease involving the bone, liver and also mesentery.  He was started on CAPTEM in 7/2017 and also Lanreotide and zoledronic acid.  Scans in 10/2017 had shown stability of disease.  Imaging in 1/2018 had shown stability of disease.  Scans in 4/2018 had shown stability of disease.  Scans in 07/2018 had continued to showed mild growth.  In 10/2018 he underwent TACE.  CAPTEM was discontinued in 02/2019 due to progression.  He underwent TACE in 03/2019.  He was started on treatment with PRRT using Lily-177 on 04/10/2019.  His 2nd cycle was in 06/2019 and following this he experienced vision loss of his left eye.  This was hypothesized to have resulted from inflammatory response is due to in orbital lesion and PRRT was discontinued and corticosteroids were initiated with improvement of his symptoms.  Subsequent imaging have shown stable disease.      Past Medical History:   Past Medical History:   Diagnosis Date    Allergy     Carcinoid bronchial adenoma of left lung     PRRT    Chemotherapy follow-up examination 8/25/2017     Cough with hemoptysis     mild/intermittent    Elevated bilirubin 7/2/2018    Hx of BKA, left     above knee    Kidney stone     Mild heartburn     Physical deconditioning 6/22/2020    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    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    Sepsis due to methicillin susceptible Staphylococcus aureus 09/14/2019    MSSA bacteremia from Amputation stump infection    Sleep apnea     Snores    Thyroid nodule 11/25/2020    Urinary tract infection     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       Past Surgical HIstory:   Past Surgical History:   Procedure Laterality Date    ABDOMINAL SURGERY      Bka Left     HERNIA REPAIR      IRRIGATION AND DEBRIDEMENT OF LOWER EXTREMITY Left 9/20/2019    Procedure: IRRIGATION AND DEBRIDEMENT, LOWER EXTREMITY - left - cysto tubing - cultures;  Surgeon: Jesus Arias MD;  Location: [address];  Service: Orthopedics;  Laterality: Left;    LAPAROSCOPIC REPAIR OF INCARCERATED UMBILICAL HERNIA N/A 11/27/2018    Procedure: REPAIR, HERNIA, UMBILICAL, INCARCERATED, LAPAROSCOPIC;  Surgeon: Coco Guidry MD;  Location: [facility] OR;  Service: General;  Laterality: N/A;  video    TRANSESOPHAGEAL ECHOCARDIOGRAPHY N/A 9/18/2019    Procedure: ECHOCARDIOGRAM, TRANSESOPHAGEAL;  Surgeon: Grace Diagnostic Provider;  Location: [facility] EP LAB;  Service: Anesthesiology;  Laterality: N/A;       Family History:   Family History   Problem Relation Age of Onset    Cancer Mother         lung    Cancer Sister     Anesthesia problems Neg Hx        Social History:  reports that he has never smoked. He has never used smokeless tobacco. He reports current alcohol use of about 1.0 standard drinks of alcohol per week. He reports that he does not use drugs.    Allergies:  Review of patient's allergies indicates:  No Known Allergies    Medications:  Current Outpatient Medications   Medication Sig Dispense Refill    ALPHAGAN P 0.1 % Drop  Instill 1 drop into left eye twice daily  4    AZOPT 1 % ophthalmic suspension SHAKE LQ AND INT 1 GTT IN OU BID UTD      famotidine (PEPCID) 20 MG tablet Take 1 tablet (20 mg total) by mouth 2 (two) times daily. 60 tablet 2    fluconazole (DIFLUCAN) 100 MG tablet Take 1 tablet (100 mg total) by mouth once daily. Take 200mg on day 1, then 100mg days 2-7 (Patient not taking: Reported on 11/18/2020) 7 tablet 0    fluconazole (DIFLUCAN) 200 MG Tab Take 1 tablet (200 mg total) by mouth once daily. (Patient not taking: Reported on 11/18/2020) 1 tablet 0    ketorolac 0.4% (ACULAR) 0.4 % Drop INT 1 GTT IN OS QID      LOTEMAX SM 0.38 % DrpG SKYLER 1 GTT IN OU BID      ondansetron (ZOFRAN) 4 MG tablet Take 1 tablet (4 mg total) by mouth daily as needed for Nausea.      predniSONE (DELTASONE) 20 MG tablet Take 0.5 tablets (10 mg total) by mouth once daily. Take with two 50 mg tabs for a total of 110 mg daily (Patient not taking: Reported on 11/18/2020)      PROLENSA 0.07 % Drop INSTILL 1 DROP INTO LEFT EYE TWICE DAILY  4    sulfamethoxazole-trimethoprim 800-160mg (BACTRIM DS) 800-160 mg Tab Take 1 tablet by mouth once daily. (Patient not taking: Reported on 11/18/2020) 30 tablet 1    tamsulosin (FLOMAX) 0.4 mg Cap Take 1 capsule (0.4 mg total) by mouth once daily. 90 capsule 3    tobramycin sulfate 0.3% (TOBREX) 0.3 % ophthalmic ointment Apply a thin layer into right eye four times a day as directed 3.5 g 12     No current facility-administered medications for this visit.        Review of Systems   Constitutional: Negative for appetite change, chills, fatigue, fever and unexpected weight change.   HENT: Negative for dental problem, sinus pressure and sneezing.    Eyes: Positive for visual disturbance.   Respiratory: Negative for cough, choking, chest tightness and shortness of breath.    Cardiovascular: Negative for chest pain and leg swelling.   Gastrointestinal: Negative for abdominal pain, blood in stool,  constipation, diarrhea and nausea.   Genitourinary: Negative for difficulty urinating, dysuria and frequency.   Musculoskeletal: Negative for arthralgias and back pain.   Skin: Negative for rash and wound.   Neurological: Negative for dizziness, light-headedness and headaches.   Hematological: Negative for adenopathy. Does not bruise/bleed easily.   Psychiatric/Behavioral: Negative for sleep disturbance. The patient is not nervous/anxious.        ECOG Performance Status: 0   ECOG SCORE           Objective:      Vitals:   There were no vitals filed for this visit.  BMI: There is no height or weight on file to calculate BMI.    Physical Exam    Laboratory Data:  No visits with results within 1 Week(s) from this visit.   Latest known visit with results is:   Lab Visit on 11/16/2020   Component Date Value Ref Range Status    WBC 11/16/2020 5.14  3.90 - 12.70 K/uL Final    RBC 11/16/2020 4.31* 4.60 - 6.20 M/uL Final    Hemoglobin 11/16/2020 12.0* 14.0 - 18.0 g/dL Final    Hematocrit 11/16/2020 38.3* 40.0 - 54.0 % Final    MCV 11/16/2020 89  82 - 98 fL Final    MCH 11/16/2020 27.8  27.0 - 31.0 pg Final    MCHC 11/16/2020 31.3* 32.0 - 36.0 g/dL Final    RDW 11/16/2020 14.6* 11.5 - 14.5 % Final    Platelets 11/16/2020 228  150 - 350 K/uL Final    MPV 11/16/2020 9.5  9.2 - 12.9 fL Final    Gran # (ANC) 11/16/2020 3.2  1.8 - 7.7 K/uL Final    Comment: The ANC is based on a white cell differential from an   automated cell counter. It has not been microscopically   reviewed for the presence of abnormal cells. Clinical   correlation is required.      Immature Grans (Abs) 11/16/2020 0.02  0.00 - 0.04 K/uL Final    Comment: Mild elevation in immature granulocytes is non specific and   can be seen in a variety of conditions including stress response,   acute inflammation, trauma and pregnancy. Correlation with other   laboratory and clinical findings is essential.      Sodium 11/16/2020 138  136 - 145 mmol/L Final     Potassium 11/16/2020 4.3  3.5 - 5.1 mmol/L Final    Chloride 11/16/2020 104  95 - 110 mmol/L Final    CO2 11/16/2020 25  23 - 29 mmol/L Final    Glucose 11/16/2020 121* 70 - 110 mg/dL Final    BUN 11/16/2020 15  8 - 23 mg/dL Final    Creatinine 11/16/2020 1.4  0.5 - 1.4 mg/dL Final    Calcium 11/16/2020 9.0  8.7 - 10.5 mg/dL Final    Total Protein 11/16/2020 7.3  6.0 - 8.4 g/dL Final    Albumin 11/16/2020 3.6  3.5 - 5.2 g/dL Final    Total Bilirubin 11/16/2020 0.6  0.1 - 1.0 mg/dL Final    Comment: For infants and newborns, interpretation of results should be based  on gestational age, weight and in agreement with clinical  observations.  Premature Infant recommended reference ranges:  Up to 24 hours.............<8.0 mg/dL  Up to 48 hours............<12.0 mg/dL  3-5 days..................<15.0 mg/dL  6-29 days.................<15.0 mg/dL      Alkaline Phosphatase 11/16/2020 91  55 - 135 U/L Final    AST 11/16/2020 10  10 - 40 U/L Final    ALT 11/16/2020 11  10 - 44 U/L Final    Anion Gap 11/16/2020 9  8 - 16 mmol/L Final    eGFR if African American 11/16/2020 >60.0  >60 mL/min/1.73 m^2 Final    eGFR if non African American 11/16/2020 52.7* >60 mL/min/1.73 m^2 Final    Comment: Calculation used to obtain the estimated glomerular filtration  rate (eGFR) is the CKD-EPI equation.       Prealbumin 11/16/2020 24  20 - 43 mg/dL Final     Supplemental Diagnosis  Chromogranin Staining:  Intensity: Moderate to strong. Positive cells: 100 (%)  Synaptophysin Staining:  Intensity: Strong. Positive cells: 100 (%)  CD31: 40 vessels per 1 HPF  Factor VIII: 27 vessels per 1 HPF  Ki67: 1 % tumor cells staining  Immunostains performed with appropriate positive and negative controls.  FINAL PATHOLOGIC DIAGNOSIS  1. Lymph node, level XI, excisional biopsy:  Fragments of benign lymph node with anthracosis and sinus histiocytosis (0/1).  2. Lung, lingular staple line, biopsy:  Lung, negative for neoplasm.  3. Lymph node,  level X, excisional biopsy:  1 benign lymph node with anthracotic pigment and sinus histiocytosis (0/1).  4. Lymph node, level XII, excisional biopsy:  2 benign lymph nodes with sinus histiocytosis and anthracotic pigment (0/2).  5. Lung, left upper lobe, lobectomy:  Typical carcinoid tumor (well differentiated neuroendocrine tumor), 4 cm in greatest dimension.  Bronchial and vascular margins are negative for neoplasm.  Uninvolved lung adjacent to the tumor shows emphysematous changes and fibrosis, however remaining lung  appears unremarkable.  Additional immunohistochemical stains for ancillary studies (including synaptophysin, chromogranin and Ki-67)  will be completed and results will be reported in a supplemental report.  See synoptic report in comment section for further details.  6. Lymph node, level VII, excisional biopsy:  2 benign lymph nodes with anthracotic pigment and sinus histiocytosis (0/2).  Comment: Synoptic Report  Specimen: Left upper lobe of lung  Procedure: Lobectomy  Specimen laterality: Left  Tumor size:  Greatest dimension: 4 cm  Tumor focality: Single focus  Histologic type: Typical carcinoid tumor  Visceral pleural invasion: Not identified  Tumor extension: Not identified  Margins: Bronchial and vascular margins are uninvolved by tumor.  Distance of tumor from closest margin: 1 cm from the bronchial surgical margin.  Lymphovascular invasion: Not identified  Ancillary studies:  Immunohistochemical stains for neuroendocrine markers and Ki-67 will be completed and results will follow in a  supplemental report.  Note: The tumor consists of a proliferation of cells in nests with lewis formation. The tumor cells have salt and  pepper chromatin pattern with abundant cytoplasm. There is no significant nuclear atypia. No evidence of  necrosis. Mitotic count is 0 mitoses in 10 high power fields.    Imaging:    MRI 11/16/2020  COMPARISON:  MRI 08/05/2020; dotatate PET-CT  04/24/2020     FINDINGS:  Liver: Normal homogeneous background signal.  Redemonstration of several enhancing hepatic metastases demonstrating stable distribution when compared to MRI 08/05/2020.  No definite new lesions on today's study.  No new lesions demonstrate decreased central enhancement when compared to the most recent prior MR suggesting necrosis.  Index lesions detailed below:     -hepatic segment VIII.  4.3 x 2.6 cm (series 13, image 38; series 16, image 52), previously 3.8 x 2.8 cm on 08/05/2020.     -hepatic segment III.  2.4 cm (series 13, image 37), previously 2.4 cm on 08/05/2020.     -hepatic segment V. 1.2 cm (series 13, image 47), previously 1.1 cm     Hepatic and portal veins are patent.     Biliary: Gallbladder is unremarkable.  No intrahepatic or extrahepatic biliary dilatation.     Pancreas: Atrophic.  No focal lesion.  No pancreatic ductal dilatation.     Spleen: Normal size.  Prior enhancing splenic lesions are less conspicuous on today's study.  Index nodule measures 1.6 cm (series 15, image 38), previously 1.9 cm.     Adrenal glands: Stable right adrenal adenoma.  The left adrenal gland is unremarkable.     Kidneys: Tiny right renal cyst.  No solid renal masses.  No hydronephrosis.     Miscellaneous: Visualized bowel loops are unremarkable.  No evidence for lymphadenopathy.  Multiple metastatic lesions throughout the spine, similar to prior.     Impression:     1. Similar distribution of intrahepatic metastatic disease with slight increased size of an index lesion in segment VIII as above.  Many lesions demonstrate decreased central enhancement on today's study suggesting necrosis.  No definite new lesions.  2. Prior splenic lesions are less conspicuous on today's study.  Index lesion described above.  3. Multiple metastatic lesions throughout the spine, similar to prior.      CT 11/16/2020  COMPARISON:  CT chest 08/05/2020     FINDINGS:  Base of Neck: No acute abnormalities.   Redemonstration of a hypoattenuating thyroid nodule within the right lobe of the thyroid gland.  Nodule measures approximately 1.9 cm (axial series 2, image 16) and warrants dedicated ultrasound evaluation based on size and patient age if not already performed.     Thoracic soft tissues: Normal.     Aorta: Left-sided aortic arch which demonstrates mild fusiform dilatation measuring up to 4.2 cm in the ascending thoracic aorta (coronal series 604, image 93) and up to 3.3 cm in the proximal descending thoracic aorta.  Mild atherosclerotic changes including abnormal calcification of the aortic valve annulus.     Heart: Normal in size without abnormal pericardial fluid.  Calcific atherosclerosis of the coronary arteries in a multivessel distribution.     Pulmonary vasculature: Postsurgical changes status post left upper lobectomy.  Remaining pulmonary arteries distribute normally.  Three pulmonary veins present.     Darya/Mediastinum: No mediastinal lymphadenopathy.  Hilar contours incompletely characterized given lack of intravenous contrast however no obvious abnormality present.     Airways: Trachea remains midline and patent.  Proximal airways patent.     Lungs/Pleura: Redemonstration of postsurgical change status post left upper lobectomy and lingular resection.  No new discrete lesion.     Stable ovoid soft tissue nodule less than 1 cm in size located in the fat overlying the right hemidiaphragm at the inferior margin of the right middle lobe (sagittal series 603, image 117; axial series 2, image 70).  Such stability in size remains highly suggestive of benign etiology.     Previously identified pulmonary nodule in the posterior basal segment of the right lower lobe is less conspicuous on today's exam with trace ground-glass opacification remaining within the area, measuring 0.5 cm in greatest diameter (axial series 4, image 323).     Esophagus: Normal.     Upper Abdomen: No acute abnormalities.  Lipid rich adenoma  involving the right adrenal measuring 3.3 cm on today's exam.  Layering hyperdense material within the gallbladder compatible with sludge and/or small stones.  Nonspecific 1.1 cm soft tissue density anterior to the spleen, similar to priors.  Widespread heterogeneous parenchymal attenuation within the left and right hepatic lobes compatible with known hepatic metastases.  Largest area of abnormal attenuation within the right hepatic lobe measuring 5.8 x 5.4 cm (axial series 2, image 93).     Bones: Redemonstration of axial skeleton osseous metastases involving numerous ribs, the sternum, and multiple vertebral bodies.  No acute fracture.     Impression:     In this patient with history of bronchial carcinoid status post left upper lobectomy including lingular section, there has been interval improvement/decrease in size of previously identified sub solid opacity within the periphery of the posterior basal segment of the right lower lobe with minimal ground-glass opacification remaining on today's exam (axial series 4, image 323).  No new discrete lesions.     Multiple hepatic and osseous lesions compatible with known metastases.  Findings better characterized on prior DOTATE PET-CT 04/24/2020 and MRI abdomen 08/05/2020.     Right-sided thyroid nodule which warrants dedicated ultrasound based on size criteria.     Stable right adrenal adenoma.     This report was flagged in Epic as containing an incidental finding.               Assessment:       1. Malignant carcinoid tumor of bronchus and lung    2. Secondary neuroendocrine tumor of liver    3. Secondary neuroendocrine tumor of bone    4. Thyroid nodule           Plan:     Spoke with patient concerning tumor board recommendations for embolization and thyroid ultrasound.  He is agreeable to proceed, however, he wishes to wait until his wife is out of the hospital.  I told him we would check back with him in the coming weeks to see when he wishes to schedule.  I have  also told him I will be leaving [facility] in February but would ensure a smooth transition to my replacement.  He voiced understanding.  He is to report any new symptoms in the interim.  All questions were answered and he is agreeable with this plan.    Jesus London DO, FACP  Hematology & Oncology  [facility] [place] [address]  ph. [phone]  Fax. [phone]

## 2020-11-25 NOTE — TELEPHONE ENCOUNTER
Spoke with patient concerning tumor board recommendations for embolization and thyroid ultrasound.  He is agreeable to proceed, however, he wishes to wait until his wife is out of the hospital.  I told him we would check back with him in the coming weeks to see when he wishes to schedule.  I have also told him I will be leaving [facility] in February but would ensure a smooth transition to my replacement.  He voiced understanding.  He is to report any new symptoms in the interim.  All questions were answered and he is agreeable with this plan.

## 2020-12-03 ENCOUNTER — INFUSION (OUTPATIENT)
Dept: INFUSION THERAPY | Facility: HOSPITAL | Age: 65
End: 2020-12-03
Payer: COMMERCIAL

## 2020-12-03 DIAGNOSIS — C7A.090 CARCINOID BRONCHIAL ADENOMA OF LEFT LUNG: ICD-10-CM

## 2020-12-03 DIAGNOSIS — C78.7 SECONDARY MALIGNANT NEOPLASM OF LIVER: ICD-10-CM

## 2020-12-03 DIAGNOSIS — D3A.090 BRONCHIAL CARCINOID TUMORS: ICD-10-CM

## 2020-12-03 DIAGNOSIS — C7B.8 SECONDARY MALIGNANT NEUROENDOCRINE TUMOR OF LIVER: Primary | ICD-10-CM

## 2020-12-03 DIAGNOSIS — C7B.8 SECONDARY NEUROENDOCRINE TUMOR OF BONE: ICD-10-CM

## 2020-12-03 DIAGNOSIS — C7B.8 SECONDARY NEUROENDOCRINE TUMOR OF LIVER: ICD-10-CM

## 2020-12-03 PROCEDURE — 96372 THER/PROPH/DIAG INJ SC/IM: CPT

## 2020-12-03 PROCEDURE — 63600175 PHARM REV CODE 636 W HCPCS: Mod: JG | Performed by: INTERNAL MEDICINE

## 2020-12-03 RX ORDER — LANREOTIDE ACETATE 120 MG/.5ML
120 INJECTION SUBCUTANEOUS ONCE
Status: CANCELLED | OUTPATIENT
Start: 2020-12-03

## 2020-12-03 RX ORDER — LANREOTIDE ACETATE 120 MG/.5ML
120 INJECTION SUBCUTANEOUS ONCE
Status: COMPLETED | OUTPATIENT
Start: 2020-12-03 | End: 2020-12-03

## 2020-12-03 RX ADMIN — LANREOTIDE ACETATE 120 MG: 120 INJECTION SUBCUTANEOUS at 08:12

## 2020-12-03 NOTE — NURSING
0850 pt tolerated inj. Pt voiced no new complaints or concerns at this time. NAD noted. Pt d/c home.

## 2020-12-19 ENCOUNTER — PATIENT MESSAGE (OUTPATIENT)
Dept: NEUROLOGY | Facility: HOSPITAL | Age: 65
End: 2020-12-19

## 2020-12-29 RX ORDER — SODIUM CHLORIDE 0.9 % (FLUSH) 0.9 %
10 SYRINGE (ML) INJECTION
Status: CANCELLED | OUTPATIENT
Start: 2020-12-29

## 2020-12-29 RX ORDER — HEPARIN 100 UNIT/ML
500 SYRINGE INTRAVENOUS
Status: CANCELLED | OUTPATIENT
Start: 2020-12-29

## 2020-12-31 ENCOUNTER — HOSPITAL ENCOUNTER (OUTPATIENT)
Dept: RADIOLOGY | Facility: HOSPITAL | Age: 65
Discharge: HOME | End: 2020-12-31
Attending: INTERNAL MEDICINE
Payer: COMMERCIAL

## 2020-12-31 ENCOUNTER — INFUSION (OUTPATIENT)
Dept: INFUSION THERAPY | Facility: HOSPITAL | Age: 65
End: 2020-12-31
Payer: COMMERCIAL

## 2020-12-31 DIAGNOSIS — C7B.8 SECONDARY NEUROENDOCRINE TUMOR OF LIVER: ICD-10-CM

## 2020-12-31 DIAGNOSIS — D3A.090 BRONCHIAL CARCINOID TUMORS: ICD-10-CM

## 2020-12-31 DIAGNOSIS — C7B.8 SECONDARY MALIGNANT NEUROENDOCRINE TUMOR OF LIVER: Primary | ICD-10-CM

## 2020-12-31 DIAGNOSIS — C7A.090 CARCINOID BRONCHIAL ADENOMA OF LEFT LUNG: ICD-10-CM

## 2020-12-31 DIAGNOSIS — C7B.8 SECONDARY NEUROENDOCRINE TUMOR OF BONE: ICD-10-CM

## 2020-12-31 DIAGNOSIS — C78.7 SECONDARY MALIGNANT NEOPLASM OF LIVER: ICD-10-CM

## 2020-12-31 DIAGNOSIS — E04.1 THYROID NODULE: ICD-10-CM

## 2020-12-31 PROCEDURE — 76536 US THYROID: ICD-10-PCS | Mod: 26,,, | Performed by: RADIOLOGY

## 2020-12-31 PROCEDURE — 63600175 PHARM REV CODE 636 W HCPCS: Mod: JG | Performed by: INTERNAL MEDICINE

## 2020-12-31 PROCEDURE — 76536 US EXAM OF HEAD AND NECK: CPT | Mod: TC

## 2020-12-31 PROCEDURE — 96372 THER/PROPH/DIAG INJ SC/IM: CPT

## 2020-12-31 PROCEDURE — 76536 US EXAM OF HEAD AND NECK: CPT | Mod: 26,,, | Performed by: RADIOLOGY

## 2020-12-31 RX ORDER — LANREOTIDE ACETATE 120 MG/.5ML
120 INJECTION SUBCUTANEOUS ONCE
Status: CANCELLED | OUTPATIENT
Start: 2020-12-31

## 2020-12-31 RX ORDER — LANREOTIDE ACETATE 120 MG/.5ML
120 INJECTION SUBCUTANEOUS ONCE
Status: COMPLETED | OUTPATIENT
Start: 2020-12-31 | End: 2020-12-31

## 2020-12-31 RX ADMIN — LANREOTIDE ACETATE 120 MG: 120 INJECTION SUBCUTANEOUS at 10:12

## 2021-01-04 ENCOUNTER — TELEPHONE (OUTPATIENT)
Dept: NEUROLOGY | Facility: HOSPITAL | Age: 66
End: 2021-01-04

## 2021-01-15 ENCOUNTER — TELEPHONE (OUTPATIENT)
Dept: NEUROLOGY | Facility: HOSPITAL | Age: 66
End: 2021-01-15

## 2021-01-19 ENCOUNTER — TELEPHONE (OUTPATIENT)
Dept: NEUROLOGY | Facility: HOSPITAL | Age: 66
End: 2021-01-19

## 2021-01-26 ENCOUNTER — PATIENT MESSAGE (OUTPATIENT)
Dept: NEUROLOGY | Facility: HOSPITAL | Age: 66
End: 2021-01-26

## 2021-02-02 ENCOUNTER — PATIENT MESSAGE (OUTPATIENT)
Dept: NEUROLOGY | Facility: HOSPITAL | Age: 66
End: 2021-02-02

## 2021-02-03 ENCOUNTER — TELEPHONE (OUTPATIENT)
Dept: NEUROLOGY | Facility: HOSPITAL | Age: 66
End: 2021-02-03

## 2021-02-03 ENCOUNTER — INFUSION (OUTPATIENT)
Dept: INFUSION THERAPY | Facility: HOSPITAL | Age: 66
End: 2021-02-03
Payer: COMMERCIAL

## 2021-02-03 DIAGNOSIS — C7B.8 SECONDARY NEUROENDOCRINE TUMOR OF BONE: ICD-10-CM

## 2021-02-03 DIAGNOSIS — D3A.090 BRONCHIAL CARCINOID TUMORS: ICD-10-CM

## 2021-02-03 DIAGNOSIS — C78.7 SECONDARY MALIGNANT NEOPLASM OF LIVER: ICD-10-CM

## 2021-02-03 DIAGNOSIS — C7B.8 SECONDARY NEUROENDOCRINE TUMOR OF LIVER: ICD-10-CM

## 2021-02-03 DIAGNOSIS — H05.89 ORBITAL LESION: Primary | ICD-10-CM

## 2021-02-03 DIAGNOSIS — C7B.8 SECONDARY MALIGNANT NEUROENDOCRINE TUMOR OF LIVER: Primary | ICD-10-CM

## 2021-02-03 DIAGNOSIS — C7A.090 CARCINOID BRONCHIAL ADENOMA OF LEFT LUNG: ICD-10-CM

## 2021-02-03 DIAGNOSIS — H54.62 VISION LOSS OF LEFT EYE: ICD-10-CM

## 2021-02-03 PROCEDURE — 96372 THER/PROPH/DIAG INJ SC/IM: CPT

## 2021-02-03 PROCEDURE — 63600175 PHARM REV CODE 636 W HCPCS: Mod: JG | Performed by: INTERNAL MEDICINE

## 2021-02-03 RX ORDER — LANREOTIDE ACETATE 120 MG/.5ML
120 INJECTION SUBCUTANEOUS ONCE
Status: COMPLETED | OUTPATIENT
Start: 2021-02-03 | End: 2021-02-03

## 2021-02-03 RX ORDER — LANREOTIDE ACETATE 120 MG/.5ML
120 INJECTION SUBCUTANEOUS ONCE
Status: CANCELLED | OUTPATIENT
Start: 2021-02-03 | End: 2021-02-03

## 2021-02-03 RX ADMIN — LANREOTIDE ACETATE 120 MG: 120 INJECTION SUBCUTANEOUS at 10:02

## 2021-02-04 ENCOUNTER — TELEPHONE (OUTPATIENT)
Dept: NEUROLOGY | Facility: HOSPITAL | Age: 66
End: 2021-02-04

## 2021-02-04 ENCOUNTER — PATIENT MESSAGE (OUTPATIENT)
Dept: NEUROLOGY | Facility: HOSPITAL | Age: 66
End: 2021-02-04

## 2021-02-04 ENCOUNTER — OFFICE VISIT (OUTPATIENT)
Dept: NEUROLOGY | Facility: HOSPITAL | Age: 66
End: 2021-02-04
Attending: SURGERY
Payer: COMMERCIAL

## 2021-02-04 DIAGNOSIS — E04.1 THYROID NODULE: Primary | ICD-10-CM

## 2021-02-04 DIAGNOSIS — C7B.02 SECONDARY MALIGNANT CARCINOID TUMOR OF LIVER: ICD-10-CM

## 2021-02-04 DIAGNOSIS — H05.89 ORBITAL LESION: Primary | ICD-10-CM

## 2021-02-04 DIAGNOSIS — Z01.818 PRE-OP TESTING: ICD-10-CM

## 2021-02-04 DIAGNOSIS — H54.62 VISION LOSS OF LEFT EYE: ICD-10-CM

## 2021-02-04 DIAGNOSIS — C7A.090 CARCINOID BRONCHIAL ADENOMA, UNSPECIFIED LATERALITY: Primary | ICD-10-CM

## 2021-02-04 RX ORDER — DEXTROSE MONOHYDRATE, SODIUM CHLORIDE, AND POTASSIUM CHLORIDE 50; 1.49; 4.5 G/1000ML; G/1000ML; G/1000ML
INJECTION, SOLUTION INTRAVENOUS CONTINUOUS
Status: CANCELLED | OUTPATIENT
Start: 2021-02-04

## 2021-02-04 RX ORDER — IBUPROFEN 400 MG/1
400 TABLET ORAL EVERY 4 HOURS PRN
Status: CANCELLED | OUTPATIENT
Start: 2021-02-04

## 2021-02-04 RX ORDER — DEXAMETHASONE SODIUM PHOSPHATE 4 MG/ML
8 INJECTION, SOLUTION INTRA-ARTICULAR; INTRALESIONAL; INTRAMUSCULAR; INTRAVENOUS; SOFT TISSUE ONCE
Status: CANCELLED | OUTPATIENT
Start: 2021-02-04 | End: 2021-02-04

## 2021-02-04 RX ORDER — FAMOTIDINE 20 MG/1
20 TABLET, FILM COATED ORAL EVERY 12 HOURS
Status: CANCELLED | OUTPATIENT
Start: 2021-02-04

## 2021-02-04 RX ORDER — ALLOPURINOL 100 MG/1
300 TABLET ORAL DAILY
Status: CANCELLED | OUTPATIENT
Start: 2021-02-04 | End: 2021-02-06

## 2021-02-04 RX ORDER — TRAMADOL HYDROCHLORIDE 50 MG/1
50 TABLET ORAL EVERY 6 HOURS PRN
Qty: 12 TABLET | Refills: 0 | Status: SHIPPED | OUTPATIENT
Start: 2021-02-04 | End: 2021-02-14

## 2021-02-04 RX ORDER — SODIUM CHLORIDE 9 MG/ML
INJECTION, SOLUTION INTRAVENOUS ONCE
Status: CANCELLED | OUTPATIENT
Start: 2021-02-04 | End: 2021-02-04

## 2021-02-04 RX ORDER — FUROSEMIDE 10 MG/ML
20 INJECTION INTRAMUSCULAR; INTRAVENOUS ONCE
Status: CANCELLED | OUTPATIENT
Start: 2021-02-04 | End: 2021-02-04

## 2021-02-04 RX ORDER — CIPROFLOXACIN 500 MG/1
500 TABLET ORAL EVERY 12 HOURS
Qty: 10 TABLET | Refills: 0 | Status: ON HOLD | OUTPATIENT
Start: 2021-02-04 | End: 2021-07-22

## 2021-02-04 RX ORDER — DIPHENHYDRAMINE HYDROCHLORIDE 50 MG/ML
50 INJECTION INTRAMUSCULAR; INTRAVENOUS ONCE
Status: CANCELLED | OUTPATIENT
Start: 2021-02-04 | End: 2021-02-04

## 2021-02-04 RX ORDER — TRAMADOL HYDROCHLORIDE 50 MG/1
50 TABLET ORAL EVERY 6 HOURS PRN
Status: CANCELLED | OUTPATIENT
Start: 2021-02-04

## 2021-02-04 RX ORDER — ONDANSETRON 4 MG/1
4 TABLET, FILM COATED ORAL 2 TIMES DAILY
Qty: 15 TABLET | Refills: 0 | Status: ON HOLD | OUTPATIENT
Start: 2021-02-04 | End: 2021-07-22 | Stop reason: SDUPTHER

## 2021-02-04 RX ORDER — ONDANSETRON 2 MG/ML
4 INJECTION INTRAMUSCULAR; INTRAVENOUS EVERY 8 HOURS PRN
Status: CANCELLED | OUTPATIENT
Start: 2021-02-04

## 2021-02-04 RX ORDER — FENTANYL CITRATE 50 UG/ML
50 INJECTION, SOLUTION INTRAMUSCULAR; INTRAVENOUS ONCE
Status: CANCELLED | OUTPATIENT
Start: 2021-02-04 | End: 2021-02-04

## 2021-02-04 RX ORDER — MIDAZOLAM HYDROCHLORIDE 1 MG/ML
0.5 INJECTION INTRAMUSCULAR; INTRAVENOUS ONCE
Status: CANCELLED | OUTPATIENT
Start: 2021-02-04 | End: 2021-02-04

## 2021-02-04 RX ORDER — SODIUM CHLORIDE 9 MG/ML
INJECTION, SOLUTION INTRAVENOUS CONTINUOUS
Status: CANCELLED | OUTPATIENT
Start: 2021-02-04

## 2021-02-05 ENCOUNTER — OFFICE VISIT (OUTPATIENT)
Dept: OPHTHALMOLOGY | Facility: CLINIC | Age: 66
End: 2021-02-05
Payer: COMMERCIAL

## 2021-02-05 ENCOUNTER — CLINICAL SUPPORT (OUTPATIENT)
Dept: OPHTHALMOLOGY | Facility: CLINIC | Age: 66
End: 2021-02-05
Payer: COMMERCIAL

## 2021-02-05 DIAGNOSIS — H54.62 VISION LOSS OF LEFT EYE: ICD-10-CM

## 2021-02-05 DIAGNOSIS — H53.413 CENTRAL SCOTOMA, BOTH EYES: ICD-10-CM

## 2021-02-05 DIAGNOSIS — H05.89 ORBITAL LESION: ICD-10-CM

## 2021-02-05 DIAGNOSIS — C7A.090 MALIGNANT CARCINOID TUMOR OF BRONCHUS AND LUNG: Primary | ICD-10-CM

## 2021-02-05 DIAGNOSIS — C7B.8 SECONDARY NEUROENDOCRINE TUMOR OF LIVER: ICD-10-CM

## 2021-02-05 PROCEDURE — 92134 POSTERIOR SEGMENT OCT RETINA (OCULAR COHERENCE TOMOGRAPHY)-BOTH EYES: ICD-10-PCS | Mod: S$GLB,,, | Performed by: OPHTHALMOLOGY

## 2021-02-05 PROCEDURE — 1126F AMNT PAIN NOTED NONE PRSNT: CPT | Mod: S$GLB,,, | Performed by: OPHTHALMOLOGY

## 2021-02-05 PROCEDURE — 92134 CPTRZ OPH DX IMG PST SGM RTA: CPT | Mod: S$GLB,,, | Performed by: OPHTHALMOLOGY

## 2021-02-05 PROCEDURE — 92004 COMPRE OPH EXAM NEW PT 1/>: CPT | Mod: S$GLB,,, | Performed by: OPHTHALMOLOGY

## 2021-02-05 PROCEDURE — 1126F PR PAIN SEVERITY QUANTIFIED, NO PAIN PRESENT: ICD-10-PCS | Mod: S$GLB,,, | Performed by: OPHTHALMOLOGY

## 2021-02-05 PROCEDURE — 99999 PR PBB SHADOW E&M-EST. PATIENT-LVL III: ICD-10-PCS | Mod: PBBFAC,,, | Performed by: OPHTHALMOLOGY

## 2021-02-05 PROCEDURE — 99999 PR PBB SHADOW E&M-EST. PATIENT-LVL III: CPT | Mod: PBBFAC,,, | Performed by: OPHTHALMOLOGY

## 2021-02-05 PROCEDURE — 92004 PR EYE EXAM, NEW PATIENT,COMPREHESV: ICD-10-PCS | Mod: S$GLB,,, | Performed by: OPHTHALMOLOGY

## 2021-02-10 ENCOUNTER — PATIENT MESSAGE (OUTPATIENT)
Dept: NEUROLOGY | Facility: HOSPITAL | Age: 66
End: 2021-02-10

## 2021-02-11 ENCOUNTER — TELEPHONE (OUTPATIENT)
Dept: NEUROLOGY | Facility: HOSPITAL | Age: 66
End: 2021-02-11

## 2021-02-12 ENCOUNTER — LAB VISIT (OUTPATIENT)
Dept: FAMILY MEDICINE | Facility: CLINIC | Age: 66
End: 2021-02-12
Payer: COMMERCIAL

## 2021-02-12 ENCOUNTER — PATIENT MESSAGE (OUTPATIENT)
Dept: OPHTHALMOLOGY | Facility: CLINIC | Age: 66
End: 2021-02-12

## 2021-02-12 DIAGNOSIS — Z01.818 PRE-OP TESTING: ICD-10-CM

## 2021-02-12 PROCEDURE — U0005 INFEC AGEN DETEC AMPLI PROBE: HCPCS

## 2021-02-12 PROCEDURE — U0003 INFECTIOUS AGENT DETECTION BY NUCLEIC ACID (DNA OR RNA); SEVERE ACUTE RESPIRATORY SYNDROME CORONAVIRUS 2 (SARS-COV-2) (CORONAVIRUS DISEASE [COVID-19]), AMPLIFIED PROBE TECHNIQUE, MAKING USE OF HIGH THROUGHPUT TECHNOLOGIES AS DESCRIBED BY CMS-2020-01-R: HCPCS

## 2021-02-13 DIAGNOSIS — U07.1 COVID-19 VIRUS DETECTED: ICD-10-CM

## 2021-02-13 LAB — SARS-COV-2 RNA RESP QL NAA+PROBE: DETECTED

## 2021-02-15 ENCOUNTER — HOSPITAL ENCOUNTER (OUTPATIENT)
Dept: INTERVENTIONAL RADIOLOGY/VASCULAR | Facility: HOSPITAL | Age: 66
Discharge: HOME | End: 2021-02-15
Attending: INTERNAL MEDICINE
Payer: COMMERCIAL

## 2021-02-15 DIAGNOSIS — C7B.8 SECONDARY NEUROENDOCRINE TUMOR OF LIVER: ICD-10-CM

## 2021-02-15 DIAGNOSIS — E04.1 THYROID NODULE: ICD-10-CM

## 2021-02-22 ENCOUNTER — PATIENT MESSAGE (OUTPATIENT)
Dept: PHYSICAL MEDICINE AND REHAB | Facility: CLINIC | Age: 66
End: 2021-02-22

## 2021-02-22 ENCOUNTER — TELEPHONE (OUTPATIENT)
Dept: NEUROLOGY | Facility: HOSPITAL | Age: 66
End: 2021-02-22

## 2021-02-22 DIAGNOSIS — C7B.8 SECONDARY NEUROENDOCRINE TUMOR OF LIVER: Primary | ICD-10-CM

## 2021-02-23 ENCOUNTER — PATIENT MESSAGE (OUTPATIENT)
Dept: NEUROLOGY | Facility: HOSPITAL | Age: 66
End: 2021-02-23

## 2021-02-25 DIAGNOSIS — S78.112A ABOVE KNEE AMPUTATION OF LEFT LOWER EXTREMITY: Primary | ICD-10-CM

## 2021-03-01 ENCOUNTER — INFUSION (OUTPATIENT)
Dept: INFUSION THERAPY | Facility: HOSPITAL | Age: 66
End: 2021-03-01
Payer: COMMERCIAL

## 2021-03-01 DIAGNOSIS — C7A.090 CARCINOID BRONCHIAL ADENOMA OF LEFT LUNG: ICD-10-CM

## 2021-03-01 DIAGNOSIS — C78.7 SECONDARY MALIGNANT NEOPLASM OF LIVER: ICD-10-CM

## 2021-03-01 DIAGNOSIS — D3A.090 BRONCHIAL CARCINOID TUMORS: ICD-10-CM

## 2021-03-01 DIAGNOSIS — C7B.8 SECONDARY MALIGNANT NEUROENDOCRINE TUMOR OF LIVER: Primary | ICD-10-CM

## 2021-03-01 DIAGNOSIS — C7B.8 SECONDARY NEUROENDOCRINE TUMOR OF LIVER: ICD-10-CM

## 2021-03-01 DIAGNOSIS — C7B.8 SECONDARY NEUROENDOCRINE TUMOR OF BONE: ICD-10-CM

## 2021-03-01 PROCEDURE — 63600175 PHARM REV CODE 636 W HCPCS: Mod: JG | Performed by: INTERNAL MEDICINE

## 2021-03-01 PROCEDURE — 96372 THER/PROPH/DIAG INJ SC/IM: CPT

## 2021-03-01 RX ORDER — LANREOTIDE ACETATE 120 MG/.5ML
120 INJECTION SUBCUTANEOUS ONCE
Status: COMPLETED | OUTPATIENT
Start: 2021-03-01 | End: 2021-03-01

## 2021-03-01 RX ORDER — LANREOTIDE ACETATE 120 MG/.5ML
120 INJECTION SUBCUTANEOUS ONCE
Status: CANCELLED | OUTPATIENT
Start: 2021-03-01 | End: 2021-03-01

## 2021-03-01 RX ADMIN — LANREOTIDE ACETATE 120 MG: 120 INJECTION SUBCUTANEOUS at 09:03

## 2021-03-16 ENCOUNTER — TELEPHONE (OUTPATIENT)
Dept: NEUROLOGY | Facility: HOSPITAL | Age: 66
End: 2021-03-16

## 2021-03-16 DIAGNOSIS — C7B.8 SECONDARY NEUROENDOCRINE TUMOR OF BONE: ICD-10-CM

## 2021-03-16 DIAGNOSIS — C7B.8 SECONDARY NEUROENDOCRINE TUMOR OF LIVER: ICD-10-CM

## 2021-03-16 DIAGNOSIS — C7A.090 MALIGNANT CARCINOID TUMOR OF BRONCHUS AND LUNG: Primary | ICD-10-CM

## 2021-03-16 RX ORDER — LANREOTIDE ACETATE 120 MG/.5ML
120 INJECTION SUBCUTANEOUS ONCE
Status: CANCELLED | OUTPATIENT
Start: 2021-03-17 | End: 2021-04-05

## 2021-03-17 ENCOUNTER — TELEPHONE (OUTPATIENT)
Dept: NEUROLOGY | Facility: HOSPITAL | Age: 66
End: 2021-03-17

## 2021-03-17 DIAGNOSIS — C7B.8 SECONDARY NEUROENDOCRINE TUMOR OF LIVER: Primary | ICD-10-CM

## 2021-03-17 RX ORDER — LANREOTIDE ACETATE 120 MG/.5ML
120 INJECTION SUBCUTANEOUS
Status: CANCELLED
Start: 2021-04-05

## 2021-03-17 RX ORDER — LANREOTIDE ACETATE 120 MG/.5ML
120 INJECTION SUBCUTANEOUS
Status: CANCELLED | OUTPATIENT
Start: 2021-03-17

## 2021-03-17 RX ORDER — LANREOTIDE ACETATE 120 MG/.5ML
120 INJECTION SUBCUTANEOUS
Status: CANCELLED
Start: 2021-03-17

## 2021-04-05 ENCOUNTER — OFFICE VISIT (OUTPATIENT)
Dept: NEUROLOGY | Facility: HOSPITAL | Age: 66
End: 2021-04-05
Attending: SURGERY
Payer: COMMERCIAL

## 2021-04-05 ENCOUNTER — TELEPHONE (OUTPATIENT)
Dept: NEUROLOGY | Facility: HOSPITAL | Age: 66
End: 2021-04-05

## 2021-04-05 ENCOUNTER — LAB VISIT (OUTPATIENT)
Dept: LAB | Facility: HOSPITAL | Age: 66
End: 2021-04-05
Attending: SURGERY
Payer: MEDICAID

## 2021-04-05 ENCOUNTER — INFUSION (OUTPATIENT)
Dept: INFUSION THERAPY | Facility: HOSPITAL | Age: 66
End: 2021-04-05
Attending: SURGERY
Payer: COMMERCIAL

## 2021-04-05 VITALS
HEIGHT: 71 IN | DIASTOLIC BLOOD PRESSURE: 75 MMHG | BODY MASS INDEX: 36.42 KG/M2 | TEMPERATURE: 98 F | HEART RATE: 69 BPM | WEIGHT: 260.13 LBS | SYSTOLIC BLOOD PRESSURE: 126 MMHG

## 2021-04-05 DIAGNOSIS — C7B.8 SECONDARY NEUROENDOCRINE TUMOR OF LIVER: ICD-10-CM

## 2021-04-05 DIAGNOSIS — M54.32 BACK PAIN WITH LEFT-SIDED SCIATICA: ICD-10-CM

## 2021-04-05 DIAGNOSIS — C7A.090 CARCINOID BRONCHIAL ADENOMA OF LEFT LUNG: ICD-10-CM

## 2021-04-05 DIAGNOSIS — C7A.090 MALIGNANT CARCINOID TUMOR OF BRONCHUS AND LUNG: ICD-10-CM

## 2021-04-05 DIAGNOSIS — C7B.02 METASTATIC MALIGNANT CARCINOID TUMOR TO LIVER: ICD-10-CM

## 2021-04-05 DIAGNOSIS — C7B.8 SECONDARY NEUROENDOCRINE TUMOR OF BONE: ICD-10-CM

## 2021-04-05 DIAGNOSIS — D3A.090 BRONCHIAL CARCINOID TUMORS: ICD-10-CM

## 2021-04-05 DIAGNOSIS — C78.7 SECONDARY MALIGNANT NEOPLASM OF LIVER: ICD-10-CM

## 2021-04-05 DIAGNOSIS — R26.9 GAIT ABNORMALITY: Primary | ICD-10-CM

## 2021-04-05 DIAGNOSIS — C7B.8 SECONDARY MALIGNANT NEUROENDOCRINE TUMOR OF LIVER: Primary | ICD-10-CM

## 2021-04-05 LAB
ALBUMIN SERPL BCP-MCNC: 3.4 G/DL (ref 3.5–5.2)
ALP SERPL-CCNC: 164 U/L (ref 55–135)
ALT SERPL W/O P-5'-P-CCNC: 26 U/L (ref 10–44)
ANION GAP SERPL CALC-SCNC: 14 MMOL/L (ref 8–16)
AST SERPL-CCNC: 17 U/L (ref 10–40)
BILIRUB SERPL-MCNC: 0.4 MG/DL (ref 0.1–1)
BUN SERPL-MCNC: 16 MG/DL (ref 8–23)
CALCIUM SERPL-MCNC: 9 MG/DL (ref 8.7–10.5)
CHLORIDE SERPL-SCNC: 105 MMOL/L (ref 95–110)
CO2 SERPL-SCNC: 22 MMOL/L (ref 23–29)
CREAT SERPL-MCNC: 1.3 MG/DL (ref 0.5–1.4)
ERYTHROCYTE [DISTWIDTH] IN BLOOD BY AUTOMATED COUNT: 15.9 % (ref 11.5–14.5)
EST. GFR  (AFRICAN AMERICAN): >60 ML/MIN/1.73 M^2
EST. GFR  (NON AFRICAN AMERICAN): 58 ML/MIN/1.73 M^2
GLUCOSE SERPL-MCNC: 164 MG/DL (ref 70–110)
HCT VFR BLD AUTO: 36.9 % (ref 40–54)
HGB BLD-MCNC: 11.9 G/DL (ref 14–18)
IMM GRANULOCYTES # BLD AUTO: 0.03 K/UL (ref 0–0.04)
INR PPP: 1.1 (ref 0.8–1.2)
MCH RBC QN AUTO: 26.7 PG (ref 27–31)
MCHC RBC AUTO-ENTMCNC: 32.2 G/DL (ref 32–36)
MCV RBC AUTO: 83 FL (ref 82–98)
NEUTROPHILS # BLD AUTO: 4 K/UL (ref 1.8–7.7)
PLATELET # BLD AUTO: 319 K/UL (ref 150–450)
PMV BLD AUTO: 9.3 FL (ref 9.2–12.9)
POTASSIUM SERPL-SCNC: 4.2 MMOL/L (ref 3.5–5.1)
PROT SERPL-MCNC: 7.7 G/DL (ref 6–8.4)
PROTHROMBIN TIME: 11.5 SEC (ref 9–12.5)
RBC # BLD AUTO: 4.45 M/UL (ref 4.6–6.2)
SODIUM SERPL-SCNC: 141 MMOL/L (ref 136–145)
WBC # BLD AUTO: 5.71 K/UL (ref 3.9–12.7)

## 2021-04-05 PROCEDURE — 80053 COMPREHEN METABOLIC PANEL: CPT | Performed by: SURGERY

## 2021-04-05 PROCEDURE — 85027 COMPLETE CBC AUTOMATED: CPT | Performed by: SURGERY

## 2021-04-05 PROCEDURE — 63600175 PHARM REV CODE 636 W HCPCS: Mod: JG | Performed by: SURGERY

## 2021-04-05 PROCEDURE — 85610 PROTHROMBIN TIME: CPT | Performed by: SURGERY

## 2021-04-05 PROCEDURE — 36415 COLL VENOUS BLD VENIPUNCTURE: CPT | Performed by: SURGERY

## 2021-04-05 PROCEDURE — 99215 OFFICE O/P EST HI 40 MIN: CPT | Mod: 25 | Performed by: SURGERY

## 2021-04-05 PROCEDURE — 96372 THER/PROPH/DIAG INJ SC/IM: CPT

## 2021-04-05 RX ORDER — DIPHENHYDRAMINE HYDROCHLORIDE 50 MG/ML
50 INJECTION INTRAMUSCULAR; INTRAVENOUS ONCE
Status: CANCELLED | OUTPATIENT
Start: 2021-04-05 | End: 2021-04-05

## 2021-04-05 RX ORDER — IBUPROFEN 400 MG/1
400 TABLET ORAL EVERY 4 HOURS PRN
Status: CANCELLED | OUTPATIENT
Start: 2021-04-05

## 2021-04-05 RX ORDER — ONDANSETRON 2 MG/ML
4 INJECTION INTRAMUSCULAR; INTRAVENOUS EVERY 8 HOURS PRN
Status: CANCELLED | OUTPATIENT
Start: 2021-04-05

## 2021-04-05 RX ORDER — TRAMADOL HYDROCHLORIDE 50 MG/1
50 TABLET ORAL EVERY 6 HOURS PRN
Status: CANCELLED | OUTPATIENT
Start: 2021-04-05

## 2021-04-05 RX ORDER — FENTANYL CITRATE 50 UG/ML
50 INJECTION, SOLUTION INTRAMUSCULAR; INTRAVENOUS ONCE
Status: CANCELLED | OUTPATIENT
Start: 2021-04-05 | End: 2021-04-05

## 2021-04-05 RX ORDER — SODIUM CHLORIDE 9 MG/ML
INJECTION, SOLUTION INTRAVENOUS CONTINUOUS
Status: CANCELLED | OUTPATIENT
Start: 2021-04-05

## 2021-04-05 RX ORDER — TRAMADOL HYDROCHLORIDE 50 MG/1
50 TABLET ORAL EVERY 6 HOURS PRN
Qty: 10 TABLET | Refills: 0 | Status: SHIPPED | OUTPATIENT
Start: 2021-04-05 | End: 2021-04-15

## 2021-04-05 RX ORDER — SODIUM CHLORIDE 9 MG/ML
INJECTION, SOLUTION INTRAVENOUS ONCE
Status: CANCELLED | OUTPATIENT
Start: 2021-04-05 | End: 2021-04-05

## 2021-04-05 RX ORDER — DEXTROSE MONOHYDRATE, SODIUM CHLORIDE, AND POTASSIUM CHLORIDE 50; 1.49; 4.5 G/1000ML; G/1000ML; G/1000ML
INJECTION, SOLUTION INTRAVENOUS CONTINUOUS
Status: CANCELLED | OUTPATIENT
Start: 2021-04-05

## 2021-04-05 RX ORDER — LANREOTIDE ACETATE 120 MG/.5ML
120 INJECTION SUBCUTANEOUS
COMMUNITY

## 2021-04-05 RX ORDER — LANREOTIDE ACETATE 120 MG/.5ML
120 INJECTION SUBCUTANEOUS
Status: DISCONTINUED | OUTPATIENT
Start: 2021-04-05 | End: 2021-04-05 | Stop reason: HOSPADM

## 2021-04-05 RX ORDER — MAGNESIUM SULFATE HEPTAHYDRATE 40 MG/ML
2 INJECTION, SOLUTION INTRAVENOUS ONCE
Status: CANCELLED | OUTPATIENT
Start: 2021-04-05 | End: 2021-04-05

## 2021-04-05 RX ORDER — FAMOTIDINE 20 MG/1
20 TABLET, FILM COATED ORAL EVERY 12 HOURS
Status: CANCELLED | OUTPATIENT
Start: 2021-04-05

## 2021-04-05 RX ORDER — CIPROFLOXACIN 500 MG/1
500 TABLET ORAL EVERY 12 HOURS
Qty: 12 TABLET | Refills: 0 | Status: ON HOLD | OUTPATIENT
Start: 2021-04-05 | End: 2021-07-22 | Stop reason: HOSPADM

## 2021-04-05 RX ORDER — FUROSEMIDE 10 MG/ML
20 INJECTION INTRAMUSCULAR; INTRAVENOUS ONCE
Status: CANCELLED | OUTPATIENT
Start: 2021-04-05 | End: 2021-04-05

## 2021-04-05 RX ORDER — ALLOPURINOL 100 MG/1
300 TABLET ORAL DAILY
Status: CANCELLED | OUTPATIENT
Start: 2021-04-05 | End: 2021-04-07

## 2021-04-05 RX ORDER — ONDANSETRON 4 MG/1
4 TABLET, FILM COATED ORAL 2 TIMES DAILY
Qty: 15 TABLET | Refills: 0 | Status: ON HOLD | OUTPATIENT
Start: 2021-04-05 | End: 2021-07-22 | Stop reason: SDUPTHER

## 2021-04-05 RX ORDER — DEXAMETHASONE SODIUM PHOSPHATE 4 MG/ML
8 INJECTION, SOLUTION INTRA-ARTICULAR; INTRALESIONAL; INTRAMUSCULAR; INTRAVENOUS; SOFT TISSUE ONCE
Status: CANCELLED | OUTPATIENT
Start: 2021-04-05 | End: 2021-04-05

## 2021-04-05 RX ORDER — MIDAZOLAM HYDROCHLORIDE 1 MG/ML
0.5 INJECTION INTRAMUSCULAR; INTRAVENOUS ONCE
Status: CANCELLED | OUTPATIENT
Start: 2021-04-05 | End: 2021-04-05

## 2021-04-05 RX ORDER — LANREOTIDE ACETATE 120 MG/.5ML
120 INJECTION SUBCUTANEOUS
Status: CANCELLED
Start: 2021-04-05

## 2021-04-05 RX ADMIN — LANREOTIDE ACETATE 120 MG: 120 INJECTION SUBCUTANEOUS at 10:04

## 2021-04-07 ENCOUNTER — HOSPITAL ENCOUNTER (OUTPATIENT)
Dept: RADIOLOGY | Facility: HOSPITAL | Age: 66
Discharge: HOME | End: 2021-04-07
Attending: RADIOLOGY
Payer: COMMERCIAL

## 2021-04-07 DIAGNOSIS — C7B.8 SECONDARY NEUROENDOCRINE TUMOR OF LIVER: ICD-10-CM

## 2021-04-07 PROCEDURE — 74183 MRI ABD W/O CNTR FLWD CNTR: CPT | Mod: TC

## 2021-04-07 PROCEDURE — 74183 MRI ABD W/O CNTR FLWD CNTR: CPT | Mod: 26,,, | Performed by: RADIOLOGY

## 2021-04-07 PROCEDURE — 25500020 PHARM REV CODE 255: Performed by: RADIOLOGY

## 2021-04-07 PROCEDURE — 74183 MRI ABDOMEN W WO CONTRAST: ICD-10-PCS | Mod: 26,,, | Performed by: RADIOLOGY

## 2021-04-07 PROCEDURE — A9585 GADOBUTROL INJECTION: HCPCS | Performed by: RADIOLOGY

## 2021-04-07 RX ORDER — GADOBUTROL 604.72 MG/ML
10 INJECTION INTRAVENOUS
Status: COMPLETED | OUTPATIENT
Start: 2021-04-07 | End: 2021-04-07

## 2021-04-07 RX ADMIN — GADOBUTROL 10 ML: 604.72 INJECTION INTRAVENOUS at 09:04

## 2021-04-08 ENCOUNTER — HOSPITAL ENCOUNTER (OUTPATIENT)
Dept: INTERVENTIONAL RADIOLOGY/VASCULAR | Facility: HOSPITAL | Age: 66
Discharge: HOME | End: 2021-04-08
Attending: SURGERY
Payer: COMMERCIAL

## 2021-04-08 ENCOUNTER — HOSPITAL ENCOUNTER (EMERGENCY)
Facility: HOSPITAL | Age: 66
Discharge: HOME | End: 2021-04-08
Attending: EMERGENCY MEDICINE
Payer: COMMERCIAL

## 2021-04-08 VITALS
HEART RATE: 60 BPM | RESPIRATION RATE: 16 BRPM | SYSTOLIC BLOOD PRESSURE: 139 MMHG | BODY MASS INDEX: 35.21 KG/M2 | DIASTOLIC BLOOD PRESSURE: 77 MMHG | WEIGHT: 260 LBS | TEMPERATURE: 99 F | OXYGEN SATURATION: 96 % | HEIGHT: 72 IN

## 2021-04-08 DIAGNOSIS — C7B.00 METASTATIC CARCINOID TUMOR: ICD-10-CM

## 2021-04-08 DIAGNOSIS — R31.9 URINARY TRACT INFECTION WITH HEMATURIA, SITE UNSPECIFIED: ICD-10-CM

## 2021-04-08 DIAGNOSIS — M54.31 SCIATICA OF RIGHT SIDE: Primary | ICD-10-CM

## 2021-04-08 DIAGNOSIS — C7B.8 SECONDARY NEUROENDOCRINE TUMOR OF LIVER: ICD-10-CM

## 2021-04-08 DIAGNOSIS — N20.0 KIDNEY STONE: ICD-10-CM

## 2021-04-08 DIAGNOSIS — N39.0 URINARY TRACT INFECTION WITH HEMATURIA, SITE UNSPECIFIED: ICD-10-CM

## 2021-04-08 LAB
ALBUMIN SERPL BCP-MCNC: 3.6 G/DL (ref 3.5–5.2)
ALP SERPL-CCNC: 169 U/L (ref 55–135)
ALT SERPL W/O P-5'-P-CCNC: 27 U/L (ref 10–44)
ANION GAP SERPL CALC-SCNC: 15 MMOL/L (ref 8–16)
APTT BLDCRRT: 27.3 SEC (ref 21–32)
AST SERPL-CCNC: 20 U/L (ref 10–40)
BACTERIA #/AREA URNS HPF: ABNORMAL /HPF
BASOPHILS # BLD AUTO: 0.05 K/UL (ref 0–0.2)
BASOPHILS NFR BLD: 0.8 % (ref 0–1.9)
BILIRUB SERPL-MCNC: 0.4 MG/DL (ref 0.1–1)
BILIRUB UR QL STRIP: NEGATIVE
BUN SERPL-MCNC: 14 MG/DL (ref 8–23)
CALCIUM SERPL-MCNC: 9.1 MG/DL (ref 8.7–10.5)
CHLORIDE SERPL-SCNC: 104 MMOL/L (ref 95–110)
CLARITY UR: ABNORMAL
CO2 SERPL-SCNC: 21 MMOL/L (ref 23–29)
COLOR UR: ABNORMAL
CREAT SERPL-MCNC: 1.2 MG/DL (ref 0.5–1.4)
DIFFERENTIAL METHOD: ABNORMAL
EOSINOPHIL # BLD AUTO: 0 K/UL (ref 0–0.5)
EOSINOPHIL NFR BLD: 0.7 % (ref 0–8)
ERYTHROCYTE [DISTWIDTH] IN BLOOD BY AUTOMATED COUNT: 15.9 % (ref 11.5–14.5)
EST. GFR  (AFRICAN AMERICAN): >60 ML/MIN/1.73 M^2
EST. GFR  (NON AFRICAN AMERICAN): >60 ML/MIN/1.73 M^2
GLUCOSE SERPL-MCNC: 127 MG/DL (ref 70–110)
GLUCOSE UR QL STRIP: NEGATIVE
HCT VFR BLD AUTO: 37.8 % (ref 40–54)
HGB BLD-MCNC: 12.2 G/DL (ref 14–18)
HGB UR QL STRIP: ABNORMAL
IMM GRANULOCYTES # BLD AUTO: 0.03 K/UL (ref 0–0.04)
IMM GRANULOCYTES NFR BLD AUTO: 0.5 % (ref 0–0.5)
INR PPP: 1 (ref 0.8–1.2)
KETONES UR QL STRIP: NEGATIVE
LEUKOCYTE ESTERASE UR QL STRIP: ABNORMAL
LYMPHOCYTES # BLD AUTO: 1.2 K/UL (ref 1–4.8)
LYMPHOCYTES NFR BLD: 18.7 % (ref 18–48)
MCH RBC QN AUTO: 26.5 PG (ref 27–31)
MCHC RBC AUTO-ENTMCNC: 32.3 G/DL (ref 32–36)
MCV RBC AUTO: 82 FL (ref 82–98)
MICROSCOPIC COMMENT: ABNORMAL
MONOCYTES # BLD AUTO: 0.6 K/UL (ref 0.3–1)
MONOCYTES NFR BLD: 9.3 % (ref 4–15)
NEUTROPHILS # BLD AUTO: 4.3 K/UL (ref 1.8–7.7)
NEUTROPHILS NFR BLD: 70 % (ref 38–73)
NITRITE UR QL STRIP: NEGATIVE
NRBC BLD-RTO: 0 /100 WBC
PH UR STRIP: 5 [PH] (ref 5–8)
PLATELET # BLD AUTO: 334 K/UL (ref 150–450)
PMV BLD AUTO: 9.7 FL (ref 9.2–12.9)
POTASSIUM SERPL-SCNC: 3.8 MMOL/L (ref 3.5–5.1)
PROT SERPL-MCNC: 7.9 G/DL (ref 6–8.4)
PROT UR QL STRIP: ABNORMAL
PROTHROMBIN TIME: 10.9 SEC (ref 9–12.5)
RBC # BLD AUTO: 4.61 M/UL (ref 4.6–6.2)
RBC #/AREA URNS HPF: >100 /HPF (ref 0–4)
SODIUM SERPL-SCNC: 140 MMOL/L (ref 136–145)
SP GR UR STRIP: 1.03 (ref 1–1.03)
SQUAMOUS #/AREA URNS HPF: 3 /HPF
URN SPEC COLLECT METH UR: ABNORMAL
UROBILINOGEN UR STRIP-ACNC: NEGATIVE EU/DL
WBC # BLD AUTO: 6.14 K/UL (ref 3.9–12.7)
WBC #/AREA URNS HPF: 36 /HPF (ref 0–5)
WBC CLUMPS URNS QL MICRO: ABNORMAL

## 2021-04-08 PROCEDURE — 25000003 PHARM REV CODE 250: Performed by: EMERGENCY MEDICINE

## 2021-04-08 PROCEDURE — 96361 HYDRATE IV INFUSION ADD-ON: CPT

## 2021-04-08 PROCEDURE — 96365 THER/PROPH/DIAG IV INF INIT: CPT

## 2021-04-08 PROCEDURE — 85730 THROMBOPLASTIN TIME PARTIAL: CPT | Performed by: EMERGENCY MEDICINE

## 2021-04-08 PROCEDURE — 85025 COMPLETE CBC W/AUTO DIFF WBC: CPT | Performed by: EMERGENCY MEDICINE

## 2021-04-08 PROCEDURE — 87186 SC STD MICRODIL/AGAR DIL: CPT | Performed by: EMERGENCY MEDICINE

## 2021-04-08 PROCEDURE — 87077 CULTURE AEROBIC IDENTIFY: CPT | Performed by: EMERGENCY MEDICINE

## 2021-04-08 PROCEDURE — 99285 EMERGENCY DEPT VISIT HI MDM: CPT | Mod: 25

## 2021-04-08 PROCEDURE — 96375 TX/PRO/DX INJ NEW DRUG ADDON: CPT

## 2021-04-08 PROCEDURE — 85610 PROTHROMBIN TIME: CPT | Performed by: EMERGENCY MEDICINE

## 2021-04-08 PROCEDURE — 87086 URINE CULTURE/COLONY COUNT: CPT | Performed by: EMERGENCY MEDICINE

## 2021-04-08 PROCEDURE — 87088 URINE BACTERIA CULTURE: CPT | Performed by: EMERGENCY MEDICINE

## 2021-04-08 PROCEDURE — 80053 COMPREHEN METABOLIC PANEL: CPT | Performed by: EMERGENCY MEDICINE

## 2021-04-08 PROCEDURE — 81000 URINALYSIS NONAUTO W/SCOPE: CPT | Performed by: EMERGENCY MEDICINE

## 2021-04-08 PROCEDURE — 63600175 PHARM REV CODE 636 W HCPCS: Performed by: EMERGENCY MEDICINE

## 2021-04-08 RX ORDER — ONDANSETRON 4 MG/1
4 TABLET, ORALLY DISINTEGRATING ORAL EVERY 6 HOURS PRN
Qty: 12 TABLET | Refills: 0 | Status: SHIPPED | OUTPATIENT
Start: 2021-04-08 | End: 2021-05-25 | Stop reason: SDUPTHER

## 2021-04-08 RX ORDER — HYDROMORPHONE HYDROCHLORIDE 1 MG/ML
1 INJECTION, SOLUTION INTRAMUSCULAR; INTRAVENOUS; SUBCUTANEOUS
Status: COMPLETED | OUTPATIENT
Start: 2021-04-08 | End: 2021-04-08

## 2021-04-08 RX ORDER — MORPHINE SULFATE 4 MG/ML
4 INJECTION, SOLUTION INTRAMUSCULAR; INTRAVENOUS
Status: COMPLETED | OUTPATIENT
Start: 2021-04-08 | End: 2021-04-08

## 2021-04-08 RX ORDER — KETOROLAC TROMETHAMINE 30 MG/ML
15 INJECTION, SOLUTION INTRAMUSCULAR; INTRAVENOUS
Status: COMPLETED | OUTPATIENT
Start: 2021-04-08 | End: 2021-04-08

## 2021-04-08 RX ORDER — CEPHALEXIN 500 MG/1
500 CAPSULE ORAL 2 TIMES DAILY
Qty: 15 CAPSULE | Refills: 0 | Status: ON HOLD | OUTPATIENT
Start: 2021-04-08 | End: 2021-07-22 | Stop reason: HOSPADM

## 2021-04-08 RX ORDER — OXYCODONE AND ACETAMINOPHEN 10; 325 MG/1; MG/1
1 TABLET ORAL EVERY 6 HOURS PRN
Qty: 12 TABLET | Refills: 0 | Status: ON HOLD | OUTPATIENT
Start: 2021-04-08 | End: 2021-07-22

## 2021-04-08 RX ADMIN — CEFTRIAXONE SODIUM 1 G: 1 INJECTION, POWDER, FOR SOLUTION INTRAMUSCULAR; INTRAVENOUS at 09:04

## 2021-04-08 RX ADMIN — MORPHINE SULFATE 4 MG: 4 INJECTION INTRAVENOUS at 08:04

## 2021-04-08 RX ADMIN — HYDROMORPHONE HYDROCHLORIDE 1 MG: 1 INJECTION, SOLUTION INTRAMUSCULAR; INTRAVENOUS; SUBCUTANEOUS at 10:04

## 2021-04-08 RX ADMIN — SODIUM CHLORIDE 1000 ML: 0.9 INJECTION, SOLUTION INTRAVENOUS at 07:04

## 2021-04-08 RX ADMIN — KETOROLAC TROMETHAMINE 15 MG: 30 INJECTION, SOLUTION INTRAMUSCULAR; INTRAVENOUS at 07:04

## 2021-04-11 LAB — BACTERIA UR CULT: ABNORMAL

## 2021-04-13 ENCOUNTER — HOSPITAL ENCOUNTER (OUTPATIENT)
Dept: RADIOLOGY | Facility: HOSPITAL | Age: 66
Discharge: HOME | End: 2021-04-13
Attending: SURGERY
Payer: COMMERCIAL

## 2021-04-13 DIAGNOSIS — M54.32 BACK PAIN WITH LEFT-SIDED SCIATICA: ICD-10-CM

## 2021-04-13 DIAGNOSIS — C7B.8 SECONDARY NEUROENDOCRINE TUMOR OF LIVER: ICD-10-CM

## 2021-04-13 DIAGNOSIS — C7B.8 SECONDARY NEUROENDOCRINE TUMOR OF BONE: ICD-10-CM

## 2021-04-13 DIAGNOSIS — R26.9 GAIT ABNORMALITY: ICD-10-CM

## 2021-04-13 PROCEDURE — 72148 MRI LUMBAR SPINE WITHOUT CONTRAST: ICD-10-PCS | Mod: 26,,, | Performed by: RADIOLOGY

## 2021-04-13 PROCEDURE — 72148 MRI LUMBAR SPINE W/O DYE: CPT | Mod: TC

## 2021-04-13 PROCEDURE — 72148 MRI LUMBAR SPINE W/O DYE: CPT | Mod: 26,,, | Performed by: RADIOLOGY

## 2021-04-13 RX ORDER — GADOBUTROL 604.72 MG/ML
10 INJECTION INTRAVENOUS
Status: DISCONTINUED | OUTPATIENT
Start: 2021-04-13 | End: 2021-04-14 | Stop reason: HOSPADM

## 2021-04-14 ENCOUNTER — PATIENT MESSAGE (OUTPATIENT)
Dept: NEUROLOGY | Facility: HOSPITAL | Age: 66
End: 2021-04-14

## 2021-04-14 ENCOUNTER — TELEPHONE (OUTPATIENT)
Dept: NEUROLOGY | Facility: HOSPITAL | Age: 66
End: 2021-04-14

## 2021-04-14 DIAGNOSIS — C7B.8 SECONDARY NEUROENDOCRINE TUMOR OF LIVER: Primary | ICD-10-CM

## 2021-04-14 DIAGNOSIS — C7B.8 SECONDARY NEUROENDOCRINE TUMOR OF BONE: ICD-10-CM

## 2021-04-14 DIAGNOSIS — C7B.8 SECONDARY MALIGNANT NEUROENDOCRINE TUMOR OF LIVER: ICD-10-CM

## 2021-04-14 DIAGNOSIS — C7A.090 MALIGNANT CARCINOID TUMOR OF BRONCHUS AND LUNG: Primary | ICD-10-CM

## 2021-04-16 ENCOUNTER — HOSPITAL ENCOUNTER (OUTPATIENT)
Dept: RADIOLOGY | Facility: HOSPITAL | Age: 66
Discharge: HOME | End: 2021-04-16
Attending: SURGERY
Payer: COMMERCIAL

## 2021-04-16 ENCOUNTER — TELEPHONE (OUTPATIENT)
Dept: NEUROLOGY | Facility: HOSPITAL | Age: 66
End: 2021-04-16

## 2021-04-16 DIAGNOSIS — C7B.8 SECONDARY MALIGNANT NEUROENDOCRINE TUMOR OF LIVER: ICD-10-CM

## 2021-04-16 DIAGNOSIS — C7A.090 MALIGNANT CARCINOID TUMOR OF BRONCHUS AND LUNG: ICD-10-CM

## 2021-04-16 DIAGNOSIS — C7B.8 SECONDARY NEUROENDOCRINE TUMOR OF BONE: ICD-10-CM

## 2021-04-16 PROCEDURE — 74177 CT CHEST ABDOMEN PELVIS WITH CONTRAST (XPD): ICD-10-PCS | Mod: 26,,, | Performed by: RADIOLOGY

## 2021-04-16 PROCEDURE — 25500020 PHARM REV CODE 255: Performed by: SURGERY

## 2021-04-16 PROCEDURE — 71260 CT CHEST ABDOMEN PELVIS WITH CONTRAST (XPD): ICD-10-PCS | Mod: 26,,, | Performed by: RADIOLOGY

## 2021-04-16 PROCEDURE — 74177 CT ABD & PELVIS W/CONTRAST: CPT | Mod: 26,,, | Performed by: RADIOLOGY

## 2021-04-16 PROCEDURE — 71260 CT THORAX DX C+: CPT | Mod: 26,,, | Performed by: RADIOLOGY

## 2021-04-16 PROCEDURE — 74177 CT ABD & PELVIS W/CONTRAST: CPT | Mod: TC

## 2021-04-16 RX ADMIN — IOHEXOL 100 ML: 350 INJECTION, SOLUTION INTRAVENOUS at 04:04

## 2021-04-19 ENCOUNTER — HOSPITAL ENCOUNTER (OUTPATIENT)
Dept: RADIOLOGY | Facility: HOSPITAL | Age: 66
Discharge: HOME | End: 2021-04-19
Attending: SURGERY
Payer: COMMERCIAL

## 2021-04-19 DIAGNOSIS — C7B.8 SECONDARY MALIGNANT NEUROENDOCRINE TUMOR OF LIVER: ICD-10-CM

## 2021-04-19 DIAGNOSIS — C7B.8 SECONDARY NEUROENDOCRINE TUMOR OF BONE: ICD-10-CM

## 2021-04-19 DIAGNOSIS — C7A.090 MALIGNANT CARCINOID TUMOR OF BRONCHUS AND LUNG: ICD-10-CM

## 2021-04-19 PROCEDURE — 78815 NM PET 68GA DOTATATE WHOLE BODY: ICD-10-PCS | Mod: 26,PS,, | Performed by: RADIOLOGY

## 2021-04-19 PROCEDURE — 78815 PET IMAGE W/CT SKULL-THIGH: CPT | Mod: TC

## 2021-04-19 PROCEDURE — 78815 PET IMAGE W/CT SKULL-THIGH: CPT | Mod: 26,PS,, | Performed by: RADIOLOGY

## 2021-04-20 ENCOUNTER — CONFERENCE (OUTPATIENT)
Dept: NEUROLOGY | Facility: HOSPITAL | Age: 66
End: 2021-04-20

## 2021-04-27 ENCOUNTER — OFFICE VISIT (OUTPATIENT)
Dept: ORTHOPEDICS | Facility: CLINIC | Age: 66
End: 2021-04-27
Payer: COMMERCIAL

## 2021-04-27 ENCOUNTER — HOSPITAL ENCOUNTER (OUTPATIENT)
Dept: RADIOLOGY | Facility: HOSPITAL | Age: 66
Discharge: HOME | End: 2021-04-27
Attending: ORTHOPAEDIC SURGERY
Payer: COMMERCIAL

## 2021-04-27 DIAGNOSIS — C7A.00 MALIGNANT CARCINOID TUMOR, UNSPECIFIED SITE: ICD-10-CM

## 2021-04-27 DIAGNOSIS — Z89.612: Primary | ICD-10-CM

## 2021-04-27 DIAGNOSIS — C79.51 BONY METASTASIS: ICD-10-CM

## 2021-04-27 PROCEDURE — 1125F AMNT PAIN NOTED PAIN PRSNT: CPT | Mod: S$GLB,,, | Performed by: ORTHOPAEDIC SURGERY

## 2021-04-27 PROCEDURE — 99999 PR PBB SHADOW E&M-EST. PATIENT-LVL III: CPT | Mod: PBBFAC,,, | Performed by: ORTHOPAEDIC SURGERY

## 2021-04-27 PROCEDURE — 99213 PR OFFICE/OUTPT VISIT, EST, LEVL III, 20-29 MIN: ICD-10-PCS | Mod: S$GLB,,, | Performed by: ORTHOPAEDIC SURGERY

## 2021-04-27 PROCEDURE — 1125F PR PAIN SEVERITY QUANTIFIED, PAIN PRESENT: ICD-10-PCS | Mod: S$GLB,,, | Performed by: ORTHOPAEDIC SURGERY

## 2021-04-27 PROCEDURE — 99213 OFFICE O/P EST LOW 20 MIN: CPT | Mod: S$GLB,,, | Performed by: ORTHOPAEDIC SURGERY

## 2021-04-27 PROCEDURE — 99999 PR PBB SHADOW E&M-EST. PATIENT-LVL III: ICD-10-PCS | Mod: PBBFAC,,, | Performed by: ORTHOPAEDIC SURGERY

## 2021-04-27 PROCEDURE — 73552 XR FEMUR 2 VIEW BILATERAL: ICD-10-PCS | Mod: 26,,, | Performed by: RADIOLOGY

## 2021-04-27 PROCEDURE — 73552 X-RAY EXAM OF FEMUR 2/>: CPT | Mod: TC,50

## 2021-04-27 PROCEDURE — 73552 X-RAY EXAM OF FEMUR 2/>: CPT | Mod: 26,,, | Performed by: RADIOLOGY

## 2021-05-03 ENCOUNTER — INFUSION (OUTPATIENT)
Dept: INFUSION THERAPY | Facility: HOSPITAL | Age: 66
End: 2021-05-03
Attending: SURGERY
Payer: COMMERCIAL

## 2021-05-03 VITALS — HEIGHT: 72 IN | WEIGHT: 260 LBS | BODY MASS INDEX: 35.21 KG/M2

## 2021-05-03 DIAGNOSIS — C7A.090 CARCINOID BRONCHIAL ADENOMA OF LEFT LUNG: ICD-10-CM

## 2021-05-03 DIAGNOSIS — C7A.090 MALIGNANT CARCINOID TUMOR OF BRONCHUS AND LUNG: ICD-10-CM

## 2021-05-03 DIAGNOSIS — D3A.090 BRONCHIAL CARCINOID TUMORS: ICD-10-CM

## 2021-05-03 DIAGNOSIS — C7B.8 SECONDARY NEUROENDOCRINE TUMOR OF BONE: ICD-10-CM

## 2021-05-03 DIAGNOSIS — C7B.8 SECONDARY MALIGNANT NEUROENDOCRINE TUMOR OF LIVER: Primary | ICD-10-CM

## 2021-05-03 DIAGNOSIS — C78.7 SECONDARY MALIGNANT NEOPLASM OF LIVER: ICD-10-CM

## 2021-05-03 DIAGNOSIS — C7B.8 SECONDARY NEUROENDOCRINE TUMOR OF LIVER: ICD-10-CM

## 2021-05-03 PROCEDURE — 96372 THER/PROPH/DIAG INJ SC/IM: CPT

## 2021-05-03 PROCEDURE — 63600175 PHARM REV CODE 636 W HCPCS: Mod: JG | Performed by: SURGERY

## 2021-05-03 RX ORDER — LANREOTIDE ACETATE 120 MG/.5ML
120 INJECTION SUBCUTANEOUS
Status: CANCELLED
Start: 2021-05-03

## 2021-05-03 RX ORDER — LANREOTIDE ACETATE 120 MG/.5ML
120 INJECTION SUBCUTANEOUS
Status: DISCONTINUED | OUTPATIENT
Start: 2021-05-03 | End: 2021-05-03 | Stop reason: HOSPADM

## 2021-05-03 RX ADMIN — LANREOTIDE ACETATE 120 MG: 120 INJECTION SUBCUTANEOUS at 10:05

## 2021-05-04 ENCOUNTER — CONFERENCE (OUTPATIENT)
Dept: NEUROLOGY | Facility: HOSPITAL | Age: 66
End: 2021-05-04

## 2021-05-04 ENCOUNTER — TELEPHONE (OUTPATIENT)
Dept: NEUROLOGY | Facility: HOSPITAL | Age: 66
End: 2021-05-04

## 2021-05-04 ENCOUNTER — TELEPHONE (OUTPATIENT)
Dept: HEMATOLOGY/ONCOLOGY | Facility: CLINIC | Age: 66
End: 2021-05-04

## 2021-05-04 DIAGNOSIS — C78.7 SECONDARY MALIGNANT NEOPLASM OF LIVER: ICD-10-CM

## 2021-05-04 DIAGNOSIS — C7B.8 SECONDARY NEUROENDOCRINE TUMOR OF BONE: ICD-10-CM

## 2021-05-04 DIAGNOSIS — D3A.090 BRONCHIAL CARCINOID TUMORS: Primary | ICD-10-CM

## 2021-05-04 DIAGNOSIS — C7B.8 SECONDARY NEUROENDOCRINE TUMOR OF LIVER: ICD-10-CM

## 2021-05-04 DIAGNOSIS — K76.9 LIVER LESION: ICD-10-CM

## 2021-05-05 ENCOUNTER — TELEPHONE (OUTPATIENT)
Dept: NEUROLOGY | Facility: HOSPITAL | Age: 66
End: 2021-05-05

## 2021-05-05 DIAGNOSIS — C78.7 SECONDARY MALIGNANT NEOPLASM OF LIVER: ICD-10-CM

## 2021-05-05 DIAGNOSIS — C7B.8 SECONDARY NEUROENDOCRINE TUMOR OF LIVER: Primary | ICD-10-CM

## 2021-05-10 ENCOUNTER — CONFERENCE (OUTPATIENT)
Dept: NEUROLOGY | Facility: HOSPITAL | Age: 66
End: 2021-05-10

## 2021-05-10 ENCOUNTER — OFFICE VISIT (OUTPATIENT)
Dept: HEMATOLOGY/ONCOLOGY | Facility: CLINIC | Age: 66
End: 2021-05-10
Payer: COMMERCIAL

## 2021-05-10 VITALS
WEIGHT: 255.75 LBS | HEART RATE: 79 BPM | RESPIRATION RATE: 18 BRPM | SYSTOLIC BLOOD PRESSURE: 117 MMHG | DIASTOLIC BLOOD PRESSURE: 74 MMHG | BODY MASS INDEX: 34.68 KG/M2 | TEMPERATURE: 98 F

## 2021-05-10 DIAGNOSIS — C79.51 SECONDARY MALIGNANT NEOPLASM OF BONE: ICD-10-CM

## 2021-05-10 DIAGNOSIS — Z71.89 ADVANCE CARE PLANNING: ICD-10-CM

## 2021-05-10 DIAGNOSIS — C7B.8 SECONDARY MALIGNANT NEUROENDOCRINE TUMOR OF LIVER: ICD-10-CM

## 2021-05-10 DIAGNOSIS — E34.0 CARCINOID SYNDROME: ICD-10-CM

## 2021-05-10 DIAGNOSIS — D63.0 ANEMIA IN NEOPLASTIC DISEASE: ICD-10-CM

## 2021-05-10 DIAGNOSIS — D3A.090 BRONCHIAL CARCINOID TUMORS: Primary | ICD-10-CM

## 2021-05-10 DIAGNOSIS — C78.89 SECONDARY MALIGNANT NEOPLASM OF SPLEEN: ICD-10-CM

## 2021-05-10 DIAGNOSIS — Z89.512 HX OF BKA, LEFT: ICD-10-CM

## 2021-05-10 DIAGNOSIS — C77.8 SECONDARY MALIGNANT NEOPLASM OF LYMPH NODES OF MULTIPLE SITES: ICD-10-CM

## 2021-05-10 DIAGNOSIS — G89.3 CHRONIC NEOPLASM-RELATED PAIN: ICD-10-CM

## 2021-05-10 PROCEDURE — 99214 PR OFFICE/OUTPT VISIT, EST, LEVL IV, 30-39 MIN: ICD-10-PCS | Mod: S$GLB,,, | Performed by: INTERNAL MEDICINE

## 2021-05-10 PROCEDURE — 99497 ADVNCD CARE PLAN 30 MIN: CPT | Mod: S$GLB,,, | Performed by: INTERNAL MEDICINE

## 2021-05-10 PROCEDURE — 99999 PR PBB SHADOW E&M-EST. PATIENT-LVL III: ICD-10-PCS | Mod: PBBFAC,,, | Performed by: INTERNAL MEDICINE

## 2021-05-10 PROCEDURE — 1125F AMNT PAIN NOTED PAIN PRSNT: CPT | Mod: S$GLB,,, | Performed by: INTERNAL MEDICINE

## 2021-05-10 PROCEDURE — 99497 PR ADVNCD CARE PLAN 30 MIN: ICD-10-PCS | Mod: S$GLB,,, | Performed by: INTERNAL MEDICINE

## 2021-05-10 PROCEDURE — 99999 PR PBB SHADOW E&M-EST. PATIENT-LVL III: CPT | Mod: PBBFAC,,, | Performed by: INTERNAL MEDICINE

## 2021-05-10 PROCEDURE — 1125F PR PAIN SEVERITY QUANTIFIED, PAIN PRESENT: ICD-10-PCS | Mod: S$GLB,,, | Performed by: INTERNAL MEDICINE

## 2021-05-10 PROCEDURE — 3008F PR BODY MASS INDEX (BMI) DOCUMENTED: ICD-10-PCS | Mod: CPTII,S$GLB,, | Performed by: INTERNAL MEDICINE

## 2021-05-10 PROCEDURE — 3008F BODY MASS INDEX DOCD: CPT | Mod: CPTII,S$GLB,, | Performed by: INTERNAL MEDICINE

## 2021-05-10 PROCEDURE — 99214 OFFICE O/P EST MOD 30 MIN: CPT | Mod: S$GLB,,, | Performed by: INTERNAL MEDICINE

## 2021-05-10 RX ORDER — DIPHENOXYLATE HYDROCHLORIDE AND ATROPINE SULFATE 2.5; .025 MG/1; MG/1
1 TABLET ORAL 4 TIMES DAILY PRN
Qty: 30 TABLET | Refills: 1 | Status: SHIPPED | OUTPATIENT
Start: 2021-05-10 | End: 2021-05-25 | Stop reason: SDUPTHER

## 2021-05-11 ENCOUNTER — TELEPHONE (OUTPATIENT)
Dept: NEUROLOGY | Facility: HOSPITAL | Age: 66
End: 2021-05-11

## 2021-05-11 ENCOUNTER — TELEPHONE (OUTPATIENT)
Dept: HEMATOLOGY/ONCOLOGY | Facility: CLINIC | Age: 66
End: 2021-05-11

## 2021-05-11 DIAGNOSIS — C7B.8 SECONDARY NEUROENDOCRINE TUMOR OF LIVER: Primary | ICD-10-CM

## 2021-05-11 DIAGNOSIS — C78.7 SECONDARY MALIGNANT NEOPLASM OF LIVER: ICD-10-CM

## 2021-05-14 ENCOUNTER — OFFICE VISIT (OUTPATIENT)
Dept: ORTHOPEDICS | Facility: CLINIC | Age: 66
End: 2021-05-14
Payer: COMMERCIAL

## 2021-05-14 VITALS
SYSTOLIC BLOOD PRESSURE: 116 MMHG | HEIGHT: 72 IN | DIASTOLIC BLOOD PRESSURE: 71 MMHG | HEART RATE: 69 BPM | BODY MASS INDEX: 34.61 KG/M2 | WEIGHT: 255.5 LBS

## 2021-05-14 DIAGNOSIS — C79.51 SECONDARY MALIGNANT NEOPLASM OF BONE: Primary | ICD-10-CM

## 2021-05-14 DIAGNOSIS — M89.9 LESION OF RIGHT FEMUR: ICD-10-CM

## 2021-05-14 DIAGNOSIS — C7A.090 CARCINOID BRONCHIAL ADENOMA OF LEFT LUNG: ICD-10-CM

## 2021-05-14 DIAGNOSIS — S78.112A ABOVE KNEE AMPUTATION OF LEFT LOWER EXTREMITY: ICD-10-CM

## 2021-05-14 PROCEDURE — 1126F AMNT PAIN NOTED NONE PRSNT: CPT | Mod: S$GLB,,, | Performed by: ORTHOPAEDIC SURGERY

## 2021-05-14 PROCEDURE — 3008F BODY MASS INDEX DOCD: CPT | Mod: CPTII,S$GLB,, | Performed by: ORTHOPAEDIC SURGERY

## 2021-05-14 PROCEDURE — 1126F PR PAIN SEVERITY QUANTIFIED, NO PAIN PRESENT: ICD-10-PCS | Mod: S$GLB,,, | Performed by: ORTHOPAEDIC SURGERY

## 2021-05-14 PROCEDURE — 99999 PR PBB SHADOW E&M-EST. PATIENT-LVL IV: ICD-10-PCS | Mod: PBBFAC,,, | Performed by: ORTHOPAEDIC SURGERY

## 2021-05-14 PROCEDURE — 3008F PR BODY MASS INDEX (BMI) DOCUMENTED: ICD-10-PCS | Mod: CPTII,S$GLB,, | Performed by: ORTHOPAEDIC SURGERY

## 2021-05-14 PROCEDURE — 99999 PR PBB SHADOW E&M-EST. PATIENT-LVL IV: CPT | Mod: PBBFAC,,, | Performed by: ORTHOPAEDIC SURGERY

## 2021-05-14 PROCEDURE — 99215 PR OFFICE/OUTPT VISIT, EST, LEVL V, 40-54 MIN: ICD-10-PCS | Mod: S$GLB,,, | Performed by: ORTHOPAEDIC SURGERY

## 2021-05-14 PROCEDURE — 99215 OFFICE O/P EST HI 40 MIN: CPT | Mod: S$GLB,,, | Performed by: ORTHOPAEDIC SURGERY

## 2021-05-20 ENCOUNTER — TELEPHONE (OUTPATIENT)
Dept: NEUROLOGY | Facility: HOSPITAL | Age: 66
End: 2021-05-20

## 2021-05-25 DIAGNOSIS — E34.0 CARCINOID SYNDROME: ICD-10-CM

## 2021-05-25 DIAGNOSIS — R11.0 NAUSEA: Primary | ICD-10-CM

## 2021-05-27 ENCOUNTER — TELEPHONE (OUTPATIENT)
Dept: NEUROLOGY | Facility: HOSPITAL | Age: 66
End: 2021-05-27

## 2021-05-27 RX ORDER — DIPHENOXYLATE HYDROCHLORIDE AND ATROPINE SULFATE 2.5; .025 MG/1; MG/1
1 TABLET ORAL 4 TIMES DAILY PRN
Qty: 30 TABLET | Refills: 1 | Status: SHIPPED | OUTPATIENT
Start: 2021-05-27 | End: 2022-05-27

## 2021-05-27 RX ORDER — ONDANSETRON 4 MG/1
4 TABLET, ORALLY DISINTEGRATING ORAL EVERY 6 HOURS PRN
Qty: 20 TABLET | Refills: 1 | Status: ON HOLD | OUTPATIENT
Start: 2021-05-27 | End: 2021-07-22 | Stop reason: SDUPTHER

## 2021-05-31 ENCOUNTER — INFUSION (OUTPATIENT)
Dept: INFUSION THERAPY | Facility: HOSPITAL | Age: 66
End: 2021-05-31
Attending: SURGERY
Payer: COMMERCIAL

## 2021-05-31 VITALS — WEIGHT: 255.5 LBS | HEIGHT: 72 IN | BODY MASS INDEX: 34.61 KG/M2

## 2021-05-31 DIAGNOSIS — C7B.8 SECONDARY MALIGNANT NEUROENDOCRINE TUMOR OF LIVER: Primary | ICD-10-CM

## 2021-05-31 DIAGNOSIS — C7A.090 MALIGNANT CARCINOID TUMOR OF BRONCHUS AND LUNG: ICD-10-CM

## 2021-05-31 DIAGNOSIS — C7A.090 CARCINOID BRONCHIAL ADENOMA OF LEFT LUNG: ICD-10-CM

## 2021-05-31 DIAGNOSIS — C78.7 SECONDARY MALIGNANT NEOPLASM OF LIVER: ICD-10-CM

## 2021-05-31 DIAGNOSIS — C7B.8 SECONDARY NEUROENDOCRINE TUMOR OF LIVER: ICD-10-CM

## 2021-05-31 DIAGNOSIS — C7B.8 SECONDARY NEUROENDOCRINE TUMOR OF BONE: ICD-10-CM

## 2021-05-31 DIAGNOSIS — D3A.090 BRONCHIAL CARCINOID TUMORS: ICD-10-CM

## 2021-05-31 PROCEDURE — 96372 THER/PROPH/DIAG INJ SC/IM: CPT

## 2021-05-31 PROCEDURE — 63600175 PHARM REV CODE 636 W HCPCS: Mod: JG

## 2021-05-31 RX ORDER — LANREOTIDE ACETATE 120 MG/.5ML
120 INJECTION SUBCUTANEOUS
Status: DISCONTINUED | OUTPATIENT
Start: 2021-05-31 | End: 2021-05-31 | Stop reason: HOSPADM

## 2021-05-31 RX ORDER — LANREOTIDE ACETATE 120 MG/.5ML
120 INJECTION SUBCUTANEOUS
Status: CANCELLED
Start: 2021-05-31

## 2021-05-31 RX ORDER — LANREOTIDE ACETATE 120 MG/.5ML
INJECTION SUBCUTANEOUS
Status: COMPLETED
Start: 2021-05-31 | End: 2021-05-31

## 2021-05-31 RX ADMIN — LANREOTIDE ACETATE 120 MG: 120 INJECTION SUBCUTANEOUS at 11:05

## 2021-06-03 ENCOUNTER — PATIENT MESSAGE (OUTPATIENT)
Dept: NEUROLOGY | Facility: HOSPITAL | Age: 66
End: 2021-06-03

## 2021-06-04 ENCOUNTER — TELEPHONE (OUTPATIENT)
Dept: NEUROLOGY | Facility: HOSPITAL | Age: 66
End: 2021-06-04

## 2021-06-09 ENCOUNTER — OFFICE VISIT (OUTPATIENT)
Dept: HEMATOLOGY/ONCOLOGY | Facility: CLINIC | Age: 66
End: 2021-06-09
Payer: COMMERCIAL

## 2021-06-09 VITALS
HEART RATE: 65 BPM | TEMPERATURE: 99 F | OXYGEN SATURATION: 98 % | SYSTOLIC BLOOD PRESSURE: 145 MMHG | DIASTOLIC BLOOD PRESSURE: 82 MMHG | RESPIRATION RATE: 18 BRPM | WEIGHT: 256.19 LBS | BODY MASS INDEX: 34.74 KG/M2

## 2021-06-09 DIAGNOSIS — C77.8 SECONDARY MALIGNANT NEOPLASM OF LYMPH NODES OF MULTIPLE SITES: ICD-10-CM

## 2021-06-09 DIAGNOSIS — C79.51 SECONDARY MALIGNANT NEOPLASM OF BONE: ICD-10-CM

## 2021-06-09 DIAGNOSIS — G89.3 CHRONIC NEOPLASM-RELATED PAIN: ICD-10-CM

## 2021-06-09 DIAGNOSIS — D63.0 ANEMIA IN NEOPLASTIC DISEASE: ICD-10-CM

## 2021-06-09 DIAGNOSIS — C7B.8 SECONDARY MALIGNANT NEUROENDOCRINE TUMOR OF LIVER: ICD-10-CM

## 2021-06-09 DIAGNOSIS — C78.89 SECONDARY MALIGNANT NEOPLASM OF SPLEEN: ICD-10-CM

## 2021-06-09 DIAGNOSIS — Z89.512 HX OF BKA, LEFT: ICD-10-CM

## 2021-06-09 DIAGNOSIS — D3A.090 BRONCHIAL CARCINOID TUMORS: Primary | ICD-10-CM

## 2021-06-09 PROCEDURE — 3008F PR BODY MASS INDEX (BMI) DOCUMENTED: ICD-10-PCS | Mod: CPTII,S$GLB,, | Performed by: INTERNAL MEDICINE

## 2021-06-09 PROCEDURE — 99999 PR PBB SHADOW E&M-EST. PATIENT-LVL III: ICD-10-PCS | Mod: PBBFAC,,, | Performed by: INTERNAL MEDICINE

## 2021-06-09 PROCEDURE — 99999 PR PBB SHADOW E&M-EST. PATIENT-LVL III: CPT | Mod: PBBFAC,,, | Performed by: INTERNAL MEDICINE

## 2021-06-09 PROCEDURE — 99214 OFFICE O/P EST MOD 30 MIN: CPT | Mod: S$GLB,,, | Performed by: INTERNAL MEDICINE

## 2021-06-09 PROCEDURE — 1126F AMNT PAIN NOTED NONE PRSNT: CPT | Mod: S$GLB,,, | Performed by: INTERNAL MEDICINE

## 2021-06-09 PROCEDURE — 3008F BODY MASS INDEX DOCD: CPT | Mod: CPTII,S$GLB,, | Performed by: INTERNAL MEDICINE

## 2021-06-09 PROCEDURE — 99214 PR OFFICE/OUTPT VISIT, EST, LEVL IV, 30-39 MIN: ICD-10-PCS | Mod: S$GLB,,, | Performed by: INTERNAL MEDICINE

## 2021-06-09 PROCEDURE — 1126F PR PAIN SEVERITY QUANTIFIED, NO PAIN PRESENT: ICD-10-PCS | Mod: S$GLB,,, | Performed by: INTERNAL MEDICINE

## 2021-06-28 ENCOUNTER — TELEPHONE (OUTPATIENT)
Dept: NEUROLOGY | Facility: HOSPITAL | Age: 66
End: 2021-06-28

## 2021-06-28 ENCOUNTER — PATIENT MESSAGE (OUTPATIENT)
Dept: NEUROLOGY | Facility: HOSPITAL | Age: 66
End: 2021-06-28

## 2021-06-28 ENCOUNTER — INFUSION (OUTPATIENT)
Dept: INFUSION THERAPY | Facility: HOSPITAL | Age: 66
End: 2021-06-28
Attending: SURGERY
Payer: COMMERCIAL

## 2021-06-28 VITALS — BODY MASS INDEX: 34.7 KG/M2 | HEIGHT: 72 IN | WEIGHT: 256.19 LBS

## 2021-06-28 DIAGNOSIS — C7A.090 MALIGNANT CARCINOID TUMOR OF BRONCHUS AND LUNG: ICD-10-CM

## 2021-06-28 DIAGNOSIS — C7B.8 SECONDARY NEUROENDOCRINE TUMOR OF LIVER: ICD-10-CM

## 2021-06-28 DIAGNOSIS — C7B.8 SECONDARY NEUROENDOCRINE TUMOR OF BONE: ICD-10-CM

## 2021-06-28 DIAGNOSIS — C7A.090 CARCINOID BRONCHIAL ADENOMA OF LEFT LUNG: ICD-10-CM

## 2021-06-28 DIAGNOSIS — C78.7 SECONDARY MALIGNANT NEOPLASM OF LIVER: ICD-10-CM

## 2021-06-28 DIAGNOSIS — D3A.090 BRONCHIAL CARCINOID TUMORS: ICD-10-CM

## 2021-06-28 DIAGNOSIS — C7B.8 SECONDARY MALIGNANT NEUROENDOCRINE TUMOR OF LIVER: Primary | ICD-10-CM

## 2021-06-28 PROCEDURE — 63600175 PHARM REV CODE 636 W HCPCS: Mod: JG | Performed by: INTERNAL MEDICINE

## 2021-06-28 PROCEDURE — 96372 THER/PROPH/DIAG INJ SC/IM: CPT

## 2021-06-28 RX ORDER — LANREOTIDE ACETATE 120 MG/.5ML
120 INJECTION SUBCUTANEOUS
Status: DISCONTINUED | OUTPATIENT
Start: 2021-06-28 | End: 2021-06-28 | Stop reason: HOSPADM

## 2021-06-28 RX ORDER — LANREOTIDE ACETATE 120 MG/.5ML
120 INJECTION SUBCUTANEOUS
Status: CANCELLED
Start: 2021-06-28

## 2021-06-28 RX ADMIN — LANREOTIDE ACETATE 120 MG: 120 INJECTION SUBCUTANEOUS at 11:06

## 2021-07-19 ENCOUNTER — LAB VISIT (OUTPATIENT)
Dept: LAB | Facility: HOSPITAL | Age: 66
End: 2021-07-19
Attending: INTERNAL MEDICINE
Payer: COMMERCIAL

## 2021-07-19 DIAGNOSIS — C78.7 SECONDARY MALIGNANT NEOPLASM OF LIVER: ICD-10-CM

## 2021-07-19 LAB
ALBUMIN SERPL BCP-MCNC: 3.2 G/DL (ref 3.5–5.2)
ALP SERPL-CCNC: 204 U/L (ref 55–135)
ALT SERPL W/O P-5'-P-CCNC: 16 U/L (ref 10–44)
ANION GAP SERPL CALC-SCNC: 10 MMOL/L (ref 8–16)
AST SERPL-CCNC: 31 U/L (ref 10–40)
BILIRUB SERPL-MCNC: 0.6 MG/DL (ref 0.1–1)
BUN SERPL-MCNC: 11 MG/DL (ref 8–23)
CALCIUM SERPL-MCNC: 9.3 MG/DL (ref 8.7–10.5)
CHLORIDE SERPL-SCNC: 103 MMOL/L (ref 95–110)
CO2 SERPL-SCNC: 25 MMOL/L (ref 23–29)
CREAT SERPL-MCNC: 1.3 MG/DL (ref 0.5–1.4)
ERYTHROCYTE [DISTWIDTH] IN BLOOD BY AUTOMATED COUNT: 17.6 % (ref 11.5–14.5)
EST. GFR  (AFRICAN AMERICAN): >60 ML/MIN/1.73 M^2
EST. GFR  (NON AFRICAN AMERICAN): 57.7 ML/MIN/1.73 M^2
GLUCOSE SERPL-MCNC: 118 MG/DL (ref 70–110)
HCT VFR BLD AUTO: 35.8 % (ref 40–54)
HGB BLD-MCNC: 11.2 G/DL (ref 14–18)
IMM GRANULOCYTES # BLD AUTO: 0.03 K/UL (ref 0–0.04)
INR PPP: 1 (ref 0.8–1.2)
MCH RBC QN AUTO: 25.5 PG (ref 27–31)
MCHC RBC AUTO-ENTMCNC: 31.3 G/DL (ref 32–36)
MCV RBC AUTO: 81 FL (ref 82–98)
NEUTROPHILS # BLD AUTO: 6.6 K/UL (ref 1.8–7.7)
PLATELET # BLD AUTO: 314 K/UL (ref 150–450)
PMV BLD AUTO: 9.8 FL (ref 9.2–12.9)
POTASSIUM SERPL-SCNC: 4.2 MMOL/L (ref 3.5–5.1)
PROT SERPL-MCNC: 7.3 G/DL (ref 6–8.4)
PROTHROMBIN TIME: 11.3 SEC (ref 9–12.5)
RBC # BLD AUTO: 4.4 M/UL (ref 4.6–6.2)
SODIUM SERPL-SCNC: 138 MMOL/L (ref 136–145)
WBC # BLD AUTO: 8.76 K/UL (ref 3.9–12.7)

## 2021-07-19 PROCEDURE — 36415 COLL VENOUS BLD VENIPUNCTURE: CPT | Performed by: INTERNAL MEDICINE

## 2021-07-19 PROCEDURE — 80053 COMPREHEN METABOLIC PANEL: CPT | Performed by: INTERNAL MEDICINE

## 2021-07-19 PROCEDURE — 85610 PROTHROMBIN TIME: CPT | Performed by: INTERNAL MEDICINE

## 2021-07-19 PROCEDURE — 85027 COMPLETE CBC AUTOMATED: CPT | Performed by: INTERNAL MEDICINE

## 2021-07-20 ENCOUNTER — OFFICE VISIT (OUTPATIENT)
Dept: NEUROLOGY | Facility: HOSPITAL | Age: 66
End: 2021-07-20
Attending: SURGERY
Payer: COMMERCIAL

## 2021-07-20 VITALS
BODY MASS INDEX: 32.76 KG/M2 | WEIGHT: 241.88 LBS | DIASTOLIC BLOOD PRESSURE: 78 MMHG | HEIGHT: 72 IN | HEART RATE: 70 BPM | TEMPERATURE: 99 F | SYSTOLIC BLOOD PRESSURE: 127 MMHG

## 2021-07-20 DIAGNOSIS — C7B.8 SECONDARY NEUROENDOCRINE TUMOR OF BONE: ICD-10-CM

## 2021-07-20 DIAGNOSIS — C7B.8 SECONDARY NEUROENDOCRINE TUMOR OF LIVER: Primary | ICD-10-CM

## 2021-07-20 PROCEDURE — 99215 OFFICE O/P EST HI 40 MIN: CPT | Performed by: SURGERY

## 2021-07-20 RX ORDER — PREDNISOLONE ACETATE 10 MG/ML
SUSPENSION/ DROPS OPHTHALMIC
COMMUNITY
Start: 2021-06-08 | End: 2021-09-24

## 2021-07-21 ENCOUNTER — HOSPITAL ENCOUNTER (OUTPATIENT)
Dept: INTERVENTIONAL RADIOLOGY/VASCULAR | Facility: HOSPITAL | Age: 66
Discharge: HOME | End: 2021-07-22
Attending: INTERNAL MEDICINE | Admitting: SURGERY
Payer: COMMERCIAL

## 2021-07-21 ENCOUNTER — HOSPITAL ENCOUNTER (OUTPATIENT)
Dept: RADIOLOGY | Facility: HOSPITAL | Age: 66
Discharge: HOME | End: 2021-07-21
Attending: INTERNAL MEDICINE
Payer: COMMERCIAL

## 2021-07-21 ENCOUNTER — HOSPITAL ENCOUNTER (OUTPATIENT)
Dept: INTERVENTIONAL RADIOLOGY/VASCULAR | Facility: HOSPITAL | Age: 66
Discharge: HOME | End: 2021-07-21
Attending: SURGERY
Payer: COMMERCIAL

## 2021-07-21 ENCOUNTER — PATIENT MESSAGE (OUTPATIENT)
Dept: NEUROLOGY | Facility: HOSPITAL | Age: 66
End: 2021-07-21

## 2021-07-21 VITALS
HEART RATE: 57 BPM | OXYGEN SATURATION: 96 % | SYSTOLIC BLOOD PRESSURE: 148 MMHG | RESPIRATION RATE: 18 BRPM | BODY MASS INDEX: 31.1 KG/M2 | WEIGHT: 229.25 LBS | TEMPERATURE: 98 F | DIASTOLIC BLOOD PRESSURE: 83 MMHG

## 2021-07-21 VITALS
DIASTOLIC BLOOD PRESSURE: 79 MMHG | SYSTOLIC BLOOD PRESSURE: 135 MMHG | HEART RATE: 62 BPM | OXYGEN SATURATION: 38 % | RESPIRATION RATE: 13 BRPM

## 2021-07-21 DIAGNOSIS — C7B.02 SECONDARY MALIGNANT CARCINOID TUMOR OF LIVER: ICD-10-CM

## 2021-07-21 DIAGNOSIS — C78.89 SECONDARY MALIGNANT NEOPLASM OF SPLEEN: ICD-10-CM

## 2021-07-21 DIAGNOSIS — C78.7 SECONDARY MALIGNANT NEOPLASM OF LIVER: ICD-10-CM

## 2021-07-21 DIAGNOSIS — C7B.02 METASTATIC MALIGNANT CARCINOID TUMOR TO LIVER: ICD-10-CM

## 2021-07-21 DIAGNOSIS — C77.8 SECONDARY MALIGNANT NEOPLASM OF LYMPH NODES OF MULTIPLE SITES: Primary | ICD-10-CM

## 2021-07-21 DIAGNOSIS — C7B.02 SECONDARY MALIGNANT CARCINOID TUMOR OF LIVER: Primary | ICD-10-CM

## 2021-07-21 LAB — SARS-COV-2 RDRP RESP QL NAA+PROBE: NEGATIVE

## 2021-07-21 PROCEDURE — 63600175 PHARM REV CODE 636 W HCPCS: Performed by: RADIOLOGY

## 2021-07-21 PROCEDURE — C1894 INTRO/SHEATH, NON-LASER: HCPCS

## 2021-07-21 PROCEDURE — 37243 IR EMBOLIZATION COMP FOR TUMOR_ORGAN ISCHEMIA_INFARC: ICD-10-PCS | Mod: ,,, | Performed by: RADIOLOGY

## 2021-07-21 PROCEDURE — 63600175 PHARM REV CODE 636 W HCPCS: Performed by: SURGERY

## 2021-07-21 PROCEDURE — 36247 PR PLACE CATH SUBSUBSELECT ART,ABD/PEL: ICD-10-PCS | Mod: 51,,, | Performed by: RADIOLOGY

## 2021-07-21 PROCEDURE — 88360 TUMOR IMMUNOHISTOCHEM/MANUAL: CPT | Mod: 59 | Performed by: PATHOLOGY

## 2021-07-21 PROCEDURE — 25500020 PHARM REV CODE 255: Performed by: INTERNAL MEDICINE

## 2021-07-21 PROCEDURE — 76377 3D RENDER W/INTRP POSTPROCES: CPT | Mod: TC | Performed by: RADIOLOGY

## 2021-07-21 PROCEDURE — 75887 VEIN X-RAY LIVER W/O HEMODYN: CPT | Mod: TC | Performed by: RADIOLOGY

## 2021-07-21 PROCEDURE — 76377 3D RENDER W/INTRP POSTPROCES: CPT | Mod: 26,,, | Performed by: RADIOLOGY

## 2021-07-21 PROCEDURE — 37243 VASC EMBOLIZE/OCCLUDE ORGAN: CPT | Performed by: RADIOLOGY

## 2021-07-21 PROCEDURE — 76380 PR  CT SCAN,LIMITED/LOCALIZED F/U STUDY: ICD-10-PCS | Mod: 26,59,, | Performed by: RADIOLOGY

## 2021-07-21 PROCEDURE — 99152 MOD SED SAME PHYS/QHP 5/>YRS: CPT

## 2021-07-21 PROCEDURE — 76937 US GUIDE VASCULAR ACCESS: CPT | Mod: 26,,, | Performed by: RADIOLOGY

## 2021-07-21 PROCEDURE — 76380 CAT SCAN FOLLOW-UP STUDY: CPT | Mod: TC,59 | Performed by: RADIOLOGY

## 2021-07-21 PROCEDURE — A4550 SURGICAL TRAYS: HCPCS

## 2021-07-21 PROCEDURE — 36247 INS CATH ABD/L-EXT ART 3RD: CPT | Performed by: RADIOLOGY

## 2021-07-21 PROCEDURE — 76937 PR  US GUIDE, VASCULAR ACCESS: ICD-10-PCS | Mod: 26,,, | Performed by: RADIOLOGY

## 2021-07-21 PROCEDURE — 36248 INS CATH ABD/L-EXT ART ADDL: CPT | Performed by: RADIOLOGY

## 2021-07-21 PROCEDURE — 99152 MOD SED SAME PHYS/QHP 5/>YRS: CPT | Performed by: RADIOLOGY

## 2021-07-21 PROCEDURE — 88307 TISSUE EXAM BY PATHOLOGIST: CPT | Performed by: PATHOLOGY

## 2021-07-21 PROCEDURE — 76377 PR  3D RENDERING W/ IMAGE POSTPROCESS: ICD-10-PCS | Mod: 26,,, | Performed by: RADIOLOGY

## 2021-07-21 PROCEDURE — 36247 INS CATH ABD/L-EXT ART 3RD: CPT | Mod: 51,,, | Performed by: RADIOLOGY

## 2021-07-21 PROCEDURE — 88333 PATH CONSLTJ SURG CYTO XM 1: CPT | Mod: 26,,, | Performed by: PATHOLOGY

## 2021-07-21 PROCEDURE — 99152 MOD SED SAME PHYS/QHP 5/>YRS: CPT | Mod: ,,, | Performed by: RADIOLOGY

## 2021-07-21 PROCEDURE — 76942 ECHO GUIDE FOR BIOPSY: CPT | Mod: 26,,, | Performed by: RADIOLOGY

## 2021-07-21 PROCEDURE — 75887 VEIN X-RAY LIVER W/O HEMODYN: CPT | Mod: 26,,, | Performed by: RADIOLOGY

## 2021-07-21 PROCEDURE — 75726 ARTERY X-RAYS ABDOMEN: CPT | Mod: TC,59 | Performed by: RADIOLOGY

## 2021-07-21 PROCEDURE — 88360 TUMOR IMMUNOHISTOCHEM/MANUAL: CPT | Mod: 26,,, | Performed by: PATHOLOGY

## 2021-07-21 PROCEDURE — C1769 GUIDE WIRE: HCPCS

## 2021-07-21 PROCEDURE — 75726 ARTERY X-RAYS ABDOMEN: CPT | Mod: 26,59,, | Performed by: RADIOLOGY

## 2021-07-21 PROCEDURE — 88307 TISSUE EXAM BY PATHOLOGIST: CPT | Mod: 26,,, | Performed by: PATHOLOGY

## 2021-07-21 PROCEDURE — 25000003 PHARM REV CODE 250: Performed by: RADIOLOGY

## 2021-07-21 PROCEDURE — 47000 IR BIOPSY LIVER: ICD-10-PCS | Mod: ,,, | Performed by: RADIOLOGY

## 2021-07-21 PROCEDURE — 77263 THER RADIOLOGY TX PLNG CPLX: CPT | Mod: ,,, | Performed by: RADIOLOGY

## 2021-07-21 PROCEDURE — 25000003 PHARM REV CODE 250: Performed by: SURGERY

## 2021-07-21 PROCEDURE — G0269 OCCLUSIVE DEVICE IN VEIN ART: HCPCS | Performed by: RADIOLOGY

## 2021-07-21 PROCEDURE — 88333 PATH CONSLTJ SURG CYTO XM 1: CPT | Performed by: PATHOLOGY

## 2021-07-21 PROCEDURE — 75774 ARTERY X-RAY EACH VESSEL: CPT | Mod: 26,59,, | Performed by: RADIOLOGY

## 2021-07-21 PROCEDURE — 76380 CAT SCAN FOLLOW-UP STUDY: CPT | Mod: 26,59,, | Performed by: RADIOLOGY

## 2021-07-21 PROCEDURE — 99153 MOD SED SAME PHYS/QHP EA: CPT | Performed by: RADIOLOGY

## 2021-07-21 PROCEDURE — 75774 ARTERY X-RAY EACH VESSEL: CPT | Mod: TC | Performed by: RADIOLOGY

## 2021-07-21 PROCEDURE — 88360 PR  TUMOR IMMUNOHISTOCHEM/MANUAL: ICD-10-PCS | Mod: 26,,, | Performed by: PATHOLOGY

## 2021-07-21 PROCEDURE — 88307 PR  SURG PATH,LEVEL V: ICD-10-PCS | Mod: 26,,, | Performed by: PATHOLOGY

## 2021-07-21 PROCEDURE — U0002 COVID-19 LAB TEST NON-CDC: HCPCS | Performed by: RADIOLOGY

## 2021-07-21 PROCEDURE — 96420 CHEMO IA PUSH TECNIQUE: CPT | Performed by: RADIOLOGY

## 2021-07-21 PROCEDURE — 36248 INS CATH ABD/L-EXT ART ADDL: CPT | Mod: ,,, | Performed by: RADIOLOGY

## 2021-07-21 PROCEDURE — 99152 PR MOD CONSCIOUS SEDATION, SAME PHYS, 5+ YRS, FIRST 15 MIN: ICD-10-PCS | Mod: ,,, | Performed by: RADIOLOGY

## 2021-07-21 PROCEDURE — 76937 US GUIDE VASCULAR ACCESS: CPT | Mod: TC | Performed by: RADIOLOGY

## 2021-07-21 PROCEDURE — 76942 IR BIOPSY LIVER: ICD-10-PCS | Mod: 26,,, | Performed by: RADIOLOGY

## 2021-07-21 PROCEDURE — 75726 CHG ANGIO VISCERAL SELECTV/SUBSELEC: ICD-10-PCS | Mod: 26,59,, | Performed by: RADIOLOGY

## 2021-07-21 PROCEDURE — 75887 PR  PERCUT XHEPATIC PORTOGRAM: ICD-10-PCS | Mod: 26,,, | Performed by: RADIOLOGY

## 2021-07-21 PROCEDURE — 88333 PR  INTRAOPERATIVE CYTO PATH CONSULT, INITIAL SITE: ICD-10-PCS | Mod: 26,,, | Performed by: PATHOLOGY

## 2021-07-21 PROCEDURE — 47000 NEEDLE BIOPSY OF LIVER PERQ: CPT | Performed by: RADIOLOGY

## 2021-07-21 PROCEDURE — 25500020 PHARM REV CODE 255: Performed by: RADIOLOGY

## 2021-07-21 PROCEDURE — 75774 PR  ANGIO EA ADDNL SELECTV VESSEL: ICD-10-PCS | Mod: 26,59,, | Performed by: RADIOLOGY

## 2021-07-21 PROCEDURE — 36248 PR PR INS CATH ABD/L-EXT ART ADDL 2ND ORD/3RD ORD/BYD: ICD-10-PCS | Mod: ,,, | Performed by: RADIOLOGY

## 2021-07-21 PROCEDURE — 77263 PR  RADIATION THERAPY PLAN COMPLEX: ICD-10-PCS | Mod: ,,, | Performed by: RADIOLOGY

## 2021-07-21 PROCEDURE — 99153 MOD SED SAME PHYS/QHP EA: CPT

## 2021-07-21 RX ORDER — FUROSEMIDE 10 MG/ML
20 INJECTION INTRAMUSCULAR; INTRAVENOUS ONCE
Status: CANCELLED | OUTPATIENT
Start: 2021-07-21 | End: 2021-07-21

## 2021-07-21 RX ORDER — SCOLOPAMINE TRANSDERMAL SYSTEM 1 MG/1
1 PATCH, EXTENDED RELEASE TRANSDERMAL
Status: DISCONTINUED | OUTPATIENT
Start: 2021-07-21 | End: 2021-07-22 | Stop reason: HOSPADM

## 2021-07-21 RX ORDER — DEXTROSE MONOHYDRATE, SODIUM CHLORIDE, AND POTASSIUM CHLORIDE 50; 1.49; 4.5 G/1000ML; G/1000ML; G/1000ML
INJECTION, SOLUTION INTRAVENOUS CONTINUOUS
Status: DISCONTINUED | OUTPATIENT
Start: 2021-07-21 | End: 2021-07-22 | Stop reason: HOSPADM

## 2021-07-21 RX ORDER — FUROSEMIDE 10 MG/ML
20 INJECTION INTRAMUSCULAR; INTRAVENOUS ONCE
Status: COMPLETED | OUTPATIENT
Start: 2021-07-21 | End: 2021-07-21

## 2021-07-21 RX ORDER — PROMETHAZINE HYDROCHLORIDE 25 MG/ML
INJECTION, SOLUTION INTRAMUSCULAR; INTRAVENOUS CODE/TRAUMA/SEDATION MEDICATION
Status: COMPLETED | OUTPATIENT
Start: 2021-07-21 | End: 2021-07-21

## 2021-07-21 RX ORDER — FENTANYL CITRATE 50 UG/ML
50 INJECTION, SOLUTION INTRAMUSCULAR; INTRAVENOUS ONCE
Status: DISCONTINUED | OUTPATIENT
Start: 2021-07-21 | End: 2021-07-21

## 2021-07-21 RX ORDER — DEXTROSE MONOHYDRATE, SODIUM CHLORIDE, AND POTASSIUM CHLORIDE 50; 1.49; 4.5 G/1000ML; G/1000ML; G/1000ML
INJECTION, SOLUTION INTRAVENOUS CONTINUOUS
Status: DISCONTINUED | OUTPATIENT
Start: 2021-07-21 | End: 2021-07-21

## 2021-07-21 RX ORDER — FAMOTIDINE 20 MG/1
20 TABLET, FILM COATED ORAL EVERY 12 HOURS
Status: CANCELLED | OUTPATIENT
Start: 2021-07-21

## 2021-07-21 RX ORDER — DEXTROSE MONOHYDRATE, SODIUM CHLORIDE, AND POTASSIUM CHLORIDE 50; 1.49; 4.5 G/1000ML; G/1000ML; G/1000ML
INJECTION, SOLUTION INTRAVENOUS CONTINUOUS
Status: CANCELLED | OUTPATIENT
Start: 2021-07-21

## 2021-07-21 RX ORDER — MIDAZOLAM HYDROCHLORIDE 1 MG/ML
0.5 INJECTION INTRAMUSCULAR; INTRAVENOUS ONCE
Status: CANCELLED | OUTPATIENT
Start: 2021-07-21 | End: 2021-07-21

## 2021-07-21 RX ORDER — LIDOCAINE HYDROCHLORIDE 10 MG/ML
INJECTION INFILTRATION; PERINEURAL CODE/TRAUMA/SEDATION MEDICATION
Status: COMPLETED | OUTPATIENT
Start: 2021-07-21 | End: 2021-07-21

## 2021-07-21 RX ORDER — FENTANYL CITRATE 50 UG/ML
50 INJECTION, SOLUTION INTRAMUSCULAR; INTRAVENOUS ONCE
Status: CANCELLED | OUTPATIENT
Start: 2021-07-21 | End: 2021-07-21

## 2021-07-21 RX ORDER — ONDANSETRON 2 MG/ML
4 INJECTION INTRAMUSCULAR; INTRAVENOUS EVERY 8 HOURS PRN
Status: DISCONTINUED | OUTPATIENT
Start: 2021-07-21 | End: 2021-07-22 | Stop reason: HOSPADM

## 2021-07-21 RX ORDER — TRAMADOL HYDROCHLORIDE 50 MG/1
50 TABLET ORAL EVERY 6 HOURS PRN
Status: CANCELLED | OUTPATIENT
Start: 2021-07-21

## 2021-07-21 RX ORDER — DIPHENHYDRAMINE HYDROCHLORIDE 50 MG/ML
50 INJECTION INTRAMUSCULAR; INTRAVENOUS ONCE
Status: COMPLETED | OUTPATIENT
Start: 2021-07-21 | End: 2021-07-21

## 2021-07-21 RX ORDER — TRAMADOL HYDROCHLORIDE 50 MG/1
50 TABLET ORAL EVERY 6 HOURS PRN
Status: DISCONTINUED | OUTPATIENT
Start: 2021-07-21 | End: 2021-07-22 | Stop reason: HOSPADM

## 2021-07-21 RX ORDER — FAMOTIDINE 20 MG/1
20 TABLET, FILM COATED ORAL EVERY 12 HOURS
Status: DISCONTINUED | OUTPATIENT
Start: 2021-07-21 | End: 2021-07-22 | Stop reason: HOSPADM

## 2021-07-21 RX ORDER — MIDAZOLAM HYDROCHLORIDE 1 MG/ML
0.5 INJECTION INTRAMUSCULAR; INTRAVENOUS ONCE
Status: DISCONTINUED | OUTPATIENT
Start: 2021-07-21 | End: 2021-07-21

## 2021-07-21 RX ORDER — SODIUM CHLORIDE 9 MG/ML
INJECTION, SOLUTION INTRAVENOUS ONCE
Status: CANCELLED | OUTPATIENT
Start: 2021-07-21 | End: 2021-07-21

## 2021-07-21 RX ORDER — MIDAZOLAM HYDROCHLORIDE 5 MG/ML
INJECTION INTRAMUSCULAR; INTRAVENOUS CODE/TRAUMA/SEDATION MEDICATION
Status: COMPLETED | OUTPATIENT
Start: 2021-07-21 | End: 2021-07-21

## 2021-07-21 RX ORDER — FENTANYL CITRATE 50 UG/ML
INJECTION, SOLUTION INTRAMUSCULAR; INTRAVENOUS CODE/TRAUMA/SEDATION MEDICATION
Status: COMPLETED | OUTPATIENT
Start: 2021-07-21 | End: 2021-07-21

## 2021-07-21 RX ORDER — IBUPROFEN 400 MG/1
400 TABLET ORAL EVERY 4 HOURS PRN
Status: CANCELLED | OUTPATIENT
Start: 2021-07-21

## 2021-07-21 RX ORDER — MAGNESIUM SULFATE HEPTAHYDRATE 40 MG/ML
2 INJECTION, SOLUTION INTRAVENOUS ONCE
Status: COMPLETED | OUTPATIENT
Start: 2021-07-21 | End: 2021-07-21

## 2021-07-21 RX ORDER — DEXAMETHASONE SODIUM PHOSPHATE 4 MG/ML
8 INJECTION, SOLUTION INTRA-ARTICULAR; INTRALESIONAL; INTRAMUSCULAR; INTRAVENOUS; SOFT TISSUE ONCE
Status: CANCELLED | OUTPATIENT
Start: 2021-07-21 | End: 2021-07-21

## 2021-07-21 RX ORDER — ALLOPURINOL 100 MG/1
300 TABLET ORAL DAILY
Status: CANCELLED | OUTPATIENT
Start: 2021-07-21 | End: 2021-07-23

## 2021-07-21 RX ORDER — SODIUM CHLORIDE 9 MG/ML
INJECTION, SOLUTION INTRAVENOUS ONCE
Status: COMPLETED | OUTPATIENT
Start: 2021-07-21 | End: 2021-07-21

## 2021-07-21 RX ORDER — IBUPROFEN 200 MG
600 TABLET ORAL EVERY 6 HOURS PRN
COMMUNITY

## 2021-07-21 RX ORDER — DIPHENHYDRAMINE HYDROCHLORIDE 50 MG/ML
50 INJECTION INTRAMUSCULAR; INTRAVENOUS ONCE
Status: CANCELLED | OUTPATIENT
Start: 2021-07-21 | End: 2021-07-21

## 2021-07-21 RX ORDER — ONDANSETRON 2 MG/ML
4 INJECTION INTRAMUSCULAR; INTRAVENOUS EVERY 8 HOURS PRN
Status: CANCELLED | OUTPATIENT
Start: 2021-07-21

## 2021-07-21 RX ORDER — DEXAMETHASONE SODIUM PHOSPHATE 4 MG/ML
8 INJECTION, SOLUTION INTRA-ARTICULAR; INTRALESIONAL; INTRAMUSCULAR; INTRAVENOUS; SOFT TISSUE ONCE
Status: COMPLETED | OUTPATIENT
Start: 2021-07-21 | End: 2021-07-21

## 2021-07-21 RX ORDER — IBUPROFEN 400 MG/1
400 TABLET ORAL EVERY 4 HOURS PRN
Status: DISCONTINUED | OUTPATIENT
Start: 2021-07-21 | End: 2021-07-22 | Stop reason: HOSPADM

## 2021-07-21 RX ORDER — ALLOPURINOL 100 MG/1
300 TABLET ORAL DAILY
Status: COMPLETED | OUTPATIENT
Start: 2021-07-21 | End: 2021-07-22

## 2021-07-21 RX ORDER — MAGNESIUM SULFATE HEPTAHYDRATE 40 MG/ML
2 INJECTION, SOLUTION INTRAVENOUS ONCE
Status: CANCELLED | OUTPATIENT
Start: 2021-07-21 | End: 2021-07-21

## 2021-07-21 RX ADMIN — OCTREOTIDE ACETATE 500 MCG: 500 INJECTION, SOLUTION INTRAVENOUS; SUBCUTANEOUS at 12:07

## 2021-07-21 RX ADMIN — MIDAZOLAM HYDROCHLORIDE 1 MG: 5 INJECTION, SOLUTION INTRAMUSCULAR; INTRAVENOUS at 01:07

## 2021-07-21 RX ADMIN — FENTANYL CITRATE 50 MCG: 50 INJECTION, SOLUTION INTRAMUSCULAR; INTRAVENOUS at 01:07

## 2021-07-21 RX ADMIN — IOHEXOL 50 MG: 350 INJECTION, SOLUTION INTRAVENOUS at 02:07

## 2021-07-21 RX ADMIN — IBUPROFEN 400 MG: 400 TABLET, FILM COATED ORAL at 07:07

## 2021-07-21 RX ADMIN — PROMETHAZINE HYDROCHLORIDE 12.5 MG: 25 INJECTION INTRAMUSCULAR; INTRAVENOUS at 01:07

## 2021-07-21 RX ADMIN — POTASSIUM CHLORIDE, DEXTROSE MONOHYDRATE AND SODIUM CHLORIDE: 150; 5; 450 INJECTION, SOLUTION INTRAVENOUS at 05:07

## 2021-07-21 RX ADMIN — OCTREOTIDE ACETATE 250 MCG/HR: 1000 INJECTION, SOLUTION INTRAVENOUS; SUBCUTANEOUS at 12:07

## 2021-07-21 RX ADMIN — SODIUM CHLORIDE: 0.9 INJECTION, SOLUTION INTRAVENOUS at 12:07

## 2021-07-21 RX ADMIN — IOHEXOL 110 ML: 350 INJECTION, SOLUTION INTRAVENOUS at 02:07

## 2021-07-21 RX ADMIN — AMPICILLIN SODIUM AND SULBACTAM SODIUM 3 G: 2; 1 INJECTION, POWDER, FOR SOLUTION INTRAMUSCULAR; INTRAVENOUS at 07:07

## 2021-07-21 RX ADMIN — FAMOTIDINE 20 MG: 20 TABLET ORAL at 09:07

## 2021-07-21 RX ADMIN — ALLOPURINOL 300 MG: 100 TABLET ORAL at 04:07

## 2021-07-21 RX ADMIN — SCOPOLAMINE 1 PATCH: 1.5 PATCH, EXTENDED RELEASE TRANSDERMAL at 11:07

## 2021-07-21 RX ADMIN — MIDAZOLAM HYDROCHLORIDE 1 MG: 5 INJECTION, SOLUTION INTRAMUSCULAR; INTRAVENOUS at 12:07

## 2021-07-21 RX ADMIN — MAGNESIUM SULFATE 2 G: 2 INJECTION INTRAVENOUS at 11:07

## 2021-07-21 RX ADMIN — FENTANYL CITRATE 50 MCG: 50 INJECTION, SOLUTION INTRAMUSCULAR; INTRAVENOUS at 12:07

## 2021-07-21 RX ADMIN — FUROSEMIDE 20 MG: 10 INJECTION, SOLUTION INTRAVENOUS at 04:07

## 2021-07-21 RX ADMIN — DEXAMETHASONE SODIUM PHOSPHATE 8 MG: 4 INJECTION, SOLUTION INTRAMUSCULAR; INTRAVENOUS at 04:07

## 2021-07-21 RX ADMIN — POTASSIUM CHLORIDE, DEXTROSE MONOHYDRATE AND SODIUM CHLORIDE: 150; 5; 450 INJECTION, SOLUTION INTRAVENOUS at 11:07

## 2021-07-21 RX ADMIN — AMPICILLIN SODIUM AND SULBACTAM SODIUM 3 G: 2; 1 INJECTION, POWDER, FOR SOLUTION INTRAMUSCULAR; INTRAVENOUS at 11:07

## 2021-07-21 RX ADMIN — POTASSIUM CHLORIDE, DEXTROSE MONOHYDRATE AND SODIUM CHLORIDE: 150; 5; 450 INJECTION, SOLUTION INTRAVENOUS at 10:07

## 2021-07-21 RX ADMIN — DIPHENHYDRAMINE HYDROCHLORIDE 50 MG: 50 INJECTION INTRAMUSCULAR; INTRAVENOUS at 12:07

## 2021-07-21 RX ADMIN — ONDANSETRON 4 MG: 2 INJECTION INTRAMUSCULAR; INTRAVENOUS at 10:07

## 2021-07-21 RX ADMIN — ETHIODIZED OIL 10 ML: 480 INJECTION INTRA-ARTERIAL; INTRALYMPHATIC; INTRAUTERINE at 02:07

## 2021-07-21 RX ADMIN — LIDOCAINE HYDROCHLORIDE 10 ML: 10 INJECTION, SOLUTION INFILTRATION; PERINEURAL at 01:07

## 2021-07-21 RX ADMIN — ONDANSETRON 16 MG: 2 INJECTION INTRAMUSCULAR; INTRAVENOUS at 12:07

## 2021-07-22 ENCOUNTER — TELEPHONE (OUTPATIENT)
Dept: INFUSION THERAPY | Facility: HOSPITAL | Age: 66
End: 2021-07-22

## 2021-07-22 ENCOUNTER — PATIENT MESSAGE (OUTPATIENT)
Dept: HEMATOLOGY/ONCOLOGY | Facility: CLINIC | Age: 66
End: 2021-07-22

## 2021-07-22 ENCOUNTER — TELEPHONE (OUTPATIENT)
Dept: NEUROLOGY | Facility: HOSPITAL | Age: 66
End: 2021-07-22

## 2021-07-22 ENCOUNTER — TELEPHONE (OUTPATIENT)
Dept: HEMATOLOGY/ONCOLOGY | Facility: CLINIC | Age: 66
End: 2021-07-22

## 2021-07-22 ENCOUNTER — DOCUMENTATION ONLY (OUTPATIENT)
Dept: NEUROLOGY | Facility: HOSPITAL | Age: 66
End: 2021-07-22

## 2021-07-22 VITALS
TEMPERATURE: 98 F | SYSTOLIC BLOOD PRESSURE: 157 MMHG | BODY MASS INDEX: 30.25 KG/M2 | HEIGHT: 72 IN | HEART RATE: 50 BPM | WEIGHT: 223.31 LBS | DIASTOLIC BLOOD PRESSURE: 75 MMHG | RESPIRATION RATE: 18 BRPM | OXYGEN SATURATION: 99 %

## 2021-07-22 DIAGNOSIS — C7B.02 METASTATIC MALIGNANT CARCINOID TUMOR TO LIVER: ICD-10-CM

## 2021-07-22 DIAGNOSIS — C7A.090 MALIGNANT CARCINOID TUMOR OF BRONCHUS AND LUNG: Primary | ICD-10-CM

## 2021-07-22 PROCEDURE — 63600175 PHARM REV CODE 636 W HCPCS: Performed by: SURGERY

## 2021-07-22 PROCEDURE — 94799 UNLISTED PULMONARY SVC/PX: CPT

## 2021-07-22 PROCEDURE — 94761 N-INVAS EAR/PLS OXIMETRY MLT: CPT

## 2021-07-22 PROCEDURE — 25000003 PHARM REV CODE 250: Performed by: SURGERY

## 2021-07-22 RX ORDER — CIPROFLOXACIN 500 MG/1
500 TABLET ORAL EVERY 12 HOURS
Qty: 12 TABLET | Refills: 0 | Status: SHIPPED | OUTPATIENT
Start: 2021-07-22 | End: 2021-09-14 | Stop reason: SDUPTHER

## 2021-07-22 RX ORDER — ONDANSETRON 2 MG/ML
4 INJECTION INTRAMUSCULAR; INTRAVENOUS EVERY 8 HOURS PRN
Status: CANCELLED | OUTPATIENT
Start: 2021-07-22

## 2021-07-22 RX ORDER — FUROSEMIDE 10 MG/ML
20 INJECTION INTRAMUSCULAR; INTRAVENOUS ONCE
Status: CANCELLED | OUTPATIENT
Start: 2021-07-22 | End: 2021-07-22

## 2021-07-22 RX ORDER — FAMOTIDINE 20 MG/1
20 TABLET, FILM COATED ORAL EVERY 12 HOURS
Status: CANCELLED | OUTPATIENT
Start: 2021-07-22

## 2021-07-22 RX ORDER — ONDANSETRON 4 MG/1
8 TABLET, ORALLY DISINTEGRATING ORAL EVERY 6 HOURS PRN
Qty: 15 TABLET | Refills: 0 | Status: SHIPPED | OUTPATIENT
Start: 2021-07-22 | End: 2021-09-24

## 2021-07-22 RX ORDER — DEXTROSE MONOHYDRATE, SODIUM CHLORIDE, AND POTASSIUM CHLORIDE 50; 1.49; 4.5 G/1000ML; G/1000ML; G/1000ML
INJECTION, SOLUTION INTRAVENOUS CONTINUOUS
Status: CANCELLED | OUTPATIENT
Start: 2021-07-22

## 2021-07-22 RX ORDER — ONDANSETRON 4 MG/1
4 TABLET, FILM COATED ORAL 2 TIMES DAILY
Qty: 15 TABLET | Refills: 0 | Status: SHIPPED | OUTPATIENT
Start: 2021-07-22 | End: 2021-09-24

## 2021-07-22 RX ORDER — IBUPROFEN 200 MG
400 TABLET ORAL EVERY 4 HOURS PRN
Status: CANCELLED | OUTPATIENT
Start: 2021-07-22

## 2021-07-22 RX ORDER — TRAMADOL HYDROCHLORIDE 50 MG/1
50 TABLET ORAL EVERY 6 HOURS PRN
Status: CANCELLED | OUTPATIENT
Start: 2021-07-22

## 2021-07-22 RX ORDER — ONDANSETRON 4 MG/1
4 TABLET, FILM COATED ORAL 2 TIMES DAILY
Qty: 15 TABLET | Refills: 0 | Status: SHIPPED | OUTPATIENT
Start: 2021-07-22 | End: 2021-09-14 | Stop reason: SDUPTHER

## 2021-07-22 RX ORDER — DIPHENHYDRAMINE HYDROCHLORIDE 50 MG/ML
50 INJECTION INTRAMUSCULAR; INTRAVENOUS ONCE
Status: CANCELLED | OUTPATIENT
Start: 2021-07-22 | End: 2021-07-22

## 2021-07-22 RX ORDER — MIDAZOLAM HYDROCHLORIDE 1 MG/ML
0.5 INJECTION INTRAMUSCULAR; INTRAVENOUS ONCE
Status: CANCELLED | OUTPATIENT
Start: 2021-07-22 | End: 2021-07-22

## 2021-07-22 RX ORDER — DEXAMETHASONE SODIUM PHOSPHATE 4 MG/ML
8 INJECTION, SOLUTION INTRA-ARTICULAR; INTRALESIONAL; INTRAMUSCULAR; INTRAVENOUS; SOFT TISSUE ONCE
Status: CANCELLED | OUTPATIENT
Start: 2021-07-22 | End: 2021-07-22

## 2021-07-22 RX ORDER — SODIUM CHLORIDE 9 MG/ML
INJECTION, SOLUTION INTRAVENOUS ONCE
Status: CANCELLED | OUTPATIENT
Start: 2021-07-22 | End: 2021-07-22

## 2021-07-22 RX ADMIN — TRAMADOL HYDROCHLORIDE 50 MG: 50 TABLET, FILM COATED ORAL at 03:07

## 2021-07-22 RX ADMIN — AMPICILLIN SODIUM AND SULBACTAM SODIUM 3 G: 2; 1 INJECTION, POWDER, FOR SOLUTION INTRAMUSCULAR; INTRAVENOUS at 01:07

## 2021-07-22 RX ADMIN — POTASSIUM CHLORIDE, DEXTROSE MONOHYDRATE AND SODIUM CHLORIDE: 150; 5; 450 INJECTION, SOLUTION INTRAVENOUS at 06:07

## 2021-07-22 RX ADMIN — AMPICILLIN SODIUM AND SULBACTAM SODIUM 3 G: 2; 1 INJECTION, POWDER, FOR SOLUTION INTRAMUSCULAR; INTRAVENOUS at 06:07

## 2021-07-22 RX ADMIN — ALLOPURINOL 300 MG: 100 TABLET ORAL at 08:07

## 2021-07-22 RX ADMIN — FAMOTIDINE 20 MG: 20 TABLET ORAL at 08:07

## 2021-07-23 ENCOUNTER — TELEPHONE (OUTPATIENT)
Dept: NEUROLOGY | Facility: HOSPITAL | Age: 66
End: 2021-07-23

## 2021-07-23 DIAGNOSIS — Z13.9 SCREENING FOR UNSPECIFIED CONDITION: ICD-10-CM

## 2021-07-23 DIAGNOSIS — C7A.090 MALIGNANT CARCINOID TUMOR OF BRONCHUS AND LUNG: Primary | ICD-10-CM

## 2021-07-23 LAB
ADEQUACY: NORMAL
FINAL PATHOLOGIC DIAGNOSIS: NORMAL
GROSS: NORMAL
Lab: NORMAL

## 2021-07-26 ENCOUNTER — TELEPHONE (OUTPATIENT)
Dept: NEUROLOGY | Facility: HOSPITAL | Age: 66
End: 2021-07-26

## 2021-07-27 ENCOUNTER — PATIENT MESSAGE (OUTPATIENT)
Dept: NEUROLOGY | Facility: HOSPITAL | Age: 66
End: 2021-07-27

## 2021-07-27 ENCOUNTER — TELEPHONE (OUTPATIENT)
Dept: NEUROLOGY | Facility: HOSPITAL | Age: 66
End: 2021-07-27

## 2021-07-28 ENCOUNTER — TELEPHONE (OUTPATIENT)
Dept: NEUROLOGY | Facility: HOSPITAL | Age: 66
End: 2021-07-28

## 2021-07-28 DIAGNOSIS — C7B.8 SECONDARY NEUROENDOCRINE TUMOR OF LIVER: Primary | ICD-10-CM

## 2021-07-29 ENCOUNTER — TELEPHONE (OUTPATIENT)
Dept: NEUROLOGY | Facility: HOSPITAL | Age: 66
End: 2021-07-29

## 2021-07-29 DIAGNOSIS — C7B.8 SECONDARY NEUROENDOCRINE TUMOR OF LIVER: Primary | ICD-10-CM

## 2021-08-02 ENCOUNTER — PATIENT MESSAGE (OUTPATIENT)
Dept: NEUROLOGY | Facility: HOSPITAL | Age: 66
End: 2021-08-02

## 2021-08-02 ENCOUNTER — LAB VISIT (OUTPATIENT)
Dept: LAB | Facility: HOSPITAL | Age: 66
End: 2021-08-02
Payer: COMMERCIAL

## 2021-08-02 DIAGNOSIS — C7A.090 MALIGNANT CARCINOID TUMOR OF BRONCHUS AND LUNG: ICD-10-CM

## 2021-08-02 LAB
ALBUMIN SERPL BCP-MCNC: 2.8 G/DL (ref 3.5–5.2)
ALP SERPL-CCNC: 463 U/L (ref 55–135)
ALT SERPL W/O P-5'-P-CCNC: 121 U/L (ref 10–44)
ANION GAP SERPL CALC-SCNC: 11 MMOL/L (ref 8–16)
AST SERPL-CCNC: 47 U/L (ref 10–40)
BASOPHILS # BLD AUTO: 0.03 K/UL (ref 0–0.2)
BASOPHILS NFR BLD: 0.4 % (ref 0–1.9)
BILIRUB SERPL-MCNC: 0.7 MG/DL (ref 0.1–1)
BUN SERPL-MCNC: 12 MG/DL (ref 8–23)
CALCIUM SERPL-MCNC: 9.7 MG/DL (ref 8.7–10.5)
CHLORIDE SERPL-SCNC: 103 MMOL/L (ref 95–110)
CO2 SERPL-SCNC: 24 MMOL/L (ref 23–29)
CREAT SERPL-MCNC: 1.3 MG/DL (ref 0.5–1.4)
DIFFERENTIAL METHOD: ABNORMAL
EOSINOPHIL # BLD AUTO: 0 K/UL (ref 0–0.5)
EOSINOPHIL NFR BLD: 0.3 % (ref 0–8)
ERYTHROCYTE [DISTWIDTH] IN BLOOD BY AUTOMATED COUNT: 17 % (ref 11.5–14.5)
EST. GFR  (AFRICAN AMERICAN): >60 ML/MIN/1.73 M^2
EST. GFR  (NON AFRICAN AMERICAN): 57.7 ML/MIN/1.73 M^2
GLUCOSE SERPL-MCNC: 120 MG/DL (ref 70–110)
HCT VFR BLD AUTO: 35.4 % (ref 40–54)
HGB BLD-MCNC: 11.2 G/DL (ref 14–18)
IMM GRANULOCYTES # BLD AUTO: 0.1 K/UL (ref 0–0.04)
IMM GRANULOCYTES NFR BLD AUTO: 1.3 % (ref 0–0.5)
LYMPHOCYTES # BLD AUTO: 1 K/UL (ref 1–4.8)
LYMPHOCYTES NFR BLD: 12 % (ref 18–48)
MCH RBC QN AUTO: 25.6 PG (ref 27–31)
MCHC RBC AUTO-ENTMCNC: 31.6 G/DL (ref 32–36)
MCV RBC AUTO: 81 FL (ref 82–98)
MONOCYTES # BLD AUTO: 0.7 K/UL (ref 0.3–1)
MONOCYTES NFR BLD: 9.1 % (ref 4–15)
NEUTROPHILS # BLD AUTO: 6.1 K/UL (ref 1.8–7.7)
NEUTROPHILS NFR BLD: 76.9 % (ref 38–73)
NRBC BLD-RTO: 0 /100 WBC
PLATELET # BLD AUTO: 482 K/UL (ref 150–450)
PMV BLD AUTO: 9.2 FL (ref 9.2–12.9)
POTASSIUM SERPL-SCNC: 4.4 MMOL/L (ref 3.5–5.1)
PROT SERPL-MCNC: 7.5 G/DL (ref 6–8.4)
RBC # BLD AUTO: 4.38 M/UL (ref 4.6–6.2)
SODIUM SERPL-SCNC: 138 MMOL/L (ref 136–145)
WBC # BLD AUTO: 7.93 K/UL (ref 3.9–12.7)

## 2021-08-02 PROCEDURE — 36415 COLL VENOUS BLD VENIPUNCTURE: CPT | Performed by: SURGERY

## 2021-08-02 PROCEDURE — 80053 COMPREHEN METABOLIC PANEL: CPT | Performed by: SURGERY

## 2021-08-02 PROCEDURE — 85025 COMPLETE CBC W/AUTO DIFF WBC: CPT | Performed by: SURGERY

## 2021-08-09 ENCOUNTER — PATIENT MESSAGE (OUTPATIENT)
Dept: UROLOGY | Facility: CLINIC | Age: 66
End: 2021-08-09

## 2021-08-12 ENCOUNTER — TELEPHONE (OUTPATIENT)
Dept: HEMATOLOGY/ONCOLOGY | Facility: CLINIC | Age: 66
End: 2021-08-12

## 2021-08-12 ENCOUNTER — PATIENT MESSAGE (OUTPATIENT)
Dept: NEUROLOGY | Facility: HOSPITAL | Age: 66
End: 2021-08-12

## 2021-08-17 ENCOUNTER — OFFICE VISIT (OUTPATIENT)
Dept: HEMATOLOGY/ONCOLOGY | Facility: CLINIC | Age: 66
End: 2021-08-17
Payer: COMMERCIAL

## 2021-08-17 ENCOUNTER — TELEPHONE (OUTPATIENT)
Dept: NEUROLOGY | Facility: HOSPITAL | Age: 66
End: 2021-08-17

## 2021-08-17 ENCOUNTER — CLINICAL SUPPORT (OUTPATIENT)
Dept: NEUROLOGY | Facility: HOSPITAL | Age: 66
End: 2021-08-17
Payer: COMMERCIAL

## 2021-08-17 VITALS
RESPIRATION RATE: 19 BRPM | HEART RATE: 61 BPM | TEMPERATURE: 98 F | OXYGEN SATURATION: 99 % | BODY MASS INDEX: 31.77 KG/M2 | SYSTOLIC BLOOD PRESSURE: 119 MMHG | HEIGHT: 72 IN | DIASTOLIC BLOOD PRESSURE: 68 MMHG | WEIGHT: 234.56 LBS

## 2021-08-17 DIAGNOSIS — D3A.090 BRONCHIAL CARCINOID TUMORS: Primary | ICD-10-CM

## 2021-08-17 DIAGNOSIS — Z13.9 SCREENING FOR UNSPECIFIED CONDITION: ICD-10-CM

## 2021-08-17 DIAGNOSIS — C77.8 SECONDARY MALIGNANT NEOPLASM OF LYMPH NODES OF MULTIPLE SITES: ICD-10-CM

## 2021-08-17 DIAGNOSIS — C7B.8 SECONDARY NEUROENDOCRINE TUMOR OF LIVER: ICD-10-CM

## 2021-08-17 DIAGNOSIS — Z89.512 HX OF BKA, LEFT: ICD-10-CM

## 2021-08-17 DIAGNOSIS — G89.3 CHRONIC NEOPLASM-RELATED PAIN: ICD-10-CM

## 2021-08-17 DIAGNOSIS — C79.51 SECONDARY MALIGNANT NEOPLASM OF BONE: ICD-10-CM

## 2021-08-17 DIAGNOSIS — Z13.9 SCREENING FOR UNSPECIFIED CONDITION: Primary | ICD-10-CM

## 2021-08-17 DIAGNOSIS — D63.0 ANEMIA IN NEOPLASTIC DISEASE: ICD-10-CM

## 2021-08-17 DIAGNOSIS — C78.89 SECONDARY MALIGNANT NEOPLASM OF SPLEEN: ICD-10-CM

## 2021-08-17 LAB — SARS-COV-2 RDRP RESP QL NAA+PROBE: NEGATIVE

## 2021-08-17 PROCEDURE — 99215 PR OFFICE/OUTPT VISIT, EST, LEVL V, 40-54 MIN: ICD-10-PCS | Mod: S$GLB,,, | Performed by: INTERNAL MEDICINE

## 2021-08-17 PROCEDURE — 3078F PR MOST RECENT DIASTOLIC BLOOD PRESSURE < 80 MM HG: ICD-10-PCS | Mod: CPTII,S$GLB,, | Performed by: INTERNAL MEDICINE

## 2021-08-17 PROCEDURE — 1126F AMNT PAIN NOTED NONE PRSNT: CPT | Mod: CPTII,S$GLB,, | Performed by: INTERNAL MEDICINE

## 2021-08-17 PROCEDURE — 3074F PR MOST RECENT SYSTOLIC BLOOD PRESSURE < 130 MM HG: ICD-10-PCS | Mod: CPTII,S$GLB,, | Performed by: INTERNAL MEDICINE

## 2021-08-17 PROCEDURE — 99999 PR PBB SHADOW E&M-EST. PATIENT-LVL IV: ICD-10-PCS | Mod: PBBFAC,,, | Performed by: INTERNAL MEDICINE

## 2021-08-17 PROCEDURE — 3074F SYST BP LT 130 MM HG: CPT | Mod: CPTII,S$GLB,, | Performed by: INTERNAL MEDICINE

## 2021-08-17 PROCEDURE — 1160F RVW MEDS BY RX/DR IN RCRD: CPT | Mod: CPTII,S$GLB,, | Performed by: INTERNAL MEDICINE

## 2021-08-17 PROCEDURE — 1159F PR MEDICATION LIST DOCUMENTED IN MEDICAL RECORD: ICD-10-PCS | Mod: CPTII,S$GLB,, | Performed by: INTERNAL MEDICINE

## 2021-08-17 PROCEDURE — 99215 OFFICE O/P EST HI 40 MIN: CPT | Mod: S$GLB,,, | Performed by: INTERNAL MEDICINE

## 2021-08-17 PROCEDURE — 3078F DIAST BP <80 MM HG: CPT | Mod: CPTII,S$GLB,, | Performed by: INTERNAL MEDICINE

## 2021-08-17 PROCEDURE — 3008F PR BODY MASS INDEX (BMI) DOCUMENTED: ICD-10-PCS | Mod: CPTII,S$GLB,, | Performed by: INTERNAL MEDICINE

## 2021-08-17 PROCEDURE — U0002 COVID-19 LAB TEST NON-CDC: HCPCS | Performed by: INTERNAL MEDICINE

## 2021-08-17 PROCEDURE — 3008F BODY MASS INDEX DOCD: CPT | Mod: CPTII,S$GLB,, | Performed by: INTERNAL MEDICINE

## 2021-08-17 PROCEDURE — 99999 PR PBB SHADOW E&M-EST. PATIENT-LVL IV: CPT | Mod: PBBFAC,,, | Performed by: INTERNAL MEDICINE

## 2021-08-17 PROCEDURE — 1126F PR PAIN SEVERITY QUANTIFIED, NO PAIN PRESENT: ICD-10-PCS | Mod: CPTII,S$GLB,, | Performed by: INTERNAL MEDICINE

## 2021-08-17 PROCEDURE — 1159F MED LIST DOCD IN RCRD: CPT | Mod: CPTII,S$GLB,, | Performed by: INTERNAL MEDICINE

## 2021-08-17 PROCEDURE — 99211 OFF/OP EST MAY X REQ PHY/QHP: CPT

## 2021-08-17 PROCEDURE — 1160F PR REVIEW ALL MEDS BY PRESCRIBER/CLIN PHARMACIST DOCUMENTED: ICD-10-PCS | Mod: CPTII,S$GLB,, | Performed by: INTERNAL MEDICINE

## 2021-08-17 RX ORDER — SODIUM CHLORIDE 0.9 % (FLUSH) 0.9 %
10 SYRINGE (ML) INJECTION
Status: CANCELLED | OUTPATIENT
Start: 2021-08-18

## 2021-08-17 RX ORDER — ARGININE 100 %
1000 CRYSTALS MISCELLANEOUS
Status: CANCELLED | OUTPATIENT
Start: 2021-08-18

## 2021-08-17 RX ORDER — DIPHENHYDRAMINE HYDROCHLORIDE 50 MG/ML
50 INJECTION INTRAMUSCULAR; INTRAVENOUS
Status: CANCELLED | OUTPATIENT
Start: 2021-08-18

## 2021-08-17 RX ORDER — SODIUM CHLORIDE 9 MG/ML
INJECTION, SOLUTION INTRAVENOUS ONCE
Status: CANCELLED | OUTPATIENT
Start: 2021-08-18

## 2021-08-17 RX ORDER — ACETAMINOPHEN 325 MG/1
650 TABLET ORAL
Status: CANCELLED | OUTPATIENT
Start: 2021-08-18

## 2021-08-17 RX ORDER — OXYCODONE HYDROCHLORIDE 5 MG/1
5 TABLET ORAL EVERY 6 HOURS PRN
Qty: 60 TABLET | Refills: 0 | Status: SHIPPED | OUTPATIENT
Start: 2021-08-17 | End: 2021-08-25 | Stop reason: SDUPTHER

## 2021-08-18 ENCOUNTER — INFUSION (OUTPATIENT)
Dept: INFUSION THERAPY | Facility: HOSPITAL | Age: 66
End: 2021-08-18
Attending: INTERNAL MEDICINE
Payer: COMMERCIAL

## 2021-08-18 ENCOUNTER — HOSPITAL ENCOUNTER (OUTPATIENT)
Dept: RADIOLOGY | Facility: HOSPITAL | Age: 66
Discharge: HOME | End: 2021-08-18
Attending: INTERNAL MEDICINE
Payer: COMMERCIAL

## 2021-08-18 VITALS
WEIGHT: 234.56 LBS | BODY MASS INDEX: 31.77 KG/M2 | RESPIRATION RATE: 18 BRPM | DIASTOLIC BLOOD PRESSURE: 70 MMHG | OXYGEN SATURATION: 98 % | SYSTOLIC BLOOD PRESSURE: 115 MMHG | HEIGHT: 72 IN | HEART RATE: 62 BPM | TEMPERATURE: 98 F

## 2021-08-18 DIAGNOSIS — C7A.090 MALIGNANT CARCINOID TUMOR OF BRONCHUS AND LUNG: Primary | ICD-10-CM

## 2021-08-18 DIAGNOSIS — C7A.090 MALIGNANT CARCINOID TUMOR OF BRONCHUS AND LUNG: ICD-10-CM

## 2021-08-18 PROCEDURE — 96367 TX/PROPH/DG ADDL SEQ IV INF: CPT

## 2021-08-18 PROCEDURE — 25000003 PHARM REV CODE 250: Performed by: INTERNAL MEDICINE

## 2021-08-18 PROCEDURE — 96366 THER/PROPH/DIAG IV INF ADDON: CPT

## 2021-08-18 PROCEDURE — 63600175 PHARM REV CODE 636 W HCPCS: Performed by: INTERNAL MEDICINE

## 2021-08-18 PROCEDURE — 96365 THER/PROPH/DIAG IV INF INIT: CPT | Mod: 59

## 2021-08-18 PROCEDURE — 79101 NM THERAPY BY IV ADMINISTRATION LU177: ICD-10-PCS | Mod: 26,,, | Performed by: RADIOLOGY

## 2021-08-18 PROCEDURE — 79101 NUCLEAR RX IV ADMIN: CPT | Mod: TC

## 2021-08-18 PROCEDURE — 79101 NUCLEAR RX IV ADMIN: CPT | Mod: 26,,, | Performed by: RADIOLOGY

## 2021-08-18 RX ORDER — ACETAMINOPHEN 325 MG/1
650 TABLET ORAL
Status: CANCELLED | OUTPATIENT
Start: 2021-10-13

## 2021-08-18 RX ORDER — SODIUM CHLORIDE 9 MG/ML
INJECTION, SOLUTION INTRAVENOUS ONCE
Status: CANCELLED | OUTPATIENT
Start: 2021-10-13

## 2021-08-18 RX ORDER — ARGININE/LYSINE/0.9 % SOD CHL 25-25MG/ML
1000 PLASTIC BAG, INJECTION (ML) INTRAVENOUS
Status: CANCELLED | OUTPATIENT
Start: 2021-10-13

## 2021-08-18 RX ORDER — SODIUM CHLORIDE 9 MG/ML
INJECTION, SOLUTION INTRAVENOUS ONCE
Status: COMPLETED | OUTPATIENT
Start: 2021-08-18 | End: 2021-08-18

## 2021-08-18 RX ORDER — DIPHENHYDRAMINE HYDROCHLORIDE 50 MG/ML
50 INJECTION INTRAMUSCULAR; INTRAVENOUS
Status: CANCELLED | OUTPATIENT
Start: 2021-10-13

## 2021-08-18 RX ORDER — DIPHENHYDRAMINE HYDROCHLORIDE 50 MG/ML
50 INJECTION INTRAMUSCULAR; INTRAVENOUS
Status: DISCONTINUED | OUTPATIENT
Start: 2021-08-18 | End: 2021-08-18 | Stop reason: HOSPADM

## 2021-08-18 RX ORDER — SODIUM CHLORIDE 0.9 % (FLUSH) 0.9 %
10 SYRINGE (ML) INJECTION
Status: DISCONTINUED | OUTPATIENT
Start: 2021-08-18 | End: 2021-08-18 | Stop reason: HOSPADM

## 2021-08-18 RX ORDER — SODIUM CHLORIDE 0.9 % (FLUSH) 0.9 %
10 SYRINGE (ML) INJECTION
Status: CANCELLED | OUTPATIENT
Start: 2021-10-13

## 2021-08-18 RX ORDER — ARGININE/LYSINE/0.9 % SOD CHL 25-25MG/ML
1000 PLASTIC BAG, INJECTION (ML) INTRAVENOUS
Status: COMPLETED | OUTPATIENT
Start: 2021-08-18 | End: 2021-08-18

## 2021-08-18 RX ORDER — ACETAMINOPHEN 325 MG/1
650 TABLET ORAL
Status: DISCONTINUED | OUTPATIENT
Start: 2021-08-18 | End: 2021-08-18 | Stop reason: HOSPADM

## 2021-08-18 RX ADMIN — SODIUM CHLORIDE: 0.9 INJECTION, SOLUTION INTRAVENOUS at 08:08

## 2021-08-18 RX ADMIN — DEXAMETHASONE SODIUM PHOSPHATE 16 MG: 10 INJECTION, SOLUTION INTRAMUSCULAR; INTRAVENOUS at 08:08

## 2021-08-18 RX ADMIN — Medication 1000 ML: at 08:08

## 2021-08-19 ENCOUNTER — INFUSION (OUTPATIENT)
Dept: INFUSION THERAPY | Facility: HOSPITAL | Age: 66
End: 2021-08-19
Attending: SURGERY
Payer: COMMERCIAL

## 2021-08-19 VITALS — BODY MASS INDEX: 31.77 KG/M2 | WEIGHT: 234.56 LBS | HEIGHT: 72 IN

## 2021-08-19 DIAGNOSIS — C7B.8 SECONDARY NEUROENDOCRINE TUMOR OF BONE: ICD-10-CM

## 2021-08-19 DIAGNOSIS — C7B.8 SECONDARY MALIGNANT NEUROENDOCRINE TUMOR OF LIVER: Primary | ICD-10-CM

## 2021-08-19 DIAGNOSIS — C78.7 SECONDARY MALIGNANT NEOPLASM OF LIVER: ICD-10-CM

## 2021-08-19 DIAGNOSIS — C7B.8 SECONDARY NEUROENDOCRINE TUMOR OF LIVER: ICD-10-CM

## 2021-08-19 DIAGNOSIS — C7A.090 MALIGNANT CARCINOID TUMOR OF BRONCHUS AND LUNG: ICD-10-CM

## 2021-08-19 DIAGNOSIS — C7A.090 CARCINOID BRONCHIAL ADENOMA OF LEFT LUNG: ICD-10-CM

## 2021-08-19 DIAGNOSIS — D3A.090 BRONCHIAL CARCINOID TUMORS: ICD-10-CM

## 2021-08-19 PROCEDURE — 96372 THER/PROPH/DIAG INJ SC/IM: CPT

## 2021-08-19 PROCEDURE — 63600175 PHARM REV CODE 636 W HCPCS: Mod: JG | Performed by: INTERNAL MEDICINE

## 2021-08-19 RX ORDER — LANREOTIDE ACETATE 120 MG/.5ML
120 INJECTION SUBCUTANEOUS
Status: CANCELLED
Start: 2021-08-19

## 2021-08-19 RX ORDER — LANREOTIDE ACETATE 120 MG/.5ML
120 INJECTION SUBCUTANEOUS
Status: DISCONTINUED | OUTPATIENT
Start: 2021-08-19 | End: 2021-08-19 | Stop reason: HOSPADM

## 2021-08-19 RX ADMIN — LANREOTIDE ACETATE 120 MG: 120 INJECTION SUBCUTANEOUS at 11:08

## 2021-08-26 ENCOUNTER — TELEPHONE (OUTPATIENT)
Dept: NEUROLOGY | Facility: HOSPITAL | Age: 66
End: 2021-08-26

## 2021-08-27 ENCOUNTER — PATIENT MESSAGE (OUTPATIENT)
Dept: UROLOGY | Facility: CLINIC | Age: 66
End: 2021-08-27

## 2021-09-08 ENCOUNTER — TELEPHONE (OUTPATIENT)
Dept: NEUROLOGY | Facility: HOSPITAL | Age: 66
End: 2021-09-08

## 2021-09-08 DIAGNOSIS — Z01.818 PRE-OP TESTING: Primary | ICD-10-CM

## 2021-09-08 DIAGNOSIS — C7B.8 SECONDARY NEUROENDOCRINE TUMOR OF LIVER: ICD-10-CM

## 2021-09-13 ENCOUNTER — TELEPHONE (OUTPATIENT)
Dept: NEUROLOGY | Facility: HOSPITAL | Age: 66
End: 2021-09-13

## 2021-09-14 ENCOUNTER — PATIENT MESSAGE (OUTPATIENT)
Dept: NEUROLOGY | Facility: HOSPITAL | Age: 66
End: 2021-09-14

## 2021-09-14 ENCOUNTER — PATIENT MESSAGE (OUTPATIENT)
Dept: UROLOGY | Facility: CLINIC | Age: 66
End: 2021-09-14

## 2021-09-14 ENCOUNTER — TELEPHONE (OUTPATIENT)
Dept: NEUROLOGY | Facility: HOSPITAL | Age: 66
End: 2021-09-14

## 2021-09-14 ENCOUNTER — OFFICE VISIT (OUTPATIENT)
Dept: NEUROLOGY | Facility: HOSPITAL | Age: 66
End: 2021-09-14
Attending: SURGERY
Payer: COMMERCIAL

## 2021-09-14 DIAGNOSIS — C7B.00 METASTATIC CARCINOID TUMOR: ICD-10-CM

## 2021-09-14 DIAGNOSIS — C7A.090 CARCINOID BRONCHIAL ADENOMA, UNSPECIFIED LATERALITY: Primary | ICD-10-CM

## 2021-09-14 RX ORDER — FENTANYL CITRATE 50 UG/ML
50 INJECTION, SOLUTION INTRAMUSCULAR; INTRAVENOUS ONCE
Status: CANCELLED | OUTPATIENT
Start: 2021-09-14 | End: 2021-09-14

## 2021-09-14 RX ORDER — HYDROCODONE BITARTRATE AND ACETAMINOPHEN 5; 325 MG/1; MG/1
1 TABLET ORAL EVERY 6 HOURS PRN
Qty: 15 TABLET | Refills: 0 | Status: SHIPPED | OUTPATIENT
Start: 2021-09-14 | End: 2021-09-24

## 2021-09-14 RX ORDER — DEXAMETHASONE SODIUM PHOSPHATE 4 MG/ML
8 INJECTION, SOLUTION INTRA-ARTICULAR; INTRALESIONAL; INTRAMUSCULAR; INTRAVENOUS; SOFT TISSUE ONCE
Status: CANCELLED | OUTPATIENT
Start: 2021-09-14 | End: 2021-09-14

## 2021-09-14 RX ORDER — CIPROFLOXACIN 500 MG/1
500 TABLET ORAL EVERY 12 HOURS
Qty: 12 TABLET | Refills: 0 | Status: SHIPPED | OUTPATIENT
Start: 2021-09-14 | End: 2021-09-24 | Stop reason: ALTCHOICE

## 2021-09-14 RX ORDER — DEXTROSE MONOHYDRATE, SODIUM CHLORIDE, AND POTASSIUM CHLORIDE 50; 1.49; 4.5 G/1000ML; G/1000ML; G/1000ML
INJECTION, SOLUTION INTRAVENOUS CONTINUOUS
Status: CANCELLED | OUTPATIENT
Start: 2021-09-14

## 2021-09-14 RX ORDER — ONDANSETRON 4 MG/1
4 TABLET, FILM COATED ORAL 2 TIMES DAILY
Qty: 20 TABLET | Refills: 1 | Status: SHIPPED | OUTPATIENT
Start: 2021-09-14

## 2021-09-14 RX ORDER — ONDANSETRON 2 MG/ML
4 INJECTION INTRAMUSCULAR; INTRAVENOUS EVERY 8 HOURS PRN
Status: CANCELLED | OUTPATIENT
Start: 2021-09-14

## 2021-09-14 RX ORDER — FAMOTIDINE 20 MG/1
20 TABLET, FILM COATED ORAL EVERY 12 HOURS
Status: CANCELLED | OUTPATIENT
Start: 2021-09-14

## 2021-09-14 RX ORDER — IBUPROFEN 400 MG/1
400 TABLET ORAL EVERY 4 HOURS PRN
Status: CANCELLED | OUTPATIENT
Start: 2021-09-14

## 2021-09-14 RX ORDER — DIPHENHYDRAMINE HYDROCHLORIDE 50 MG/ML
50 INJECTION INTRAMUSCULAR; INTRAVENOUS ONCE
Status: CANCELLED | OUTPATIENT
Start: 2021-09-14 | End: 2021-09-14

## 2021-09-14 RX ORDER — HYDROCODONE BITARTRATE AND ACETAMINOPHEN 5; 325 MG/1; MG/1
1 TABLET ORAL EVERY 6 HOURS PRN
Status: CANCELLED | OUTPATIENT
Start: 2021-09-14

## 2021-09-14 RX ORDER — ALLOPURINOL 100 MG/1
300 TABLET ORAL DAILY
Status: CANCELLED | OUTPATIENT
Start: 2021-09-14 | End: 2021-09-16

## 2021-09-14 RX ORDER — MIDAZOLAM HYDROCHLORIDE 1 MG/ML
0.5 INJECTION INTRAMUSCULAR; INTRAVENOUS ONCE
Status: CANCELLED | OUTPATIENT
Start: 2021-09-14 | End: 2021-09-14

## 2021-09-14 RX ORDER — SODIUM CHLORIDE 9 MG/ML
INJECTION, SOLUTION INTRAVENOUS ONCE
Status: CANCELLED | OUTPATIENT
Start: 2021-09-14 | End: 2021-09-14

## 2021-09-14 RX ORDER — FUROSEMIDE 10 MG/ML
20 INJECTION INTRAMUSCULAR; INTRAVENOUS ONCE
Status: CANCELLED | OUTPATIENT
Start: 2021-09-14 | End: 2021-09-14

## 2021-09-15 ENCOUNTER — TELEPHONE (OUTPATIENT)
Dept: INTERVENTIONAL RADIOLOGY/VASCULAR | Facility: HOSPITAL | Age: 66
End: 2021-09-15

## 2021-09-15 RX ORDER — FENTANYL CITRATE 50 UG/ML
50 INJECTION, SOLUTION INTRAMUSCULAR; INTRAVENOUS ONCE
Status: CANCELLED | OUTPATIENT
Start: 2021-09-15 | End: 2021-09-15

## 2021-09-15 RX ORDER — SODIUM CHLORIDE 9 MG/ML
INJECTION, SOLUTION INTRAVENOUS ONCE
Status: CANCELLED | OUTPATIENT
Start: 2021-09-15 | End: 2021-09-15

## 2021-09-15 RX ORDER — IBUPROFEN 400 MG/1
400 TABLET ORAL EVERY 4 HOURS PRN
Status: CANCELLED | OUTPATIENT
Start: 2021-09-15

## 2021-09-15 RX ORDER — DEXTROSE MONOHYDRATE, SODIUM CHLORIDE, AND POTASSIUM CHLORIDE 50; 1.49; 4.5 G/1000ML; G/1000ML; G/1000ML
INJECTION, SOLUTION INTRAVENOUS CONTINUOUS
Status: CANCELLED | OUTPATIENT
Start: 2021-09-15

## 2021-09-15 RX ORDER — DIPHENHYDRAMINE HYDROCHLORIDE 50 MG/ML
50 INJECTION INTRAMUSCULAR; INTRAVENOUS ONCE
Status: CANCELLED | OUTPATIENT
Start: 2021-09-15 | End: 2021-09-15

## 2021-09-15 RX ORDER — ALLOPURINOL 100 MG/1
300 TABLET ORAL DAILY
Status: CANCELLED | OUTPATIENT
Start: 2021-09-15 | End: 2021-09-17

## 2021-09-15 RX ORDER — HYDROCODONE BITARTRATE AND ACETAMINOPHEN 5; 325 MG/1; MG/1
1 TABLET ORAL EVERY 6 HOURS PRN
Status: CANCELLED | OUTPATIENT
Start: 2021-09-15

## 2021-09-15 RX ORDER — FAMOTIDINE 20 MG/1
20 TABLET, FILM COATED ORAL EVERY 12 HOURS
Status: CANCELLED | OUTPATIENT
Start: 2021-09-15

## 2021-09-15 RX ORDER — ONDANSETRON 2 MG/ML
4 INJECTION INTRAMUSCULAR; INTRAVENOUS EVERY 8 HOURS PRN
Status: CANCELLED | OUTPATIENT
Start: 2021-09-15

## 2021-09-15 RX ORDER — FUROSEMIDE 10 MG/ML
20 INJECTION INTRAMUSCULAR; INTRAVENOUS ONCE
Status: CANCELLED | OUTPATIENT
Start: 2021-09-15 | End: 2021-09-15

## 2021-09-15 RX ORDER — DEXAMETHASONE SODIUM PHOSPHATE 4 MG/ML
8 INJECTION, SOLUTION INTRA-ARTICULAR; INTRALESIONAL; INTRAMUSCULAR; INTRAVENOUS; SOFT TISSUE ONCE
Status: CANCELLED | OUTPATIENT
Start: 2021-09-15 | End: 2021-09-15

## 2021-09-15 RX ORDER — MIDAZOLAM HYDROCHLORIDE 1 MG/ML
0.5 INJECTION INTRAMUSCULAR; INTRAVENOUS ONCE
Status: CANCELLED | OUTPATIENT
Start: 2021-09-15 | End: 2021-09-15

## 2021-09-16 ENCOUNTER — HOSPITAL ENCOUNTER (OUTPATIENT)
Dept: INTERVENTIONAL RADIOLOGY/VASCULAR | Facility: HOSPITAL | Age: 66
Discharge: HOME | End: 2021-09-17
Attending: SURGERY | Admitting: SURGERY
Payer: MEDICARE

## 2021-09-16 ENCOUNTER — INFUSION (OUTPATIENT)
Dept: INFUSION THERAPY | Facility: HOSPITAL | Age: 66
End: 2021-09-16
Attending: SURGERY
Payer: MEDICARE

## 2021-09-16 VITALS — HEIGHT: 72 IN | BODY MASS INDEX: 31.77 KG/M2 | WEIGHT: 234.56 LBS

## 2021-09-16 DIAGNOSIS — C78.7 SECONDARY MALIGNANT NEOPLASM OF LIVER: ICD-10-CM

## 2021-09-16 DIAGNOSIS — C7B.8 SECONDARY MALIGNANT NEUROENDOCRINE TUMOR OF LIVER: Primary | ICD-10-CM

## 2021-09-16 DIAGNOSIS — C7A.090 CARCINOID BRONCHIAL ADENOMA OF LEFT LUNG: ICD-10-CM

## 2021-09-16 DIAGNOSIS — C7B.8 SECONDARY NEUROENDOCRINE TUMOR OF LIVER: ICD-10-CM

## 2021-09-16 DIAGNOSIS — C7A.090 MALIGNANT CARCINOID TUMOR OF BRONCHUS AND LUNG: ICD-10-CM

## 2021-09-16 DIAGNOSIS — C7B.02 METASTATIC MALIGNANT CARCINOID TUMOR TO LIVER: Primary | ICD-10-CM

## 2021-09-16 DIAGNOSIS — C7B.8 SECONDARY NEUROENDOCRINE TUMOR OF BONE: ICD-10-CM

## 2021-09-16 DIAGNOSIS — D3A.090 BRONCHIAL CARCINOID TUMORS: ICD-10-CM

## 2021-09-16 DIAGNOSIS — C7A.090 CARCINOID BRONCHIAL ADENOMA, UNSPECIFIED LATERALITY: ICD-10-CM

## 2021-09-16 LAB
ALBUMIN SERPL BCP-MCNC: 2.7 G/DL (ref 3.5–5.2)
ALP SERPL-CCNC: 222 U/L (ref 55–135)
ALT SERPL W/O P-5'-P-CCNC: 19 U/L (ref 10–44)
ANION GAP SERPL CALC-SCNC: 14 MMOL/L (ref 8–16)
AST SERPL-CCNC: 18 U/L (ref 10–40)
BASOPHILS # BLD AUTO: 0.03 K/UL (ref 0–0.2)
BASOPHILS NFR BLD: 0.3 % (ref 0–1.9)
BILIRUB SERPL-MCNC: 0.9 MG/DL (ref 0.1–1)
BUN SERPL-MCNC: 15 MG/DL (ref 8–23)
CALCIUM SERPL-MCNC: 8.8 MG/DL (ref 8.7–10.5)
CHLORIDE SERPL-SCNC: 101 MMOL/L (ref 95–110)
CO2 SERPL-SCNC: 21 MMOL/L (ref 23–29)
CREAT SERPL-MCNC: 1.2 MG/DL (ref 0.5–1.4)
DIFFERENTIAL METHOD: ABNORMAL
EOSINOPHIL # BLD AUTO: 0 K/UL (ref 0–0.5)
EOSINOPHIL NFR BLD: 0.1 % (ref 0–8)
ERYTHROCYTE [DISTWIDTH] IN BLOOD BY AUTOMATED COUNT: 18.1 % (ref 11.5–14.5)
EST. GFR  (AFRICAN AMERICAN): >60 ML/MIN/1.73 M^2
EST. GFR  (NON AFRICAN AMERICAN): >60 ML/MIN/1.73 M^2
GLUCOSE SERPL-MCNC: 120 MG/DL (ref 70–110)
HCT VFR BLD AUTO: 35.4 % (ref 40–54)
HGB BLD-MCNC: 11.4 G/DL (ref 14–18)
IMM GRANULOCYTES # BLD AUTO: 0.05 K/UL (ref 0–0.04)
IMM GRANULOCYTES NFR BLD AUTO: 0.5 % (ref 0–0.5)
INR PPP: 1.2 (ref 0.8–1.2)
LYMPHOCYTES # BLD AUTO: 0.3 K/UL (ref 1–4.8)
LYMPHOCYTES NFR BLD: 3.7 % (ref 18–48)
MCH RBC QN AUTO: 26.1 PG (ref 27–31)
MCHC RBC AUTO-ENTMCNC: 32.2 G/DL (ref 32–36)
MCV RBC AUTO: 81 FL (ref 82–98)
MONOCYTES # BLD AUTO: 0.9 K/UL (ref 0.3–1)
MONOCYTES NFR BLD: 9.6 % (ref 4–15)
NEUTROPHILS # BLD AUTO: 7.9 K/UL (ref 1.8–7.7)
NEUTROPHILS NFR BLD: 85.8 % (ref 38–73)
NRBC BLD-RTO: 0 /100 WBC
PLATELET # BLD AUTO: 242 K/UL (ref 150–450)
PMV BLD AUTO: 8.7 FL (ref 9.2–12.9)
POTASSIUM SERPL-SCNC: 4 MMOL/L (ref 3.5–5.1)
PROT SERPL-MCNC: 6.7 G/DL (ref 6–8.4)
PROTHROMBIN TIME: 12.4 SEC (ref 9–12.5)
RBC # BLD AUTO: 4.36 M/UL (ref 4.6–6.2)
SARS-COV-2 RDRP RESP QL NAA+PROBE: NEGATIVE
SODIUM SERPL-SCNC: 136 MMOL/L (ref 136–145)
WBC # BLD AUTO: 9.25 K/UL (ref 3.9–12.7)

## 2021-09-16 PROCEDURE — 76937 US GUIDE VASCULAR ACCESS: CPT | Mod: 26,,, | Performed by: RADIOLOGY

## 2021-09-16 PROCEDURE — 36248 INS CATH ABD/L-EXT ART ADDL: CPT | Mod: ,,, | Performed by: RADIOLOGY

## 2021-09-16 PROCEDURE — 76377 PR  3D RENDERING W/ IMAGE POSTPROCESS: ICD-10-PCS | Mod: 26,,, | Performed by: RADIOLOGY

## 2021-09-16 PROCEDURE — 77263 THER RADIOLOGY TX PLNG CPLX: CPT | Mod: ,,, | Performed by: RADIOLOGY

## 2021-09-16 PROCEDURE — 63600175 PHARM REV CODE 636 W HCPCS: Mod: JG | Performed by: INTERNAL MEDICINE

## 2021-09-16 PROCEDURE — 36247 INS CATH ABD/L-EXT ART 3RD: CPT | Mod: 51,,, | Performed by: RADIOLOGY

## 2021-09-16 PROCEDURE — 25500020 PHARM REV CODE 255: Performed by: RADIOLOGY

## 2021-09-16 PROCEDURE — 36247 PR PLACE CATH SUBSUBSELECT ART,ABD/PEL: ICD-10-PCS | Mod: 51,,, | Performed by: RADIOLOGY

## 2021-09-16 PROCEDURE — 25000003 PHARM REV CODE 250: Performed by: RADIOLOGY

## 2021-09-16 PROCEDURE — 63600175 PHARM REV CODE 636 W HCPCS: Performed by: SURGERY

## 2021-09-16 PROCEDURE — 75774 ARTERY X-RAY EACH VESSEL: CPT | Mod: TC,59 | Performed by: RADIOLOGY

## 2021-09-16 PROCEDURE — 99152 MOD SED SAME PHYS/QHP 5/>YRS: CPT | Performed by: RADIOLOGY

## 2021-09-16 PROCEDURE — 75887 VEIN X-RAY LIVER W/O HEMODYN: CPT | Mod: TC | Performed by: RADIOLOGY

## 2021-09-16 PROCEDURE — 36415 COLL VENOUS BLD VENIPUNCTURE: CPT | Performed by: SURGERY

## 2021-09-16 PROCEDURE — 25000003 PHARM REV CODE 250: Performed by: SURGERY

## 2021-09-16 PROCEDURE — 99900035 HC TECH TIME PER 15 MIN (STAT)

## 2021-09-16 PROCEDURE — 36247 INS CATH ABD/L-EXT ART 3RD: CPT | Performed by: RADIOLOGY

## 2021-09-16 PROCEDURE — 25500020 PHARM REV CODE 255: Performed by: SURGERY

## 2021-09-16 PROCEDURE — 37243 IR EMBOLIZATION COMP FOR TUMOR_ORGAN ISCHEMIA_INFARC: ICD-10-PCS | Mod: ,,, | Performed by: RADIOLOGY

## 2021-09-16 PROCEDURE — 99153 MOD SED SAME PHYS/QHP EA: CPT

## 2021-09-16 PROCEDURE — 76937 US GUIDE VASCULAR ACCESS: CPT | Mod: TC | Performed by: RADIOLOGY

## 2021-09-16 PROCEDURE — 37243 VASC EMBOLIZE/OCCLUDE ORGAN: CPT | Performed by: RADIOLOGY

## 2021-09-16 PROCEDURE — 75726 CHG ANGIO VISCERAL SELECTV/SUBSELEC: ICD-10-PCS | Mod: 26,59,, | Performed by: RADIOLOGY

## 2021-09-16 PROCEDURE — G0269 OCCLUSIVE DEVICE IN VEIN ART: HCPCS | Performed by: RADIOLOGY

## 2021-09-16 PROCEDURE — 94799 UNLISTED PULMONARY SVC/PX: CPT

## 2021-09-16 PROCEDURE — 99152 MOD SED SAME PHYS/QHP 5/>YRS: CPT

## 2021-09-16 PROCEDURE — 75774 ARTERY X-RAY EACH VESSEL: CPT | Mod: 26,59,, | Performed by: RADIOLOGY

## 2021-09-16 PROCEDURE — 76380 PR  CT SCAN,LIMITED/LOCALIZED F/U STUDY: ICD-10-PCS | Mod: 26,59,, | Performed by: RADIOLOGY

## 2021-09-16 PROCEDURE — 75726 ARTERY X-RAYS ABDOMEN: CPT | Mod: TC,59 | Performed by: RADIOLOGY

## 2021-09-16 PROCEDURE — 85610 PROTHROMBIN TIME: CPT | Performed by: SURGERY

## 2021-09-16 PROCEDURE — 96420 CHEMO IA PUSH TECNIQUE: CPT | Performed by: RADIOLOGY

## 2021-09-16 PROCEDURE — 76937 PR  US GUIDE, VASCULAR ACCESS: ICD-10-PCS | Mod: 26,,, | Performed by: RADIOLOGY

## 2021-09-16 PROCEDURE — U0002 COVID-19 LAB TEST NON-CDC: HCPCS | Performed by: SURGERY

## 2021-09-16 PROCEDURE — 85025 COMPLETE CBC W/AUTO DIFF WBC: CPT | Performed by: SURGERY

## 2021-09-16 PROCEDURE — 36248 PR PR INS CATH ABD/L-EXT ART ADDL 2ND ORD/3RD ORD/BYD: ICD-10-PCS | Mod: ,,, | Performed by: RADIOLOGY

## 2021-09-16 PROCEDURE — 75887 VEIN X-RAY LIVER W/O HEMODYN: CPT | Mod: 26,,, | Performed by: RADIOLOGY

## 2021-09-16 PROCEDURE — 76380 CAT SCAN FOLLOW-UP STUDY: CPT | Mod: 26,59,, | Performed by: RADIOLOGY

## 2021-09-16 PROCEDURE — 75726 ARTERY X-RAYS ABDOMEN: CPT | Mod: 26,59,, | Performed by: RADIOLOGY

## 2021-09-16 PROCEDURE — 77263 PR  RADIATION THERAPY PLAN COMPLEX: ICD-10-PCS | Mod: ,,, | Performed by: RADIOLOGY

## 2021-09-16 PROCEDURE — 27200996 IR EMBOLIZATION COMP FOR TUMOR_ORGAN ISCHEMIA_INFARC

## 2021-09-16 PROCEDURE — 99153 MOD SED SAME PHYS/QHP EA: CPT | Performed by: RADIOLOGY

## 2021-09-16 PROCEDURE — 96372 THER/PROPH/DIAG INJ SC/IM: CPT

## 2021-09-16 PROCEDURE — 36248 INS CATH ABD/L-EXT ART ADDL: CPT | Performed by: RADIOLOGY

## 2021-09-16 PROCEDURE — 75887 PR  PERCUT XHEPATIC PORTOGRAM: ICD-10-PCS | Mod: 26,,, | Performed by: RADIOLOGY

## 2021-09-16 PROCEDURE — 76377 3D RENDER W/INTRP POSTPROCES: CPT | Mod: TC | Performed by: RADIOLOGY

## 2021-09-16 PROCEDURE — 75774 PR  ANGIO EA ADDNL SELECTV VESSEL: ICD-10-PCS | Mod: 26,59,, | Performed by: RADIOLOGY

## 2021-09-16 PROCEDURE — 63600175 PHARM REV CODE 636 W HCPCS: Performed by: RADIOLOGY

## 2021-09-16 PROCEDURE — 76380 CAT SCAN FOLLOW-UP STUDY: CPT | Mod: TC,59 | Performed by: RADIOLOGY

## 2021-09-16 PROCEDURE — 76377 3D RENDER W/INTRP POSTPROCES: CPT | Mod: 26,,, | Performed by: RADIOLOGY

## 2021-09-16 PROCEDURE — C1769 GUIDE WIRE: HCPCS

## 2021-09-16 PROCEDURE — 80053 COMPREHEN METABOLIC PANEL: CPT | Performed by: SURGERY

## 2021-09-16 RX ORDER — FAMOTIDINE 20 MG/1
20 TABLET, FILM COATED ORAL EVERY 12 HOURS
Status: DISCONTINUED | OUTPATIENT
Start: 2021-09-16 | End: 2021-09-16

## 2021-09-16 RX ORDER — ALLOPURINOL 100 MG/1
300 TABLET ORAL DAILY
Status: DISCONTINUED | OUTPATIENT
Start: 2021-09-16 | End: 2021-09-17 | Stop reason: HOSPADM

## 2021-09-16 RX ORDER — DEXTROSE MONOHYDRATE, SODIUM CHLORIDE, AND POTASSIUM CHLORIDE 50; 1.49; 4.5 G/1000ML; G/1000ML; G/1000ML
INJECTION, SOLUTION INTRAVENOUS CONTINUOUS
Status: DISCONTINUED | OUTPATIENT
Start: 2021-09-16 | End: 2021-09-16

## 2021-09-16 RX ORDER — LANREOTIDE ACETATE 120 MG/.5ML
120 INJECTION SUBCUTANEOUS
Status: CANCELLED
Start: 2021-09-16

## 2021-09-16 RX ORDER — MIDAZOLAM HYDROCHLORIDE 1 MG/ML
INJECTION INTRAMUSCULAR; INTRAVENOUS CODE/TRAUMA/SEDATION MEDICATION
Status: COMPLETED | OUTPATIENT
Start: 2021-09-16 | End: 2021-09-16

## 2021-09-16 RX ORDER — FENTANYL CITRATE 50 UG/ML
INJECTION, SOLUTION INTRAMUSCULAR; INTRAVENOUS CODE/TRAUMA/SEDATION MEDICATION
Status: COMPLETED | OUTPATIENT
Start: 2021-09-16 | End: 2021-09-16

## 2021-09-16 RX ORDER — PROMETHAZINE HYDROCHLORIDE 25 MG/ML
INJECTION, SOLUTION INTRAMUSCULAR; INTRAVENOUS CODE/TRAUMA/SEDATION MEDICATION
Status: COMPLETED | OUTPATIENT
Start: 2021-09-16 | End: 2021-09-16

## 2021-09-16 RX ORDER — IBUPROFEN 400 MG/1
400 TABLET ORAL EVERY 4 HOURS PRN
Status: DISCONTINUED | OUTPATIENT
Start: 2021-09-16 | End: 2021-09-16

## 2021-09-16 RX ORDER — TRAMADOL HYDROCHLORIDE 50 MG/1
50 TABLET ORAL EVERY 6 HOURS PRN
Status: DISCONTINUED | OUTPATIENT
Start: 2021-09-16 | End: 2021-09-17 | Stop reason: HOSPADM

## 2021-09-16 RX ORDER — DEXAMETHASONE SODIUM PHOSPHATE 4 MG/ML
8 INJECTION, SOLUTION INTRA-ARTICULAR; INTRALESIONAL; INTRAMUSCULAR; INTRAVENOUS; SOFT TISSUE ONCE
Status: COMPLETED | OUTPATIENT
Start: 2021-09-16 | End: 2021-09-16

## 2021-09-16 RX ORDER — SODIUM CHLORIDE 9 MG/ML
INJECTION, SOLUTION INTRAVENOUS ONCE
Status: COMPLETED | OUTPATIENT
Start: 2021-09-16 | End: 2021-09-16

## 2021-09-16 RX ORDER — IBUPROFEN 400 MG/1
400 TABLET ORAL EVERY 4 HOURS PRN
Status: DISCONTINUED | OUTPATIENT
Start: 2021-09-16 | End: 2021-09-17 | Stop reason: HOSPADM

## 2021-09-16 RX ORDER — HYDROMORPHONE HYDROCHLORIDE 1 MG/ML
INJECTION, SOLUTION INTRAMUSCULAR; INTRAVENOUS; SUBCUTANEOUS CODE/TRAUMA/SEDATION MEDICATION
Status: COMPLETED | OUTPATIENT
Start: 2021-09-16 | End: 2021-09-16

## 2021-09-16 RX ORDER — DEXTROSE MONOHYDRATE, SODIUM CHLORIDE, AND POTASSIUM CHLORIDE 50; 1.49; 4.5 G/1000ML; G/1000ML; G/1000ML
INJECTION, SOLUTION INTRAVENOUS CONTINUOUS
Status: DISCONTINUED | OUTPATIENT
Start: 2021-09-16 | End: 2021-09-17 | Stop reason: HOSPADM

## 2021-09-16 RX ORDER — HEPARIN SODIUM 200 [USP'U]/100ML
INJECTION, SOLUTION INTRAVENOUS
Status: COMPLETED | OUTPATIENT
Start: 2021-09-16 | End: 2021-09-16

## 2021-09-16 RX ORDER — FUROSEMIDE 10 MG/ML
20 INJECTION INTRAMUSCULAR; INTRAVENOUS ONCE
Status: COMPLETED | OUTPATIENT
Start: 2021-09-16 | End: 2021-09-16

## 2021-09-16 RX ORDER — DEXAMETHASONE SODIUM PHOSPHATE 4 MG/ML
8 INJECTION, SOLUTION INTRA-ARTICULAR; INTRALESIONAL; INTRAMUSCULAR; INTRAVENOUS; SOFT TISSUE ONCE
Status: DISCONTINUED | OUTPATIENT
Start: 2021-09-16 | End: 2021-09-16

## 2021-09-16 RX ORDER — LANREOTIDE ACETATE 120 MG/.5ML
120 INJECTION SUBCUTANEOUS
Status: DISCONTINUED | OUTPATIENT
Start: 2021-09-16 | End: 2021-09-16 | Stop reason: HOSPADM

## 2021-09-16 RX ORDER — FAMOTIDINE 20 MG/1
20 TABLET, FILM COATED ORAL EVERY 12 HOURS
Status: DISCONTINUED | OUTPATIENT
Start: 2021-09-16 | End: 2021-09-17 | Stop reason: HOSPADM

## 2021-09-16 RX ORDER — DIPHENHYDRAMINE HYDROCHLORIDE 50 MG/ML
50 INJECTION INTRAMUSCULAR; INTRAVENOUS ONCE
Status: DISCONTINUED | OUTPATIENT
Start: 2021-09-16 | End: 2021-09-16 | Stop reason: ALTCHOICE

## 2021-09-16 RX ORDER — ONDANSETRON 2 MG/ML
4 INJECTION INTRAMUSCULAR; INTRAVENOUS EVERY 8 HOURS PRN
Status: DISCONTINUED | OUTPATIENT
Start: 2021-09-16 | End: 2021-09-17 | Stop reason: HOSPADM

## 2021-09-16 RX ORDER — HYDROCODONE BITARTRATE AND ACETAMINOPHEN 5; 325 MG/1; MG/1
1 TABLET ORAL EVERY 6 HOURS PRN
Status: DISCONTINUED | OUTPATIENT
Start: 2021-09-16 | End: 2021-09-16

## 2021-09-16 RX ORDER — HYDROCODONE BITARTRATE AND ACETAMINOPHEN 5; 325 MG/1; MG/1
1 TABLET ORAL EVERY 6 HOURS PRN
Status: DISCONTINUED | OUTPATIENT
Start: 2021-09-16 | End: 2021-09-17 | Stop reason: HOSPADM

## 2021-09-16 RX ORDER — ALLOPURINOL 100 MG/1
300 TABLET ORAL DAILY
Status: DISCONTINUED | OUTPATIENT
Start: 2021-09-17 | End: 2021-09-16

## 2021-09-16 RX ORDER — ONDANSETRON 2 MG/ML
4 INJECTION INTRAMUSCULAR; INTRAVENOUS EVERY 8 HOURS PRN
Status: DISCONTINUED | OUTPATIENT
Start: 2021-09-16 | End: 2021-09-16

## 2021-09-16 RX ORDER — DEXTROSE MONOHYDRATE, SODIUM CHLORIDE, AND POTASSIUM CHLORIDE 50; 1.49; 4.5 G/1000ML; G/1000ML; G/1000ML
INJECTION, SOLUTION INTRAVENOUS CONTINUOUS
Status: DISCONTINUED | OUTPATIENT
Start: 2021-09-16 | End: 2021-09-16 | Stop reason: SDUPTHER

## 2021-09-16 RX ORDER — SCOLOPAMINE TRANSDERMAL SYSTEM 1 MG/1
1 PATCH, EXTENDED RELEASE TRANSDERMAL
Status: DISCONTINUED | OUTPATIENT
Start: 2021-09-16 | End: 2021-09-17 | Stop reason: HOSPADM

## 2021-09-16 RX ORDER — LIDOCAINE HYDROCHLORIDE 10 MG/ML
INJECTION INFILTRATION; PERINEURAL CODE/TRAUMA/SEDATION MEDICATION
Status: COMPLETED | OUTPATIENT
Start: 2021-09-16 | End: 2021-09-16

## 2021-09-16 RX ORDER — FUROSEMIDE 10 MG/ML
20 INJECTION INTRAMUSCULAR; INTRAVENOUS ONCE
Status: DISCONTINUED | OUTPATIENT
Start: 2021-09-16 | End: 2021-09-16

## 2021-09-16 RX ADMIN — OCTREOTIDE ACETATE 125 MCG/HR: 1000 INJECTION, SOLUTION INTRAVENOUS; SUBCUTANEOUS at 06:09

## 2021-09-16 RX ADMIN — OCTREOTIDE ACETATE 125 MCG/HR: 1000 INJECTION, SOLUTION INTRAVENOUS; SUBCUTANEOUS at 07:09

## 2021-09-16 RX ADMIN — FENTANYL CITRATE 50 MCG: 50 INJECTION, SOLUTION INTRAMUSCULAR; INTRAVENOUS at 10:09

## 2021-09-16 RX ADMIN — HYDROMORPHONE HYDROCHLORIDE 0.5 MG: 1 INJECTION, SOLUTION INTRAMUSCULAR; INTRAVENOUS; SUBCUTANEOUS at 10:09

## 2021-09-16 RX ADMIN — AMPICILLIN SODIUM AND SULBACTAM SODIUM 3 G: 2; 1 INJECTION, POWDER, FOR SOLUTION INTRAMUSCULAR; INTRAVENOUS at 09:09

## 2021-09-16 RX ADMIN — OCTREOTIDE ACETATE 500 MCG: 500 INJECTION, SOLUTION INTRAVENOUS; SUBCUTANEOUS at 09:09

## 2021-09-16 RX ADMIN — OCTREOTIDE ACETATE 250 MCG/HR: 1000 INJECTION, SOLUTION INTRAVENOUS; SUBCUTANEOUS at 10:09

## 2021-09-16 RX ADMIN — DEXAMETHASONE SODIUM PHOSPHATE 8 MG: 4 INJECTION, SOLUTION INTRAMUSCULAR; INTRAVENOUS at 01:09

## 2021-09-16 RX ADMIN — AMPICILLIN SODIUM AND SULBACTAM SODIUM 3 G: 2; 1 INJECTION, POWDER, FOR SOLUTION INTRAMUSCULAR; INTRAVENOUS at 03:09

## 2021-09-16 RX ADMIN — DEXTROSE, SODIUM CHLORIDE, AND POTASSIUM CHLORIDE: 5; .45; .15 INJECTION INTRAVENOUS at 09:09

## 2021-09-16 RX ADMIN — IOHEXOL 75 ML: 350 INJECTION, SOLUTION INTRAVENOUS at 11:09

## 2021-09-16 RX ADMIN — SODIUM CHLORIDE: 0.9 INJECTION, SOLUTION INTRAVENOUS at 09:09

## 2021-09-16 RX ADMIN — PROMETHAZINE HYDROCHLORIDE 12.5 MG: 25 INJECTION INTRAMUSCULAR; INTRAVENOUS at 11:09

## 2021-09-16 RX ADMIN — FUROSEMIDE 20 MG: 10 INJECTION, SOLUTION INTRAVENOUS at 01:09

## 2021-09-16 RX ADMIN — ALLOPURINOL 300 MG: 100 TABLET ORAL at 03:09

## 2021-09-16 RX ADMIN — MIDAZOLAM HYDROCHLORIDE 1 MG: 1 INJECTION, SOLUTION INTRAMUSCULAR; INTRAVENOUS at 10:09

## 2021-09-16 RX ADMIN — DEXTROSE, SODIUM CHLORIDE, AND POTASSIUM CHLORIDE: 5; .45; .15 INJECTION INTRAVENOUS at 07:09

## 2021-09-16 RX ADMIN — HEPARIN SODIUM 3000 UNITS/HR: 200 INJECTION, SOLUTION INTRAVENOUS at 10:09

## 2021-09-16 RX ADMIN — DEXAMETHASONE SODIUM PHOSPHATE 8 MG: 4 INJECTION, SOLUTION INTRA-ARTICULAR; INTRALESIONAL; INTRAMUSCULAR; INTRAVENOUS; SOFT TISSUE at 06:09

## 2021-09-16 RX ADMIN — LIDOCAINE HYDROCHLORIDE 5 ML: 10 INJECTION, SOLUTION INFILTRATION; PERINEURAL at 10:09

## 2021-09-16 RX ADMIN — FAMOTIDINE 20 MG: 20 TABLET ORAL at 08:09

## 2021-09-16 RX ADMIN — SCOPALAMINE 1 PATCH: 1 PATCH, EXTENDED RELEASE TRANSDERMAL at 09:09

## 2021-09-16 RX ADMIN — ONDANSETRON 16 MG: 2 INJECTION INTRAMUSCULAR; INTRAVENOUS at 09:09

## 2021-09-16 RX ADMIN — DOXORUBICIN HYDROCHLORIDE 50 MG: 2 INJECTION, POWDER, LYOPHILIZED, FOR SOLUTION INTRAVENOUS at 11:09

## 2021-09-16 RX ADMIN — DEXTROSE, SODIUM CHLORIDE, AND POTASSIUM CHLORIDE: 5; .45; .15 INJECTION INTRAVENOUS at 06:09

## 2021-09-16 RX ADMIN — FUROSEMIDE 20 MG: 10 INJECTION, SOLUTION INTRAMUSCULAR; INTRAVENOUS at 06:09

## 2021-09-16 RX ADMIN — LANREOTIDE ACETATE 120 MG: 120 INJECTION SUBCUTANEOUS at 08:09

## 2021-09-16 RX ADMIN — ETHIODIZED OIL 10 ML: 480 INJECTION INTRA-ARTERIAL; INTRALYMPHATIC; INTRAUTERINE at 11:09

## 2021-09-17 ENCOUNTER — TELEPHONE (OUTPATIENT)
Dept: NEUROLOGY | Facility: HOSPITAL | Age: 66
End: 2021-09-17

## 2021-09-17 VITALS
HEIGHT: 72 IN | WEIGHT: 240.5 LBS | DIASTOLIC BLOOD PRESSURE: 60 MMHG | TEMPERATURE: 98 F | OXYGEN SATURATION: 98 % | BODY MASS INDEX: 32.57 KG/M2 | SYSTOLIC BLOOD PRESSURE: 133 MMHG | RESPIRATION RATE: 18 BRPM | HEART RATE: 56 BPM

## 2021-09-17 DIAGNOSIS — C7A.090 MALIGNANT CARCINOID TUMOR OF BRONCHUS AND LUNG: Primary | ICD-10-CM

## 2021-09-17 DIAGNOSIS — C78.89 SECONDARY MALIGNANT NEOPLASM OF SPLEEN: ICD-10-CM

## 2021-09-17 DIAGNOSIS — C7B.8 SECONDARY NEUROENDOCRINE TUMOR OF LIVER: ICD-10-CM

## 2021-09-17 DIAGNOSIS — C77.8 SECONDARY MALIGNANT NEOPLASM OF LYMPH NODES OF MULTIPLE SITES: ICD-10-CM

## 2021-09-17 DIAGNOSIS — C7B.8 SECONDARY NEUROENDOCRINE TUMOR OF BONE: ICD-10-CM

## 2021-09-17 LAB
ALBUMIN SERPL BCP-MCNC: 2.4 G/DL (ref 3.5–5.2)
ALP SERPL-CCNC: 195 U/L (ref 55–135)
ALT SERPL W/O P-5'-P-CCNC: 33 U/L (ref 10–44)
ANION GAP SERPL CALC-SCNC: 9 MMOL/L (ref 8–16)
AST SERPL-CCNC: 77 U/L (ref 10–40)
BASOPHILS # BLD AUTO: 0.01 K/UL (ref 0–0.2)
BASOPHILS NFR BLD: 0.2 % (ref 0–1.9)
BILIRUB SERPL-MCNC: 0.5 MG/DL (ref 0.1–1)
BUN SERPL-MCNC: 16 MG/DL (ref 8–23)
CALCIUM SERPL-MCNC: 8.9 MG/DL (ref 8.7–10.5)
CHLORIDE SERPL-SCNC: 101 MMOL/L (ref 95–110)
CO2 SERPL-SCNC: 25 MMOL/L (ref 23–29)
CREAT SERPL-MCNC: 1.1 MG/DL (ref 0.5–1.4)
DIFFERENTIAL METHOD: ABNORMAL
EOSINOPHIL # BLD AUTO: 0 K/UL (ref 0–0.5)
EOSINOPHIL NFR BLD: 0 % (ref 0–8)
ERYTHROCYTE [DISTWIDTH] IN BLOOD BY AUTOMATED COUNT: 17.7 % (ref 11.5–14.5)
EST. GFR  (AFRICAN AMERICAN): >60 ML/MIN/1.73 M^2
EST. GFR  (NON AFRICAN AMERICAN): >60 ML/MIN/1.73 M^2
GLUCOSE SERPL-MCNC: 160 MG/DL (ref 70–110)
HCT VFR BLD AUTO: 32.7 % (ref 40–54)
HGB BLD-MCNC: 10.4 G/DL (ref 14–18)
IMM GRANULOCYTES # BLD AUTO: 0.02 K/UL (ref 0–0.04)
IMM GRANULOCYTES NFR BLD AUTO: 0.3 % (ref 0–0.5)
LYMPHOCYTES # BLD AUTO: 0.3 K/UL (ref 1–4.8)
LYMPHOCYTES NFR BLD: 5 % (ref 18–48)
MCH RBC QN AUTO: 25.9 PG (ref 27–31)
MCHC RBC AUTO-ENTMCNC: 31.8 G/DL (ref 32–36)
MCV RBC AUTO: 81 FL (ref 82–98)
MONOCYTES # BLD AUTO: 0.2 K/UL (ref 0.3–1)
MONOCYTES NFR BLD: 3.3 % (ref 4–15)
NEUTROPHILS # BLD AUTO: 5.3 K/UL (ref 1.8–7.7)
NEUTROPHILS NFR BLD: 91.2 % (ref 38–73)
NRBC BLD-RTO: 0 /100 WBC
PLATELET # BLD AUTO: 255 K/UL (ref 150–450)
PMV BLD AUTO: 9.6 FL (ref 9.2–12.9)
POTASSIUM SERPL-SCNC: 4.4 MMOL/L (ref 3.5–5.1)
PROT SERPL-MCNC: 6.2 G/DL (ref 6–8.4)
RBC # BLD AUTO: 4.02 M/UL (ref 4.6–6.2)
SODIUM SERPL-SCNC: 135 MMOL/L (ref 136–145)
WBC # BLD AUTO: 5.79 K/UL (ref 3.9–12.7)

## 2021-09-17 PROCEDURE — 99900035 HC TECH TIME PER 15 MIN (STAT)

## 2021-09-17 PROCEDURE — 36415 COLL VENOUS BLD VENIPUNCTURE: CPT | Performed by: STUDENT IN AN ORGANIZED HEALTH CARE EDUCATION/TRAINING PROGRAM

## 2021-09-17 PROCEDURE — 63600175 PHARM REV CODE 636 W HCPCS: Performed by: SURGERY

## 2021-09-17 PROCEDURE — 25000003 PHARM REV CODE 250: Performed by: SURGERY

## 2021-09-17 PROCEDURE — 80053 COMPREHEN METABOLIC PANEL: CPT | Performed by: STUDENT IN AN ORGANIZED HEALTH CARE EDUCATION/TRAINING PROGRAM

## 2021-09-17 PROCEDURE — 85025 COMPLETE CBC W/AUTO DIFF WBC: CPT | Performed by: STUDENT IN AN ORGANIZED HEALTH CARE EDUCATION/TRAINING PROGRAM

## 2021-09-17 RX ADMIN — AMPICILLIN SODIUM AND SULBACTAM SODIUM 3 G: 2; 1 INJECTION, POWDER, FOR SOLUTION INTRAMUSCULAR; INTRAVENOUS at 11:09

## 2021-09-17 RX ADMIN — DEXTROSE, SODIUM CHLORIDE, AND POTASSIUM CHLORIDE: 5; .45; .15 INJECTION INTRAVENOUS at 05:09

## 2021-09-17 RX ADMIN — FAMOTIDINE 20 MG: 20 TABLET ORAL at 08:09

## 2021-09-17 RX ADMIN — AMPICILLIN SODIUM AND SULBACTAM SODIUM 3 G: 2; 1 INJECTION, POWDER, FOR SOLUTION INTRAMUSCULAR; INTRAVENOUS at 04:09

## 2021-09-20 ENCOUNTER — TELEPHONE (OUTPATIENT)
Dept: NEUROLOGY | Facility: HOSPITAL | Age: 66
End: 2021-09-20

## 2021-09-22 ENCOUNTER — TELEPHONE (OUTPATIENT)
Dept: NEUROLOGY | Facility: HOSPITAL | Age: 66
End: 2021-09-22

## 2021-09-24 ENCOUNTER — OFFICE VISIT (OUTPATIENT)
Dept: UROLOGY | Facility: CLINIC | Age: 66
End: 2021-09-24
Payer: MEDICARE

## 2021-09-24 VITALS — SYSTOLIC BLOOD PRESSURE: 98 MMHG | DIASTOLIC BLOOD PRESSURE: 66 MMHG | HEART RATE: 85 BPM

## 2021-09-24 DIAGNOSIS — Z12.5 ENCOUNTER FOR PROSTATE CANCER SCREENING: Primary | ICD-10-CM

## 2021-09-24 PROCEDURE — 99999 PR PBB SHADOW E&M-EST. PATIENT-LVL III: ICD-10-PCS | Mod: PBBFAC,,, | Performed by: STUDENT IN AN ORGANIZED HEALTH CARE EDUCATION/TRAINING PROGRAM

## 2021-09-24 PROCEDURE — 99213 OFFICE O/P EST LOW 20 MIN: CPT | Mod: PBBFAC,PO | Performed by: STUDENT IN AN ORGANIZED HEALTH CARE EDUCATION/TRAINING PROGRAM

## 2021-09-24 PROCEDURE — 99999 PR PBB SHADOW E&M-EST. PATIENT-LVL III: CPT | Mod: PBBFAC,,, | Performed by: STUDENT IN AN ORGANIZED HEALTH CARE EDUCATION/TRAINING PROGRAM

## 2021-09-24 PROCEDURE — 99214 PR OFFICE/OUTPT VISIT, EST, LEVL IV, 30-39 MIN: ICD-10-PCS | Mod: S$PBB,,, | Performed by: STUDENT IN AN ORGANIZED HEALTH CARE EDUCATION/TRAINING PROGRAM

## 2021-09-24 PROCEDURE — 99214 OFFICE O/P EST MOD 30 MIN: CPT | Mod: S$PBB,,, | Performed by: STUDENT IN AN ORGANIZED HEALTH CARE EDUCATION/TRAINING PROGRAM

## 2021-10-01 ENCOUNTER — LAB VISIT (OUTPATIENT)
Dept: LAB | Facility: HOSPITAL | Age: 66
End: 2021-10-01
Attending: SURGERY
Payer: MEDICARE

## 2021-10-01 DIAGNOSIS — Z01.818 PRE-OP TESTING: ICD-10-CM

## 2021-10-01 LAB
ALBUMIN SERPL BCP-MCNC: 2.9 G/DL (ref 3.5–5.2)
ALP SERPL-CCNC: 272 U/L (ref 55–135)
ALT SERPL W/O P-5'-P-CCNC: 21 U/L (ref 10–44)
ANION GAP SERPL CALC-SCNC: 12 MMOL/L (ref 8–16)
AST SERPL-CCNC: 14 U/L (ref 10–40)
BASOPHILS # BLD AUTO: 0.04 K/UL (ref 0–0.2)
BASOPHILS NFR BLD: 1.1 % (ref 0–1.9)
BILIRUB SERPL-MCNC: 0.6 MG/DL (ref 0.1–1)
BUN SERPL-MCNC: 8 MG/DL (ref 8–23)
CALCIUM SERPL-MCNC: 9.3 MG/DL (ref 8.7–10.5)
CHLORIDE SERPL-SCNC: 102 MMOL/L (ref 95–110)
CO2 SERPL-SCNC: 24 MMOL/L (ref 23–29)
CREAT SERPL-MCNC: 1.1 MG/DL (ref 0.5–1.4)
DIFFERENTIAL METHOD: ABNORMAL
EOSINOPHIL # BLD AUTO: 0 K/UL (ref 0–0.5)
EOSINOPHIL NFR BLD: 0.5 % (ref 0–8)
ERYTHROCYTE [DISTWIDTH] IN BLOOD BY AUTOMATED COUNT: 18.3 % (ref 11.5–14.5)
EST. GFR  (AFRICAN AMERICAN): >60 ML/MIN/1.73 M^2
EST. GFR  (NON AFRICAN AMERICAN): >60 ML/MIN/1.73 M^2
GLUCOSE SERPL-MCNC: 124 MG/DL (ref 70–110)
HCT VFR BLD AUTO: 36.7 % (ref 40–54)
HGB BLD-MCNC: 11.4 G/DL (ref 14–18)
IMM GRANULOCYTES # BLD AUTO: 0.01 K/UL (ref 0–0.04)
IMM GRANULOCYTES NFR BLD AUTO: 0.3 % (ref 0–0.5)
LYMPHOCYTES # BLD AUTO: 0.6 K/UL (ref 1–4.8)
LYMPHOCYTES NFR BLD: 16 % (ref 18–48)
MCH RBC QN AUTO: 25.8 PG (ref 27–31)
MCHC RBC AUTO-ENTMCNC: 31.1 G/DL (ref 32–36)
MCV RBC AUTO: 83 FL (ref 82–98)
MONOCYTES # BLD AUTO: 0.4 K/UL (ref 0.3–1)
MONOCYTES NFR BLD: 10.1 % (ref 4–15)
NEUTROPHILS # BLD AUTO: 2.7 K/UL (ref 1.8–7.7)
NEUTROPHILS NFR BLD: 72 % (ref 38–73)
NRBC BLD-RTO: 0 /100 WBC
PLATELET # BLD AUTO: 323 K/UL (ref 150–450)
PMV BLD AUTO: 9 FL (ref 9.2–12.9)
POTASSIUM SERPL-SCNC: 3.6 MMOL/L (ref 3.5–5.1)
PROT SERPL-MCNC: 6.7 G/DL (ref 6–8.4)
RBC # BLD AUTO: 4.42 M/UL (ref 4.6–6.2)
SODIUM SERPL-SCNC: 138 MMOL/L (ref 136–145)
WBC # BLD AUTO: 3.76 K/UL (ref 3.9–12.7)

## 2021-10-01 PROCEDURE — 36415 COLL VENOUS BLD VENIPUNCTURE: CPT | Performed by: SURGERY

## 2021-10-01 PROCEDURE — 85025 COMPLETE CBC W/AUTO DIFF WBC: CPT | Performed by: SURGERY

## 2021-10-01 PROCEDURE — 80053 COMPREHEN METABOLIC PANEL: CPT | Performed by: SURGERY

## 2021-10-07 ENCOUNTER — PATIENT MESSAGE (OUTPATIENT)
Dept: NEUROLOGY | Facility: HOSPITAL | Age: 66
End: 2021-10-07

## 2021-10-11 ENCOUNTER — HOSPITAL ENCOUNTER (OUTPATIENT)
Dept: RADIOLOGY | Facility: HOSPITAL | Age: 66
Discharge: HOME | End: 2021-10-11
Attending: INTERNAL MEDICINE
Payer: MEDICARE

## 2021-10-11 DIAGNOSIS — C7B.8 SECONDARY NEUROENDOCRINE TUMOR OF LIVER: ICD-10-CM

## 2021-10-11 PROCEDURE — 25500020 PHARM REV CODE 255: Performed by: INTERNAL MEDICINE

## 2021-10-11 PROCEDURE — 74183 MRI ABD W/O CNTR FLWD CNTR: CPT | Mod: 26,,, | Performed by: INTERNAL MEDICINE

## 2021-10-11 PROCEDURE — A9585 GADOBUTROL INJECTION: HCPCS | Performed by: INTERNAL MEDICINE

## 2021-10-11 PROCEDURE — 74183 MRI ABDOMEN W WO CONTRAST: ICD-10-PCS | Mod: 26,,, | Performed by: INTERNAL MEDICINE

## 2021-10-11 PROCEDURE — 74183 MRI ABD W/O CNTR FLWD CNTR: CPT | Mod: TC

## 2021-10-11 RX ORDER — GADOBUTROL 604.72 MG/ML
10 INJECTION INTRAVENOUS
Status: COMPLETED | OUTPATIENT
Start: 2021-10-11 | End: 2021-10-11

## 2021-10-11 RX ADMIN — GADOBUTROL 10 ML: 604.72 INJECTION INTRAVENOUS at 11:10

## 2021-10-12 ENCOUNTER — OFFICE VISIT (OUTPATIENT)
Dept: HEMATOLOGY/ONCOLOGY | Facility: CLINIC | Age: 66
End: 2021-10-12
Payer: MEDICARE

## 2021-10-12 ENCOUNTER — LAB VISIT (OUTPATIENT)
Dept: FAMILY MEDICINE | Facility: CLINIC | Age: 66
End: 2021-10-12
Payer: MEDICARE

## 2021-10-12 VITALS
TEMPERATURE: 99 F | BODY MASS INDEX: 30.1 KG/M2 | DIASTOLIC BLOOD PRESSURE: 66 MMHG | RESPIRATION RATE: 19 BRPM | SYSTOLIC BLOOD PRESSURE: 110 MMHG | OXYGEN SATURATION: 98 % | HEART RATE: 66 BPM | WEIGHT: 222.25 LBS | HEIGHT: 72 IN

## 2021-10-12 DIAGNOSIS — C7B.8 SECONDARY NEUROENDOCRINE TUMOR OF LIVER: ICD-10-CM

## 2021-10-12 DIAGNOSIS — C77.8 SECONDARY MALIGNANT NEOPLASM OF LYMPH NODES OF MULTIPLE SITES: ICD-10-CM

## 2021-10-12 DIAGNOSIS — C7B.04 SECONDARY CARCINOID TUMOR OF PERITONEUM: ICD-10-CM

## 2021-10-12 DIAGNOSIS — D63.0 ANEMIA IN NEOPLASTIC DISEASE: ICD-10-CM

## 2021-10-12 DIAGNOSIS — Z01.818 PRE-OP TESTING: ICD-10-CM

## 2021-10-12 DIAGNOSIS — Z79.899 IMMUNODEFICIENCY DUE TO DRUG THERAPY: ICD-10-CM

## 2021-10-12 DIAGNOSIS — D3A.090 BRONCHIAL CARCINOID TUMORS: Primary | ICD-10-CM

## 2021-10-12 DIAGNOSIS — C7B.8 SECONDARY NEUROENDOCRINE TUMOR OF BONE: ICD-10-CM

## 2021-10-12 DIAGNOSIS — C78.89 SECONDARY MALIGNANT NEOPLASM OF SPLEEN: ICD-10-CM

## 2021-10-12 DIAGNOSIS — Z89.512 HX OF BKA, LEFT: ICD-10-CM

## 2021-10-12 DIAGNOSIS — G89.3 CHRONIC NEOPLASM-RELATED PAIN: ICD-10-CM

## 2021-10-12 DIAGNOSIS — D84.821 IMMUNODEFICIENCY DUE TO DRUG THERAPY: ICD-10-CM

## 2021-10-12 LAB
SARS-COV-2 RNA RESP QL NAA+PROBE: NOT DETECTED
SARS-COV-2- CYCLE NUMBER: NORMAL

## 2021-10-12 PROCEDURE — 99215 OFFICE O/P EST HI 40 MIN: CPT | Mod: S$PBB,,, | Performed by: INTERNAL MEDICINE

## 2021-10-12 PROCEDURE — U0005 INFEC AGEN DETEC AMPLI PROBE: HCPCS | Performed by: INTERNAL MEDICINE

## 2021-10-12 PROCEDURE — 99213 OFFICE O/P EST LOW 20 MIN: CPT | Mod: PBBFAC,PO | Performed by: INTERNAL MEDICINE

## 2021-10-12 PROCEDURE — 99999 PR PBB SHADOW E&M-EST. PATIENT-LVL III: CPT | Mod: PBBFAC,,, | Performed by: INTERNAL MEDICINE

## 2021-10-12 PROCEDURE — 99215 PR OFFICE/OUTPT VISIT, EST, LEVL V, 40-54 MIN: ICD-10-PCS | Mod: S$PBB,,, | Performed by: INTERNAL MEDICINE

## 2021-10-12 PROCEDURE — U0003 INFECTIOUS AGENT DETECTION BY NUCLEIC ACID (DNA OR RNA); SEVERE ACUTE RESPIRATORY SYNDROME CORONAVIRUS 2 (SARS-COV-2) (CORONAVIRUS DISEASE [COVID-19]), AMPLIFIED PROBE TECHNIQUE, MAKING USE OF HIGH THROUGHPUT TECHNOLOGIES AS DESCRIBED BY CMS-2020-01-R: HCPCS | Performed by: INTERNAL MEDICINE

## 2021-10-12 PROCEDURE — 99999 PR PBB SHADOW E&M-EST. PATIENT-LVL III: ICD-10-PCS | Mod: PBBFAC,,, | Performed by: INTERNAL MEDICINE

## 2021-10-13 ENCOUNTER — HOSPITAL ENCOUNTER (OUTPATIENT)
Dept: RADIOLOGY | Facility: HOSPITAL | Age: 66
Discharge: HOME | End: 2021-10-13
Attending: INTERNAL MEDICINE
Payer: MEDICARE

## 2021-10-13 ENCOUNTER — INFUSION (OUTPATIENT)
Dept: INFUSION THERAPY | Facility: HOSPITAL | Age: 66
End: 2021-10-13
Attending: INTERNAL MEDICINE
Payer: MEDICARE

## 2021-10-13 VITALS
HEART RATE: 61 BPM | DIASTOLIC BLOOD PRESSURE: 70 MMHG | HEIGHT: 72 IN | RESPIRATION RATE: 18 BRPM | SYSTOLIC BLOOD PRESSURE: 113 MMHG | OXYGEN SATURATION: 99 % | BODY MASS INDEX: 30.1 KG/M2 | WEIGHT: 222.25 LBS | TEMPERATURE: 98 F

## 2021-10-13 DIAGNOSIS — C7A.090 MALIGNANT CARCINOID TUMOR OF BRONCHUS AND LUNG: Primary | ICD-10-CM

## 2021-10-13 DIAGNOSIS — C7A.090 MALIGNANT CARCINOID TUMOR OF BRONCHUS AND LUNG: ICD-10-CM

## 2021-10-13 PROCEDURE — 96366 THER/PROPH/DIAG IV INF ADDON: CPT

## 2021-10-13 PROCEDURE — 79101 NUCLEAR RX IV ADMIN: CPT | Mod: 26,,, | Performed by: RADIOLOGY

## 2021-10-13 PROCEDURE — 96367 TX/PROPH/DG ADDL SEQ IV INF: CPT

## 2021-10-13 PROCEDURE — A9513 LUTETIUM LU 177 DOTATAT THER: HCPCS | Mod: JG

## 2021-10-13 PROCEDURE — 63600175 PHARM REV CODE 636 W HCPCS: Performed by: INTERNAL MEDICINE

## 2021-10-13 PROCEDURE — 96365 THER/PROPH/DIAG IV INF INIT: CPT | Mod: 59

## 2021-10-13 PROCEDURE — 25000003 PHARM REV CODE 250: Performed by: INTERNAL MEDICINE

## 2021-10-13 PROCEDURE — 79101 NM THERAPY BY IV ADMINISTRATION LU177: ICD-10-PCS | Mod: 26,,, | Performed by: RADIOLOGY

## 2021-10-13 RX ORDER — SODIUM CHLORIDE 9 MG/ML
INJECTION, SOLUTION INTRAVENOUS ONCE
Status: CANCELLED | OUTPATIENT
Start: 2021-12-08

## 2021-10-13 RX ORDER — ACETAMINOPHEN 325 MG/1
650 TABLET ORAL
Status: CANCELLED | OUTPATIENT
Start: 2021-12-08

## 2021-10-13 RX ORDER — SODIUM CHLORIDE 0.9 % (FLUSH) 0.9 %
10 SYRINGE (ML) INJECTION
Status: DISCONTINUED | OUTPATIENT
Start: 2021-10-13 | End: 2021-10-13 | Stop reason: HOSPADM

## 2021-10-13 RX ORDER — ARGININE/LYSINE/0.9 % SOD CHL 25-25MG/ML
1000 PLASTIC BAG, INJECTION (ML) INTRAVENOUS
Status: COMPLETED | OUTPATIENT
Start: 2021-10-13 | End: 2021-10-13

## 2021-10-13 RX ORDER — ARGININE/LYSINE/0.9 % SOD CHL 25-25MG/ML
1000 PLASTIC BAG, INJECTION (ML) INTRAVENOUS
Status: CANCELLED | OUTPATIENT
Start: 2021-12-08

## 2021-10-13 RX ORDER — SODIUM CHLORIDE 9 MG/ML
INJECTION, SOLUTION INTRAVENOUS ONCE
Status: COMPLETED | OUTPATIENT
Start: 2021-10-13 | End: 2021-10-13

## 2021-10-13 RX ORDER — DIPHENHYDRAMINE HYDROCHLORIDE 50 MG/ML
50 INJECTION INTRAMUSCULAR; INTRAVENOUS
Status: DISCONTINUED | OUTPATIENT
Start: 2021-10-13 | End: 2021-10-13 | Stop reason: HOSPADM

## 2021-10-13 RX ORDER — SODIUM CHLORIDE 0.9 % (FLUSH) 0.9 %
10 SYRINGE (ML) INJECTION
Status: CANCELLED | OUTPATIENT
Start: 2021-12-08

## 2021-10-13 RX ORDER — ACETAMINOPHEN 325 MG/1
650 TABLET ORAL
Status: DISCONTINUED | OUTPATIENT
Start: 2021-10-13 | End: 2021-10-13 | Stop reason: HOSPADM

## 2021-10-13 RX ORDER — DIPHENHYDRAMINE HYDROCHLORIDE 50 MG/ML
50 INJECTION INTRAMUSCULAR; INTRAVENOUS
Status: CANCELLED | OUTPATIENT
Start: 2021-12-08

## 2021-10-13 RX ADMIN — DEXAMETHASONE SODIUM PHOSPHATE 16 MG: 10 INJECTION, SOLUTION INTRAMUSCULAR; INTRAVENOUS at 08:10

## 2021-10-13 RX ADMIN — SODIUM CHLORIDE: 0.9 INJECTION, SOLUTION INTRAVENOUS at 07:10

## 2021-10-13 RX ADMIN — Medication 1000 ML: at 08:10

## 2021-10-14 ENCOUNTER — INFUSION (OUTPATIENT)
Dept: INFUSION THERAPY | Facility: HOSPITAL | Age: 66
End: 2021-10-14
Attending: SURGERY
Payer: MEDICARE

## 2021-10-14 VITALS — BODY MASS INDEX: 30.1 KG/M2 | HEIGHT: 72 IN | WEIGHT: 222.25 LBS

## 2021-10-14 DIAGNOSIS — C7B.8 SECONDARY MALIGNANT NEUROENDOCRINE TUMOR OF LIVER: Primary | ICD-10-CM

## 2021-10-14 DIAGNOSIS — C78.7 SECONDARY MALIGNANT NEOPLASM OF LIVER: ICD-10-CM

## 2021-10-14 DIAGNOSIS — C7A.090 CARCINOID BRONCHIAL ADENOMA OF LEFT LUNG: ICD-10-CM

## 2021-10-14 DIAGNOSIS — C7B.8 SECONDARY NEUROENDOCRINE TUMOR OF BONE: ICD-10-CM

## 2021-10-14 DIAGNOSIS — D3A.090 BRONCHIAL CARCINOID TUMORS: ICD-10-CM

## 2021-10-14 DIAGNOSIS — C7B.8 SECONDARY NEUROENDOCRINE TUMOR OF LIVER: ICD-10-CM

## 2021-10-14 DIAGNOSIS — C7A.090 MALIGNANT CARCINOID TUMOR OF BRONCHUS AND LUNG: ICD-10-CM

## 2021-10-14 PROCEDURE — 96372 THER/PROPH/DIAG INJ SC/IM: CPT

## 2021-10-14 PROCEDURE — 63600175 PHARM REV CODE 636 W HCPCS: Mod: JG | Performed by: INTERNAL MEDICINE

## 2021-10-14 RX ORDER — LANREOTIDE ACETATE 120 MG/.5ML
120 INJECTION SUBCUTANEOUS
Status: CANCELLED
Start: 2021-10-14

## 2021-10-14 RX ORDER — LANREOTIDE ACETATE 120 MG/.5ML
120 INJECTION SUBCUTANEOUS
Status: DISCONTINUED | OUTPATIENT
Start: 2021-10-14 | End: 2021-10-14

## 2021-10-14 RX ORDER — LANREOTIDE ACETATE 120 MG/.5ML
120 INJECTION SUBCUTANEOUS
Status: CANCELLED | OUTPATIENT
Start: 2021-11-11

## 2021-10-14 RX ADMIN — LANREOTIDE ACETATE 120 MG: 120 INJECTION SUBCUTANEOUS at 09:10

## 2021-10-19 ENCOUNTER — CONFERENCE (OUTPATIENT)
Dept: NEUROLOGY | Facility: HOSPITAL | Age: 66
End: 2021-10-19

## 2021-10-19 ENCOUNTER — PATIENT MESSAGE (OUTPATIENT)
Dept: HEMATOLOGY/ONCOLOGY | Facility: CLINIC | Age: 66
End: 2021-10-19
Payer: MEDICARE

## 2021-10-19 DIAGNOSIS — D3A.090 BRONCHIAL CARCINOID TUMORS: Primary | ICD-10-CM

## 2021-10-21 ENCOUNTER — PATIENT MESSAGE (OUTPATIENT)
Dept: NEUROLOGY | Facility: HOSPITAL | Age: 66
End: 2021-10-21
Payer: MEDICARE

## 2021-10-22 ENCOUNTER — TELEPHONE (OUTPATIENT)
Dept: NEUROLOGY | Facility: HOSPITAL | Age: 66
End: 2021-10-22

## 2021-10-22 DIAGNOSIS — C7B.02 METASTATIC MALIGNANT CARCINOID TUMOR TO LIVER: ICD-10-CM

## 2021-10-22 DIAGNOSIS — C77.8 SECONDARY MALIGNANT NEOPLASM OF LYMPH NODES OF MULTIPLE SITES: ICD-10-CM

## 2021-10-22 DIAGNOSIS — C78.89 SECONDARY MALIGNANT NEOPLASM OF SPLEEN: ICD-10-CM

## 2021-10-22 DIAGNOSIS — C7B.8 SECONDARY NEUROENDOCRINE TUMOR OF BONE: ICD-10-CM

## 2021-10-22 DIAGNOSIS — C7A.090 MALIGNANT CARCINOID TUMOR OF BRONCHUS AND LUNG: Primary | ICD-10-CM

## 2021-11-10 ENCOUNTER — PATIENT MESSAGE (OUTPATIENT)
Dept: HEMATOLOGY/ONCOLOGY | Facility: CLINIC | Age: 66
End: 2021-11-10
Payer: MEDICARE

## 2021-11-11 ENCOUNTER — TELEPHONE (OUTPATIENT)
Dept: HEMATOLOGY/ONCOLOGY | Facility: CLINIC | Age: 66
End: 2021-11-11
Payer: MEDICARE

## 2021-11-11 ENCOUNTER — TELEPHONE (OUTPATIENT)
Dept: OBSTETRICS AND GYNECOLOGY | Facility: CLINIC | Age: 66
End: 2021-11-11
Payer: MEDICARE

## 2021-11-11 ENCOUNTER — PATIENT MESSAGE (OUTPATIENT)
Dept: NEUROLOGY | Facility: HOSPITAL | Age: 66
End: 2021-11-11
Payer: MEDICARE

## 2021-11-11 ENCOUNTER — TELEPHONE (OUTPATIENT)
Dept: NEUROLOGY | Facility: HOSPITAL | Age: 66
End: 2021-11-11
Payer: MEDICARE

## 2021-11-12 ENCOUNTER — TELEPHONE (OUTPATIENT)
Dept: HEMATOLOGY/ONCOLOGY | Facility: CLINIC | Age: 66
End: 2021-11-12
Payer: MEDICARE

## 2021-11-15 ENCOUNTER — TELEPHONE (OUTPATIENT)
Dept: HEMATOLOGY/ONCOLOGY | Facility: CLINIC | Age: 66
End: 2021-11-15
Payer: MEDICARE

## 2021-11-19 ENCOUNTER — INFUSION (OUTPATIENT)
Dept: INFUSION THERAPY | Facility: HOSPITAL | Age: 66
End: 2021-11-19
Payer: MEDICARE

## 2021-11-19 VITALS
RESPIRATION RATE: 18 BRPM | TEMPERATURE: 98 F | DIASTOLIC BLOOD PRESSURE: 63 MMHG | SYSTOLIC BLOOD PRESSURE: 115 MMHG | HEART RATE: 62 BPM

## 2021-11-19 DIAGNOSIS — C79.51 SECONDARY MALIGNANT NEOPLASM OF BONE: ICD-10-CM

## 2021-11-19 DIAGNOSIS — C7B.8 SECONDARY MALIGNANT NEUROENDOCRINE TUMOR OF LIVER: ICD-10-CM

## 2021-11-19 DIAGNOSIS — C7B.8 SECONDARY NEUROENDOCRINE TUMOR OF BONE: ICD-10-CM

## 2021-11-19 DIAGNOSIS — C7A.090 CARCINOID BRONCHIAL ADENOMA OF LEFT LUNG: ICD-10-CM

## 2021-11-19 DIAGNOSIS — C7A.090 MALIGNANT CARCINOID TUMOR OF BRONCHUS AND LUNG: ICD-10-CM

## 2021-11-19 DIAGNOSIS — C7A.090 CARCINOID BRONCHIAL ADENOMA, UNSPECIFIED LATERALITY: Primary | ICD-10-CM

## 2021-11-19 DIAGNOSIS — C78.89 SECONDARY MALIGNANT NEOPLASM OF SPLEEN: ICD-10-CM

## 2021-11-19 DIAGNOSIS — C77.8 SECONDARY MALIGNANT NEOPLASM OF LYMPH NODES OF MULTIPLE SITES: ICD-10-CM

## 2021-11-19 DIAGNOSIS — D3A.090 BRONCHIAL CARCINOID TUMORS: ICD-10-CM

## 2021-11-19 DIAGNOSIS — C7B.8 SECONDARY NEUROENDOCRINE TUMOR OF LIVER: ICD-10-CM

## 2021-11-19 DIAGNOSIS — C78.7 SECONDARY MALIGNANT NEOPLASM OF LIVER: ICD-10-CM

## 2021-11-19 DIAGNOSIS — C7B.02 METASTATIC MALIGNANT CARCINOID TUMOR TO LIVER: ICD-10-CM

## 2021-11-19 PROCEDURE — 96372 THER/PROPH/DIAG INJ SC/IM: CPT

## 2021-11-19 PROCEDURE — 63600175 PHARM REV CODE 636 W HCPCS: Mod: JG | Performed by: INTERNAL MEDICINE

## 2021-11-19 RX ORDER — ERYTHROMYCIN 5 MG/G
OINTMENT OPHTHALMIC
COMMUNITY
Start: 2021-10-21 | End: 2022-09-14

## 2021-11-19 RX ORDER — LANREOTIDE ACETATE 120 MG/.5ML
120 INJECTION SUBCUTANEOUS
Status: CANCELLED | OUTPATIENT
Start: 2021-11-19

## 2021-11-19 RX ORDER — LANREOTIDE ACETATE 120 MG/.5ML
120 INJECTION SUBCUTANEOUS
Status: DISCONTINUED | OUTPATIENT
Start: 2021-11-19 | End: 2021-11-19 | Stop reason: HOSPADM

## 2021-11-19 RX ADMIN — LANREOTIDE ACETATE 120 MG: 120 INJECTION SUBCUTANEOUS at 03:11

## 2021-12-17 ENCOUNTER — INFUSION (OUTPATIENT)
Dept: INFUSION THERAPY | Facility: HOSPITAL | Age: 66
End: 2021-12-17
Payer: MEDICARE

## 2021-12-17 DIAGNOSIS — C7B.8 SECONDARY MALIGNANT NEUROENDOCRINE TUMOR OF LIVER: ICD-10-CM

## 2021-12-17 DIAGNOSIS — C7A.090 CARCINOID BRONCHIAL ADENOMA OF LEFT LUNG: ICD-10-CM

## 2021-12-17 DIAGNOSIS — C7B.02 METASTATIC MALIGNANT CARCINOID TUMOR TO LIVER: ICD-10-CM

## 2021-12-17 DIAGNOSIS — C78.89 SECONDARY MALIGNANT NEOPLASM OF SPLEEN: ICD-10-CM

## 2021-12-17 DIAGNOSIS — C79.51 SECONDARY MALIGNANT NEOPLASM OF BONE: ICD-10-CM

## 2021-12-17 DIAGNOSIS — C77.8 SECONDARY MALIGNANT NEOPLASM OF LYMPH NODES OF MULTIPLE SITES: ICD-10-CM

## 2021-12-17 DIAGNOSIS — D3A.090 BRONCHIAL CARCINOID TUMORS: ICD-10-CM

## 2021-12-17 DIAGNOSIS — C7A.090 CARCINOID BRONCHIAL ADENOMA, UNSPECIFIED LATERALITY: Primary | ICD-10-CM

## 2021-12-17 DIAGNOSIS — C7A.090 MALIGNANT CARCINOID TUMOR OF BRONCHUS AND LUNG: ICD-10-CM

## 2021-12-17 DIAGNOSIS — C78.7 SECONDARY MALIGNANT NEOPLASM OF LIVER: ICD-10-CM

## 2021-12-17 DIAGNOSIS — C7B.8 SECONDARY NEUROENDOCRINE TUMOR OF LIVER: ICD-10-CM

## 2021-12-17 DIAGNOSIS — C7B.8 SECONDARY NEUROENDOCRINE TUMOR OF BONE: ICD-10-CM

## 2021-12-17 PROCEDURE — 96372 THER/PROPH/DIAG INJ SC/IM: CPT

## 2021-12-17 PROCEDURE — 63600175 PHARM REV CODE 636 W HCPCS: Mod: JG | Performed by: INTERNAL MEDICINE

## 2021-12-17 RX ORDER — LANREOTIDE ACETATE 120 MG/.5ML
120 INJECTION SUBCUTANEOUS
Status: CANCELLED | OUTPATIENT
Start: 2021-12-17

## 2021-12-17 RX ORDER — LANREOTIDE ACETATE 120 MG/.5ML
120 INJECTION SUBCUTANEOUS
Status: DISCONTINUED | OUTPATIENT
Start: 2021-12-17 | End: 2021-12-17 | Stop reason: HOSPADM

## 2021-12-17 RX ADMIN — LANREOTIDE ACETATE 120 MG: 120 INJECTION SUBCUTANEOUS at 10:12

## 2022-01-03 ENCOUNTER — OFFICE VISIT (OUTPATIENT)
Dept: DERMATOLOGY | Facility: CLINIC | Age: 67
End: 2022-01-03
Payer: MEDICARE

## 2022-01-03 DIAGNOSIS — L21.9 SEBORRHEIC DERMATITIS: Primary | ICD-10-CM

## 2022-01-03 DIAGNOSIS — Z12.83 SKIN EXAM, SCREENING FOR CANCER: ICD-10-CM

## 2022-01-03 DIAGNOSIS — C7B.8 SECONDARY NEUROENDOCRINE TUMOR OF BONE: ICD-10-CM

## 2022-01-03 DIAGNOSIS — L57.0 ACTINIC KERATOSIS: ICD-10-CM

## 2022-01-03 DIAGNOSIS — C77.8 SECONDARY MALIGNANT NEOPLASM OF LYMPH NODES OF MULTIPLE SITES: ICD-10-CM

## 2022-01-03 DIAGNOSIS — D22.9 MULTIPLE BENIGN NEVI: ICD-10-CM

## 2022-01-03 DIAGNOSIS — C7A.090 MALIGNANT CARCINOID TUMOR OF BRONCHUS AND LUNG: ICD-10-CM

## 2022-01-03 DIAGNOSIS — D36.10 NEUROFIBROMA: ICD-10-CM

## 2022-01-03 DIAGNOSIS — C78.89 SECONDARY MALIGNANT NEOPLASM OF SPLEEN: ICD-10-CM

## 2022-01-03 DIAGNOSIS — C7B.02 METASTATIC MALIGNANT CARCINOID TUMOR TO LIVER: ICD-10-CM

## 2022-01-03 PROCEDURE — 99999 PR PBB SHADOW E&M-EST. PATIENT-LVL III: CPT | Mod: PBBFAC,,, | Performed by: DERMATOLOGY

## 2022-01-03 PROCEDURE — 99204 PR OFFICE/OUTPT VISIT, NEW, LEVL IV, 45-59 MIN: ICD-10-PCS | Mod: S$PBB,,, | Performed by: DERMATOLOGY

## 2022-01-03 PROCEDURE — 99999 PR PBB SHADOW E&M-EST. PATIENT-LVL III: ICD-10-PCS | Mod: PBBFAC,,, | Performed by: DERMATOLOGY

## 2022-01-03 PROCEDURE — 99204 OFFICE O/P NEW MOD 45 MIN: CPT | Mod: S$PBB,,, | Performed by: DERMATOLOGY

## 2022-01-03 PROCEDURE — 99213 OFFICE O/P EST LOW 20 MIN: CPT | Mod: PBBFAC,PN | Performed by: DERMATOLOGY

## 2022-01-03 RX ORDER — AMMONIUM LACTATE 12 G/100G
CREAM TOPICAL
Qty: 140 G | Refills: 3 | Status: SHIPPED | OUTPATIENT
Start: 2022-01-03

## 2022-01-03 NOTE — PATIENT INSTRUCTIONS
Patient instructed in importance in daily sun protection. Sun avoidance and topical protection discussed.     Patient encouraged to wear hat for all outdoor exposure.     Also discussed sun protective clothing.      Patient instructed to start Amlactin cream or lotion nightly to AK prone areas or other specified affected areas.  Warned of skin irritation and to decrease frequency of usage if this occurs.    Instructed patient to use plain Head and Shoulders shampoo regularly for soaks lasting at least 3 minutes or more to the scalp and or face as directed.  Regular shampoo and conditioner afterward is fine.  For suspected allergy cases, rinse away from the face and body discussed.

## 2022-01-03 NOTE — PROGRESS NOTES
Subjective:       Patient ID:  Elroy Rahman is a 66 y.o. male who presents for   Chief Complaint   Patient presents with    Skin Check     UBSE      Pt co dry arms for years.      Review of Systems   Constitutional: Negative.    HENT: Negative.    Respiratory: Negative.    Musculoskeletal: Negative.    Skin: Positive for dry skin.        Objective:    Physical Exam   Constitutional: He appears well-developed and well-nourished.   Eyes: No conjunctival no injection.   Neurological: He is alert and oriented to person, place, and time. He is not disoriented.   Psychiatric: He has a normal mood and affect.   Skin:   Areas Examined (abnormalities noted in diagram):   Scalp / Hair Palpated and Inspected  Head / Face Inspection Performed  Neck Inspection Performed  Chest / Axilla Inspection Performed  Abdomen Inspection Performed  Back Inspection Performed  RUE Inspected  LUE Inspection Performed                   Diagram Legend     Erythematous scaling macule/papule c/w actinic keratosis       Vascular papule c/w angioma      Pigmented verrucoid papule/plaque c/w seborrheic keratosis      Yellow umbilicated papule c/w sebaceous hyperplasia      Irregularly shaped tan macule c/w lentigo     1-2 mm smooth white papules consistent with Milia      Movable subcutaneous cyst with punctum c/w epidermal inclusion cyst      Subcutaneous movable cyst c/w pilar cyst      Firm pink to brown papule c/w dermatofibroma      Pedunculated fleshy papule(s) c/w skin tag(s)      Evenly pigmented macule c/w junctional nevus     Mildly variegated pigmented, slightly irregular-bordered macule c/w mildly atypical nevus      Flesh colored to evenly pigmented papule c/w intradermal nevus       Pink pearly papule/plaque c/w basal cell carcinoma      Erythematous hyperkeratotic cursted plaque c/w SCC      Surgical scar with no sign of skin cancer recurrence      Open and closed comedones      Inflammatory papules and pustules      Verrucoid  papule consistent consistent with wart     Erythematous eczematous patches and plaques     Dystrophic onycholytic nail with subungual debris c/w onychomycosis     Umbilicated papule    Erythematous-base heme-crusted tan verrucoid plaque consistent with inflamed seborrheic keratosis     Erythematous Silvery Scaling Plaque c/w Psoriasis     See annotation      Assessment / Plan:        Seborrheic dermatitis  Discussed with patient the etiology and pathogenesis of the disease or skin lesion(s) and possible treatments and aggravators.    Reviewed with patient different treatment options and associated risks.  Proper application of medications and or care for affected area(s) and condition(s) reviewed.  Instructed patient to use plain Head and Shoulders shampoo regularly for soaks lasting at least 3 minutes or more to the scalp and or face as directed.  Regular shampoo and conditioner afterward is fine.  For suspected allergy cases, rinse away from the face and body discussed.    Malignant carcinoid tumor of bronchus and lung  -     Ambulatory referral/consult to Dermatology  Previous [facility] labs and or records and notes reviewed and considered for their impact on our clinical decision making today.    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  No suspicious lesions were noted unless otherwise documented in this note.    Patient did not want a waist down exam today.  No other seriously suspicious lesions noted for body areas examined today.  Patient to inform of us if they notice any dark or changing  or suspicious spots in areas not examined today.  Follow up for routine monitoring recommended to patient.    Instructed patient to watch out for dark spots, bleeding spots, crusty spots, sores that break out repeatedly in the same spot.  These characteristics are risk factors for skin cancer, and patient is to notify us if they experience any of these symptoms.  Brochure given for patient education.    Actinic keratosis  -     ammonium lactate 12 % Crea; Qd-bid arms and dorsal hands  Dispense: 140 g; Refill: 3  Discussed all of the following:  Actinic keratosis is a skin growth caused by sun damage. These growths are not cancer, but they may develop into skin cancer (precancerous). Many people get these growths, especially as they age. This is because of cumulative sun exposure over years. Multiple growths are called actinic keratoses.    Patient instructed in importance in daily sun protection. Sun avoidance and topical protection discussed.     Patient encouraged to wear hat for all outdoor exposure.     Also discussed sun protective clothing.  Patient instructed to start Amlactin cream or lotion nightly to AK prone areas or other specified affected areas.  Warned of skin irritation and to decrease frequency of usage if this occurs.  Proper application of medications and or care for affected area(s) and condition(s) reviewed.    We will recheck the arms and hands at our follow up appointment.    Independent historian was in exam room or on virtual today to provide information and assist in delivering therapy and treatment at home.    Multiple benign nevi  Discussed all of the following with the patient.    Patient to check their breasts (if applicable), buttocks, and groin with a mirror.  Patient deferred our examination of these areas today.    Each month, check your body for any spots such as freckles, age spots, and moles.  Watch for color changes and shape changes and growth in size.        Moles, also called  nevi, are small, colored (pigmented) marks on the skin. They have no known purpose. Many moles appear before age 30, but they also increase frequently as people age. Moles most often are not cancer (benign) and are harmless. But some become cancerous (malignant). Thats why you need to watch the moles on your body and tell your healthcare provider about any that concern you.  Brochure given for patient education.    Neurofibroma  Discussed with patient the benign nature of these lesions and that no treatment is indicated.    Chronic nature of this condition discussed with patient.  Prn cosmetic hyfrecation of nf on the r ear.  Costs $150.  Scar and recurrence reviewed.             Follow up in about 6 months (around 7/3/2022).

## 2022-01-18 ENCOUNTER — INFUSION (OUTPATIENT)
Dept: INFUSION THERAPY | Facility: HOSPITAL | Age: 67
End: 2022-01-18
Payer: MEDICARE

## 2022-01-18 ENCOUNTER — HOSPITAL ENCOUNTER (OUTPATIENT)
Dept: RADIOLOGY | Facility: HOSPITAL | Age: 67
Discharge: HOME | End: 2022-01-18
Attending: INTERNAL MEDICINE
Payer: MEDICARE

## 2022-01-18 DIAGNOSIS — C78.89 SECONDARY MALIGNANT NEOPLASM OF SPLEEN: ICD-10-CM

## 2022-01-18 DIAGNOSIS — C7B.8 SECONDARY NEUROENDOCRINE TUMOR OF BONE: ICD-10-CM

## 2022-01-18 DIAGNOSIS — C7B.02 METASTATIC MALIGNANT CARCINOID TUMOR TO LIVER: ICD-10-CM

## 2022-01-18 DIAGNOSIS — D3A.090 BRONCHIAL CARCINOID TUMORS: ICD-10-CM

## 2022-01-18 DIAGNOSIS — C79.51 SECONDARY MALIGNANT NEOPLASM OF BONE: ICD-10-CM

## 2022-01-18 DIAGNOSIS — C7B.8 SECONDARY NEUROENDOCRINE TUMOR OF LIVER: ICD-10-CM

## 2022-01-18 DIAGNOSIS — C7B.8 SECONDARY MALIGNANT NEUROENDOCRINE TUMOR OF LIVER: ICD-10-CM

## 2022-01-18 DIAGNOSIS — C77.8 SECONDARY MALIGNANT NEOPLASM OF LYMPH NODES OF MULTIPLE SITES: ICD-10-CM

## 2022-01-18 DIAGNOSIS — C7A.090 MALIGNANT CARCINOID TUMOR OF BRONCHUS AND LUNG: ICD-10-CM

## 2022-01-18 DIAGNOSIS — C78.7 SECONDARY MALIGNANT NEOPLASM OF LIVER: ICD-10-CM

## 2022-01-18 DIAGNOSIS — C7A.090 CARCINOID BRONCHIAL ADENOMA OF LEFT LUNG: ICD-10-CM

## 2022-01-18 DIAGNOSIS — C7A.090 CARCINOID BRONCHIAL ADENOMA, UNSPECIFIED LATERALITY: Primary | ICD-10-CM

## 2022-01-18 PROCEDURE — 96372 THER/PROPH/DIAG INJ SC/IM: CPT | Mod: 59

## 2022-01-18 PROCEDURE — 74183 MRI ABDOMEN W WO CONTRAST: ICD-10-PCS | Mod: 26,,, | Performed by: RADIOLOGY

## 2022-01-18 PROCEDURE — 25500020 PHARM REV CODE 255: Performed by: INTERNAL MEDICINE

## 2022-01-18 PROCEDURE — 74183 MRI ABD W/O CNTR FLWD CNTR: CPT | Mod: 26,,, | Performed by: RADIOLOGY

## 2022-01-18 PROCEDURE — 74183 MRI ABD W/O CNTR FLWD CNTR: CPT | Mod: TC

## 2022-01-18 PROCEDURE — 63600175 PHARM REV CODE 636 W HCPCS: Mod: JG | Performed by: INTERNAL MEDICINE

## 2022-01-18 PROCEDURE — A9585 GADOBUTROL INJECTION: HCPCS | Performed by: INTERNAL MEDICINE

## 2022-01-18 RX ORDER — LANREOTIDE ACETATE 120 MG/.5ML
120 INJECTION SUBCUTANEOUS
Status: DISCONTINUED | OUTPATIENT
Start: 2022-01-18 | End: 2022-01-18 | Stop reason: HOSPADM

## 2022-01-18 RX ORDER — LANREOTIDE ACETATE 120 MG/.5ML
120 INJECTION SUBCUTANEOUS
Status: CANCELLED | OUTPATIENT
Start: 2022-01-18

## 2022-01-18 RX ORDER — GADOBUTROL 604.72 MG/ML
10 INJECTION INTRAVENOUS
Status: COMPLETED | OUTPATIENT
Start: 2022-01-18 | End: 2022-01-18

## 2022-01-18 RX ADMIN — GADOBUTROL 10 ML: 604.72 INJECTION INTRAVENOUS at 09:01

## 2022-01-18 RX ADMIN — LANREOTIDE ACETATE 120 MG: 120 INJECTION SUBCUTANEOUS at 10:01

## 2022-01-18 NOTE — PROGRESS NOTES
PATIENT: Elroy Rahman  MRN: [id number]  DATE: 1/19/2022    Diagnosis:   1. Bronchial carcinoid tumors    2. Secondary neuroendocrine tumor of liver    3. Secondary malignant neoplasm of spleen    4. Secondary malignant neoplasm of bone    5. Secondary carcinoid tumor of peritoneum    6. 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 Immunodeficiency due to drug therapy    8. Chronic neoplasm-related pain    9. Hx of BKA, left    10. Anemia in neoplastic disease      Chief Complaint: neuroendocrine tumor    Oncologic History:      Oncologic History Bronchial carcinoid, left, diagnosed 1/2016   Metastatic disease to liver and bone 5/2017       Oncologic Treatment Left upper lobectomy 1/2016  CAPTEM 7/2017 - 2/2019 (discontinued due to progression)  Lanreotide 7/2017   Zoledronic acid 7/2017   TACE 10/2018  TACE 3/2019  Lily-177 4/10/19, 6/5/19        Pathology 7/21/21:  Liver, biopsy   Liver with well-differentiated neuroendocrine tumor, WHO Grade 3   Mitotic rate: 8 per 2 millimeter squared   Peritumoral fibrosis and peritumoral necrosis identified   Net panel:   Ki 67 proliferation index: up to 25%   Chromogranin: Positive, 3+ staining, 100% of tumor cells   Synaptophysin: Positive, 3+ staining, 100% of tumor cells   CD 31: Up to 17 vessels per HPF   Uninvolved liver with moderate mixed steatosis, cholestasis, and mild   periportal inflammation    1/2016: Typical carcinoid tumor, well-differentiated T2a, N0, M0 Ki-67 <1%  6/2017: Liver biopsy, well-differentiated, Ki-67 25%             Subjective:    History of Present Illness:   His history dates to 1/2016 when he underwent a left upper lobectomy with Dr. Bell for a typical carcinoid tumor.  His pathological staging was T2a, N0, M0 with Ki-67 less than 1% and mitotic count 0 per 10 high-powered fields.  He done well postoperatively, however, in 5/2017 he was undergoing routine surveillance and a chest CT in picked up evidence of  "progression of disease in the liver.  Dedicated abdominal CT was performed showing multiple liver lesions.  In 6/2017 he underwent a CT-guided liver biopsy which was positive for metastatic neuroendocrine tumor which was well-differentiated with no overt nuclear atypia, however, the Ki-67 was 25%.  He underwent gallium-68 PET/CT and was found to have widespread disease involving the bone, liver and also mesentery.  He was started on CAPTEM in 7/2017 and also Lanreotide and zoledronic acid.  Scans in 10/2017 had shown stability of disease.  Imaging in 1/2018 had shown stability of disease.  Scans in 4/2018 had shown stability of disease.  Scans in 07/2018 had continued to showed mild growth.  In 10/2018 he underwent TACE.  CAPTEM was discontinued in 02/2019 due to progression.  He underwent TACE in 03/2019.  He was started on treatment with PRRT using Lily-177 on 04/10/2019.    - his case was discussed at neuroendocrine conference on 5/4/21. Notes from that conference:  " Progression of widespread receptor positive disease involving bone, liver, spleen, and nodes. Still needs cTACE.  Bones mets   BOARD RECOMMENDATIONS:   Consult IR for biopsy of liver and TACE  Send [facility]  Consult to Dr MOIZ Chung  Bones scan if positive Sam[place] with Dr VICKI Obando  If Bone not positive external Radiation to bone"    - he underwent liver-directed therapy (trans-arterial chemoembolization) and biopsy on 7/21/21.  - he underwent TACE in September 2021.  - he underwent MRI abdomen on 10/11/21.    Interval History:  - he presents for a follow-up appointment for his bronchial carcinoid tumor  - he completed 2 doses of PRRT.  - his case was discussed at neuroendocrine conference on 10/19/21. Information from that discussion:  "Good response to right lobe cTACE. Slow progression in the left lobe and outside the liver since 4/2021.  Recommend left lobe cTACE."  - he underwent MRI abdomen on 1/18/22.  - today, he is doing great! " His diarrhea is controlled with loperamide. Pain is rare. His fatigue has improved.  - He denies shortness of breath, chest pain, nausea, vomiting, constipation.    Past Medical History:   Past Medical History:   Diagnosis Date    Allergy     Carcinoid bronchial adenoma of left lung     PRRT    Chemotherapy follow-up examination 8/25/2017    Cough with hemoptysis     mild/intermittent    Elevated bilirubin 7/2/2018    Hx of BKA, left     above knee    Kidney stone     Mild heartburn     Physical deconditioning 6/22/2020    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    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    Sepsis due to methicillin susceptible Staphylococcus aureus 09/14/2019    MSSA bacteremia from Amputation stump infection    Sleep apnea     Snores    Thyroid nodule 11/25/2020    Urinary tract infection     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       Past Surgical HIstory:   Past Surgical History:   Procedure Laterality Date    ABDOMINAL SURGERY      Bka Left     HERNIA REPAIR      IRRIGATION AND DEBRIDEMENT OF LOWER EXTREMITY Left 9/20/2019    Procedure: IRRIGATION AND DEBRIDEMENT, LOWER EXTREMITY - left - cysto tubing - cultures;  Surgeon: Jesus Arias MD;  Location: [address];  Service: Orthopedics;  Laterality: Left;    LAPAROSCOPIC REPAIR OF INCARCERATED UMBILICAL HERNIA N/A 11/27/2018    Procedure: REPAIR, HERNIA, UMBILICAL, INCARCERATED, LAPAROSCOPIC;  Surgeon: Coco Guidry MD;  Location: [facility] OR;  Service: General;  Laterality: N/A;  video    TRANSESOPHAGEAL ECHOCARDIOGRAPHY N/A 9/18/2019    Procedure: ECHOCARDIOGRAM, TRANSESOPHAGEAL;  Surgeon: Grace Diagnostic Provider;  Location: [facility] EP LAB;  Service: Anesthesiology;  Laterality: N/A;       Family History:   Family History   Problem Relation Age of Onset    Cancer Mother         lung    Cancer Sister     Anesthesia problems Neg Hx        Social History:  reports that he has never  smoked. He has never used smokeless tobacco. He reports current alcohol use of about 1.0 standard drink of alcohol per week. He reports that he does not use drugs.    Allergies:  Review of patient's allergies indicates:  No Known Allergies    Medications:  Current Outpatient Medications   Medication Sig Dispense Refill    ammonium lactate 12 % Crea Qd-bid arms and dorsal hands 140 g 3    diphenoxylate-atropine 2.5-0.025 mg (LOMOTIL) 2.5-0.025 mg per tablet Take 1 tablet by mouth 4 (four) times daily as needed for Diarrhea. 30 tablet 1    erythromycin (ROMYCIN) ophthalmic ointment SMARTSIG:Sparingly Right Eye Every Night      ibuprofen (ADVIL,MOTRIN) 200 MG tablet Take 600 mg by mouth every 6 (six) hours as needed for Pain.      lanreotide (SOMATULINE DEPOT) 120 mg/0.5 mL Syrg Inject 120 mg into the skin every 28 days.      ondansetron (ZOFRAN) 4 MG tablet Take 1 tablet (4 mg total) by mouth 2 (two) times daily. 20 tablet 1    oxyCODONE (ROXICODONE) 5 MG immediate release tablet Take 1 tablet (5 mg total) by mouth every 6 (six) hours as needed for Pain. 60 tablet 0     No current facility-administered medications for this visit.       Review of Systems   Constitutional: Negative for appetite change, chills, fatigue, fever and unexpected weight change.   HENT: Negative for dental problem, sinus pressure and sneezing.    Eyes: Positive for visual disturbance.   Respiratory: Negative for cough, choking, chest tightness and shortness of breath.    Cardiovascular: Negative for chest pain and leg swelling.   Gastrointestinal: Negative for abdominal pain, blood in stool, constipation and nausea.   Genitourinary: Negative for difficulty urinating, dysuria and frequency.   Musculoskeletal: Negative for arthralgias and back pain.   Skin: Negative for rash and wound.   Neurological: Negative for dizziness, light-headedness and headaches.   Hematological: Negative for adenopathy. Does not bruise/bleed easily.    Psychiatric/Behavioral: Negative for sleep disturbance. The patient is not nervous/anxious.        ECOG Performance Status:   ECOG SCORE    1 - Restricted in strenuous activity-ambulatory and able to carry out work of a light nature         Objective:      Vitals:   Vitals:    01/19/22 1009   BP: 132/78   BP Location: Left arm   Patient Position: Sitting   BP Method: Large (Automatic)   Pulse: 60   Temp: 97.6 °F (36.4 °C)   TempSrc: Oral   SpO2: 98%   Weight: 112.7 kg (248 lb 7.3 oz)   Height: 6' (1.829 m)     BMI: Body mass index is 33.7 kg/m².    Physical Exam  Constitutional:       Appearance: He is well-developed.   HENT:      Head: Normocephalic and atraumatic.   Eyes:      Pupils: Pupils are equal, round, and reactive to light.   Cardiovascular:      Rate and Rhythm: Normal rate and regular rhythm.   Pulmonary:      Effort: Pulmonary effort is normal. No respiratory distress.      Breath sounds: Normal breath sounds.   Abdominal:      General: There is no distension.      Palpations: Abdomen is soft.      Tenderness: There is no abdominal tenderness.   Musculoskeletal:         General: No tenderness.      Cervical back: Normal range of motion and neck supple.   Lymphadenopathy:      Cervical: No cervical adenopathy.   Skin:     General: Skin is warm and dry.   Neurological:      Mental Status: He is alert and oriented to person, place, and time.      Cranial Nerves: No cranial nerve deficit.   Psychiatric:         Behavior: Behavior normal.         Laboratory Data:  Labs have been reviewed.    Lab Results   Component Value Date    WBC 3.31 (L) 01/18/2022    HGB 12.0 (L) 01/18/2022    HCT 37.3 (L) 01/18/2022    MCV 91 01/18/2022     01/18/2022         Supplemental Diagnosis  Chromogranin Staining:  Intensity: Moderate to strong. Positive cells: 100 (%)  Synaptophysin Staining:  Intensity: Strong. Positive cells: 100 (%)  CD31: 40 vessels per 1 HPF  Factor VIII: 27 vessels per 1 HPF  Ki67: 1 % tumor  cells staining  Immunostains performed with appropriate positive and negative controls.  FINAL PATHOLOGIC DIAGNOSIS  1. Lymph node, level XI, excisional biopsy:  Fragments of benign lymph node with anthracosis and sinus histiocytosis (0/1).  2. Lung, lingular staple line, biopsy:  Lung, negative for neoplasm.  3. Lymph node, level X, excisional biopsy:  1 benign lymph node with anthracotic pigment and sinus histiocytosis (0/1).  4. Lymph node, level XII, excisional biopsy:  2 benign lymph nodes with sinus histiocytosis and anthracotic pigment (0/2).  5. Lung, left upper lobe, lobectomy:  Typical carcinoid tumor (well differentiated neuroendocrine tumor), 4 cm in greatest dimension.  Bronchial and vascular margins are negative for neoplasm.  Uninvolved lung adjacent to the tumor shows emphysematous changes and fibrosis, however remaining lung  appears unremarkable.  Additional immunohistochemical stains for ancillary studies (including synaptophysin, chromogranin and Ki-67)  will be completed and results will be reported in a supplemental report.  See synoptic report in comment section for further details.  6. Lymph node, level VII, excisional biopsy:  2 benign lymph nodes with anthracotic pigment and sinus histiocytosis (0/2).  Comment: Synoptic Report  Specimen: Left upper lobe of lung  Procedure: Lobectomy  Specimen laterality: Left  Tumor size:  Greatest dimension: 4 cm  Tumor focality: Single focus  Histologic type: Typical carcinoid tumor  Visceral pleural invasion: Not identified  Tumor extension: Not identified  Margins: Bronchial and vascular margins are uninvolved by tumor.  Distance of tumor from closest margin: 1 cm from the bronchial surgical margin.  Lymphovascular invasion: Not identified  Ancillary studies:  Immunohistochemical stains for neuroendocrine markers and Ki-67 will be completed and results will follow in a  supplemental report.  Note: The tumor consists of a proliferation of cells in nests  with lewis formation. The tumor cells have salt and  pepper chromatin pattern with abundant cytoplasm. There is no significant nuclear atypia. No evidence of  necrosis. Mitotic count is 0 mitoses in 10 high power fields.    Imaging:   MRI abdomen (1/18/22): I have personally reviewed the images.    Inferior Thorax: Stable right cardiophrenic lymph node measuring 1.4 cm (series 12, image 15).     Liver: Innumerable diffuse enhancing lesions scattered throughout the liver, grossly similar in overall number compared to prior examination.  Some lesions are stable and others demonstrate interval growth with increased arterial enhancing component.  Reference lesions provided below.  Hepatic and portal veins appear patent.     Left hepatic lobe lesion measures 2.3 x 3.1 cm (series 11, image 29), previously 2.6 x 2.2 cm.     Left hepatic lobe lesion measures 2.5 x 1.8 cm (series 11, image 40), previously 2.1 x 1.4 cm.     Right hepatic lobe lesion measures 6.4 x 5.7 cm (series 11, image 39), previously 6.1 x 5.5 cm.  Lesion demonstrates increased peripheral arterial enhancement.     Right hepatic lobe lesion measures 4.7 cm (series 11, image 50), previously 4.4 cm.  Lesion demonstrates increased peripheral arterial enhancement.     Gallbladder: Unremarkable.     Bile Ducts: No dilatation.     Pancreas: No mass or ductal dilatation.     Spleen: Enlarged measuring 15 cm.  Multiple hypoenhancing lesions, similar to prior exam.     Adrenals: Stable right adrenal nodule measuring 3.7 x 2.6 cm with prominent signal loss on out of phase imaging consistent with an adenoma.  Left adrenal gland is unremarkable.     Kidneys/Ureters: Bilateral renal cysts.  No solid enhancing renal mass.  No hydronephrosis.     GI Tract/Mesentery: No evidence of bowel obstruction or inflammation.     Peritoneal Space: No ascites.     Lymph nodes: Previously described mesenteric lymph node appears smaller on today's exam (series 14, image 48).  No new  pathologic adenopathy.     Abdominal wall: Unremarkable.     Vasculature: No aneurysm.     Bones: Numerous scattered enhancing osseous lesions, similar to prior exam.     Impression:     1. Patient with metastatic neuroendocrine tumor.  Mild interval progression of diffuse hepatic metastasis with several lesions demonstrating interval growth and increased arterial enhancing component.  Splenic and osseous lesions appear similar to prior exam.  Stable enlarged cardiophrenic lymph node.    MRI abdomen (10/11/21): I have personally reviewed the images.  Lower thorax: Heart is normal in size.  No pleural fluid.  No consolidation.     Liver: There are innumerable enhancing lesions scattered throughout the liver, compatible with known metastases.  Many of the previous solid enhancing lesions now demonstrate regions of central non enhancement, for example a 6.1 lesion in segment 8 on 11:46, a 4.0 cm lesion in the periphery of segment 5 with capsular retraction on 11:61, and a 4.2 cm lesion in segment 6 on 11:70, likely reflecting post treatment changes from interval chemoembolization.  However, there are new and enlarging solid enhancing lesions.  For example the 2.6 cm lesion on 11:34 previously measured 1.6 cm, the 2.6 cm lesion on: 35 previously measured 1.6 cm, and the 2.1 cm lesion on 11:47 previously measured 1.4 cm.  The 0.8 cm lesion on 11:45 appears new.     Gallbladder: Unremarkable.     Bile ducts: No intrahepatic or extrahepatic biliary dilatation.     Pancreas: No focal lesion.  No pancreatic ductal dilatation.     Spleen: Enlarged, measuring 14.7 cm in the craniocaudal dimension.  Multiple splenic lesions, most of which have increased in size from prior MRI 04/07/2021 and a few which appear to be new.  Index lesion at the hilum has increased in size now measuring 2.8 cm (series 12, image 40), previously 2.4 cm.     Adrenal glands: Stable right adrenal gland nodule measuring 3.4 x 2.5 cm with signal dropout on  out of phase imaging, favored to represent an adenoma.  Left adrenal gland is unremarkable.     Kidneys: Subcentimeter T2 hyperintense nonenhancing lesions in the right kidney, likely cysts.  No solid renal mass.  No hydronephrosis.     GI: Stomach is unremarkable.  Visualized loops of bowel are unremarkable.     Mesentery/retroperitoneum: Enlarging enhancing 1.4 cm right cardiophrenic angle lymph node (12:22), previously 1.0 cm.  Enlarging 2.0 cm mesenteric nodule on 14:47, previously 1.5 cm in the coronal plane.     Vasculature: Visualized aorta is normal in caliber.  Portal vein, SMV, and splenic veins are patent.     Body wall/extraperitoneal soft tissues: Unremarkable.     Bones: Numerous metastatic lesions scattered throughout the visualized spine and ribs bilaterally, some of which have increased in size from prior exam particular within the lower thoracic spine.     Miscellaneous: No intraperitoneal free fluid.     Impression:     Mixed response in this patient with known metastatic neuroendocrine tumor.  Many hepatic lesions demonstrate post treatment changes from interval chemoembolization, however there are new and enlarging lesions in the liver, spleen, mesentery, and axial skeleton consistent with worsening metastatic disease.    CT chest/abdomen/pelvis (4/16/21): I have personally reviewed the images  - Innumerable lesions in the liver, spleen, osseous structures are not significantly changed from the recent MRI of the abdomen dated 04/07/2021 and are consistent with metastatic disease, noting that osseous lesions in the thorax have increased in size since they were most recently imaged on thoracic CT of 11/16/2020.  Index lesions are detailed above.     Stable soft tissue nodule at the right cardiophrenic angle and mesenteric implant are also unchanged from prior MRI.  These are nonspecific but suspicious for metastatic disease.     Stable calcific density within the bladder near the left UVJ  measuring 5 mm suspicious for calculus.  There is no evidence of hydronephrosis.  There is mild bladder wall thickening which could in part be due to underdistention although cystitis is not excluded.     1.5 cm right thyroid nodule.  This was better evaluated on previous ultrasound, and FNA was recommended.     Stable right adrenal adenoma.     Slight interval improvement in ground-glass opacity within the right lower lobe, of doubtful significance      Gallium 68 PET/CT scan (4/19/21): I have personally reviewed the images  Progressed disseminated metastatic disease particularly of the skeleton and liver as detailed above.  Although the cortical bone of the right femur remains intact this time, there is likely increased risk for pathologic fracture.     Small hyperdensity within the nondistended bladder which previously was characterized as near the left UVJ.  No dilated ureter or hydronephrosis.    MRI lumbar spine (4/13/21):  Degenerative changes of the lumbar spine detailed above.  Mild right neural foraminal narrowing noted at L3-L4.  Otherwise overall similar to prior study.     Known bone metastases throughout the visualized axial skeleton, progressed compared to prior exam.     Stable right adrenal lesion, previously characterized as an adenoma    MRI abdomen (4/7/21):  1. In this patient with known metastatic neuroendocrine tumor there has been interval detrimental change as follows:  *Interval increased size of numerous hepatic lesions.  *Interval increased size of multiple splenic lesions.  *Interval increased size of an indeterminate enhancing mesenteric nodule.  *Interval increased size of a right cardiophrenic lymph node versus exophytic hepatic metastasis.  *Interval increased size of numerous osseous metastases.  Possible anterior epidural tumor extension noted at the L4-L5 level.  Consider further characterization with lumbar spine MRI.  2.  Stable right adrenal adenoma.         Assessment:      "  1. Bronchial carcinoid tumors    2. Secondary neuroendocrine tumor of liver    3. Secondary malignant neoplasm of spleen    4. Secondary malignant neoplasm of bone    5. Secondary carcinoid tumor of peritoneum    6. 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 Immunodeficiency due to drug therapy    8. Chronic neoplasm-related pain    9. Hx of BKA, left    10. Anemia in neoplastic disease           Plan:     1. Bronchial carcinoid tumor  - I have reviewed his chart.  - he has undergone multiple therapies, including left upper lobectomy (1/2016), capecitabine/temozolomide (7/2017 - 2/2019), lanreotide (since 7/2017), zoledronic acid (since 7/2017), trans-arterial chemoembolization (10/2018, 3/2019), peptide radionuclide receptor therapy (4/10/19, 6/5/19)  - CT chest/abdomen/pelvis (4/16/21) revealed progressive disease.  - Gallium 68 PET/CT scan (4/19/21) confirmed progressive disease  - his case was discussed at neuroendocrine conference. Recommendation was to proceed with TACE and PRRT  - he underwent liver-directed therapy (trans-arterial chemoembolization) and biopsy on 7/21/21.  - MRI abdomen (10/11/21) revealed a mixed response. However, the comparison images were from April 2021, so I suspect that some of the progression is from prior to when PRRT was restarted in August 2021.   - he completed a second dose of PRRT.  - his case was discussed at neuroendocrine conference on 10/19/21. Information from that discussion:  "Good response to right lobe cTACE. Slow progression in the left lobe and outside the liver since 4/2021.  Recommend left lobe cTACE."  - he never underwent repeat TACE due to cataract surgery  - MRI abdomen (1/18/22) revealed "mild interval progression of diffuse hepatic metastasis with several lesions demonstrating interval growth and increased arterial enhancing component."  - continue lanreotide.   - I will present his case at neuroendocrine conference next week. " Follow-up plan will be determined after the conference.    2. Chronic neoplasm-related pain  - stable. Continue oxycodone as needed.    3. Anemia in neoplastic disease  - Labs have been reviewed. Hemoglobin is 12 g/dL.  - continue to monitor    4. Advance Care Planning     Power of   After our discussion (at previous visit), the patient decided to complete a HCPOA and appointed his wife Monica Becker ([phone]).        - I will present his case at neuroendocrine conference next week. Follow-up plan will be determined after the conference.    Brodie Chung M.D.  Hematology/Oncology  [facility] - [address], [address]  [place] [address]  Phone: [phone]  Fax: [phone]

## 2022-01-18 NOTE — NURSING
Pt arrived for Lanreotide.  Pt tolerated injection SQ to R buttock.  Discharged to home with wife and appt calendar

## 2022-01-19 ENCOUNTER — OFFICE VISIT (OUTPATIENT)
Dept: HEMATOLOGY/ONCOLOGY | Facility: CLINIC | Age: 67
End: 2022-01-19
Payer: MEDICARE

## 2022-01-19 VITALS
WEIGHT: 248.44 LBS | TEMPERATURE: 98 F | OXYGEN SATURATION: 98 % | HEIGHT: 72 IN | DIASTOLIC BLOOD PRESSURE: 78 MMHG | BODY MASS INDEX: 33.65 KG/M2 | HEART RATE: 60 BPM | SYSTOLIC BLOOD PRESSURE: 132 MMHG

## 2022-01-19 DIAGNOSIS — Z89.512 HX OF BKA, LEFT: ICD-10-CM

## 2022-01-19 DIAGNOSIS — D84.821 IMMUNODEFICIENCY DUE TO DRUG THERAPY: ICD-10-CM

## 2022-01-19 DIAGNOSIS — G89.3 CHRONIC NEOPLASM-RELATED PAIN: ICD-10-CM

## 2022-01-19 DIAGNOSIS — C78.89 SECONDARY MALIGNANT NEOPLASM OF SPLEEN: ICD-10-CM

## 2022-01-19 DIAGNOSIS — C77.8 SECONDARY MALIGNANT NEOPLASM OF LYMPH NODES OF MULTIPLE SITES: ICD-10-CM

## 2022-01-19 DIAGNOSIS — C79.51 SECONDARY MALIGNANT NEOPLASM OF BONE: ICD-10-CM

## 2022-01-19 DIAGNOSIS — C7B.04 SECONDARY CARCINOID TUMOR OF PERITONEUM: ICD-10-CM

## 2022-01-19 DIAGNOSIS — C7B.8 SECONDARY NEUROENDOCRINE TUMOR OF LIVER: ICD-10-CM

## 2022-01-19 DIAGNOSIS — Z79.899 IMMUNODEFICIENCY DUE TO DRUG THERAPY: ICD-10-CM

## 2022-01-19 DIAGNOSIS — D3A.090 BRONCHIAL CARCINOID TUMORS: Primary | ICD-10-CM

## 2022-01-19 DIAGNOSIS — D63.0 ANEMIA IN NEOPLASTIC DISEASE: ICD-10-CM

## 2022-01-19 PROCEDURE — 99999 PR PBB SHADOW E&M-EST. PATIENT-LVL III: ICD-10-PCS | Mod: PBBFAC,,, | Performed by: INTERNAL MEDICINE

## 2022-01-19 PROCEDURE — 99213 OFFICE O/P EST LOW 20 MIN: CPT | Mod: PBBFAC,PO | Performed by: INTERNAL MEDICINE

## 2022-01-19 PROCEDURE — 99999 PR PBB SHADOW E&M-EST. PATIENT-LVL III: CPT | Mod: PBBFAC,,, | Performed by: INTERNAL MEDICINE

## 2022-01-19 PROCEDURE — 99215 OFFICE O/P EST HI 40 MIN: CPT | Mod: S$PBB,,, | Performed by: INTERNAL MEDICINE

## 2022-01-19 PROCEDURE — 99215 PR OFFICE/OUTPT VISIT, EST, LEVL V, 40-54 MIN: ICD-10-PCS | Mod: S$PBB,,, | Performed by: INTERNAL MEDICINE

## 2022-01-19 NOTE — Clinical Note
Good morning,  Can you add him to neuroendocrine conference on 1/25/22? Question is about more proceeding with more liver directed therapy.  Thanks!

## 2022-01-24 ENCOUNTER — PATIENT MESSAGE (OUTPATIENT)
Dept: HEMATOLOGY/ONCOLOGY | Facility: CLINIC | Age: 67
End: 2022-01-24
Payer: MEDICARE

## 2022-01-24 DIAGNOSIS — Z76.89 ENCOUNTER TO ESTABLISH CARE: Primary | ICD-10-CM

## 2022-01-25 DIAGNOSIS — D3A.090 BRONCHIAL CARCINOID TUMORS: Primary | ICD-10-CM

## 2022-01-25 DIAGNOSIS — C78.7 SECONDARY MALIGNANT NEOPLASM OF LIVER: ICD-10-CM

## 2022-02-07 ENCOUNTER — TELEPHONE (OUTPATIENT)
Dept: NEUROLOGY | Facility: HOSPITAL | Age: 67
End: 2022-02-07
Payer: MEDICARE

## 2022-02-07 NOTE — TELEPHONE ENCOUNTER
----- Message from Selma Nicholas sent at 2/7/2022  3:05 PM CST -----  Contact: Shadi DIAZ Patient Connect [phone] ext [phone]  H[place]     Type: Requesting to speak with nurse    Who Called: Shadi   Regarding: has questions regarding pt's treatment   Would the patient rather a call back or a response via [facility]? Call back  Best Call Back Number: [phone] ext [phone]  Additional Information: n/a

## 2022-02-09 ENCOUNTER — TELEPHONE (OUTPATIENT)
Dept: NEUROLOGY | Facility: HOSPITAL | Age: 67
End: 2022-02-09
Payer: MEDICARE

## 2022-02-09 NOTE — TELEPHONE ENCOUNTER
Return call to Esteban white/ EMILY  [phone] asking if pt was taking PRRT this year. He wanted to know if he is taking PRRT this year. I told him that is took his last PRRT in October 2021. He verbalized understanding

## 2022-02-15 ENCOUNTER — INFUSION (OUTPATIENT)
Dept: INFUSION THERAPY | Facility: HOSPITAL | Age: 67
End: 2022-02-15
Payer: MEDICARE

## 2022-02-15 ENCOUNTER — TELEPHONE (OUTPATIENT)
Dept: HEMATOLOGY/ONCOLOGY | Facility: CLINIC | Age: 67
End: 2022-02-15
Payer: MEDICARE

## 2022-02-15 DIAGNOSIS — C7B.8 SECONDARY NEUROENDOCRINE TUMOR OF LIVER: ICD-10-CM

## 2022-02-15 DIAGNOSIS — C78.7 SECONDARY MALIGNANT NEOPLASM OF LIVER: ICD-10-CM

## 2022-02-15 DIAGNOSIS — C7B.02 METASTATIC MALIGNANT CARCINOID TUMOR TO LIVER: ICD-10-CM

## 2022-02-15 DIAGNOSIS — C7A.090 CARCINOID BRONCHIAL ADENOMA, UNSPECIFIED LATERALITY: Primary | ICD-10-CM

## 2022-02-15 DIAGNOSIS — C78.89 SECONDARY MALIGNANT NEOPLASM OF SPLEEN: ICD-10-CM

## 2022-02-15 DIAGNOSIS — C7B.8 SECONDARY NEUROENDOCRINE TUMOR OF BONE: ICD-10-CM

## 2022-02-15 DIAGNOSIS — D3A.090 BRONCHIAL CARCINOID TUMORS: Primary | ICD-10-CM

## 2022-02-15 DIAGNOSIS — C7A.090 CARCINOID BRONCHIAL ADENOMA OF LEFT LUNG: ICD-10-CM

## 2022-02-15 DIAGNOSIS — C7B.8 SECONDARY MALIGNANT NEUROENDOCRINE TUMOR OF LIVER: ICD-10-CM

## 2022-02-15 DIAGNOSIS — C77.8 SECONDARY MALIGNANT NEOPLASM OF LYMPH NODES OF MULTIPLE SITES: ICD-10-CM

## 2022-02-15 DIAGNOSIS — C7A.090 MALIGNANT CARCINOID TUMOR OF BRONCHUS AND LUNG: ICD-10-CM

## 2022-02-15 DIAGNOSIS — D3A.090 BRONCHIAL CARCINOID TUMORS: ICD-10-CM

## 2022-02-15 DIAGNOSIS — C79.51 SECONDARY MALIGNANT NEOPLASM OF BONE: ICD-10-CM

## 2022-02-15 PROCEDURE — 63600175 PHARM REV CODE 636 W HCPCS: Mod: JG | Performed by: INTERNAL MEDICINE

## 2022-02-15 PROCEDURE — 96372 THER/PROPH/DIAG INJ SC/IM: CPT

## 2022-02-15 RX ORDER — LANREOTIDE ACETATE 120 MG/.5ML
120 INJECTION SUBCUTANEOUS
Status: CANCELLED | OUTPATIENT
Start: 2022-02-15

## 2022-02-15 RX ORDER — LANREOTIDE ACETATE 120 MG/.5ML
120 INJECTION SUBCUTANEOUS
Status: DISCONTINUED | OUTPATIENT
Start: 2022-02-15 | End: 2022-02-15 | Stop reason: HOSPADM

## 2022-02-15 RX ADMIN — LANREOTIDE ACETATE 120 MG: 120 INJECTION SUBCUTANEOUS at 10:02

## 2022-02-15 NOTE — NURSING
Patient received Lanreotide injection SQ to right hip.  Tolerated well. Band-aid applied to site. C/D/I.  Patient ambulated off unit with steady gait.

## 2022-03-06 NOTE — PROCEDURES
Radiology Post-Procedure Note    Pre Op Diagnosis: Metastatatic Neuroendocrine    Post Op Diagnosis: Same    Procedure: Chemoembolization    Procedure Performed by: Kelvin    Written Informed Consent Obtained: Yes    Specimen Removed: None    Estimated Blood Loss: Minimal    Findings:     After placement of a right femoral artery sheath, a 5-Estonian catheter was inserted and angiography of the celiac artery and superior mesenteric artery for anatomic evaluation and localization of hepatic tumor.  A microcatheter was introduced into feeding arteries of the [segment 4/left  ] hepatic lobe and liquid doxorubicin and LC beads  were injected until slow flow was achieved.  Post procedural angiography revealed no complications.    Right femoral artery angiogram was performed and the sheath was removed.  Hemostasis was achieved using AngioSeal technique.  There was no hematoma at the time of hemostasis.    The patient tolerated procedure well.  Please see Imaging report for further details.    .Nestor LEONE M.D. personally reviewed and agree with the above dictated note.    Allergy;

## 2022-03-16 ENCOUNTER — OFFICE VISIT (OUTPATIENT)
Dept: INTERNAL MEDICINE | Facility: CLINIC | Age: 67
End: 2022-03-16
Payer: MEDICARE

## 2022-03-16 ENCOUNTER — PATIENT MESSAGE (OUTPATIENT)
Dept: HEMATOLOGY/ONCOLOGY | Facility: CLINIC | Age: 67
End: 2022-03-16
Payer: MEDICARE

## 2022-03-16 ENCOUNTER — LAB VISIT (OUTPATIENT)
Dept: LAB | Facility: HOSPITAL | Age: 67
End: 2022-03-16
Payer: MEDICARE

## 2022-03-16 VITALS
BODY MASS INDEX: 33.08 KG/M2 | SYSTOLIC BLOOD PRESSURE: 130 MMHG | WEIGHT: 244.25 LBS | HEART RATE: 63 BPM | HEIGHT: 72 IN | DIASTOLIC BLOOD PRESSURE: 68 MMHG

## 2022-03-16 DIAGNOSIS — Z76.89 ENCOUNTER TO ESTABLISH CARE: ICD-10-CM

## 2022-03-16 DIAGNOSIS — C7A.090 CARCINOID BRONCHIAL ADENOMA, UNSPECIFIED LATERALITY: Primary | ICD-10-CM

## 2022-03-16 DIAGNOSIS — C79.51 SECONDARY MALIGNANT NEOPLASM OF BONE: ICD-10-CM

## 2022-03-16 DIAGNOSIS — D3A.090 BRONCHIAL CARCINOID TUMORS: ICD-10-CM

## 2022-03-16 DIAGNOSIS — Z13.220 SCREENING CHOLESTEROL LEVEL: ICD-10-CM

## 2022-03-16 DIAGNOSIS — C7B.8 SECONDARY NEUROENDOCRINE TUMOR OF BONE: ICD-10-CM

## 2022-03-16 DIAGNOSIS — R93.89 ABNORMAL FINDINGS ON DIAGNOSTIC IMAGING OF OTHER SPECIFIED BODY STRUCTURES: ICD-10-CM

## 2022-03-16 DIAGNOSIS — S78.112A ABOVE KNEE AMPUTATION OF LEFT LOWER EXTREMITY: ICD-10-CM

## 2022-03-16 DIAGNOSIS — C7B.8 SECONDARY MALIGNANT NEUROENDOCRINE TUMOR OF LIVER: ICD-10-CM

## 2022-03-16 DIAGNOSIS — Z23 NEED FOR INFLUENZA VACCINATION: ICD-10-CM

## 2022-03-16 DIAGNOSIS — Z11.59 ENCOUNTER FOR HEPATITIS C SCREENING TEST FOR LOW RISK PATIENT: ICD-10-CM

## 2022-03-16 DIAGNOSIS — Z01.89 ROUTINE LAB DRAW: ICD-10-CM

## 2022-03-16 DIAGNOSIS — C78.89 SECONDARY MALIGNANT NEOPLASM OF SPLEEN: ICD-10-CM

## 2022-03-16 DIAGNOSIS — G89.3 CHRONIC NEOPLASM-RELATED PAIN: ICD-10-CM

## 2022-03-16 DIAGNOSIS — Z23 NEED FOR VACCINATION WITH 13-POLYVALENT PNEUMOCOCCAL CONJUGATE VACCINE: ICD-10-CM

## 2022-03-16 DIAGNOSIS — Z11.4 ENCOUNTER FOR SCREENING FOR HIV: ICD-10-CM

## 2022-03-16 DIAGNOSIS — Z12.5 PROSTATE CANCER SCREENING: ICD-10-CM

## 2022-03-16 DIAGNOSIS — C7B.8 SECONDARY NEUROENDOCRINE TUMOR OF LIVER: ICD-10-CM

## 2022-03-16 DIAGNOSIS — C78.7 SECONDARY MALIGNANT NEOPLASM OF LIVER: ICD-10-CM

## 2022-03-16 DIAGNOSIS — C7A.090 MALIGNANT CARCINOID TUMOR OF BRONCHUS AND LUNG: ICD-10-CM

## 2022-03-16 DIAGNOSIS — R79.9 ABNORMAL FINDING OF BLOOD CHEMISTRY, UNSPECIFIED: ICD-10-CM

## 2022-03-16 DIAGNOSIS — R19.8 OTHER SPECIFIED SYMPTOMS AND SIGNS INVOLVING THE DIGESTIVE SYSTEM AND ABDOMEN: ICD-10-CM

## 2022-03-16 DIAGNOSIS — Z23 NEED FOR SHINGLES VACCINE: ICD-10-CM

## 2022-03-16 DIAGNOSIS — Z12.11 COLON CANCER SCREENING: ICD-10-CM

## 2022-03-16 DIAGNOSIS — E66.9 OBESITY (BMI 30-39.9): ICD-10-CM

## 2022-03-16 DIAGNOSIS — C77.8 SECONDARY MALIGNANT NEOPLASM OF LYMPH NODES OF MULTIPLE SITES: ICD-10-CM

## 2022-03-16 DIAGNOSIS — C7B.02 METASTATIC MALIGNANT CARCINOID TUMOR TO LIVER: ICD-10-CM

## 2022-03-16 DIAGNOSIS — Z76.89 ENCOUNTER TO ESTABLISH CARE WITH NEW DOCTOR: ICD-10-CM

## 2022-03-16 LAB
CHOLEST SERPL-MCNC: 231 MG/DL (ref 120–199)
CHOLEST/HDLC SERPL: 4.7 {RATIO} (ref 2–5)
COMPLEXED PSA SERPL-MCNC: 4.1 NG/ML (ref 0–4)
HDLC SERPL-MCNC: 49 MG/DL (ref 40–75)
HDLC SERPL: 21.2 % (ref 20–50)
LDLC SERPL CALC-MCNC: 154.8 MG/DL (ref 63–159)
NONHDLC SERPL-MCNC: 182 MG/DL
TRIGL SERPL-MCNC: 136 MG/DL (ref 30–150)
TSH SERPL DL<=0.005 MIU/L-ACNC: 3.2 UIU/ML (ref 0.4–4)

## 2022-03-16 PROCEDURE — 87389 HIV-1 AG W/HIV-1&-2 AB AG IA: CPT | Performed by: NURSE PRACTITIONER

## 2022-03-16 PROCEDURE — 84153 ASSAY OF PSA TOTAL: CPT | Performed by: NURSE PRACTITIONER

## 2022-03-16 PROCEDURE — 86803 HEPATITIS C AB TEST: CPT | Performed by: NURSE PRACTITIONER

## 2022-03-16 PROCEDURE — 80061 LIPID PANEL: CPT | Performed by: NURSE PRACTITIONER

## 2022-03-16 PROCEDURE — 36415 COLL VENOUS BLD VENIPUNCTURE: CPT | Performed by: NURSE PRACTITIONER

## 2022-03-16 PROCEDURE — 99214 OFFICE O/P EST MOD 30 MIN: CPT | Mod: S$PBB,,, | Performed by: NURSE PRACTITIONER

## 2022-03-16 PROCEDURE — 99215 OFFICE O/P EST HI 40 MIN: CPT | Mod: PBBFAC | Performed by: NURSE PRACTITIONER

## 2022-03-16 PROCEDURE — 84443 ASSAY THYROID STIM HORMONE: CPT | Performed by: NURSE PRACTITIONER

## 2022-03-16 PROCEDURE — 99999 PR PBB SHADOW E&M-EST. PATIENT-LVL V: CPT | Mod: PBBFAC,,, | Performed by: NURSE PRACTITIONER

## 2022-03-16 PROCEDURE — 99999 PR PBB SHADOW E&M-EST. PATIENT-LVL V: ICD-10-PCS | Mod: PBBFAC,,, | Performed by: NURSE PRACTITIONER

## 2022-03-16 PROCEDURE — 99214 PR OFFICE/OUTPT VISIT, EST, LEVL IV, 30-39 MIN: ICD-10-PCS | Mod: S$PBB,,, | Performed by: NURSE PRACTITIONER

## 2022-03-16 RX ORDER — GATIFLOXACIN 5 MG/ML
SOLUTION/ DROPS OPHTHALMIC
COMMUNITY
Start: 2022-03-01 | End: 2022-09-14

## 2022-03-16 RX ORDER — OXYCODONE HYDROCHLORIDE 5 MG/1
5 TABLET ORAL EVERY 6 HOURS PRN
Qty: 60 TABLET | Refills: 0 | Status: SHIPPED | OUTPATIENT
Start: 2022-03-16 | End: 2022-05-25 | Stop reason: SDUPTHER

## 2022-03-16 RX ORDER — TOBRAMYCIN AND DEXAMETHASONE 3; 1 MG/ML; MG/ML
SUSPENSION/ DROPS OPHTHALMIC
COMMUNITY
Start: 2022-03-01 | End: 2022-09-26

## 2022-03-16 RX ORDER — PREDNISOLONE ACETATE 10 MG/ML
SUSPENSION/ DROPS OPHTHALMIC
COMMUNITY
Start: 2022-02-07 | End: 2022-09-26

## 2022-03-16 RX ORDER — KETOROLAC TROMETHAMINE 5 MG/ML
SOLUTION OPHTHALMIC
COMMUNITY
Start: 2022-02-09 | End: 2022-09-14

## 2022-03-16 NOTE — PROGRESS NOTES
"Subjective:       Patient ID: Elroy Rahman is a 66 y.o. male.    Chief Complaint: Annual Exam    Pt new to me, here for annual to establish care.    Previous PCP Dr. Leela Cruz    Referred by his oncologist Dr. Chung. Last visit with him was 1-19-22. Plan of care that visit was the following:  Plan:     1. Bronchial carcinoid tumor  - I have reviewed his chart.  - he has undergone multiple therapies, including left upper lobectomy (1/2016), capecitabine/temozolomide (7/2017 - 2/2019), lanreotide (since 7/2017), zoledronic acid (since 7/2017), trans-arterial chemoembolization (10/2018, 3/2019), peptide radionuclide receptor therapy (4/10/19, 6/5/19)  - CT chest/abdomen/pelvis (4/16/21) revealed progressive disease.  - Gallium 68 PET/CT scan (4/19/21) confirmed progressive disease  - his case was discussed at neuroendocrine conference. Recommendation was to proceed with TACE and PRRT  - he underwent liver-directed therapy (trans-arterial chemoembolization) and biopsy on 7/21/21.  - MRI abdomen (10/11/21) revealed a mixed response. However, the comparison images were from April 2021, so I suspect that some of the progression is from prior to when PRRT was restarted in August 2021.   - he completed a second dose of PRRT.  - his case was discussed at neuroendocrine conference on 10/19/21. Information from that discussion:  "Good response to right lobe cTACE. Slow progression in the left lobe and outside the liver since 4/2021.  Recommend left lobe cTACE."  - he never underwent repeat TACE due to cataract surgery  - MRI abdomen (1/18/22) revealed "mild interval progression of diffuse hepatic metastasis with several lesions demonstrating interval growth and increased arterial enhancing component."  - continue lanreotide.   - I will present his case at neuroendocrine conference next week. Follow-up plan will be determined after the conference.     2. Chronic neoplasm-related pain  - stable. Continue oxycodone as " needed.     3. Anemia in neoplastic disease  - Labs have been reviewed. Hemoglobin is 12 g/dL.  - continue to monitor     4. Advance Care Planning      Power of   After our discussion (at previous visit), the patient decided to complete a HCPOA and appointed his wife Monica Becker ([phone]).      - I will present his case at neuroendocrine conference next week. Follow-up plan will be determined after the conference.    Pt present with wife today. Next Chemo treatment is in May. Recently had cataract surgery right eye. He needs a PCP as his oncologist told him his pain meds will need to come from his PCP. Not in pain now and uses roxicodone very sparingly.  reviewed. Wife states she thinks he needs PT for his mobility, had left BKA. Pt disagrees and states he can walk fine.    Review of Systems   Constitutional: Negative for activity change, appetite change and unexpected weight change.   HENT: Negative for hearing loss and voice change.    Eyes: Negative for visual disturbance.   Respiratory: Negative for apnea, cough, chest tightness and shortness of breath.    Cardiovascular: Negative for chest pain, palpitations and leg swelling.   Gastrointestinal: Negative for abdominal distention, abdominal pain, blood in stool, constipation, diarrhea, nausea and vomiting.   Endocrine: Negative for cold intolerance, heat intolerance, polydipsia, polyphagia and polyuria.   Genitourinary: Negative for difficulty urinating, dysuria and penile pain.   Musculoskeletal: Negative for arthralgias and myalgias.   Integumentary:  Negative for color change, pallor, rash and wound.   Allergic/Immunologic: Negative for environmental allergies and food allergies.   Neurological: Negative for dizziness, syncope, weakness, light-headedness, numbness and headaches.   Hematological: Negative for adenopathy. Does not bruise/bleed easily.   Psychiatric/Behavioral: Negative for agitation, behavioral problems, sleep disturbance and  suicidal ideas.      Review of patient's allergies indicates:   Allergen Reactions    Epinephrine Anaphylaxis and Other (See Comments)     Can Cause A Carcinoid crisis     Current Outpatient Medications:     ammonium lactate 12 % Crea, Qd-bid arms and dorsal hands, Disp: 140 g, Rfl: 3    diphenoxylate-atropine 2.5-0.025 mg (LOMOTIL) 2.5-0.025 mg per tablet, Take 1 tablet by mouth 4 (four) times daily as needed for Diarrhea., Disp: 30 tablet, Rfl: 1    erythromycin (ROMYCIN) ophthalmic ointment, SMARTSIG:Sparingly Right Eye Every Night, Disp: , Rfl:     gatifloxacin 0.5 % Drop drops, Place into both eyes., Disp: , Rfl:     ibuprofen (ADVIL,MOTRIN) 200 MG tablet, Take 600 mg by mouth every 6 (six) hours as needed for Pain., Disp: , Rfl:     ketorolac 0.5% (ACULAR) 0.5 % Drop, INSTILL 1 DROP IN LEFT EYE FOUR TIMES DAILY STARTING 2 DAYS PRIOR TO SURGERY, Disp: , Rfl:     lanreotide (SOMATULINE DEPOT) 120 mg/0.5 mL Syrg, Inject 120 mg into the skin every 28 days., Disp: , Rfl:     ondansetron (ZOFRAN) 4 MG tablet, Take 1 tablet (4 mg total) by mouth 2 (two) times daily., Disp: 20 tablet, Rfl: 1    prednisoLONE acetate (PRED FORTE) 1 % DrpS, SHAKE LIQUID AND INSTILL 1 DROP IN RIGHT EYE FOUR TIMES DAILY FOR 1 WEEK, Disp: , Rfl:     tobramycin-dexamethasone 0.3-0.1% (TOBRADEX) 0.3-0.1 % DrpS, SHAKE LIQUID AND INSTILL 1 DROP IN RIGHT EYE FOUR TIMES DAILY, Disp: , Rfl:     Patient Active Problem List   Diagnosis    Malignant carcinoid tumor of bronchus and lung    Paroxysmal atrial fibrillation    Sleep apnea    Liver lesion    Bronchial carcinoid tumors    Secondary neuroendocrine tumor of liver    Secondary neuroendocrine tumor of bone    Elevated bilirubin    Secondary malignant neoplasm of liver    Incarcerated umbilical hernia    Carcinoid bronchial adenoma of left lung    Secondary malignant neuroendocrine tumor of liver    Vision loss of left eye    Above knee amputation of left lower extremity     Myopathy    Acute muscle weakness    UTI (urinary tract infection)    Sepsis due to methicillin susceptible Staphylococcus aureus    MAGDI (acute kidney injury)    Impaired mobility and ADLs    Amputation stump infection    MSSA bacteremia    Abscess    Physical deconditioning    Decreased strength, endurance, and mobility    Balance problem    Gait abnormality    Thyroid nodule    Secondary malignant neoplasm of bone    Secondary malignant neoplasm of spleen    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    Metastatic malignant carcinoid tumor to liver    Carcinoid bronchial adenoma     Past Medical History:   Diagnosis Date    Allergy     Carcinoid bronchial adenoma of left lung     PRRT    Chemotherapy follow-up examination 8/25/2017    Cough with hemoptysis     mild/intermittent    Elevated bilirubin 7/2/2018    Hx of BKA, left     above knee    Kidney stone     Mild heartburn     Physical deconditioning 6/22/2020    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    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    Sepsis due to methicillin susceptible Staphylococcus aureus 09/14/2019    MSSA bacteremia from Amputation stump infection    Sleep apnea     Snores    Thyroid nodule 11/25/2020    Urinary tract infection     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     Past Surgical History:   Procedure Laterality Date    ABDOMINAL SURGERY      Bka Left     HERNIA REPAIR      IRRIGATION AND DEBRIDEMENT OF LOWER EXTREMITY Left 9/20/2019    Procedure: IRRIGATION AND DEBRIDEMENT, LOWER EXTREMITY - left - cysto tubing - cultures;  Surgeon: Jesus Arias MD;  Location: [facility] OR [address] [place] [address];  Service: Orthopedics;  Laterality: Left;    LAPAROSCOPIC REPAIR OF INCARCERATED UMBILICAL HERNIA N/A 11/27/2018    Procedure: REPAIR, HERNIA, UMBILICAL, INCARCERATED, LAPAROSCOPIC;  Surgeon: Coco Guidry MD;  Location: [facility];  Service: General;  Laterality:  N/A;  video    TRANSESOPHAGEAL ECHOCARDIOGRAPHY N/A 9/18/2019    Procedure: ECHOCARDIOGRAM, TRANSESOPHAGEAL;  Surgeon: Grace Diagnostic Provider;  Location: [facility] EP LAB;  Service: Anesthesiology;  Laterality: N/A;     Social History     Socioeconomic History    Marital status:    Tobacco Use    Smoking status: Never Smoker    Smokeless tobacco: Never Used   Substance and Sexual Activity    Alcohol use: Yes     Alcohol/week: 1.0 standard drink     Types: 1 Cans of beer per week     Comment: rare    Drug use: No     Family History   Problem Relation Age of Onset    Cancer Mother         lung    Cancer Sister     Anesthesia problems Neg Hx            Objective:       Vitals:    03/16/22 0830   BP: 130/68   Pulse: 63   Weight: 110.8 kg (244 lb 4.3 oz)   Height: 6' (1.829 m)   PainSc: 0-No pain     Body mass index is 33.13 kg/m².    Physical Exam  Vitals and nursing note reviewed.   Constitutional:       Appearance: Normal appearance. He is well-developed. He is obese.   HENT:      Head: Normocephalic.      Right Ear: Tympanic membrane, ear canal and external ear normal. There is no impacted cerumen.      Left Ear: Tympanic membrane, ear canal and external ear normal. There is no impacted cerumen.      Nose: Nose normal.      Mouth/Throat:      Mouth: Mucous membranes are moist.      Pharynx: Oropharynx is clear.   Eyes:      General: Lids are normal. Lids are everted, no foreign bodies appreciated.      Extraocular Movements: Extraocular movements intact.      Conjunctiva/sclera: Conjunctivae normal.      Pupils: Pupils are equal, round, and reactive to light.   Neck:      Vascular: No carotid bruit or JVD.      Trachea: Trachea normal.   Cardiovascular:      Rate and Rhythm: Normal rate and regular rhythm.      Pulses: Normal pulses.      Heart sounds: Normal heart sounds.   Pulmonary:      Effort: Pulmonary effort is normal.      Breath sounds: Normal breath sounds.   Abdominal:      General: Bowel  sounds are normal.      Palpations: Abdomen is soft.   Musculoskeletal:      Cervical back: Full passive range of motion without pain, normal range of motion and neck supple.      Left Lower Extremity: Left leg is amputated above knee.   Skin:     General: Skin is warm and dry.      Capillary Refill: Capillary refill takes less than 2 seconds.   Neurological:      General: No focal deficit present.      Mental Status: He is alert and oriented to person, place, and time.   Psychiatric:         Mood and Affect: Mood normal.         Speech: Speech normal.         Behavior: Behavior normal.         Thought Content: Thought content normal.         Judgment: Judgment normal.         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    Metastatic malignant carcinoid tumor to liver    Carcinoid bronchial adenoma - Primary       Orthopedic    Above knee amputation of left lower extremity      Other Visit Diagnoses     Routine lab draw        Relevant Orders    Lipid Panel    Hepatitis C Antibody    HIV 1/2 Ag/Ab (4th Gen)    PSA, Screening    TSH    Screening cholesterol level        Relevant Orders    Lipid Panel    Encounter for hepatitis C screening test for low risk patient        Relevant Orders    Hepatitis C Antibody    Need for vaccination with 13-polyvalent pneumococcal conjugate vaccine        Encounter for screening for HIV        Relevant Orders    HIV 1/2 Ag/Ab (4th Gen)    Colon cancer screening        Relevant Orders    Case Request Endoscopy: COLONOSCOPY (Completed)    Need for shingles  vaccine        Encounter to establish care with new doctor        Relevant Orders    Ambulatory referral/consult to Internal Medicine    Encounter to establish care        BMI 33.0-33.9,adult        Obesity (BMI 30-39.9)        Need for influenza vaccination        Abnormal finding of blood chemistry, unspecified         Relevant Orders    Lipid Panel    TSH    Other specified symptoms and signs involving the digestive system and abdomen         Relevant Orders    Case Request Endoscopy: COLONOSCOPY (Completed)    Prostate cancer screening        Relevant Orders    PSA, Screening    Abnormal findings on diagnostic imaging of other specified body structures         Relevant Orders    TSH          Plan:       Elroy was seen today for annual exam.    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 Chung, Hem/Onc, next tx in mMay    Above knee amputation of left lower extremity  Stable    Routine lab draw  -     Lipid Panel; Future  -     Hepatitis C Antibody; Future  -     HIV 1/2 Ag/Ab (4th Gen); Future  -     PSA, Screening; Future  -     TSH; Future    Screening cholesterol level  -     Lipid Panel; Future    Encounter for hepatitis C screening test for low risk patient  -     Hepatitis C Antibody; Future    Need for vaccination with 13-polyvalent pneumococcal conjugate vaccine  Declines    Encounter for screening for HIV  -     HIV 1/2 Ag/Ab (4th Gen); Future    Colon cancer screening  -     Case Request Endoscopy:  COLONOSCOPY    Need for shingles vaccine  Declines    Encounter to establish care with new doctor  -     Ambulatory referral/consult to Internal Medicine; Future    Encounter to establish care  -     Ambulatory referral/consult to Family Practice    BMI 33.0-33.9,adult  BMI reviewed    Obesity (BMI 30-39.9)  BMI reviewed.    Diet and exercise to lose weight.    Need for influenza vaccination  Declines    Abnormal finding of blood chemistry, unspecified   -     Lipid Panel; Future  -     TSH; Future    Other specified symptoms and signs involving the digestive system and abdomen   -     Case Request Endoscopy: COLONOSCOPY    Prostate cancer screening  -     PSA, Screening; Future    Abnormal findings on diagnostic imaging of other specified body structures   -     TSH; Future    Fasting lab orders, will call with results, if results ok, RTC in 1 yr for annual or sooner prn with Dr. Devyn Kent for follow up and his pain meds    Endoscopy will contact you to schedule your colonoscopy    Prevnar 13, Flu, and Shingles vaccines discussed--Declined vaccinations    Follow up for next available with Dr. Devyn Kent to establish PCP and pain meds.

## 2022-03-16 NOTE — PATIENT INSTRUCTIONS
Fasting lab orders, will call with results, if results ok, RTC in 1 yr for annual or sooner prn with Dr. Devyn Kent for follow up and his pain meds    Endoscopy will contact you to schedule your colonoscopy    Prevnar 13, Flu, and Shingles vaccines discussed--Declined vaccinations

## 2022-03-17 ENCOUNTER — INFUSION (OUTPATIENT)
Dept: INFUSION THERAPY | Facility: HOSPITAL | Age: 67
End: 2022-03-17
Payer: MEDICARE

## 2022-03-17 DIAGNOSIS — C79.51 SECONDARY MALIGNANT NEOPLASM OF BONE: ICD-10-CM

## 2022-03-17 DIAGNOSIS — C78.7 SECONDARY MALIGNANT NEOPLASM OF LIVER: ICD-10-CM

## 2022-03-17 DIAGNOSIS — C7B.8 SECONDARY NEUROENDOCRINE TUMOR OF LIVER: ICD-10-CM

## 2022-03-17 DIAGNOSIS — D3A.090 BRONCHIAL CARCINOID TUMORS: ICD-10-CM

## 2022-03-17 DIAGNOSIS — C7A.090 CARCINOID BRONCHIAL ADENOMA OF LEFT LUNG: ICD-10-CM

## 2022-03-17 DIAGNOSIS — C7B.8 SECONDARY MALIGNANT NEUROENDOCRINE TUMOR OF LIVER: ICD-10-CM

## 2022-03-17 DIAGNOSIS — C77.8 SECONDARY MALIGNANT NEOPLASM OF LYMPH NODES OF MULTIPLE SITES: ICD-10-CM

## 2022-03-17 DIAGNOSIS — C78.89 SECONDARY MALIGNANT NEOPLASM OF SPLEEN: ICD-10-CM

## 2022-03-17 DIAGNOSIS — C7A.090 CARCINOID BRONCHIAL ADENOMA, UNSPECIFIED LATERALITY: Primary | ICD-10-CM

## 2022-03-17 DIAGNOSIS — C7A.090 MALIGNANT CARCINOID TUMOR OF BRONCHUS AND LUNG: ICD-10-CM

## 2022-03-17 DIAGNOSIS — C7B.8 SECONDARY NEUROENDOCRINE TUMOR OF BONE: ICD-10-CM

## 2022-03-17 DIAGNOSIS — C7B.02 METASTATIC MALIGNANT CARCINOID TUMOR TO LIVER: ICD-10-CM

## 2022-03-17 LAB
HCV AB SERPL QL IA: NEGATIVE
HIV 1+2 AB+HIV1 P24 AG SERPL QL IA: NEGATIVE

## 2022-03-17 PROCEDURE — 96372 THER/PROPH/DIAG INJ SC/IM: CPT

## 2022-03-17 PROCEDURE — 63600175 PHARM REV CODE 636 W HCPCS: Mod: JG | Performed by: INTERNAL MEDICINE

## 2022-03-17 RX ORDER — LANREOTIDE ACETATE 120 MG/.5ML
120 INJECTION SUBCUTANEOUS
Status: DISCONTINUED | OUTPATIENT
Start: 2022-03-17 | End: 2022-03-17 | Stop reason: HOSPADM

## 2022-03-17 RX ORDER — LANREOTIDE ACETATE 120 MG/.5ML
120 INJECTION SUBCUTANEOUS
Status: CANCELLED | OUTPATIENT
Start: 2022-03-17

## 2022-03-17 RX ADMIN — LANREOTIDE ACETATE 120 MG: 120 INJECTION SUBCUTANEOUS at 08:03

## 2022-03-31 ENCOUNTER — PATIENT MESSAGE (OUTPATIENT)
Dept: ENDOSCOPY | Facility: HOSPITAL | Age: 67
End: 2022-03-31
Payer: MEDICARE

## 2022-03-31 DIAGNOSIS — Z12.11 SPECIAL SCREENING FOR MALIGNANT NEOPLASMS, COLON: Primary | ICD-10-CM

## 2022-03-31 RX ORDER — SODIUM, POTASSIUM,MAG SULFATES 17.5-3.13G
1 SOLUTION, RECONSTITUTED, ORAL ORAL DAILY
Qty: 1 KIT | Refills: 0 | Status: SHIPPED | OUTPATIENT
Start: 2022-03-31 | End: 2022-04-02

## 2022-04-23 ENCOUNTER — TELEPHONE (OUTPATIENT)
Dept: HEMATOLOGY/ONCOLOGY | Facility: CLINIC | Age: 67
End: 2022-04-23
Payer: MEDICARE

## 2022-04-23 ENCOUNTER — PATIENT MESSAGE (OUTPATIENT)
Dept: HEMATOLOGY/ONCOLOGY | Facility: CLINIC | Age: 67
End: 2022-04-23
Payer: MEDICARE

## 2022-04-23 NOTE — TELEPHONE ENCOUNTER
I sent a message via the portal. I will investigate on 4/25/22 and follow up with patient/his wife.    Brodie Chung M.D.  Hematology/Oncology  [facility] - [address], [address]  [place] [address]  Phone: [phone]  Fax: [phone]

## 2022-04-23 NOTE — TELEPHONE ENCOUNTER
----- Message from Sigrid Capellan sent at 4/23/2022 10:56 AM CDT -----  Type:  Patient  Call    Who Called:Hope/ wife   Would the patient rather a call back or a response via [facility]? Call back or portal  Best Call Back Number: [phone]  Additional Information: calling to find out when is her  next injection.  Wife thought the injection was set up on a monthly basis without having to call in.

## 2022-04-26 ENCOUNTER — TELEPHONE (OUTPATIENT)
Dept: HEMATOLOGY/ONCOLOGY | Facility: CLINIC | Age: 67
End: 2022-04-26
Payer: MEDICARE

## 2022-04-26 NOTE — TELEPHONE ENCOUNTER
----- Message from Brodie Chung MD sent at 4/26/2022  1:21 PM CDT -----  Regarding: f/u regarding lanreotide  Good afternoon,    What's the status of his lanreotide injections at [facility]? I don't see it scheduled.     Thanks!  brodie

## 2022-04-28 ENCOUNTER — INFUSION (OUTPATIENT)
Dept: INFUSION THERAPY | Facility: HOSPITAL | Age: 67
End: 2022-04-28
Payer: MEDICARE

## 2022-04-28 DIAGNOSIS — C7A.090 CARCINOID BRONCHIAL ADENOMA, UNSPECIFIED LATERALITY: Primary | ICD-10-CM

## 2022-04-28 DIAGNOSIS — C78.7 SECONDARY MALIGNANT NEOPLASM OF LIVER: ICD-10-CM

## 2022-04-28 DIAGNOSIS — C7B.02 METASTATIC MALIGNANT CARCINOID TUMOR TO LIVER: ICD-10-CM

## 2022-04-28 DIAGNOSIS — C79.51 SECONDARY MALIGNANT NEOPLASM OF BONE: ICD-10-CM

## 2022-04-28 DIAGNOSIS — C7B.8 SECONDARY MALIGNANT NEUROENDOCRINE TUMOR OF LIVER: ICD-10-CM

## 2022-04-28 DIAGNOSIS — C77.8 SECONDARY MALIGNANT NEOPLASM OF LYMPH NODES OF MULTIPLE SITES: ICD-10-CM

## 2022-04-28 DIAGNOSIS — D3A.090 BRONCHIAL CARCINOID TUMORS: ICD-10-CM

## 2022-04-28 DIAGNOSIS — C7A.090 MALIGNANT CARCINOID TUMOR OF BRONCHUS AND LUNG: ICD-10-CM

## 2022-04-28 DIAGNOSIS — C7B.8 SECONDARY NEUROENDOCRINE TUMOR OF BONE: ICD-10-CM

## 2022-04-28 DIAGNOSIS — C7B.8 SECONDARY NEUROENDOCRINE TUMOR OF LIVER: ICD-10-CM

## 2022-04-28 DIAGNOSIS — C7A.090 CARCINOID BRONCHIAL ADENOMA OF LEFT LUNG: ICD-10-CM

## 2022-04-28 DIAGNOSIS — C78.89 SECONDARY MALIGNANT NEOPLASM OF SPLEEN: ICD-10-CM

## 2022-04-28 PROCEDURE — 96372 THER/PROPH/DIAG INJ SC/IM: CPT

## 2022-04-28 PROCEDURE — 63600175 PHARM REV CODE 636 W HCPCS: Mod: JG | Performed by: INTERNAL MEDICINE

## 2022-04-28 RX ORDER — LANREOTIDE ACETATE 120 MG/.5ML
120 INJECTION SUBCUTANEOUS
Status: DISCONTINUED | OUTPATIENT
Start: 2022-04-28 | End: 2022-04-28 | Stop reason: HOSPADM

## 2022-04-28 RX ORDER — LANREOTIDE ACETATE 120 MG/.5ML
120 INJECTION SUBCUTANEOUS
Status: CANCELLED | OUTPATIENT
Start: 2022-04-28

## 2022-04-28 RX ADMIN — LANREOTIDE ACETATE 120 MG: 120 INJECTION SUBCUTANEOUS at 11:04

## 2022-05-02 ENCOUNTER — HOSPITAL ENCOUNTER (OUTPATIENT)
Dept: RADIOLOGY | Facility: HOSPITAL | Age: 67
Discharge: HOME | End: 2022-05-02
Attending: INTERNAL MEDICINE
Payer: MEDICARE

## 2022-05-02 DIAGNOSIS — D3A.090 BRONCHIAL CARCINOID TUMORS: ICD-10-CM

## 2022-05-02 DIAGNOSIS — C78.7 SECONDARY MALIGNANT NEOPLASM OF LIVER: ICD-10-CM

## 2022-05-02 LAB
CREAT SERPL-MCNC: 1.2 MG/DL (ref 0.5–1.4)
SAMPLE: NORMAL

## 2022-05-02 PROCEDURE — 74183 MRI ABD W/O CNTR FLWD CNTR: CPT | Mod: 26,,, | Performed by: RADIOLOGY

## 2022-05-02 PROCEDURE — 74183 MRI ABDOMEN W WO CONTRAST: ICD-10-PCS | Mod: 26,,, | Performed by: RADIOLOGY

## 2022-05-02 PROCEDURE — A9585 GADOBUTROL INJECTION: HCPCS | Performed by: INTERNAL MEDICINE

## 2022-05-02 PROCEDURE — 74183 MRI ABD W/O CNTR FLWD CNTR: CPT | Mod: TC

## 2022-05-02 PROCEDURE — 25500020 PHARM REV CODE 255: Performed by: INTERNAL MEDICINE

## 2022-05-02 RX ORDER — GADOBUTROL 604.72 MG/ML
10 INJECTION INTRAVENOUS
Status: COMPLETED | OUTPATIENT
Start: 2022-05-02 | End: 2022-05-02

## 2022-05-02 RX ADMIN — GADOBUTROL 10 ML: 604.72 INJECTION INTRAVENOUS at 09:05

## 2022-05-03 NOTE — PROGRESS NOTES
PATIENT: Elroy Rahman  MRN: [id number]  DATE: 5/4/2022    Diagnosis:   1. Bronchial carcinoid tumors    2. Secondary malignant neoplasm of liver    3. Secondary malignant neoplasm of spleen    4. Secondary malignant neoplasm of bone    5. Secondary carcinoid tumor of peritoneum    6. 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 Immunodeficiency due to drug therapy    8. Chronic neoplasm-related pain    9. Hx of BKA, left    10. Anemia in neoplastic disease      Chief Complaint: bronchial carcinoid tumors    Oncologic History:      Oncologic History Bronchial carcinoid, left, diagnosed 1/2016   Metastatic disease to liver and bone 5/2017       Oncologic Treatment Left upper lobectomy 1/2016  CAPTEM 7/2017 - 2/2019 (discontinued due to progression)  Lanreotide 7/2017   Zoledronic acid 7/2017   TACE 10/2018  TACE 3/2019  Lily-177 4/10/19, 6/5/19  TACE  PRRT      Pathology 7/21/21:  Liver, biopsy   Liver with well-differentiated neuroendocrine tumor, WHO Grade 3   Mitotic rate: 8 per 2 millimeter squared   Peritumoral fibrosis and peritumoral necrosis identified   Net panel:   Ki 67 proliferation index: up to 25%   Chromogranin: Positive, 3+ staining, 100% of tumor cells   Synaptophysin: Positive, 3+ staining, 100% of tumor cells   CD 31: Up to 17 vessels per HPF   Uninvolved liver with moderate mixed steatosis, cholestasis, and mild   periportal inflammation    1/2016: Typical carcinoid tumor, well-differentiated T2a, N0, M0 Ki-67 <1%  6/2017: Liver biopsy, well-differentiated, Ki-67 25%             Subjective:    History of Present Illness:   His history dates to 1/2016 when he underwent a left upper lobectomy with Dr. Bell for a typical carcinoid tumor.  His pathological staging was T2a, N0, M0 with Ki-67 less than 1% and mitotic count 0 per 10 high-powered fields.  He done well postoperatively, however, in 5/2017 he was undergoing routine surveillance and a chest CT in picked up  "evidence of progression of disease in the liver.  Dedicated abdominal CT was performed showing multiple liver lesions.  In 6/2017 he underwent a CT-guided liver biopsy which was positive for metastatic neuroendocrine tumor which was well-differentiated with no overt nuclear atypia, however, the Ki-67 was 25%.  He underwent gallium-68 PET/CT and was found to have widespread disease involving the bone, liver and also mesentery.  He was started on CAPTEM in 7/2017 and also Lanreotide and zoledronic acid.  Scans in 10/2017 had shown stability of disease.  Imaging in 1/2018 had shown stability of disease.  Scans in 4/2018 had shown stability of disease.  Scans in 07/2018 had continued to showed mild growth.  In 10/2018 he underwent TACE.  CAPTEM was discontinued in 02/2019 due to progression.  He underwent TACE in 03/2019.  He was started on treatment with PRRT using Lily-177 on 04/10/2019.    - his case was discussed at neuroendocrine conference on 5/4/21. Notes from that conference:  " Progression of widespread receptor positive disease involving bone, liver, spleen, and nodes. Still needs cTACE.  Bones mets   BOARD RECOMMENDATIONS:   Consult IR for biopsy of liver and TACE  Send tissue to [facility]  Consult to Dr MOIZ Chung  Bones scan if positive [place] with Dr VICKI Obando  If Bone not positive external Radiation to bone"    - he underwent liver-directed therapy (trans-arterial chemoembolization) and biopsy on 7/21/21.  - he underwent TACE in September 2021.  - he underwent MRI abdomen on 10/11/21.  - he completed 2 doses of PRRT.  - his case was discussed at neuroendocrine conference on 10/19/21. Information from that discussion:  "Good response to right lobe cTACE. Slow progression in the left lobe and outside the liver since 4/2021.  Recommend left lobe cTACE."  - he underwent MRI abdomen on 1/18/22.    Interval History:  - he presents for a follow-up appointment for his bronchial carcinoid tumor  - he underwent " MRI abdomen on 5/3/22.  - today, he is doing well. His diarrhea is controlled with loperamide. He denies shortness of breath, chest pain, nausea, vomiting, constipation.    Past Medical History:   Past Medical History:   Diagnosis Date    Allergy     Carcinoid bronchial adenoma of left lung     PRRT    Chemotherapy follow-up examination 8/25/2017    Cough with hemoptysis     mild/intermittent    Elevated bilirubin 7/2/2018    Hx of BKA, left     above knee    Kidney stone     Mild heartburn     Physical deconditioning 6/22/2020    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    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    Sepsis due to methicillin susceptible Staphylococcus aureus 09/14/2019    MSSA bacteremia from Amputation stump infection    Sleep apnea     Snores    Thyroid nodule 11/25/2020    Urinary tract infection     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       Past Surgical HIstory:   Past Surgical History:   Procedure Laterality Date    ABDOMINAL SURGERY      Bka Left     HERNIA REPAIR      IRRIGATION AND DEBRIDEMENT OF LOWER EXTREMITY Left 9/20/2019    Procedure: IRRIGATION AND DEBRIDEMENT, LOWER EXTREMITY - left - cysto tubing - cultures;  Surgeon: Jesus Arias MD;  Location: [address];  Service: Orthopedics;  Laterality: Left;    LAPAROSCOPIC REPAIR OF INCARCERATED UMBILICAL HERNIA N/A 11/27/2018    Procedure: REPAIR, HERNIA, UMBILICAL, INCARCERATED, LAPAROSCOPIC;  Surgeon: Coco Guidry MD;  Location: [facility] OR;  Service: General;  Laterality: N/A;  video    TRANSESOPHAGEAL ECHOCARDIOGRAPHY N/A 9/18/2019    Procedure: ECHOCARDIOGRAM, TRANSESOPHAGEAL;  Surgeon: Grace Diagnostic Provider;  Location: [facility] EP LAB;  Service: Anesthesiology;  Laterality: N/A;       Family History:   Family History   Problem Relation Age of Onset    Cancer Mother         lung    Cancer Sister     Anesthesia problems Neg Hx        Social History:  reports that he has  never smoked. He has never used smokeless tobacco. He reports current alcohol use of about 1.0 standard drink of alcohol per week. He reports that he does not use drugs.    Allergies:  Review of patient's allergies indicates:  No Known Allergies    Medications:  Current Outpatient Medications   Medication Sig Dispense Refill    ammonium lactate 12 % Crea Qd-bid arms and dorsal hands 140 g 3    diphenoxylate-atropine 2.5-0.025 mg (LOMOTIL) 2.5-0.025 mg per tablet Take 1 tablet by mouth 4 (four) times daily as needed for Diarrhea. 30 tablet 1    erythromycin (ROMYCIN) ophthalmic ointment SMARTSIG:Sparingly Right Eye Every Night      gatifloxacin 0.5 % Drop drops Place into both eyes.      ibuprofen (ADVIL,MOTRIN) 200 MG tablet Take 600 mg by mouth every 6 (six) hours as needed for Pain.      ketorolac 0.5% (ACULAR) 0.5 % Drop INSTILL 1 DROP IN LEFT EYE FOUR TIMES DAILY STARTING 2 DAYS PRIOR TO SURGERY      lanreotide (SOMATULINE DEPOT) 120 mg/0.5 mL Syrg Inject 120 mg into the skin every 28 days.      ondansetron (ZOFRAN) 4 MG tablet Take 1 tablet (4 mg total) by mouth 2 (two) times daily. 20 tablet 1    oxyCODONE (ROXICODONE) 5 MG immediate release tablet Take 1 tablet (5 mg total) by mouth every 6 (six) hours as needed for Pain. 60 tablet 0    prednisoLONE acetate (PRED FORTE) 1 % DrpS SHAKE LIQUID AND INSTILL 1 DROP IN RIGHT EYE FOUR TIMES DAILY FOR 1 WEEK      tobramycin-dexamethasone 0.3-0.1% (TOBRADEX) 0.3-0.1 % DrpS SHAKE LIQUID AND INSTILL 1 DROP IN RIGHT EYE FOUR TIMES DAILY       No current facility-administered medications for this visit.       Review of Systems   Constitutional: Negative for appetite change, chills, fatigue, fever and unexpected weight change.   HENT: Negative for dental problem, sinus pressure and sneezing.    Eyes: Positive for visual disturbance.   Respiratory: Negative for cough, choking, chest tightness and shortness of breath.    Cardiovascular: Negative for chest pain and  leg swelling.   Gastrointestinal: Negative for abdominal pain, blood in stool, constipation and nausea.   Genitourinary: Negative for difficulty urinating, dysuria and frequency.   Musculoskeletal: Negative for arthralgias and back pain.   Skin: Negative for rash and wound.   Neurological: Negative for dizziness, light-headedness and headaches.   Hematological: Negative for adenopathy. Does not bruise/bleed easily.   Psychiatric/Behavioral: Negative for sleep disturbance. The patient is not nervous/anxious.        ECOG Performance Status:   ECOG SCORE    1 - Restricted in strenuous activity-ambulatory and able to carry out work of a light nature         Objective:      Vitals:   Vitals:    05/04/22 1314   BP: 119/70   BP Location: Right arm   Patient Position: Sitting   BP Method: Large (Automatic)   Pulse: 75   Resp: 18   Temp: 98.5 °F (36.9 °C)   TempSrc: Oral   SpO2: 98%   Weight: 108.5 kg (239 lb 3.2 oz)     BMI: Body mass index is 32.44 kg/m².    Physical Exam  Constitutional:       Appearance: He is well-developed.   HENT:      Head: Normocephalic and atraumatic.   Eyes:      Pupils: Pupils are equal, round, and reactive to light.   Cardiovascular:      Rate and Rhythm: Normal rate and regular rhythm.   Pulmonary:      Effort: Pulmonary effort is normal. No respiratory distress.      Breath sounds: Normal breath sounds.   Abdominal:      General: There is no distension.      Palpations: Abdomen is soft.      Tenderness: There is no abdominal tenderness.   Musculoskeletal:         General: No tenderness.      Cervical back: Normal range of motion and neck supple.   Lymphadenopathy:      Cervical: No cervical adenopathy.   Skin:     General: Skin is warm and dry.   Neurological:      Mental Status: He is alert and oriented to person, place, and time.      Cranial Nerves: No cranial nerve deficit.   Psychiatric:         Behavior: Behavior normal.         Laboratory Data:  Labs have been reviewed.    Lab Results    Component Value Date    WBC 4.56 05/02/2022    HGB 11.7 (L) 05/02/2022    HCT 37.2 (L) 05/02/2022    MCV 89 05/02/2022     05/02/2022         Supplemental Diagnosis  Chromogranin Staining:  Intensity: Moderate to strong. Positive cells: 100 (%)  Synaptophysin Staining:  Intensity: Strong. Positive cells: 100 (%)  CD31: 40 vessels per 1 HPF  Factor VIII: 27 vessels per 1 HPF  Ki67: 1 % tumor cells staining  Immunostains performed with appropriate positive and negative controls.  FINAL PATHOLOGIC DIAGNOSIS  1. Lymph node, level XI, excisional biopsy:  Fragments of benign lymph node with anthracosis and sinus histiocytosis (0/1).  2. Lung, lingular staple line, biopsy:  Lung, negative for neoplasm.  3. Lymph node, level X, excisional biopsy:  1 benign lymph node with anthracotic pigment and sinus histiocytosis (0/1).  4. Lymph node, level XII, excisional biopsy:  2 benign lymph nodes with sinus histiocytosis and anthracotic pigment (0/2).  5. Lung, left upper lobe, lobectomy:  Typical carcinoid tumor (well differentiated neuroendocrine tumor), 4 cm in greatest dimension.  Bronchial and vascular margins are negative for neoplasm.  Uninvolved lung adjacent to the tumor shows emphysematous changes and fibrosis, however remaining lung  appears unremarkable.  Additional immunohistochemical stains for ancillary studies (including synaptophysin, chromogranin and Ki-67)  will be completed and results will be reported in a supplemental report.  See synoptic report in comment section for further details.  6. Lymph node, level VII, excisional biopsy:  2 benign lymph nodes with anthracotic pigment and sinus histiocytosis (0/2).  Comment: Synoptic Report  Specimen: Left upper lobe of lung  Procedure: Lobectomy  Specimen laterality: Left  Tumor size:  Greatest dimension: 4 cm  Tumor focality: Single focus  Histologic type: Typical carcinoid tumor  Visceral pleural invasion: Not identified  Tumor extension: Not  identified  Margins: Bronchial and vascular margins are uninvolved by tumor.  Distance of tumor from closest margin: 1 cm from the bronchial surgical margin.  Lymphovascular invasion: Not identified  Ancillary studies:  Immunohistochemical stains for neuroendocrine markers and Ki-67 will be completed and results will follow in a  supplemental report.  Note: The tumor consists of a proliferation of cells in nests with lewis formation. The tumor cells have salt and  pepper chromatin pattern with abundant cytoplasm. There is no significant nuclear atypia. No evidence of  necrosis. Mitotic count is 0 mitoses in 10 high power fields.    Imaging:   MRI abdomen (5/2/22): I have personally reviewed the images.    Inferior Thorax: Stable right cardiophrenic lymph node measuring 1.4 cm (series 13, image 17).     Liver: Numerous diffuse enhancing lesions scattered throughout the liver, grossly similar in overall number and size compared to prior exam.  Hepatic and portal veins appear patent.     Left hepatic lobe lesion measures 3.1 x 2.2 cm (series 13, image 32), previously 3.1 x 2.3 cm.     Left hepatic lobe lesion measures 3.2 x 1.6 cm (series 13, image 33), previously 2.6 x 1.6 cm.     Right hepatic lobe lesion measures 6.5 x 6.0 cm (series 13, image 44), previously 6.4 x 5.7 cm.     Right hepatic lobe lesion measures 4.6 cm (series 13, image 54), previously 4.7 cm.     Gallbladder: Unremarkable.     Bile Ducts: No dilatation.     Pancreas: No mass or ductal dilatation.     Spleen: Enlarged measuring 15 cm.  Multiple hypoenhancing lesions have increased in size and conspicuity compared to prior exam.  For reference, lesion measures 2.1 cm (series 14, image 38), previously 1.5 cm.  Small splenule noted.     Adrenals: Stable right adrenal nodule measuring 3.7 x 2.6 cm with signal loss on out of phase imaging consistent with adenoma.  Left adrenal gland is unremarkable.     Kidneys/Ureters: Bilateral renal cysts.  No solid  enhancing renal mass.  No hydronephrosis.     GI Tract/Mesentery: No evidence of bowel obstruction or inflammation.     Peritoneal Space: No ascites.     Lymph nodes: Stable right mesenteric lymph node measuring 1.3 cm (series 16, image 47), previously 1.3 cm.     Abdominal wall: Unremarkable.     Vasculature: No aneurysm.     Bones: Numerous scattered enhancing osseous lesions, similar to prior exam.     Impression:     1. Patient with metastatic neuroendocrine tumor.  Grossly stable numerous diffuse enhancing hepatic and osseous lesions.  Stable index cardiophrenic and mesenteric lymph nodes.  Increased size and conspicuity of multiple hypoenhancing splenic lesions.    MRI abdomen (1/18/22): I have personally reviewed the images.    Inferior Thorax: Stable right cardiophrenic lymph node measuring 1.4 cm (series 12, image 15).     Liver: Innumerable diffuse enhancing lesions scattered throughout the liver, grossly similar in overall number compared to prior examination.  Some lesions are stable and others demonstrate interval growth with increased arterial enhancing component.  Reference lesions provided below.  Hepatic and portal veins appear patent.     Left hepatic lobe lesion measures 2.3 x 3.1 cm (series 11, image 29), previously 2.6 x 2.2 cm.     Left hepatic lobe lesion measures 2.5 x 1.8 cm (series 11, image 40), previously 2.1 x 1.4 cm.     Right hepatic lobe lesion measures 6.4 x 5.7 cm (series 11, image 39), previously 6.1 x 5.5 cm.  Lesion demonstrates increased peripheral arterial enhancement.     Right hepatic lobe lesion measures 4.7 cm (series 11, image 50), previously 4.4 cm.  Lesion demonstrates increased peripheral arterial enhancement.     Gallbladder: Unremarkable.     Bile Ducts: No dilatation.     Pancreas: No mass or ductal dilatation.     Spleen: Enlarged measuring 15 cm.  Multiple hypoenhancing lesions, similar to prior exam.     Adrenals: Stable right adrenal nodule measuring 3.7 x 2.6 cm  with prominent signal loss on out of phase imaging consistent with an adenoma.  Left adrenal gland is unremarkable.     Kidneys/Ureters: Bilateral renal cysts.  No solid enhancing renal mass.  No hydronephrosis.     GI Tract/Mesentery: No evidence of bowel obstruction or inflammation.     Peritoneal Space: No ascites.     Lymph nodes: Previously described mesenteric lymph node appears smaller on today's exam (series 14, image 48).  No new pathologic adenopathy.     Abdominal wall: Unremarkable.     Vasculature: No aneurysm.     Bones: Numerous scattered enhancing osseous lesions, similar to prior exam.     Impression:     1. Patient with metastatic neuroendocrine tumor.  Mild interval progression of diffuse hepatic metastasis with several lesions demonstrating interval growth and increased arterial enhancing component.  Splenic and osseous lesions appear similar to prior exam.  Stable enlarged cardiophrenic lymph node.    MRI abdomen (10/11/21): I have personally reviewed the images.  Lower thorax: Heart is normal in size.  No pleural fluid.  No consolidation.     Liver: There are innumerable enhancing lesions scattered throughout the liver, compatible with known metastases.  Many of the previous solid enhancing lesions now demonstrate regions of central non enhancement, for example a 6.1 lesion in segment 8 on 11:46, a 4.0 cm lesion in the periphery of segment 5 with capsular retraction on 11:61, and a 4.2 cm lesion in segment 6 on 11:70, likely reflecting post treatment changes from interval chemoembolization.  However, there are new and enlarging solid enhancing lesions.  For example the 2.6 cm lesion on 11:34 previously measured 1.6 cm, the 2.6 cm lesion on: 35 previously measured 1.6 cm, and the 2.1 cm lesion on 11:47 previously measured 1.4 cm.  The 0.8 cm lesion on 11:45 appears new.     Gallbladder: Unremarkable.     Bile ducts: No intrahepatic or extrahepatic biliary dilatation.     Pancreas: No focal  lesion.  No pancreatic ductal dilatation.     Spleen: Enlarged, measuring 14.7 cm in the craniocaudal dimension.  Multiple splenic lesions, most of which have increased in size from prior MRI 04/07/2021 and a few which appear to be new.  Index lesion at the hilum has increased in size now measuring 2.8 cm (series 12, image 40), previously 2.4 cm.     Adrenal glands: Stable right adrenal gland nodule measuring 3.4 x 2.5 cm with signal dropout on out of phase imaging, favored to represent an adenoma.  Left adrenal gland is unremarkable.     Kidneys: Subcentimeter T2 hyperintense nonenhancing lesions in the right kidney, likely cysts.  No solid renal mass.  No hydronephrosis.     GI: Stomach is unremarkable.  Visualized loops of bowel are unremarkable.     Mesentery/retroperitoneum: Enlarging enhancing 1.4 cm right cardiophrenic angle lymph node (12:22), previously 1.0 cm.  Enlarging 2.0 cm mesenteric nodule on 14:47, previously 1.5 cm in the coronal plane.     Vasculature: Visualized aorta is normal in caliber.  Portal vein, SMV, and splenic veins are patent.     Body wall/extraperitoneal soft tissues: Unremarkable.     Bones: Numerous metastatic lesions scattered throughout the visualized spine and ribs bilaterally, some of which have increased in size from prior exam particular within the lower thoracic spine.     Miscellaneous: No intraperitoneal free fluid.     Impression:     Mixed response in this patient with known metastatic neuroendocrine tumor.  Many hepatic lesions demonstrate post treatment changes from interval chemoembolization, however there are new and enlarging lesions in the liver, spleen, mesentery, and axial skeleton consistent with worsening metastatic disease.    CT chest/abdomen/pelvis (4/16/21): I have personally reviewed the images  - Innumerable lesions in the liver, spleen, osseous structures are not significantly changed from the recent MRI of the abdomen dated 04/07/2021 and are consistent  with metastatic disease, noting that osseous lesions in the thorax have increased in size since they were most recently imaged on thoracic CT of 11/16/2020.  Index lesions are detailed above.     Stable soft tissue nodule at the right cardiophrenic angle and mesenteric implant are also unchanged from prior MRI.  These are nonspecific but suspicious for metastatic disease.     Stable calcific density within the bladder near the left UVJ measuring 5 mm suspicious for calculus.  There is no evidence of hydronephrosis.  There is mild bladder wall thickening which could in part be due to underdistention although cystitis is not excluded.     1.5 cm right thyroid nodule.  This was better evaluated on previous ultrasound, and FNA was recommended.     Stable right adrenal adenoma.     Slight interval improvement in ground-glass opacity within the right lower lobe, of doubtful significance      Gallium 68 PET/CT scan (4/19/21): I have personally reviewed the images  Progressed disseminated metastatic disease particularly of the skeleton and liver as detailed above.  Although the cortical bone of the right femur remains intact this time, there is likely increased risk for pathologic fracture.     Small hyperdensity within the nondistended bladder which previously was characterized as near the left UVJ.  No dilated ureter or hydronephrosis.    MRI lumbar spine (4/13/21):  Degenerative changes of the lumbar spine detailed above.  Mild right neural foraminal narrowing noted at L3-L4.  Otherwise overall similar to prior study.     Known bone metastases throughout the visualized axial skeleton, progressed compared to prior exam.     Stable right adrenal lesion, previously characterized as an adenoma    MRI abdomen (4/7/21):  1. In this patient with known metastatic neuroendocrine tumor there has been interval detrimental change as follows:  *Interval increased size of numerous hepatic lesions.  *Interval increased size of multiple  "splenic lesions.  *Interval increased size of an indeterminate enhancing mesenteric nodule.  *Interval increased size of a right cardiophrenic lymph node versus exophytic hepatic metastasis.  *Interval increased size of numerous osseous metastases.  Possible anterior epidural tumor extension noted at the L4-L5 level.  Consider further characterization with lumbar spine MRI.  2.  Stable right adrenal adenoma.         Assessment:       1. Bronchial carcinoid tumors    2. Secondary malignant neoplasm of liver    3. Secondary malignant neoplasm of spleen    4. Secondary malignant neoplasm of bone    5. Secondary carcinoid tumor of peritoneum    6. 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 Immunodeficiency due to drug therapy    8. Chronic neoplasm-related pain    9. Hx of BKA, left    10. Anemia in neoplastic disease           Plan:     1. Bronchial carcinoid tumor  - I have reviewed his chart.  - he has undergone multiple therapies, including left upper lobectomy (1/2016), capecitabine/temozolomide (7/2017 - 2/2019), lanreotide (since 7/2017), zoledronic acid (since 7/2017), trans-arterial chemoembolization (10/2018, 3/2019), peptide radionuclide receptor therapy (4/10/19, 6/5/19)  - CT chest/abdomen/pelvis (4/16/21) revealed progressive disease.  - Gallium 68 PET/CT scan (4/19/21) confirmed progressive disease  - his case was discussed at neuroendocrine conference. Recommendation was to proceed with TACE and PRRT  - he underwent liver-directed therapy (trans-arterial chemoembolization) and biopsy on 7/21/21.  - MRI abdomen (10/11/21) revealed a mixed response. However, the comparison images were from April 2021, so I suspect that some of the progression is from prior to when PRRT was restarted in August 2021.   - he completed a second dose of PRRT.  - his case was discussed at neuroendocrine conference on 10/19/21. Information from that discussion:  "Good response to right lobe cTACE. Slow " "progression in the left lobe and outside the liver since 4/2021.  Recommend left lobe cTACE."  - he never underwent repeat TACE due to cataract surgery  - MRI abdomen (1/18/22) revealed "mild interval progression of diffuse hepatic metastasis with several lesions demonstrating interval growth and increased arterial enhancing component."  - continue lanreotide.   - I will present his case at neuroendocrine conference next week. Follow-up plan will be determined after the conference.    2. Chronic neoplasm-related pain  - stable. Continue oxycodone as needed.    3. Anemia in neoplastic disease  - Labs have been reviewed. Hemoglobin is 11.7 g/dL.  - continue to monitor    4. Advance Care Planning     Power of   After our discussion (at previous visit), the patient decided to complete a HCPOA and appointed his wife Monica Becker ([phone]).        - I will present his case at neuroendocrine conference next week. Follow-up plan will be determined after the conference.    Brodie Chung M.D.  Hematology/Oncology  [facility] - [address], [address]  [place] [address]  Phone: [phone]  Fax: [phone]  "

## 2022-05-04 ENCOUNTER — OFFICE VISIT (OUTPATIENT)
Dept: HEMATOLOGY/ONCOLOGY | Facility: CLINIC | Age: 67
End: 2022-05-04
Payer: MEDICARE

## 2022-05-04 VITALS
SYSTOLIC BLOOD PRESSURE: 119 MMHG | RESPIRATION RATE: 18 BRPM | WEIGHT: 239.19 LBS | DIASTOLIC BLOOD PRESSURE: 70 MMHG | BODY MASS INDEX: 32.44 KG/M2 | OXYGEN SATURATION: 98 % | HEART RATE: 75 BPM | TEMPERATURE: 99 F

## 2022-05-04 DIAGNOSIS — C78.89 SECONDARY MALIGNANT NEOPLASM OF SPLEEN: ICD-10-CM

## 2022-05-04 DIAGNOSIS — G89.3 CHRONIC NEOPLASM-RELATED PAIN: ICD-10-CM

## 2022-05-04 DIAGNOSIS — Z79.899 IMMUNODEFICIENCY DUE TO DRUG THERAPY: ICD-10-CM

## 2022-05-04 DIAGNOSIS — D63.0 ANEMIA IN NEOPLASTIC DISEASE: ICD-10-CM

## 2022-05-04 DIAGNOSIS — C7B.04 SECONDARY CARCINOID TUMOR OF PERITONEUM: ICD-10-CM

## 2022-05-04 DIAGNOSIS — C77.8 SECONDARY MALIGNANT NEOPLASM OF LYMPH NODES OF MULTIPLE SITES: ICD-10-CM

## 2022-05-04 DIAGNOSIS — D3A.090 BRONCHIAL CARCINOID TUMORS: Primary | ICD-10-CM

## 2022-05-04 DIAGNOSIS — C79.51 SECONDARY MALIGNANT NEOPLASM OF BONE: ICD-10-CM

## 2022-05-04 DIAGNOSIS — D84.821 IMMUNODEFICIENCY DUE TO DRUG THERAPY: ICD-10-CM

## 2022-05-04 DIAGNOSIS — C78.7 SECONDARY MALIGNANT NEOPLASM OF LIVER: ICD-10-CM

## 2022-05-04 DIAGNOSIS — Z89.512 HX OF BKA, LEFT: ICD-10-CM

## 2022-05-04 PROCEDURE — 99999 PR PBB SHADOW E&M-EST. PATIENT-LVL IV: CPT | Mod: PBBFAC,,, | Performed by: INTERNAL MEDICINE

## 2022-05-04 PROCEDURE — 99215 PR OFFICE/OUTPT VISIT, EST, LEVL V, 40-54 MIN: ICD-10-PCS | Mod: S$PBB,,, | Performed by: INTERNAL MEDICINE

## 2022-05-04 PROCEDURE — 99999 PR PBB SHADOW E&M-EST. PATIENT-LVL IV: ICD-10-PCS | Mod: PBBFAC,,, | Performed by: INTERNAL MEDICINE

## 2022-05-04 PROCEDURE — 99215 OFFICE O/P EST HI 40 MIN: CPT | Mod: S$PBB,,, | Performed by: INTERNAL MEDICINE

## 2022-05-04 PROCEDURE — 99214 OFFICE O/P EST MOD 30 MIN: CPT | Mod: PBBFAC,PO | Performed by: INTERNAL MEDICINE

## 2022-05-04 NOTE — Clinical Note
Good afternoon,  Can we add him to neuroendocrine conference on 5/10/22? Review MRI, next steps.  Thanks!

## 2022-05-10 ENCOUNTER — ANESTHESIA (OUTPATIENT)
Dept: ENDOSCOPY | Facility: HOSPITAL | Age: 67
End: 2022-05-10
Payer: MEDICARE

## 2022-05-10 ENCOUNTER — ANESTHESIA EVENT (OUTPATIENT)
Dept: ENDOSCOPY | Facility: HOSPITAL | Age: 67
End: 2022-05-10
Payer: MEDICARE

## 2022-05-10 ENCOUNTER — HOSPITAL ENCOUNTER (OUTPATIENT)
Facility: HOSPITAL | Age: 67
Discharge: HOME | End: 2022-05-10
Attending: INTERNAL MEDICINE | Admitting: INTERNAL MEDICINE
Payer: MEDICARE

## 2022-05-10 ENCOUNTER — TELEPHONE (OUTPATIENT)
Dept: HEMATOLOGY/ONCOLOGY | Facility: CLINIC | Age: 67
End: 2022-05-10
Payer: MEDICARE

## 2022-05-10 VITALS
RESPIRATION RATE: 16 BRPM | HEART RATE: 54 BPM | OXYGEN SATURATION: 100 % | BODY MASS INDEX: 32.37 KG/M2 | DIASTOLIC BLOOD PRESSURE: 87 MMHG | TEMPERATURE: 98 F | HEIGHT: 72 IN | SYSTOLIC BLOOD PRESSURE: 152 MMHG | WEIGHT: 239 LBS

## 2022-05-10 DIAGNOSIS — C78.89 SECONDARY MALIGNANT NEOPLASM OF SPLEEN: ICD-10-CM

## 2022-05-10 DIAGNOSIS — C78.7 SECONDARY MALIGNANT NEOPLASM OF LIVER: ICD-10-CM

## 2022-05-10 DIAGNOSIS — Z12.11 COLON CANCER SCREENING: Primary | ICD-10-CM

## 2022-05-10 DIAGNOSIS — D3A.090 BRONCHIAL CARCINOID TUMORS: ICD-10-CM

## 2022-05-10 DIAGNOSIS — C78.7 SECONDARY MALIGNANT NEOPLASM OF LIVER: Primary | ICD-10-CM

## 2022-05-10 DIAGNOSIS — D3A.090 BRONCHIAL CARCINOID TUMORS: Primary | ICD-10-CM

## 2022-05-10 LAB
CTP QC/QA: YES
SARS-COV-2 AG RESP QL IA.RAPID: NEGATIVE

## 2022-05-10 PROCEDURE — 27201089 HC SNARE, DISP (ANY): Performed by: INTERNAL MEDICINE

## 2022-05-10 PROCEDURE — D9220A PRA ANESTHESIA: ICD-10-PCS | Mod: PT,ANES,, | Performed by: ANESTHESIOLOGY

## 2022-05-10 PROCEDURE — 45385 PR COLONOSCOPY,REMV LESN,SNARE: ICD-10-PCS | Mod: PT,,, | Performed by: INTERNAL MEDICINE

## 2022-05-10 PROCEDURE — 25000003 PHARM REV CODE 250: Performed by: NURSE ANESTHETIST, CERTIFIED REGISTERED

## 2022-05-10 PROCEDURE — 37000009 HC ANESTHESIA EA ADD 15 MINS: Performed by: INTERNAL MEDICINE

## 2022-05-10 PROCEDURE — 45385 COLONOSCOPY W/LESION REMOVAL: CPT | Mod: PT | Performed by: INTERNAL MEDICINE

## 2022-05-10 PROCEDURE — 88305 TISSUE EXAM BY PATHOLOGIST: CPT | Mod: 26,,, | Performed by: PATHOLOGY

## 2022-05-10 PROCEDURE — 88305 TISSUE EXAM BY PATHOLOGIST: ICD-10-PCS | Mod: 26,,, | Performed by: PATHOLOGY

## 2022-05-10 PROCEDURE — 37000008 HC ANESTHESIA 1ST 15 MINUTES: Performed by: INTERNAL MEDICINE

## 2022-05-10 PROCEDURE — 88305 TISSUE EXAM BY PATHOLOGIST: CPT | Performed by: PATHOLOGY

## 2022-05-10 PROCEDURE — D9220A PRA ANESTHESIA: ICD-10-PCS | Mod: PT,CRNA,, | Performed by: NURSE ANESTHETIST, CERTIFIED REGISTERED

## 2022-05-10 PROCEDURE — 45385 COLONOSCOPY W/LESION REMOVAL: CPT | Mod: PT,,, | Performed by: INTERNAL MEDICINE

## 2022-05-10 PROCEDURE — D9220A PRA ANESTHESIA: Mod: PT,CRNA,, | Performed by: NURSE ANESTHETIST, CERTIFIED REGISTERED

## 2022-05-10 PROCEDURE — D9220A PRA ANESTHESIA: Mod: PT,ANES,, | Performed by: ANESTHESIOLOGY

## 2022-05-10 PROCEDURE — 63600175 PHARM REV CODE 636 W HCPCS: Performed by: NURSE ANESTHETIST, CERTIFIED REGISTERED

## 2022-05-10 RX ORDER — PROPOFOL 10 MG/ML
VIAL (ML) INTRAVENOUS
Status: DISCONTINUED | OUTPATIENT
Start: 2022-05-10 | End: 2022-05-10

## 2022-05-10 RX ORDER — PROPOFOL 10 MG/ML
VIAL (ML) INTRAVENOUS CONTINUOUS PRN
Status: DISCONTINUED | OUTPATIENT
Start: 2022-05-10 | End: 2022-05-10

## 2022-05-10 RX ORDER — SODIUM CHLORIDE 0.9 % (FLUSH) 0.9 %
10 SYRINGE (ML) INJECTION
Status: DISCONTINUED | OUTPATIENT
Start: 2022-05-10 | End: 2022-05-10 | Stop reason: HOSPADM

## 2022-05-10 RX ORDER — LIDOCAINE HYDROCHLORIDE 20 MG/ML
INJECTION INTRAVENOUS
Status: DISCONTINUED | OUTPATIENT
Start: 2022-05-10 | End: 2022-05-10

## 2022-05-10 RX ADMIN — LIDOCAINE HYDROCHLORIDE 100 MG: 20 INJECTION, SOLUTION INTRAVENOUS at 01:05

## 2022-05-10 RX ADMIN — SODIUM CHLORIDE: 0.9 INJECTION, SOLUTION INTRAVENOUS at 01:05

## 2022-05-10 RX ADMIN — Medication 150 MCG/KG/MIN: at 01:05

## 2022-05-10 RX ADMIN — PROPOFOL 60 MG: 10 INJECTION, EMULSION INTRAVENOUS at 01:05

## 2022-05-10 NOTE — PROGRESS NOTES
Sesar FLORIAN form today for molecular testing of tumor biopsy specimen from July 2021.    Brodie Chung M.D.  Hematology/Oncology  [facility] - [address], [address]  [place] [address]  Phone: [phone]  Fax: [phone]

## 2022-05-10 NOTE — PROVATION PATIENT INSTRUCTIONS
Discharge Summary/Instructions after an Endoscopic Procedure  Patient Name: Elroy Rahman  Patient MRN: [id number]  Patient YOB: 1955  Tuesday, May 10, 2022  Gin Bowling MD  Dear patient,  As a result of recent federal legislation (The Federal Cures Act), you may   receive lab or pathology results from your procedure in your [facility]   account before your physician is able to contact you. Your physician or   their representative will relay the results to you with their   recommendations at their soonest availability.  Thank you,  RESTRICTIONS:  During your procedure today, you received medications for sedation.  These   medications may affect your judgment, balance and coordination.  Therefore,   for 24 hours, you have the following restrictions:   - DO NOT drive a car, operate machinery, make legal/financial decisions,   sign important papers or drink alcohol.    ACTIVITY:  Today: no heavy lifting, straining or running due to procedural   sedation/anesthesia.  The following day: return to full activity including work.  DIET:  Eat and drink normally unless instructed otherwise.     TREATMENT FOR COMMON SIDE EFFECTS:  - Mild abdominal pain, nausea, belching, bloating or excessive gas:  rest,   eat lightly and use a heating pad.  - Sore Throat: treat with throat lozenges and/or gargle with warm salt   water.  - Because air was used during the procedure, expelling large amounts of air   from your rectum or belching is normal.  - If a bowel prep was taken, you may not have a bowel movement for 1-3 days.    This is normal.  SYMPTOMS TO WATCH FOR AND REPORT TO YOUR PHYSICIAN:  1. Abdominal pain or bloating, other than gas cramps.  2. Chest pain.  3. Back pain.  4. Signs of infection such as: chills or fever occurring within 24 hours   after the procedure.  5. Rectal bleeding, which would show as bright red, maroon, or black stools.   (A tablespoon of blood from the rectum is not serious, especially if    hemorrhoids are present.)  6. Vomiting.  7. Weakness or dizziness.  GO DIRECTLY TO THE NEAREST EMERGENCY ROOM IF YOU HAVE ANY OF THE FOLLOWING:      Difficulty breathing              Chills and/or fever over 101 F   Persistent vomiting and/or vomiting blood   Severe abdominal pain   Severe chest pain   Black, tarry stools   Bleeding- more than one tablespoon   Any other symptom or condition that you feel may need urgent attention  Your doctor recommends these additional instructions:  If any biopsies were taken, your doctors clinic will contact you in 1 to 2   weeks with any results.  - Discharge patient to home.   - Resume previous diet.   - Continue present medications.   - Await pathology results.   - Repeat colonoscopy in 6 months because the bowel preparation was poor.   Recommend extended prep prior to next colonoscopy.  Case request placed.  For questions, problems or results please call your physician - Gin Bowling MD at Work:  [phone].  [facility], EMERGENCY ROOM PHONE NUMBER: [phone]  IF A COMPLICATION OR EMERGENCY SITUATION ARISES AND YOU ARE UNABLE TO REACH   YOUR PHYSICIAN - GO DIRECTLY TO THE EMERGENCY ROOM.  Gin Bowling MD  5/10/2022 2:25:59 PM  This report has been verified and signed electronically.  Dear patient,  As a result of recent federal legislation (The Federal Cures Act), you may   receive lab or pathology results from your procedure in your [facility]   account before your physician is able to contact you. Your physician or   their representative will relay the results to you with their   recommendations at their soonest availability.  Thank you,  PROVATION

## 2022-05-10 NOTE — TRANSFER OF CARE
Anesthesia Transfer of Care Note    Patient: Elroy Rahman    Procedure(s) Performed: Procedure(s) (LRB):  COLONOSCOPY (N/A)    Patient location: [facility]    Anesthesia Type: general    Transport from OR: Transported from OR on 6-10 L/min O2 by face mask with adequate spontaneous ventilation    Post pain: adequate analgesia    Post assessment: no apparent anesthetic complications and tolerated procedure well    Post vital signs: stable    Level of consciousness: sedated and responds to stimulation    Nausea/Vomiting: no nausea/vomiting    Complications: none    Transfer of care protocol was followed      Last vitals:   Visit Vitals  BP (!) 151/78 (BP Location: Left arm, Patient Position: Lying)   Pulse (!) 52   Temp 36.8 °C (98.2 °F) (Temporal)   Resp 16   Ht 6' (1.829 m)   Wt 108.4 kg (239 lb)   SpO2 100%   BMI 32.41 kg/m²

## 2022-05-10 NOTE — ANESTHESIA PREPROCEDURE EVALUATION
05/10/2022  Pre-operative evaluation for Procedure(s) (LRB):  COLONOSCOPY (N/A)    Elroy Rahman is a 66 y.o. male     Patient Active Problem List   Diagnosis    Malignant carcinoid tumor of bronchus and lung    Paroxysmal atrial fibrillation    Sleep apnea    Liver lesion    Bronchial carcinoid tumors    Secondary neuroendocrine tumor of liver    Secondary neuroendocrine tumor of bone    Elevated bilirubin    Secondary malignant neoplasm of liver    Incarcerated umbilical hernia    Carcinoid bronchial adenoma of left lung    Secondary malignant neuroendocrine tumor of liver    Vision loss of left eye    Above knee amputation of left lower extremity    Myopathy    Acute muscle weakness    UTI (urinary tract infection)    Sepsis due to methicillin susceptible Staphylococcus aureus    MAGDI (acute kidney injury)    Impaired mobility and ADLs    Amputation stump infection    MSSA bacteremia    Abscess    Physical deconditioning    Decreased strength, endurance, and mobility    Balance problem    Gait abnormality    Thyroid nodule    Secondary malignant neoplasm of bone    Secondary malignant neoplasm of spleen    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     Current Outpatient Medications on File Prior to Encounter   Medication Sig Dispense Refill    ammonium lactate 12 % Crea Qd-bid arms and dorsal hands 140 g 3    diphenoxylate-atropine 2.5-0.025 mg (LOMOTIL) 2.5-0.025 mg per tablet Take 1 tablet by mouth 4 (four) times daily as needed for  Diarrhea. 30 tablet 1    erythromycin (ROMYCIN) ophthalmic ointment SMARTSIG:Sparingly Right Eye Every Night      gatifloxacin 0.5 % Drop drops Place into both eyes.      ibuprofen (ADVIL,MOTRIN) 200 MG tablet Take 600 mg by mouth every 6 (six) hours as needed for Pain.      ketorolac 0.5% (ACULAR) 0.5 % Drop INSTILL 1 DROP IN LEFT EYE FOUR TIMES DAILY STARTING 2 DAYS PRIOR TO SURGERY      lanreotide (SOMATULINE DEPOT) 120 mg/0.5 mL Syrg Inject 120 mg into the skin every 28 days.      ondansetron (ZOFRAN) 4 MG tablet Take 1 tablet (4 mg total) by mouth 2 (two) times daily. 20 tablet 1    prednisoLONE acetate (PRED FORTE) 1 % DrpS SHAKE LIQUID AND INSTILL 1 DROP IN RIGHT EYE FOUR TIMES DAILY FOR 1 WEEK      tobramycin-dexamethasone 0.3-0.1% (TOBRADEX) 0.3-0.1 % DrpS SHAKE LIQUID AND INSTILL 1 DROP IN RIGHT EYE FOUR TIMES DAILY         Past Surgical History:   Procedure Laterality Date    ABDOMINAL SURGERY      Bka Left     HERNIA REPAIR      IRRIGATION AND DEBRIDEMENT OF LOWER EXTREMITY Left 9/20/2019    Procedure: IRRIGATION AND DEBRIDEMENT, LOWER EXTREMITY - left - cysto tubing - cultures;  Surgeon: Jesus Arias MD;  Location: [address];  Service: Orthopedics;  Laterality: Left;    LAPAROSCOPIC REPAIR OF INCARCERATED UMBILICAL HERNIA N/A 11/27/2018    Procedure: REPAIR, HERNIA, UMBILICAL, INCARCERATED, LAPAROSCOPIC;  Surgeon: Coco Guidry MD;  Location: [facility] OR;  Service: General;  Laterality: N/A;  video    TRANSESOPHAGEAL ECHOCARDIOGRAPHY N/A 9/18/2019    Procedure: ECHOCARDIOGRAM, TRANSESOPHAGEAL;  Surgeon: Grace Diagnostic Provider;  Location: [facility] EP LAB;  Service: Anesthesiology;  Laterality: N/A;       Social History     Socioeconomic History    Marital status:    Tobacco Use    Smoking status: Never Smoker    Smokeless tobacco: Never Used   Substance and Sexual Activity    Alcohol use: Yes     Alcohol/week: 1.0 standard drink     Types: 1 Cans of beer per week      Comment: rare    Drug use: No         CBC: No results for input(s): WBC, RBC, HGB, HCT, PLT, MCV, MCH, MCHC in the last 72 hours.    CMP: No results for input(s): NA, K, CL, CO2, BUN, CREATININE, GLU, MG, PHOS, CALCIUM, ALBUMIN, PROT, ALKPHOS, ALT, AST, BILITOT in the last 72 hours.    INR  No results for input(s): PT, INR, PROTIME, APTT in the last 72 hours.        Diagnostic Studies:      EK   Normal sinus rhythm   Normal ECG   When compared with ECG of 2018 10:40,   Premature atrial complexes are no longer Present   Vent. rate has increased BY  31 BPM   Confirmed by Lokesh Thomas MD ([id number]) on 2019 8:58:52 PM     2D Echo:  2019  · Normal left ventricular systolic function.  · Normal appearing left atrial appendage. No thrombus is present in the appendage.  · Mild mitral regurgitation.  · Mild tricuspid regurgitation.  · No evidence of intracardiac vegetations or masses          Pre-op Assessment    I have reviewed the Patient Summary Reports.     I have reviewed the Nursing Notes. I have reviewed the NPO Status.   I have reviewed the Medications.   Prednisone    Review of Systems  Anesthesia Hx:  No problems with previous Anesthesia  History of prior surgery of interest to airway management or planning: Denies Family Hx of Anesthesia complications.   Denies Personal Hx of Anesthesia complications.   Hematology/Oncology:  Hematology Normal       -- Cancer in past history:  chemotherapy and surgery  Oncology Comments: Hx of cf Bronchial carcinoid tumor (s/p ALIRIO resection)      EENT/Dental:EENT/Dental Normal   Cardiovascular:   Denies Hypertension.  Denies Valvular problems/Murmurs.  Denies CABG/stent.     Pulmonary:   Denies COPD. Sleep Apnea    Renal/:   Chronic Renal Disease    Hepatic/GI:   Liver Disease,    Neurological:   Denies CVA. Denies Seizures.    Endocrine:  Endocrine Normal Denies Diabetes. Denies Hypothyroidism.    Dermatological:  Skin Normal    Psych:  Psychiatric Normal            Physical Exam  General: Well nourished, Cooperative and Alert    Airway:  Mallampati: III / II  Mouth Opening: Normal  Neck ROM: Normal ROM    Dental:  Intact        Anesthesia Plan  Type of Anesthesia, risks & benefits discussed:    Anesthesia Type: Gen Natural Airway  Intra-op Monitoring Plan: Standard ASA Monitors  Informed Consent: Informed consent signed with the Patient and all parties understand the risks and agree with anesthesia plan.  All questions answered.   ASA Score: 3  Day of Surgery Review of History & Physical: H&P Update referred to the surgeon/provider.    Ready For Surgery From Anesthesia Perspective.     .

## 2022-05-10 NOTE — H&P
Short Stay Endoscopy History and Physical    PCP - Wayne Catalan PA-C    Procedure - Colonoscopy  ASA - per anesthesia  Mallampati - per anesthesia  History of Anesthesia problems - no  Family history Anesthesia problems - no   Plan of anesthesia - General    HPI:  This is a 66 y.o. male here for evaluation of : asymptomatic screening exam      ROS:  Constitutional: No fevers, chills, No weight loss  CV: No chest pain  Pulm: No cough, No shortness of breath  GI: see HPI  Derm: No rash    Medical History:  has a past medical history of Allergy, Carcinoid bronchial adenoma of left lung, Chemotherapy follow-up examination (8/25/2017), Cough with hemoptysis, Elevated bilirubin (7/2/2018), BKA, left, Kidney stone, Mild heartburn, Physical deconditioning (6/22/2020), Secondary neuroendocrine tumor of bone(209.73) (6/15/2017), Secondary neuroendocrine tumor of liver (6/15/2017), Sepsis due to methicillin susceptible Staphylococcus aureus (09/14/2019), Sleep apnea, Thyroid nodule (11/25/2020), Urinary tract infection, and Vision loss of left eye (7/31/2019).    Surgical History:  has a past surgical history that includes Bka (Left); Laparoscopic repair of incarcerated umbilical hernia (N/A, 11/27/2018); Abdominal surgery; Hernia repair; Transesophageal echocardiography (N/A, 9/18/2019); and Irrigation and debridement of lower extremity (Left, 9/20/2019).    Family History: family history includes Cancer in his mother and sister.. Otherwise no colon cancer, inflammatory bowel disease, or GI malignancies.    Social History:  reports that he has never smoked. He has never used smokeless tobacco. He reports current alcohol use of about 1.0 standard drink of alcohol per week. He reports that he does not use drugs.    Review of patient's allergies indicates:   Allergen Reactions    Epinephrine Anaphylaxis and Other (See Comments)     Can Cause A Carcinoid crisis       Medications:   Medications Prior to Admission    Medication Sig Dispense Refill Last Dose    ammonium lactate 12 % Crea Qd-bid arms and dorsal hands 140 g 3     diphenoxylate-atropine 2.5-0.025 mg (LOMOTIL) 2.5-0.025 mg per tablet Take 1 tablet by mouth 4 (four) times daily as needed for Diarrhea. 30 tablet 1     erythromycin (ROMYCIN) ophthalmic ointment SMARTSIG:Sparingly Right Eye Every Night       gatifloxacin 0.5 % Drop drops Place into both eyes.       ibuprofen (ADVIL,MOTRIN) 200 MG tablet Take 600 mg by mouth every 6 (six) hours as needed for Pain.       ketorolac 0.5% (ACULAR) 0.5 % Drop INSTILL 1 DROP IN LEFT EYE FOUR TIMES DAILY STARTING 2 DAYS PRIOR TO SURGERY       lanreotide (SOMATULINE DEPOT) 120 mg/0.5 mL Syrg Inject 120 mg into the skin every 28 days.       ondansetron (ZOFRAN) 4 MG tablet Take 1 tablet (4 mg total) by mouth 2 (two) times daily. 20 tablet 1     oxyCODONE (ROXICODONE) 5 MG immediate release tablet Take 1 tablet (5 mg total) by mouth every 6 (six) hours as needed for Pain. 60 tablet 0     prednisoLONE acetate (PRED FORTE) 1 % DrpS SHAKE LIQUID AND INSTILL 1 DROP IN RIGHT EYE FOUR TIMES DAILY FOR 1 WEEK       tobramycin-dexamethasone 0.3-0.1% (TOBRADEX) 0.3-0.1 % DrpS SHAKE LIQUID AND INSTILL 1 DROP IN RIGHT EYE FOUR TIMES DAILY            Physical Exam:    Vital Signs: There were no vitals filed for this visit.    Gen: NAD, lying comfortably  HENT: NCAT, oropharynx clear  Eyes: anicteric sclerae, EOMI grossly  Neck: supple, no visible masses/goiter  Cardiac: RRR  Lungs: non-labored breathing  Abd: soft, NT/ND, normoactive BS  Ext: no LE edema, warm, well perfused  Skin: skin intact on exposed body parts, no visible rashes, lesions  Neuro: A&Ox4, neuro exam grossly intact, moves all extremities  Psych: appropriate mood, affect        Labs:  Lab Results   Component Value Date    WBC 4.56 05/02/2022    HGB 11.7 (L) 05/02/2022    HCT 37.2 (L) 05/02/2022     05/02/2022    CHOL 231 (H) 03/16/2022    TRIG 136 03/16/2022     HDL 49 03/16/2022    ALT 19 05/02/2022    AST 28 05/02/2022     05/02/2022    K 4.6 05/02/2022     05/02/2022    CREATININE 1.2 05/02/2022    BUN 14 05/02/2022    CO2 26 05/02/2022    TSH 3.204 03/16/2022    PSA 4.1 (H) 03/16/2022    INR 1.2 09/16/2021       Plan:  Colonoscopy for colon cancer screening.    I have explained the risks and benefits of endoscopy procedures to the patient including but not limited to bleeding, perforation, infection, and death.  The patient was asked if they understand and allowed to ask any further questions to their satisfaction.    Gin Bowling MD

## 2022-05-10 NOTE — ANESTHESIA POSTPROCEDURE EVALUATION
Anesthesia Post Evaluation    Patient: Elroy Rahman    Procedure(s) Performed: Procedure(s) (LRB):  COLONOSCOPY (N/A)    Final Anesthesia Type: general      Patient location during evaluation: PACU  Patient participation: Yes- Able to Participate  Level of consciousness: awake and alert  Post-procedure vital signs: reviewed and stable  Pain management: adequate  Airway patency: patent    PONV status at discharge: No PONV  Anesthetic complications: no      Cardiovascular status: blood pressure returned to baseline  Respiratory status: unassisted  Hydration status: euvolemic  Follow-up not needed.          Vitals Value Taken Time   /87 05/10/22 1520   Temp 36.6 °C (97.9 °F) 05/10/22 1520   Pulse 50 05/10/22 1526   Resp 16 05/10/22 1520   SpO2 100 % 05/10/22 1526   Vitals shown include unvalidated device data.      No case tracking events are documented in the log.      Pain/Masood Score: Masood Score: 9 (5/10/2022  2:45 PM)

## 2022-05-12 ENCOUNTER — TELEPHONE (OUTPATIENT)
Dept: INTERVENTIONAL RADIOLOGY/VASCULAR | Facility: HOSPITAL | Age: 67
End: 2022-05-12
Payer: MEDICARE

## 2022-05-17 LAB
FINAL PATHOLOGIC DIAGNOSIS: NORMAL
Lab: NORMAL

## 2022-05-25 DIAGNOSIS — G89.3 CHRONIC NEOPLASM-RELATED PAIN: ICD-10-CM

## 2022-05-25 RX ORDER — OXYCODONE HYDROCHLORIDE 5 MG/1
5 TABLET ORAL EVERY 6 HOURS PRN
Qty: 60 TABLET | Refills: 0 | Status: SHIPPED | OUTPATIENT
Start: 2022-05-25 | End: 2022-05-25

## 2022-05-27 ENCOUNTER — PATIENT MESSAGE (OUTPATIENT)
Dept: HEMATOLOGY/ONCOLOGY | Facility: CLINIC | Age: 67
End: 2022-05-27
Payer: MEDICARE

## 2022-05-27 DIAGNOSIS — G89.3 CHRONIC NEOPLASM-RELATED PAIN: Primary | ICD-10-CM

## 2022-05-27 RX ORDER — OXYCODONE HYDROCHLORIDE 15 MG/1
15 TABLET ORAL EVERY 6 HOURS PRN
Qty: 120 TABLET | Refills: 0 | Status: ON HOLD | OUTPATIENT
Start: 2022-05-27 | End: 2022-09-29 | Stop reason: HOSPADM

## 2022-06-01 ENCOUNTER — INFUSION (OUTPATIENT)
Dept: INFUSION THERAPY | Facility: HOSPITAL | Age: 67
End: 2022-06-01
Payer: MEDICARE

## 2022-06-01 DIAGNOSIS — C7B.8 SECONDARY NEUROENDOCRINE TUMOR OF LIVER: ICD-10-CM

## 2022-06-01 DIAGNOSIS — C7B.02 METASTATIC MALIGNANT CARCINOID TUMOR TO LIVER: ICD-10-CM

## 2022-06-01 DIAGNOSIS — C7B.8 SECONDARY MALIGNANT NEUROENDOCRINE TUMOR OF LIVER: ICD-10-CM

## 2022-06-01 DIAGNOSIS — C77.8 SECONDARY MALIGNANT NEOPLASM OF LYMPH NODES OF MULTIPLE SITES: ICD-10-CM

## 2022-06-01 DIAGNOSIS — C79.51 SECONDARY MALIGNANT NEOPLASM OF BONE: ICD-10-CM

## 2022-06-01 DIAGNOSIS — C7A.090 CARCINOID BRONCHIAL ADENOMA OF LEFT LUNG: ICD-10-CM

## 2022-06-01 DIAGNOSIS — C78.89 SECONDARY MALIGNANT NEOPLASM OF SPLEEN: ICD-10-CM

## 2022-06-01 DIAGNOSIS — D3A.090 BRONCHIAL CARCINOID TUMORS: ICD-10-CM

## 2022-06-01 DIAGNOSIS — C7A.090 MALIGNANT CARCINOID TUMOR OF BRONCHUS AND LUNG: ICD-10-CM

## 2022-06-01 DIAGNOSIS — C78.7 SECONDARY MALIGNANT NEOPLASM OF LIVER: ICD-10-CM

## 2022-06-01 DIAGNOSIS — C7A.090 CARCINOID BRONCHIAL ADENOMA, UNSPECIFIED LATERALITY: Primary | ICD-10-CM

## 2022-06-01 DIAGNOSIS — C7B.8 SECONDARY NEUROENDOCRINE TUMOR OF BONE: ICD-10-CM

## 2022-06-01 PROCEDURE — 96372 THER/PROPH/DIAG INJ SC/IM: CPT

## 2022-06-01 PROCEDURE — 63600175 PHARM REV CODE 636 W HCPCS: Mod: JG | Performed by: INTERNAL MEDICINE

## 2022-06-01 RX ORDER — LANREOTIDE ACETATE 120 MG/.5ML
120 INJECTION SUBCUTANEOUS
Status: DISCONTINUED | OUTPATIENT
Start: 2022-06-01 | End: 2022-06-01 | Stop reason: HOSPADM

## 2022-06-01 RX ORDER — LANREOTIDE ACETATE 120 MG/.5ML
120 INJECTION SUBCUTANEOUS
Status: CANCELLED | OUTPATIENT
Start: 2022-06-01

## 2022-06-01 RX ADMIN — LANREOTIDE ACETATE 120 MG: 120 INJECTION SUBCUTANEOUS at 02:06

## 2022-06-23 ENCOUNTER — INFUSION (OUTPATIENT)
Dept: INFUSION THERAPY | Facility: HOSPITAL | Age: 67
End: 2022-06-23
Attending: INTERNAL MEDICINE
Payer: MEDICARE

## 2022-06-23 DIAGNOSIS — C7A.090 CARCINOID BRONCHIAL ADENOMA OF LEFT LUNG: ICD-10-CM

## 2022-06-23 DIAGNOSIS — C79.51 SECONDARY MALIGNANT NEOPLASM OF BONE: ICD-10-CM

## 2022-06-23 DIAGNOSIS — C7B.8 SECONDARY NEUROENDOCRINE TUMOR OF BONE: ICD-10-CM

## 2022-06-23 DIAGNOSIS — C78.89 SECONDARY MALIGNANT NEOPLASM OF SPLEEN: ICD-10-CM

## 2022-06-23 DIAGNOSIS — C7B.8 SECONDARY NEUROENDOCRINE TUMOR OF LIVER: ICD-10-CM

## 2022-06-23 DIAGNOSIS — C7B.02 METASTATIC MALIGNANT CARCINOID TUMOR TO LIVER: ICD-10-CM

## 2022-06-23 DIAGNOSIS — C7A.090 MALIGNANT CARCINOID TUMOR OF BRONCHUS AND LUNG: ICD-10-CM

## 2022-06-23 DIAGNOSIS — C7A.090 CARCINOID BRONCHIAL ADENOMA, UNSPECIFIED LATERALITY: Primary | ICD-10-CM

## 2022-06-23 DIAGNOSIS — D3A.090 BRONCHIAL CARCINOID TUMORS: ICD-10-CM

## 2022-06-23 DIAGNOSIS — C77.8 SECONDARY MALIGNANT NEOPLASM OF LYMPH NODES OF MULTIPLE SITES: ICD-10-CM

## 2022-06-23 DIAGNOSIS — C78.7 SECONDARY MALIGNANT NEOPLASM OF LIVER: ICD-10-CM

## 2022-06-23 DIAGNOSIS — C7B.8 SECONDARY MALIGNANT NEUROENDOCRINE TUMOR OF LIVER: ICD-10-CM

## 2022-06-23 PROCEDURE — 96402 CHEMO HORMON ANTINEOPL SQ/IM: CPT

## 2022-06-23 PROCEDURE — 63600175 PHARM REV CODE 636 W HCPCS: Mod: JG | Performed by: INTERNAL MEDICINE

## 2022-06-23 PROCEDURE — 96372 THER/PROPH/DIAG INJ SC/IM: CPT

## 2022-06-23 RX ORDER — LANREOTIDE ACETATE 120 MG/.5ML
120 INJECTION SUBCUTANEOUS
Status: CANCELLED | OUTPATIENT
Start: 2022-06-23

## 2022-06-23 RX ORDER — LANREOTIDE ACETATE 120 MG/.5ML
120 INJECTION SUBCUTANEOUS
Status: DISCONTINUED | OUTPATIENT
Start: 2022-06-23 | End: 2022-06-23 | Stop reason: HOSPADM

## 2022-06-23 RX ADMIN — LANREOTIDE ACETATE 120 MG: 120 INJECTION SUBCUTANEOUS at 08:06

## 2022-06-23 NOTE — NURSING
Patient received lanreotide injection SQ to right hip. Patient tolerated well. Assisted off unit via w/c accompanied by spouse.

## 2022-07-07 ENCOUNTER — HOSPITAL ENCOUNTER (EMERGENCY)
Facility: HOSPITAL | Age: 67
Discharge: HOME | End: 2022-07-08
Attending: EMERGENCY MEDICINE
Payer: MEDICARE

## 2022-07-07 DIAGNOSIS — G89.3 CANCER ASSOCIATED PAIN: ICD-10-CM

## 2022-07-07 DIAGNOSIS — C7B.03 METASTATIC CARCINOID TUMOR TO BONE: Primary | ICD-10-CM

## 2022-07-07 DIAGNOSIS — R33.9 URINARY RETENTION: ICD-10-CM

## 2022-07-07 LAB
ALBUMIN SERPL BCP-MCNC: 3.1 G/DL (ref 3.5–5.2)
ALP SERPL-CCNC: 478 U/L (ref 55–135)
ALT SERPL W/O P-5'-P-CCNC: 38 U/L (ref 10–44)
ANION GAP SERPL CALC-SCNC: 15 MMOL/L (ref 8–16)
AST SERPL-CCNC: 59 U/L (ref 10–40)
BACTERIA #/AREA URNS HPF: NORMAL /HPF
BILIRUB SERPL-MCNC: 0.8 MG/DL (ref 0.1–1)
BILIRUB UR QL STRIP: ABNORMAL
BUN SERPL-MCNC: 14 MG/DL (ref 8–23)
CALCIUM SERPL-MCNC: 9.3 MG/DL (ref 8.7–10.5)
CAOX CRY URNS QL MICRO: NORMAL
CHLORIDE SERPL-SCNC: 100 MMOL/L (ref 95–110)
CLARITY UR: ABNORMAL
CO2 SERPL-SCNC: 23 MMOL/L (ref 23–29)
COLOR UR: YELLOW
CREAT SERPL-MCNC: 1.1 MG/DL (ref 0.5–1.4)
ERYTHROCYTE [DISTWIDTH] IN BLOOD BY AUTOMATED COUNT: 16.5 % (ref 11.5–14.5)
EST. GFR  (AFRICAN AMERICAN): >60 ML/MIN/1.73 M^2
EST. GFR  (NON AFRICAN AMERICAN): >60 ML/MIN/1.73 M^2
GLUCOSE SERPL-MCNC: 122 MG/DL (ref 70–110)
GLUCOSE UR QL STRIP: NEGATIVE
HCT VFR BLD AUTO: 40 % (ref 40–54)
HGB BLD-MCNC: 12.8 G/DL (ref 14–18)
HGB UR QL STRIP: ABNORMAL
HYALINE CASTS #/AREA URNS LPF: 0 /LPF
KETONES UR QL STRIP: NEGATIVE
LACTATE SERPL-SCNC: 1.4 MMOL/L (ref 0.5–2.2)
LEUKOCYTE ESTERASE UR QL STRIP: ABNORMAL
LIPASE SERPL-CCNC: 14 U/L (ref 4–60)
MCH RBC QN AUTO: 27.4 PG (ref 27–31)
MCHC RBC AUTO-ENTMCNC: 32 G/DL (ref 32–36)
MCV RBC AUTO: 86 FL (ref 82–98)
MICROSCOPIC COMMENT: NORMAL
NITRITE UR QL STRIP: NEGATIVE
PH UR STRIP: 6 [PH] (ref 5–8)
PLATELET # BLD AUTO: 292 K/UL (ref 150–450)
PMV BLD AUTO: 9.2 FL (ref 9.2–12.9)
POTASSIUM SERPL-SCNC: 4 MMOL/L (ref 3.5–5.1)
PROT SERPL-MCNC: 7.1 G/DL (ref 6–8.4)
PROT UR QL STRIP: ABNORMAL
RBC # BLD AUTO: 4.68 M/UL (ref 4.6–6.2)
RBC #/AREA URNS HPF: 1 /HPF (ref 0–4)
SODIUM SERPL-SCNC: 138 MMOL/L (ref 136–145)
SP GR UR STRIP: >1.03 (ref 1–1.03)
URN SPEC COLLECT METH UR: ABNORMAL
UROBILINOGEN UR STRIP-ACNC: NEGATIVE EU/DL
WBC # BLD AUTO: 7.74 K/UL (ref 3.9–12.7)
WBC #/AREA URNS HPF: 4 /HPF (ref 0–5)

## 2022-07-07 PROCEDURE — 63600175 PHARM REV CODE 636 W HCPCS: Performed by: EMERGENCY MEDICINE

## 2022-07-07 PROCEDURE — 96375 TX/PRO/DX INJ NEW DRUG ADDON: CPT | Mod: 59

## 2022-07-07 PROCEDURE — 25000003 PHARM REV CODE 250: Performed by: EMERGENCY MEDICINE

## 2022-07-07 PROCEDURE — 99285 EMERGENCY DEPT VISIT HI MDM: CPT | Mod: 25

## 2022-07-07 PROCEDURE — 85027 COMPLETE CBC AUTOMATED: CPT | Performed by: EMERGENCY MEDICINE

## 2022-07-07 PROCEDURE — 83690 ASSAY OF LIPASE: CPT | Performed by: EMERGENCY MEDICINE

## 2022-07-07 PROCEDURE — 51798 US URINE CAPACITY MEASURE: CPT

## 2022-07-07 PROCEDURE — 81000 URINALYSIS NONAUTO W/SCOPE: CPT | Performed by: EMERGENCY MEDICINE

## 2022-07-07 PROCEDURE — 80053 COMPREHEN METABOLIC PANEL: CPT | Performed by: EMERGENCY MEDICINE

## 2022-07-07 PROCEDURE — 96361 HYDRATE IV INFUSION ADD-ON: CPT | Mod: 59

## 2022-07-07 PROCEDURE — 83605 ASSAY OF LACTIC ACID: CPT | Performed by: EMERGENCY MEDICINE

## 2022-07-07 PROCEDURE — 96374 THER/PROPH/DIAG INJ IV PUSH: CPT | Mod: 59

## 2022-07-07 PROCEDURE — 96376 TX/PRO/DX INJ SAME DRUG ADON: CPT | Mod: 59

## 2022-07-07 PROCEDURE — 51702 INSERT TEMP BLADDER CATH: CPT

## 2022-07-07 RX ORDER — ONDANSETRON 2 MG/ML
4 INJECTION INTRAMUSCULAR; INTRAVENOUS
Status: COMPLETED | OUTPATIENT
Start: 2022-07-07 | End: 2022-07-07

## 2022-07-07 RX ORDER — KETOROLAC TROMETHAMINE 30 MG/ML
15 INJECTION, SOLUTION INTRAMUSCULAR; INTRAVENOUS
Status: COMPLETED | OUTPATIENT
Start: 2022-07-08 | End: 2022-07-08

## 2022-07-07 RX ORDER — MORPHINE SULFATE 4 MG/ML
4 INJECTION, SOLUTION INTRAMUSCULAR; INTRAVENOUS
Status: COMPLETED | OUTPATIENT
Start: 2022-07-07 | End: 2022-07-07

## 2022-07-07 RX ORDER — MORPHINE SULFATE 4 MG/ML
4 INJECTION, SOLUTION INTRAMUSCULAR; INTRAVENOUS
Status: COMPLETED | OUTPATIENT
Start: 2022-07-08 | End: 2022-07-08

## 2022-07-07 RX ADMIN — MORPHINE SULFATE 4 MG: 4 INJECTION INTRAVENOUS at 10:07

## 2022-07-07 RX ADMIN — IOHEXOL 100 ML: 350 INJECTION, SOLUTION INTRAVENOUS at 11:07

## 2022-07-07 RX ADMIN — SODIUM CHLORIDE 500 ML: 0.9 INJECTION, SOLUTION INTRAVENOUS at 10:07

## 2022-07-07 RX ADMIN — ONDANSETRON 4 MG: 2 INJECTION INTRAMUSCULAR; INTRAVENOUS at 10:07

## 2022-07-08 VITALS
RESPIRATION RATE: 20 BRPM | HEART RATE: 89 BPM | TEMPERATURE: 98 F | BODY MASS INDEX: 29.8 KG/M2 | WEIGHT: 220 LBS | SYSTOLIC BLOOD PRESSURE: 102 MMHG | HEIGHT: 72 IN | DIASTOLIC BLOOD PRESSURE: 58 MMHG | OXYGEN SATURATION: 100 %

## 2022-07-08 PROCEDURE — 25500020 PHARM REV CODE 255: Performed by: EMERGENCY MEDICINE

## 2022-07-08 PROCEDURE — 63600175 PHARM REV CODE 636 W HCPCS: Performed by: EMERGENCY MEDICINE

## 2022-07-08 RX ADMIN — KETOROLAC TROMETHAMINE 15 MG: 30 INJECTION, SOLUTION INTRAMUSCULAR; INTRAVENOUS at 12:07

## 2022-07-08 RX ADMIN — MORPHINE SULFATE 4 MG: 4 INJECTION INTRAVENOUS at 12:07

## 2022-07-08 NOTE — ED PROVIDER NOTES
Encounter Date: 7/7/2022       History     Chief Complaint   Patient presents with    Back Pain     Hx of cancer, increased pain to back x 1 day. Last urination this AM.     Elroy Rahman is a 66 y.o. 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    The patient presents to the ED due to back pain.   Patient has history of metastatic lung cancer with liver, spleen, bone involvement.  He reports chronic back pain that worsened today.  No associated fever, N/V, CP, SOB, leg pain/swelling, weakness/numbness.  He also reports difficulty urinating. He has needed a catheter once before after surgery. No hematuria.           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    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    Sepsis due to methicillin susceptible Staphylococcus aureus 09/14/2019    MSSA bacteremia from Amputation stump infection    Sleep apnea     Snores    Thyroid nodule 11/25/2020     Urinary tract infection     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     Past Surgical History:   Procedure Laterality Date    ABDOMINAL SURGERY      Bka Left     COLONOSCOPY N/A 5/10/2022    Procedure: COLONOSCOPY;  Surgeon: Gin Bowling MD;  Location: [facility] ENDO (2ND FLR);  Service: Endoscopy;  Laterality: N/A;  rapid- not vaccinated    HERNIA REPAIR      IRRIGATION AND DEBRIDEMENT OF LOWER EXTREMITY Left 9/20/2019    Procedure: IRRIGATION AND DEBRIDEMENT, LOWER EXTREMITY - left - cysto tubing - cultures;  Surgeon: Jesus Arias MD;  Location: [facility] OR 2ND FLR;  Service: Orthopedics;  Laterality: Left;    LAPAROSCOPIC REPAIR OF INCARCERATED UMBILICAL HERNIA N/A 11/27/2018    Procedure: REPAIR, HERNIA, UMBILICAL, INCARCERATED, LAPAROSCOPIC;  Surgeon: Coco Guidry MD;  Location: [facility];  Service: General;  Laterality: N/A;  video    TRANSESOPHAGEAL ECHOCARDIOGRAPHY N/A 9/18/2019    Procedure: ECHOCARDIOGRAM, TRANSESOPHAGEAL;  Surgeon: Grace Diagnostic Provider;  Location: [facility] EP LAB;  Service: Anesthesiology;  Laterality: N/A;     Family History   Problem Relation Age of Onset    Cancer Mother         lung    Cancer Sister     Anesthesia problems Neg Hx      Social History     Tobacco Use    Smoking status: Never Smoker    Smokeless tobacco: Never Used   Substance Use Topics    Alcohol use: Yes     Alcohol/week: 1.0 standard drink     Types: 1 Cans of beer per week     Comment: rare    Drug use: No     Review of Systems   Constitutional: Negative for chills and fever.   HENT: Negative for sore throat.    Respiratory: Negative for shortness of breath.    Cardiovascular: Negative for chest pain.   Gastrointestinal: Negative for abdominal pain, constipation, diarrhea, nausea and vomiting.   Genitourinary: Positive for difficulty urinating. Negative for dysuria, frequency and urgency.   Musculoskeletal: Positive for back pain.   Skin: Negative for rash and wound.   Neurological: Negative for  weakness.   Hematological: Does not bruise/bleed easily.   Psychiatric/Behavioral: Negative for agitation, behavioral problems and confusion.       Physical Exam     Initial Vitals [07/07/22 2135]   BP Pulse Resp Temp SpO2   (!) 105/46 100 18 98.2 °F (36.8 °C) 100 %      MAP       --         Physical Exam    Nursing note and vitals reviewed.  Constitutional: He appears well-developed and well-nourished. He is not diaphoretic. No distress.   Well-appearing, no distress.   HENT:   Head: Normocephalic and atraumatic.   Mouth/Throat: Oropharynx is clear and moist.   Eyes: EOM are normal. Pupils are equal, round, and reactive to light.   Neck: No tracheal deviation present.   Cardiovascular: Normal rate, regular rhythm, normal heart sounds and intact distal pulses.   Pulmonary/Chest: Breath sounds normal. No stridor. No respiratory distress.   Abdominal: Abdomen is soft. He exhibits no distension and no mass. There is no abdominal tenderness.   Genitourinary:    Genitourinary Comments: Bedside US with distended bladder after urination.      Musculoskeletal:         General: No edema. Normal range of motion.     Neurological: He is alert and oriented to person, place, and time. He has normal strength. No cranial nerve deficit or sensory deficit. He exhibits normal muscle tone. GCS eye subscore is 4. GCS verbal subscore is 5. GCS motor subscore is 6.   Skin: Skin is warm and dry. Capillary refill takes less than 2 seconds. No rash noted.   Psychiatric: He has a normal mood and affect. His behavior is normal. Thought content normal.         ED Course   Procedures  Labs Reviewed   CBC WITHOUT DIFFERENTIAL - Abnormal; Notable for the following components:       Result Value    Hemoglobin 12.8 (*)     RDW 16.5 (*)     All other components within normal limits   COMPREHENSIVE METABOLIC PANEL - Abnormal; Notable for the following components:    Glucose 122 (*)     Albumin 3.1 (*)     Alkaline Phosphatase 478 (*)     AST 59 (*)      All other components within normal limits   URINALYSIS, REFLEX TO URINE CULTURE - Abnormal; Notable for the following components:    Appearance, UA Hazy (*)     Specific Gravity, UA >1.030 (*)     Protein, UA 3+ (*)     Bilirubin (UA) 1+ (*)     Occult Blood UA Trace (*)     Leukocytes, UA 2+ (*)     All other components within normal limits    Narrative:     Specimen Source->Urine   LIPASE   LACTIC ACID, PLASMA   URINALYSIS MICROSCOPIC    Narrative:     Specimen Source->Urine          Imaging Results          CT Abdomen Pelvis With Contrast (Final result)  Result time 07/08/22 00:23:33    Final result by Gabriele Gudino MD (07/08/22 00:23:33)                 Impression:      Hepatic splenic adrenal and osseous metastasis unchanged.    Leigh catheter decompressing the urinary bladder with no evidence of upper tract stone or obstruction.    No acute findings evident within the abdomen or pelvis.      Electronically signed by: Gabriele Gudino  Date:    07/08/2022  Time:    00:23             Narrative:    EXAMINATION:  CT ABDOMEN PELVIS WITH CONTRAST    CLINICAL HISTORY:  Flank pain, kidney stone suspected;Nausea/vomiting;Abdominal pain, acute, nonlocalized;    TECHNIQUE:  Low dose axial images, sagittal and coronal reformations were obtained from the lung bases to the pubic symphysis following the IV administration of 100 ML of Omnipaque 350 .  Oral contrast was not given.    COMPARISON:  Most recent MRI 05/02/2022.    FINDINGS:  Leigh catheter decompresses the bladder.  There is no evidence of upper or lower tract stone or obstruction.    The numerous hepatic and splenic masses some of which appear calcified within the liver appear unchanged since the 2022 MRI.  Mass involving the right adrenal gland appears stable.  There is no evidence of bowel obstruction or inflammatory stranding or adenopathy. The aorta while atherosclerotic is normal in caliber.    Numerous osseous lesions throughout the spine without  new compression fracture with multiple sclerotic vertebral bodies.  Multiple lesions throughout the femora and pelvic bones are noted as well.  Scattered rib lesions are also present.    Heart is not enlarged with atherosclerotic calcifications primarily left and right coronary arteries.  The lung parenchyma appears clear there is no evidence pleural effusion.                                 Medications   morphine injection 4 mg (4 mg Intravenous Given 7/7/22 2230)   ondansetron injection 4 mg (4 mg Intravenous Given 7/7/22 2230)   sodium chloride 0.9% bolus 500 mL (0 mLs Intravenous Stopped 7/7/22 2321)   iohexoL (OMNIPAQUE 350) injection 100 mL (100 mLs Intravenous Given 7/7/22 2356)   morphine injection 4 mg (4 mg Intravenous Given 7/8/22 0023)   ketorolac injection 15 mg (15 mg Intravenous Given 7/8/22 0023)     Medical Decision Making:   History:   Old Medical Records: I decided to obtain old medical records.  Old Records Summarized: records from clinic visits.       <> Summary of Records: History of L bronchial cancer with metastasis to liver, bone, spleen.  Initial Assessment:   65 yo M with metastatic lung cancer with liver, spleen, bone involvement presents to ED with worsening lower back pain.  Afebrile, vitals reassuring, exam benign.  Bedside US with distended bladder, will place presley.  Will obtain labs, CT A/P, UA, treat pain, and reassess.  Differential Diagnosis:   Differential Diagnosis includes, but is not limited to:  Cauda equina syndrome, diskitis/osteomyelitis, epidural/paraspinal abscess, AAA, aortic dissection, post-op/hardware infection, trauma/vertebral fracture, spinal cord injury, disc herniation, spinal stenosis, sciatica, radiculopathy, neoplasm, lumbar muscle strain, muscle spasm, neuropathic pain, UTI/pyelonephritis, nephrolithiasis.    Clinical Tests:   Lab Tests: Reviewed and Ordered  Radiological Study: Ordered and Reviewed  ED Management:  Labs unremarkable. UA without  infection.  Leigh placed with appropriate UOP.  CT A/P with stable findings, no acute process.  Will refer to Urology for future voiding trial and further management.  Stable for D/C.    On re-evaluation, the patient's status has improved.  After complete ED evaluation, clinical impression is most consistent with urinary retention, cancer-related pain.  Urology, Heme-Onc follow-up within 2-3 days was recommended.    After taking into careful account the patient's history, physical exam findings, as well as empirical and objective data obtained throughout ED workup, I feel no emergent medical condition has been identified. No further evaluation or admission was felt to be required, and the patient is stable for discharge from the ED. The patient and any additional family present were updated with test results, overall clinical impression, and recommended further plan of care, including discharge instructions as provided and outpatient follow-up for continued evaluation and management as needed. All questions were answered. The patient expressed understanding and agreed with current plan for discharge and follow-up plan of care. Strict ED return precautions were provided, including return/worsening of current symptoms, new symptoms, or any other concerns.                        Clinical Impression:   Final diagnoses:  [G89.3] Cancer associated pain  [C7B.03] Metastatic carcinoid tumor to bone (Primary)  [R33.9] Urinary retention          ED Disposition Condition    Discharge Stable        ED Prescriptions     None        Follow-up Information     Follow up With Specialties Details Why Contact Info Additional Information    [facility] - [facility] Urology Schedule an appointment as soon as possible for a visit  as soon as possible for follow-up [address], [address]  [facility] [address] [phone] Please park in [address] or [address] and use [address]. Take [facility] elevators.           Otilio  GIRISH Poe MD  07/08/22 3951         Otilio Poe MD  07/12/22 1207

## 2022-07-08 NOTE — ED TRIAGE NOTES
Pt presents to ED with complaints of back pain that has been going on for a few weeks but reports pain became severe today. Pt also report urinary retention, reports last urination was this AM. Pt reports history of CA and Kidney Stones. Pt reports pain 10/10. Pt reports he did have an episode of emesis yesterday.

## 2022-07-08 NOTE — ED NOTES
Dr. Poe at bedside using ultrasound to scan pts bladder. Verbal order to RN to place presley catheter due to large amount of urine still noted in bladder after void of 100ml.

## 2022-07-11 ENCOUNTER — PATIENT MESSAGE (OUTPATIENT)
Dept: INTERNAL MEDICINE | Facility: CLINIC | Age: 67
End: 2022-07-11
Payer: MEDICARE

## 2022-07-11 ENCOUNTER — PATIENT MESSAGE (OUTPATIENT)
Dept: UROLOGY | Facility: CLINIC | Age: 67
End: 2022-07-11
Payer: MEDICARE

## 2022-07-12 ENCOUNTER — DOCUMENTATION ONLY (OUTPATIENT)
Dept: HEMATOLOGY/ONCOLOGY | Facility: CLINIC | Age: 67
End: 2022-07-12
Payer: MEDICARE

## 2022-07-12 ENCOUNTER — PATIENT MESSAGE (OUTPATIENT)
Dept: HEMATOLOGY/ONCOLOGY | Facility: CLINIC | Age: 67
End: 2022-07-12
Payer: MEDICARE

## 2022-07-12 ENCOUNTER — HOSPITAL ENCOUNTER (EMERGENCY)
Facility: HOSPITAL | Age: 67
Discharge: HOME | End: 2022-07-13
Attending: EMERGENCY MEDICINE
Payer: MEDICARE

## 2022-07-12 VITALS
SYSTOLIC BLOOD PRESSURE: 129 MMHG | DIASTOLIC BLOOD PRESSURE: 78 MMHG | TEMPERATURE: 98 F | RESPIRATION RATE: 18 BRPM | OXYGEN SATURATION: 98 % | HEART RATE: 79 BPM

## 2022-07-12 DIAGNOSIS — C78.7 SECONDARY MALIGNANT NEOPLASM OF LIVER: ICD-10-CM

## 2022-07-12 DIAGNOSIS — R31.9 HEMATURIA, UNSPECIFIED TYPE: ICD-10-CM

## 2022-07-12 DIAGNOSIS — R53.81 PHYSICAL DECONDITIONING: ICD-10-CM

## 2022-07-12 DIAGNOSIS — Z76.89 ENCOUNTER FOR ASSESSMENT OF FOLEY CATHETER: Primary | ICD-10-CM

## 2022-07-12 DIAGNOSIS — D3A.090 BRONCHIAL CARCINOID TUMORS: Primary | ICD-10-CM

## 2022-07-12 LAB
ALBUMIN SERPL BCP-MCNC: 2.7 G/DL (ref 3.5–5.2)
ALP SERPL-CCNC: 520 U/L (ref 55–135)
ALT SERPL W/O P-5'-P-CCNC: 32 U/L (ref 10–44)
ANION GAP SERPL CALC-SCNC: 9 MMOL/L (ref 8–16)
AST SERPL-CCNC: 21 U/L (ref 10–40)
BASOPHILS # BLD AUTO: 0.03 K/UL (ref 0–0.2)
BASOPHILS NFR BLD: 0.5 % (ref 0–1.9)
BILIRUB SERPL-MCNC: 0.8 MG/DL (ref 0.1–1)
BUN SERPL-MCNC: 13 MG/DL (ref 8–23)
CALCIUM SERPL-MCNC: 8.7 MG/DL (ref 8.7–10.5)
CHLORIDE SERPL-SCNC: 106 MMOL/L (ref 95–110)
CO2 SERPL-SCNC: 22 MMOL/L (ref 23–29)
CREAT SERPL-MCNC: 0.9 MG/DL (ref 0.5–1.4)
DIFFERENTIAL METHOD: ABNORMAL
EOSINOPHIL # BLD AUTO: 0 K/UL (ref 0–0.5)
EOSINOPHIL NFR BLD: 0.2 % (ref 0–8)
ERYTHROCYTE [DISTWIDTH] IN BLOOD BY AUTOMATED COUNT: 17.1 % (ref 11.5–14.5)
EST. GFR  (AFRICAN AMERICAN): >60 ML/MIN/1.73 M^2
EST. GFR  (NON AFRICAN AMERICAN): >60 ML/MIN/1.73 M^2
GLUCOSE SERPL-MCNC: 103 MG/DL (ref 70–110)
HCT VFR BLD AUTO: 39.4 % (ref 40–54)
HGB BLD-MCNC: 13.1 G/DL (ref 14–18)
IMM GRANULOCYTES # BLD AUTO: 0.03 K/UL (ref 0–0.04)
IMM GRANULOCYTES NFR BLD AUTO: 0.5 % (ref 0–0.5)
LYMPHOCYTES # BLD AUTO: 0.8 K/UL (ref 1–4.8)
LYMPHOCYTES NFR BLD: 12.9 % (ref 18–48)
MCH RBC QN AUTO: 28.2 PG (ref 27–31)
MCHC RBC AUTO-ENTMCNC: 33.2 G/DL (ref 32–36)
MCV RBC AUTO: 85 FL (ref 82–98)
MONOCYTES # BLD AUTO: 0.7 K/UL (ref 0.3–1)
MONOCYTES NFR BLD: 11.4 % (ref 4–15)
NEUTROPHILS # BLD AUTO: 4.5 K/UL (ref 1.8–7.7)
NEUTROPHILS NFR BLD: 74.5 % (ref 38–73)
NRBC BLD-RTO: 0 /100 WBC
PLATELET # BLD AUTO: 317 K/UL (ref 150–450)
PMV BLD AUTO: 8.4 FL (ref 9.2–12.9)
POTASSIUM SERPL-SCNC: 3.8 MMOL/L (ref 3.5–5.1)
PROT SERPL-MCNC: 6.7 G/DL (ref 6–8.4)
RBC # BLD AUTO: 4.64 M/UL (ref 4.6–6.2)
SODIUM SERPL-SCNC: 137 MMOL/L (ref 136–145)
WBC # BLD AUTO: 5.97 K/UL (ref 3.9–12.7)

## 2022-07-12 PROCEDURE — 99282 PR EMERGENCY DEPT VISIT,LEVEL II: ICD-10-PCS | Mod: ,,, | Performed by: EMERGENCY MEDICINE

## 2022-07-12 PROCEDURE — 81001 URINALYSIS AUTO W/SCOPE: CPT | Performed by: EMERGENCY MEDICINE

## 2022-07-12 PROCEDURE — 80053 COMPREHEN METABOLIC PANEL: CPT | Performed by: EMERGENCY MEDICINE

## 2022-07-12 PROCEDURE — 99282 EMERGENCY DEPT VISIT SF MDM: CPT | Mod: ,,, | Performed by: EMERGENCY MEDICINE

## 2022-07-12 PROCEDURE — 99283 EMERGENCY DEPT VISIT LOW MDM: CPT | Mod: 25

## 2022-07-12 PROCEDURE — 87086 URINE CULTURE/COLONY COUNT: CPT | Performed by: EMERGENCY MEDICINE

## 2022-07-12 PROCEDURE — 85025 COMPLETE CBC W/AUTO DIFF WBC: CPT | Performed by: EMERGENCY MEDICINE

## 2022-07-12 NOTE — TELEPHONE ENCOUNTER
Contacted patient's caregiver for clarification.  Appointment for this morning has been taken.  Offered 7/18 at 830 as previously promised.  She accepted, but asked if patient can go to ED to have catheter removed.  Informed it would be his choice, placed on waiting list, if an appointment becomes available sooner they will receive a text and can schedule.  Reminded voiding trials to be done early morning.

## 2022-07-12 NOTE — PROGRESS NOTES
Social Work Consult    Name:Elroy Rahman  : 1955  MRN: [id number]    Referral:Home Health     SW covering for Bety Chu LCSW while she is out.    Dr Chung sent consult to JACQUELYN to arrange home health for this patient.     JACQUELYN spoke to patients wife, explained HH would be out 3x/week and it would be more on the HH agency's schedule. They are requesting home care as in someone to help him change in the morning, bathe etc. Home care is not covered by insurance (unless long term care insurance)    JACQUELYN emailed her a list of private pay agencies and told her about [employer]. JACQUELYN also provided info on medicaid waiver programs.    JACQUELYN notified Dr Chung.

## 2022-07-13 LAB
BACTERIA #/AREA URNS AUTO: ABNORMAL /HPF
BACTERIA UR CULT: NORMAL
BACTERIA UR CULT: NORMAL
BILIRUB UR QL STRIP: ABNORMAL
CLARITY UR REFRACT.AUTO: ABNORMAL
COLOR UR AUTO: YELLOW
GLUCOSE UR QL STRIP: NEGATIVE
HGB UR QL STRIP: ABNORMAL
HYALINE CASTS UR QL AUTO: 0 /LPF
KETONES UR QL STRIP: NEGATIVE
LEUKOCYTE ESTERASE UR QL STRIP: ABNORMAL
MICROSCOPIC COMMENT: ABNORMAL
NITRITE UR QL STRIP: NEGATIVE
PH UR STRIP: 6 [PH] (ref 5–8)
PROT UR QL STRIP: ABNORMAL
RBC #/AREA URNS AUTO: >100 /HPF (ref 0–4)
SP GR UR STRIP: 1.03 (ref 1–1.03)
URN SPEC COLLECT METH UR: ABNORMAL
WBC #/AREA URNS AUTO: 33 /HPF (ref 0–5)

## 2022-07-13 NOTE — ED PROVIDER NOTES
Encounter Date: 7/12/2022       History     Chief Complaint   Patient presents with    Dysuria     Pt to ER via EMS c/o dysuria. He has a presley cath in place. No hematuria. Hx of left AKA.      66-year-old man with comorbidities of left BKA as well as carcinoid adenoma of the left lung and recent urinary tract infection presents to the ED by EMS requesting removal of his Presley catheter.  He denies any other additional complaints other than a request for removal of the Presley catheter placed due to a previously noted inability to void.  He denies any associated fevers, chills, nausea, vomiting, increased shortness of breath, chest pain, or syncope.        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    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    Sepsis due to methicillin susceptible Staphylococcus aureus 09/14/2019    MSSA bacteremia from Amputation stump infection    Sleep apnea     Snores    Thyroid nodule 11/25/2020    Urinary tract infection     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     Past Surgical History:   Procedure Laterality Date    ABDOMINAL SURGERY      Bka Left     COLONOSCOPY N/A 5/10/2022    Procedure: COLONOSCOPY;  Surgeon: Gin Bowling MD;  Location: [address]);  Service: Endoscopy;  Laterality: N/A;  rapid- not vaccinated    HERNIA REPAIR      IRRIGATION AND DEBRIDEMENT OF LOWER EXTREMITY Left 9/20/2019    Procedure: IRRIGATION AND DEBRIDEMENT, LOWER EXTREMITY -  left - cysto tubing - cultures;  Surgeon: Jesus Arias MD;  Location: [address] FLR;  Service: Orthopedics;  Laterality: Left;    LAPAROSCOPIC REPAIR OF INCARCERATED UMBILICAL HERNIA N/A 11/27/2018    Procedure: REPAIR, HERNIA, UMBILICAL, INCARCERATED, LAPAROSCOPIC;  Surgeon: Coco Guidry MD;  Location: [facility] OR;  Service: General;  Laterality: N/A;  video    TRANSESOPHAGEAL ECHOCARDIOGRAPHY N/A 9/18/2019    Procedure: ECHOCARDIOGRAM, TRANSESOPHAGEAL;  Surgeon: Dosc Diagnostic Provider;  Location: [facility] EP LAB;  Service: Anesthesiology;  Laterality: N/A;     Family History   Problem Relation Age of Onset    Cancer Mother         lung    Cancer Sister     Anesthesia problems Neg Hx      Social History     Tobacco Use    Smoking status: Never Smoker    Smokeless tobacco: Never Used   Substance Use Topics    Alcohol use: Yes     Alcohol/week: 1.0 standard drink     Types: 1 Cans of beer per week     Comment: rare    Drug use: No     Review of Systems   Constitutional: Negative for chills and fever.   HENT: Negative for facial swelling and trouble swallowing.    Respiratory: Negative for chest tightness and shortness of breath.    Cardiovascular: Negative for chest pain.   Gastrointestinal: Negative for abdominal pain, nausea and vomiting.   Genitourinary: Negative for flank pain, hematuria and penile pain.   Musculoskeletal: Negative for neck pain.   Skin: Negative for rash and wound.   Neurological: Negative for seizures and numbness.       Physical Exam     Initial Vitals   BP Pulse Resp Temp SpO2   07/12/22 2010 07/12/22 2010 07/12/22 2010 07/12/22 2237 07/12/22 2010   (!) 145/75 90 16 98 °F (36.7 °C) 98 %      MAP       --                Physical Exam    Vitals reviewed.  Constitutional:   66-year-old  man, chronically ill-appearing, no acute distress noted   HENT:   Head: Normocephalic and atraumatic.   Mouth/Throat: Oropharynx is clear and moist.   Eyes: EOM are normal. Pupils  are equal, round, and reactive to light.   Neck: No tracheal deviation present.   Cardiovascular: Normal rate, regular rhythm and intact distal pulses.   Pulmonary/Chest: No stridor. No respiratory distress.   Musculoskeletal:         General: No edema.      Comments: Left lower leg is surgically absent     Neurological: He is alert and oriented to person, place, and time.   Skin: Skin is warm and dry.   Psychiatric: His behavior is normal. Thought content normal.         ED Course   Procedures  Labs Reviewed   CBC W/ AUTO DIFFERENTIAL - Abnormal; Notable for the following components:       Result Value    Hemoglobin 13.1 (*)     Hematocrit 39.4 (*)     RDW 17.1 (*)     MPV 8.4 (*)     Lymph # 0.8 (*)     Gran % 74.5 (*)     Lymph % 12.9 (*)     All other components within normal limits   COMPREHENSIVE METABOLIC PANEL - Abnormal; Notable for the following components:    CO2 22 (*)     Albumin 2.7 (*)     Alkaline Phosphatase 520 (*)     All other components within normal limits   URINALYSIS, REFLEX TO URINE CULTURE - Abnormal; Notable for the following components:    Appearance, UA Hazy (*)     Protein, UA 2+ (*)     Bilirubin (UA) 1+ (*)     Occult Blood UA 2+ (*)     Leukocytes, UA Trace (*)     All other components within normal limits    Narrative:     Specimen Source->Urine   URINALYSIS MICROSCOPIC - Abnormal; Notable for the following components:    RBC, UA >100 (*)     WBC, UA 33 (*)     All other components within normal limits    Narrative:     Specimen Source->Urine   CULTURE, URINE               Medications - No data to display             Attending Attestation:             Attending ED Notes:   Laboratory evaluation is consistent with previous findings without peg hematologic derangement.  Metabolic profile reveals an elevated alkaline phosphatase and mildly diminished albumin only without additional evidence of solid organ dysfunction.  Urinalysis reveals evidence of hematuria without presence of  nitrite.  Per the patient's request, his Leigh catheter was removed without issue.  The patient has not voided prior to discharge, however, he is declining replacement of the Leigh catheter, and is requesting discharge home.  He will be discharged home in stable condition with instructions to return to the ED as needed for emergent concerns, and continue outpatient therapy as prescribed.               Clinical Impression:   Final diagnoses:  [Z76.89] Encounter for assessment of Leigh catheter (Primary)  [R31.9] Hematuria, unspecified type          ED Disposition Condition    Discharge Stable        ED Prescriptions     None        Follow-up Information     Follow up With Specialties Details Why Contact Info    [facility] Emergency Dept Emergency Medicine  As needed, If symptoms worsen [id number]  [facility] [id number] [phone]           Ruslan Arellano MD  07/13/22 0107

## 2022-07-13 NOTE — ED TRIAGE NOTES
"Elroy Rahman, a 66 y.o. male presents to the ED w/ complaint of pain with urination. Pt has a presley catheter in place, pt states "I have pain when I pee." went to [facility] on 7/7 and had catheter placed for urinary retention.    Triage note:  Chief Complaint   Patient presents with    Dysuria     Pt to ER via EMS c/o dysuria. He has a presley cath in place. No hematuria. Hx of left AKA.      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

## 2022-07-15 PROCEDURE — G0180 MD CERTIFICATION HHA PATIENT: HCPCS | Mod: ,,, | Performed by: INTERNAL MEDICINE

## 2022-07-15 PROCEDURE — G0180 PR HOME HEALTH MD CERTIFICATION: ICD-10-PCS | Mod: ,,, | Performed by: INTERNAL MEDICINE

## 2022-07-19 ENCOUNTER — PATIENT MESSAGE (OUTPATIENT)
Dept: RESEARCH | Facility: CLINIC | Age: 67
End: 2022-07-19
Payer: MEDICARE

## 2022-07-22 ENCOUNTER — OFFICE VISIT (OUTPATIENT)
Dept: UROLOGY | Facility: CLINIC | Age: 67
End: 2022-07-22
Payer: MEDICARE

## 2022-07-22 VITALS — SYSTOLIC BLOOD PRESSURE: 123 MMHG | DIASTOLIC BLOOD PRESSURE: 78 MMHG | HEART RATE: 68 BPM

## 2022-07-22 DIAGNOSIS — N13.8 BPH WITH OBSTRUCTION/LOWER URINARY TRACT SYMPTOMS: Primary | ICD-10-CM

## 2022-07-22 DIAGNOSIS — N40.1 BPH WITH OBSTRUCTION/LOWER URINARY TRACT SYMPTOMS: Primary | ICD-10-CM

## 2022-07-22 DIAGNOSIS — R33.9 URINARY RETENTION: ICD-10-CM

## 2022-07-22 PROCEDURE — 99214 PR OFFICE/OUTPT VISIT, EST, LEVL IV, 30-39 MIN: ICD-10-PCS | Mod: S$PBB,,, | Performed by: STUDENT IN AN ORGANIZED HEALTH CARE EDUCATION/TRAINING PROGRAM

## 2022-07-22 PROCEDURE — 99999 PR PBB SHADOW E&M-EST. PATIENT-LVL III: ICD-10-PCS | Mod: PBBFAC,,, | Performed by: STUDENT IN AN ORGANIZED HEALTH CARE EDUCATION/TRAINING PROGRAM

## 2022-07-22 PROCEDURE — 99214 OFFICE O/P EST MOD 30 MIN: CPT | Mod: S$PBB,,, | Performed by: STUDENT IN AN ORGANIZED HEALTH CARE EDUCATION/TRAINING PROGRAM

## 2022-07-22 PROCEDURE — 99213 OFFICE O/P EST LOW 20 MIN: CPT | Mod: PBBFAC,PO | Performed by: STUDENT IN AN ORGANIZED HEALTH CARE EDUCATION/TRAINING PROGRAM

## 2022-07-22 PROCEDURE — 99999 PR PBB SHADOW E&M-EST. PATIENT-LVL III: CPT | Mod: PBBFAC,,, | Performed by: STUDENT IN AN ORGANIZED HEALTH CARE EDUCATION/TRAINING PROGRAM

## 2022-07-22 RX ORDER — TAMSULOSIN HYDROCHLORIDE 0.4 MG/1
0.4 CAPSULE ORAL DAILY
Qty: 90 CAPSULE | Refills: 3 | Status: SHIPPED | OUTPATIENT
Start: 2022-07-22 | End: 2022-09-26

## 2022-07-22 NOTE — Clinical Note
Messaging both you for help, lAayna listed as pcp but pt could not remember and I saw recent notes from [facility] - he is having issues with loose stools despite imodium, can one of you assist? Thanks, Dr. Hernández

## 2022-07-22 NOTE — PROGRESS NOTES
Subjective:       Patient ID: Elroy Rahman is a 66 y.o. male.    Chief Complaint: Follow-up  This is a 66 y.o.  male patient that is an established patient of [facility].    The patient was originally referred to me by NET team for abnormal prostate finding on PET CT scan. The scan was obtained in 8/2019 and demonstrated - Focal, eccentric increased radiotracer uptake in the prostate. He does not have a personal or family history of prostate cancer. 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 He has not had a PSA checked for quite some time prior to seeing me. He underwent a PSA check in 2020 which returned back within the normal range for his age group. He also underwent an MRI of the prostate which did not demonstrate any suspicious lesions. Volume 56.72 on MRI. Therefore we planned on monitoring with yearly PSAs.  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 Reviewed report from ER and sounds like a PVR was checked by Dr. Poe and it was only 100cc but a presley was inserted. He presented to ED 7/12/22 and has been urinating on his own since the presley was removed at his request.     7/22/22  He cannot see his stream. He does not have to force or strain. He last urinated about 8am.       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    Secondary neuroendocrine tumor of  liver 6/15/2017    Sepsis due to methicillin susceptible Staphylococcus aureus 09/14/2019    MSSA bacteremia from Amputation stump infection    Sleep apnea     Snores    Thyroid nodule 11/25/2020    Urinary tract infection     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       Past Surgical History:   Procedure Laterality Date    ABDOMINAL SURGERY      Bka Left     COLONOSCOPY N/A 5/10/2022    Procedure: COLONOSCOPY;  Surgeon: Gin Bowling MD;  Location: [facility] ENDO (2ND FLR);  Service: Endoscopy;  Laterality: N/A;  rapid- not vaccinated    HERNIA REPAIR      IRRIGATION AND DEBRIDEMENT OF LOWER EXTREMITY Left 9/20/2019    Procedure: IRRIGATION AND DEBRIDEMENT, LOWER EXTREMITY - left - cysto tubing - cultures;  Surgeon: Jesus Arias MD;  [facility];  Service: Orthopedics;  Laterality: Left;    LAPAROSCOPIC REPAIR OF INCARCERATED UMBILICAL HERNIA N/A 11/27/2018    Procedure: REPAIR, HERNIA, UMBILICAL, INCARCERATED, LAPAROSCOPIC;  Surgeon: Coco Guidry MD;  Location: [facility] OR;  Service: General;  Laterality: N/A;  video    TRANSESOPHAGEAL ECHOCARDIOGRAPHY N/A 9/18/2019    Procedure: ECHOCARDIOGRAM, TRANSESOPHAGEAL;  Surgeon: Grace Diagnostic Provider;  Location: [facility] EP LAB;  Service: Anesthesiology;  Laterality: N/A;       Family History   Problem Relation Age of Onset    Cancer Mother         lung    Cancer Sister     Anesthesia problems Neg Hx        Social History     Tobacco Use    Smoking status: Never Smoker    Smokeless tobacco: Never Used   Substance Use Topics    Alcohol use: Yes     Alcohol/week: 1.0 standard drink     Types: 1 Cans of beer per week     Comment: rare    Drug use: No       Current Outpatient Medications on File Prior to Visit   Medication Sig Dispense Refill    ammonium lactate 12 % Crea Qd-bid arms and dorsal hands 140 g 3    erythromycin (ROMYCIN) ophthalmic ointment SMARTSIG:Sparingly Right Eye Every Night      gatifloxacin 0.5 % Drop drops Place  into both eyes.      ibuprofen (ADVIL,MOTRIN) 200 MG tablet Take 600 mg by mouth every 6 (six) hours as needed for Pain.      ketorolac 0.5% (ACULAR) 0.5 % Drop INSTILL 1 DROP IN LEFT EYE FOUR TIMES DAILY STARTING 2 DAYS PRIOR TO SURGERY      lanreotide (SOMATULINE DEPOT) 120 mg/0.5 mL Syrg Inject 120 mg into the skin every 28 days.      ondansetron (ZOFRAN) 4 MG tablet Take 1 tablet (4 mg total) by mouth 2 (two) times daily. 20 tablet 1    oxyCODONE (ROXICODONE) 15 MG Tab Take 1 tablet (15 mg total) by mouth every 6 (six) hours as needed for Pain (chronic neoplasm-related pain). 120 tablet 0    prednisoLONE acetate (PRED FORTE) 1 % DrpS SHAKE LIQUID AND INSTILL 1 DROP IN RIGHT EYE FOUR TIMES DAILY FOR 1 WEEK      tobramycin-dexamethasone 0.3-0.1% (TOBRADEX) 0.3-0.1 % DrpS SHAKE LIQUID AND INSTILL 1 DROP IN RIGHT EYE FOUR TIMES DAILY       No current facility-administered medications on file prior to visit.       Review of patient's allergies indicates:   Allergen Reactions    Epinephrine Anaphylaxis and Other (See Comments)     Can Cause A Carcinoid crisis       Review of Systems   Constitutional: Negative for chills.   HENT: Negative for congestion.    Eyes: Negative for visual disturbance.   Respiratory: Negative for shortness of breath.    Cardiovascular: Negative for chest pain.   Gastrointestinal: Negative for abdominal distention.   Musculoskeletal: Negative for gait problem.   Skin: Negative for color change.   Neurological: Negative for dizziness.   Psychiatric/Behavioral: Negative for agitation.       Objective:      Physical Exam  Constitutional:       Appearance: He is well-developed.      Comments: In wheelchair   HENT:      Head: Normocephalic.   Eyes:      Pupils: Pupils are equal, round, and reactive to light.   Pulmonary:      Effort: Pulmonary effort is normal.   Abdominal:      Palpations: Abdomen is soft.   Musculoskeletal:         General: Normal range of motion.      Cervical back:  Normal range of motion.   Skin:     General: Skin is warm and dry.   Neurological:      Mental Status: He is alert.         Assessment:       1. BPH with obstruction/lower urinary tract symptoms    2. Urinary retention        Plan:         Counseled pt that he might not truly have been in retention. A PVR of 100cc is not consistent with retention but since he was struggling the urinate I believe the ED tried to take the most conservative course of action by offering him a catheter.   1. Flomax. Discussed r/b and he would like me to send this in.   2. PSA 4.1 3/16/22, stable. Can check yearly.  3. Will message Lucia Ba NP for help with  loose stools.   4.  to arrange f/u in 3-4 months.             BPH with obstruction/lower urinary tract symptoms  -     tamsulosin (FLOMAX) 0.4 mg Cap; Take 1 capsule (0.4 mg total) by mouth once daily.  Dispense: 90 capsule; Refill: 3    Urinary retention  -     Ambulatory referral/consult to Urology  -     tamsulosin (FLOMAX) 0.4 mg Cap; Take 1 capsule (0.4 mg total) by mouth once daily.  Dispense: 90 capsule; Refill: 3

## 2022-07-28 ENCOUNTER — TELEPHONE (OUTPATIENT)
Dept: HEMATOLOGY/ONCOLOGY | Facility: CLINIC | Age: 67
End: 2022-07-28
Payer: MEDICARE

## 2022-07-28 DIAGNOSIS — R19.7 DIARRHEA, UNSPECIFIED TYPE: Primary | ICD-10-CM

## 2022-07-28 NOTE — TELEPHONE ENCOUNTER
----- Message from Henna Johnson sent at 7/28/2022  1:54 PM CDT -----  Contact: [facility] [place] [phone]  Otiilo is calling to report diarrhea for about over a week or almost a month.

## 2022-08-01 ENCOUNTER — TELEPHONE (OUTPATIENT)
Dept: HEMATOLOGY/ONCOLOGY | Facility: CLINIC | Age: 67
End: 2022-08-01
Payer: MEDICARE

## 2022-08-01 NOTE — TELEPHONE ENCOUNTER
----- Message from Rani Nelson sent at 8/1/2022  2:50 PM CDT -----  Contact: [phone]  Who Called: [facility]   Regarding:  pt still has diarrhea, would like to continue PT   Would the patient rather a call back or a response via [facility]? Call back  Best Call Back Number: [phone]  Additional Information: n/a

## 2022-08-03 ENCOUNTER — INFUSION (OUTPATIENT)
Dept: INFUSION THERAPY | Facility: HOSPITAL | Age: 67
End: 2022-08-03
Attending: INTERNAL MEDICINE
Payer: MEDICARE

## 2022-08-03 DIAGNOSIS — C7B.8 SECONDARY NEUROENDOCRINE TUMOR OF BONE: ICD-10-CM

## 2022-08-03 DIAGNOSIS — C79.51 SECONDARY MALIGNANT NEOPLASM OF BONE: ICD-10-CM

## 2022-08-03 DIAGNOSIS — C7A.090 CARCINOID BRONCHIAL ADENOMA, UNSPECIFIED LATERALITY: Primary | ICD-10-CM

## 2022-08-03 DIAGNOSIS — C77.8 SECONDARY MALIGNANT NEOPLASM OF LYMPH NODES OF MULTIPLE SITES: ICD-10-CM

## 2022-08-03 DIAGNOSIS — C78.7 SECONDARY MALIGNANT NEOPLASM OF LIVER: ICD-10-CM

## 2022-08-03 DIAGNOSIS — C7A.090 MALIGNANT CARCINOID TUMOR OF BRONCHUS AND LUNG: ICD-10-CM

## 2022-08-03 DIAGNOSIS — C7B.8 SECONDARY NEUROENDOCRINE TUMOR OF LIVER: ICD-10-CM

## 2022-08-03 DIAGNOSIS — C78.89 SECONDARY MALIGNANT NEOPLASM OF SPLEEN: ICD-10-CM

## 2022-08-03 DIAGNOSIS — C7B.02 METASTATIC MALIGNANT CARCINOID TUMOR TO LIVER: ICD-10-CM

## 2022-08-03 DIAGNOSIS — C7B.8 SECONDARY MALIGNANT NEUROENDOCRINE TUMOR OF LIVER: ICD-10-CM

## 2022-08-03 DIAGNOSIS — D3A.090 BRONCHIAL CARCINOID TUMORS: ICD-10-CM

## 2022-08-03 DIAGNOSIS — C7A.090 CARCINOID BRONCHIAL ADENOMA OF LEFT LUNG: ICD-10-CM

## 2022-08-03 PROCEDURE — 96372 THER/PROPH/DIAG INJ SC/IM: CPT

## 2022-08-03 PROCEDURE — 63600175 PHARM REV CODE 636 W HCPCS: Mod: JG | Performed by: INTERNAL MEDICINE

## 2022-08-03 RX ORDER — LANREOTIDE ACETATE 120 MG/.5ML
120 INJECTION SUBCUTANEOUS
Status: CANCELLED | OUTPATIENT
Start: 2022-08-03

## 2022-08-03 RX ORDER — LANREOTIDE ACETATE 120 MG/.5ML
120 INJECTION SUBCUTANEOUS
Status: DISCONTINUED | OUTPATIENT
Start: 2022-08-03 | End: 2022-08-03 | Stop reason: HOSPADM

## 2022-08-03 RX ADMIN — LANREOTIDE ACETATE 120 MG: 120 INJECTION SUBCUTANEOUS at 08:08

## 2022-08-10 ENCOUNTER — EXTERNAL HOME HEALTH (OUTPATIENT)
Dept: HOME HEALTH SERVICES | Facility: HOSPITAL | Age: 67
End: 2022-08-10
Payer: MEDICARE

## 2022-08-17 ENCOUNTER — DOCUMENT SCAN (OUTPATIENT)
Dept: HOME HEALTH SERVICES | Facility: HOSPITAL | Age: 67
End: 2022-08-17
Payer: MEDICARE

## 2022-08-17 ENCOUNTER — HOSPITAL ENCOUNTER (OUTPATIENT)
Dept: RADIOLOGY | Facility: HOSPITAL | Age: 67
Discharge: HOME | End: 2022-08-17
Attending: INTERNAL MEDICINE
Payer: MEDICARE

## 2022-08-17 DIAGNOSIS — C78.89 SECONDARY MALIGNANT NEOPLASM OF SPLEEN: ICD-10-CM

## 2022-08-17 DIAGNOSIS — C78.7 SECONDARY MALIGNANT NEOPLASM OF LIVER: ICD-10-CM

## 2022-08-17 PROCEDURE — 78815 PET IMAGE W/CT SKULL-THIGH: CPT | Mod: PS,TC

## 2022-08-17 PROCEDURE — 78815 NM PET CU64 DOTATATE, SKULL TO MID THIGH: ICD-10-PCS | Mod: 26,PS,, | Performed by: RADIOLOGY

## 2022-08-17 PROCEDURE — 78815 PET IMAGE W/CT SKULL-THIGH: CPT | Mod: 26,PS,, | Performed by: RADIOLOGY

## 2022-08-24 ENCOUNTER — TELEPHONE (OUTPATIENT)
Dept: UROLOGY | Facility: CLINIC | Age: 67
End: 2022-08-24
Payer: MEDICARE

## 2022-08-24 NOTE — TELEPHONE ENCOUNTER
Spoke with Harjeet. (Physical Therapy)  Requesting orders for extensive overall care for patient due to his declining health/issues.  Chronic diarrhea, weakness, vision loss, immobility due to BKA.  He does not have reliable care givers at this time.  They are seeking home visits to be done by [facility] (they've contacted [facility] for assessment) and to extend his physical therapy.  Patient has not been evaluated by other physicians in a while, so Harjeet is reaching out to the most recent providers that have evaluated patient.  Informed Umesh I would forward the message and staff would contact him with update.  He voiced understanding.

## 2022-08-24 NOTE — TELEPHONE ENCOUNTER
----- Message from Sigrid Capellan sent at 8/24/2022  4:29 PM CDT -----  Type:  Needs Medical Advice    Who Called:  Umesh / Home Therapist   Symptoms (please be specific): a few health issues, chronic diarrhea   How long has patient had these symptoms:     Pharmacy name and phone #:     Would the patient rather a call back or a response via [facility]? Call back   Best Call Back Number:  [phone]   Additional Information:  calling for order for a nurse evaluation and extend therapy for one more week ..

## 2022-08-29 ENCOUNTER — PES CALL (OUTPATIENT)
Dept: ADMINISTRATIVE | Facility: CLINIC | Age: 67
End: 2022-08-29
Payer: MEDICARE

## 2022-08-31 ENCOUNTER — INFUSION (OUTPATIENT)
Dept: INFUSION THERAPY | Facility: HOSPITAL | Age: 67
End: 2022-08-31
Payer: MEDICARE

## 2022-08-31 DIAGNOSIS — C79.51 SECONDARY MALIGNANT NEOPLASM OF BONE: ICD-10-CM

## 2022-08-31 DIAGNOSIS — C77.8 SECONDARY MALIGNANT NEOPLASM OF LYMPH NODES OF MULTIPLE SITES: ICD-10-CM

## 2022-08-31 DIAGNOSIS — C7B.8 SECONDARY NEUROENDOCRINE TUMOR OF LIVER: ICD-10-CM

## 2022-08-31 DIAGNOSIS — C78.89 SECONDARY MALIGNANT NEOPLASM OF SPLEEN: ICD-10-CM

## 2022-08-31 DIAGNOSIS — C78.7 SECONDARY MALIGNANT NEOPLASM OF LIVER: ICD-10-CM

## 2022-08-31 DIAGNOSIS — C7A.090 CARCINOID BRONCHIAL ADENOMA, UNSPECIFIED LATERALITY: Primary | ICD-10-CM

## 2022-08-31 DIAGNOSIS — C7A.090 CARCINOID BRONCHIAL ADENOMA OF LEFT LUNG: ICD-10-CM

## 2022-08-31 DIAGNOSIS — C7A.090 MALIGNANT CARCINOID TUMOR OF BRONCHUS AND LUNG: ICD-10-CM

## 2022-08-31 DIAGNOSIS — C7B.02 METASTATIC MALIGNANT CARCINOID TUMOR TO LIVER: ICD-10-CM

## 2022-08-31 DIAGNOSIS — C7B.8 SECONDARY MALIGNANT NEUROENDOCRINE TUMOR OF LIVER: ICD-10-CM

## 2022-08-31 DIAGNOSIS — C7B.8 SECONDARY NEUROENDOCRINE TUMOR OF BONE: ICD-10-CM

## 2022-08-31 DIAGNOSIS — D3A.090 BRONCHIAL CARCINOID TUMORS: ICD-10-CM

## 2022-08-31 PROCEDURE — 63600175 PHARM REV CODE 636 W HCPCS: Mod: JG | Performed by: INTERNAL MEDICINE

## 2022-08-31 PROCEDURE — 96372 THER/PROPH/DIAG INJ SC/IM: CPT

## 2022-08-31 PROCEDURE — 96402 CHEMO HORMON ANTINEOPL SQ/IM: CPT

## 2022-08-31 RX ORDER — LANREOTIDE ACETATE 120 MG/.5ML
120 INJECTION SUBCUTANEOUS
Status: DISCONTINUED | OUTPATIENT
Start: 2022-08-31 | End: 2022-08-31 | Stop reason: HOSPADM

## 2022-08-31 RX ORDER — LANREOTIDE ACETATE 120 MG/.5ML
120 INJECTION SUBCUTANEOUS
Status: CANCELLED | OUTPATIENT
Start: 2022-08-31

## 2022-08-31 RX ADMIN — LANREOTIDE ACETATE 120 MG: 120 INJECTION SUBCUTANEOUS at 09:08

## 2022-08-31 NOTE — NURSING
Pt here for Lanreotide injection. Administered injection to left hip. No questions or concerns. Pt left unit in WC accompanied by his daughter.

## 2022-09-13 NOTE — PROGRESS NOTES
PATIENT: Elroy Rahman  MRN: [id number]  DATE: 9/14/2022    Diagnosis:   1. Bronchial carcinoid tumors    2. Secondary malignant neoplasm of liver    3. Secondary malignant neoplasm of spleen    4. Secondary malignant neoplasm of bone    5. Secondary carcinoid tumor of peritoneum    6. 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 Immunodeficiency due to drug therapy    8. Chronic neoplasm-related pain    9. Hx of BKA, left    10. Anemia in neoplastic disease      Chief Complaint: Carcinoid Tumor    Oncologic History:      Oncologic History Bronchial carcinoid, left, diagnosed 1/2016   Metastatic disease to liver and bone 5/2017       Oncologic Treatment Left upper lobectomy 1/2016  CAPTEM 7/2017 - 2/2019 (discontinued due to progression)  Lanreotide 7/2017   Zoledronic acid 7/2017   TACE 10/2018  TACE 3/2019  Lily-177 4/10/19, 6/5/19  TACE  PRRT      Pathology 7/21/21:  Liver, biopsy   Liver with well-differentiated neuroendocrine tumor, WHO Grade 3   Mitotic rate: 8 per 2 millimeter squared   Peritumoral fibrosis and peritumoral necrosis identified   Net panel:   Ki 67 proliferation index: up to 25%   Chromogranin: Positive, 3+ staining, 100% of tumor cells   Synaptophysin: Positive, 3+ staining, 100% of tumor cells   CD 31: Up to 17 vessels per HPF   Uninvolved liver with moderate mixed steatosis, cholestasis, and mild   periportal inflammation    1/2016: Typical carcinoid tumor, well-differentiated T2a, N0, M0 Ki-67 <1%  6/2017: Liver biopsy, well-differentiated, Ki-67 25%             Subjective:    History of Present Illness:   His history dates to 1/2016 when he underwent a left upper lobectomy with Dr. Bell for a typical carcinoid tumor.  His pathological staging was T2a, N0, M0 with Ki-67 less than 1% and mitotic count 0 per 10 high-powered fields.  He done well postoperatively, however, in 5/2017 he was undergoing routine surveillance and a chest CT in picked up evidence of  "progression of disease in the liver.  Dedicated abdominal CT was performed showing multiple liver lesions.  In 6/2017 he underwent a CT-guided liver biopsy which was positive for metastatic neuroendocrine tumor which was well-differentiated with no overt nuclear atypia, however, the Ki-67 was 25%.  He underwent gallium-68 PET/CT and was found to have widespread disease involving the bone, liver and also mesentery.  He was started on CAPTEM in 7/2017 and also Lanreotide and zoledronic acid.  Scans in 10/2017 had shown stability of disease.  Imaging in 1/2018 had shown stability of disease.  Scans in 4/2018 had shown stability of disease.  Scans in 07/2018 had continued to showed mild growth.  In 10/2018 he underwent TACE.  CAPTEM was discontinued in 02/2019 due to progression.  He underwent TACE in 03/2019.  He was started on treatment with PRRT using Lily-177 on 04/10/2019.    - his case was discussed at neuroendocrine conference on 5/4/21. Notes from that conference:  " Progression of widespread receptor positive disease involving bone, liver, spleen, and nodes. Still needs cTACE.  Bones mets   BOARD RECOMMENDATIONS:   Consult IR for biopsy of liver and TACE  Send tissue to [facility]  Consult to Dr MOIZ Chung  Bones scan if positive [place] with Dr VICKI Obando  If Bone not positive external Radiation to bone"    - he underwent liver-directed therapy (trans-arterial chemoembolization) and biopsy on 7/21/21.  - he underwent TACE in September 2021.  - he underwent MRI abdomen on 10/11/21.  - he completed 2 doses of PRRT.  - his case was discussed at neuroendocrine conference on 10/19/21. Information from that discussion:  "Good response to right lobe cTACE. Slow progression in the left lobe and outside the liver since 4/2021.  Recommend left lobe cTACE."  - he underwent MRI abdomen on 1/18/22.  - he underwent MRI abdomen on 5/3/22.    Interval History:  - he presents for a follow-up appointment for his bronchial " carcinoid tumor  - he was not able to undergo the TACE procedure in May/June 2022  - he underwent copper scan on 8/17/22  - over the past several months, he had severe diarrhea, weight loss, dehydration. He feels this is getting under control. He notes that he was told that maybe gallstones are contributing.    Past Medical History:   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    Sepsis due to methicillin susceptible Staphylococcus aureus 09/14/2019    MSSA bacteremia from Amputation stump infection    Sleep apnea     Snores    Thyroid nodule 11/25/2020    Urinary tract infection     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       Past Surgical HIstory:   Past Surgical History:   Procedure Laterality Date    ABDOMINAL SURGERY      Bka Left     COLONOSCOPY N/A 5/10/2022    Procedure: COLONOSCOPY;  Surgeon: Gin Bowling MD;  Location: [facility] ([address] [place] [address]);  Service: Endoscopy;  Laterality: N/A;  rapid- not vaccinated    HERNIA REPAIR      IRRIGATION AND DEBRIDEMENT OF LOWER EXTREMITY Left 9/20/2019    Procedure: IRRIGATION AND DEBRIDEMENT, LOWER EXTREMITY - left - cysto tubing - cultures;  Surgeon: Jesus Arias MD;  Location: [address];  Service: Orthopedics;  Laterality: Left;    LAPAROSCOPIC REPAIR OF INCARCERATED UMBILICAL HERNIA N/A 11/27/2018    Procedure: REPAIR, HERNIA, UMBILICAL, INCARCERATED, LAPAROSCOPIC;  Surgeon: Coco Guidry MD;  Location: [facility];  Service: General;  Laterality: N/A;  video    TRANSESOPHAGEAL ECHOCARDIOGRAPHY N/A 9/18/2019    Procedure: ECHOCARDIOGRAM, TRANSESOPHAGEAL;   Surgeon: [facility] Provider;  Location: [facility] EP LAB;  Service: Anesthesiology;  Laterality: N/A;       Family History:   Family History   Problem Relation Age of Onset    Cancer Mother         lung    Cancer Sister     Anesthesia problems Neg Hx        Social History:  reports that he has never smoked. He has never used smokeless tobacco. He reports current alcohol use of about 1.0 standard drink per week. He reports that he does not use drugs.    Allergies:  Review of patient's allergies indicates:  No Known Allergies    Medications:  Current Outpatient Medications   Medication Sig Dispense Refill    ammonium lactate 12 % Crea Qd-bid arms and dorsal hands 140 g 3    erythromycin (ROMYCIN) ophthalmic ointment SMARTSIG:Sparingly Right Eye Every Night      gatifloxacin 0.5 % Drop drops Place into both eyes.      ibuprofen (ADVIL,MOTRIN) 200 MG tablet Take 600 mg by mouth every 6 (six) hours as needed for Pain.      ketorolac 0.5% (ACULAR) 0.5 % Drop INSTILL 1 DROP IN LEFT EYE FOUR TIMES DAILY STARTING 2 DAYS PRIOR TO SURGERY      lanreotide (SOMATULINE DEPOT) 120 mg/0.5 mL Syrg Inject 120 mg into the skin every 28 days.      ondansetron (ZOFRAN) 4 MG tablet Take 1 tablet (4 mg total) by mouth 2 (two) times daily. 20 tablet 1    oxyCODONE (ROXICODONE) 15 MG Tab Take 1 tablet (15 mg total) by mouth every 6 (six) hours as needed for Pain (chronic neoplasm-related pain). 120 tablet 0    prednisoLONE acetate (PRED FORTE) 1 % DrpS SHAKE LIQUID AND INSTILL 1 DROP IN RIGHT EYE FOUR TIMES DAILY FOR 1 WEEK      tamsulosin (FLOMAX) 0.4 mg Cap Take 1 capsule (0.4 mg total) by mouth once daily. 90 capsule 3    tobramycin-dexamethasone 0.3-0.1% (TOBRADEX) 0.3-0.1 % DrpS SHAKE LIQUID AND INSTILL 1 DROP IN RIGHT EYE FOUR TIMES DAILY       No current facility-administered medications for this visit.       Review of Systems   Constitutional:  Positive for fatigue. Negative for appetite change, chills, fever and unexpected weight  change.   HENT:  Negative for dental problem, sinus pressure and sneezing.    Eyes:  Positive for visual disturbance.   Respiratory:  Negative for cough, choking, chest tightness and shortness of breath.    Cardiovascular:  Negative for chest pain and leg swelling.   Gastrointestinal:  Positive for diarrhea. Negative for abdominal pain, blood in stool, constipation and nausea.   Genitourinary:  Negative for difficulty urinating, dysuria and frequency.   Musculoskeletal:  Negative for arthralgias and back pain.   Skin:  Negative for rash and wound.   Neurological:  Negative for dizziness, light-headedness and headaches.   Hematological:  Negative for adenopathy. Does not bruise/bleed easily.   Psychiatric/Behavioral:  Negative for sleep disturbance. The patient is not nervous/anxious.      ECOG Performance Status:   ECOG SCORE 1            Objective:      Vitals:   Vitals:    09/14/22 1020   BP: 135/70   BP Location: Left arm   Patient Position: Sitting   BP Method: Medium (Automatic)   Pulse: 75   Resp: 20   Temp: 98.3 °F (36.8 °C)   TempSrc: Oral   SpO2: 98%   Weight: 100 kg (220 lb 7.4 oz)   Height: 6' (1.829 m)     BMI: Body mass index is 29.9 kg/m².    Physical Exam  Constitutional:       Appearance: He is well-developed.   HENT:      Head: Normocephalic and atraumatic.   Eyes:      Pupils: Pupils are equal, round, and reactive to light.   Cardiovascular:      Rate and Rhythm: Normal rate and regular rhythm.   Pulmonary:      Effort: Pulmonary effort is normal. No respiratory distress.      Breath sounds: Normal breath sounds.   Abdominal:      General: There is no distension.      Palpations: Abdomen is soft.      Tenderness: There is no abdominal tenderness.   Musculoskeletal:         General: No tenderness.      Cervical back: Normal range of motion and neck supple.   Lymphadenopathy:      Cervical: No cervical adenopathy.   Skin:     General: Skin is warm and dry.   Neurological:      Mental Status: He is  alert and oriented to person, place, and time.      Cranial Nerves: No cranial nerve deficit.   Psychiatric:         Behavior: Behavior normal.       Laboratory Data:  Labs have been reviewed.    Lab Results   Component Value Date    WBC 5.97 07/12/2022    HGB 13.1 (L) 07/12/2022    HCT 39.4 (L) 07/12/2022    MCV 85 07/12/2022     07/12/2022         Supplemental Diagnosis  Chromogranin Staining:  Intensity: Moderate to strong. Positive cells: 100 (%)  Synaptophysin Staining:  Intensity: Strong. Positive cells: 100 (%)  CD31: 40 vessels per 1 HPF  Factor VIII: 27 vessels per 1 HPF  Ki67: 1 % tumor cells staining  Immunostains performed with appropriate positive and negative controls.  FINAL PATHOLOGIC DIAGNOSIS  1. Lymph node, level XI, excisional biopsy:  Fragments of benign lymph node with anthracosis and sinus histiocytosis (0/1).  2. Lung, lingular staple line, biopsy:  Lung, negative for neoplasm.  3. Lymph node, level X, excisional biopsy:  1 benign lymph node with anthracotic pigment and sinus histiocytosis (0/1).  4. Lymph node, level XII, excisional biopsy:  2 benign lymph nodes with sinus histiocytosis and anthracotic pigment (0/2).  5. Lung, left upper lobe, lobectomy:  Typical carcinoid tumor (well differentiated neuroendocrine tumor), 4 cm in greatest dimension.  Bronchial and vascular margins are negative for neoplasm.  Uninvolved lung adjacent to the tumor shows emphysematous changes and fibrosis, however remaining lung  appears unremarkable.  Additional immunohistochemical stains for ancillary studies (including synaptophysin, chromogranin and Ki-67)  will be completed and results will be reported in a supplemental report.  See synoptic report in comment section for further details.  6. Lymph node, level VII, excisional biopsy:  2 benign lymph nodes with anthracotic pigment and sinus histiocytosis (0/2).  Comment: Synoptic Report  Specimen: Left upper lobe of lung  Procedure:  Lobectomy  Specimen laterality: Left  Tumor size:  Greatest dimension: 4 cm  Tumor focality: Single focus  Histologic type: Typical carcinoid tumor  Visceral pleural invasion: Not identified  Tumor extension: Not identified  Margins: Bronchial and vascular margins are uninvolved by tumor.  Distance of tumor from closest margin: 1 cm from the bronchial surgical margin.  Lymphovascular invasion: Not identified  Ancillary studies:  Immunohistochemical stains for neuroendocrine markers and Ki-67 will be completed and results will follow in a  supplemental report.  Note: The tumor consists of a proliferation of cells in nests with lewis formation. The tumor cells have salt and  pepper chromatin pattern with abundant cytoplasm. There is no significant nuclear atypia. No evidence of  necrosis. Mitotic count is 0 mitoses in 10 high power fields.    Imaging:   Copper scan (8/17/22):  In this patient with metastatic carcinoid tumor of the bronchus and lung there are fewer, smaller and less tracer avid lesions status post interval PRRT and embolization procedures suggestive of response to therapy.     There remain numerous tracer avid lesions throughout the chest, abdomen, pelvis, and axial and appendicular skeleton that are similar in distribution.   No discrete new tracer avid lesions to otherwise suggest mixed response or progression of disease.      CT abdomen/pelvis (7/8/22):  Hepatic splenic adrenal and osseous metastasis unchanged.     Leigh catheter decompressing the urinary bladder with no evidence of upper tract stone or obstruction.     No acute findings evident within the abdomen or pelvis.      MRI abdomen (5/2/22): I have personally reviewed the images.    Inferior Thorax: Stable right cardiophrenic lymph node measuring 1.4 cm (series 13, image 17).     Liver: Numerous diffuse enhancing lesions scattered throughout the liver, grossly similar in overall number and size compared to prior exam.  Hepatic and portal  veins appear patent.     Left hepatic lobe lesion measures 3.1 x 2.2 cm (series 13, image 32), previously 3.1 x 2.3 cm.     Left hepatic lobe lesion measures 3.2 x 1.6 cm (series 13, image 33), previously 2.6 x 1.6 cm.     Right hepatic lobe lesion measures 6.5 x 6.0 cm (series 13, image 44), previously 6.4 x 5.7 cm.     Right hepatic lobe lesion measures 4.6 cm (series 13, image 54), previously 4.7 cm.     Gallbladder: Unremarkable.     Bile Ducts: No dilatation.     Pancreas: No mass or ductal dilatation.     Spleen: Enlarged measuring 15 cm.  Multiple hypoenhancing lesions have increased in size and conspicuity compared to prior exam.  For reference, lesion measures 2.1 cm (series 14, image 38), previously 1.5 cm.  Small splenule noted.     Adrenals: Stable right adrenal nodule measuring 3.7 x 2.6 cm with signal loss on out of phase imaging consistent with adenoma.  Left adrenal gland is unremarkable.     Kidneys/Ureters: Bilateral renal cysts.  No solid enhancing renal mass.  No hydronephrosis.     GI Tract/Mesentery: No evidence of bowel obstruction or inflammation.     Peritoneal Space: No ascites.     Lymph nodes: Stable right mesenteric lymph node measuring 1.3 cm (series 16, image 47), previously 1.3 cm.     Abdominal wall: Unremarkable.     Vasculature: No aneurysm.     Bones: Numerous scattered enhancing osseous lesions, similar to prior exam.     Impression:     1. Patient with metastatic neuroendocrine tumor.  Grossly stable numerous diffuse enhancing hepatic and osseous lesions.  Stable index cardiophrenic and mesenteric lymph nodes.  Increased size and conspicuity of multiple hypoenhancing splenic lesions.    MRI abdomen (1/18/22): I have personally reviewed the images.    Inferior Thorax: Stable right cardiophrenic lymph node measuring 1.4 cm (series 12, image 15).     Liver: Innumerable diffuse enhancing lesions scattered throughout the liver, grossly similar in overall number compared to prior  examination.  Some lesions are stable and others demonstrate interval growth with increased arterial enhancing component.  Reference lesions provided below.  Hepatic and portal veins appear patent.     Left hepatic lobe lesion measures 2.3 x 3.1 cm (series 11, image 29), previously 2.6 x 2.2 cm.     Left hepatic lobe lesion measures 2.5 x 1.8 cm (series 11, image 40), previously 2.1 x 1.4 cm.     Right hepatic lobe lesion measures 6.4 x 5.7 cm (series 11, image 39), previously 6.1 x 5.5 cm.  Lesion demonstrates increased peripheral arterial enhancement.     Right hepatic lobe lesion measures 4.7 cm (series 11, image 50), previously 4.4 cm.  Lesion demonstrates increased peripheral arterial enhancement.     Gallbladder: Unremarkable.     Bile Ducts: No dilatation.     Pancreas: No mass or ductal dilatation.     Spleen: Enlarged measuring 15 cm.  Multiple hypoenhancing lesions, similar to prior exam.     Adrenals: Stable right adrenal nodule measuring 3.7 x 2.6 cm with prominent signal loss on out of phase imaging consistent with an adenoma.  Left adrenal gland is unremarkable.     Kidneys/Ureters: Bilateral renal cysts.  No solid enhancing renal mass.  No hydronephrosis.     GI Tract/Mesentery: No evidence of bowel obstruction or inflammation.     Peritoneal Space: No ascites.     Lymph nodes: Previously described mesenteric lymph node appears smaller on today's exam (series 14, image 48). No new pathologic adenopathy.     Abdominal wall: Unremarkable.     Vasculature: No aneurysm.     Bones: Numerous scattered enhancing osseous lesions, similar to prior exam.     Impression:     1. Patient with metastatic neuroendocrine tumor.  Mild interval progression of diffuse hepatic metastasis with several lesions demonstrating interval growth and increased arterial enhancing component.  Splenic and osseous lesions appear similar to prior exam.  Stable enlarged cardiophrenic lymph node.    MRI abdomen (10/11/21): I have  personally reviewed the images.  Lower thorax: Heart is normal in size.  No pleural fluid.  No consolidation.     Liver: There are innumerable enhancing lesions scattered throughout the liver, compatible with known metastases.  Many of the previous solid enhancing lesions now demonstrate regions of central non enhancement, for example a 6.1 lesion in segment 8 on 11:46, a 4.0 cm lesion in the periphery of segment 5 with capsular retraction on 11:61, and a 4.2 cm lesion in segment 6 on 11:70, likely reflecting post treatment changes from interval chemoembolization.  However, there are new and enlarging solid enhancing lesions.  For example the 2.6 cm lesion on 11:34 previously measured 1.6 cm, the 2.6 cm lesion on: 35 previously measured 1.6 cm, and the 2.1 cm lesion on 11:47 previously measured 1.4 cm.  The 0.8 cm lesion on 11:45 appears new.     Gallbladder: Unremarkable.     Bile ducts: No intrahepatic or extrahepatic biliary dilatation.     Pancreas: No focal lesion.  No pancreatic ductal dilatation.     Spleen: Enlarged, measuring 14.7 cm in the craniocaudal dimension.  Multiple splenic lesions, most of which have increased in size from prior MRI 04/07/2021 and a few which appear to be new.  Index lesion at the hilum has increased in size now measuring 2.8 cm (series 12, image 40), previously 2.4 cm.     Adrenal glands: Stable right adrenal gland nodule measuring 3.4 x 2.5 cm with signal dropout on out of phase imaging, favored to represent an adenoma.  Left adrenal gland is unremarkable.     Kidneys: Subcentimeter T2 hyperintense nonenhancing lesions in the right kidney, likely cysts.  No solid renal mass.  No hydronephrosis.     GI: Stomach is unremarkable.  Visualized loops of bowel are unremarkable.     Mesentery/retroperitoneum: Enlarging enhancing 1.4 cm right cardiophrenic angle lymph node (12:22), previously 1.0 cm.  Enlarging 2.0 cm mesenteric nodule on 14:47, previously 1.5 cm in the coronal plane.      Vasculature: Visualized aorta is normal in caliber.  Portal vein, SMV, and splenic veins are patent.     Body wall/extraperitoneal soft tissues: Unremarkable.     Bones: Numerous metastatic lesions scattered throughout the visualized spine and ribs bilaterally, some of which have increased in size from prior exam particular within the lower thoracic spine.     Miscellaneous: No intraperitoneal free fluid.     Impression:     Mixed response in this patient with known metastatic neuroendocrine tumor.  Many hepatic lesions demonstrate post treatment changes from interval chemoembolization, however there are new and enlarging lesions in the liver, spleen, mesentery, and axial skeleton consistent with worsening metastatic disease.    CT chest/abdomen/pelvis (4/16/21): I have personally reviewed the images  - Innumerable lesions in the liver, spleen, osseous structures are not significantly changed from the recent MRI of the abdomen dated 04/07/2021 and are consistent with metastatic disease, noting that osseous lesions in the thorax have increased in size since they were most recently imaged on thoracic CT of 11/16/2020.  Index lesions are detailed above.     Stable soft tissue nodule at the right cardiophrenic angle and mesenteric implant are also unchanged from prior MRI.  These are nonspecific but suspicious for metastatic disease.     Stable calcific density within the bladder near the left UVJ measuring 5 mm suspicious for calculus.  There is no evidence of hydronephrosis.  There is mild bladder wall thickening which could in part be due to underdistention although cystitis is not excluded.     1.5 cm right thyroid nodule.  This was better evaluated on previous ultrasound, and FNA was recommended.     Stable right adrenal adenoma.     Slight interval improvement in ground-glass opacity within the right lower lobe, of doubtful significance      Gallium 68 PET/CT scan (4/19/21): I have personally reviewed the  images  Progressed disseminated metastatic disease particularly of the skeleton and liver as detailed above.  Although the cortical bone of the right femur remains intact this time, there is likely increased risk for pathologic fracture.     Small hyperdensity within the nondistended bladder which previously was characterized as near the left UVJ.  No dilated ureter or hydronephrosis.    MRI lumbar spine (4/13/21):  Degenerative changes of the lumbar spine detailed above.  Mild right neural foraminal narrowing noted at L3-L4.  Otherwise overall similar to prior study.     Known bone metastases throughout the visualized axial skeleton, progressed compared to prior exam.     Stable right adrenal lesion, previously characterized as an adenoma    MRI abdomen (4/7/21):  1. In this patient with known metastatic neuroendocrine tumor there has been interval detrimental change as follows:  *Interval increased size of numerous hepatic lesions.  *Interval increased size of multiple splenic lesions.  *Interval increased size of an indeterminate enhancing mesenteric nodule.  *Interval increased size of a right cardiophrenic lymph node versus exophytic hepatic metastasis.  *Interval increased size of numerous osseous metastases.  Possible anterior epidural tumor extension noted at the L4-L5 level.  Consider further characterization with lumbar spine MRI.  2.  Stable right adrenal adenoma.         Assessment:       1. Bronchial carcinoid tumors    2. Secondary malignant neoplasm of liver    3. Secondary malignant neoplasm of spleen    4. Secondary malignant neoplasm of bone    5. Secondary carcinoid tumor of peritoneum    6. 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 Immunodeficiency due to drug therapy    8. Chronic neoplasm-related pain    9. Hx of BKA, left    10. Anemia in neoplastic disease           Plan:     1. Bronchial carcinoid tumor  - I have reviewed his chart.  - he has undergone multiple  "therapies, including left upper lobectomy (1/2016), capecitabine/temozolomide (7/2017 - 2/2019), lanreotide (since 7/2017), zoledronic acid (since 7/2017), trans-arterial chemoembolization (10/2018, 3/2019), peptide radionuclide receptor therapy (4/10/19, 6/5/19)  - CT chest/abdomen/pelvis (4/16/21) revealed progressive disease.  - Gallium 68 PET/CT scan (4/19/21) confirmed progressive disease  - his case was discussed at neuroendocrine conference. Recommendation was to proceed with TACE and PRRT  - he underwent liver-directed therapy (trans-arterial chemoembolization) and biopsy on 7/21/21.  - MRI abdomen (10/11/21) revealed a mixed response. However, the comparison images were from April 2021, so I suspect that some of the progression is from prior to when PRRT was restarted in August 2021.   - he completed a second dose of PRRT.  - his case was discussed at neuroendocrine conference on 10/19/21. Information from that discussion:  "Good response to right lobe cTACE. Slow progression in the left lobe and outside the liver since 4/2021.  Recommend left lobe cTACE."  - MRI abdomen (1/18/22) revealed "mild interval progression of diffuse hepatic metastasis with several lesions demonstrating interval growth and increased arterial enhancing component."  - MRI abdomen (5/2/22) revealed "Grossly stable numerous diffuse enhancing hepatic and osseous lesions.  Stable index cardiophrenic and mesenteric lymph nodes.  Increased size and conspicuity of multiple hypoenhancing splenic lesions."  - he was not able to undergo the TACE procedure in May/June 2022  - copper scan (8/17/22) revealed continued partial response to therapy.  - continue lanreotide.   - will try to rearrange home health.  - I will present his case at neuroendocrine conference next week. Follow-up plan will be determined after the conference.    2. Chronic neoplasm-related pain  - stable. Continue oxycodone as needed.    3. Anemia in neoplastic disease  - " Labs have been reviewed. Hemoglobin is 13.1 g/dL.  - continue to monitor    4. Advance Care Planning     Power of   After our discussion (at previous visit), the patient decided to complete a HCPOA and appointed his  wife Monica Becker ([phone]) .        - I will present his case at neuroendocrine conference next week. Follow-up plan will be determined after the conference.    Brodie Chung M.D.  Hematology/Oncology  [facility] - [address], [address]  [place] [address]  Phone: [phone]  Fax: [phone]

## 2022-09-14 ENCOUNTER — OFFICE VISIT (OUTPATIENT)
Dept: HEMATOLOGY/ONCOLOGY | Facility: CLINIC | Age: 67
End: 2022-09-14
Payer: MEDICARE

## 2022-09-14 VITALS
HEIGHT: 72 IN | DIASTOLIC BLOOD PRESSURE: 70 MMHG | HEART RATE: 75 BPM | BODY MASS INDEX: 29.86 KG/M2 | TEMPERATURE: 98 F | SYSTOLIC BLOOD PRESSURE: 135 MMHG | OXYGEN SATURATION: 98 % | RESPIRATION RATE: 20 BRPM | WEIGHT: 220.44 LBS

## 2022-09-14 DIAGNOSIS — D3A.090 BRONCHIAL CARCINOID TUMORS: Primary | ICD-10-CM

## 2022-09-14 DIAGNOSIS — R53.81 PHYSICAL DECONDITIONING: ICD-10-CM

## 2022-09-14 DIAGNOSIS — D63.0 ANEMIA IN NEOPLASTIC DISEASE: ICD-10-CM

## 2022-09-14 DIAGNOSIS — C78.7 SECONDARY MALIGNANT NEOPLASM OF LIVER: ICD-10-CM

## 2022-09-14 DIAGNOSIS — Z79.899 IMMUNODEFICIENCY DUE TO DRUG THERAPY: ICD-10-CM

## 2022-09-14 DIAGNOSIS — C7B.04 SECONDARY CARCINOID TUMOR OF PERITONEUM: ICD-10-CM

## 2022-09-14 DIAGNOSIS — G89.3 CHRONIC NEOPLASM-RELATED PAIN: ICD-10-CM

## 2022-09-14 DIAGNOSIS — C78.89 SECONDARY MALIGNANT NEOPLASM OF SPLEEN: ICD-10-CM

## 2022-09-14 DIAGNOSIS — D84.821 IMMUNODEFICIENCY DUE TO DRUG THERAPY: ICD-10-CM

## 2022-09-14 DIAGNOSIS — C79.51 SECONDARY MALIGNANT NEOPLASM OF BONE: ICD-10-CM

## 2022-09-14 DIAGNOSIS — Z89.512 HX OF BKA, LEFT: ICD-10-CM

## 2022-09-14 DIAGNOSIS — C77.8 SECONDARY MALIGNANT NEOPLASM OF LYMPH NODES OF MULTIPLE SITES: ICD-10-CM

## 2022-09-14 PROCEDURE — 99213 OFFICE O/P EST LOW 20 MIN: CPT | Mod: PBBFAC,PO | Performed by: INTERNAL MEDICINE

## 2022-09-14 PROCEDURE — 99999 PR PBB SHADOW E&M-EST. PATIENT-LVL III: ICD-10-PCS | Mod: PBBFAC,,, | Performed by: INTERNAL MEDICINE

## 2022-09-14 PROCEDURE — 99999 PR PBB SHADOW E&M-EST. PATIENT-LVL III: CPT | Mod: PBBFAC,,, | Performed by: INTERNAL MEDICINE

## 2022-09-14 PROCEDURE — 99215 PR OFFICE/OUTPT VISIT, EST, LEVL V, 40-54 MIN: ICD-10-PCS | Mod: S$PBB,,, | Performed by: INTERNAL MEDICINE

## 2022-09-14 PROCEDURE — 99215 OFFICE O/P EST HI 40 MIN: CPT | Mod: S$PBB,,, | Performed by: INTERNAL MEDICINE

## 2022-09-14 RX ORDER — MONTELUKAST SODIUM 4 MG/1
1 TABLET, CHEWABLE ORAL 2 TIMES DAILY
COMMUNITY
Start: 2022-08-26

## 2022-09-14 RX ORDER — DIPHENOXYLATE HYDROCHLORIDE AND ATROPINE SULFATE 2.5; .025 MG/1; MG/1
1 TABLET ORAL
COMMUNITY
Start: 2022-08-26 | End: 2022-09-26

## 2022-09-14 NOTE — Clinical Note
Can we add to conference on 9/22/22? 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 Has had PRRT and liver-directed therapy in the past. Review copper scan, next steps?  Thanks!

## 2022-09-14 NOTE — Clinical Note
Good afternoon,  Patient requested to resume home health. Can you see if he is allowed more home health? I placed new home health orders.  Thanks!

## 2022-09-15 ENCOUNTER — DOCUMENTATION ONLY (OUTPATIENT)
Dept: HEMATOLOGY/ONCOLOGY | Facility: CLINIC | Age: 67
End: 2022-09-15
Payer: MEDICARE

## 2022-09-15 NOTE — PROGRESS NOTES
Received message via Epic yesterday afternoon from Dr. Chung, consulting Oncology Social Worker re: arranging home health services. Orders include SN, PT, and OT. Patient has recently used [facility]. Called [facility] li[facility] nurse Gena at ext. [phone] and left a detailed voice mail message yesterday afternoon. Sent reply message to Dr. Chung with this update.    Spoke with Gena today in follow up. She informed me that [facility] is able to accept patient's referral and can admit patient either Sun. 9/18 or Monday 9/19, and staff will contact patient directly to schedule the visits. Gena said that they will admit patient sooner if they have the availability. Called patient at [phone] and discussed with him. Patient stated understanding and agreement with these home health arrangements. He reported no other needs or concerns at this time. Sent additional reply message to Dr. Chung.    Will continue to follow and assist as needs are identified.

## 2022-09-20 PROCEDURE — G0180 PR HOME HEALTH MD CERTIFICATION: ICD-10-PCS | Mod: ,,, | Performed by: INTERNAL MEDICINE

## 2022-09-20 PROCEDURE — G0180 MD CERTIFICATION HHA PATIENT: HCPCS | Mod: ,,, | Performed by: INTERNAL MEDICINE

## 2022-09-22 ENCOUNTER — PATIENT MESSAGE (OUTPATIENT)
Dept: HEMATOLOGY/ONCOLOGY | Facility: CLINIC | Age: 67
End: 2022-09-22
Payer: MEDICARE

## 2022-09-22 ENCOUNTER — TUMOR BOARD CONFERENCE (OUTPATIENT)
Dept: RESEARCH | Facility: HOSPITAL | Age: 67
End: 2022-09-22
Payer: MEDICARE

## 2022-09-22 DIAGNOSIS — C7B.8 SECONDARY NEUROENDOCRINE TUMOR OF LIVER: Primary | ICD-10-CM

## 2022-09-22 DIAGNOSIS — D3A.090 BRONCHIAL CARCINOID TUMORS: ICD-10-CM

## 2022-09-22 NOTE — PROGRESS NOTES
[facility]      NEUROENDOCRINE TUMOR MULTIDISCIPLINARY TUMOR BOARD  _____________________________________________________________________    PRESENTER:   Brodie Chung MD    REASON FOR PRESENTATION:  Treatment Plan and Scan Review    ATTENDEES:   Surgery:              MD KALYN Perry MD R. Brown, MD W. Kethman, MD  Interventional Radiology - Medardo Worrell MD  Nuclear Medicine - Salvador Obando MD  Pathology - Chelsea Dow MD & Lizzeth Sierra DO  Oncology - Brodie Chung MD; Rosy Jaffe MD  Gastroenterology - Not Present   Palliative Care - Radha Zambrano-MD Debbie  Nutritional Support - Tracie Weil-Cavalier, RDN  Research- LOR Rider, RN    PATIENT STATUS:  Established Patient    PATIENT SUMMARY:  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    Sepsis due to methicillin susceptible Staphylococcus aureus 09/14/2019    MSSA bacteremia from Amputation stump infection    Sleep apnea     Snores    Thyroid nodule 11/25/2020    Urinary tract infection     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       Past Surgical History:   Procedure Laterality Date    ABDOMINAL SURGERY      Bka Left     COLONOSCOPY N/A 5/10/2022    Procedure: COLONOSCOPY;  Surgeon: Gin Bowling MD;  Location: [facility] ([address] [place] [address]);  Service: Endoscopy;  Laterality: N/A;  rapid- not vaccinated    HERNIA REPAIR      IRRIGATION AND DEBRIDEMENT OF LOWER EXTREMITY Left 9/20/2019    Procedure: IRRIGATION AND DEBRIDEMENT, LOWER EXTREMITY - left - cysto tubing - cultures;  Surgeon: Jesus Arias MD;   Location: [facility] OR 2ND FLR;  Service: Orthopedics;  Laterality: Left;    LAPAROSCOPIC REPAIR OF INCARCERATED UMBILICAL HERNIA N/A 11/27/2018    Procedure: REPAIR, HERNIA, UMBILICAL, INCARCERATED, LAPAROSCOPIC;  Surgeon: Coco Guidry MD;  Location: [facility] OR;  Service: General;  Laterality: N/A;  video    TRANSESOPHAGEAL ECHOCARDIOGRAPHY N/A 9/18/2019    Procedure: ECHOCARDIOGRAM, TRANSESOPHAGEAL;  Surgeon: Grace Diagnostic Provider;  Location: [facility] EP LAB;  Service: Anesthesiology;  Laterality: N/A;     ________________________________________________________________    DISCUSSION:    66 y.o. male w/ metastatic bronchial carcinoid tumor on Lanreotide. Typical carcinoid tumor, well-differentiated T2a, N0, M0 Ki-67 <1%, 6/2017: Liver biopsy, well-differentiated, Ki-67 25%.  S/P left upper lobectomy 1/2016, CAPTEM 7/2017 - 2/2019 (discontinued due to progression), Lanreotide 7/2017 , Zoledronic acid 7/2017 , TACE 10/2018, 3/2019  Lily-177 4/10/19, 6/5/19  AM: Widespread somatostatin receptor positive disease involving liver, spleen, bone, mesentery, pericardium appears stable. Good response to prior TACE. Still has disease in the liver, left greater than right.  DL: Disease less avid so appears as a good response to prior TACE.      BOARD RECOMMENDATIONS:     Re-scan in 4 months is asymptomatic. May discuss w/ Dr. Worrell if we need to proceed with LDT or if we can wait for 4 months at next scan.

## 2022-09-26 ENCOUNTER — HOSPITAL ENCOUNTER (OUTPATIENT)
Facility: HOSPITAL | Age: 67
Discharge: HOME HEALTH | End: 2022-09-29
Attending: EMERGENCY MEDICINE | Admitting: INTERNAL MEDICINE
Payer: MEDICARE

## 2022-09-26 DIAGNOSIS — R06.02 SHORTNESS OF BREATH: Primary | ICD-10-CM

## 2022-09-26 DIAGNOSIS — R07.9 CHEST PAIN: ICD-10-CM

## 2022-09-26 DIAGNOSIS — M79.89 LEG SWELLING: ICD-10-CM

## 2022-09-26 DIAGNOSIS — R33.9 URINARY RETENTION: ICD-10-CM

## 2022-09-26 DIAGNOSIS — N40.1 BPH WITH OBSTRUCTION/LOWER URINARY TRACT SYMPTOMS: ICD-10-CM

## 2022-09-26 DIAGNOSIS — M79.604 LEG PAIN, RIGHT: ICD-10-CM

## 2022-09-26 DIAGNOSIS — N13.8 BPH WITH OBSTRUCTION/LOWER URINARY TRACT SYMPTOMS: ICD-10-CM

## 2022-09-26 PROBLEM — E46 HYPOALBUMINEMIA DUE TO PROTEIN-CALORIE MALNUTRITION: Status: ACTIVE | Noted: 2022-09-26

## 2022-09-26 PROBLEM — D64.9 ANEMIA: Status: ACTIVE | Noted: 2022-09-26

## 2022-09-26 PROBLEM — E87.6 HYPOKALEMIA: Status: ACTIVE | Noted: 2022-09-26

## 2022-09-26 PROBLEM — E88.09 HYPOALBUMINEMIA DUE TO PROTEIN-CALORIE MALNUTRITION: Status: ACTIVE | Noted: 2022-09-26

## 2022-09-26 LAB
ALBUMIN SERPL BCP-MCNC: 2.6 G/DL (ref 3.5–5.2)
ALP SERPL-CCNC: 697 U/L (ref 55–135)
ALT SERPL W/O P-5'-P-CCNC: 65 U/L (ref 10–44)
ANION GAP SERPL CALC-SCNC: 12 MMOL/L (ref 8–16)
ASCENDING AORTA: 3.7 CM
AST SERPL-CCNC: 53 U/L (ref 10–40)
AV INDEX (PROSTH): 0.84
AV MEAN GRADIENT: 6 MMHG
AV PEAK GRADIENT: 11 MMHG
AV VALVE AREA: 3.45 CM2
AV VELOCITY RATIO: 0.78
BACTERIA #/AREA URNS AUTO: ABNORMAL /HPF
BASOPHILS # BLD AUTO: 0.04 K/UL (ref 0–0.2)
BASOPHILS NFR BLD: 0.7 % (ref 0–1.9)
BILIRUB SERPL-MCNC: 1 MG/DL (ref 0.1–1)
BILIRUB UR QL STRIP: ABNORMAL
BNP SERPL-MCNC: 98 PG/ML (ref 0–99)
BUN SERPL-MCNC: 13 MG/DL (ref 8–23)
CALCIUM SERPL-MCNC: 8.4 MG/DL (ref 8.7–10.5)
CHLORIDE SERPL-SCNC: 106 MMOL/L (ref 95–110)
CLARITY UR REFRACT.AUTO: CLEAR
CO2 SERPL-SCNC: 23 MMOL/L (ref 23–29)
COLOR UR AUTO: YELLOW
CREAT SERPL-MCNC: 0.8 MG/DL (ref 0.5–1.4)
CREAT UR-MCNC: 107 MG/DL (ref 23–375)
CV ECHO LV RWT: 0.31 CM
DIFFERENTIAL METHOD: ABNORMAL
DOP CALC AO PEAK VEL: 1.64 M/S
DOP CALC AO VTI: 26.98 CM
DOP CALC LVOT AREA: 4.1 CM2
DOP CALC LVOT DIAMETER: 2.29 CM
DOP CALC LVOT PEAK VEL: 1.28 M/S
DOP CALC LVOT STROKE VOLUME: 93.16 CM3
DOP CALCLVOT PEAK VEL VTI: 22.63 CM
E WAVE DECELERATION TIME: 201.86 MSEC
E/A RATIO: 0.8
E/E' RATIO: 6.94 M/S
ECHO LV POSTERIOR WALL: 0.76 CM (ref 0.6–1.1)
EJECTION FRACTION: 60 %
EOSINOPHIL # BLD AUTO: 0 K/UL (ref 0–0.5)
EOSINOPHIL NFR BLD: 0.2 % (ref 0–8)
ERYTHROCYTE [DISTWIDTH] IN BLOOD BY AUTOMATED COUNT: 18.9 % (ref 11.5–14.5)
EST. GFR  (NO RACE VARIABLE): >60 ML/MIN/1.73 M^2
ESTIMATED AVG GLUCOSE: 97 MG/DL (ref 68–131)
FERRITIN SERPL-MCNC: 2069 NG/ML (ref 20–300)
FRACTIONAL SHORTENING: 43 % (ref 28–44)
GLUCOSE SERPL-MCNC: 87 MG/DL (ref 70–110)
GLUCOSE UR QL STRIP: NEGATIVE
HBA1C MFR BLD: 5 % (ref 4–5.6)
HCT VFR BLD AUTO: 36 % (ref 40–54)
HCV AB SERPL QL IA: NORMAL
HGB BLD-MCNC: 11.8 G/DL (ref 14–18)
HGB UR QL STRIP: ABNORMAL
HIV 1+2 AB+HIV1 P24 AG SERPL QL IA: NORMAL
HYALINE CASTS UR QL AUTO: 2 /LPF
IMM GRANULOCYTES # BLD AUTO: 0.08 K/UL (ref 0–0.04)
IMM GRANULOCYTES NFR BLD AUTO: 1.4 % (ref 0–0.5)
INTERVENTRICULAR SEPTUM: 0.81 CM (ref 0.6–1.1)
IRON SERPL-MCNC: 39 UG/DL (ref 45–160)
KETONES UR QL STRIP: NEGATIVE
LA MAJOR: 4.62 CM
LA MINOR: 4.53 CM
LA WIDTH: 3.15 CM
LEFT ATRIUM SIZE: 3.7 CM
LEFT ATRIUM VOLUME MOD: 47.81 CM3
LEFT ATRIUM VOLUME: 45.32 CM3
LEFT INTERNAL DIMENSION IN SYSTOLE: 2.76 CM (ref 2.1–4)
LEFT VENTRICLE DIASTOLIC VOLUME: 109.87 ML
LEFT VENTRICLE SYSTOLIC VOLUME: 28.56 ML
LEFT VENTRICULAR INTERNAL DIMENSION IN DIASTOLE: 4.84 CM (ref 3.5–6)
LEFT VENTRICULAR MASS: 125.4 G
LEUKOCYTE ESTERASE UR QL STRIP: NEGATIVE
LV LATERAL E/E' RATIO: 5.9 M/S
LV SEPTAL E/E' RATIO: 8.43 M/S
LYMPHOCYTES # BLD AUTO: 0.6 K/UL (ref 1–4.8)
LYMPHOCYTES NFR BLD: 11.3 % (ref 18–48)
MCH RBC QN AUTO: 31.1 PG (ref 27–31)
MCHC RBC AUTO-ENTMCNC: 32.8 G/DL (ref 32–36)
MCV RBC AUTO: 95 FL (ref 82–98)
MICROSCOPIC COMMENT: ABNORMAL
MONOCYTES # BLD AUTO: 0.7 K/UL (ref 0.3–1)
MONOCYTES NFR BLD: 11.8 % (ref 4–15)
MV A" WAVE DURATION": 11.7 MSEC
MV PEAK A VEL: 0.74 M/S
MV PEAK E VEL: 0.59 M/S
MV STENOSIS PRESSURE HALF TIME: 58.54 MS
MV VALVE AREA P 1/2 METHOD: 3.76 CM2
NEUTROPHILS # BLD AUTO: 4.2 K/UL (ref 1.8–7.7)
NEUTROPHILS NFR BLD: 74.6 % (ref 38–73)
NITRITE UR QL STRIP: NEGATIVE
NRBC BLD-RTO: 0 /100 WBC
PH UR STRIP: 6 [PH] (ref 5–8)
PISA TR MAX VEL: 2.66 M/S
PLATELET # BLD AUTO: 208 K/UL (ref 150–450)
PMV BLD AUTO: 9.2 FL (ref 9.2–12.9)
POCT GLUCOSE: 105 MG/DL (ref 70–110)
POCT GLUCOSE: 87 MG/DL (ref 70–110)
POTASSIUM SERPL-SCNC: 3.4 MMOL/L (ref 3.5–5.1)
PREALB SERPL-MCNC: 19 MG/DL (ref 20–43)
PROT SERPL-MCNC: 5.7 G/DL (ref 6–8.4)
PROT UR QL STRIP: ABNORMAL
PROT UR-MCNC: 453 MG/DL (ref 0–15)
PROT/CREAT UR: 4.23 MG/G{CREAT} (ref 0–0.2)
PULM VEIN S/D RATIO: 2.08
PV PEAK D VEL: 0.24 M/S
PV PEAK S VEL: 0.5 M/S
RA MAJOR: 4.69 CM
RA PRESSURE: 3 MMHG
RA WIDTH: 2.61 CM
RBC # BLD AUTO: 3.8 M/UL (ref 4.6–6.2)
RBC #/AREA URNS AUTO: 3 /HPF (ref 0–4)
RIGHT VENTRICULAR END-DIASTOLIC DIMENSION: 2.72 CM
RV TISSUE DOPPLER FREE WALL SYSTOLIC VELOCITY 1 (APICAL 4 CHAMBER VIEW): 20.1 CM/S
SARS-COV-2 RDRP RESP QL NAA+PROBE: NEGATIVE
SATURATED IRON: 30 % (ref 20–50)
SINUS: 3.32 CM
SODIUM SERPL-SCNC: 141 MMOL/L (ref 136–145)
SP GR UR STRIP: 1.03 (ref 1–1.03)
SQUAMOUS #/AREA URNS AUTO: 0 /HPF
STJ: 3.14 CM
TDI LATERAL: 0.1 M/S
TDI SEPTAL: 0.07 M/S
TDI: 0.09 M/S
TOTAL IRON BINDING CAPACITY: 129 UG/DL (ref 250–450)
TR MAX PG: 28 MMHG
TRANSFERRIN SERPL-MCNC: 87 MG/DL (ref 200–375)
TRICUSPID ANNULAR PLANE SYSTOLIC EXCURSION: 1.69 CM
TROPONIN I SERPL DL<=0.01 NG/ML-MCNC: <0.006 NG/ML (ref 0–0.03)
TROPONIN I SERPL DL<=0.01 NG/ML-MCNC: <0.006 NG/ML (ref 0–0.03)
TSH SERPL DL<=0.005 MIU/L-ACNC: 1.26 UIU/ML (ref 0.4–4)
TV REST PULMONARY ARTERY PRESSURE: 31 MMHG
URN SPEC COLLECT METH UR: ABNORMAL
WBC # BLD AUTO: 5.6 K/UL (ref 3.9–12.7)
WBC #/AREA URNS AUTO: 7 /HPF (ref 0–5)

## 2022-09-26 PROCEDURE — 25000003 PHARM REV CODE 250: Performed by: INTERNAL MEDICINE

## 2022-09-26 PROCEDURE — 84484 ASSAY OF TROPONIN QUANT: CPT | Performed by: INTERNAL MEDICINE

## 2022-09-26 PROCEDURE — 63600175 PHARM REV CODE 636 W HCPCS: Performed by: INTERNAL MEDICINE

## 2022-09-26 PROCEDURE — 84484 ASSAY OF TROPONIN QUANT: CPT | Mod: 91 | Performed by: INTERNAL MEDICINE

## 2022-09-26 PROCEDURE — 99285 EMERGENCY DEPT VISIT HI MDM: CPT | Mod: GC,CS,, | Performed by: EMERGENCY MEDICINE

## 2022-09-26 PROCEDURE — G0378 HOSPITAL OBSERVATION PER HR: HCPCS

## 2022-09-26 PROCEDURE — 84156 ASSAY OF PROTEIN URINE: CPT | Performed by: INTERNAL MEDICINE

## 2022-09-26 PROCEDURE — 99285 PR EMERGENCY DEPT VISIT,LEVEL V: ICD-10-PCS | Mod: GC,CS,, | Performed by: EMERGENCY MEDICINE

## 2022-09-26 PROCEDURE — 99285 EMERGENCY DEPT VISIT HI MDM: CPT | Mod: 25

## 2022-09-26 PROCEDURE — 93010 EKG 12-LEAD: ICD-10-PCS | Mod: ,,, | Performed by: INTERNAL MEDICINE

## 2022-09-26 PROCEDURE — 96374 THER/PROPH/DIAG INJ IV PUSH: CPT

## 2022-09-26 PROCEDURE — 87389 HIV-1 AG W/HIV-1&-2 AB AG IA: CPT | Performed by: PHYSICIAN ASSISTANT

## 2022-09-26 PROCEDURE — 82962 GLUCOSE BLOOD TEST: CPT

## 2022-09-26 PROCEDURE — 86803 HEPATITIS C AB TEST: CPT | Performed by: PHYSICIAN ASSISTANT

## 2022-09-26 PROCEDURE — 80053 COMPREHEN METABOLIC PANEL: CPT | Performed by: INTERNAL MEDICINE

## 2022-09-26 PROCEDURE — 93005 ELECTROCARDIOGRAM TRACING: CPT

## 2022-09-26 PROCEDURE — 85025 COMPLETE CBC W/AUTO DIFF WBC: CPT | Performed by: INTERNAL MEDICINE

## 2022-09-26 PROCEDURE — 84134 ASSAY OF PREALBUMIN: CPT | Performed by: INTERNAL MEDICINE

## 2022-09-26 PROCEDURE — 83880 ASSAY OF NATRIURETIC PEPTIDE: CPT | Performed by: INTERNAL MEDICINE

## 2022-09-26 PROCEDURE — 83036 HEMOGLOBIN GLYCOSYLATED A1C: CPT | Performed by: INTERNAL MEDICINE

## 2022-09-26 PROCEDURE — 84466 ASSAY OF TRANSFERRIN: CPT | Performed by: INTERNAL MEDICINE

## 2022-09-26 PROCEDURE — 81001 URINALYSIS AUTO W/SCOPE: CPT | Performed by: INTERNAL MEDICINE

## 2022-09-26 PROCEDURE — 84443 ASSAY THYROID STIM HORMONE: CPT | Performed by: INTERNAL MEDICINE

## 2022-09-26 PROCEDURE — U0002 COVID-19 LAB TEST NON-CDC: HCPCS | Performed by: INTERNAL MEDICINE

## 2022-09-26 PROCEDURE — 93010 ELECTROCARDIOGRAM REPORT: CPT | Mod: ,,, | Performed by: INTERNAL MEDICINE

## 2022-09-26 PROCEDURE — 82728 ASSAY OF FERRITIN: CPT | Performed by: INTERNAL MEDICINE

## 2022-09-26 RX ORDER — IBUPROFEN 200 MG
16 TABLET ORAL
Status: DISCONTINUED | OUTPATIENT
Start: 2022-09-26 | End: 2022-09-29 | Stop reason: HOSPADM

## 2022-09-26 RX ORDER — FUROSEMIDE 10 MG/ML
20 INJECTION INTRAMUSCULAR; INTRAVENOUS ONCE
Status: COMPLETED | OUTPATIENT
Start: 2022-09-26 | End: 2022-09-26

## 2022-09-26 RX ORDER — CHOLESTYRAMINE 4 G/9G
1 POWDER, FOR SUSPENSION ORAL 2 TIMES DAILY
Status: DISCONTINUED | OUTPATIENT
Start: 2022-09-26 | End: 2022-09-29 | Stop reason: HOSPADM

## 2022-09-26 RX ORDER — POLYETHYLENE GLYCOL 3350 17 G/17G
17 POWDER, FOR SOLUTION ORAL DAILY
Status: DISCONTINUED | OUTPATIENT
Start: 2022-09-27 | End: 2022-09-29 | Stop reason: HOSPADM

## 2022-09-26 RX ORDER — TAMSULOSIN HYDROCHLORIDE 0.4 MG/1
0.4 CAPSULE ORAL DAILY
Status: DISCONTINUED | OUTPATIENT
Start: 2022-09-27 | End: 2022-09-29 | Stop reason: HOSPADM

## 2022-09-26 RX ORDER — ACETAMINOPHEN 325 MG/1
650 TABLET ORAL EVERY 4 HOURS PRN
Status: DISCONTINUED | OUTPATIENT
Start: 2022-09-26 | End: 2022-09-29 | Stop reason: HOSPADM

## 2022-09-26 RX ORDER — DIPHENOXYLATE HYDROCHLORIDE AND ATROPINE SULFATE 2.5; .025 MG/1; MG/1
1 TABLET ORAL 4 TIMES DAILY PRN
Status: DISCONTINUED | OUTPATIENT
Start: 2022-09-26 | End: 2022-09-29 | Stop reason: HOSPADM

## 2022-09-26 RX ORDER — PREDNISOLONE ACETATE 10 MG/ML
1 SUSPENSION/ DROPS OPHTHALMIC 4 TIMES DAILY
Status: DISCONTINUED | OUTPATIENT
Start: 2022-09-26 | End: 2022-09-27

## 2022-09-26 RX ORDER — FUROSEMIDE 10 MG/ML
20 INJECTION INTRAMUSCULAR; INTRAVENOUS DAILY
Status: DISCONTINUED | OUTPATIENT
Start: 2022-09-27 | End: 2022-09-28

## 2022-09-26 RX ORDER — TOBRAMYCIN AND DEXAMETHASONE 3; 1 MG/ML; MG/ML
1 SUSPENSION/ DROPS OPHTHALMIC EVERY 6 HOURS
Status: DISCONTINUED | OUTPATIENT
Start: 2022-09-26 | End: 2022-09-27

## 2022-09-26 RX ORDER — LORATADINE 10 MG/1
10 TABLET ORAL DAILY
Status: ON HOLD | COMMUNITY
Start: 2022-09-16 | End: 2022-09-29 | Stop reason: HOSPADM

## 2022-09-26 RX ORDER — ONDANSETRON 4 MG/1
4 TABLET, FILM COATED ORAL EVERY 6 HOURS PRN
Status: DISCONTINUED | OUTPATIENT
Start: 2022-09-26 | End: 2022-09-29 | Stop reason: HOSPADM

## 2022-09-26 RX ORDER — SODIUM CHLORIDE 0.9 % (FLUSH) 0.9 %
10 SYRINGE (ML) INJECTION EVERY 12 HOURS PRN
Status: DISCONTINUED | OUTPATIENT
Start: 2022-09-26 | End: 2022-09-29 | Stop reason: HOSPADM

## 2022-09-26 RX ORDER — GLUCAGON 1 MG
1 KIT INJECTION
Status: DISCONTINUED | OUTPATIENT
Start: 2022-09-26 | End: 2022-09-29 | Stop reason: HOSPADM

## 2022-09-26 RX ORDER — NALOXONE HCL 0.4 MG/ML
0.02 VIAL (ML) INJECTION
Status: DISCONTINUED | OUTPATIENT
Start: 2022-09-26 | End: 2022-09-29 | Stop reason: HOSPADM

## 2022-09-26 RX ORDER — IBUPROFEN 200 MG
24 TABLET ORAL
Status: DISCONTINUED | OUTPATIENT
Start: 2022-09-26 | End: 2022-09-29 | Stop reason: HOSPADM

## 2022-09-26 RX ORDER — POTASSIUM CHLORIDE 20 MEQ/1
40 TABLET, EXTENDED RELEASE ORAL ONCE
Status: COMPLETED | OUTPATIENT
Start: 2022-09-26 | End: 2022-09-26

## 2022-09-26 RX ORDER — PROCHLORPERAZINE EDISYLATE 5 MG/ML
5 INJECTION INTRAMUSCULAR; INTRAVENOUS EVERY 6 HOURS PRN
Status: DISCONTINUED | OUTPATIENT
Start: 2022-09-26 | End: 2022-09-29 | Stop reason: HOSPADM

## 2022-09-26 RX ORDER — TALC
6 POWDER (GRAM) TOPICAL NIGHTLY PRN
Status: DISCONTINUED | OUTPATIENT
Start: 2022-09-26 | End: 2022-09-29 | Stop reason: HOSPADM

## 2022-09-26 RX ORDER — LOPERAMIDE HYDROCHLORIDE 2 MG/1
2 CAPSULE ORAL 2 TIMES DAILY
COMMUNITY
Start: 2022-09-16

## 2022-09-26 RX ADMIN — PREDNISOLONE ACETATE 1 DROP: 10 SUSPENSION/ DROPS OPHTHALMIC at 05:09

## 2022-09-26 RX ADMIN — POTASSIUM CHLORIDE 40 MEQ: 1500 TABLET, EXTENDED RELEASE ORAL at 04:09

## 2022-09-26 RX ADMIN — PREDNISOLONE ACETATE 1 DROP: 10 SUSPENSION/ DROPS OPHTHALMIC at 11:09

## 2022-09-26 RX ADMIN — CHOLESTYRAMINE 4 G: 4 POWDER, FOR SUSPENSION ORAL at 10:09

## 2022-09-26 RX ADMIN — FUROSEMIDE 20 MG: 10 INJECTION, SOLUTION INTRAMUSCULAR; INTRAVENOUS at 04:09

## 2022-09-26 NOTE — ED NOTES
Pt reports they get eye drops to both eyes, provider informed only gave eyedrops to R eye as indicated in MAR, will await any change in instruction

## 2022-09-26 NOTE — ED TRIAGE NOTES
The pt is a 67-year-old male who presents to the ED from home via EMS. The pt's chief complaint is new onset shortness of breath, cough, and swelling to the RLE. Pt has an AKA to the LLE.

## 2022-09-26 NOTE — PROVIDER PROGRESS NOTES - EMERGENCY DEPT.
Encounter Date: 9/26/2022    ED Physician Progress Notes          Physician Attestation Statement for Resident:  As the supervising MD   Physician Attestation Statement: I have personally seen and examined this patient.       ***I agree with the history unless otherwise noted.     ***As the supervising MD I agree with the PE unless otherwise noted.      ***I have reviewed and agree with the residents interpretation of the following unless otherwise noted:   ***lab data  ***imaging studies    ***EKG and rhythm strips.  Normal sinus rhythm at 72, QRS 80, , no ST/T-wave changes    ***I have also reviewed the following:   ***old records at this facility,   ***records from referring facility   ***old EKGs,   ***old imaging and results    ***As the supervising MD I agree with the treatment, course, plan, and disposition unless otherwise noted.

## 2022-09-26 NOTE — ED NOTES
Patient identifiers for Elroy Rahman 67 y.o. male checked and correct.  Chief Complaint   Patient presents with    Leg Pain     From home, intermittent right leg pain. At this time denying pain. Swelling noted from foot to knee. Left AKA.     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 Patient is oriented x 4 and speaking appropriately. Pt is hard of hearing and has visual deficits, loss of peripheral vision- baseline.   APPEARANCE: Patient resting comfortably and in no acute distress. Patient is clean and well groomed, patient's clothing is properly fastened.  HEENT: Pt presents with surgical mask on.   SKIN: The skin is warm and dry. Patient has normal skin turgor and moist mucus membranes. Pt has abrasions to the left hand and left forearm from a dog bite, left forearm wound covered with a bandage. Pt has an abrasion to the right great toe.   MUSKULOSKELETAL: Patient is moving all extremities well with the exception of the lower extremities. AKA at the LLE from an accident in adolescence. Pulses in RLE difficult  to discern due to extensive edema.   RESPIRATORY: Airway is open and patent. Respirations are spontaneous and non-labored with normal effort and rate. Pt reports intermittent shortness of breath and a cough with white sputum.   CARDIAC: Patient has a normal rate and rhythm. 84 on cardiac monitor. 3+ pitting edema noted to the RLE.   ABDOMEN: No distention noted. Soft and non-tender upon palpation. Pt reports diarrhea for the last 3 months.  NEUROLOGICAL: Facial expression is symmetrical. Hand grasps are equal bilaterally. Normal sensation in all extremities when touched with finger.

## 2022-09-26 NOTE — ED NOTES
Family member arrived to bedside. Pt provided with urinals and urinals emptied, pt dinner tray arrived however pt is not hungry at this time. Dinner tray placed behind bed. Patient resting in stretcher and is in NAD at this time. Pt is awake alert and oriented x4, VSS, respirations even and unlabored. Pt updated on plan of care. Bed low and locked with side rails up x2. Pt voices no needs at this time.  Will continue to monitor.

## 2022-09-26 NOTE — ED PROVIDER NOTES
Encounter Date: 9/26/2022       History     Chief Complaint   Patient presents with    Leg Pain     From home, intermittent right leg pain. At this time denying pain. Swelling noted from foot to knee. Left AKA.     Mr. Elroy Rahman is a 67 y.o. male w/ PMH significant for carcinoid bronchial adenoma of L lung, neuroendocrine tumor of liver; and s/p pneumonectomy & L AKA; who presented to the ED for RLE swelling, SOB, & cough. Sxs started 2 wks ago & have gradually worsened. Reports RLE swelling that began at calf, progressing to knee & now thigh/entire leg. A/w nocturnal SOB & nocturnal cough productive of clear sputum. Denies fever, chills, CP, AP, N/V/D, dysuria, hematuria, incr urinary frequency, skin discoloration. Reports minimal EtOH use, denies smoking or other drug use.    The history is provided by the patient, medical records and a significant other. No  was used.   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    Sepsis due to methicillin susceptible Staphylococcus aureus 09/14/2019    MSSA bacteremia from Amputation stump infection    Sleep apnea     Snores    Thyroid nodule 11/25/2020    Urinary tract infection     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     Past Surgical History:   Procedure Laterality Date    ABDOMINAL SURGERY      Bka Left     COLONOSCOPY N/A 5/10/2022    Procedure: COLONOSCOPY;  Surgeon: Gin Bowling MD;  Location:  [facility] ENDO (2ND FLR);  Service: Endoscopy;  Laterality: N/A;  rapid- not vaccinated    HERNIA REPAIR      IRRIGATION AND DEBRIDEMENT OF LOWER EXTREMITY Left 9/20/2019    Procedure: IRRIGATION AND DEBRIDEMENT, LOWER EXTREMITY - left - cysto tubing - cultures;  Surgeon: Jesus Arias MD;  Location: [facility] 2ND FLR;  Service: Orthopedics;  Laterality: Left;    LAPAROSCOPIC REPAIR OF INCARCERATED UMBILICAL HERNIA N/A 11/27/2018    Procedure: REPAIR, HERNIA, UMBILICAL, INCARCERATED, LAPAROSCOPIC;  Surgeon: Coco Guidry MD;  Location: [facility] OR;  Service: General;  Laterality: N/A;  video    TRANSESOPHAGEAL ECHOCARDIOGRAPHY N/A 9/18/2019    Procedure: ECHOCARDIOGRAM, TRANSESOPHAGEAL;  Surgeon: Grace Diagnostic Provider;  Location: [facility] EP LAB;  Service: Anesthesiology;  Laterality: N/A;     Family History   Problem Relation Age of Onset    Cancer Mother         lung    Cancer Sister     Anesthesia problems Neg Hx      Social History     Tobacco Use    Smoking status: Never    Smokeless tobacco: Never   Substance Use Topics    Alcohol use: Yes     Alcohol/week: 1.0 standard drink     Types: 1 Cans of beer per week     Comment: rare    Drug use: No     Review of Systems   Constitutional:  Negative for chills, fever and unexpected weight change.   HENT:  Negative for sinus pain, trouble swallowing and voice change.    Eyes:  Negative for photophobia, pain and visual disturbance.   Respiratory:  Positive for cough and shortness of breath. Negative for wheezing.    Cardiovascular:  Positive for leg swelling. Negative for chest pain and palpitations.   Gastrointestinal:  Negative for abdominal pain, diarrhea, nausea and vomiting.   Endocrine: Negative for polydipsia and polyuria.   Genitourinary:  Negative for difficulty urinating, frequency, hematuria and urgency.   Musculoskeletal:  Negative for arthralgias, back pain and myalgias.   Skin:  Negative for color change and pallor.   Neurological:  Negative for  syncope, weakness, numbness and headaches.   Hematological:  Does not bruise/bleed easily.   Psychiatric/Behavioral:  Negative for confusion, dysphoric mood and sleep disturbance.      Physical Exam     Initial Vitals [09/26/22 1050]   BP Pulse Resp Temp SpO2   128/78 84 16 98.2 °F (36.8 °C) 98 %      MAP       --         Physical Exam    Nursing note and vitals reviewed.  Constitutional: He appears well-developed and well-nourished. No distress.   HENT:   Head: Normocephalic and atraumatic.   Eyes: Conjunctivae and EOM are normal. Pupils are equal, round, and reactive to light. No scleral icterus.   Neck: Neck supple. No tracheal deviation present. No JVD present.   Cardiovascular:  Normal rate, regular rhythm, normal heart sounds and intact distal pulses.           No murmur heard.  Pulmonary/Chest: No respiratory distress. He has no rhonchi. He has rales (RLL).   Abdominal: Abdomen is soft. Bowel sounds are normal. He exhibits no distension and no mass. There is no abdominal tenderness.   Musculoskeletal:         General: Edema present. No tenderness. Normal range of motion.      Cervical back: Neck supple.      Right upper leg: Edema present.      Left upper leg: Deformity (AKA) present.      Right lower leg: 3+ Edema present.      Left lower leg: Deformity (AKA) present.     Neurological: He is alert and oriented to person, place, and time. He has normal strength. No cranial nerve deficit or sensory deficit.   Skin: Skin is warm and dry. Capillary refill takes less than 2 seconds. No rash noted. No erythema. No pallor.   Psychiatric: He has a normal mood and affect. His behavior is normal. Judgment and thought content normal.       ED Course   Procedures  Labs Reviewed   CBC W/ AUTO DIFFERENTIAL - Abnormal; Notable for the following components:       Result Value    RBC 3.80 (*)     Hemoglobin 11.8 (*)     Hematocrit 36.0 (*)     MCH 31.1 (*)     RDW 18.9 (*)     Immature Granulocytes 1.4 (*)     Immature Grans  (Abs) 0.08 (*)     Lymph # 0.6 (*)     Gran % 74.6 (*)     Lymph % 11.3 (*)     All other components within normal limits   COMPREHENSIVE METABOLIC PANEL - Abnormal; Notable for the following components:    Potassium 3.4 (*)     Calcium 8.4 (*)     Total Protein 5.7 (*)     Albumin 2.6 (*)     Alkaline Phosphatase 697 (*)     AST 53 (*)     ALT 65 (*)     All other components within normal limits   URINALYSIS, REFLEX TO URINE CULTURE - Abnormal; Notable for the following components:    Protein, UA 2+ (*)     Bilirubin (UA) 1+ (*)     Occult Blood UA Trace (*)     All other components within normal limits    Narrative:     Specimen Source->Urine   URINALYSIS MICROSCOPIC - Abnormal; Notable for the following components:    WBC, UA 7 (*)     Hyaline Casts, UA 2 (*)     All other components within normal limits    Narrative:     Specimen Source->Urine   HIV 1 / 2 ANTIBODY    Narrative:     Release to patient->Immediate   HEPATITIS C ANTIBODY    Narrative:     Release to patient->Immediate   SARS-COV-2 RNA AMPLIFICATION, QUAL   TROPONIN I   B-TYPE NATRIURETIC PEPTIDE   PROTEIN / CREATININE RATIO, URINE   POCT GLUCOSE   POCT GLUCOSE MONITORING CONTINUOUS        ECG Results              EKG 12-lead (Final result)  Result time 09/26/22 14:10:01      Final result by Interface, Lab In [facility] (09/26/22 14:10:01)                   Narrative:    Test Reason : R06.02,    Vent. Rate : 072 BPM     Atrial Rate : 072 BPM     P-R Int : 146 ms          QRS Dur : 080 ms      QT Int : 418 ms       P-R-T Axes : 042 012 035 degrees     QTc Int : 457 ms    Normal sinus rhythm  Normal ECG  When compared with ECG of 13-SEP-2019 13:18,  QT has lengthened  Confirmed by Ariel DURÁN MD ([id number]) on 9/26/2022 2:09:50 PM    Referred By: [email or web address]   SELF           Confirmed By:Ariel DURÁN MD                                  Imaging Results              US Lower Extremity Veins Right (Final result)  Result time 09/26/22 15:00:15      Final result by  Luciano Ortiz MD (09/26/22 15:00:15)                   Impression:      No evidence of deep venous thrombosis in the right lower extremity.    Electronically signed by resident: Parish Abrams  Date:    09/26/2022  Time:    14:55    Electronically signed by: Luciano Ortiz MD  Date:    09/26/2022  Time:    15:00               Narrative:    EXAMINATION:  US LOWER EXTREMITY VEINS RIGHT    CLINICAL HISTORY:  Pain in right leg    TECHNIQUE:  Duplex and color flow Doppler evaluation and graded compression of the right lower extremity veins was performed.    COMPARISON:  None    FINDINGS:  Right thigh veins: The common femoral, femoral, popliteal, upper greater saphenous, and deep femoral veins are patent and free of thrombus. The veins are normally compressible and have normal phasic flow and augmentation response.    Right calf veins: The visualized calf veins are patent.    Contralateral CFV: The contralateral (left) common femoral vein is patent and free of thrombus.    Miscellaneous: None                                       X-Ray Chest 1 View (Final result)  Result time 09/26/22 13:36:00      Final result by Migue Pruitt MD (09/26/22 13:36:00)                   Impression:      No acute cardiopulmonary disease and no detrimental interval change      Electronically signed by: Migue Pruitt MD  Date:    09/26/2022  Time:    13:36               Narrative:    EXAMINATION:  XR CHEST 1 VIEW    CLINICAL HISTORY:  shortness of breath;    TECHNIQUE:  Single frontal view of the chest was performed.    COMPARISON:  09/20/2019 and 09/18/2019    FINDINGS:  Lordotic positioning.  Postoperative changes are again identified with surgical clips in the left hilar region.  Cardiac silhouette is accentuated by poor inspiration.  The heart appears to be at the upper limits of normal size and unchanged.  Pulmonary vascular pattern does not appear congested.  Overall volume loss of both lungs due to poor inspiration.  The right  lung appears free of active disease.  Some obscuration of the left costophrenic angle as before.  This appears similar to the previous examinations and may represent some pleural/parenchymal scarring due to the patient's prior left upper lobectomy.  No new pulmonary parenchymal or pleural abnormality is seen.  No pneumothorax.  Partial resection of the left 6 and 7th ribs again noted.                                       Medications   potassium chloride SA CR tablet 40 mEq (has no administration in time range)   furosemide injection 20 mg (has no administration in time range)     Medical Decision Making:   History:   Old Medical Records: I decided to obtain old medical records.  Initial Assessment:   Mr. Elroy Rahman is a 67 y.o. male w/ PMH significant for carcinoid bronchial adenoma of L lung, neuroendocrine tumor of liver; and s/p pneumonectomy & L AKA; who presented to the ED for RLE swelling, SOB, & cough. No h/o HF, CKD, cirrhosis, EtOH abuse.  Differential Diagnosis:   New-onset HF vs nephrotic syndrome vs hepatic cirrhosis  Independently Interpreted Test(s):   I have ordered and independently interpreted X-rays - see prior notes.  I have ordered and independently interpreted EKG Reading(s) - see prior notes  Clinical Tests:   Lab Tests: Ordered and Reviewed  Radiological Study: Ordered and Reviewed  Medical Tests: Ordered and Reviewed  ED Management:  CXR, BNP, trop, COVID, all negative. UA w/ 2+ proteinuria. Ex w/ 3+ pitting edema & mild crackles; pt naive to furosemide, given 20 mg IV furosemide. Lytes repleted. Significantly, pt crawls around at home & seemingly unable to perform ADLs; plan to admit to obs for further diuresis.                        Clinical Impression:   Final diagnoses:  [R06.02] Shortness of breath  [M79.89] Leg swelling  [M79.604] Leg pain, right        ED Disposition Condition    Observation Stable                Emil Mc MD  Resident  09/26/22 [id number]

## 2022-09-26 NOTE — ED NOTES
Bed: [facility]  Expected date:   Expected time:   Means of arrival:   Comments:

## 2022-09-26 NOTE — Clinical Note
Diagnosis: Shortness of breath [786.05.ICD-9-CM]   Future Attending Provider: ALEK BLANCA [[id number]]   Admitting Provider:: ALEK BLANCA [[id number]]

## 2022-09-26 NOTE — ED NOTES
Providers, Magaly, at bedside. Pt requesting food, provider looking into pt hx, will continue to monitor

## 2022-09-26 NOTE — ED NOTES
Messaged provider regarding food, no new orders, will continue to monitor. Patient resting in stretcher and is in NAD at this time. Pt is awake alert and oriented x4, VSS, respirations even and unlabored. Pt updated on plan of care. Bed low and locked with side rails up x2.

## 2022-09-26 NOTE — PHARMACY MED REC
"Admission Medication History     The home medication history was taken by Shauna Obando.    You may go to "Admission" then "Reconcile Home Medications" tabs to review and/or act upon these items.     The home medication list has been updated by the Pharmacy department.   Please read ALL comments highlighted in yellow.   Please address this information as you see fit.    Feel free to contact us if you have any questions or require assistance.      The medications listed below were removed from the home medication list. Please reorder if appropriate:  Patient reports no longer taking the following medication(s):  DIPHENOXYLATE-ATROPINE 2.5-0.025 MG TAB  PREDNISOLONE ACETATE 1 % DRP  TAMSULOSIN 0.4 MG CAP  TOBRAMYCIN-DEXAMETHASONE 0.3-0.1% DRP    Medications listed below were obtained from: [facility] - spouse presented bottles    Current Outpatient Medications on File Prior to Encounter   Medication Sig    ammonium lactate 12 % Crea Apply topically to  arms and dorsal hands daily to twice daily as needed for scaling.      colestipoL (COLESTID) 1 gram Tab   Take 1 g by mouth 2 (two) times daily.    ibuprofen (ADVIL,MOTRIN) 200 MG tablet   Take 600 mg by mouth every 6 (six) hours as needed for Pain.    lanreotide (SOMATULINE DEPOT) 120 mg/0.5 mL Syrg   Inject 120 mg into the skin every 28 days.    loperamide (IMODIUM) 2 mg capsule   Take 2 mg by mouth 2 (two) times daily.    loratadine (CLARITIN) 10 mg tablet   Take 10 mg by mouth once daily.    ondansetron (ZOFRAN) 4 MG tablet Take 4 mg by mouth 2 (two) times daily as needed for Nausea.         Potential issues to be addressed PRIOR TO DISCHARGE  Patient requested refills for the following medications: (ZOFRAN)    Shauna Obando, [facility]  EXT [id number]                  .        "

## 2022-09-26 NOTE — ED NOTES
Pt informed a dietary order has been placed. Patient resting in stretcher and is in NAD at this time. Pt is awake alert and oriented x4, VSS, respirations even and unlabored. Pt updated on plan of care. Bed low and locked with side rails up x2, call bell in pt reach. Pt voices no needs at this time.  Will continue to monitor.

## 2022-09-26 NOTE — ED NOTES
Pt provided with sandwiches and jello. Patient resting in stretcher and is in NAD at this time. Pt is awake alert and oriented x4, VSS, respirations even and unlabored. Pt updated on plan of care. Bed low and locked with side rails up x2. Pt voices no needs at this time.  Will continue to monitor.

## 2022-09-27 PROBLEM — Z71.89 ADVANCE CARE PLANNING: Status: ACTIVE | Noted: 2022-09-27

## 2022-09-27 PROBLEM — H26.9 CATARACT: Status: ACTIVE | Noted: 2022-09-27

## 2022-09-27 PROBLEM — N40.0 BPH (BENIGN PROSTATIC HYPERPLASIA): Status: ACTIVE | Noted: 2022-09-27

## 2022-09-27 PROBLEM — R53.81 PHYSICAL DECONDITIONING: Status: RESOLVED | Noted: 2020-06-22 | Resolved: 2022-09-27

## 2022-09-27 LAB
ALBUMIN SERPL BCP-MCNC: 2.4 G/DL (ref 3.5–5.2)
ALP SERPL-CCNC: 758 U/L (ref 55–135)
ALT SERPL W/O P-5'-P-CCNC: 75 U/L (ref 10–44)
ANION GAP SERPL CALC-SCNC: 8 MMOL/L (ref 8–16)
AST SERPL-CCNC: 60 U/L (ref 10–40)
BASOPHILS # BLD AUTO: 0.02 K/UL (ref 0–0.2)
BASOPHILS NFR BLD: 0.3 % (ref 0–1.9)
BILIRUB SERPL-MCNC: 1 MG/DL (ref 0.1–1)
BUN SERPL-MCNC: 14 MG/DL (ref 8–23)
CALCIUM SERPL-MCNC: 8.3 MG/DL (ref 8.7–10.5)
CHLORIDE SERPL-SCNC: 108 MMOL/L (ref 95–110)
CO2 SERPL-SCNC: 25 MMOL/L (ref 23–29)
CREAT SERPL-MCNC: 0.8 MG/DL (ref 0.5–1.4)
DIFFERENTIAL METHOD: ABNORMAL
EOSINOPHIL # BLD AUTO: 0 K/UL (ref 0–0.5)
EOSINOPHIL NFR BLD: 0.2 % (ref 0–8)
ERYTHROCYTE [DISTWIDTH] IN BLOOD BY AUTOMATED COUNT: 18.9 % (ref 11.5–14.5)
EST. GFR  (NO RACE VARIABLE): >60 ML/MIN/1.73 M^2
GLUCOSE SERPL-MCNC: 105 MG/DL (ref 70–110)
HCT VFR BLD AUTO: 33.6 % (ref 40–54)
HGB BLD-MCNC: 11.1 G/DL (ref 14–18)
IMM GRANULOCYTES # BLD AUTO: 0.05 K/UL (ref 0–0.04)
IMM GRANULOCYTES NFR BLD AUTO: 0.9 % (ref 0–0.5)
LYMPHOCYTES # BLD AUTO: 0.7 K/UL (ref 1–4.8)
LYMPHOCYTES NFR BLD: 11.2 % (ref 18–48)
MAGNESIUM SERPL-MCNC: 1.7 MG/DL (ref 1.6–2.6)
MCH RBC QN AUTO: 30.9 PG (ref 27–31)
MCHC RBC AUTO-ENTMCNC: 33 G/DL (ref 32–36)
MCV RBC AUTO: 94 FL (ref 82–98)
MONOCYTES # BLD AUTO: 0.7 K/UL (ref 0.3–1)
MONOCYTES NFR BLD: 12 % (ref 4–15)
NEUTROPHILS # BLD AUTO: 4.4 K/UL (ref 1.8–7.7)
NEUTROPHILS NFR BLD: 75.4 % (ref 38–73)
NRBC BLD-RTO: 0 /100 WBC
PHOSPHATE SERPL-MCNC: 3 MG/DL (ref 2.7–4.5)
PLATELET # BLD AUTO: 214 K/UL (ref 150–450)
PMV BLD AUTO: 8.9 FL (ref 9.2–12.9)
POCT GLUCOSE: 146 MG/DL (ref 70–110)
POTASSIUM SERPL-SCNC: 3.9 MMOL/L (ref 3.5–5.1)
PROT SERPL-MCNC: 5.3 G/DL (ref 6–8.4)
RBC # BLD AUTO: 3.59 M/UL (ref 4.6–6.2)
SODIUM SERPL-SCNC: 141 MMOL/L (ref 136–145)
WBC # BLD AUTO: 5.81 K/UL (ref 3.9–12.7)

## 2022-09-27 PROCEDURE — 94761 N-INVAS EAR/PLS OXIMETRY MLT: CPT

## 2022-09-27 PROCEDURE — 85025 COMPLETE CBC W/AUTO DIFF WBC: CPT | Performed by: INTERNAL MEDICINE

## 2022-09-27 PROCEDURE — 84100 ASSAY OF PHOSPHORUS: CPT | Performed by: INTERNAL MEDICINE

## 2022-09-27 PROCEDURE — 96376 TX/PRO/DX INJ SAME DRUG ADON: CPT

## 2022-09-27 PROCEDURE — 25000003 PHARM REV CODE 250: Performed by: INTERNAL MEDICINE

## 2022-09-27 PROCEDURE — 36415 COLL VENOUS BLD VENIPUNCTURE: CPT | Performed by: INTERNAL MEDICINE

## 2022-09-27 PROCEDURE — G0378 HOSPITAL OBSERVATION PER HR: HCPCS

## 2022-09-27 PROCEDURE — 80053 COMPREHEN METABOLIC PANEL: CPT | Performed by: INTERNAL MEDICINE

## 2022-09-27 PROCEDURE — 82962 GLUCOSE BLOOD TEST: CPT

## 2022-09-27 PROCEDURE — 83735 ASSAY OF MAGNESIUM: CPT | Performed by: INTERNAL MEDICINE

## 2022-09-27 PROCEDURE — 25000003 PHARM REV CODE 250: Performed by: PHYSICIAN ASSISTANT

## 2022-09-27 PROCEDURE — 63600175 PHARM REV CODE 636 W HCPCS: Performed by: INTERNAL MEDICINE

## 2022-09-27 RX ORDER — CETIRIZINE HYDROCHLORIDE 10 MG/1
10 TABLET ORAL NIGHTLY
Status: DISCONTINUED | OUTPATIENT
Start: 2022-09-28 | End: 2022-09-29 | Stop reason: HOSPADM

## 2022-09-27 RX ORDER — TOBRAMYCIN AND DEXAMETHASONE 3; 1 MG/ML; MG/ML
1 SUSPENSION/ DROPS OPHTHALMIC EVERY 6 HOURS
Status: DISCONTINUED | OUTPATIENT
Start: 2022-09-27 | End: 2022-09-27

## 2022-09-27 RX ORDER — TOBRAMYCIN AND DEXAMETHASONE 3; 1 MG/ML; MG/ML
1 SUSPENSION/ DROPS OPHTHALMIC EVERY 6 HOURS
Status: DISCONTINUED | OUTPATIENT
Start: 2022-09-27 | End: 2022-09-29 | Stop reason: HOSPADM

## 2022-09-27 RX ORDER — PREDNISOLONE ACETATE 10 MG/ML
1 SUSPENSION/ DROPS OPHTHALMIC 4 TIMES DAILY
Status: DISCONTINUED | OUTPATIENT
Start: 2022-09-27 | End: 2022-09-29 | Stop reason: HOSPADM

## 2022-09-27 RX ORDER — CETIRIZINE HYDROCHLORIDE 10 MG/1
10 TABLET ORAL DAILY
Status: DISCONTINUED | OUTPATIENT
Start: 2022-09-27 | End: 2022-09-27

## 2022-09-27 RX ADMIN — CHOLESTYRAMINE 4 G: 4 POWDER, FOR SUSPENSION ORAL at 10:09

## 2022-09-27 RX ADMIN — PREDNISOLONE ACETATE 1 DROP: 10 SUSPENSION/ DROPS OPHTHALMIC at 04:09

## 2022-09-27 RX ADMIN — FUROSEMIDE 20 MG: 10 INJECTION, SOLUTION INTRAMUSCULAR; INTRAVENOUS at 10:09

## 2022-09-27 RX ADMIN — PREDNISOLONE ACETATE 1 DROP: 10 SUSPENSION/ DROPS OPHTHALMIC at 12:09

## 2022-09-27 RX ADMIN — PREDNISOLONE ACETATE 1 DROP: 10 SUSPENSION/ DROPS OPHTHALMIC at 10:09

## 2022-09-27 RX ADMIN — TAMSULOSIN HYDROCHLORIDE 0.4 MG: 0.4 CAPSULE ORAL at 10:09

## 2022-09-27 RX ADMIN — TOBRAMYCIN AND DEXAMETHASONE 1 DROP: 3; 1 SUSPENSION/ DROPS OPHTHALMIC at 06:09

## 2022-09-27 RX ADMIN — CETIRIZINE HYDROCHLORIDE 10 MG: 10 TABLET, FILM COATED ORAL at 06:09

## 2022-09-27 RX ADMIN — TOBRAMYCIN AND DEXAMETHASONE 1 DROP: 3; 1 SUSPENSION/ DROPS OPHTHALMIC at 12:09
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
## 2022-09-27 NOTE — ASSESSMENT & PLAN NOTE
- reports poor po intake several months ago and it has been steadily improving  - liver mets without signs of liver dysfunction   - nutrition consulted. followup recs

## 2022-09-27 NOTE — ASSESSMENT & PLAN NOTE
- oncologist: Dr. Brodie Chung  - metastatic bronchial carcinoid tumor. Continued on Lanreotide.   - reviewed by neuroendocrine conference (last session on 9/22/22).   - Typical carcinoid tumor, well-differentiated T2a, N0, M0 Ki-67 <1%, 6/2017: Liver biopsy, well-differentiated, Ki-67 25%.  S/P left upper lobectomy 1/2016, CAPTEM 7/2017 - 2/2019 (discontinued due to progression), Lanreotide 7/2017 , Zoledronic acid 7/2017 , TACE 10/2018, 3/2019  Lily-177 4/10/19, 6/5/19  - Widespread somatostatin receptor positive disease involving liver, spleen, bone, mesentery, pericardium appears stable. Good response to prior TACE. Still has disease in the liver, left greater than right.  - Disease less avid so appears as a good response to prior TACE. Plan to re-scan in 4 months

## 2022-09-27 NOTE — SUBJECTIVE & OBJECTIVE
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    Sepsis due to methicillin susceptible Staphylococcus aureus 09/14/2019    MSSA bacteremia from Amputation stump infection    Sleep apnea     Snores    Thyroid nodule 11/25/2020    Urinary tract infection     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       Past Surgical History:   Procedure Laterality Date    ABDOMINAL SURGERY      Bka Left     COLONOSCOPY N/A 5/10/2022    Procedure: COLONOSCOPY;  Surgeon: Gin Bowling MD;  Location: [facility] ([address] [place] [address]);  Service: Endoscopy;  Laterality: N/A;  rapid- not vaccinated    HERNIA REPAIR      IRRIGATION AND DEBRIDEMENT OF LOWER EXTREMITY Left 9/20/2019    Procedure: IRRIGATION AND DEBRIDEMENT, LOWER EXTREMITY - left - cysto tubing - cultures;  Surgeon: Jesus Arias MD;  Location: [address];  Service: Orthopedics;  Laterality: Left;    LAPAROSCOPIC REPAIR OF INCARCERATED UMBILICAL HERNIA N/A 11/27/2018    Procedure: REPAIR, HERNIA, UMBILICAL, INCARCERATED, LAPAROSCOPIC;  Surgeon: Coco Guidry MD;  Location: [facility];  Service: General;  Laterality: N/A;  video    TRANSESOPHAGEAL ECHOCARDIOGRAPHY N/A 9/18/2019    Procedure: ECHOCARDIOGRAM, TRANSESOPHAGEAL;  Surgeon: Grace Diagnostic Provider;  Location: [facility] EP LAB;  Service: Anesthesiology;  Laterality: N/A;       Review of patient's allergies indicates:   Allergen Reactions    Epinephrine Anaphylaxis and Other (See Comments)     Can Cause A Carcinoid crisis       No current facility-administered medications on file prior to encounter.     Current Outpatient  Medications on File Prior to Encounter   Medication Sig    ammonium lactate 12 % Crea Qd-bid arms and dorsal hands (Patient taking differently: Apply topically to  arms and dorsal hands daily to twice daily as needed for scaling.)    colestipoL (COLESTID) 1 gram Tab Take 1 g by mouth 2 (two) times daily.    ibuprofen (ADVIL,MOTRIN) 200 MG tablet Take 600 mg by mouth every 6 (six) hours as needed for Pain.    lanreotide (SOMATULINE DEPOT) 120 mg/0.5 mL Syrg Inject 120 mg into the skin every 28 days.    loperamide (IMODIUM) 2 mg capsule Take 2 mg by mouth 2 (two) times daily.    loratadine (CLARITIN) 10 mg tablet Take 10 mg by mouth once daily.    ondansetron (ZOFRAN) 4 MG tablet Take 1 tablet (4 mg total) by mouth 2 (two) times daily. (Patient taking differently: Take 4 mg by mouth 2 (two) times daily as needed for Nausea.)    oxyCODONE (ROXICODONE) 15 MG Tab Take 1 tablet (15 mg total) by mouth every 6 (six) hours as needed for Pain (chronic neoplasm-related pain). (Patient not taking: Reported on 9/26/2022)     Family History       Problem Relation (Age of Onset)    Cancer Mother, Sister          Tobacco Use    Smoking status: Never    Smokeless tobacco: Never   Substance and Sexual Activity    Alcohol use: Yes     Alcohol/week: 1.0 standard drink     Types: 1 Cans of beer per week     Comment: rare    Drug use: No    Sexual activity: Not on file     Review of Systems   Constitutional:  Positive for activity change, appetite change and fatigue. Negative for chills and fever.   HENT:  Negative for congestion and trouble swallowing.    Eyes:  Positive for visual disturbance (partial blindness).   Respiratory:  Negative for cough and shortness of breath.    Cardiovascular:  Positive for leg swelling. Negative for chest pain.   Gastrointestinal:  Negative for abdominal distention, abdominal pain, nausea and vomiting.   Endocrine: Negative for cold intolerance, heat intolerance, polydipsia and polyuria.   Genitourinary:   Negative for difficulty urinating and dysuria.   Musculoskeletal:  Negative for back pain and myalgias.        Left BKA   Skin:  Negative for rash and wound.   Neurological:  Positive for weakness. Negative for dizziness and light-headedness.   Hematological:  Negative for adenopathy. Does not bruise/bleed easily.   Psychiatric/Behavioral:  Negative for confusion and sleep disturbance.    Objective:     Vital Signs (Most Recent):  Temp: 98 °F (36.7 °C) (09/27/22 0606)  Pulse: 78 (09/27/22 0131)  Resp: 16 (09/27/22 0606)  BP: 110/66 (09/27/22 0606)  SpO2: 98 % (09/27/22 0131) Vital Signs (24h Range):  Temp:  [97.6 °F (36.4 °C)-98.2 °F (36.8 °C)] 98 °F (36.7 °C)  Pulse:  [67-86] 78  Resp:  [14-17] 16  SpO2:  [96 %-99 %] 98 %  BP: (109-128)/(64-78) 110/66     Weight: 100 kg (220 lb 7.4 oz)  Body mass index is 29.9 kg/m².    Physical Exam  Constitutional:       Appearance: He is well-developed.   HENT:      Head: Normocephalic and atraumatic.   Eyes:      General: No scleral icterus.     Pupils: Pupils are equal, round, and reactive to light.   Neck:      Vascular: No JVD.   Cardiovascular:      Rate and Rhythm: Normal rate and regular rhythm.      Heart sounds: No murmur heard.    No friction rub. No gallop.   Pulmonary:      Effort: Pulmonary effort is normal. No respiratory distress.      Breath sounds: Normal breath sounds. No wheezing or rales.   Abdominal:      General: Bowel sounds are normal. There is no distension.      Palpations: Abdomen is soft.      Tenderness: There is no abdominal tenderness. There is no guarding or rebound.   Musculoskeletal:         General: No deformity. Normal range of motion.      Cervical back: Neck supple.      Right lower leg: Edema present.      Comments: Left BKA   Lymphadenopathy:      Cervical: No cervical adenopathy.   Skin:     General: Skin is warm and dry.      Capillary Refill: Capillary refill takes less than 2 seconds.      Findings: No erythema or rash.    Neurological:      Mental Status: He is alert and oriented to person, place, and time.      Cranial Nerves: No cranial nerve deficit.      Sensory: No sensory deficit.   Psychiatric:         Judgment: Judgment normal.         CRANIAL NERVES     CN III, IV, VI   Pupils are equal, round, and reactive to light.     Significant Labs: All pertinent labs within the past 24 hours have been reviewed.  CBC:   Recent Labs   Lab 09/26/22  1155 09/27/22  0429   WBC 5.60 5.81   HGB 11.8* 11.1*   HCT 36.0* 33.6*    214     CMP:   Recent Labs   Lab 09/26/22  1155 09/27/22  0429    141   K 3.4* 3.9    108   CO2 23 25   GLU 87 105   BUN 13 14   CREATININE 0.8 0.8   CALCIUM 8.4* 8.3*   PROT 5.7* 5.3*   ALBUMIN 2.6* 2.4*   BILITOT 1.0 1.0   ALKPHOS 697* 758*   AST 53* 60*   ALT 65* 75*   ANIONGAP 12 8       Significant Imaging: I have reviewed all pertinent imaging results/findings within the past 24 hours.    Imaging Results              US Lower Extremity Veins Right (Final result)  Result time 09/26/22 15:00:15      Final result by Luciano Ortiz MD (09/26/22 15:00:15)                   Impression:      No evidence of deep venous thrombosis in the right lower extremity.    Electronically signed by resident: Parish Abrams  Date:    09/26/2022  Time:    14:55    Electronically signed by: Luciano Ortiz MD  Date:    09/26/2022  Time:    15:00               Narrative:    EXAMINATION:  US LOWER EXTREMITY VEINS RIGHT    CLINICAL HISTORY:  Pain in right leg    TECHNIQUE:  Duplex and color flow Doppler evaluation and graded compression of the right lower extremity veins was performed.    COMPARISON:  None    FINDINGS:  Right thigh veins: The common femoral, femoral, popliteal, upper greater saphenous, and deep femoral veins are patent and free of thrombus. The veins are normally compressible and have normal phasic flow and augmentation response.    Right calf veins: The visualized calf veins are  patent.    Contralateral CFV: The contralateral (left) common femoral vein is patent and free of thrombus.    Miscellaneous: None                                       X-Ray Chest 1 View (Final result)  Result time 09/26/22 13:36:00      Final result by Migue Pruitt MD (09/26/22 13:36:00)                   Impression:      No acute cardiopulmonary disease and no detrimental interval change      Electronically signed by: Migue Pruitt MD  Date:    09/26/2022  Time:    13:36               Narrative:    EXAMINATION:  XR CHEST 1 VIEW    CLINICAL HISTORY:  shortness of breath;    TECHNIQUE:  Single frontal view of the chest was performed.    COMPARISON:  09/20/2019 and 09/18/2019    FINDINGS:  Lordotic positioning.  Postoperative changes are again identified with surgical clips in the left hilar region.  Cardiac silhouette is accentuated by poor inspiration.  The heart appears to be at the upper limits of normal size and unchanged.  Pulmonary vascular pattern does not appear congested.  Overall volume loss of both lungs due to poor inspiration.  The right lung appears free of active disease.  Some obscuration of the left costophrenic angle as before.  This appears similar to the previous examinations and may represent some pleural/parenchymal scarring due to the patient's prior left upper lobectomy.  No new pulmonary parenchymal or pleural abnormality is seen.  No pneumothorax.  Partial resection of the left 6 and 7th ribs again noted.

## 2022-09-27 NOTE — ASSESSMENT & PLAN NOTE
- echo and BNP normal   - transaminitis but no evidence of liver dysfunction   - likely 2/2 hypoalbuminemia   - continue diuresis and management of hypoalbuminemia

## 2022-09-27 NOTE — CONSULTS
[facility] - Telemetry Stepdown ([facility]  Adult Nutrition  Consult Note    SUMMARY     Recommendations    1. Liberalize diet to Low Na diet to maximize food selection    2. Initiate Boost Plus TID for optimization of protein and calorie intake     3. RD following    Goals: Meet 75%-100% EEN/EPN by RD f/u date  Nutrition Goal Status: goal not met    Assessment and Plan    Nutrition Problem:  Moderate Protein-Calorie Malnutrition  Malnutrition in the context of Chronic Illness/Injury    Related to (etiology):  Physiological needs     Signs and Symptoms (as evidenced by):  Muscle Mass Depletion: mild depletion of clavicle region   Weight Loss: 10% x 6 mo.   Fluid Accumulation: moderate    Interventions(treatment strategy):  Collaboration of nutrition care w/ other providers  ONS     Nutrition Diagnosis Status:  New     Malnutrition Assessment  Malnutrition Type: chronic illness  Energy Intake: moderate energy intake  Weight Loss (Malnutrition): 10% in 6 months  Muscle Mass (Malnutrition): mild depletion  Fluid Accumulation (Malnutrition): moderate   Clavicle Bone Region (Muscle Loss): mild depletion   Edema (Fluid Accumulation): 3-->moderate   Muscle Loss Evaluation (Final Summary): mild protein-calorie malnutrition  Fluid Accumulation Evaluation: moderate    Moderate Weight Loss (Malnutrition): 10% in 6 months    Reason for Assessment    Reason For Assessment: Consult  Diagnosis: other (see comments) (SOB)  Relevant Medical History: Cacinoid bronchial adenoma, neuroendocrine tumor of liver  Interdisciplinary Rounds: did not attend    General Information Comments:  09/27 - Spoke w/ Pt at bedside. Pt reported having a good appetite since admit and PTA reporting eating 100% of lunch. Pt denied any n/v but did report issues with chewing and swallowing. Pt reported a UBW over 300 lbs. experiencing major weight loss since cancer diagnosis. Per chart review, 24 lbs (10%) lost in the past 6 months. NFPE complete (please  "see PES statement). LBM noted- 9/25. Pt meet the criteria for moderate malnutrition in the context of chronic illness.     Nutrition Discharge Planning: pending medical course    Nutrition Risk Screen    Nutrition Risk Screen: no indicators present    Anthropometrics    Temp: 97 °F (36.1 °C)  Height Method: Stated  Height: 6' 0.99" (185.4 cm)  Height (inches): 72.99 in  Weight Method: Bed Scale  Weight: 100 kg (220 lb 7.4 oz)  Weight (lb): 220.46 lb  Ideal Body Weight (IBW), Male: 183.94 lb  % Ideal Body Weight, Male (lb): 119.82 %  BMI (Calculated): 29.1     Lab/Procedures/Meds    Pertinent Labs Reviewed: reviewed  Pertinent Labs Comments: None    Physical Findings/Assessment  Mild muscle depletion in clavicle region   Moderate fluid accumulation in lower extremity     Estimated/Assessed Needs    Weight Used For Calorie Calculations: 100 kg (220 lb 7.4 oz)  Energy Calorie Requirements (kcal): 2291 kcal ([place]-St. Jeor (PAL 1.25))  Energy Need Method: Kcal/kg  Protein Requirements: 100-120 g (1.0-1.2 g / kg)  Weight Used For Protein Calculations: 100 kg (220 lb 7.4 oz)  Fluid Requirements (mL): per MD or 1 ml/kcal  RDA Method (mL): 2291     Nutrition Prescription Ordered    Current Diet Order: Cardiac Diet    Evaluation of Received Nutrient/Fluid Intake    I/O: -1.0 L  Energy Calories Required: not meeting needs  Protein Required: not meeting needs  Tolerance: tolerating  % Intake of Estimated Energy Needs: 50 - 75 %  % Meal Intake: 75 - 100 %    Nutrition Risk    Level of Risk/Frequency of Follow-up: low     Monitor and Evaluation    Food and Nutrient Intake: energy intake, food and beverage intake  Food and Nutrient Adminstration: diet order  Knowledge/Beliefs/Attitudes: food and nutrition knowledge/skill, beliefs and attitudes  Anthropometric Measurements: height/length, weight, weight change, body mass index  Biochemical Data, Medical Tests and Procedures: electrolyte and renal panel, gastrointestinal profile, " glucose/endocrine profile, inflammatory profile, lipid profile  Nutrition-Focused Physical Findings: overall appearance, extremities, muscles and bones, head and eyes     Nutrition Follow-Up    RD Follow-up?: Yes    Patricia Grider - Dietetic Intern

## 2022-09-27 NOTE — PLAN OF CARE
Recommendations    1. Liberalize diet to Low Na diet to maximize food selection    2. Initiate Boost Plus TID for optimization of protein and calorie intake     3. RD following
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
## 2022-09-27 NOTE — ASSESSMENT & PLAN NOTE
- the patient decided to complete a HCPOA and appointed his wife Monica Becker ([phone]).
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
## 2022-09-27 NOTE — ASSESSMENT & PLAN NOTE
- CXR negative for pneumonia or edema  - BNP wnl   - US Doppler RLE negative for DVT  - likely 2/2 deconditioning   - PT/OT

## 2022-09-27 NOTE — ED NOTES
Attempted to give report, line dropped once, asked  if could give report to charge, line dropped again after being transferred
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
## 2022-09-28 ENCOUNTER — PATIENT MESSAGE (OUTPATIENT)
Dept: HEMATOLOGY/ONCOLOGY | Facility: CLINIC | Age: 67
End: 2022-09-28
Payer: MEDICARE

## 2022-09-28 ENCOUNTER — TELEPHONE (OUTPATIENT)
Dept: HEMATOLOGY/ONCOLOGY | Facility: CLINIC | Age: 67
End: 2022-09-28
Payer: MEDICARE

## 2022-09-28 LAB
ALBUMIN SERPL BCP-MCNC: 2.7 G/DL (ref 3.5–5.2)
ALP SERPL-CCNC: 796 U/L (ref 55–135)
ALT SERPL W/O P-5'-P-CCNC: 78 U/L (ref 10–44)
ANION GAP SERPL CALC-SCNC: 10 MMOL/L (ref 8–16)
AST SERPL-CCNC: 45 U/L (ref 10–40)
BASOPHILS # BLD AUTO: 0.02 K/UL (ref 0–0.2)
BASOPHILS NFR BLD: 0.3 % (ref 0–1.9)
BILIRUB SERPL-MCNC: 1 MG/DL (ref 0.1–1)
BUN SERPL-MCNC: 16 MG/DL (ref 8–23)
CALCIUM SERPL-MCNC: 8.8 MG/DL (ref 8.7–10.5)
CHLORIDE SERPL-SCNC: 107 MMOL/L (ref 95–110)
CO2 SERPL-SCNC: 22 MMOL/L (ref 23–29)
CREAT SERPL-MCNC: 0.9 MG/DL (ref 0.5–1.4)
DIFFERENTIAL METHOD: ABNORMAL
EOSINOPHIL # BLD AUTO: 0 K/UL (ref 0–0.5)
EOSINOPHIL NFR BLD: 0.2 % (ref 0–8)
ERYTHROCYTE [DISTWIDTH] IN BLOOD BY AUTOMATED COUNT: 19.1 % (ref 11.5–14.5)
EST. GFR  (NO RACE VARIABLE): >60 ML/MIN/1.73 M^2
GLUCOSE SERPL-MCNC: 128 MG/DL (ref 70–110)
HCT VFR BLD AUTO: 36.4 % (ref 40–54)
HGB BLD-MCNC: 11.9 G/DL (ref 14–18)
IMM GRANULOCYTES # BLD AUTO: 0.05 K/UL (ref 0–0.04)
IMM GRANULOCYTES NFR BLD AUTO: 0.8 % (ref 0–0.5)
LYMPHOCYTES # BLD AUTO: 0.6 K/UL (ref 1–4.8)
LYMPHOCYTES NFR BLD: 9.5 % (ref 18–48)
MAGNESIUM SERPL-MCNC: 1.7 MG/DL (ref 1.6–2.6)
MCH RBC QN AUTO: 30.7 PG (ref 27–31)
MCHC RBC AUTO-ENTMCNC: 32.7 G/DL (ref 32–36)
MCV RBC AUTO: 94 FL (ref 82–98)
MONOCYTES # BLD AUTO: 0.6 K/UL (ref 0.3–1)
MONOCYTES NFR BLD: 10.1 % (ref 4–15)
NEUTROPHILS # BLD AUTO: 4.8 K/UL (ref 1.8–7.7)
NEUTROPHILS NFR BLD: 79.1 % (ref 38–73)
NRBC BLD-RTO: 0 /100 WBC
PHOSPHATE SERPL-MCNC: 2.8 MG/DL (ref 2.7–4.5)
PLATELET # BLD AUTO: 236 K/UL (ref 150–450)
PMV BLD AUTO: 9.3 FL (ref 9.2–12.9)
POCT GLUCOSE: 104 MG/DL (ref 70–110)
POCT GLUCOSE: 148 MG/DL (ref 70–110)
POTASSIUM SERPL-SCNC: 3.8 MMOL/L (ref 3.5–5.1)
PROT SERPL-MCNC: 5.9 G/DL (ref 6–8.4)
RBC # BLD AUTO: 3.88 M/UL (ref 4.6–6.2)
SODIUM SERPL-SCNC: 139 MMOL/L (ref 136–145)
WBC # BLD AUTO: 6.12 K/UL (ref 3.9–12.7)

## 2022-09-28 PROCEDURE — 97535 SELF CARE MNGMENT TRAINING: CPT

## 2022-09-28 PROCEDURE — 84100 ASSAY OF PHOSPHORUS: CPT | Performed by: INTERNAL MEDICINE

## 2022-09-28 PROCEDURE — 25000003 PHARM REV CODE 250: Performed by: INTERNAL MEDICINE

## 2022-09-28 PROCEDURE — 96376 TX/PRO/DX INJ SAME DRUG ADON: CPT

## 2022-09-28 PROCEDURE — 80053 COMPREHEN METABOLIC PANEL: CPT | Performed by: INTERNAL MEDICINE

## 2022-09-28 PROCEDURE — 97530 THERAPEUTIC ACTIVITIES: CPT

## 2022-09-28 PROCEDURE — 63600175 PHARM REV CODE 636 W HCPCS: Performed by: INTERNAL MEDICINE

## 2022-09-28 PROCEDURE — 97162 PT EVAL MOD COMPLEX 30 MIN: CPT

## 2022-09-28 PROCEDURE — 36415 COLL VENOUS BLD VENIPUNCTURE: CPT | Performed by: INTERNAL MEDICINE

## 2022-09-28 PROCEDURE — 83735 ASSAY OF MAGNESIUM: CPT | Performed by: INTERNAL MEDICINE

## 2022-09-28 PROCEDURE — 99225 PR SUBSEQUENT OBSERVATION CARE,LEVEL II: ICD-10-PCS | Mod: ,,, | Performed by: INTERNAL MEDICINE

## 2022-09-28 PROCEDURE — 82962 GLUCOSE BLOOD TEST: CPT | Mod: 91

## 2022-09-28 PROCEDURE — 25000003 PHARM REV CODE 250: Performed by: PHYSICIAN ASSISTANT

## 2022-09-28 PROCEDURE — 99225 PR SUBSEQUENT OBSERVATION CARE,LEVEL II: CPT | Mod: ,,, | Performed by: INTERNAL MEDICINE

## 2022-09-28 PROCEDURE — 85025 COMPLETE CBC W/AUTO DIFF WBC: CPT | Performed by: INTERNAL MEDICINE

## 2022-09-28 PROCEDURE — G0378 HOSPITAL OBSERVATION PER HR: HCPCS

## 2022-09-28 PROCEDURE — 97165 OT EVAL LOW COMPLEX 30 MIN: CPT

## 2022-09-28 RX ORDER — FUROSEMIDE 20 MG/1
20 TABLET ORAL DAILY
Status: DISCONTINUED | OUTPATIENT
Start: 2022-09-29 | End: 2022-09-29 | Stop reason: HOSPADM

## 2022-09-28 RX ADMIN — Medication 6 MG: at 09:09

## 2022-09-28 RX ADMIN — CHOLESTYRAMINE 4 G: 4 POWDER, FOR SUSPENSION ORAL at 09:09

## 2022-09-28 RX ADMIN — PREDNISOLONE ACETATE 1 DROP: 10 SUSPENSION/ DROPS OPHTHALMIC at 12:09

## 2022-09-28 RX ADMIN — TOBRAMYCIN AND DEXAMETHASONE 1 DROP: 3; 1 SUSPENSION/ DROPS OPHTHALMIC at 06:09

## 2022-09-28 RX ADMIN — TAMSULOSIN HYDROCHLORIDE 0.4 MG: 0.4 CAPSULE ORAL at 09:09

## 2022-09-28 RX ADMIN — PREDNISOLONE ACETATE 1 DROP: 10 SUSPENSION/ DROPS OPHTHALMIC at 09:09

## 2022-09-28 RX ADMIN — FUROSEMIDE 20 MG: 10 INJECTION, SOLUTION INTRAMUSCULAR; INTRAVENOUS at 09:09

## 2022-09-28 RX ADMIN — CETIRIZINE HYDROCHLORIDE 10 MG: 10 TABLET, FILM COATED ORAL at 09:09

## 2022-09-28 RX ADMIN — TOBRAMYCIN AND DEXAMETHASONE 1 DROP: 3; 1 SUSPENSION/ DROPS OPHTHALMIC at 12:09

## 2022-09-28 RX ADMIN — OXYCODONE HYDROCHLORIDE 15 MG: 10 TABLET ORAL at 09:09

## 2022-09-28 RX ADMIN — Medication 6 MG: at 01:09

## 2022-09-28 RX ADMIN — PREDNISOLONE ACETATE 1 DROP: 10 SUSPENSION/ DROPS OPHTHALMIC at 06:09

## 2022-09-28 RX ADMIN — CHOLESTYRAMINE 4 G: 4 POWDER, FOR SUSPENSION ORAL at 10:09

## 2022-09-28 NOTE — PLAN OF CARE
Problem: Physical Therapy  Goal: Physical Therapy Goal  Description: Goals to be met by: 10/12/2022     Patient will increase functional independence with mobility by performin. Supine to sit with Stand-by Assistance  2. Sit to supine with Stand-by Assistance  3. Sit to stand transfer with Minimal Assistance  4. Bed to chair transfer with Minimal Assistance using Rolling Walker or LRAD  5. Gait  x 25 feet with Minimal Assistance using Rolling Walker or LRAD.   6. Wheelchair propulsion x 150 feet with SBA using bilateral uppper extremities and RLE  7. Lower extremity exercise program x15 reps per handout, with assistance as needed    PT Evaluation completed 2022   PT goals and POC established.     Outcome: Ongoing, Progressing

## 2022-09-28 NOTE — TELEPHONE ENCOUNTER
"----- Message from Fatimah Snowden sent at 9/28/2022  8:33 AM CDT -----  Regarding: speak with office  Contact: mariano  Consult/Advisory:           Name Of Caller: mariano diop [phone]    Contact Preference?:[phone] (home)              Does patient feel the need to be seen today? Yes           What is the nature of the call?: pt  is an inpatient and has a injection wants to know if he can cross the street to get or can someone come to him           Additional Notes:  "Thank you for all that you do for our patients'"     "

## 2022-09-28 NOTE — PT/OT/SLP EVAL
Occupational Therapy   Evaluation/Treatment    Name: Elroy Rahman  MRN: [id number]  Admitting Diagnosis:  Shortness of breath  Recent Surgery: * No surgery found *      Recommendations:     Discharge Recommendations: nursing facility, skilled  Discharge Equipment Recommendations:  walker, rolling  Barriers to discharge:   (Increased level of assistance needed at this time.)    Assessment:     Elroy Rahman is a 67 y.o. male with a medical diagnosis of Shortness of breath.  Performance deficits affecting function: weakness, impaired endurance, impaired self care skills, impaired functional mobility, gait instability, edema, impaired cardiopulmonary response to activity, visual deficits, decreased lower extremity function. Patient would benefit from continued skilled acute OT 3x/wk to improve functional mobility, increase independence with ADLs, and address established goals. Recommending SNF once medically appropriate for discharge to increase maximal independence, reduce burden of care, and ensure safety.     Rehab Prognosis: Good; patient would benefit from acute skilled OT services to address these deficits and reach maximum level of function.       Plan:     Patient to be seen 3 x/week to address the above listed problems via self-care/home management, therapeutic activities, therapeutic exercises  Plan of Care Expires: 10/28/22  Plan of Care Reviewed with: patient, son    Subjective     Chief Complaint: weakness  Patient/Family Comments/goals: to get better    Occupational Profile:  Living Environment: Patient lives with spouse in a [facility] with 3-4 WENDY, but has ramp access (no handrails). Patient has a tub shower combo with no bench or grab bar. Patient has a regular toilet with no grab bars. Patient owns a w/c and quad caned, but does not really use it. Patient has a prosthesis and ambulates and performs g/h standing at sink. Patient has recently been needing more assistance from wife for ADLs at home.     Equipment Used at Home:  prosthesis, wheelchair, cane, quad    Pain/Comfort:  Pain Rating 1: 0/10  Pain Rating Post-Intervention 1: 0/10    Patients cultural, spiritual, Pentecostalism conflicts given the current situation:      Objective:     Communicated with: LANRE prior to session.  Patient found HOB elevated with bed alarm upon OT entry to room.    General Precautions: Standard, vision impaired, fall   Orthopedic Precautions: (L LE AKA)   Braces: N/A    Occupational Performance:    Bed Mobility:    Patient completed Rolling/Turning to Left with  stand by assistance using handrails  Patient completed Rolling/Turning to Right with stand by assistance using handrails  Patient completed Scooting/Bridging with contact guard assistance seated EOB towards HOB with use of B UEs.   Patient completed Supine to Sit with contact guard assistance with HOB elevated and using handrail  Patient completed Sit to Supine with minimum assistance with HOB flat and no handrail    Functional Mobility/Transfers:  Not performed due to not having prosthesis and R LE edema    Activities of Daily Living:  Grooming: SBA oral care, washing face, and combing hair seated EOB  Upper Body Dressing: minimum assistance [place] and donning front gown EOB  Lower Body Dressing: total assistance needed at this time  Toileting: modified independence using urinal, but new brief applied at end of session    Cognitive/Visual Perceptual:  Cognitive/Psychosocial Skills:     -       Oriented to: Person, Place, Time, and Situation   -       Follows Commands/attention:Follows multistep  commands  -       Communication: clear/fluent  -       Memory: No Deficits noted  -       Safety awareness/insight to disability: intact   -       Mood/Affect/Coping skills/emotional control: Appropriate to situation  Visual/Perceptual:      -Impaired  loss of peripheral vision at baseline    Physical Exam:  Upper Extremity Range of Motion:     -       Right Upper Extremity:  WFL  -       Left Upper Extremity: WFL  Upper Extremity Strength:    -       Right Upper Extremity: WFL  -       Left Upper Extremity: WFL   Strength:    -       Right Upper Extremity: WFL  -       Left Upper Extremity: WFL  Fine Motor Coordination:    -       Intact  Gross motor coordination:   WFL  Edema noted to R LE and foot    AMPAC 6 Click ADL:  AMPAC Total Score: 16    Treatment & Education:  Role of OT and POC  ADL retraining  Functional mobility training  Safety  Discharge planning    Educated patient on importance of elevating R LE due to edema noted in R LE. R LE elevated on pillows and bottom of bed elevated as well.    Patient left HOB elevated with all lines intact, call button in reach, and all needs met.     GOALS:   Multidisciplinary Problems       Occupational Therapy Goals          Problem: Occupational Therapy    Goal Priority Disciplines Outcome Interventions   Occupational Therapy Goal     OT, PT/OT Ongoing, Progressing    Description: Goals to be met by: 10/19/2022     Patient will increase functional independence with ADLs by performing:    UE Dressing with Set-up Assistance.  LE Dressing with Stand-by Assistance.  Grooming while seated with Modified [place].  Toileting from bedside commode with Stand-by Assistance for hygiene and clothing management.   Supine to sit with Modified [place].  Stand pivot transfers with Contact Guard Assistance.  Toilet transfer to bedside commode with Contact Guard Assistance.                         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    Sepsis due to methicillin susceptible Staphylococcus aureus 09/14/2019    MSSA bacteremia from Amputation stump infection    Sleep apnea     Snores    Thyroid nodule 11/25/2020    Urinary tract infection     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         Past Surgical History:   Procedure Laterality Date    ABDOMINAL SURGERY      Bka Left     COLONOSCOPY N/A 5/10/2022    Procedure: COLONOSCOPY;  Surgeon: Gin Bowling MD;  Location: [facility] ENDO (2ND FLR);  Service: Endoscopy;  Laterality: N/A;  rapid- not vaccinated    HERNIA REPAIR      IRRIGATION AND DEBRIDEMENT OF LOWER EXTREMITY Left 9/20/2019    Procedure: IRRIGATION AND DEBRIDEMENT, LOWER EXTREMITY - left - cysto tubing - cultures;  Surgeon: Jesus Arias MD;  [address]  Service: Orthopedics;  Laterality: Left;    LAPAROSCOPIC REPAIR OF INCARCERATED UMBILICAL HERNIA N/A 11/27/2018    Procedure: REPAIR, HERNIA, UMBILICAL, INCARCERATED, LAPAROSCOPIC;  Surgeon: Coco Guidry MD;  Location: [facility] OR;  Service: General;  Laterality: N/A;  video    TRANSESOPHAGEAL ECHOCARDIOGRAPHY N/A 9/18/2019    Procedure: ECHOCARDIOGRAM, TRANSESOPHAGEAL;  Surgeon: Grace Diagnostic Provider;  Location: [facility] EP LAB;  Service: Anesthesiology;  Laterality: N/A;       Time Tracking:     OT Date of Treatment: 09/28/22  OT Start Time: 1009  OT Stop Time: 1045  OT Total Time (min): 36 min    Billable Minutes:Evaluation 10  Self Care/Home Management 26 9/28/2022

## 2022-09-28 NOTE — PT/OT/SLP EVAL
"Physical Therapy   Co-Evaluation and Co-Treatment    Patient Name:  Elroy Rahman   MRN:  [id number]    Recommendations:     Discharge Recommendations:  nursing facility, skilled   Discharge Equipment Recommendations: walker, rolling   Barriers to discharge:  Patient requires increased level of assistance    Assessment:     Elroy Rahman is a 67 y.o. male admitted with a medical diagnosis of Shortness of breath.  He presents with the following impairments/functional limitations:  weakness, impaired endurance, impaired functional mobility, edema, gait instability, impaired balance, decreased lower extremity function, visual deficits, decreased ROM, impaired cardiopulmonary response to activity Patient tolerated the session fairly and mostly limited by RLE edema and reports of nausea. Patient also demonstrates visual deficits and LLE AKA. Patient completed bed mobility during today's session and tolerated sitting EOB. Therapist requested that family bring prosthesis to assess more functional mobility in future sessions. He is a fall risk secondary to his impaired endurance and strength deficits, and will continue to benefit from skilled PT to address his deficits and maximize functional independence.     Rehab Prognosis: Good; patient would benefit from acute skilled PT services to address these deficits and reach maximum level of function.    Recent Surgery: * No surgery found *      Plan:     During this hospitalization, patient to be seen 3 x/week to address the identified rehab impairments via therapeutic activities, gait training, therapeutic exercises, neuromuscular re-education and progress toward the following goals:    Plan of Care Expires:  10/28/22    Subjective     Chief Complaint: "I need to spit up"  Patient/Family Comments/goals: gain strength, independence  Pain/Comfort:  Pain Rating 1: 0/10  Pain Rating Post-Intervention 1: 0/10    Patients cultural, spiritual, Sabianism conflicts given the " current situation: no    Living Environment:  Living environment obtained from son and patient. Patient lives with his wife in a H with ramp entry and handrails.   Prior to admission, patients level of function was requiring some assistance from wife to complete ADLs, but ambulatory with LLE prosthesis. Son reports patient frequently crawls around house as well. Equipment used at home: prosthesis.  DME owned (not currently used): rolling walker and QBC .  Upon discharge, patient will have assistance from wife.    Objective:   Patient required co-evaluation with OT for highest quality care d/t decreased activity tolerance and increased level of assistance necessary.  Communicated with nurse prior to session.  Patient found HOB elevated with bed alarm  upon PT entry to room.    General Precautions: Standard, vision impaired, fall   Orthopedic Precautions: (LLE AKA)   Braces: N/A  Respiratory Status: Room air    Exams:  Cognitive Exam:  Patient is oriented to Person, Place, Time, and Situation  Gross Motor Coordination:  WFL  Postural Exam:  Patient presented with the following abnormalities:    -       Rounded shoulders  -       Forward head  Skin Integrity/Edema:      -       Edema: Pitting RLE  RLE ROM: AROM overall limited by severe edema. PROM WFL   RLE Strength: grossly 3+/5 within available ROM   LLE ROM: hip flex/abd/add WFL  LLE Strength: not formally assessed    Functional Mobility:  Bed Mobility:     Rolling Left:  stand by assistance and use of bed rails  Rolling Right: stand by assistance and use of bed rails  Scooting: contact guard assistance and seated EOB R laterally toward HOB with use of BUE  Bridging: contact guard assistance and in supine to attain more appropriate positioning in bed, change brief  Supine to Sit: contact guard assistance and increased time required with HOB elevated and use of bed rails  Sit to Supine: minimum assistance and A with RLE, verbal cues for sequencing  Transfers:   not assessed d/t edema, lack of prosthesis   Balance: Sitting balance initially CGA but progressed to supervision, tolerated 15 min     Therapeutic Activities and Exercises:   Patient and son educated on calling for assistance for any needs to improve overall safety awareness.  Patient and son educated on role of PT in acute care, PT POC, and PT goals.  RLE elevated upon therapist departure of room for improved positioning  Toileting completed during session, changed brief.   Patient requested to have prosthesis brought in by family for participation in future therapy sessions    AM-PAC 6 CLICK MOBILITY  Total Score:12     Patient left HOB elevated with call button in reach, bed alarm on, and nurse notified.    GOALS:   Multidisciplinary Problems       Physical Therapy Goals          Problem: Physical Therapy    Goal Priority Disciplines Outcome Goal Variances Interventions   Physical Therapy Goal     PT, PT/OT Ongoing, Progressing     Description: Goals to be met by: 10/12/2022     Patient will increase functional independence with mobility by performin. Supine to sit with Stand-by Assistance  2. Sit to supine with Stand-by Assistance  3. Sit to stand transfer with Minimal Assistance  4. Bed to chair transfer with Minimal Assistance using Rolling Walker or LRAD  5. Gait  x 25 feet with Minimal Assistance using Rolling Walker or LRAD.   6. Wheelchair propulsion x 150 feet with SBA using bilateral uppper extremities and RLE  7. Lower extremity exercise program x15 reps per handout, with assistance as needed                         History:     Past Medical History:   Diagnosis Date    Allergy     Carcinoid bronchial adenoma of left lung     PRRT    Chemotherapy follow-up examination 2017    Cough with hemoptysis     mild/intermittent    Elevated bilirubin 2018    Hx of BKA, left     above knee    Kidney stone     Mild heartburn     Physical deconditioning 2020    Secondary neuroendocrine tumor of  bone(209.73) 6/15/2017    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    Sepsis due to methicillin susceptible Staphylococcus aureus 09/14/2019    MSSA bacteremia from Amputation stump infection    Sleep apnea     Snores    Thyroid nodule 11/25/2020    Urinary tract infection     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       Past Surgical History:   Procedure Laterality Date    ABDOMINAL SURGERY      Bka Left     COLONOSCOPY N/A 5/10/2022    Procedure: COLONOSCOPY;  Surgeon: Gin Bowling MD;  Location: [facility] ENDO (2ND FLR);  Service: Endoscopy;  Laterality: N/A;  rapid- not vaccinated    HERNIA REPAIR      IRRIGATION AND DEBRIDEMENT OF LOWER EXTREMITY Left 9/20/2019    Procedure: IRRIGATION AND DEBRIDEMENT, LOWER EXTREMITY - left - cysto tubing - cultures;  Surgeon: Jesus Arias MD;  [address];  Service: Orthopedics;  Laterality: Left;    LAPAROSCOPIC REPAIR OF INCARCERATED UMBILICAL HERNIA N/A 11/27/2018    Procedure: REPAIR, HERNIA, UMBILICAL, INCARCERATED, LAPAROSCOPIC;  Surgeon: Coco Guidyr MD;  Location: [facility];  Service: General;  Laterality: N/A;  video    TRANSESOPHAGEAL ECHOCARDIOGRAPHY N/A 9/18/2019    Procedure: ECHOCARDIOGRAM, TRANSESOPHAGEAL;  Surgeon: Grace Diagnostic Provider;  Location: [facility] EP LAB;  Service: Anesthesiology;  Laterality: N/A;       Time Tracking:     PT Received On: 09/28/22  PT Start Time: 1009     PT Stop Time: 1038  PT Total Time (min): 29 min     Billable Minutes: Evaluation 14 and Therapeutic Activity 15      09/28/2022

## 2022-09-28 NOTE — PLAN OF CARE
Pt was notified of PT recs for SNF. Pt stated he was active with [facility] and wished to continue with them. BERONICA Avery of [facility], left VM of plan. Notified team via secure chat.   Will continue to update plan as needed.

## 2022-09-28 NOTE — PLAN OF CARE
Problem: Occupational Therapy  Goal: Occupational Therapy Goal  Description: Goals to be met by: 10/19/2022     Patient will increase functional independence with ADLs by performing:    UE Dressing with Set-up Assistance.  LE Dressing with Stand-by Assistance.  Grooming while seated with Modified [place].  Toileting from bedside commode with Stand-by Assistance for hygiene and clothing management.   Supine to sit with Modified [place].  Stand pivot transfers with Contact Guard Assistance.  Toilet transfer to bedside commode with Contact Guard Assistance.    Outcome: Ongoing, Progressing   Patient's goals are set.

## 2022-09-29 VITALS
BODY MASS INDEX: 29.22 KG/M2 | WEIGHT: 220.44 LBS | HEART RATE: 82 BPM | OXYGEN SATURATION: 99 % | SYSTOLIC BLOOD PRESSURE: 119 MMHG | HEIGHT: 73 IN | TEMPERATURE: 97 F | DIASTOLIC BLOOD PRESSURE: 70 MMHG | RESPIRATION RATE: 18 BRPM

## 2022-09-29 LAB
ALBUMIN SERPL BCP-MCNC: 2.5 G/DL (ref 3.5–5.2)
ALP SERPL-CCNC: 650 U/L (ref 55–135)
ALT SERPL W/O P-5'-P-CCNC: 63 U/L (ref 10–44)
ANION GAP SERPL CALC-SCNC: 11 MMOL/L (ref 8–16)
AST SERPL-CCNC: 62 U/L (ref 10–40)
BASOPHILS # BLD AUTO: 0.01 K/UL (ref 0–0.2)
BASOPHILS NFR BLD: 0.1 % (ref 0–1.9)
BILIRUB SERPL-MCNC: 0.8 MG/DL (ref 0.1–1)
BUN SERPL-MCNC: 17 MG/DL (ref 8–23)
CALCIUM SERPL-MCNC: 8.5 MG/DL (ref 8.7–10.5)
CHLORIDE SERPL-SCNC: 107 MMOL/L (ref 95–110)
CO2 SERPL-SCNC: 23 MMOL/L (ref 23–29)
CREAT SERPL-MCNC: 0.9 MG/DL (ref 0.5–1.4)
DIFFERENTIAL METHOD: ABNORMAL
EOSINOPHIL # BLD AUTO: 0 K/UL (ref 0–0.5)
EOSINOPHIL NFR BLD: 0 % (ref 0–8)
ERYTHROCYTE [DISTWIDTH] IN BLOOD BY AUTOMATED COUNT: 18.9 % (ref 11.5–14.5)
EST. GFR  (NO RACE VARIABLE): >60 ML/MIN/1.73 M^2
GLUCOSE SERPL-MCNC: 117 MG/DL (ref 70–110)
HCT VFR BLD AUTO: 31.6 % (ref 40–54)
HGB BLD-MCNC: 10.4 G/DL (ref 14–18)
IMM GRANULOCYTES # BLD AUTO: 0.1 K/UL (ref 0–0.04)
IMM GRANULOCYTES NFR BLD AUTO: 1.4 % (ref 0–0.5)
LYMPHOCYTES # BLD AUTO: 0.5 K/UL (ref 1–4.8)
LYMPHOCYTES NFR BLD: 7.5 % (ref 18–48)
MAGNESIUM SERPL-MCNC: 1.7 MG/DL (ref 1.6–2.6)
MCH RBC QN AUTO: 31 PG (ref 27–31)
MCHC RBC AUTO-ENTMCNC: 32.9 G/DL (ref 32–36)
MCV RBC AUTO: 94 FL (ref 82–98)
MONOCYTES # BLD AUTO: 0.9 K/UL (ref 0.3–1)
MONOCYTES NFR BLD: 12.5 % (ref 4–15)
NEUTROPHILS # BLD AUTO: 5.4 K/UL (ref 1.8–7.7)
NEUTROPHILS NFR BLD: 78.5 % (ref 38–73)
NRBC BLD-RTO: 0 /100 WBC
PHOSPHATE SERPL-MCNC: 3.2 MG/DL (ref 2.7–4.5)
PLATELET # BLD AUTO: 223 K/UL (ref 150–450)
PMV BLD AUTO: 9.3 FL (ref 9.2–12.9)
POTASSIUM SERPL-SCNC: 3.8 MMOL/L (ref 3.5–5.1)
PROT SERPL-MCNC: 5.4 G/DL (ref 6–8.4)
RBC # BLD AUTO: 3.35 M/UL (ref 4.6–6.2)
SODIUM SERPL-SCNC: 141 MMOL/L (ref 136–145)
WBC # BLD AUTO: 6.9 K/UL (ref 3.9–12.7)

## 2022-09-29 PROCEDURE — 99217 PR OBSERVATION CARE DISCHARGE: ICD-10-PCS | Mod: ,,, | Performed by: STUDENT IN AN ORGANIZED HEALTH CARE EDUCATION/TRAINING PROGRAM

## 2022-09-29 PROCEDURE — 25000003 PHARM REV CODE 250: Performed by: PHYSICIAN ASSISTANT

## 2022-09-29 PROCEDURE — 85025 COMPLETE CBC W/AUTO DIFF WBC: CPT | Performed by: INTERNAL MEDICINE

## 2022-09-29 PROCEDURE — 25000003 PHARM REV CODE 250: Performed by: INTERNAL MEDICINE

## 2022-09-29 PROCEDURE — 84100 ASSAY OF PHOSPHORUS: CPT | Performed by: INTERNAL MEDICINE

## 2022-09-29 PROCEDURE — 80053 COMPREHEN METABOLIC PANEL: CPT | Performed by: INTERNAL MEDICINE

## 2022-09-29 PROCEDURE — 36415 COLL VENOUS BLD VENIPUNCTURE: CPT | Performed by: INTERNAL MEDICINE

## 2022-09-29 PROCEDURE — G0378 HOSPITAL OBSERVATION PER HR: HCPCS

## 2022-09-29 PROCEDURE — 97530 THERAPEUTIC ACTIVITIES: CPT | Mod: CQ

## 2022-09-29 PROCEDURE — 97110 THERAPEUTIC EXERCISES: CPT | Mod: CQ

## 2022-09-29 PROCEDURE — 83735 ASSAY OF MAGNESIUM: CPT | Performed by: INTERNAL MEDICINE

## 2022-09-29 PROCEDURE — 99217 PR OBSERVATION CARE DISCHARGE: CPT | Mod: ,,, | Performed by: STUDENT IN AN ORGANIZED HEALTH CARE EDUCATION/TRAINING PROGRAM

## 2022-09-29 RX ORDER — FUROSEMIDE 20 MG/1
20 TABLET ORAL DAILY
Qty: 30 TABLET | Refills: 11 | Status: SHIPPED | OUTPATIENT
Start: 2022-09-29 | End: 2023-09-29

## 2022-09-29 RX ORDER — TOBRAMYCIN AND DEXAMETHASONE 3; 1 MG/ML; MG/ML
SUSPENSION/ DROPS OPHTHALMIC
Qty: 2.5 ML | Refills: 0 | Status: SHIPPED | OUTPATIENT
Start: 2022-09-29

## 2022-09-29 RX ORDER — ACETAMINOPHEN 325 MG/1
650 TABLET ORAL EVERY 4 HOURS PRN
Qty: 15 TABLET | Refills: 0 | Status: SHIPPED | OUTPATIENT
Start: 2022-09-29

## 2022-09-29 RX ORDER — OXYCODONE HYDROCHLORIDE 15 MG/1
15 TABLET ORAL EVERY 6 HOURS PRN
Qty: 12 TABLET | Refills: 0 | Status: SHIPPED | OUTPATIENT
Start: 2022-09-29

## 2022-09-29 RX ORDER — POLYETHYLENE GLYCOL 3350 17 G/17G
17 POWDER, FOR SOLUTION ORAL DAILY
Qty: 238 G | Refills: 0 | Status: SHIPPED | OUTPATIENT
Start: 2022-09-29

## 2022-09-29 RX ORDER — PREDNISOLONE ACETATE 10 MG/ML
SUSPENSION/ DROPS OPHTHALMIC
Qty: 5 ML | Refills: 0 | Status: SHIPPED | OUTPATIENT
Start: 2022-09-29

## 2022-09-29 RX ORDER — CETIRIZINE HYDROCHLORIDE 10 MG/1
10 TABLET ORAL NIGHTLY
Qty: 30 TABLET | Refills: 11 | Status: SHIPPED | OUTPATIENT
Start: 2022-09-29 | End: 2023-09-29

## 2022-09-29 RX ORDER — TAMSULOSIN HYDROCHLORIDE 0.4 MG/1
0.4 CAPSULE ORAL DAILY
Qty: 90 CAPSULE | Refills: 3 | Status: SHIPPED | OUTPATIENT
Start: 2022-09-29 | End: 2023-09-29

## 2022-09-29 RX ORDER — DIPHENOXYLATE HYDROCHLORIDE AND ATROPINE SULFATE 2.5; .025 MG/1; MG/1
1 TABLET ORAL 4 TIMES DAILY PRN
Qty: 30 TABLET | Refills: 11 | Status: SHIPPED | OUTPATIENT
Start: 2022-09-29 | End: 2022-10-09

## 2022-09-29 RX ADMIN — PREDNISOLONE ACETATE 1 DROP: 10 SUSPENSION/ DROPS OPHTHALMIC at 04:09

## 2022-09-29 RX ADMIN — OXYCODONE HYDROCHLORIDE 15 MG: 10 TABLET ORAL at 03:09

## 2022-09-29 RX ADMIN — OXYCODONE HYDROCHLORIDE 15 MG: 10 TABLET ORAL at 08:09

## 2022-09-29 RX ADMIN — PREDNISOLONE ACETATE 1 DROP: 10 SUSPENSION/ DROPS OPHTHALMIC at 12:09

## 2022-09-29 RX ADMIN — TAMSULOSIN HYDROCHLORIDE 0.4 MG: 0.4 CAPSULE ORAL at 08:09

## 2022-09-29 RX ADMIN — CETIRIZINE HYDROCHLORIDE 10 MG: 10 TABLET, FILM COATED ORAL at 05:09

## 2022-09-29 RX ADMIN — CHOLESTYRAMINE 4 G: 4 POWDER, FOR SUSPENSION ORAL at 08:09

## 2022-09-29 RX ADMIN — TOBRAMYCIN AND DEXAMETHASONE 1 DROP: 3; 1 SUSPENSION/ DROPS OPHTHALMIC at 12:09

## 2022-09-29 RX ADMIN — FUROSEMIDE 20 MG: 20 TABLET ORAL at 08:09

## 2022-09-29 RX ADMIN — OXYCODONE HYDROCHLORIDE 15 MG: 10 TABLET ORAL at 02:09

## 2022-09-29 RX ADMIN — PREDNISOLONE ACETATE 1 DROP: 10 SUSPENSION/ DROPS OPHTHALMIC at 09:09

## 2022-09-29 RX ADMIN — TOBRAMYCIN AND DEXAMETHASONE 1 DROP: 3; 1 SUSPENSION/ DROPS OPHTHALMIC at 06:09

## 2022-09-29 NOTE — PLAN OF CARE
[facility] Tele[facility] Step[facility] ([place] [address])      HOME HEALTH ORDERS  FACE TO FACE ENCOUNTER    Patient Name: Elroy Rahman  YOB: 1955    PCP: Wayne Catalan PA-C   PCP Address: [address] [place] [address] [address] [facility]  PCP Phone Number: [phone]  PCP Fax: [phone]    Encounter Date: 9/26/22    Admit to Home Health    Diagnoses:  Active Hospital Problems    Diagnosis  POA    *Shortness of breath [R06.02]  Yes    Cataract [H26.9]  Yes    Advance care planning [Z71.89]  Not Applicable    BPH (benign prostatic hyperplasia) [N40.0]  Yes    Leg swelling [M79.89]  Yes    Leg pain, right [M79.604]  Yes    Anemia [D64.9]  Yes    Hypokalemia [E87.6]  Yes    Hypoalbuminemia due to protein-calorie malnutrition [E88.09, E46]  Yes    Metastatic malignant carcinoid tumor to liver [C7B.02]  Yes    Gait abnormality [R26.9]  Yes    Impaired mobility and ADLs [Z74.09, Z78.9]  Yes    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.62]  Yes    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]  Yes      Resolved Hospital Problems    Diagnosis Date Resolved POA    Physical deconditioning [R53.81] 09/27/2022 Yes    Paroxysmal atrial fibrillation [I48.0] 09/27/2022 Yes     - post-op episode of afib after ALIRIO resection   - RESOLVED         Follow Up Appointments:  Future Appointments   Date Time Provider Department Center   10/26/2022  9:00 AM INJECTION, [facility]   11/14/2022 11:00 AM Jillian Hernández MD [facility] UROLOGY [facility]   11/23/2022  9:00 AM INJECTION, [facility]   12/21/2022  9:00 AM INJECTION, [facility]   1/25/2023  8:30 AM LAB, APPOINTMENT [place] [facility]   1/25/2023  8:45 AM [facility]   1/25/2023  9:30 AM [facility] PET CT LIMIT 500 LBS [facility]   1/26/2023 11:00 AM Brodie Chung MD [facility] HEM ONC [facility]       Allergies:  Review of patient's allergies  indicates:   Allergen Reactions    Epinephrine Anaphylaxis and Other (See Comments)     Can Cause A Carcinoid crisis       Medications: Review discharge medications with patient and family and provide education.    Current Facility-Administered Medications   Medication Dose Route Frequency Provider Last Rate Last Admin    acetaminophen tablet 650 mg  650 mg Oral Q4H PRN Fabien Kirby MD        cetirizine tablet 10 mg  10 mg Oral QHS Alma Miller PA-C   10 mg at 09/28/22 2104    cholestyramine 4 gram packet 4 g  1 packet Oral BID Fabien Kirby MD   4 g at 09/28/22 2200    dextrose 10% bolus 125 mL  12.5 g Intravenous PRN Fabien Kirby MD        dextrose 10% bolus 250 mL  25 g Intravenous PRN Fabien Kirby MD        diphenoxylate-atropine 2.5-0.025 mg per tablet 1 tablet  1 tablet Oral QID PRN Fabien Kirby MD        furosemide tablet 20 mg  20 mg Oral Daily Fabien Kirby MD        glucagon (human recombinant) injection 1 mg  1 mg Intramuscular PRN Fabien Kirby MD        glucose chewable tablet 16 g  16 g Oral PRN Fabien Kirby MD        glucose chewable tablet 24 g  24 g Oral PRN Fabien Kirby MD        ibuprofen tablet 600 mg  600 mg Oral Q6H PRN Fabien Kirby MD        melatonin tablet 6 mg  6 mg Oral Nightly PRN Fabien Kirby MD   6 mg at 09/28/22 2104    naloxone 0.4 mg/mL injection 0.02 mg  0.02 mg Intravenous PRN Fabien Kirby MD        ondansetron tablet 4 mg  4 mg Oral Q6H PRN Fabien Kirby MD        oxyCODONE immediate release tablet 15 mg  15 mg Oral Q6H PRN Fabien Kirby MD   15 mg at 09/29/22 0204    polyethylene glycol packet 17 g  17 g Oral Daily Fabien Kirby MD        prednisoLONE acetate 1 % ophthalmic suspension 1 drop  1 drop Both Eyes QID Fabien Kirby MD   1 drop at 09/28/22 2115    prochlorperazine injection Soln 5 mg  5 mg Intravenous Q6H PRN Fabien Kirby MD        sodium chloride 0.9% flush 10 mL  10 mL Intravenous  Q12H PRN Fabien Kirby MD        tamsulosin 24 hr capsule 0.4 mg  0.4 mg Oral Daily Fabien Kirby MD   0.4 mg at 09/28/22 0902    tobramycin-dexamethasone 0.3-0.1% ophthalmic suspension 1 drop  1 drop Both Eyes Q6H Fabien Kirby MD   1 drop at 09/29/22 0632     Current Discharge Medication List        START taking these medications    Details   acetaminophen (TYLENOL) 325 MG tablet Take 2 tablets (650 mg total) by mouth every 4 (four) hours as needed for Pain.  Qty: 15 tablet, Refills: 0      furosemide (LASIX) 20 MG tablet Take 1 tablet (20 mg total) by mouth once daily.  Qty: 30 tablet, Refills: 11      polyethylene glycol (GLYCOLAX) 17 gram PwPk Take 17 g by mouth once daily.  Qty: 10 each, Refills: 0           CONTINUE these medications which have CHANGED    Details   diphenoxylate-atropine 2.5-0.025 mg (LOMOTIL) 2.5-0.025 mg per tablet Take 1 tablet by mouth 4 (four) times daily as needed for Diarrhea.  Qty: 30 tablet, Refills: 11      oxyCODONE (ROXICODONE) 15 MG Tab Take 1 tablet (15 mg total) by mouth every 6 (six) hours as needed for Pain.  Qty: 12 tablet, Refills: 0    Comments: Quantity prescribed more than 7 day supply? No      prednisoLONE acetate (PRED FORTE) 1 % DrpS SHAKE LIQUID AND INSTILL 1 DROP IN RIGHT EYE FOUR TIMES DAILY FOR 1 WEEK  Strength: 1 %  Qty: 5 mL, Refills: 0    Comments: Do not fill      tamsulosin (FLOMAX) 0.4 mg Cap Take 1 capsule (0.4 mg total) by mouth once daily.  Qty: 90 capsule, Refills: 3    Associated Diagnoses: Urinary retention; BPH with obstruction/lower urinary tract symptoms      tobramycin-dexamethasone 0.3-0.1% (TOBRADEX) 0.3-0.1 % DrpS SHAKE LIQUID AND INSTILL 1 DROP IN RIGHT EYE FOUR TIMES DAILY  Strength: 0.3-0.1 %  Qty: 2.5 mL, Refills: 0    Comments: Do not fill           CONTINUE these medications which have NOT CHANGED    Details   ammonium lactate 12 % Crea Qd-bid arms and dorsal hands  Qty: 140 g, Refills: 3    Associated Diagnoses: Actinic  keratosis      colestipoL (COLESTID) 1 gram Tab Take 1 g by mouth 2 (two) times daily.      ibuprofen (ADVIL,MOTRIN) 200 MG tablet Take 600 mg by mouth every 6 (six) hours as needed for Pain.      lanreotide (SOMATULINE DEPOT) 120 mg/0.5 mL Syrg Inject 120 mg into the skin every 28 days.      loperamide (IMODIUM) 2 mg capsule Take 2 mg by mouth 2 (two) times daily.      loratadine (CLARITIN) 10 mg tablet Take 10 mg by mouth once daily.      ondansetron (ZOFRAN) 4 MG tablet Take 1 tablet (4 mg total) by mouth 2 (two) times daily.  Qty: 20 tablet, Refills: 1               I have seen and examined this patient within the last 30 days. My clinical findings that support the need for the home health skilled services and home bound status are the following:no   Weakness/numbness causing balance and gait disturbance due to Weakness/Debility making it taxing to leave home.     Diet:   2 gram sodium diet    Labs:  Report Lab results to PCP.    Referrals/ Consults  Physical Therapy to evaluate and treat. Evaluate for home safety and equipment needs; Establish/upgrade home exercise program. Perform / instruct on therapeutic exercises, gait training, transfer training, and Range of Motion.  Occupational Therapy to evaluate and treat. Evaluate home environment for safety and equipment needs. Perform/Instruct on transfers, ADL training, ROM, and therapeutic exercises.    Activities:   activity as tolerated    Nursing:   Agency to admit patient within 24 hours of hospital discharge unless specified on physician order or at patient request    SN to complete comprehensive assessment including routine vital signs. Instruct on disease process and s/s of complications to report to MD. Review/verify medication list sent home with the patient at time of discharge  and instruct patient/caregiver as needed. Frequency may be adjusted depending on start of care date.     Skilled nurse to perform up to 3 visits PRN for symptoms related to  diagnosis    Notify MD if SBP > 160 or < 90; DBP > 90 or < 50; HR > 120 or < 50; Temp > 101; O2 < 88%; Other:   n/a    Ok to schedule additional visits based on staff availability and patient request on consecutive days within the home health episode.    When multiple disciplines ordered:    Start of Care occurs on Sunday - Wednesday schedule remaining discipline evaluations as ordered on separate consecutive days following the start of care.    Thursday SOC -schedule subsequent evaluations Friday and Monday the following week.     Friday - Saturday SOC - schedule subsequent discipline evaluations on consecutive days starting Monday of the following week.    For all post-discharge communication and subsequent orders please contact patient's primary care physician. If unable to reach primary care physician or do not receive response within 30 minutes, please contact [facility] for clinical staff order clarification    Miscellaneous   None    Home Health Aide:  Physical Therapy Three times weekly and Occupational Therapy Three times weekly    Wound Care Orders  no    I certify that this patient is confined to his home and needs physical therapy and occupational therapy.

## 2022-09-29 NOTE — PLAN OF CARE
TC to Gena @ [facility] RE: Pt discharge and resume HH on 9/30/29. She V/U.      09/29/22 0848   Post-Acute Status   Post-Acute Authorization Home Health   Home Health Status Set-up Complete/Auth obtained   Discharge Plan   Discharge Plan A Home Health   Discharge Plan B Home Health       Edmond Hernández RN,BSN

## 2022-09-29 NOTE — PT/OT/SLP PROGRESS
"Physical Therapy Treatment    Patient Name:  Elroy Rahman   MRN:  [id number]    Recommendations:     Discharge Recommendations:  nursing facility, skilled   Discharge Equipment Recommendations: walker, rolling   Barriers to discharge: required increased level of assistance     Assessment:     Elroy Rahman is a 67 y.o. male admitted with a medical diagnosis of Shortness of breath.  He presents with the following impairments/functional limitations:  weakness, impaired endurance, visual deficits, impaired balance, gait instability, impaired functional mobility, pain, decreased safety awareness, decreased lower extremity function, impaired coordination, edema, abnormal tone.  Pt was agreeable and tolerated fairly. Pt completed seated and supine therex. Pt continues to need Lauren with bed mobility. Pt refused sit to stand transfer and no prosthesis present in room. Pt continues to benefit from therapy to improve functional endurance and strength.     Rehab Prognosis: Good; patient would benefit from acute skilled PT services to address these deficits and reach maximum level of function.    Recent Surgery: * No surgery found *      Plan:     During this hospitalization, patient to be seen 3 x/week to address the identified rehab impairments via gait training, therapeutic activities, therapeutic exercises, neuromuscular re-education and progress toward the following goals:    Plan of Care Expires:  10/28/22    Subjective     Chief Complaint: nerve pain   Patient/Family Comments/goals: "I don't know what the plan is"  Pain/Comfort:  Pain Rating 1: 3/10  Location 1: leg (did not specify location)  Pain Addressed 1: Reposition, Distraction, Nurse notified  Pain Rating Post-Intervention 1: 6/10      Objective:     Communicated with RN prior to session.  Patient found HOB elevated with bed alarm upon PT entry to room.     General Precautions: Standard, fall, vision impaired   Orthopedic Precautions: (LLE AKA)   Braces: " N/A  Respiratory Status: Room air     Functional Mobility:  Bed Mobility:     Rolling Left/Right:  stand by assistance with bed rails   Scooting:   To EOB: contact guard assistance  To HOB: Sushant, assisted with BUE hand placement on bed rail  Supine to Sit: minimum assistance with HOB elevated   Assisted with RLE  Sit to Supine: minimum assistance  Assisted with RLE  Transfers:     Sit to Stand: pt refused   Balance:   Static/Dynamic sitting EOB balance: SBA for safety   Tolerated ~15 mins EOB     AM-PAC 6 CLICK MOBILITY  Turning over in bed (including adjusting bedclothes, sheets and blankets)?: 3  Sitting down on and standing up from a chair with arms (e.g., wheelchair, bedside commode, etc.): 2  Moving from lying on back to sitting on the side of the bed?: 3  Moving to and from a bed to a chair (including a wheelchair)?: 2  Need to walk in hospital room?: 1  Climbing 3-5 steps with a railing?: 1  Basic Mobility Total Score: 12       Therapeutic Activities and Exercises:  Assisted with changing brief, no prosthesis present in room  Supine RLE therex x15 reps: heel slide and hip abd/add  Seated RLE therex x15 reps: LAQ and heel/toe raises  Unable to complete marching  Seated LLE therex x15 reps: marching   Patient educated on role of therapy, goals of session, and benefits of out of bed mobility.   Instructed on use of call button and importance of calling nursing staff for assistance with mobility   Questions/concerns addressed within PTA scope of practice  Pt verbalized understanding.  Whiteboard Updated    Patient left HOB elevated with all lines intact and call button in reach..    GOALS:   Multidisciplinary Problems       Physical Therapy Goals          Problem: Physical Therapy    Goal Priority Disciplines Outcome Goal Variances Interventions   Physical Therapy Goal     PT, PT/OT Ongoing, Progressing     Description: Goals to be met by: 10/12/2022     Patient will increase functional independence with mobility  by performin. Supine to sit with Stand-by Assistance  2. Sit to supine with Stand-by Assistance  3. Sit to stand transfer with Minimal Assistance  4. Bed to chair transfer with Minimal Assistance using Rolling Walker or LRAD  5. Gait  x 25 feet with Minimal Assistance using Rolling Walker or LRAD.   6. Wheelchair propulsion x 150 feet with SBA using bilateral uppper extremities and RLE  7. Lower extremity exercise program x15 reps per handout, with assistance as needed                         Time Tracking:     PT Received On: 22  PT Start Time: 1515     PT Stop Time: 1544  PT Total Time (min): 29 min     Billable Minutes: Therapeutic Activity 12 and Therapeutic Exercise 17    Treatment Type: Treatment  PT/PTA: PTA     PTA Visit Number: 1     2022

## 2022-09-29 NOTE — NURSING
Report received from SONIA Tyler RN. Patient awake, alert, and oriented x 4. Lying in bed-semi fowlers position. NAD noted. Respirations are even and unlabored. Plan of care reviewed. Education provided. Instruction given on use of call light. Bed locked and in lowest position. All safety measures are in place. Communication board updated with direct nursing extension. RN will continue to monitor.

## 2022-09-29 NOTE — NURSING
Meds delivered from inpatient Pharmacy. IV x 1 removed. Discharge packet placed at bedside. Pt waiting for wife to arrive and provide transport home. Mr. Rahman is without complaints. RN will educate both patient and wife (pt request) on discharge AVS when she arrives.
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
## 2022-09-29 NOTE — PLAN OF CARE
[facility] - Telemetry Stepdown ([facility]  Discharge Final Note    Primary Care Provider: Wayne Catalan PA-C    Expected Discharge Date: 9/29/2022    Future Appointments   Date Time Provider Department Center   10/6/2022  8:00 AM Neil Christina NP [facility] FAM MED [place]   10/26/2022  9:00 AM INJECTION, [facility] INFUSION [facility] CHEMO [facility]   11/14/2022 11:00 AM Jillian Hernández MD [facility] UROLOGY [facility]   11/23/2022  9:00 AM INJECTION, [facility] INFUSION [facility] CHE[facility]   12/21/2022  9:00 AM INJECTION, [facility] INFUSION [facility] CHEMO [facility]   1/25/2023  8:30 AM LAB, APPOINTMENT [place] [facility] LAB VNP [facility]   1/25/2023  8:45 AM [facility] OIC-MRI2 [facility]   1/25/2023  9:30 AM [facility] PET CT LIMIT 500 LBS [facility] PET CT [facility]   1/26/2023 11:00 AM Brodie Chung MD [facility] HEM ONC [place] [facility]       Pt discharged home with resumption of home health with [facility].   Edmond Hernández, RN,BSN          Final Discharge Note (most recent)       Final Note - 09/29/22 1528          Final Note    Assessment Type Final Discharge Note     Anticipated Discharge Disposition Home-Health Care [facility]     Hospital Resources/Appts/Education Provided Provided patient/caregiver with written discharge plan information;Appointments scheduled and added to AVS        Post-Acute Status    Home Health Status Set-up Complete/Auth obtained     Discharge Delays None known at this time                     Important Message from Medicare

## 2022-09-29 NOTE — ASSESSMENT & PLAN NOTE
- echo and BNP normal   - transaminitis but no evidence of liver dysfunction   - likely 2/2 hypoalbuminemia   - continue diuresis and management of hypoalbuminemia   - transitioning from IV to oral lasix on 9/29.

## 2022-09-29 NOTE — SUBJECTIVE & OBJECTIVE
Interval History: Patient lying in bed, no acute distress. No acute events overnight. Patient reports improvement in RLE swelling while on diuresis. Patient also notes good UOP, regular bowel movements and good po intake. Denies fever, chills, chest pain, SOB, cough, N/V, abdominal pain, changes in bowel or bladder function, signs of bleeding, rashes, wounds. TTE normal. Swelling likely related to hypoalbuminemia. Nutrition consulted and will transition to oral diuretic tomorrow prior to discharge.    Review of Systems   Constitutional:  Positive for activity change, fatigue and unexpected weight change. Negative for chills and fever.   HENT:  Negative for congestion.    Eyes:  Negative for visual disturbance.   Respiratory:  Negative for cough and shortness of breath.    Cardiovascular:  Positive for leg swelling. Negative for chest pain.   Gastrointestinal:  Negative for abdominal distention, abdominal pain, nausea and vomiting.   Genitourinary:  Negative for dysuria.   Musculoskeletal:  Negative for back pain.   Skin:  Negative for rash and wound.   Neurological:  Positive for weakness. Negative for dizziness.   Psychiatric/Behavioral:  Negative for confusion.    Objective:     Vital Signs (Most Recent):  Temp: 97.9 °F (36.6 °C) (09/29/22 0754)  Pulse: 69 (09/29/22 0754)  Resp: 18 (09/29/22 0855)  BP: 119/77 (09/29/22 0754)  SpO2: 98 % (09/29/22 0754) Vital Signs (24h Range):  Temp:  [97.9 °F (36.6 °C)-98.5 °F (36.9 °C)] 97.9 °F (36.6 °C)  Pulse:  [69-86] 69  Resp:  [18-19] 18  SpO2:  [98 %-100 %] 98 %  BP: (105-130)/(60-78) 119/77     Weight: 100 kg (220 lb 7.4 oz)  Body mass index is 29.09 kg/m².    Intake/Output Summary (Last 24 hours) at 9/29/2022 0906  Last data filed at 9/29/2022 0628  Gross per 24 hour   Intake --   Output 500 ml   Net -500 ml      Physical Exam  Constitutional:       Appearance: Normal appearance.   HENT:      Head: Normocephalic.      Mouth/Throat:      Mouth: Mucous membranes are moist.    Eyes:      Extraocular Movements: Extraocular movements intact.      Pupils: Pupils are equal, round, and reactive to light.   Neck:      Comments: JVD absent  Cardiovascular:      Rate and Rhythm: Normal rate and regular rhythm.      Pulses: Normal pulses.      Heart sounds: Normal heart sounds.   Pulmonary:      Effort: No respiratory distress.      Breath sounds: Normal breath sounds.   Abdominal:      General: Bowel sounds are normal. There is no distension.      Palpations: Abdomen is soft.      Tenderness: There is no abdominal tenderness.   Musculoskeletal:         General: Normal range of motion.      Cervical back: Neck supple.      Right lower leg: Edema present.      Comments: Left BKA   Neurological:      General: No focal deficit present.      Mental Status: He is alert and oriented to person, place, and time.   Psychiatric:         Mood and Affect: Mood normal.       Significant Labs: All pertinent labs within the past 24 hours have been reviewed.  CBC:   Recent Labs   Lab 09/28/22  0929 09/29/22  0330   WBC 6.12 6.90   HGB 11.9* 10.4*   HCT 36.4* 31.6*    223     CMP:   Recent Labs   Lab 09/28/22  0929 09/29/22  0330    141   K 3.8 3.8    107   CO2 22* 23   * 117*   BUN 16 17   CREATININE 0.9 0.9   CALCIUM 8.8 8.5*   PROT 5.9* 5.4*   ALBUMIN 2.7* 2.5*   BILITOT 1.0 0.8   ALKPHOS 796* 650*   AST 45* 62*   ALT 78* 63*   ANIONGAP 10 11       Significant Imaging: I have reviewed all pertinent imaging results/findings within the past 24 hours.
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
## 2022-10-02 NOTE — HOSPITAL COURSE
Shortness of breath  - CXR negative for pneumonia or edema  - BNP wnl   - US Doppler RLE negative for DVT  - likely 2/2 deconditioning   - PT/OT: HH        BPH (benign prostatic hyperplasia)  - continue home tamsulosin        Advance care planning  - the patient decided to complete a HCPOA and appointed his wife Monica Becker ([phone]).      Cataract  - continue home eye drops        Hypoalbuminemia due to protein-calorie malnutrition  - reports poor po intake several months ago and it has been steadily improving  - liver mets without signs of liver dysfunction   - nutrition consulted. followup recs      Anemia  Anemia in neoplastic disease  - Hb on admit, 11.8  - baseline Hb 11-12  - continue to monitor         Leg pain, right  - possibly 2/2 edema or bone mets  - US Doppler LLE negative for DVT  - tylenol and oxy prn        Leg swelling  - echo and BNP normal   - transaminitis but no evidence of liver dysfunction   - likely 2/2 hypoalbuminemia   - continue diuresis and management of hypoalbuminemia   - transitioning from IV to oral lasix on 9/29.         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 9/18 or Monday 9/19, and staff will contact patient directly to schedule the visits   - re-evaluate while inpatient with PT/OT        Carcinoid bronchial adenoma of left lung  - oncologist: Dr. Brodie Chung  - metastatic bronchial carcinoid tumor. Continued on Lanreotide.   - reviewed by neuroendocrine conference (last session on 9/22/22).   - Typical carcinoid tumor, well-differentiated T2a, N0, M0 Ki-67 <1%, 6/2017: Liver biopsy, well-differentiated, Ki-67 25%.  S/P left upper lobectomy 1/2016, CAPTEM 7/2017 - 2/2019 (discontinued due to progression), Lanreotide 7/2017  , Zoledronic acid 7/2017 , TACE 10/2018, 3/2019  Lily-177 4/10/19, 6/5/19  - Widespread somatostatin receptor positive disease involving liver, spleen, bone, mesentery, pericardium appears stable. Good response to prior TACE. Still has disease in the liver, left greater than right.  - Disease less avid so appears as a good response to prior TACE. Plan to re-scan in 4 months

## 2022-10-11 ENCOUNTER — TELEPHONE (OUTPATIENT)
Dept: HEMATOLOGY/ONCOLOGY | Facility: CLINIC | Age: 67
End: 2022-10-11
Payer: MEDICARE

## 2022-10-11 NOTE — TELEPHONE ENCOUNTER
----- Message from Brodie Chung MD sent at 10/11/2022  3:04 PM CDT -----  Contact: [facility] Fredi [phone]  Absolutely. Thanks!  ----- Message -----  From: Florencia Daniel RN  Sent: 10/11/2022   2:31 PM CDT  To: Brodie Chnug MD    Hey, do you want me to give a verbal order if possible?   ----- Message -----  From: Henna Johnson  Sent: 10/11/2022   2:25 PM CDT  To: Sheldon Rothmanrick is calling to get an order for physical therapy for another two weeks.

## 2022-10-15 ENCOUNTER — DOCUMENT SCAN (OUTPATIENT)
Dept: HOME HEALTH SERVICES | Facility: HOSPITAL | Age: 67
End: 2022-10-15
Payer: MEDICARE

## 2022-10-21 ENCOUNTER — PATIENT MESSAGE (OUTPATIENT)
Dept: HEMATOLOGY/ONCOLOGY | Facility: CLINIC | Age: 67
End: 2022-10-21
Payer: MEDICARE

## 2022-10-24 NOTE — PLAN OF CARE
----- Message from Ania Mauro sent at 10/24/2022  9:53 AM CDT -----  Regarding: forms from employer  Type:  Needs Medical Advice    Who Called: patient   Symptoms (please be specific):    How long has patient had these symptoms:    Pharmacy name and phone #:    Would the patient rather a call back or a response via [facility]?   Best Call Back Number: [phone]  Additional Information: patient has forms from her employer that needs to be filled out. Please call patient back.        Problem: Adult Inpatient Plan of Care  Goal: Plan of Care Review  Outcome: Ongoing (interventions implemented as appropriate)  Patient AA&Ox4. VS's stable on RA. Glucose monitoring continued. NPO@MN for Sx. Patient and family verbalized that they wont be having the surgery at this time. Will have surgery if required, but would like to look at other options. Wound care provided per order. Denies needs. Safety intact. Call light and bedside table within reach. Bed in lowest, locked position with side rails up x3. Nonskid socks present. Will continue to monitor.

## 2022-10-25 ENCOUNTER — EXTERNAL HOME HEALTH (OUTPATIENT)
Dept: HOME HEALTH SERVICES | Facility: HOSPITAL | Age: 67
End: 2022-10-25
Payer: MEDICARE

## 2022-10-25 ENCOUNTER — DOCUMENT SCAN (OUTPATIENT)
Dept: HOME HEALTH SERVICES | Facility: HOSPITAL | Age: 67
End: 2022-10-25
Payer: MEDICARE

## 2022-11-02 ENCOUNTER — PATIENT MESSAGE (OUTPATIENT)
Dept: HEMATOLOGY/ONCOLOGY | Facility: CLINIC | Age: 67
End: 2022-11-02
Payer: MEDICARE

## 2022-11-03 ENCOUNTER — TELEPHONE (OUTPATIENT)
Dept: HEMATOLOGY/ONCOLOGY | Facility: CLINIC | Age: 67
End: 2022-11-03
Payer: MEDICARE

## 2022-11-03 NOTE — TELEPHONE ENCOUNTER
----- Message from Cheri London sent at 11/3/2022  9:25 AM CDT -----  Regarding: Karli Lopez [facility] [phone]  Contact: Karli Lopez [facility] [phone]  Who Called: Karli Lopez [facility] [phone]    Calling to talk to nurse in regards to getting an update on patients treatments. Please advice

## 2022-11-04 ENCOUNTER — PATIENT MESSAGE (OUTPATIENT)
Dept: HEMATOLOGY/ONCOLOGY | Facility: CLINIC | Age: 67
End: 2022-11-04
Payer: MEDICARE

## 2023-01-06 NOTE — PROGRESS NOTES
Attempted x2 to reach patient's wife at [phone] number, which has been disconnected. Will reach out to review patient's schedule with him before 0900 as requested by patient.    Statement Selected

## 2023-02-28 NOTE — TELEPHONE ENCOUNTER
I saw pt in March as a new pt and told him he needed to establish with a MD PCP back then, suggested Dr. Kent. Doesn't appear he ever established with an MD as a PCP. With that being said, as an NP and not a PCP, I cannot order home health. He is going to [facility] to be seen by an MD to establish as I told him back in March for this.    Lucia Ba, ALEXX, FNP-C     n/a

## 2023-05-02 NOTE — SUBJECTIVE & OBJECTIVE
Interval History:  Pt reports great improvement in his fatigue and some improvement in his weakness since admission.    Review of Systems   Constitutional: Positive for activity change, chills, diaphoresis, fatigue and fever. Negative for appetite change.   HENT: Negative for congestion, postnasal drip and sore throat.    Eyes: Positive for visual disturbance.   Respiratory: Negative for cough, chest tightness and shortness of breath.    Cardiovascular: Negative for chest pain, palpitations and leg swelling.   Gastrointestinal: Positive for nausea. Negative for abdominal pain, constipation, diarrhea and vomiting.   Endocrine: Negative for heat intolerance.   Genitourinary: Negative for difficulty urinating and dysuria.   Musculoskeletal: Negative for arthralgias, back pain and myalgias.   Neurological: Positive for weakness and light-headedness. Negative for numbness and headaches.     Objective:     Vital Signs (Most Recent):  Temp: 96.8 °F (36 °C) (09/15/19 1512)  Pulse: 69 (09/15/19 1512)  Resp: 17 (09/15/19 1512)  BP: 128/66 (09/15/19 1512)  SpO2: 96 % (09/15/19 1512) Vital Signs (24h Range):  Temp:  [96.8 °F (36 °C)-99 °F (37.2 °C)] 96.8 °F (36 °C)  Pulse:  [56-87] 69  Resp:  [16-18] 17  SpO2:  [93 %-97 %] 96 %  BP: (126-143)/(66-93) 128/66     Weight: 130.6 kg (288 lb)  Body mass index is 39.06 kg/m².    Intake/Output Summary (Last 24 hours) at 9/15/2019 1541  Last data filed at 9/15/2019 1056  Gross per 24 hour   Intake 240 ml   Output 1105 ml   Net -865 ml      Physical Exam   Constitutional: He is oriented to person, place, and time. He appears well-developed and well-nourished. He does not appear ill. No distress.   Obese   HENT:   Head: Normocephalic and atraumatic.   Eyes: No scleral icterus.   Neck: Normal range of motion. Neck supple.   Cardiovascular: Normal rate, regular rhythm and intact distal pulses. Exam reveals no gallop and no friction rub.   No murmur heard.  Pulmonary/Chest: Effort normal and  breath sounds normal. No respiratory distress.   Abdominal: Soft. He exhibits distension. There is no tenderness. There is no guarding.   Musculoskeletal:   b/l UE 5/5 muscle strength  RLE: 3/5 muscle strength. Able to move his R foot and bend his right knee  LLE stump redness improving, some scabbing at the base of the stump   Neurological: He is alert and oriented to person, place, and time.   Skin: Skin is warm.   Nursing note and vitals reviewed.      Significant Labs:   CBC:   Recent Labs   Lab 09/14/19  0650 09/15/19  0616   WBC 12.13 14.44*   HGB 13.6* 13.5*   HCT 41.2 39.4*    196     CMP:   Recent Labs   Lab 09/14/19  0650 09/15/19  0615   * 137   K 4.4 4.5    100   CO2 25 29   * 130*   BUN 19 30*   CREATININE 0.9 1.4   CALCIUM 8.9 9.1   PROT 6.0 6.1   ALBUMIN 2.2* 2.0*   BILITOT 1.6* 0.7   ALKPHOS 133 141*   AST 54* 35   ALT 81* 79*   ANIONGAP 10 8   EGFRNONAA >60.0 53.5*       Significant Imaging: I have reviewed all pertinent imaging results/findings within the past 24 hours.   3 = A little assistance

## 2024-03-12 NOTE — DISCHARGE INSTRUCTIONS
For scheduling: Call Nena at [phone]    For questions or concerns call: LISA MON-FRI 8 AM- 5PM [phone]. Radiology resident on call [phone].    For immediate concerns that are not emergent, you may call our radiology clinic at: [phone]     AFIB
